# Patient Record
Sex: FEMALE | Race: WHITE | NOT HISPANIC OR LATINO | Employment: FULL TIME | URBAN - METROPOLITAN AREA
[De-identification: names, ages, dates, MRNs, and addresses within clinical notes are randomized per-mention and may not be internally consistent; named-entity substitution may affect disease eponyms.]

---

## 2017-01-06 ENCOUNTER — HOSPITAL ENCOUNTER (OUTPATIENT)
Dept: CARDIOLOGY | Facility: CLINIC | Age: 57
Discharge: HOME | End: 2017-01-06
Payer: COMMERCIAL

## 2017-01-06 ENCOUNTER — TELEPHONE (OUTPATIENT)
Dept: ELECTROPHYSIOLOGY | Facility: CLINIC | Age: 57
End: 2017-01-06

## 2017-01-06 ENCOUNTER — OFFICE VISIT (OUTPATIENT)
Dept: GASTROENTEROLOGY | Facility: CLINIC | Age: 57
End: 2017-01-06
Payer: COMMERCIAL

## 2017-01-06 ENCOUNTER — TELEPHONE (OUTPATIENT)
Dept: ENDOSCOPY | Facility: HOSPITAL | Age: 57
End: 2017-01-06

## 2017-01-06 VITALS
HEART RATE: 77 BPM | BODY MASS INDEX: 47.38 KG/M2 | DIASTOLIC BLOOD PRESSURE: 84 MMHG | SYSTOLIC BLOOD PRESSURE: 133 MMHG | HEIGHT: 65 IN | WEIGHT: 284.38 LBS

## 2017-01-06 DIAGNOSIS — I48.91 ATRIAL FIBRILLATION, UNSPECIFIED TYPE: ICD-10-CM

## 2017-01-06 DIAGNOSIS — I48.0 PAROXYSMAL ATRIAL FIBRILLATION: Primary | ICD-10-CM

## 2017-01-06 DIAGNOSIS — K22.10 ULCER OF ESOPHAGUS WITHOUT BLEEDING: Primary | ICD-10-CM

## 2017-01-06 DIAGNOSIS — I48.0 PAROXYSMAL ATRIAL FIBRILLATION: ICD-10-CM

## 2017-01-06 DIAGNOSIS — E66.01 MORBID OBESITY WITH BMI OF 45.0-49.9, ADULT: ICD-10-CM

## 2017-01-06 DIAGNOSIS — I10 ESSENTIAL HYPERTENSION: ICD-10-CM

## 2017-01-06 PROCEDURE — 93000 ELECTROCARDIOGRAM COMPLETE: CPT | Mod: S$GLB,,, | Performed by: INTERNAL MEDICINE

## 2017-01-06 PROCEDURE — 99999 PR PBB SHADOW E&M-EST. PATIENT-LVL III: CPT | Mod: PBBFAC,,, | Performed by: PHYSICIAN ASSISTANT

## 2017-01-06 PROCEDURE — 1159F MED LIST DOCD IN RCRD: CPT | Mod: S$GLB,,, | Performed by: PHYSICIAN ASSISTANT

## 2017-01-06 PROCEDURE — 3075F SYST BP GE 130 - 139MM HG: CPT | Mod: S$GLB,,, | Performed by: PHYSICIAN ASSISTANT

## 2017-01-06 PROCEDURE — 3079F DIAST BP 80-89 MM HG: CPT | Mod: S$GLB,,, | Performed by: PHYSICIAN ASSISTANT

## 2017-01-06 PROCEDURE — 99204 OFFICE O/P NEW MOD 45 MIN: CPT | Mod: S$GLB,,, | Performed by: PHYSICIAN ASSISTANT

## 2017-01-06 RX ORDER — LISINOPRIL 10 MG/1
10 TABLET ORAL
COMMUNITY
End: 2017-03-07 | Stop reason: SDUPTHER

## 2017-01-06 RX ORDER — METOPROLOL TARTRATE 25 MG/1
25 TABLET, FILM COATED ORAL
COMMUNITY
End: 2017-03-07 | Stop reason: SDUPTHER

## 2017-01-06 RX ORDER — HYDROCHLOROTHIAZIDE 25 MG/1
25 TABLET ORAL
COMMUNITY
End: 2017-01-25 | Stop reason: SDUPTHER

## 2017-01-06 RX ORDER — AZITHROMYCIN 500 MG/1
TABLET, FILM COATED ORAL
Refills: 0 | COMMUNITY
Start: 2016-11-04 | End: 2017-01-25

## 2017-01-06 NOTE — TELEPHONE ENCOUNTER
----- Message from Jocelin Yeung MA sent at 1/6/2017 10:11 AM CST -----  Contact: patient called  Batsheva please call the patient at [phone] she had a nose bleed yesterday  today it stop her blood pressure today is 150/97 heart rate is 102. Thank you.

## 2017-01-06 NOTE — TELEPHONE ENCOUNTER
Pt states that her BP has been fluctuating and HR this AM was 102. She could not give me exact values on BP. She also had a nose bleed with bright red blood just sitting at her desk yesterday. She is currently on her way to an appointment her at [facility] to see an NP for GI evaluation. EKG ordered and to be done prior to GI appointment. I informed her that once I have the results, I will discuss this with Dr. Bojorquez and call her back. She verbalized understanding and denied further questions, needs, and concerns.

## 2017-01-06 NOTE — PROGRESS NOTES
[facility] Consultation Note    Reason for Consult:  The primary encounter diagnosis was Ulcer of esophagus without bleeding. Diagnoses of Essential hypertension, Paroxysmal atrial fibrillation, and Morbid obesity with BMI of 45.0-49.9, adult were also pertinent to this visit.    PCP:   [facility]   [address] [facility] / [place] [address]    Referring MD:  Manny Bojorquez Md  [address] [place] [address]    HPI:  This is a 56 y.o. female referred by Dr. Bojorquez for evaluation of hx of an esophageal ulcer.  Diagnosed with an esophageal ulcer 10 yrs ago via endoscopy. Did not have a f/u to check for healing.  Currently being evaluated by cardiology for a fib. The plan is for her to start Eliquis, but need to rule out GI bleeding. Today she has no complaints. Denies GERD, no N/V/D, melena or rectal bleeding.     She is due for a screening colonoscopy, but does not wish to schedule it at this time.     ROS:  Constitutional: No fevers, chills, No weight loss  ENT: No allergies  CV: No chest pain  Pulm: No cough, No shortness of breath  Ophtho: No vision changes  GI: see HPI  Derm: No rash  Heme: No lymphadenopathy, No bruising  MSK: No arthritis  : No dysuria, No hematuria  Endo: No hot or cold intolerance  Neuro: No syncope, No seizure  Psych: No anxiety, No depression    Medical History:  has a past medical history of Arrhythmia; Bronchitis; High blood pressure; and Ulcer.    Surgical History:  has a past surgical history that includes Total hip arthroplasty and csection.    Family History: family history includes Colon cancer in her maternal grandmother. There is no history of Stomach cancer or Esophageal cancer..     Social History:  reports that she has never smoked. She does not have any smokeless tobacco history on file. She reports that she does not drink alcohol.    Review of patient's allergies indicates:   Allergen Reactions    Aspirin Other (See  "Comments)     "I don't take it because I've had ulcers"   ulcers    Meperidine        Current Outpatient Prescriptions on File Prior to Visit   Medication Sig Dispense Refill    hydrochlorothiazide (HYDRODIURIL) 25 MG tablet Take 25 mg by mouth once daily.  2    metoprolol tartrate (LOPRESSOR) 100 MG tablet Take 50 mg by mouth once daily.       lisinopril 10 MG tablet Take 10 mg by mouth once daily.  2     No current facility-administered medications on file prior to visit.          Objective Findings:    Vital Signs:  Visit Vitals    /84    Pulse 77    Ht 5' 5" (1.651 m)    Wt 129 kg (284 lb 6.3 oz)    BMI 47.33 kg/m2     Body mass index is 47.33 kg/(m^2).    Physical Exam:  General Appearance: Well appearing in no acute distress  Head:   Normocephalic, without obvious abnormality  Eyes:    No scleral icterus  ENT: Neck supple, Lips, mucosa, and tongue normal  Lungs: CTA bilaterally in anterior and posterior fields, no wheezes, no crackles.  Heart:  Irregular irregular, no murmurs heard  Abdomen: Soft, non tender, non distended with positive bowel sounds in all four quadrants.   Extremities: 2+ pulses, no clubbing, cyanosis or edema  Skin: No rash  Neurologic: AAO x 3      Labs:  Lab Results   Component Value Date    WBC 8.52 04/15/2006    HGB 12.7 04/15/2006    HCT 38.2 04/15/2006     04/15/2006    CHOL 224 (H) 01/28/2006    TRIG 246 (H) 01/28/2006    HDL 39.0 (L) 01/28/2006    ALT 15 01/28/2006    AST 17 01/28/2006     01/28/2006    K 4.2 01/28/2006     01/28/2006    CREATININE 0.8 01/28/2006    BUN 18 01/28/2006    CO2 26 01/28/2006    TSH 1.3 01/28/2006       Imaging:    Endoscopy:    EGD - 10yrs ago  Assessment:  1. Ulcer of esophagus without bleeding    2. Essential hypertension    3. Paroxysmal atrial fibrillation    4. Morbid obesity with BMI of 45.0-49.9, adult      Hx esophageal ulcers 10yrs ago. Did not have f/u EGD to confirm ulcer healing. Need to confirm healing prior " to starting anticoagulation for A Fib    Recommendations:  1. Schedule EGD to rule out esophageal ulcer and GI bleeding      No Follow-up on file.      Order summary:  Orders Placed This Encounter    Case request GI: ESOPHAGOGASTRODUODENOSCOPY (EGD)         Thank you so much for allowing me to participate in the care of Delmy Smith PA-C

## 2017-01-06 NOTE — LETTER
January 6, 2017      Manny Bojorquez MD  [address] [facility] [address]           [facility] Gastroenterology  [address]  [place] [address]  Phone: [phone]  Fax: [phone]          Patient: Delmy Shoemaker   MR Number: [id number]   YOB: 1960   Date of Visit: 1/6/2017       Dear Dr. Manny Bojorquez:    Thank you for referring Delmy Shoemaker to me for evaluation. Attached you will find relevant portions of my assessment and plan of care.    If you have questions, please do not hesitate to call me. I look forward to following Delmy Shoemaker along with you.    Sincerely,    Xenia Smith PA-C    Enclosure  CC:  No Recipients    If you would like to receive this communication electronically, please contact [email or web address] or [phone] to request more information on [employer] access.    For providers and/or their staff who would like to refer a patient to [facility], please contact us through our one-stop-shop provider referral line, [facility], at [phone].    If you feel you have received this communication in error or would no longer like to receive these types of communications, please e-mail [email or web address]

## 2017-01-06 NOTE — MR AVS SNAPSHOT
[facility] - Gastroenterology  [address]  [facility] [address]  Phone: [phone]  Fax: [phone]                  Delmy Shoemaker   2017 2:30 PM   Office Visit    Description:  Female : 1960   Provider:  Xenia Smith PA-C   Department:  [facility] - Gastroenterology           Reason for Visit     GI Problem           Diagnoses this Visit        Comments    Ulcer of esophagus without bleeding    -  Primary            To Do List           Future Appointments        Provider Department Dept Phone    2017 1:00 PM ECHO, [facility] - Echo/Stress Lab [phone]    2017 1:45 PM CARDIAC, PET IMAGING [facility] Cardiac [phone]    2017 3:30 PM HOLTER, EKG [facility] Arrhythmia [phone]    3/7/2017 10:00 AM EKG, APPT [facility] -[phone]    3/7/2017 10:40 AM Manny Bojorquez MD [facility] Arrhythmia [phone]      Goals (5 Years of Data)     None      [facility]     [facility] On Call Nurse Care Line -  Assistance  Registered nurses in the [facility] On Call Center provide clinical advisement, health education, appointment booking, and other advisory services.  Call for this free service at [phone].             Medications           Message regarding Medications     Verify the changes and/or additions to your medication regime listed below are the same as discussed with your clinician today.  If any of these changes or additions are incorrect, please notify your healthcare provider.             Verify that the below list of medications is an accurate representation of the medications you are currently taking.  If none reported, the list may be blank. If incorrect, please contact your healthcare provider. Carry this list with you in case of emergency.           Current Medications     hydrochlorothiazide (HYDRODIURIL) 25 MG tablet Take 25 mg by mouth once daily.    lisinopril 10 MG tablet Take 10 mg by mouth.    metoprolol  "tartrate (LOPRESSOR) 100 MG tablet Take 50 mg by mouth once daily.     azithromycin (ZITHROMAX) 500 MG tablet TAKE ONE TABLET BY MOUTH DAILY FOR 3 DAYS    hydrochlorothiazide (HYDRODIURIL) 25 MG tablet Take 25 mg by mouth.    lisinopril 10 MG tablet Take 10 mg by mouth once daily.    metoprolol tartrate (LOPRESSOR) 25 MG tablet Take 25 mg by mouth.           Clinical Reference Information           Vital Signs - Last Recorded  Most recent update: 1/6/2017  2:43 PM by Teddy Elena MA    BP Pulse Ht Wt BMI    133/84 77 5' 5" (1.651 m) 129 kg (284 lb 6.3 oz) 47.33 kg/m2      Blood Pressure          Most Recent Value    BP  133/84      Allergies as of 1/6/2017     Aspirin    Meperidine      Immunizations Administered on Date of Encounter - 1/6/2017     None      Orders Placed During Today's Visit      Normal Orders This Visit    Case request GI: ESOPHAGOGASTRODUODENOSCOPY (EGD)       [facility] Sign-Up     Activating your [facility] account is as easy as 1-2-3!     1) Visit [email or web address], select Sign Up Now, enter this activation code and your date of birth, then select Next.  [email or web address]  Expires: 2/11/2017  8:20 AM      2) Create a username and password to use when you visit [facility] in the future and select a security question in case you lose your password and select Next.    3) Enter your e-mail address and click Sign Up!    Additional Information  If you have questions, please e-mail [email or web address] or call [phone] to talk to our [facility] staff. Remember, [facility] is NOT to be used for urgent needs. For medical emergencies, dial 911.         "

## 2017-01-06 NOTE — TELEPHONE ENCOUNTER
Please see my previous telephone note. Pt brought me EKG, which shows SB.  Stated she feels fine. She brought me a copy of her recent vitals (which I will send to medical records to be scanned) which shows a fluctuating BP. HR's are between 50's-114 bmp and -184/60's-100's.   I asked that she wait in the lobby while I speak with Dr. Bojorquez.  Discussed with Dr. Bojorquez. He states that we should continue with the plan as scheduled (echo, stress test, and 48 HR holter), and then depending on the results of the 48 Hour holter, we can schedule a Zio patch, and follow up PCP in reference to BP. I discussed this with the Pt several minutes in the lobby. She verbalized understanding of all discussed and denied further questions, needs, and concerns. Will schedule Zio patch once 48 HR Holter completed and resulted. Pt ambulated out of clinic in stable condition.

## 2017-01-09 ENCOUNTER — CLINICAL SUPPORT (OUTPATIENT)
Dept: CARDIOLOGY | Facility: CLINIC | Age: 57
End: 2017-01-09
Payer: COMMERCIAL

## 2017-01-09 ENCOUNTER — HOSPITAL ENCOUNTER (OUTPATIENT)
Dept: CARDIOLOGY | Facility: CLINIC | Age: 57
Discharge: HOME | End: 2017-01-09
Payer: COMMERCIAL

## 2017-01-09 ENCOUNTER — CLINICAL SUPPORT (OUTPATIENT)
Dept: ELECTROPHYSIOLOGY | Facility: CLINIC | Age: 57
End: 2017-01-09
Payer: COMMERCIAL

## 2017-01-09 DIAGNOSIS — E66.01 MORBID OBESITY WITH BMI OF 45.0-49.9, ADULT: ICD-10-CM

## 2017-01-09 DIAGNOSIS — I10 ESSENTIAL HYPERTENSION: ICD-10-CM

## 2017-01-09 DIAGNOSIS — I48.0 PAROXYSMAL ATRIAL FIBRILLATION: Primary | ICD-10-CM

## 2017-01-09 DIAGNOSIS — I48.0 PAROXYSMAL ATRIAL FIBRILLATION: ICD-10-CM

## 2017-01-09 DIAGNOSIS — R07.9 CHEST PAIN, UNSPECIFIED TYPE: ICD-10-CM

## 2017-01-09 LAB
DIASTOLIC DYSFUNCTION: NO
DIASTOLIC DYSFUNCTION: NO
RETIRED EF AND QEF - SEE NOTES: 60 (ref 55–65)

## 2017-01-09 PROCEDURE — 99999 PR PBB SHADOW E&M-EST. PATIENT-LVL I: CPT | Mod: PBBFAC,,,

## 2017-01-09 PROCEDURE — 93224 XTRNL ECG REC UP TO 48 HRS: CPT | Mod: S$GLB,,, | Performed by: INTERNAL MEDICINE

## 2017-01-09 PROCEDURE — 78492 MYOCRD IMG PET MLT RST&STRS: CPT | Mod: S$GLB,,, | Performed by: INTERNAL MEDICINE

## 2017-01-09 PROCEDURE — 93306 TTE W/DOPPLER COMPLETE: CPT | Mod: S$GLB,,, | Performed by: INTERNAL MEDICINE

## 2017-01-09 PROCEDURE — A9555 RB82 RUBIDIUM: HCPCS | Mod: S$GLB,,, | Performed by: INTERNAL MEDICINE

## 2017-01-09 PROCEDURE — 93015 CV STRESS TEST SUPVJ I&R: CPT | Mod: S$GLB,,, | Performed by: INTERNAL MEDICINE

## 2017-01-09 PROCEDURE — 96372 THER/PROPH/DIAG INJ SC/IM: CPT | Mod: 59,S$GLB,, | Performed by: INTERNAL MEDICINE

## 2017-01-09 RX ORDER — FLECAINIDE ACETATE 100 MG/1
100 TABLET ORAL EVERY 12 HOURS
Qty: 60 TABLET | Refills: 11 | Status: SHIPPED | OUTPATIENT
Start: 2017-01-09 | End: 2017-12-07 | Stop reason: SDUPTHER

## 2017-01-11 ENCOUNTER — OFFICE VISIT (OUTPATIENT)
Dept: OTOLARYNGOLOGY | Facility: CLINIC | Age: 57
End: 2017-01-11
Payer: COMMERCIAL

## 2017-01-11 VITALS
SYSTOLIC BLOOD PRESSURE: 130 MMHG | DIASTOLIC BLOOD PRESSURE: 90 MMHG | TEMPERATURE: 97 F | HEART RATE: 54 BPM | BODY MASS INDEX: 47 KG/M2 | WEIGHT: 282.44 LBS

## 2017-01-11 DIAGNOSIS — R04.0 ANTERIOR EPISTAXIS: Primary | ICD-10-CM

## 2017-01-11 DIAGNOSIS — I48.0 PAROXYSMAL ATRIAL FIBRILLATION: ICD-10-CM

## 2017-01-11 PROCEDURE — 99203 OFFICE O/P NEW LOW 30 MIN: CPT | Mod: 25,S$GLB,, | Performed by: OTOLARYNGOLOGY

## 2017-01-11 PROCEDURE — 99999 PR PBB SHADOW E&M-EST. PATIENT-LVL III: CPT | Mod: PBBFAC,,, | Performed by: OTOLARYNGOLOGY

## 2017-01-11 PROCEDURE — 31231 NASAL ENDOSCOPY DX: CPT | Mod: S$GLB,,, | Performed by: OTOLARYNGOLOGY

## 2017-01-11 PROCEDURE — 1159F MED LIST DOCD IN RCRD: CPT | Mod: S$GLB,,, | Performed by: OTOLARYNGOLOGY

## 2017-01-11 PROCEDURE — 3080F DIAST BP >= 90 MM HG: CPT | Mod: S$GLB,,, | Performed by: OTOLARYNGOLOGY

## 2017-01-11 PROCEDURE — 3075F SYST BP GE 130 - 139MM HG: CPT | Mod: S$GLB,,, | Performed by: OTOLARYNGOLOGY

## 2017-01-11 NOTE — PROGRESS NOTES
"Chief Complaint   Patient presents with    Epistaxis         56 y.o. female presents with several week history of occasional nose bleeds starting just before [place]. They are typically left sided and are controlled with moderate pressure/Afrin. She is also concerned as this has led to a diagnosis of A fib and future anticoagulation. Denies nasal pain. No history of nasal packing.       Review of Systems     Constitutional: Negative for fatigue and unexpected weight change.   HENT: per HPI.  Eyes: Negative for visual disturbance.   Respiratory: Negative for shortness of breath, hemoptysis  Cardiovascular: Negative for chest pain and palpitations.   Genitourinary: Negative for dysuria and difficulty urinating.   Musculoskeletal: Negative for decreased ROM, back pain.   Skin: Negative for rash, sunburn, itching.   Neurological: Negative for dizziness and seizures.   Hematological: Negative for adenopathy. Does not bruise/bleed easily.   Psychiatric/Behavioral: Negative for agitation. The patient is not nervous/anxious.   Endocrine: Negative for rapid weight loss/weight gain, heat/cold intolerance.     Past Medical History   Diagnosis Date    Arrhythmia     Bronchitis     High blood pressure     Ulcer        Past Surgical History   Procedure Laterality Date    Total hip arthroplasty      Csection         family history includes Colon cancer in her maternal grandmother. There is no history of Stomach cancer or Esophageal cancer.    Pt  reports that she has never smoked. She does not have any smokeless tobacco history on file. She reports that she does not drink alcohol.    Review of patient's allergies indicates:   Allergen Reactions    Aspirin Other (See Comments)     "I don't take it because I've had ulcers"   ulcers    Meperidine         Physical Exam    Vitals:    01/11/17 1317   BP: (!) 130/90   Pulse: (!) 54   Temp: 96.5 °F (35.8 °C)     Body mass index is 47 kg/(m^2).    Physical Exam   Constitutional: " She is oriented to person, place, and time. She appears well-developed and well-nourished. No distress.   HENT:   Head: Normocephalic and atraumatic.   Right Ear: Hearing, tympanic membrane, external ear and ear canal normal. Tympanic membrane mobility is normal. No middle ear effusion. No decreased hearing is noted.   Left Ear: Hearing, tympanic membrane, external ear and ear canal normal. Tympanic membrane mobility is normal.  No middle ear effusion. No decreased hearing is noted.   Nose: Nose lacerations (left anterior septum with excoriation and prominent capillary in Keiselbach's plexus) present. No mucosal edema, rhinorrhea or nasal septal hematoma.   Mouth/Throat: Uvula is midline, oropharynx is clear and moist and mucous membranes are normal.   After informed consent obtained for nasal endoscopy. The nasal cavities were sprayed with Phenylephrine and Lidocaine. Nasal endoscopy then performed. No other masses or lesions seen posteriorly bilaterally. Silver nitrate applied to left anterior septum. Patient tolerated the procedure well.   Eyes: Conjunctivae, EOM and lids are normal. Pupils are equal, round, and reactive to light. Right eye exhibits no discharge. Left eye exhibits no discharge.   Neck: Trachea normal, normal range of motion and phonation normal. Neck supple. No tracheal tenderness present. No tracheal deviation, no edema and no erythema present. No thyroid mass and no thyromegaly present.   Cardiovascular: Normal heart sounds.    Pulmonary/Chest: Breath sounds normal. No stridor.   Abdominal: Soft.   Lymphadenopathy:     She has no cervical adenopathy.   Neurological: She is alert and oriented to person, place, and time.   Skin: Skin is warm and dry. No rash noted. She is not diaphoretic. No erythema. No pallor.   Psychiatric: She has a normal mood and affect.   Nursing note and vitals reviewed.          Assessment     1. Anterior epistaxis    2. Paroxysmal atrial fibrillation          Plan  In  summary, Ms. Shoemaker is a 56 year old female with recent episodes of epistaxis and concerned for new anticoagulation for A fib. Area of excoriation/dryness on left anterior septum. Silver nitrate applied in clinic today. Discussed prevention with humidified air, nasal saline spray, and vaseline as well as episodic treatment with pressure, Afrin prn, and ice packs. ED for severe episodes. Return to clinic prn.

## 2017-01-12 ENCOUNTER — TELEPHONE (OUTPATIENT)
Dept: ELECTROPHYSIOLOGY | Facility: CLINIC | Age: 57
End: 2017-01-12

## 2017-01-12 NOTE — TELEPHONE ENCOUNTER
Called Pt to discuss Holter Monitor results per Dr. Bojorquez, however, she did not answer. I left a detailed voicemail asking her to call me back at her earliest convenience.

## 2017-01-12 NOTE — TELEPHONE ENCOUNTER
----- Message from Manny Boojrquez MD sent at 1/12/2017  1:12 PM CST -----  We can cancel the Zio patch for now    ----- Message -----     From: Batsheva Platt, RN     Sent: 1/12/2017   1:01 PM       To: Manny Bojorquez MD    Did you still want to continue with the Zio patch? Or wait and see how she does on the medications?  ----- Message -----     From: Manny Bojorquez MD     Sent: 1/12/2017  12:48 PM       To: Batsheva Platt, RN    Please notify the patient she had multiple paroxysms of atrial fibrillation while wearing the monitor, longest episode was approximately 3.5 hours.  We will see how she does with her new medications.

## 2017-01-12 NOTE — PROGRESS NOTES
Please notify the patient she had multiple paroxysms of atrial fibrillation while wearing the monitor, longest episode was approximately 3.5 hours.  We will see how she does with her new medications.

## 2017-01-13 ENCOUNTER — SURGERY (OUTPATIENT)
Age: 57
End: 2017-01-13

## 2017-01-13 ENCOUNTER — ANESTHESIA (OUTPATIENT)
Dept: ENDOSCOPY | Facility: HOSPITAL | Age: 57
End: 2017-01-13
Payer: COMMERCIAL

## 2017-01-13 ENCOUNTER — ANESTHESIA EVENT (OUTPATIENT)
Dept: ENDOSCOPY | Facility: HOSPITAL | Age: 57
End: 2017-01-13
Payer: COMMERCIAL

## 2017-01-13 ENCOUNTER — TELEPHONE (OUTPATIENT)
Dept: ELECTROPHYSIOLOGY | Facility: CLINIC | Age: 57
End: 2017-01-13

## 2017-01-13 ENCOUNTER — HOSPITAL ENCOUNTER (OUTPATIENT)
Facility: HOSPITAL | Age: 57
Discharge: HOME | End: 2017-01-13
Attending: INTERNAL MEDICINE | Admitting: INTERNAL MEDICINE
Payer: COMMERCIAL

## 2017-01-13 VITALS
BODY MASS INDEX: 53.81 KG/M2 | HEIGHT: 61 IN | WEIGHT: 285 LBS | OXYGEN SATURATION: 98 % | HEART RATE: 57 BPM | RESPIRATION RATE: 20 BRPM | DIASTOLIC BLOOD PRESSURE: 65 MMHG | TEMPERATURE: 98 F | SYSTOLIC BLOOD PRESSURE: 131 MMHG

## 2017-01-13 DIAGNOSIS — K22.10 ULCER OF ESOPHAGUS WITHOUT BLEEDING: Primary | ICD-10-CM

## 2017-01-13 DIAGNOSIS — K22.10 ESOPHAGEAL ULCER: ICD-10-CM

## 2017-01-13 PROCEDURE — 88305 TISSUE EXAM BY PATHOLOGIST: CPT | Mod: 26,,, | Performed by: PATHOLOGY

## 2017-01-13 PROCEDURE — D9220A PRA ANESTHESIA: Mod: ANES,,, | Performed by: ANESTHESIOLOGY

## 2017-01-13 PROCEDURE — D9220A PRA ANESTHESIA: Mod: CRNA,,, | Performed by: NURSE ANESTHETIST, CERTIFIED REGISTERED

## 2017-01-13 PROCEDURE — 43239 EGD BIOPSY SINGLE/MULTIPLE: CPT | Mod: ,,, | Performed by: INTERNAL MEDICINE

## 2017-01-13 PROCEDURE — 37000008 HC ANESTHESIA 1ST 15 MINUTES: Performed by: INTERNAL MEDICINE

## 2017-01-13 PROCEDURE — 88305 TISSUE EXAM BY PATHOLOGIST: CPT | Performed by: PATHOLOGY

## 2017-01-13 PROCEDURE — 27201012 HC FORCEPS, HOT/COLD, DISP: Performed by: INTERNAL MEDICINE

## 2017-01-13 PROCEDURE — 43239 EGD BIOPSY SINGLE/MULTIPLE: CPT | Performed by: INTERNAL MEDICINE

## 2017-01-13 PROCEDURE — 63600175 PHARM REV CODE 636 W HCPCS: Performed by: NURSE ANESTHETIST, CERTIFIED REGISTERED

## 2017-01-13 PROCEDURE — 25000003 PHARM REV CODE 250: Performed by: INTERNAL MEDICINE

## 2017-01-13 PROCEDURE — 37000009 HC ANESTHESIA EA ADD 15 MINS: Performed by: INTERNAL MEDICINE

## 2017-01-13 PROCEDURE — 25000003 PHARM REV CODE 250: Performed by: NURSE ANESTHETIST, CERTIFIED REGISTERED

## 2017-01-13 PROCEDURE — 88342 IMHCHEM/IMCYTCHM 1ST ANTB: CPT | Mod: 26,,, | Performed by: PATHOLOGY

## 2017-01-13 RX ORDER — SODIUM CHLORIDE 9 MG/ML
INJECTION, SOLUTION INTRAVENOUS CONTINUOUS
Status: DISCONTINUED | OUTPATIENT
Start: 2017-01-13 | End: 2017-01-13 | Stop reason: HOSPADM

## 2017-01-13 RX ORDER — LIDOCAINE HYDROCHLORIDE 10 MG/ML
INJECTION, SOLUTION INTRAVENOUS
Status: DISCONTINUED | OUTPATIENT
Start: 2017-01-13 | End: 2017-01-13

## 2017-01-13 RX ORDER — PROPOFOL 10 MG/ML
VIAL (ML) INTRAVENOUS
Status: DISCONTINUED | OUTPATIENT
Start: 2017-01-13 | End: 2017-01-13

## 2017-01-13 RX ORDER — SODIUM CHLORIDE 9 MG/ML
INJECTION, SOLUTION INTRAVENOUS CONTINUOUS PRN
Status: DISCONTINUED | OUTPATIENT
Start: 2017-01-13 | End: 2017-01-13

## 2017-01-13 RX ADMIN — TOPICAL ANESTHETIC 2 EACH: 200 SPRAY DENTAL; PERIODONTAL at 01:01

## 2017-01-13 RX ADMIN — SODIUM CHLORIDE: 0.9 INJECTION, SOLUTION INTRAVENOUS at 12:01

## 2017-01-13 RX ADMIN — SODIUM CHLORIDE: 0.9 INJECTION, SOLUTION INTRAVENOUS at 11:01

## 2017-01-13 RX ADMIN — PROPOFOL 20 MG: 10 INJECTION, EMULSION INTRAVENOUS at 01:01

## 2017-01-13 RX ADMIN — PROPOFOL 50 MG: 10 INJECTION, EMULSION INTRAVENOUS at 01:01

## 2017-01-13 RX ADMIN — LIDOCAINE HYDROCHLORIDE 100 MG: 10 INJECTION, SOLUTION INTRAVENOUS at 01:01

## 2017-01-13 NOTE — TELEPHONE ENCOUNTER
----- Message from Batsheva Platt RN sent at 1/13/2017  9:29 AM CST -----  Contact: pt       ----- Message -----     From: Marbella Garcia     Sent: 1/13/2017   8:43 AM       To: [place] [facility] Staff    Batsheva Longs says she's returning your call in ref to her Holter results    Thanks

## 2017-01-13 NOTE — IP AVS SNAPSHOT
[facility]  [address]  [facility] [address]  Phone: [phone]           Patient Discharge Instructions     Our goal is to set you up for success. This packet includes information on your condition, medications, and your home care. It will help you to care for yourself so you don't get sicker and need to go back to the hospital.     Please ask your nurse if you have any questions.        There are many details to remember when preparing to leave the hospital. Here is what you will need to do:    1. Take your medicine. If you are prescribed medications, review your Medication List in the following pages. You may have new medications to  at the pharmacy and others that you'll need to stop taking. Review the instructions for how and when to take your medications. Talk with your doctor or nurses if you are unsure of what to do.     2. Go to your follow-up appointments. Specific follow-up information is listed in the following pages. Your may be contacted by a transition nurse or clinical provider about future appointments. Be sure we have all of the phone numbers to reach you, if needed. Please contact your provider's office if you are unable to make an appointment.     3. Watch for warning signs. Your doctor or nurse will give you detailed warning signs to watch for and when to call for assistance. These instructions may also include educational information about your condition. If you experience any of warning signs to your health, call your doctor.               [facility]  Unless otherwise directed by your provider, please contact [facility], our nurse care line that is available for 24/7 assistance.     [phone] (toll-free)    Registered nurses in the [facility] provide clinical advisement, health education, appointment booking, and other advisory services.                    ** Verify the list of medication(s) below is accurate and up  to date. Carry this with you in case of emergency. If your medications have changed, please notify your healthcare provider.             Medication List      ASK your doctor about these medications        Additional Info                      azithromycin 500 MG tablet   Commonly known as:  ZITHROMAX   Refills:  0    Instructions:  TAKE ONE TABLET BY MOUTH DAILY FOR 3 DAYS     Begin Date    AM    Noon    PM    Bedtime       flecainide 100 MG Tab   Commonly known as:  TAMBOCOR   Quantity:  60 tablet   Refills:  11   Dose:  100 mg    Instructions:  Take 1 tablet (100 mg total) by mouth every 12 (twelve) hours.     Begin Date    AM    Noon    PM    Bedtime       * hydrochlorothiazide 25 MG tablet   Commonly known as:  HYDRODIURIL   Refills:  0   Dose:  25 mg    Instructions:  Take 25 mg by mouth.     Begin Date    AM    Noon    PM    Bedtime       * hydrochlorothiazide 25 MG tablet   Commonly known as:  HYDRODIURIL   Refills:  2   Dose:  25 mg    Instructions:  Take 25 mg by mouth once daily.     Begin Date    AM    Noon    PM    Bedtime       * lisinopril 10 MG tablet   Refills:  0   Dose:  10 mg    Instructions:  Take 10 mg by mouth.     Begin Date    AM    Noon    PM    Bedtime       * lisinopril 10 MG tablet   Refills:  2   Dose:  10 mg    Instructions:  Take 10 mg by mouth once daily.     Begin Date    AM    Noon    PM    Bedtime       * metoprolol tartrate 25 MG tablet   Commonly known as:  LOPRESSOR   Refills:  0   Dose:  25 mg    Instructions:  Take 25 mg by mouth.     Begin Date    AM    Noon    PM    Bedtime       * metoprolol tartrate 100 MG tablet   Commonly known as:  LOPRESSOR   Refills:  0   Dose:  50 mg    Instructions:  Take 50 mg by mouth once daily.     Begin Date    AM    Noon    PM    Bedtime       * Notice:  This list has 6 medication(s) that are the same as other medications prescribed for you. Read the directions carefully, and ask your doctor or other care provider to review them with you.                Please bring to all follow up appointments:    1. A copy of your discharge instructions.  2. All medicines you are currently taking in their original bottles.  3. Identification and insurance card.    Please arrive 15 minutes ahead of scheduled appointment time.    Please call 24 hours in advance if you must reschedule your appointment and/or time.        Your Scheduled Appointments     Mar 07, 2017 10:00 AM CST   EKG with EKG, APPT   [facility] - EKG ([facility] )    [address]  [place] [id number] [phone]            Mar 07, 2017 10:40 AM CST   Established Patient Visit with Manny Bojorquez MD   [facility] - Arrhythmia ([facility] )    [address]  [place] [id number] [phone]              Your Future Surgeries/Procedures     Jan 13, 2017   Surgery with Demarcus Whyte MD   [facility] ([facility])    [address] [facility] [id number] [phone]                  Discharge Instructions         Upper GI Endoscopy     During endoscopy, a long, flexible tube is used to view the inside of your upper GI tract.      Upper GI endoscopy allows your healthcare provider to look directly into the beginning of your gastrointestinal (GI) tract. The esophagus, stomach, and duodenum (the first part of the small intestine) make up the upper GI tract.   Before the exam  Follow these and any other instructions you are given before your endoscopy. If you dont follow the healthcare providers instructions carefully, the test may need to be canceled or done over:  · Don't eat or drink anything after midnight the night before your exam. If your exam is in the afternoon, drink only clear liquids in the morning. Don't eat or drink anything for 8 hours before the exam. In some cases, you may be able to take medicines with sips of water until 2 hours before the procedure. Speak with your healthcare provider about this.   · Bring your X-rays  and any other test results you have.  · Because you will be sedated, arrange for an adult to drive you home after the exam.  · Tell your healthcare provider before the exam if you are taking any medicines or have any medical problems.  The procedure  Here is what to expect:  · You will lie on the endoscopy table. Usually patients lie on the left side.  · You will be monitored and given oxygen.  · Your throat may be numbed with a spray or gargle. You are given medicine through an intravenous (IV) line that will help you relax and remain comfortable. You may be awake or asleep during the procedure.  · The healthcare provider will put the endoscope in your mouth and down your esophagus. It is thinner than most pieces of food that you swallow. It will not affect your breathing. The medicine helps keep you from gagging.  · Air is put into your GI tract to expand it. It can make you burp.  · During the procedure, the healthcare provider can take biopsies (tissue samples), remove abnormalities, such as polyps, or treat abnormalities through a variety of devices placed through the endoscope. You will not feel this.   · The endoscope carries images of your upper GI tract to a video screen. If you are awake, you may be able to look at the images.  · After the procedure is done, you will rest for a time. An adult must drive you home.  When to call your healthcare provider  Contact your healthcare provider if you have:  · Black or tarry stools, or blood in your stool  · Fever  · Pain in your belly that does not go away  · Nausea and vomiting, or vomiting blood   © 2939-3310 [employer]. [address] [place] [address], [place] [address]. All rights reserved. This information is not intended as a substitute for professional medical care. Always follow your healthcare professional's instructions.            Admission Information     Date & Time Provider Department CSN    1/13/2017 11:08 AM Austin C. Thomas, MD [facility]  "[facility] [id number]      Care Providers     Provider Role Specialty Primary office phone    Demarcus Whyte MD Attending Provider Gastroenterology [phone]    Demarcus Whyte MD Surgeon  Gastroenterology [phone]      Your Vitals Were     BP Pulse Temp Resp Height Weight    133/80 62 98.1 °F (36.7 °C) 20 5' 1" (1.549 m) 129.3 kg (285 lb)    SpO2 BMI             96% 53.85 kg/m2         Recent Lab Values     No lab values to display.      Pending Labs     Order Current Status    Specimen to Pathology - Surgery Collected (01/13/17 1311)      Allergies as of 1/13/2017        Reactions    Aspirin Other (See Comments)    "I don't take it because I've had ulcers"   ulcers    Meperidine       Advance Directives     An advance directive is a document which, in the event you are no longer able to make decisions for yourself, tells your healthcare team what kind of treatment you do or do not want to receive, or who you would like to make those decisions for you.  If you do not currently have an advance directive, [facility] encourages you to create one.  For more information call:  [phone]), [phone] ([phone]),  or log on to [email or web address].        Language Assistance Services     ATTENTION: Language assistance services are available, free of charge. Please call [phone].      ATENCIÓN: Si habla español, tiene a alves disposición servicios gratuitos de asistencia lingüística. Llame al [phone].     CHÚ Ý: N?u b?n nói Ti?ng Vi?t, có các d?ch v? h? tr? ngôn ng? mi?n phí dành cho b?n. G?i s? [phone].        [facility] Sign-Up     Activating your [facility] account is as easy as 1-2-3!     1) Visit [email or web address], select Sign Up Now, enter this activation code and your date of birth, then select Next.  [email or web address]  Expires: 2/11/2017  8:20 AM      2) Create a username and password to use when you visit [facility] in the future and select a security " question in case you lose your password and select Next.    3) Enter your e-mail address and click Sign Up!    Additional Information  If you have questions, please e-mail [email or web address] or call [phone] to talk to our [facility] staff. Remember, [facility] is NOT to be used for urgent needs. For medical emergencies, dial 911.          [facility] complies with applicable Federal civil rights laws and does not discriminate on the basis of race, color, national origin, age, disability, or sex.

## 2017-01-13 NOTE — ANESTHESIA POSTPROCEDURE EVALUATION
"Anesthesia Post Evaluation    Patient: Delmy Shoemaker    Procedure(s) Performed: Procedure(s) (LRB):  ESOPHAGOGASTRODUODENOSCOPY (EGD) (N/A)    Final Anesthesia Type: general  Patient location during evaluation: PACU  Patient participation: Yes- Able to Participate  Level of consciousness: awake and alert  Post-procedure vital signs: reviewed and stable  Pain management: adequate  Airway patency: patent  PONV status at discharge: No PONV  Anesthetic complications: no      Cardiovascular status: blood pressure returned to baseline  Respiratory status: unassisted  Hydration status: euvolemic  Follow-up not needed.        Visit Vitals    /65    Pulse (!) 57    Temp 36.7 °C (98.1 °F)    Resp 20    Ht 5' 1" (1.549 m)    Wt 129.3 kg (285 lb)    SpO2 98%    Breastfeeding No    BMI 53.85 kg/m2       Pain/Yojana Score: Pain Assessment Performed: Yes (1/13/2017  1:48 PM)  Presence of Pain: denies (1/13/2017  1:48 PM)  Pain Rating Prior to Med Admin: 0 (1/13/2017 11:37 AM)  Yojana Score: 10 (1/13/2017  1:48 PM)      "

## 2017-01-13 NOTE — IP AVS SNAPSHOT
[address]  [place] [address]  Phone: [phone]           I have received a copy of my After Visit Summary and discharge instructions from [facility].    INSTRUCTIONS RECEIVED AND UNDERSTOOD BY:                     Patient/Patient Representative: ________________________________________________________________     Date/Time: ________________________________________________________________                     Instructions Given By: ________________________________________________________________     Date/Time: ________________________________________________________________

## 2017-01-13 NOTE — H&P (VIEW-ONLY)
[facility] Consultation Note    Reason for Consult:  The primary encounter diagnosis was Ulcer of esophagus without bleeding. Diagnoses of Essential hypertension, Paroxysmal atrial fibrillation, and Morbid obesity with BMI of 45.0-49.9, adult were also pertinent to this visit.    PCP:   [facility]   [address] [facility] / [place] [address]    Referring MD:  Manny Bojorquez Md  [address] [place] [address]    HPI:  This is a 56 y.o. female referred by Dr. Bojorquez for evaluation of hx of an esophageal ulcer.  Diagnosed with an esophageal ulcer 10 yrs ago via endoscopy. Did not have a f/u to check for healing.  Currently being evaluated by cardiology for a fib. The plan is for her to start Eliquis, but need to rule out GI bleeding. Today she has no complaints. Denies GERD, no N/V/D, melena or rectal bleeding.     She is due for a screening colonoscopy, but does not wish to schedule it at this time.     ROS:  Constitutional: No fevers, chills, No weight loss  ENT: No allergies  CV: No chest pain  Pulm: No cough, No shortness of breath  Ophtho: No vision changes  GI: see HPI  Derm: No rash  Heme: No lymphadenopathy, No bruising  MSK: No arthritis  : No dysuria, No hematuria  Endo: No hot or cold intolerance  Neuro: No syncope, No seizure  Psych: No anxiety, No depression    Medical History:  has a past medical history of Arrhythmia; Bronchitis; High blood pressure; and Ulcer.    Surgical History:  has a past surgical history that includes Total hip arthroplasty and csection.    Family History: family history includes Colon cancer in her maternal grandmother. There is no history of Stomach cancer or Esophageal cancer..     Social History:  reports that she has never smoked. She does not have any smokeless tobacco history on file. She reports that she does not drink alcohol.    Review of patient's allergies indicates:   Allergen Reactions    Aspirin Other (See  "Comments)     "I don't take it because I've had ulcers"   ulcers    Meperidine        Current Outpatient Prescriptions on File Prior to Visit   Medication Sig Dispense Refill    hydrochlorothiazide (HYDRODIURIL) 25 MG tablet Take 25 mg by mouth once daily.  2    metoprolol tartrate (LOPRESSOR) 100 MG tablet Take 50 mg by mouth once daily.       lisinopril 10 MG tablet Take 10 mg by mouth once daily.  2     No current facility-administered medications on file prior to visit.          Objective Findings:    Vital Signs:  Visit Vitals    /84    Pulse 77    Ht 5' 5" (1.651 m)    Wt 129 kg (284 lb 6.3 oz)    BMI 47.33 kg/m2     Body mass index is 47.33 kg/(m^2).    Physical Exam:  General Appearance: Well appearing in no acute distress  Head:   Normocephalic, without obvious abnormality  Eyes:    No scleral icterus  ENT: Neck supple, Lips, mucosa, and tongue normal  Lungs: CTA bilaterally in anterior and posterior fields, no wheezes, no crackles.  Heart:  Irregular irregular, no murmurs heard  Abdomen: Soft, non tender, non distended with positive bowel sounds in all four quadrants.   Extremities: 2+ pulses, no clubbing, cyanosis or edema  Skin: No rash  Neurologic: AAO x 3      Labs:  Lab Results   Component Value Date    WBC 8.52 04/15/2006    HGB 12.7 04/15/2006    HCT 38.2 04/15/2006     04/15/2006    CHOL 224 (H) 01/28/2006    TRIG 246 (H) 01/28/2006    HDL 39.0 (L) 01/28/2006    ALT 15 01/28/2006    AST 17 01/28/2006     01/28/2006    K 4.2 01/28/2006     01/28/2006    CREATININE 0.8 01/28/2006    BUN 18 01/28/2006    CO2 26 01/28/2006    TSH 1.3 01/28/2006       Imaging:    Endoscopy:    EGD - 10yrs ago  Assessment:  1. Ulcer of esophagus without bleeding    2. Essential hypertension    3. Paroxysmal atrial fibrillation    4. Morbid obesity with BMI of 45.0-49.9, adult      Hx esophageal ulcers 10yrs ago. Did not have f/u EGD to confirm ulcer healing. Need to confirm healing prior " to starting anticoagulation for A Fib    Recommendations:  1. Schedule EGD to rule out esophageal ulcer and GI bleeding      No Follow-up on file.      Order summary:  Orders Placed This Encounter    Case request GI: ESOPHAGOGASTRODUODENOSCOPY (EGD)         Thank you so much for allowing me to participate in the care of Delmy Smith PA-C

## 2017-01-13 NOTE — ANESTHESIA PREPROCEDURE EVALUATION
01/13/2017  Delmy Shoemaker is a 56 y.o., female.  56 year old female with pmh of afib planning on going on eliquis, stomach ulcer, htn presents for EGD.  Pt's afib is controlled with flecainide.  [facility] Anesthesia Evaluation    I have reviewed the Patient Summary Reports.        Review of Systems  Anesthesia Hx:   Denies Personal Hx of Anesthesia complications.   Cardiovascular:   Hypertension Dysrhythmias    Hepatic/GI:   PUD,        Physical Exam  General:  Well nourished    Airway/Jaw/Neck:  Airway Findings: Mouth Opening: Normal Tongue: Normal  General Airway Assessment: Adult  Mallampati: III  TM Distance: Normal, at least 6 cm      Dental:  Dental Findings: In tact   Chest/Lungs:  Chest/Lungs Clear    Heart/Vascular:  Heart Findings: Normal            Anesthesia Plan  Type of Anesthesia, risks & benefits discussed:  Anesthesia Type:  general  Patient's Preference:   Intra-op Monitoring Plan:   Intra-op Monitoring Plan Comments:   Post Op Pain Control Plan:   Post Op Pain Control Plan Comments:   Induction:   IV  Beta Blocker:  Patient is on a Beta-Blocker and has received one dose within the past 24 hours (No further documentation required).       Informed Consent: Patient understands risks and agrees with Anesthesia plan.  Questions answered. Anesthesia consent signed with patient.  ASA Score: 3     Day of Surgery Review of History & Physical:  There are no significant changes.          Ready For Surgery From Anesthesia Perspective.

## 2017-01-13 NOTE — DISCHARGE INSTRUCTIONS

## 2017-01-13 NOTE — TRANSFER OF CARE
"Anesthesia Transfer of Care Note    Patient: Delmy Shoemaker    Procedure(s) Performed: Procedure(s) (LRB):  ESOPHAGOGASTRODUODENOSCOPY (EGD) (N/A)    Patient location: PACU    Anesthesia Type: general    Transport from OR: Transported from OR on room air with adequate spontaneous ventilation    Post pain: adequate analgesia    Post assessment: no apparent anesthetic complications    Post vital signs: stable    Level of consciousness: awake and alert    Nausea/Vomiting: no nausea/vomiting    Complications: none          Last vitals:   Visit Vitals    BP (!) 118/57 (BP Location: Left arm, Patient Position: Lying, BP Method: Automatic)    Pulse (!) 51    Temp 36.8 °C (98.3 °F) (Oral)    Resp 18    Ht 5' 1" (1.549 m)    Wt 129.3 kg (285 lb)    SpO2 96%    Breastfeeding No    BMI 53.85 kg/m2     "

## 2017-01-16 ENCOUNTER — DOCUMENTATION ONLY (OUTPATIENT)
Dept: GASTROENTEROLOGY | Facility: CLINIC | Age: 57
End: 2017-01-16

## 2017-01-16 ENCOUNTER — TELEPHONE (OUTPATIENT)
Dept: ELECTROPHYSIOLOGY | Facility: CLINIC | Age: 57
End: 2017-01-16

## 2017-01-16 DIAGNOSIS — I48.0 PAROXYSMAL ATRIAL FIBRILLATION: Primary | ICD-10-CM

## 2017-01-16 RX ORDER — OMEPRAZOLE 20 MG/1
20 CAPSULE, DELAYED RELEASE ORAL DAILY
Qty: 30 CAPSULE | Refills: 11 | Status: SHIPPED | OUTPATIENT
Start: 2017-01-16 | End: 2018-01-22 | Stop reason: SDUPTHER

## 2017-01-16 NOTE — PROGRESS NOTES
Okay to be anticoagulated from GI stand point. Will need Omeprazole 20 mg daily for gastro protection.

## 2017-01-19 ENCOUNTER — TELEPHONE (OUTPATIENT)
Dept: GASTROENTEROLOGY | Facility: CLINIC | Age: 57
End: 2017-01-19

## 2017-01-19 NOTE — TELEPHONE ENCOUNTER
----- Message from Demarcus Whyte MD sent at 1/19/2017  9:45 AM CST -----  Please notify patient, the stomach biopsies did not reveal any H.pylori.

## 2017-01-20 ENCOUNTER — TELEPHONE (OUTPATIENT)
Dept: ENDOSCOPY | Facility: HOSPITAL | Age: 57
End: 2017-01-20

## 2017-01-25 ENCOUNTER — OFFICE VISIT (OUTPATIENT)
Dept: OTOLARYNGOLOGY | Facility: CLINIC | Age: 57
End: 2017-01-25
Payer: COMMERCIAL

## 2017-01-25 ENCOUNTER — TELEPHONE (OUTPATIENT)
Dept: ELECTROPHYSIOLOGY | Facility: CLINIC | Age: 57
End: 2017-01-25

## 2017-01-25 VITALS
DIASTOLIC BLOOD PRESSURE: 74 MMHG | WEIGHT: 283.75 LBS | SYSTOLIC BLOOD PRESSURE: 125 MMHG | HEART RATE: 80 BPM | BODY MASS INDEX: 53.61 KG/M2 | TEMPERATURE: 98 F

## 2017-01-25 DIAGNOSIS — R04.0 EPISTAXIS, RECURRENT: Primary | ICD-10-CM

## 2017-01-25 PROCEDURE — 3074F SYST BP LT 130 MM HG: CPT | Mod: S$GLB,,, | Performed by: OTOLARYNGOLOGY

## 2017-01-25 PROCEDURE — 3078F DIAST BP <80 MM HG: CPT | Mod: S$GLB,,, | Performed by: OTOLARYNGOLOGY

## 2017-01-25 PROCEDURE — 99214 OFFICE O/P EST MOD 30 MIN: CPT | Mod: S$GLB,,, | Performed by: OTOLARYNGOLOGY

## 2017-01-25 PROCEDURE — 1159F MED LIST DOCD IN RCRD: CPT | Mod: S$GLB,,, | Performed by: OTOLARYNGOLOGY

## 2017-01-25 PROCEDURE — 99999 PR PBB SHADOW E&M-EST. PATIENT-LVL III: CPT | Mod: PBBFAC,,, | Performed by: OTOLARYNGOLOGY

## 2017-01-25 RX ORDER — CEPHALEXIN 500 MG/1
500 CAPSULE ORAL
COMMUNITY
Start: 2017-01-25 | End: 2017-02-04

## 2017-01-25 NOTE — TELEPHONE ENCOUNTER
Mrs. Shoemaker has been dealing with recurrent epistaxis, worse sense starting eliquis.  She currently has a nasal rocket in place.  I instructed her to hold her eliquis until this issue is settled with ENT.  I sent an [employer] message to her ENT requesting to inform me when they feel she is safe for anti-coagulation.  She has had a great response to Flecainide and has only noticed one episode of afib (~45 minutes) since starting it.  She is having resting bradycardia now in the low 50s.  Instructed her to go back to 25mg of metoprolol daily.

## 2017-01-26 NOTE — PROGRESS NOTES
Chief Complaint   Patient presents with    Follow-up     ED [facility]         56 y.o. female presents with several week history of occasional nose bleeds starting just before [place]. They are typically left sided and are controlled with moderate pressure/Afrin. Silver nitrate applied to left anterior septum at last visit. Recently started on Eliquis for Afib. Was bending over yesterday and noted increased facial pressure and then subsequent epistaxis. This episode resolved somewhat but then recurred this morning with increased bleeding. She then went to to her local ED in [place] where she was packed on the left side with a rhinorocket with resolve.     Review of Systems     Constitutional: Negative for fatigue and unexpected weight change.   HENT: per HPI.  Eyes: Negative for visual disturbance.   Respiratory: Negative for shortness of breath, hemoptysis  Cardiovascular: Negative for chest pain and palpitations.   Genitourinary: Negative for dysuria and difficulty urinating.   Musculoskeletal: Negative for decreased ROM, back pain.   Skin: Negative for rash, sunburn, itching.   Neurological: Negative for dizziness and seizures.   Hematological: Negative for adenopathy. Does not bruise/bleed easily.   Psychiatric/Behavioral: Negative for agitation. The patient is not nervous/anxious.   Endocrine: Negative for rapid weight loss/weight gain, heat/cold intolerance.     Past Medical History   Diagnosis Date    Arrhythmia     Bronchitis     High blood pressure     Ulcer        Past Surgical History   Procedure Laterality Date    Total hip arthroplasty      Csection         family history includes Cancer in her maternal grandmother; Colon cancer (age of onset: 70) in her maternal grandmother. There is no history of Stomach cancer or Esophageal cancer.    Pt  reports that she has never smoked. She does not have any smokeless tobacco history on file. She reports that she does not drink  "alcohol.    Review of patient's allergies indicates:   Allergen Reactions    Aspirin Other (See Comments)     "I don't take it because I've had ulcers"   ulcers    Meperidine         Physical Exam    Vitals:    01/25/17 1410   BP: 125/74   Pulse: 80   Temp: 97.8 °F (36.6 °C)     Body mass index is 53.61 kg/(m^2).    Physical Exam   Constitutional: She is oriented to person, place, and time. She appears well-developed and well-nourished. No distress.   HENT:   Head: Normocephalic and atraumatic.   Right Ear: Hearing, tympanic membrane, external ear and ear canal normal. Tympanic membrane mobility is normal. No middle ear effusion. No decreased hearing is noted.   Left Ear: Hearing, tympanic membrane, external ear and ear canal normal. Tympanic membrane mobility is normal.  No middle ear effusion. No decreased hearing is noted.   Nose: Nose lacerations: left anterior septum with excoriation and prominent capillary in Keiselbach's plexus. Epistaxis (rhinorocket in place on L. No current bleeding. OP clear of blood.) is observed.   Mouth/Throat: Uvula is midline, oropharynx is clear and moist and mucous membranes are normal.   Left nasal cavity with rhinorocket in place. Minimal oozing anteriorly.    Eyes: Conjunctivae, EOM and lids are normal. Pupils are equal, round, and reactive to light. Right eye exhibits no discharge. Left eye exhibits no discharge.   Neck: Trachea normal, normal range of motion and phonation normal. Neck supple. No tracheal tenderness present. No tracheal deviation, no edema and no erythema present. No thyroid mass and no thyromegaly present.   Cardiovascular: Normal heart sounds.    Pulmonary/Chest: Breath sounds normal. No stridor.   Abdominal: Soft.   Lymphadenopathy:     She has no cervical adenopathy.   Neurological: She is alert and oriented to person, place, and time.   Skin: Skin is warm and dry. No rash noted. She is not diaphoretic. No erythema. No pallor.   Psychiatric: She has a " normal mood and affect.   Nursing note and vitals reviewed.          Assessment     1. Epistaxis, recurrent          Plan  In summary, Ms. Shoemaker is a 56 year old female with recent left rhinorocket packing for episode of epistaxis on anticoagulation for Afib. She is to see her cardiologist later today to discuss holding Eliquis for now. She is to follow up on Monday after lab draw for CBC, coags for re-evaluation. Discussed Afrin prn and continuing nasal saline/vaseline in the right nare. All questions were answered and she is in agreement with the plan.

## 2017-01-30 ENCOUNTER — LAB VISIT (OUTPATIENT)
Dept: LAB | Facility: HOSPITAL | Age: 57
End: 2017-01-30
Attending: OTOLARYNGOLOGY
Payer: COMMERCIAL

## 2017-01-30 ENCOUNTER — OFFICE VISIT (OUTPATIENT)
Dept: OTOLARYNGOLOGY | Facility: CLINIC | Age: 57
End: 2017-01-30
Payer: COMMERCIAL

## 2017-01-30 VITALS
BODY MASS INDEX: 53.19 KG/M2 | TEMPERATURE: 95 F | DIASTOLIC BLOOD PRESSURE: 74 MMHG | HEART RATE: 58 BPM | WEIGHT: 281.5 LBS | SYSTOLIC BLOOD PRESSURE: 125 MMHG

## 2017-01-30 DIAGNOSIS — R04.0 EPISTAXIS: Primary | ICD-10-CM

## 2017-01-30 DIAGNOSIS — R04.0 EPISTAXIS, RECURRENT: ICD-10-CM

## 2017-01-30 DIAGNOSIS — I48.0 PAROXYSMAL ATRIAL FIBRILLATION: ICD-10-CM

## 2017-01-30 LAB
APTT BLDCRRT: 22.8 SEC
BASOPHILS # BLD AUTO: 0.02 K/UL
BASOPHILS NFR BLD: 0.2 %
DIFFERENTIAL METHOD: ABNORMAL
EOSINOPHIL # BLD AUTO: 0.1 K/UL
EOSINOPHIL NFR BLD: 0.9 %
ERYTHROCYTE [DISTWIDTH] IN BLOOD BY AUTOMATED COUNT: 13 %
HCT VFR BLD AUTO: 38.2 %
HGB BLD-MCNC: 13 G/DL
INR PPP: 0.9
LYMPHOCYTES # BLD AUTO: 2.3 K/UL
LYMPHOCYTES NFR BLD: 26 %
MCH RBC QN AUTO: 32.9 PG
MCHC RBC AUTO-ENTMCNC: 34 %
MCV RBC AUTO: 97 FL
MONOCYTES # BLD AUTO: 0.5 K/UL
MONOCYTES NFR BLD: 5.9 %
NEUTROPHILS # BLD AUTO: 6 K/UL
NEUTROPHILS NFR BLD: 66.8 %
PLATELET # BLD AUTO: 311 K/UL
PMV BLD AUTO: 10.6 FL
PROTHROMBIN TIME: 9.8 SEC
RBC # BLD AUTO: 3.95 M/UL
WBC # BLD AUTO: 8.99 K/UL

## 2017-01-30 PROCEDURE — 85025 COMPLETE CBC W/AUTO DIFF WBC: CPT

## 2017-01-30 PROCEDURE — 3078F DIAST BP <80 MM HG: CPT | Mod: S$GLB,,, | Performed by: NURSE PRACTITIONER

## 2017-01-30 PROCEDURE — 1159F MED LIST DOCD IN RCRD: CPT | Mod: S$GLB,,, | Performed by: NURSE PRACTITIONER

## 2017-01-30 PROCEDURE — 99999 PR PBB SHADOW E&M-EST. PATIENT-LVL III: CPT | Mod: PBBFAC,,, | Performed by: NURSE PRACTITIONER

## 2017-01-30 PROCEDURE — 85610 PROTHROMBIN TIME: CPT

## 2017-01-30 PROCEDURE — 85730 THROMBOPLASTIN TIME PARTIAL: CPT

## 2017-01-30 PROCEDURE — 3074F SYST BP LT 130 MM HG: CPT | Mod: S$GLB,,, | Performed by: NURSE PRACTITIONER

## 2017-01-30 PROCEDURE — 36415 COLL VENOUS BLD VENIPUNCTURE: CPT

## 2017-01-30 PROCEDURE — 99213 OFFICE O/P EST LOW 20 MIN: CPT | Mod: S$GLB,,, | Performed by: NURSE PRACTITIONER

## 2017-01-30 NOTE — PROGRESS NOTES
Chief Complaint   Patient presents with    Follow-up         56 y.o. female presents for a follow up visit.  She was originally seen by Dr. Jade Holland with a several week history of occasional nose bleeds starting just before [place]. They are typically left sided and are controlled with moderate pressure/Afrin. Silver nitrate applied to left anterior septum at last visit. Recently started on Eliquis for Afib. Was bending over on 1/24 and noted increased facial pressure and then subsequent epistaxis. This episode resolved somewhat but then recurred the next morning with increased bleeding. She then went to to her local ED in [place] where she was packed on the left side with a rhinorocket with resolve. She admits she has not started the prophylactic Keflex yet. She denies fever or chills. She has not had any bleeding around the packing or noted any bleeding to the back of throat. Her cardiologist had her hold her Elaquist for the past 5 days.    Review of Systems     Constitutional: Negative for fatigue and unexpected weight change.   HENT: per HPI.  Eyes: Negative for visual disturbance.   Respiratory: Negative for shortness of breath, hemoptysis  Cardiovascular: Negative for chest pain and palpitations.   Genitourinary: Negative for dysuria and difficulty urinating.   Musculoskeletal: Negative for decreased ROM, back pain.   Skin: Negative for rash, sunburn, itching.   Neurological: Negative for dizziness and seizures.   Hematological: Negative for adenopathy. Does not bruise/bleed easily.   Psychiatric/Behavioral: Negative for agitation. The patient is not nervous/anxious.   Endocrine: Negative for rapid weight loss/weight gain, heat/cold intolerance.     Past Medical History   Diagnosis Date    Arrhythmia     Bronchitis     High blood pressure     Ulcer        Past Surgical History   Procedure Laterality Date    Total hip arthroplasty      Csection         family history includes Cancer in her  "maternal grandmother; Colon cancer (age of onset: 70) in her maternal grandmother. There is no history of Stomach cancer or Esophageal cancer.    Pt  reports that she has never smoked. She does not have any smokeless tobacco history on file. She reports that she does not drink alcohol.    Review of patient's allergies indicates:   Allergen Reactions    Aspirin Other (See Comments)     "I don't take it because I've had ulcers"   ulcers    Meperidine         Physical Exam    Vitals:    01/30/17 0830   BP: 125/74   Pulse: (!) 58   Temp: (!) 95.2 °F (35.1 °C)     Body mass index is 53.19 kg/(m^2).    Physical Exam   Constitutional: She is oriented to person, place, and time. Vital signs are normal. She appears well-developed and well-nourished. She is cooperative. She does not have a sickly appearance. She does not appear ill. No distress.   HENT:   Head: Normocephalic and atraumatic.   Right Ear: Hearing normal.   Left Ear: Hearing normal.   Nose: Rhinorrhea and nose lacerations (left anterior septum with excoriation and prominent capillary in Keiselbach's plexus) present. No epistaxis (rhinorocket in place on L. No current bleeding. OP clear of blood.). Foreign body is present.       Mouth/Throat: Uvula is midline, oropharynx is clear and moist and mucous membranes are normal.   Left nasal cavity with rhinorocket in place. Minimal oozing anteriorly.    Eyes: Conjunctivae, EOM and lids are normal. Pupils are equal, round, and reactive to light. Right eye exhibits no discharge. Left eye exhibits no discharge.   Neck: Trachea normal, normal range of motion and phonation normal. Neck supple. No tracheal tenderness present. No edema and no erythema present. No thyroid mass present.   Pulmonary/Chest: Effort normal. No stridor. No respiratory distress.   Neurological: She is alert and oriented to person, place, and time.   Skin: Skin is warm and dry. No rash noted. She is not diaphoretic. No erythema. No pallor. "   Psychiatric: She has a normal mood and affect. Her behavior is normal. Thought content normal.   Nursing note and vitals reviewed.    Labs reviewed. Within normal limits.          Ref Range & Units 8:16 AM   10yr ago      WBC 3.90 - 12.70 K/uL 8.99 8.52R    RBC 4.00 - 5.40 M/uL 3.95 (L) 4.41    Hemoglobin 12.0 - 16.0 g/dL 13.0 12.7R    Hematocrit 37.0 - 48.5 % 38.2 38.2    MCV 82 - 98 fL 97 86.6R    MCH 27.0 - 31.0 pg 32.9 (H) 28.8R    MCHC 32.0 - 36.0 % 34.0 33.2R    RDW 11.5 - 14.5 % 13.0 16.9 (H)    Platelets 150 - 350 K/uL 311 267    MPV 9.2 - 12.9 fL 10.6 10.9    Gran # 1.8 - 7.7 K/uL 6.0 5.4R    Lymph # 1.0 - 4.8 K/uL 2.3 2.5R    Mono # 0.3 - 1.0 K/uL 0.5 0.6R    Eos # 0.0 - 0.5 K/uL 0.1 0.1R    Baso # 0.00 - 0.20 K/uL 0.02 0.0R    Gran% 38.0 - 73.0 % 66.8 62.9R    Lymph% 18.0 - 48.0 % 26.0 29.2R    Mono% 4.0 - 15.0 % 5.9 7.0R    Eosinophil% 0.0 - 8.0 % 0.9 0.8    Basophil% 0.0 - 1.9 % 0.2 0.1R    Differential Method  Automated         Ref Range & Units 8:16 AM     Prothrombin Time 9.0 - 12.5 sec 9.8   INR 0.8 - 1.2 0.9   Comments: Coumadin Therapy:   2.0 - 3.0 for INR for all indicators except mechanical heart valves   and antiphospholipid syndromes which should use 2.5 - 3.5.         Ref Range & Units 8:16 AM     aPTT 21.0 - 32.0 sec 22.8   Comments: aPTT therapeutic range = 39-69 seconds     Assessment     1. Epistaxis    2. Paroxysmal atrial fibrillation        Plan  In summary, Ms. Shoemaker is a 56 year old female with recent left rhinorocket packing for episode of epistaxis on anticoagulation for Afib. Labs reviewed. Rhinorocket removed without difficulty with no bleeding noted. Reabsorbable nasopore placed without difficulty per resident, Dr. Edgar Brown. She is to follow up with her cardiologist to discuss the management of her Elaquis for her a fib. I strongly encouraged her to start taking the Keflex. Discussed Afrin prn bleeding and continuing nasal saline/vaseline. If she has a nosebleed at home  that does not resolve within 10-15 minutes, she should return to the ER for management.  All questions were answered and she is in agreement with the plan. She will RTC in 1 week, sooner if needed.

## 2017-01-30 NOTE — PATIENT INSTRUCTIONS
Nasal Packing, Dissolving  A nasal packing was placed in the front part of your nose (anterior) to control bleeding. This packing is made of a substance that helps your blood clot. It will soften and mix with the clot. It will melt, be absorbed, or fall out after a few days.  Home care  Follow these guidelines when caring for yourself at home:  · Do not pull on the packing or try to remove it yourself during the first 3 days, unless your health care provider tells you to.  · Do not drink alcohol or hot liquids while the packing is in place. These can dilate blood vessels in your nose and cause bleeding to start again.  · Keep any appointments for packing removal. If you were asked to remove the packing at home, gently blow your nose to dislodge the pack.  · If the packing stays in place blocking the nasal passage for more than 3 days you may get a sinus infection. If you cannot breathe through that nostril after 3 days, contact your health care provider.  · Take any antibiotics that were prescribed until you have finished them.  © 9730-5428 [employer] [address] [place] [address], [place] [address]. All rights reserved. This information is not intended as a substitute for professional medical care. Always follow your healthcare professional's instructions.

## 2017-01-30 NOTE — MR AVS SNAPSHOT
[facility]  [address]  [facility] [address]  Phone: [phone]  Fax: [phone]                  Fatemeh Shoemaker   2017 8:30 AM   Office Visit    Description:  Female : 1960   Provider:  Pretty Noe NP   Department:  [facility] - Head/Neck Surg On[facility]           Reason for Visit     Follow-up           Diagnoses this Visit        Comments    Epistaxis                To Do List           Future Appointments        Provider Department Dept Phone    3/7/2017 10:00 AM EKG, APPT [facility] - [phone]    3/7/2017 10:40 AM Manny Bojorquez MD [facility] - Arrhythmia [phone]      Goals (5 Years of Data)     None      [facility]     [facility] Care [facility] -  Assistance  Registered nurses in the [facility] provide clinical advisement, health education, appointment booking, and other advisory services.  Call for this free service at [phone].             Medications           Message regarding Medications     Verify the changes and/or additions to your medication regime listed below are the same as discussed with your clinician today.  If any of these changes or additions are incorrect, please notify your healthcare provider.             Verify that the below list of medications is an accurate representation of the medications you are currently taking.  If none reported, the list may be blank. If incorrect, please contact your healthcare provider. Carry this list with you in case of emergency.           Current Medications     apixaban 5 mg Tab Take 1 tablet (5 mg total) by mouth 2 (two) times daily.    cephALEXin (KEFLEX) 500 MG capsule Take 500 mg by mouth.    flecainide (TAMBOCOR) 100 MG Tab Take 1 tablet (100 mg total) by mouth every 12 (twelve) hours.    hydrochlorothiazide (HYDRODIURIL) 25 MG tablet Take 25 mg by mouth once daily.    lisinopril 10 MG tablet Take 10 mg by mouth.    metoprolol tartrate (LOPRESSOR) 100 MG  tablet Take 50 mg by mouth once daily.     metoprolol tartrate (LOPRESSOR) 25 MG tablet Take 25 mg by mouth.    omeprazole (PRILOSEC) 20 MG capsule Take 1 capsule (20 mg total) by mouth once daily.           Clinical Reference Information           Vital Signs - Last Recorded  Most recent update: 1/30/2017  8:31 AM by Jaqueline Roy MA    BP Pulse Temp Wt BMI    125/74 (!) 58 (!) 95.2 °F (35.1 °C) 127.7 kg (281 lb 8.4 oz) 53.19 kg/m2      Blood Pressure          Most Recent Value    BP  125/74      Allergies as of 1/30/2017     Aspirin    Meperidine      Immunizations Administered on Date of Encounter - 1/30/2017     None      [facility] Sign-Up     Activating your [facility] account is as easy as 1-2-3!     1) Visit [email or web address], select Sign Up Now, enter this activation code and your date of birth, then select Next.  [email or web address]  Expires: 2/11/2017  8:20 AM      2) Create a username and password to use when you visit [facility] in the future and select a security question in case you lose your password and select Next.    3) Enter your e-mail address and click Sign Up!    Additional Information  If you have questions, please e-mail [email or web address] or call [phone] to talk to our [facility] staff. Remember, [facility] is NOT to be used for urgent needs. For medical emergencies, dial 911.         Instructions      Nasal Packing, Dissolving  A nasal packing was placed in the front part of your nose (anterior) to control bleeding. This packing is made of a substance that helps your blood clot. It will soften and mix with the clot. It will melt, be absorbed, or fall out after a few days.  Home care  Follow these guidelines when caring for yourself at home:  · Do not pull on the packing or try to remove it yourself during the first 3 days, unless your health care provider tells you to.  · Do not drink alcohol or hot liquids while the packing is in place. These can dilate blood vessels in your nose and cause  bleeding to start again.  · Keep any appointments for packing removal. If you were asked to remove the packing at home, gently blow your nose to dislodge the pack.  · If the packing stays in place blocking the nasal passage for more than 3 days you may get a sinus infection. If you cannot breathe through that nostril after 3 days, contact your health care provider.  · Take any antibiotics that were prescribed until you have finished them.  © 7380-7594 [employer]. [address] [place] [address], [place] [address]. All rights reserved. This information is not intended as a substitute for professional medical care. Always follow your healthcare professional's instructions.

## 2017-02-07 ENCOUNTER — OFFICE VISIT (OUTPATIENT)
Dept: OTOLARYNGOLOGY | Facility: CLINIC | Age: 57
End: 2017-02-07
Payer: COMMERCIAL

## 2017-02-07 VITALS
BODY MASS INDEX: 53.82 KG/M2 | DIASTOLIC BLOOD PRESSURE: 74 MMHG | HEART RATE: 48 BPM | WEIGHT: 284.81 LBS | TEMPERATURE: 94 F | SYSTOLIC BLOOD PRESSURE: 138 MMHG

## 2017-02-07 DIAGNOSIS — R04.0 EPISTAXIS: Primary | ICD-10-CM

## 2017-02-07 DIAGNOSIS — I48.0 PAROXYSMAL ATRIAL FIBRILLATION: ICD-10-CM

## 2017-02-07 DIAGNOSIS — I10 ESSENTIAL HYPERTENSION: ICD-10-CM

## 2017-02-07 PROCEDURE — 99214 OFFICE O/P EST MOD 30 MIN: CPT | Mod: S$GLB,,, | Performed by: OTOLARYNGOLOGY

## 2017-02-07 PROCEDURE — 3075F SYST BP GE 130 - 139MM HG: CPT | Mod: S$GLB,,, | Performed by: OTOLARYNGOLOGY

## 2017-02-07 PROCEDURE — 3078F DIAST BP <80 MM HG: CPT | Mod: S$GLB,,, | Performed by: OTOLARYNGOLOGY

## 2017-02-07 PROCEDURE — 99999 PR PBB SHADOW E&M-EST. PATIENT-LVL III: CPT | Mod: PBBFAC,,, | Performed by: OTOLARYNGOLOGY

## 2017-02-07 NOTE — PROGRESS NOTES
Chief Complaint   Patient presents with    Follow-up         56 y.o. female presents with several week history of occasional nose bleeds starting just before [place]. They are typically left sided and are controlled with moderate pressure/Afrin. Silver nitrate applied to left anterior septum at last visit. Recently started on Eliquis for Afib. Was bending over yesterday and noted increased facial pressure and then subsequent epistaxis. This episode resolved somewhat but then recurred this morning with increased bleeding. She then went to to her local ED in [place] where she was packed on the left side with a rhinorocket with resolve.     Rhinorocket removed last week. Denies any bleeding episodes since that time.  Still off Eliquis and is nervous to restart.    Review of Systems     Constitutional: Negative for fatigue and unexpected weight change.   HENT: per HPI.  Eyes: Negative for visual disturbance.   Respiratory: Negative for shortness of breath, hemoptysis  Cardiovascular: Negative for chest pain and palpitations.   Genitourinary: Negative for dysuria and difficulty urinating.   Musculoskeletal: Negative for decreased ROM, back pain.   Skin: Negative for rash, sunburn, itching.   Neurological: Negative for dizziness and seizures.   Hematological: Negative for adenopathy. Does not bruise/bleed easily.   Psychiatric/Behavioral: Negative for agitation. The patient is not nervous/anxious.   Endocrine: Negative for rapid weight loss/weight gain, heat/cold intolerance.     Past Medical History   Diagnosis Date    Arrhythmia     Bronchitis     High blood pressure     Ulcer        Past Surgical History   Procedure Laterality Date    Total hip arthroplasty      Csection         family history includes Cancer in her maternal grandmother; Colon cancer (age of onset: 70) in her maternal grandmother. There is no history of Stomach cancer or Esophageal cancer.    Pt  reports that she has never smoked. She does not  "have any smokeless tobacco history on file. She reports that she does not drink alcohol.    Review of patient's allergies indicates:   Allergen Reactions    Aspirin Other (See Comments)     "I don't take it because I've had ulcers"   ulcers    Meperidine         Physical Exam    Vitals:    02/07/17 1608   BP: 138/74   Pulse: (!) 48   Temp: (!) 94 °F (34.4 °C)     Body mass index is 53.82 kg/(m^2).    Physical Exam   Constitutional: She is oriented to person, place, and time. She appears well-developed and well-nourished. No distress.   HENT:   Head: Normocephalic and atraumatic.   Right Ear: Hearing, tympanic membrane, external ear and ear canal normal. Tympanic membrane mobility is normal. No middle ear effusion. No decreased hearing is noted.   Left Ear: Hearing, tympanic membrane, external ear and ear canal normal. Tympanic membrane mobility is normal.  No middle ear effusion. No decreased hearing is noted.   Nose: Nose normal. No nose lacerations. No epistaxis (no nasal masses or increased areas of vasularity seen, well healed from previous rhinorocket placement.). Right sinus exhibits no maxillary sinus tenderness and no frontal sinus tenderness. Left sinus exhibits no maxillary sinus tenderness and no frontal sinus tenderness.   Mouth/Throat: Uvula is midline, oropharynx is clear and moist and mucous membranes are normal.   Eyes: Conjunctivae, EOM and lids are normal. Pupils are equal, round, and reactive to light. Right eye exhibits no discharge. Left eye exhibits no discharge.   Neck: Trachea normal, normal range of motion and phonation normal. Neck supple. No tracheal tenderness present. No tracheal deviation, no edema and no erythema present. No thyroid mass and no thyromegaly present.   Cardiovascular: Normal heart sounds.    Pulmonary/Chest: Breath sounds normal. No stridor.   Abdominal: Soft.   Lymphadenopathy:     She has no cervical adenopathy.   Neurological: She is alert and oriented to person, " place, and time.   Skin: Skin is warm and dry. No rash noted. She is not diaphoretic. No erythema. No pallor.   Psychiatric: She has a normal mood and affect.   Nursing note and vitals reviewed.          Assessment     1. Epistaxis    2. Paroxysmal atrial fibrillation    3. Essential hypertension          Plan  In summary, Ms. Shoemaker is a 56 year old female with recent left rhinorocket packing for episode of epistaxis on anticoagulation for Afib. Removed last week and no bleeding episode since that time. Will reach out to her cardiologist regarding restarting Eliquis. Again discussed prevention with nasal saline/Vasesline/humidified air. Afrin/pressure/ice packs for mild epistaxis, if severe return to ED. She is to return if episodes are not resolved.

## 2017-03-07 ENCOUNTER — OFFICE VISIT (OUTPATIENT)
Dept: ELECTROPHYSIOLOGY | Facility: CLINIC | Age: 57
End: 2017-03-07
Payer: COMMERCIAL

## 2017-03-07 ENCOUNTER — HOSPITAL ENCOUNTER (OUTPATIENT)
Dept: CARDIOLOGY | Facility: CLINIC | Age: 57
Discharge: HOME | End: 2017-03-07
Payer: COMMERCIAL

## 2017-03-07 VITALS
BODY MASS INDEX: 48.08 KG/M2 | DIASTOLIC BLOOD PRESSURE: 80 MMHG | HEART RATE: 63 BPM | WEIGHT: 288.56 LBS | SYSTOLIC BLOOD PRESSURE: 122 MMHG | HEIGHT: 65 IN

## 2017-03-07 DIAGNOSIS — E66.01 MORBID OBESITY WITH BMI OF 45.0-49.9, ADULT: ICD-10-CM

## 2017-03-07 DIAGNOSIS — I48.0 PAROXYSMAL ATRIAL FIBRILLATION: Primary | ICD-10-CM

## 2017-03-07 DIAGNOSIS — I48.91 ATRIAL FIBRILLATION, UNSPECIFIED TYPE: ICD-10-CM

## 2017-03-07 DIAGNOSIS — I10 ESSENTIAL HYPERTENSION: ICD-10-CM

## 2017-03-07 PROCEDURE — 99213 OFFICE O/P EST LOW 20 MIN: CPT | Mod: S$GLB,,, | Performed by: INTERNAL MEDICINE

## 2017-03-07 PROCEDURE — 93000 ELECTROCARDIOGRAM COMPLETE: CPT | Mod: S$GLB,,, | Performed by: INTERNAL MEDICINE

## 2017-03-07 PROCEDURE — 3074F SYST BP LT 130 MM HG: CPT | Mod: S$GLB,,, | Performed by: INTERNAL MEDICINE

## 2017-03-07 PROCEDURE — 1160F RVW MEDS BY RX/DR IN RCRD: CPT | Mod: S$GLB,,, | Performed by: INTERNAL MEDICINE

## 2017-03-07 PROCEDURE — 3079F DIAST BP 80-89 MM HG: CPT | Mod: S$GLB,,, | Performed by: INTERNAL MEDICINE

## 2017-03-07 PROCEDURE — 99999 PR PBB SHADOW E&M-EST. PATIENT-LVL III: CPT | Mod: PBBFAC,,, | Performed by: INTERNAL MEDICINE

## 2017-03-07 RX ORDER — HYDROCHLOROTHIAZIDE 25 MG/1
25 TABLET ORAL DAILY
Qty: 90 TABLET | Refills: 3 | Status: SHIPPED | OUTPATIENT
Start: 2017-03-07 | End: 2017-12-07 | Stop reason: SDUPTHER

## 2017-03-07 RX ORDER — METOPROLOL TARTRATE 25 MG/1
25 TABLET, FILM COATED ORAL 2 TIMES DAILY
Qty: 180 TABLET | Refills: 3 | Status: SHIPPED | OUTPATIENT
Start: 2017-03-07 | End: 2018-03-13 | Stop reason: SDUPTHER

## 2017-03-07 RX ORDER — LISINOPRIL 10 MG/1
10 TABLET ORAL DAILY
Qty: 90 TABLET | Refills: 3 | Status: SHIPPED | OUTPATIENT
Start: 2017-03-07 | End: 2018-02-14 | Stop reason: SDUPTHER

## 2017-03-07 NOTE — MR AVS SNAPSHOT
[facility]  [address]  [place] [address]  Phone: [phone]  Fax: [phone]                  Fatemeh Shoemaker   3/7/2017 10:40 AM   Office Visit    Description:  Female : 1960   Provider:  Manny Bojorquez MD   Department:  [facility]           Reason for Visit     Atrial Fibrillation           Diagnoses this Visit        Comments    Paroxysmal atrial fibrillation    -  Primary     Essential hypertension         Morbid obesity with BMI of 45.0-49.9, adult                To Do List           Goals (5 Years of Data)     None      Follow-Up and Disposition     Return in about 6 months (around 2017).    Follow-up and Disposition History       These Medications        Disp Refills Start End    hydrochlorothiazide (HYDRODIURIL) 25 MG tablet 90 tablet 3 3/7/2017     Take 1 tablet (25 mg total) by mouth once daily. - Oral    Pharmacy: [facility] - [place] - [id number] Ph #: [phone]       lisinopril 10 MG tablet 90 tablet 3 3/7/2017     Take 1 tablet (10 mg total) by mouth once daily. - Oral    Pharmacy: [facility] - [place] - [address] Ph #: [phone]       metoprolol tartrate (LOPRESSOR) 25 MG tablet 180 tablet 3 3/7/2017     Take 1 tablet (25 mg total) by mouth 2 (two) times daily. - Oral    Pharmacy: [facility] - [place] - [address] Ph #: [phone]         [facility] On Call     [facility] Care [facility] -  Assistance  Registered nurses in the [facility] provide clinical advisement, health education, appointment booking, and other advisory services.  Call for this free service at [phone].             Medications           Message regarding Medications     Verify the changes and/or additions to your medication regime listed below are the same as discussed with your clinician today.  If any of these changes or additions are incorrect, please notify your healthcare  "provider.        CHANGE how you are taking these medications     Start Taking Instead of    hydrochlorothiazide (HYDRODIURIL) 25 MG tablet hydrochlorothiazide (HYDRODIURIL) 25 MG tablet    Dosage:  Take 1 tablet (25 mg total) by mouth once daily. Dosage:  Take 25 mg by mouth once daily.    Reason for Change:  Reorder     lisinopril 10 MG tablet lisinopril 10 MG tablet    Dosage:  Take 1 tablet (10 mg total) by mouth once daily. Dosage:  Take 10 mg by mouth.    Reason for Change:  Reorder     metoprolol tartrate (LOPRESSOR) 25 MG tablet metoprolol tartrate (LOPRESSOR) 25 MG tablet    Dosage:  Take 1 tablet (25 mg total) by mouth 2 (two) times daily. Dosage:  Take 25 mg by mouth.    Reason for Change:  Reorder            Verify that the below list of medications is an accurate representation of the medications you are currently taking.  If none reported, the list may be blank. If incorrect, please contact your healthcare provider. Carry this list with you in case of emergency.           Current Medications     apixaban 5 mg Tab Take 1 tablet (5 mg total) by mouth 2 (two) times daily.    flecainide (TAMBOCOR) 100 MG Tab Take 1 tablet (100 mg total) by mouth every 12 (twelve) hours.    hydrochlorothiazide (HYDRODIURIL) 25 MG tablet Take 1 tablet (25 mg total) by mouth once daily.    lisinopril 10 MG tablet Take 1 tablet (10 mg total) by mouth once daily.    metoprolol tartrate (LOPRESSOR) 25 MG tablet Take 1 tablet (25 mg total) by mouth 2 (two) times daily.    omeprazole (PRILOSEC) 20 MG capsule Take 1 capsule (20 mg total) by mouth once daily.           Clinical Reference Information           Your Vitals Were     BP Pulse Height Weight BMI    122/80 63 5' 4.5" (1.638 m) 130.9 kg (288 lb 9.3 oz) 48.77 kg/m2      Blood Pressure          Most Recent Value    BP  122/80      Allergies as of 3/7/2017     Aspirin    Meperidine      Immunizations Administered on Date of Encounter - 3/7/2017     None      Language Assistance " Services     ATTENTION: Language assistance services are available, free of charge. Please call [phone].      ATENCIÓN: Si habla español, tiene a alves disposición servicios gratuitos de asistencia lingüística. Llame al [phone].     CHÚ Ý: N?u b?n nói Ti?ng Vi?t, có các d?ch v? h? tr? ngôn ng? mi?n phí dành cho b?n. G?i s? [phone].         [facility] complies with applicable Federal civil rights laws and does not discriminate on the basis of race, color, national origin, age, disability, or sex.

## 2017-03-07 NOTE — PROGRESS NOTES
"Subjective:    Patient ID:  Fatemeh Shoemaker is a 56 y.o. female who presents for follow-up of Atrial Fibrillation      HPI   Prior Hx:  I had the pleasure of seeing Mrs. Shoemaker today in our electrophysiology clinic in consultation for her atrial fibrillation. As you are aware she is a pleasant 56 year-old woman with hypertension, morbid obesity and prior bleeding esophageal ulcer. She was in her usual state of health until this past year. She notes intermittent palpitations/fluttering in her chest. Sometimes accompanied by chest discomfort. She reports she was visiting her parent's [place]cd[place] in [place], [place] and developed rapid palpitations and a nosebleed. She went to [facility]. She was noted to be in atrial fibrillation with rapid ventricular response (rate of 150bpm). She was observed and converted spontaneously to sinus rhythm. She was discharged with metoprolol increased metoprolol (100mg bid from 25 mg bid) and referred here for follow-up. She notes having these symptoms of palpitations 3-4 times a month. She notes sometimes she also can feel a "pounding" sensation in her neck. She has chronic shortness of breath with exertion that she attributes to being overweight. She has also noted recurrence of a center chest discomfort similar to her prior ulcer but denies any melena or bloody stools. She notes she has had to decrease the metoprolol to 50mg twice daily due to significant fatigue and is inquiring if she can go back to her normal dosing.     I reviewed electorcardiograms presented from her Urgent Care Clinic visit. An electrocardiogram dated 12/24/2016 at 1934 shows atrial fibrillation with ventricular rate of 150bpm. A follow-up electrocardiogram at 2125 shows normal sinus rhythm.     Interim Hx:  An echocardiogram showed preserved LVEF and PET stress showed no ischemia.  She was started on flecainide and has had no symptomatic recurrences of atrial " fibrillation while on flecainide.  She had continued issues with epistaxis and was treated by ENT.  She has been 1 month now without any epistaxis and is feeling well.  She does note having issues with achieving adequate weight loss with attempts at dieting and exercise.  She is happy overall with her treatment for her atrial fibrillation.    ECHO 1/9/2017  CONCLUSIONS     1 - Normal left ventricular systolic function (EF 60-65%).     2 - Mild left atrial enlargement.     3 - Normal left ventricular diastolic function.     4 - Normal right ventricular systolic function .     PET stress 1/9/2017  CONCLUSIONS: NORMAL MYOCARDIAL PERFUSION PET STRESS TEST  1. The perfusion scan is free of evidence for myocardial ischemia or injury.   2. Resting wall motion is physiologic. Stress wall motion is physiologic.   3. Visually estimated LV function is normal at rest and normal at stress.   4. The ventricular volumes are normal at rest and stress.   5. The extracardiac distribution of radioactivity is normal.   6. There was no previous study available to compare.    My interpretation of today's in clinic electrocardiogram is sinus rhythm at a rate of 63 bpm with normal intervals.    Review of Systems   Constitution: Negative for decreased appetite, weakness and malaise/fatigue.   HENT: Negative for congestion and nosebleeds.    Eyes: Negative.    Cardiovascular: Negative.  Negative for chest pain, dyspnea on exertion, irregular heartbeat, leg swelling, near-syncope, orthopnea, palpitations and paroxysmal nocturnal dyspnea.   Respiratory: Negative.  Negative for cough and wheezing.    Endocrine: Negative.    Hematologic/Lymphatic: Negative.  Negative for bleeding problem. Does not bruise/bleed easily.   Skin: Negative.    Musculoskeletal: Negative.    Gastrointestinal: Negative.  Negative for hematochezia and melena.   Genitourinary: Negative.    Neurological: Negative.    Psychiatric/Behavioral: Negative.         Objective:     Physical Exam   Constitutional: She appears well-developed and well-nourished. No distress.   HENT:   Head: Normocephalic and atraumatic.   Eyes: Conjunctivae are normal. Right eye exhibits no discharge. Left eye exhibits no discharge.   Neck: Neck supple. No JVD present.   Cardiovascular: Normal rate, regular rhythm and normal heart sounds.  Exam reveals no gallop and no friction rub.    No murmur heard.  Pulmonary/Chest: Effort normal and breath sounds normal. No respiratory distress. She has no wheezes. She has no rales.   Abdominal: Soft. Bowel sounds are normal. She exhibits no distension. There is no tenderness.   Musculoskeletal: She exhibits no edema or tenderness.   Neurological: She is alert.   Skin: Skin is warm and dry. No rash noted. She is not diaphoretic. No erythema.   Psychiatric: She has a normal mood and affect. Her behavior is normal. Thought content normal.   Vitals reviewed.        Assessment:       1. Paroxysmal atrial fibrillation    2. Essential hypertension    3. Morbid obesity with BMI of 45.0-49.9, adult         Plan:       In summary, Mrs. Shoemaker is a pleasant 56 year-old woman with hypertension, morbid obesity and prior bleeding esophageal ulcer who presents for evaluation of symptomatic paroxysmal atrial fibrillation. I had a long discussion with the patient and her  about the pathophysiology and risks of atrial fibrillation and its basic pathophysiology, including its health implications and treatment options with the patient. Specifically, I addressed the need for CVA (stroke) prophylaxis with aspirin versus oral anticoagulation (warfarin vs NOACs, discussed bleeding risks, irreversibility of NOACs vs Vitamin K/plasma reversal of warfarin). I also discussed the goal to reduce symptomatic arrhythmic episodes by pharmacologic and/or procedural methods and utilizing a rhythm versus a rate control strategy.  She was cleared for anticoagulation after EGD.  She is on eliquis  ([id number] 2) and tolerating well.  She is also tolerating flecainide well with good results.      Discussed she should stay current with colon cancer screening (has never had a colonoscopy).  Also she is a good candidate for bariatric surgery for weight loss as I believe this is heavily impacting her quality of life and atrial fibrillation.  She does not think her insurance would cover it.    RTC in 6 months, sooner prn.    Thank you for allowing me to participate in the care of this patient. Please do not hesitate to call me with any questions or concerns.    Manny Bojorquez MD, PhD  Cardiac Electrophysiology

## 2017-03-29 ENCOUNTER — TELEPHONE (OUTPATIENT)
Dept: ELECTROPHYSIOLOGY | Facility: CLINIC | Age: 57
End: 2017-03-29

## 2017-03-29 NOTE — TELEPHONE ENCOUNTER
I called Mrs. Shoemaker to discuss further, however, she did not answer. I left a detailed voicemail asking her to call me back at her earliest convenience.

## 2017-03-29 NOTE — TELEPHONE ENCOUNTER
----- Message from Ruth Ferguson RN sent at 3/29/2017  1:33 PM CDT -----  Contact: pt called      ----- Message -----     From: Sandra Palmer     Sent: 3/29/2017  12:45 PM       To: [facility] Rn Staff    Pt called, states she has been awaiting a call and will like to discuss her blood work. Ph [phone]. Thank you

## 2017-04-03 ENCOUNTER — TELEPHONE (OUTPATIENT)
Dept: ELECTROPHYSIOLOGY | Facility: CLINIC | Age: 57
End: 2017-04-03

## 2017-04-03 NOTE — TELEPHONE ENCOUNTER
----- Message from Ashley Rey RN sent at 4/3/2017  9:26 AM CDT -----  Contact: pt      ----- Message -----     From: Marbella Garcia     Sent: 4/3/2017   8:53 AM       To: [facility]hythm[facility] Rn Staff    Pt says she's returning Batsheva's call from last week, and she can be reached at the number listed.    Thanks

## 2017-04-03 NOTE — TELEPHONE ENCOUNTER
Pt states she sent lab work to our clinic from her NP at her job, due to an elevated Cholesterol. I advised that she follow up with her PCP and/or establish care with General Cardiology to assist with this. After several minutes of discussion, she verbalized understanding and denied further questions, needs, and concerns. She will call me back if her NP/PCP will not assist with Cholesterol levels.

## 2017-05-09 ENCOUNTER — TELEPHONE (OUTPATIENT)
Dept: NEUROLOGY | Facility: CLINIC | Age: 57
End: 2017-05-09

## 2017-05-09 NOTE — TELEPHONE ENCOUNTER
----- Message from Patricia Mcconnell sent at 5/8/2017 12:13 PM CDT -----  Contact: [phone]  Patient is requesting a call back from the nurse stated a referral was sent over, need a sooner appt, need to be checked for neuropathy, hands and feet hurt so bad, tingling.    Please call the patient upon request at phone number [phone].

## 2017-05-22 ENCOUNTER — OFFICE VISIT (OUTPATIENT)
Dept: NEUROLOGY | Facility: CLINIC | Age: 57
End: 2017-05-22
Payer: COMMERCIAL

## 2017-05-22 VITALS
HEART RATE: 53 BPM | DIASTOLIC BLOOD PRESSURE: 75 MMHG | BODY MASS INDEX: 48.34 KG/M2 | WEIGHT: 290.13 LBS | SYSTOLIC BLOOD PRESSURE: 139 MMHG | HEIGHT: 65 IN

## 2017-05-22 DIAGNOSIS — G62.9 NEUROPATHY: Primary | ICD-10-CM

## 2017-05-22 DIAGNOSIS — G56.03 BILATERAL CARPAL TUNNEL SYNDROME: ICD-10-CM

## 2017-05-22 PROCEDURE — 1160F RVW MEDS BY RX/DR IN RCRD: CPT | Mod: S$GLB,,, | Performed by: PSYCHIATRY & NEUROLOGY

## 2017-05-22 PROCEDURE — 3078F DIAST BP <80 MM HG: CPT | Mod: S$GLB,,, | Performed by: PSYCHIATRY & NEUROLOGY

## 2017-05-22 PROCEDURE — 99999 PR PBB SHADOW E&M-EST. PATIENT-LVL III: CPT | Mod: PBBFAC,,, | Performed by: PSYCHIATRY & NEUROLOGY

## 2017-05-22 PROCEDURE — 99205 OFFICE O/P NEW HI 60 MIN: CPT | Mod: S$GLB,,, | Performed by: PSYCHIATRY & NEUROLOGY

## 2017-05-22 PROCEDURE — 3075F SYST BP GE 130 - 139MM HG: CPT | Mod: S$GLB,,, | Performed by: PSYCHIATRY & NEUROLOGY

## 2017-05-22 RX ORDER — GABAPENTIN 600 MG/1
600 TABLET ORAL 3 TIMES DAILY
Qty: 90 TABLET | Refills: 11 | Status: SHIPPED | OUTPATIENT
Start: 2017-05-22 | End: 2018-05-29 | Stop reason: SDUPTHER

## 2017-05-22 RX ORDER — ROSUVASTATIN CALCIUM 10 MG/1
10 TABLET, COATED ORAL NIGHTLY
COMMUNITY
End: 2020-10-19 | Stop reason: SDUPTHER

## 2017-05-22 RX ORDER — GABAPENTIN 300 MG/1
300 CAPSULE ORAL 3 TIMES DAILY
COMMUNITY
End: 2017-05-22

## 2017-05-22 NOTE — PATIENT INSTRUCTIONS
Consider supplementation with alpha lipoic acid 600mg daily as well as capsaicin or lidocaine cream or patches for nerve pain.    EMG with nerve conduction studies to check for carpal tunnel    Increase gabapentin as direct    Return in 6 weeks

## 2017-05-23 PROBLEM — G62.9 NEUROPATHY: Status: ACTIVE | Noted: 2017-05-23

## 2017-05-23 NOTE — PROGRESS NOTES
[facility] - NEUROLOGY  [facility] [place]    Date: May 23, 2017   Patient Name: Fatemeh Shoemaker   MRN: [id number]   PCP: [facility]  Referring Provider: Self, Aaareferral    Assessment:      This is Fatemeh Shoemaker, 56 y.o. female with AF on elequis, HLD, HTN, morbid obesity presents for distal symmetric painful sensory neuropathathy with no history of diabetes although symptoms potentially related to metabolic syndrome v idiopathic.  Will need to screen for common causes of neuropathy as well as rule out any potential contribution of carpal tunnel syndrome given recent change in symptoms.     Plan:      Increase GBP to 600mg tid  Labs today  EMG  Follow up 3 months       I discussed side effects of the medications. I asked the patient to stop the medication if she notices serious adverse effects as we discussed and to seek immediate medical attention at an ER.     Tray Pate MD  [facility]   Department of Neurology    Subjective:      HPI:   Ms. Fatemeh Shoemaker is a 56 y.o. female with AF on elequis, HLD, HTN, morbid obesity presents for neuropathy    She has had burning pain in her feet for at least several years and began to have burning pain in fingers of both hands starting early 2017.  Does not have any associated weakness and does not notice any exacerbating/alleviating factors.  Takes GBP for foot pain with some relief.    PAST MEDICAL HISTORY:  Past Medical History:   Diagnosis Date    Arrhythmia     Bronchitis     High blood pressure     Ulcer        PAST SURGICAL HISTORY:  Past Surgical History:   Procedure Laterality Date    csection      TOTAL HIP ARTHROPLASTY         CURRENT MEDS:  Current Outpatient Prescriptions   Medication Sig Dispense Refill    apixaban 5 mg Tab Take 1 tablet (5 mg total) by mouth 2 (two) times daily. 60 tablet 11    flecainide (TAMBOCOR) 100 MG Tab Take 1 tablet (100 mg total) by mouth every  "12 (twelve) hours. 60 tablet 11    hydrochlorothiazide (HYDRODIURIL) 25 MG tablet Take 1 tablet (25 mg total) by mouth once daily. 90 tablet 3    lisinopril 10 MG tablet Take 1 tablet (10 mg total) by mouth once daily. 90 tablet 3    metoprolol tartrate (LOPRESSOR) 25 MG tablet Take 1 tablet (25 mg total) by mouth 2 (two) times daily. 180 tablet 3    omeprazole (PRILOSEC) 20 MG capsule Take 1 capsule (20 mg total) by mouth once daily. 30 capsule 11    rosuvastatin (CRESTOR) 10 MG tablet Take 10 mg by mouth once daily.      gabapentin (NEURONTIN) 600 MG tablet Take 1 tablet (600 mg total) by mouth 3 (three) times daily. 90 tablet 11     No current facility-administered medications for this visit.        ALLERGIES:  Review of patient's allergies indicates:   Allergen Reactions    Aspirin Other (See Comments)     "I don't take it because I've had ulcers"   ulcers    Meperidine        FAMILY HISTORY:  Family History   Problem Relation Age of Onset    Colon cancer Maternal Grandmother 70    Cancer Maternal Grandmother     Stomach cancer Neg Hx     Esophageal cancer Neg Hx        SOCIAL HISTORY:  Social History   Substance Use Topics    Smoking status: Never Smoker    Smokeless tobacco: Not on file    Alcohol use No      Comment: never       Review of Systems:  12 review of systems is negative except for the symptoms mentioned in HPI.        Objective:     Vitals:    05/22/17 0956   BP: 139/75   Pulse: (!) 53   Weight: 131.6 kg (290 lb 2 oz)   Height: 5' 5" (1.651 m)       General: NAD, well nourished   Eyes: no tearing, discharge, no erythema   ENT: moist mucous membranes of the oral cavity, nares patent    Neck: Supple, full range of motion  Cardiovascular: Warm and well perfused, pulses equal and symmetrical  Lungs: Normal work of breathing, normal chest wall excursions  Skin: No rash, lesions, or breakdown on exposed skin  Psychiatry: Mood and affect are appropriate   Abdomen: soft, non tender, non " distended  Extremeties: No cyanosis, clubbing or edema.    Neurological   MENTAL STATUS: Alert and oriented to person, place, and time. Attention and concentration within normal limits. Speech without dysarthria, able to name and repeat without difficulty. Recent and remote memory within normal limits   CRANIAL NERVES: Visual fields intact. PERRL. EOMI. Facial sensation intact. Face symmetrical. Hearing grossly intact. Full shoulder shrug bilaterally. Tongue protrudes midline   SENSORY: Sensation is decreased to light touch and vibration below the ankles.  Negative Romberg.   MOTOR: Normal bulk and tone. No pronator drift.  5/5 deltoid, biceps, triceps, interosseous, hand  bilaterally. 5/5 iliopsoas, knee extension/flexion, foot dorsi/plantarflexion bilaterally.    REFLEXES: Ankles mute, remainder symmetric and 1+ throughout. Toes down going bilaterally.   CEREBELLAR/COORDINATION/GAIT: Gait steady with normal arm swing and stride length.  Heel to shin intact. Finger to nose intact. Normal rapid alternating movements.

## 2017-06-28 ENCOUNTER — TELEPHONE (OUTPATIENT)
Dept: NEUROLOGY | Facility: CLINIC | Age: 57
End: 2017-06-28

## 2017-06-28 DIAGNOSIS — G62.9 NEUROPATHY: Primary | ICD-10-CM

## 2017-06-28 NOTE — TELEPHONE ENCOUNTER
Called patient stated that her Emg was cancel she was calling to reschedule the appointment .... Can you please place another order in for an EMG

## 2017-07-17 ENCOUNTER — TELEPHONE (OUTPATIENT)
Dept: NEUROLOGY | Facility: CLINIC | Age: 57
End: 2017-07-17

## 2017-07-17 NOTE — TELEPHONE ENCOUNTER
Returned call. Informed patient that we are currently in process of rescheduling an entire schedule of EMG orders, and appt given was in fact first available.  Also informed patient she will be on wait list for earlier appointment.  Patient expressed verbal understanding

## 2017-07-17 NOTE — TELEPHONE ENCOUNTER
----- Message from Deann Michaels sent at 7/17/2017  3:28 PM CDT -----  Contact: pt [phone]  Pt is calling to speak with nurse regarding her emg test.pls call

## 2017-08-16 ENCOUNTER — TELEPHONE (OUTPATIENT)
Dept: ELECTROPHYSIOLOGY | Facility: CLINIC | Age: 57
End: 2017-08-16

## 2017-08-16 NOTE — TELEPHONE ENCOUNTER
Notified pt, she verbalized understanding and stated she is currently feeling well and in NSR.    ----- Message from Manny Bojorquez MD sent at 8/16/2017 11:26 AM CDT -----  Contact: Pt called  Thanks    Is she feeling well now back in normal rhythm? If she has had a single breakthrough on flecainide we can continue and discuss options at our clinic visit. If she still feels unwell then we should get an ECG.    Thanks    Manny  ----- Message -----  From: Sandra Bhandari RN  Sent: 8/15/2017   2:33 PM  To: Manny Bojorquez MD    Pt states she had been feeling great since she saw you last 3/7/2017. This past Saturday 8/12 pt had a 78 min episode of AF with c/o SOB, exhaustion and feeling jittery. Her fitbit showed her  during this event. Pt scheduled to see you for f/u on 9/5/17.     Thank you,  Sandra      ----- Message -----  From: Sandra Palmer  Sent: 8/15/2017   1:44 PM  To: Sandra Bhandari RN    Pt called, states she had an episode yesterday, where she remained in AFIB for 70 minutes. She is asking to speak with you today. Ph for [phone]. Thank you

## 2017-09-05 ENCOUNTER — HOSPITAL ENCOUNTER (OUTPATIENT)
Dept: CARDIOLOGY | Facility: CLINIC | Age: 57
Discharge: HOME | End: 2017-09-05
Payer: COMMERCIAL

## 2017-09-05 ENCOUNTER — CLINICAL SUPPORT (OUTPATIENT)
Dept: ELECTROPHYSIOLOGY | Facility: CLINIC | Age: 57
End: 2017-09-05
Payer: COMMERCIAL

## 2017-09-05 ENCOUNTER — OFFICE VISIT (OUTPATIENT)
Dept: ELECTROPHYSIOLOGY | Facility: CLINIC | Age: 57
End: 2017-09-05
Payer: COMMERCIAL

## 2017-09-05 VITALS
BODY MASS INDEX: 50.02 KG/M2 | HEIGHT: 64 IN | WEIGHT: 293 LBS | HEART RATE: 54 BPM | DIASTOLIC BLOOD PRESSURE: 82 MMHG | SYSTOLIC BLOOD PRESSURE: 126 MMHG

## 2017-09-05 DIAGNOSIS — I48.0 PAROXYSMAL ATRIAL FIBRILLATION: Primary | ICD-10-CM

## 2017-09-05 DIAGNOSIS — E66.01 MORBID OBESITY WITH BMI OF 45.0-49.9, ADULT: ICD-10-CM

## 2017-09-05 DIAGNOSIS — I10 ESSENTIAL HYPERTENSION: ICD-10-CM

## 2017-09-05 DIAGNOSIS — I48.0 PAF (PAROXYSMAL ATRIAL FIBRILLATION): ICD-10-CM

## 2017-09-05 DIAGNOSIS — I48.0 PAROXYSMAL ATRIAL FIBRILLATION: ICD-10-CM

## 2017-09-05 PROCEDURE — 93000 ELECTROCARDIOGRAM COMPLETE: CPT | Mod: S$GLB,,, | Performed by: INTERNAL MEDICINE

## 2017-09-05 PROCEDURE — 99999 PR PBB SHADOW E&M-EST. PATIENT-LVL III: CPT | Mod: PBBFAC,,, | Performed by: INTERNAL MEDICINE

## 2017-09-05 PROCEDURE — 99214 OFFICE O/P EST MOD 30 MIN: CPT | Mod: S$GLB,,, | Performed by: INTERNAL MEDICINE

## 2017-09-05 PROCEDURE — 3079F DIAST BP 80-89 MM HG: CPT | Mod: S$GLB,,, | Performed by: INTERNAL MEDICINE

## 2017-09-05 PROCEDURE — 93228 REMOTE 30 DAY ECG REV/REPORT: CPT | Mod: S$GLB,,, | Performed by: INTERNAL MEDICINE

## 2017-09-05 PROCEDURE — 93229 REMOTE 30 DAY ECG TECH SUPP: CPT | Mod: S$GLB,,, | Performed by: INTERNAL MEDICINE

## 2017-09-05 PROCEDURE — 3008F BODY MASS INDEX DOCD: CPT | Mod: S$GLB,,, | Performed by: INTERNAL MEDICINE

## 2017-09-05 PROCEDURE — 3074F SYST BP LT 130 MM HG: CPT | Mod: S$GLB,,, | Performed by: INTERNAL MEDICINE

## 2017-09-05 NOTE — PROGRESS NOTES
"Subjective:    Patient ID:  Fatemeh Shoemaker is a 56 y.o. female who presents for follow-up of Paroxysmal atrial fibrillation      HPI  Prior Hx:  I had the pleasure of seeing Mrs. Shoemaker today in our electrophysiology clinic in follow-up for her atrial fibrillation. As you are aware she is a pleasant 56 year-old woman with hypertension, morbid obesity and prior bleeding esophageal ulcer. She was in her usual state of health until this past year. She noted intermittent palpitations/fluttering in her chest. Sometimes accompanied by chest discomfort. She reported [facility] [place] [place] and developed rapid palpitations and a nosebleed. She went to [facility]. She was noted to be in atrial fibrillation with rapid ventricular response (rate of 150bpm). She was observed and converted spontaneously to sinus rhythm. She was discharged with metoprolol increased metoprolol (100mg bid from 25 mg bid) and referred here for evaluation. She noted having these symptoms of palpitations 3-4 times a month. She noted sometimes she also can feel a "pounding" sensation in her neck. She has chronic shortness of breath with exertion that she attributes to being overweight. She has also noted recurrence of a center chest discomfort similar to her prior ulcer but denies any melena or bloody stools. An echocardiogram showed preserved LVEF and PET stress showed no ischemia.  She was started on flecainide.    ECHO 1/9/2017  CONCLUSIONS     1 - Normal left ventricular systolic function (EF 60-65%).     2 - Mild left atrial enlargement.     3 - Normal left ventricular diastolic function.     4 - Normal right ventricular systolic function .      PET stress 1/9/2017  CONCLUSIONS: NORMAL MYOCARDIAL PERFUSION PET STRESS TEST  1. The perfusion scan is free of evidence for myocardial ischemia or injury.   2. Resting wall motion is physiologic. Stress wall motion is " physiologic.   3. Visually estimated LV function is normal at rest and normal at stress.   4. The ventricular volumes are normal at rest and stress.   5. The extracardiac distribution of radioactivity is normal.   6. There was no previous study available to compare.    Interim Hx:  Mrs. Shoemaker presents for follow-up. She notes recently having a single breakthrough of likely AF on 8/12/2017. She noted palpitations, fatigue and her fitbit noted her heart rate was ~112 bpm. This lasted ~ 1 hour. She otherwise notes fatigue and chronic dyspnea on exertion that she attributes to being overweight. She is still contemplating bariatric surgery    My interpretation of today's in clinic electrocardiogram is sinus bradycardia at a rate of 54 bpm. Normal SC interval/QRS.    Review of Systems   Constitution: Positive for malaise/fatigue. Negative for fever and weakness.   HENT: Positive for congestion.    Eyes: Negative.    Cardiovascular: Positive for dyspnea on exertion (chronic) and palpitations (per HPI). Negative for chest pain, near-syncope, orthopnea, paroxysmal nocturnal dyspnea and syncope.   Respiratory: Positive for cough. Negative for shortness of breath and sputum production.    Hematologic/Lymphatic: Bruises/bleeds easily.   Skin: Negative.    Musculoskeletal: Negative.    Gastrointestinal: Negative for hematochezia and melena.   Neurological: Negative for dizziness, focal weakness and light-headedness.        Objective:    Physical Exam   Constitutional: She is oriented to person, place, and time. She appears well-developed and well-nourished. No distress.   HENT:   Head: Normocephalic and atraumatic.   Eyes: Conjunctivae are normal. Right eye exhibits no discharge. Left eye exhibits no discharge. No scleral icterus.   Neck: Neck supple. No JVD present.   Cardiovascular: Normal rate and regular rhythm.  Exam reveals no gallop and no friction rub.    No murmur heard.  Pulmonary/Chest: Effort normal and breath  sounds normal.   Abdominal: Soft. Bowel sounds are normal. She exhibits no distension. There is no tenderness. There is no rebound.   Musculoskeletal: She exhibits no edema.   Neurological: She is alert and oriented to person, place, and time.   Skin: Skin is warm and dry. No rash noted. She is not diaphoretic. No erythema.   Psychiatric: She has a normal mood and affect. Her behavior is normal. Judgment and thought content normal.         Assessment:       1. Paroxysmal atrial fibrillation    2. Essential hypertension    3. Morbid obesity with BMI of 45.0-49.9, adult         Plan:       In summary, Mrs. Shoemaker is a pleasant 56 year-old woman with hypertension, morbid obesity and prior bleeding esophageal ulcer who presents for evaluation of symptomatic paroxysmal atrial fibrillation. I had a long discussion with the patient and her  about the pathophysiology and risks of atrial fibrillation and its basic pathophysiology, including its health implications and treatment options with the patient. Specifically, I addressed the need for CVA (stroke) prophylaxis with aspirin versus oral anticoagulation (warfarin vs NOACs, discussed bleeding risks, irreversibility of NOACs vs Vitamin K/plasma reversal of warfarin). I also discussed the goal to reduce symptomatic arrhythmic episodes by pharmacologic and/or procedural methods and utilizing a rhythm versus a rate control strategy.  She was cleared for anticoagulation after EGD.  She is on eliquis ([id number]) and tolerating well.  She is also tolerating flecainide well. She believes she has had only 1 short breakthrough event. She would like to continue current therapy. I briefly discussed option of PVI however she wants to see if her breakthrough events begin to increase. She also is seriously contemplating having bariatric surgery even if she has to finance it and would like to see how this impacts her AF. Will rx a SEEQ monitor for a month to monitor her rhythm  control.    Continue eliquis, metoprolol and flecainide.  Will call with SEEQ results  Otherwise RTC in 6 months, sooner if needed    Thank you for allowing me to participate in the care of this patient. Please do not hesitate to call me with any questions or concerns.    Manny Bojorquez MD, PhD  Cardiac Electrophysiology

## 2017-09-06 DIAGNOSIS — I48.0 PAF (PAROXYSMAL ATRIAL FIBRILLATION): Primary | ICD-10-CM

## 2017-09-11 ENCOUNTER — PATIENT MESSAGE (OUTPATIENT)
Dept: ELECTROPHYSIOLOGY | Facility: CLINIC | Age: 57
End: 2017-09-11

## 2017-09-11 ENCOUNTER — TELEPHONE (OUTPATIENT)
Dept: ELECTROPHYSIOLOGY | Facility: CLINIC | Age: 57
End: 2017-09-11

## 2017-09-11 NOTE — TELEPHONE ENCOUNTER
Spoke with pt. She is on Eliquis. She hasn't had a menstrual cycle in 6 years. Over the weekend she started having off and on vaginal bleeding and may see blood in her urine. She feels fine, but she is just anxious. She does not want to go to ER. Advised to hold Eliquis and see GYN asap. She is calling to get appt now. She will keep me posted. I will also send a message to Dr Bojorquez.

## 2017-09-11 NOTE — TELEPHONE ENCOUNTER
----- Message from Leticia Patelnoemi sent at 9/11/2017  1:39 PM CDT -----  Contact: pt  Pls call pt at [phone].  She is on blood thinners and has been bleeding like she is on her menstrual cycle since Friday and says she is 57 yrs old and hasn't had any bleeding in years.  She says she feels ok, just wanting to know if the blood thinners are the reason.      Thank you

## 2017-09-15 ENCOUNTER — OFFICE VISIT (OUTPATIENT)
Dept: OBSTETRICS AND GYNECOLOGY | Facility: CLINIC | Age: 57
End: 2017-09-15
Payer: COMMERCIAL

## 2017-09-15 VITALS
WEIGHT: 293 LBS | DIASTOLIC BLOOD PRESSURE: 84 MMHG | BODY MASS INDEX: 51.91 KG/M2 | SYSTOLIC BLOOD PRESSURE: 122 MMHG | HEIGHT: 63 IN

## 2017-09-15 DIAGNOSIS — E66.01 MORBID OBESITY WITH BMI OF 50.0-59.9, ADULT: ICD-10-CM

## 2017-09-15 DIAGNOSIS — I48.91 ATRIAL FIBRILLATION, UNSPECIFIED TYPE: ICD-10-CM

## 2017-09-15 DIAGNOSIS — N95.0 POSTMENOPAUSAL BLEEDING: Primary | ICD-10-CM

## 2017-09-15 PROCEDURE — 3074F SYST BP LT 130 MM HG: CPT | Mod: S$GLB,,, | Performed by: OBSTETRICS & GYNECOLOGY

## 2017-09-15 PROCEDURE — 3079F DIAST BP 80-89 MM HG: CPT | Mod: S$GLB,,, | Performed by: OBSTETRICS & GYNECOLOGY

## 2017-09-15 PROCEDURE — 99204 OFFICE O/P NEW MOD 45 MIN: CPT | Mod: 25,S$GLB,, | Performed by: OBSTETRICS & GYNECOLOGY

## 2017-09-15 PROCEDURE — 3008F BODY MASS INDEX DOCD: CPT | Mod: S$GLB,,, | Performed by: OBSTETRICS & GYNECOLOGY

## 2017-09-15 PROCEDURE — 87624 HPV HI-RISK TYP POOLED RSLT: CPT

## 2017-09-15 PROCEDURE — 99999 PR PBB SHADOW E&M-EST. PATIENT-LVL III: CPT | Mod: PBBFAC,,, | Performed by: OBSTETRICS & GYNECOLOGY

## 2017-09-15 PROCEDURE — 88175 CYTOPATH C/V AUTO FLUID REDO: CPT

## 2017-09-15 NOTE — PROGRESS NOTES
Subjective:       Patient ID: Fatemeh Shoemaker is a 56 y.o. female.    Chief Complaint:  Vaginal Bleeding (spotting once a month for the past 5 months )    History of Present Illness:  HPI  57 y/o postmenopausal female presents for evaluation of vaginal bleeding for the past 5 months. Menopause occurred 5 years ago. She was started on Eliquis in 2017 after a finding of AFib. She has noted light bleeding once a month for the past 5 months. Episodes typically last 1-2 days. This month saw small clots when wiping. She was instructed by Cardiology to stop the anticoagulation until GYN workup completed. Patient is interested in gastric bypass surgery. She sees a PCP on the [facility]. Last pap was >30 years ago.    PSH: C/S x 2 (pfannenstiel) and BTL    GYN & OB History  Patient's last menstrual period was 2017.   Date of Last Pap: No result found    OB History    Para Term  AB Living   2         2   SAB TAB Ectopic Multiple Live Births                  # Outcome Date GA Lbr Giles/2nd Weight Sex Delivery Anes PTL Lv   2      F CS-Unspec      1      F CS-Unspec             Review of Systems  Review of Systems   Constitutional: Negative for chills, diaphoresis, fatigue and fever.   Respiratory: Negative for cough and shortness of breath.    Cardiovascular: Negative for chest pain and palpitations.   Gastrointestinal: Negative for abdominal pain, constipation, diarrhea, nausea and vomiting.   Genitourinary: Positive for menstrual problem, vaginal bleeding and postmenopausal bleeding. Negative for dyspareunia, pelvic pain, vaginal discharge and vaginal pain.   Neurological: Negative for headaches.   Psychiatric/Behavioral: Negative for depression.   Breast: Negative for breast mass, breast pain, nipple discharge and skin changes          Objective:    Physical Exam:   Constitutional: She is oriented to person, place, and time. She appears well-developed. No distress.   obese     HENT:   Head: Normocephalic and atraumatic.     Neck: Normal range of motion.     Pulmonary/Chest: Effort normal.        Abdominal: Soft. She exhibits abdominal incision (pfannenstiel well-healed). She exhibits no distension and no mass. There is no tenderness. There is no guarding.   Large pannus     Genitourinary:   Genitourinary Comments: Atrophic external female genitalia; vagina atrophic; cervix normal, no masses; uterus difficult to palpate 2/2 body habitus; no adnexal masses palpated.                 Neurological: She is alert and oriented to person, place, and time.     Psychiatric: She has a normal mood and affect. Her behavior is normal. Judgment and thought content normal.          Assessment:        1. Postmenopausal bleeding    2. Morbid obesity with BMI of 50.0-59.9, adult    3. Atrial fibrillation, unspecified type             Plan:      Fatemeh was seen today for vaginal bleeding.    Diagnoses and all orders for this visit:    Postmenopausal bleeding  - Patient and her daughter provided counseling regarding postmenopausal bleeding including possible causes ranging from benign to malignant. Specifically discussed increased risk for endometrial hyperplasia or malignancy with obesity. Will obtain TVUS for EMS but will likely need EMBx sampling at next visit. Discussed possible need for surgery in the future pending results.   -     Liquid-based pap smear, screening  -     HPV DNA probe, amplified  -     US Transvaginal Non OB; Future    Morbid obesity with BMI of 50.0-59.9, adult  - See above.     Atrial fibrillation, unspecified type  - Follow up with Cardiology.   - Patient currently holding Eliquis due to vaginal bleeding. Discussed with patient that if she understands the risk of staying off anticoagulation, can continue to hold. Given her bleeding has been light, if anticoagulation needed, she could resume as well. She would like to hold at this time.      Orders Placed This Encounter   Procedures     HPV DNA probe, amplified    US Transvaginal Non OB     Will need well-woman exam in near future.     Return in about 1 week (around 9/22/2017) for EMBx.        Noemi Pierre MD  OB/GYN

## 2017-09-18 ENCOUNTER — TELEPHONE (OUTPATIENT)
Dept: RADIOLOGY | Facility: HOSPITAL | Age: 57
End: 2017-09-18

## 2017-09-19 ENCOUNTER — HOSPITAL ENCOUNTER (OUTPATIENT)
Dept: RADIOLOGY | Facility: HOSPITAL | Age: 57
Discharge: HOME | End: 2017-09-19
Attending: OBSTETRICS & GYNECOLOGY
Payer: COMMERCIAL

## 2017-09-19 DIAGNOSIS — N95.0 POSTMENOPAUSAL BLEEDING: ICD-10-CM

## 2017-09-19 PROCEDURE — 76830 TRANSVAGINAL US NON-OB: CPT | Mod: 26,,, | Performed by: RADIOLOGY

## 2017-09-19 PROCEDURE — 76856 US EXAM PELVIC COMPLETE: CPT | Mod: 26,,, | Performed by: RADIOLOGY

## 2017-09-19 PROCEDURE — 76856 US EXAM PELVIC COMPLETE: CPT | Mod: TC,PO

## 2017-09-20 ENCOUNTER — TELEPHONE (OUTPATIENT)
Dept: OBSTETRICS AND GYNECOLOGY | Facility: CLINIC | Age: 57
End: 2017-09-20

## 2017-09-20 ENCOUNTER — PROCEDURE VISIT (OUTPATIENT)
Dept: OBSTETRICS AND GYNECOLOGY | Facility: CLINIC | Age: 57
End: 2017-09-20
Payer: COMMERCIAL

## 2017-09-20 ENCOUNTER — PROCEDURE VISIT (OUTPATIENT)
Dept: NEUROLOGY | Facility: CLINIC | Age: 57
End: 2017-09-20
Payer: COMMERCIAL

## 2017-09-20 VITALS
SYSTOLIC BLOOD PRESSURE: 116 MMHG | WEIGHT: 293 LBS | BODY MASS INDEX: 51.91 KG/M2 | HEIGHT: 63 IN | DIASTOLIC BLOOD PRESSURE: 70 MMHG

## 2017-09-20 DIAGNOSIS — G62.9 NEUROPATHY: ICD-10-CM

## 2017-09-20 DIAGNOSIS — N95.0 POSTMENOPAUSAL BLEEDING: Primary | ICD-10-CM

## 2017-09-20 LAB
HPV HR 12 DNA CVX QL NAA+PROBE: NEGATIVE
HPV16 DNA SPEC QL NAA+PROBE: NEGATIVE
HPV18 DNA SPEC QL NAA+PROBE: NEGATIVE

## 2017-09-20 PROCEDURE — 58100 BIOPSY OF UTERUS LINING: CPT | Mod: S$GLB,,, | Performed by: OBSTETRICS & GYNECOLOGY

## 2017-09-20 PROCEDURE — 95910 NRV CNDJ TEST 7-8 STUDIES: CPT | Mod: S$GLB,,, | Performed by: NEUROMUSCULOSKELETAL MEDICINE & OMM

## 2017-09-20 PROCEDURE — 88305 TISSUE EXAM BY PATHOLOGIST: CPT | Mod: 26,,, | Performed by: PATHOLOGY

## 2017-09-20 PROCEDURE — 95886 MUSC TEST DONE W/N TEST COMP: CPT | Mod: S$GLB,,, | Performed by: NEUROMUSCULOSKELETAL MEDICINE & OMM

## 2017-09-20 PROCEDURE — 88305 TISSUE EXAM BY PATHOLOGIST: CPT | Performed by: PATHOLOGY

## 2017-09-20 NOTE — PROCEDURES
Endometrial biopsy  Date/Time: 9/20/2017 4:13 PM  Performed by: TANK PIERRE.  Authorized by: TANK PIERRE   Preparation: Patient was prepped and draped in the usual sterile fashion.  Local anesthesia used: no    Anesthesia:  Local anesthesia used: no    Sedation:  Patient sedated: no  Patient tolerance: Patient tolerated the procedure well with no immediate complications  Comments: DATE: 9/20/17    TIME: 10:00 AM    PROCEDURE: Endometrial biopsy    INDICATION: postmenopausal bleeding    PATIENT CONSENT:      PRE ENDOMETRIAL BIOPSY COUNSELING:   The patient was informed of the risk of bleeding, infection, uterine perforation and  pain and that the test will rule-out endometrial cancer with accuracy greater than 95%. She was counseled on the alternatives to endometrial biopsy.  All of her questions were answered.  Patient gives written consent    ANESTHESIA: None    Labs: UPT performed prior to the procedure was negative.    PROCEDURE NOTE:  The cervix was visualized with a speculum and swabbed with Betadinex3.  The anterior lip of the cervix was grasped with a single toothed tenaculum, and a sterile endometrial pipelle was inserted into the uterus to a sound length of 9 cm. 4 passes were made with the pipelle and moderate amount of tissue was obtained. The specimen was placed in formalin and sent to Pathology for evaluation.     COMPLICATIONS: None    DISPOSITION: The patient tolerated the above procedure well.      POST ENDOMETRIAL BIOPSY COUNSELING:  - Manage post biopsy cramping with NSAIDs or Tylenol.  - Expect spotting or light bleeding for a few days.  - Report bleeding heavier than a period, fever > 101.0 F, worsening pain or a foul  smelling vaginal discharge.      Tank Pierre MD 09/20/2017

## 2017-09-20 NOTE — TELEPHONE ENCOUNTER
----- Message from Pamela Pittman sent at 9/20/2017 12:46 PM CDT -----  Contact: self  x_  1st Request  _  2nd Request  _  3rd Request    Who: pt    Why: pt thinks she may have left some paper work.. Please advise    What Number to Call Back: [phone]    When to Expect a call back: (Before the end of the day)   -- if call after 3:00 call back will be tomorrow.

## 2017-09-22 ENCOUNTER — TELEPHONE (OUTPATIENT)
Dept: OBSTETRICS AND GYNECOLOGY | Facility: CLINIC | Age: 57
End: 2017-09-22

## 2017-09-22 NOTE — TELEPHONE ENCOUNTER
----- Message from Zay Rogers sent at 9/22/2017  1:50 PM CDT -----  Pt returning nurse phone call. Pt can be reached at [phone]. Please mailed pt paper to her that she left.

## 2017-09-25 ENCOUNTER — TELEPHONE (OUTPATIENT)
Dept: OBSTETRICS AND GYNECOLOGY | Facility: CLINIC | Age: 57
End: 2017-09-25

## 2017-09-25 RX ORDER — PREDNISONE 20 MG/1
TABLET ORAL
COMMUNITY
Start: 2017-08-18 | End: 2017-09-26 | Stop reason: CLARIF

## 2017-09-25 RX ORDER — AZITHROMYCIN 250 MG/1
TABLET, FILM COATED ORAL
COMMUNITY
Start: 2017-08-18 | End: 2017-09-26 | Stop reason: CLARIF

## 2017-09-25 RX ORDER — FLUTICASONE PROPIONATE 50 MCG
SPRAY, SUSPENSION (ML) NASAL
COMMUNITY
Start: 2017-08-18 | End: 2017-12-20

## 2017-09-25 NOTE — TELEPHONE ENCOUNTER
Called patient. Patient was confused. Patient stated that she wasn't sure if the surgery was scheduled or not because she spoke with someone and they wasn't sure if the appointment was schedule. I read patient what I seen in the computer . I schedule patient to come in to sign papers for surgery for tomorrow.

## 2017-09-25 NOTE — PROGRESS NOTES
FINAL PATHOLOGIC DIAGNOSIS  Endometrial biopsy:  Fragments of proliferative pattern endometrium and lower uterine segment.  Negative for hyperplasia and malignancy.    Called patient and reviewed benign EMBx results. Recommended to proceed with hysteroscopy/D&C. Patient scheduled for Thursday 9/28 at 7:00. Patient given number for pre-op and will notify my clinic when she would like to come to sign consents tomorrow. All questions were answered.

## 2017-09-26 ENCOUNTER — SURGICAL CONSULT (OUTPATIENT)
Dept: OBSTETRICS AND GYNECOLOGY | Facility: CLINIC | Age: 57
End: 2017-09-26
Payer: COMMERCIAL

## 2017-09-26 ENCOUNTER — HOSPITAL ENCOUNTER (OUTPATIENT)
Dept: PREADMISSION TESTING | Facility: OTHER | Age: 57
Discharge: HOME | End: 2017-09-26
Attending: OBSTETRICS & GYNECOLOGY
Payer: COMMERCIAL

## 2017-09-26 ENCOUNTER — ANESTHESIA EVENT (OUTPATIENT)
Dept: SURGERY | Facility: OTHER | Age: 57
End: 2017-09-26
Payer: COMMERCIAL

## 2017-09-26 VITALS
OXYGEN SATURATION: 97 % | HEIGHT: 64 IN | WEIGHT: 293 LBS | TEMPERATURE: 99 F | BODY MASS INDEX: 50.02 KG/M2 | DIASTOLIC BLOOD PRESSURE: 71 MMHG | HEART RATE: 63 BPM | SYSTOLIC BLOOD PRESSURE: 127 MMHG

## 2017-09-26 VITALS
SYSTOLIC BLOOD PRESSURE: 112 MMHG | WEIGHT: 293 LBS | HEIGHT: 63 IN | DIASTOLIC BLOOD PRESSURE: 80 MMHG | BODY MASS INDEX: 51.91 KG/M2

## 2017-09-26 DIAGNOSIS — I10 ESSENTIAL HYPERTENSION: ICD-10-CM

## 2017-09-26 DIAGNOSIS — N95.0 POSTMENOPAUSAL BLEEDING: Primary | ICD-10-CM

## 2017-09-26 DIAGNOSIS — I48.0 PAROXYSMAL ATRIAL FIBRILLATION: ICD-10-CM

## 2017-09-26 DIAGNOSIS — E66.01 MORBID OBESITY WITH BMI OF 45.0-49.9, ADULT: ICD-10-CM

## 2017-09-26 LAB
ANION GAP SERPL CALC-SCNC: 11 MMOL/L
BASOPHILS # BLD AUTO: 0.02 K/UL
BASOPHILS NFR BLD: 0.2 %
BUN SERPL-MCNC: 14 MG/DL
CALCIUM SERPL-MCNC: 9.9 MG/DL
CHLORIDE SERPL-SCNC: 105 MMOL/L
CO2 SERPL-SCNC: 24 MMOL/L
CREAT SERPL-MCNC: 0.7 MG/DL
DIFFERENTIAL METHOD: ABNORMAL
EOSINOPHIL # BLD AUTO: 0.1 K/UL
EOSINOPHIL NFR BLD: 0.7 %
ERYTHROCYTE [DISTWIDTH] IN BLOOD BY AUTOMATED COUNT: 13.4 %
EST. GFR  (AFRICAN AMERICAN): >60 ML/MIN/1.73 M^2
EST. GFR  (NON AFRICAN AMERICAN): >60 ML/MIN/1.73 M^2
GLUCOSE SERPL-MCNC: 103 MG/DL
HCT VFR BLD AUTO: 40.4 %
HGB BLD-MCNC: 13.7 G/DL
LYMPHOCYTES # BLD AUTO: 3 K/UL
LYMPHOCYTES NFR BLD: 31.3 %
MCH RBC QN AUTO: 32 PG
MCHC RBC AUTO-ENTMCNC: 33.9 G/DL
MCV RBC AUTO: 94 FL
MONOCYTES # BLD AUTO: 0.5 K/UL
MONOCYTES NFR BLD: 5.1 %
NEUTROPHILS # BLD AUTO: 6.1 K/UL
NEUTROPHILS NFR BLD: 62.5 %
PLATELET # BLD AUTO: 280 K/UL
PMV BLD AUTO: 10.6 FL
POTASSIUM SERPL-SCNC: 4 MMOL/L
RBC # BLD AUTO: 4.28 M/UL
SODIUM SERPL-SCNC: 140 MMOL/L
WBC # BLD AUTO: 9.71 K/UL

## 2017-09-26 PROCEDURE — 99499 UNLISTED E&M SERVICE: CPT | Mod: S$GLB,,, | Performed by: OBSTETRICS & GYNECOLOGY

## 2017-09-26 PROCEDURE — 36415 COLL VENOUS BLD VENIPUNCTURE: CPT

## 2017-09-26 PROCEDURE — 80048 BASIC METABOLIC PNL TOTAL CA: CPT

## 2017-09-26 PROCEDURE — 85025 COMPLETE CBC W/AUTO DIFF WBC: CPT

## 2017-09-26 RX ORDER — FAMOTIDINE 20 MG/1
20 TABLET, FILM COATED ORAL
Status: CANCELLED | OUTPATIENT
Start: 2017-09-26 | End: 2017-09-26

## 2017-09-26 RX ORDER — LIDOCAINE HYDROCHLORIDE 10 MG/ML
1 INJECTION, SOLUTION EPIDURAL; INFILTRATION; INTRACAUDAL; PERINEURAL ONCE
Status: CANCELLED | OUTPATIENT
Start: 2017-09-26 | End: 2017-09-26

## 2017-09-26 RX ORDER — MIDAZOLAM HYDROCHLORIDE 5 MG/ML
2 INJECTION INTRAMUSCULAR; INTRAVENOUS
Status: CANCELLED | OUTPATIENT
Start: 2017-09-26 | End: 2017-09-26

## 2017-09-26 RX ORDER — SODIUM CHLORIDE, SODIUM LACTATE, POTASSIUM CHLORIDE, CALCIUM CHLORIDE 600; 310; 30; 20 MG/100ML; MG/100ML; MG/100ML; MG/100ML
INJECTION, SOLUTION INTRAVENOUS CONTINUOUS
Status: CANCELLED | OUTPATIENT
Start: 2017-09-26

## 2017-09-26 RX ORDER — PREGABALIN 75 MG/1
150 CAPSULE ORAL
Status: DISCONTINUED | OUTPATIENT
Start: 2017-09-28 | End: 2017-09-27 | Stop reason: HOSPADM

## 2017-09-26 NOTE — ANESTHESIA PREPROCEDURE EVALUATION
09/26/2017  Fatemeh Shoemaker is a 56 y.o., female.    Anesthesia Evaluation    I have reviewed the Patient Summary Reports.    I have reviewed the Nursing Notes.   I have reviewed the Medications.     Review of Systems  Anesthesia Hx:  No problems with previous Anesthesia PONV History of prior surgery of interest to airway management or planning: Denies Family Hx of Anesthesia complications.  Personal Hx of Anesthesia complications, Post-Operative Nausea/Vomiting, in the past, but not with recent anesthetics / prophylaxis Difficult or Failed Neuraxial Anesthesia  Social:  Non-Smoker    Hematology/Oncology:  Hematology Normal   Oncology Normal     EENT/Dental:EENT/Dental Normal   Cardiovascular:   Hypertension, well controlled Dysrhythmias atrial fibrillation ECG has been reviewed. Hx of afib,,NSR now w PAC   Pulmonary:  Pulmonary Normal    Renal/:  Renal/ Normal     Hepatic/GI:   GERD, well controlled    Musculoskeletal:   Arthritis     Neurological:  Neurology Normal    Endocrine:  Endocrine Normal    Dermatological:  Skin Normal    Psych:  Psychiatric Normal           Physical Exam  General:  Morbid Obesity    Airway/Jaw/Neck:  Airway Findings: General Airway Assessment: Possible difficult intubation Mallampati: II      Dental:  Dental Findings: molar caps         Mental Status:  Mental Status Findings:  Cooperative, Alert and Oriented         Anesthesia Plan  Type of Anesthesia, risks & benefits discussed:  Anesthesia Type:  general  Patient's Preference:   Intra-op Monitoring Plan:   Intra-op Monitoring Plan Comments:   Post Op Pain Control Plan:   Post Op Pain Control Plan Comments:   Induction:   IV  Beta Blocker:         Informed Consent: Patient understands risks and agrees with Anesthesia plan.  Questions answered. Anesthesia consent signed with patient.  ASA Score: 3     Day of Surgery Review  of History & Physical:    H&P update referred to the surgeon.     Anesthesia Plan Notes: CBC,BMP today        Ready For Surgery From Anesthesia Perspective.

## 2017-09-26 NOTE — DISCHARGE INSTRUCTIONS
PRE-ADMIT TESTING -  [phone]    [address]  [place] [facility]          Your surgery has been scheduled at [facility]. We are pleased to have the opportunity to serve you. For Further Information please call [phone].    On the day of surgery please report to the Information Desk on the 1st floor.    · CONTACT YOUR PHYSICIAN'S OFFICE THE DAY PRIOR TO YOUR SURGERY TO OBTAIN YOUR ARRIVAL TIME.     · The evening before surgery do not eat anything after 9 p.m. ( this includes hard candy, chewing gum and mints).  You may only have GATORADE, POWERADE AND WATER  from 9 p.m. until you leave your home.   DO NOT DRINK ANY LIQUIDS ON THE WAY TO THE HOSPITAL.      SPECIAL MEDICATION INSTRUCTIONS: TAKE medications checked off by the Anesthesiologist on your Medication List.    Angiogram Patients: Take medications as instructed by your physician, including aspirin.     Surgery Patients:    If you take ASPIRIN - Your PHYSICIAN/SURGEON will need to inform you IF/OR when you need to stop taking aspirin prior to your surgery.     Do Not take any medications containing IBUPROFEN.  Do Not Wear any make-up or dark nail polish   (especially eye make-up) to surgery. If you come to surgery with makeup on you will be required to remove the makeup or nail polish.    Do not shave your surgical area at least 5 days prior to your surgery. The surgical prep will be performed at the hospital according to Infection Control regulations.    Leave all valuables at home.   Do Not wear any jewelry or watches, including any metal in body piercings.  Contact Lens must be removed before surgery. Either do not wear the contact lens or bring a case and solution for storage.  Please bring a container for eyeglasses or dentures as required.  Bring any paperwork your physician has provided, such as consent forms,  history and physicals, doctor's orders, etc.   Bring comfortable clothes that are loose fitting to wear upon  discharge. Take into consideration the type of surgery being performed.  Maintain your diet as advised per your physician the day prior to surgery.      Adequate rest the night before surgery is advised.   Park in the Parking lot behind the hospital or in the [facility] across the street from the parking lot. Parking is complimentary.  If you will be discharged the same day as your procedure, please arrange for a responsible adult to drive you home or to accompany you if traveling by taxi.   YOU WILL NOT BE PERMITTED TO DRIVE OR TO LEAVE THE HOSPITAL ALONE AFTER SURGERY.   It is strongly recommended that you arrange for someone to remain with you for the first 24 hrs following your surgery.       Thank you for your cooperation.  The Staff of [facility].        Bathing Instructions                                                                 Please shower the evening before and morning of your procedure with    ANTIBACTERIAL SOAP. ( DIAL, etc )  Concentrate on the surgical area   for at least 3 minutes and rinse completely. Dry off as usual.   Do not use any deodorant, powder, body lotions, perfume, after shave or    cologne.

## 2017-09-26 NOTE — PROGRESS NOTES
"History & Physical  Gynecology      SUBJECTIVE:     Chief Complaint: Pre-op Exam       History of Present Illness:  55 y/o postmenopausal female presents for surgery workup for Hysteroscopy/D&C on 17. Patient initially presented for evaluation of vaginal bleeding for the past 5 months. Menopause occurred 5 years ago. She was started on Eliquis in 2017 after a finding of AFib. She has noted light bleeding once a month for the past 5 months. Episodes typically last 1-2 days. This month saw small clots when wiping. She was instructed by Cardiology to stop the anticoagulation until GYN workup completed. TVUS showed thickened EMS 14 mm. EMBx benign. Patient without complaints today.     PSH: C/S x 2 (pfannenstiel) and BTL    Review of patient's allergies indicates:   Allergen Reactions    Aspirin Other (See Comments)     "I don't take it because I've had ulcers"   ulcers    Meperidine        Past Medical History:   Diagnosis Date    Arrhythmia     Atrial fibrillation     history    Bronchitis     High blood pressure     PONV (postoperative nausea and vomiting)     Stroke     Ulcer      Past Surgical History:   Procedure Laterality Date    csection      HIP PINNING      JOINT REPLACEMENT      hip right    NASAL SEPTUM SURGERY      TOTAL HIP ARTHROPLASTY      TUBAL LIGATION       OB History      Para Term  AB Living    2         2    SAB TAB Ectopic Multiple Live Births                     Family History   Problem Relation Age of Onset    Colon cancer Maternal Grandmother 70    Cancer Maternal Grandmother     Ovarian cancer Maternal Grandmother     Eclampsia Paternal Grandmother     Stroke Father     Hypertension Mother     Stomach cancer Neg Hx     Esophageal cancer Neg Hx     Breast cancer Neg Hx     Miscarriages / Stillbirths Neg Hx      Social History   Substance Use Topics    Smoking status: Never Smoker    Smokeless tobacco: Never Used    Alcohol use No      " Comment: never       Current Outpatient Prescriptions   Medication Sig    apixaban 5 mg Tab Take 1 tablet (5 mg total) by mouth 2 (two) times daily. (Patient taking differently: Take 5 mg by mouth 2 (two) times daily. September 18)    flecainide (TAMBOCOR) 100 MG Tab Take 1 tablet (100 mg total) by mouth every 12 (twelve) hours.    fluticasone (FLONASE) 50 mcg/actuation nasal spray     gabapentin (NEURONTIN) 600 MG tablet Take 1 tablet (600 mg total) by mouth 3 (three) times daily.    hydrochlorothiazide (HYDRODIURIL) 25 MG tablet Take 1 tablet (25 mg total) by mouth once daily.    lisinopril 10 MG tablet Take 1 tablet (10 mg total) by mouth once daily.    metoprolol tartrate (LOPRESSOR) 25 MG tablet Take 1 tablet (25 mg total) by mouth 2 (two) times daily.    omeprazole (PRILOSEC) 20 MG capsule Take 1 capsule (20 mg total) by mouth once daily.    rosuvastatin (CRESTOR) 10 MG tablet Take 10 mg by mouth once daily.     No current facility-administered medications for this visit.      Facility-Administered Medications Ordered in Other Visits   Medication    [START ON 9/28/2017] pregabalin capsule 150 mg         Review of Systems:  Review of Systems   Constitutional: Negative for chills, diaphoresis, fatigue and fever.   Respiratory: Negative for cough and shortness of breath.    Cardiovascular: Negative for chest pain and palpitations.   Gastrointestinal: Negative for abdominal pain, constipation, diarrhea, nausea and vomiting.   Genitourinary: Positive for postmenopausal bleeding. Negative for dyspareunia, pelvic pain, vaginal bleeding, vaginal discharge and vaginal pain.   Neurological: Negative for headaches.   Psychiatric/Behavioral: Negative for depression.        OBJECTIVE:     Physical Exam:  Physical Exam   Constitutional: She is oriented to person, place, and time. She appears well-developed and well-nourished. No distress.   HENT:   Head: Normocephalic and atraumatic.   Neck: Normal range of  motion.   Cardiovascular: Normal rate and regular rhythm.  Exam reveals no gallop and no friction rub.    No murmur heard.  Pulmonary/Chest: Effort normal. She has no wheezes. She has no rales. She exhibits no tenderness.   Abdominal: Soft. She exhibits no distension and no mass. There is no tenderness. There is no rebound and no guarding. No hernia.   Genitourinary:   Genitourinary Comments: Deferred     Musculoskeletal: Normal range of motion. She exhibits no edema.   Neurological: She is alert and oriented to person, place, and time.   Psychiatric: She has a normal mood and affect. Her behavior is normal. Judgment and thought content normal.         ASSESSMENT:       ICD-10-CM ICD-9-CM    1. Postmenopausal bleeding N95.0 627.1 POCT glucose      Notify physician if BS > 180 for hysterectomy patients      Notify Physician/Vital Signs Parameters Urine output less than 0.5mL/kg/hr (with indwelling catheter) or 30 mL/hr (without indwelling catheter) or blood glucose greater than 200 mg/dL      Notify physician      Notify Physician - Potential Need of Opioid Reversal      Place in Outpatient      Diet NPO      Place sequential compression device      Place SOL hose      POCT urine pregnancy      Cardiac Monitoring - Adult      Pulse Oximetry Q4H      Vital signs   2. Morbid obesity with BMI of 45.0-49.9, adult E66.01 278.01     Z68.42 V85.42    3. Essential hypertension I10 401.9    4. Paroxysmal atrial fibrillation I48.0 427.31           Plan:      Diagnoses and all orders for this visit:    Postmenopausal bleeding  - TVUS showed thickened EMS 14 mm. EMBx benign. Recommend proceeding with diagnostic/therapeutic D&C which is scheduled for 9/28.  - Patient has been to preop today.   - I have discussed the risks, benefits, indications, and alternatives of the procedure in detail. The patient verbalizes her understanding. All questions answered. Consents signed. The patient agrees to proceed as planned.  - Orders for  surgery placed.  - Labs, EKG reviewed.     Morbid obesity with BMI of 45.0-49.9, adult    Essential hypertension  - Continue current meds.    Paroxysmal atrial fibrillation  - Patient has been holding Eliquis since before she was seen by me. Plan to resume after surgery if bleeding stable.         No orders of the defined types were placed in this encounter.      Return in about 2 weeks (around 10/10/2017) for post op.        Counseling time: 30 minutes    Noemi Pierre MD

## 2017-09-28 ENCOUNTER — ANESTHESIA (OUTPATIENT)
Dept: SURGERY | Facility: OTHER | Age: 57
End: 2017-09-28
Payer: COMMERCIAL

## 2017-09-28 ENCOUNTER — SURGERY (OUTPATIENT)
Age: 57
End: 2017-09-28

## 2017-09-28 ENCOUNTER — HOSPITAL ENCOUNTER (OUTPATIENT)
Facility: OTHER | Age: 57
Discharge: HOME | End: 2017-09-28
Attending: OBSTETRICS & GYNECOLOGY | Admitting: OBSTETRICS & GYNECOLOGY
Payer: COMMERCIAL

## 2017-09-28 VITALS
BODY MASS INDEX: 50.02 KG/M2 | HEART RATE: 55 BPM | TEMPERATURE: 98 F | OXYGEN SATURATION: 95 % | RESPIRATION RATE: 16 BRPM | SYSTOLIC BLOOD PRESSURE: 112 MMHG | DIASTOLIC BLOOD PRESSURE: 67 MMHG | HEIGHT: 64 IN | WEIGHT: 293 LBS

## 2017-09-28 DIAGNOSIS — N95.0 POSTMENOPAUSAL BLEEDING: ICD-10-CM

## 2017-09-28 DIAGNOSIS — Z98.890 STATUS POST HYSTEROSCOPIC POLYPECTOMY: Primary | ICD-10-CM

## 2017-09-28 LAB
B-HCG UR QL: NEGATIVE
CTP QC/QA: YES

## 2017-09-28 PROCEDURE — 63600175 PHARM REV CODE 636 W HCPCS: Performed by: NURSE ANESTHETIST, CERTIFIED REGISTERED

## 2017-09-28 PROCEDURE — 71000015 HC POSTOP RECOV 1ST HR: Performed by: OBSTETRICS & GYNECOLOGY

## 2017-09-28 PROCEDURE — 81025 URINE PREGNANCY TEST: CPT | Performed by: ANESTHESIOLOGY

## 2017-09-28 PROCEDURE — 36000706: Performed by: OBSTETRICS & GYNECOLOGY

## 2017-09-28 PROCEDURE — 27201423 OPTIME MED/SURG SUP & DEVICES STERILE SUPPLY: Performed by: OBSTETRICS & GYNECOLOGY

## 2017-09-28 PROCEDURE — C1782 MORCELLATOR: HCPCS | Performed by: OBSTETRICS & GYNECOLOGY

## 2017-09-28 PROCEDURE — 25000003 PHARM REV CODE 250: Performed by: ANESTHESIOLOGY

## 2017-09-28 PROCEDURE — 36000707: Performed by: OBSTETRICS & GYNECOLOGY

## 2017-09-28 PROCEDURE — 63600175 PHARM REV CODE 636 W HCPCS: Performed by: SPECIALIST

## 2017-09-28 PROCEDURE — 71000016 HC POSTOP RECOV ADDL HR: Performed by: OBSTETRICS & GYNECOLOGY

## 2017-09-28 PROCEDURE — 25000003 PHARM REV CODE 250: Performed by: SPECIALIST

## 2017-09-28 PROCEDURE — 37000008 HC ANESTHESIA 1ST 15 MINUTES: Performed by: OBSTETRICS & GYNECOLOGY

## 2017-09-28 PROCEDURE — 88305 TISSUE EXAM BY PATHOLOGIST: CPT | Performed by: PATHOLOGY

## 2017-09-28 PROCEDURE — 37000009 HC ANESTHESIA EA ADD 15 MINS: Performed by: OBSTETRICS & GYNECOLOGY

## 2017-09-28 PROCEDURE — 25000003 PHARM REV CODE 250: Performed by: NURSE ANESTHETIST, CERTIFIED REGISTERED

## 2017-09-28 PROCEDURE — 71000033 HC RECOVERY, INTIAL HOUR: Performed by: OBSTETRICS & GYNECOLOGY

## 2017-09-28 PROCEDURE — 88305 TISSUE EXAM BY PATHOLOGIST: CPT | Mod: 26,,, | Performed by: PATHOLOGY

## 2017-09-28 PROCEDURE — 58558 HYSTEROSCOPY BIOPSY: CPT | Mod: ,,, | Performed by: OBSTETRICS & GYNECOLOGY

## 2017-09-28 RX ORDER — DEXAMETHASONE SODIUM PHOSPHATE 4 MG/ML
INJECTION, SOLUTION INTRA-ARTICULAR; INTRALESIONAL; INTRAMUSCULAR; INTRAVENOUS; SOFT TISSUE
Status: DISCONTINUED | OUTPATIENT
Start: 2017-09-28 | End: 2017-09-28

## 2017-09-28 RX ORDER — FENTANYL CITRATE 50 UG/ML
25 INJECTION, SOLUTION INTRAMUSCULAR; INTRAVENOUS EVERY 5 MIN PRN
Status: COMPLETED | OUTPATIENT
Start: 2017-09-28 | End: 2017-09-28

## 2017-09-28 RX ORDER — OXYCODONE HYDROCHLORIDE 5 MG/1
5 TABLET ORAL
Status: DISCONTINUED | OUTPATIENT
Start: 2017-09-28 | End: 2017-09-28 | Stop reason: HOSPADM

## 2017-09-28 RX ORDER — LIDOCAINE HYDROCHLORIDE 10 MG/ML
1 INJECTION, SOLUTION EPIDURAL; INFILTRATION; INTRACAUDAL; PERINEURAL ONCE
Status: DISCONTINUED | OUTPATIENT
Start: 2017-09-28 | End: 2017-09-28 | Stop reason: HOSPADM

## 2017-09-28 RX ORDER — MIDAZOLAM HYDROCHLORIDE 5 MG/ML
2 INJECTION INTRAMUSCULAR; INTRAVENOUS
Status: DISCONTINUED | OUTPATIENT
Start: 2017-09-28 | End: 2017-09-28 | Stop reason: HOSPADM

## 2017-09-28 RX ORDER — GLYCOPYRROLATE 0.2 MG/ML
INJECTION INTRAMUSCULAR; INTRAVENOUS
Status: DISCONTINUED | OUTPATIENT
Start: 2017-09-28 | End: 2017-09-28

## 2017-09-28 RX ORDER — FAMOTIDINE 20 MG/1
20 TABLET, FILM COATED ORAL
Status: COMPLETED | OUTPATIENT
Start: 2017-09-28 | End: 2017-09-28

## 2017-09-28 RX ORDER — ROCURONIUM BROMIDE 10 MG/ML
INJECTION, SOLUTION INTRAVENOUS
Status: DISCONTINUED | OUTPATIENT
Start: 2017-09-28 | End: 2017-09-28

## 2017-09-28 RX ORDER — DIPHENHYDRAMINE HCL 25 MG
25 CAPSULE ORAL EVERY 4 HOURS PRN
Status: DISCONTINUED | OUTPATIENT
Start: 2017-09-28 | End: 2017-09-28

## 2017-09-28 RX ORDER — LIDOCAINE HYDROCHLORIDE 10 MG/ML
INJECTION, SOLUTION INTRAVENOUS
Status: DISCONTINUED | OUTPATIENT
Start: 2017-09-28 | End: 2017-09-28

## 2017-09-28 RX ORDER — SUCCINYLCHOLINE CHLORIDE 20 MG/ML
INJECTION INTRAMUSCULAR; INTRAVENOUS
Status: DISCONTINUED | OUTPATIENT
Start: 2017-09-28 | End: 2017-09-28

## 2017-09-28 RX ORDER — SODIUM CHLORIDE, SODIUM LACTATE, POTASSIUM CHLORIDE, CALCIUM CHLORIDE 600; 310; 30; 20 MG/100ML; MG/100ML; MG/100ML; MG/100ML
INJECTION, SOLUTION INTRAVENOUS CONTINUOUS
Status: DISCONTINUED | OUTPATIENT
Start: 2017-09-28 | End: 2017-09-28 | Stop reason: HOSPADM

## 2017-09-28 RX ORDER — NEOSTIGMINE METHYLSULFATE 1 MG/ML
INJECTION, SOLUTION INTRAVENOUS
Status: DISCONTINUED | OUTPATIENT
Start: 2017-09-28 | End: 2017-09-28

## 2017-09-28 RX ORDER — PROPOFOL 10 MG/ML
VIAL (ML) INTRAVENOUS
Status: DISCONTINUED | OUTPATIENT
Start: 2017-09-28 | End: 2017-09-28

## 2017-09-28 RX ORDER — FENTANYL CITRATE 50 UG/ML
INJECTION, SOLUTION INTRAMUSCULAR; INTRAVENOUS
Status: DISCONTINUED | OUTPATIENT
Start: 2017-09-28 | End: 2017-09-28

## 2017-09-28 RX ORDER — ONDANSETRON 2 MG/ML
INJECTION INTRAMUSCULAR; INTRAVENOUS
Status: DISCONTINUED | OUTPATIENT
Start: 2017-09-28 | End: 2017-09-28

## 2017-09-28 RX ORDER — GLYCOPYRROLATE 0.2 MG/ML
0.2 INJECTION INTRAMUSCULAR; INTRAVENOUS ONCE
Status: COMPLETED | OUTPATIENT
Start: 2017-09-28 | End: 2017-09-28

## 2017-09-28 RX ORDER — ONDANSETRON 2 MG/ML
4 INJECTION INTRAMUSCULAR; INTRAVENOUS DAILY PRN
Status: DISCONTINUED | OUTPATIENT
Start: 2017-09-28 | End: 2017-09-28 | Stop reason: HOSPADM

## 2017-09-28 RX ORDER — SODIUM CHLORIDE 0.9 % (FLUSH) 0.9 %
3 SYRINGE (ML) INJECTION
Status: DISCONTINUED | OUTPATIENT
Start: 2017-09-28 | End: 2017-09-28 | Stop reason: HOSPADM

## 2017-09-28 RX ORDER — DIPHENHYDRAMINE HYDROCHLORIDE 50 MG/ML
25 INJECTION INTRAMUSCULAR; INTRAVENOUS EVERY 6 HOURS PRN
Status: DISCONTINUED | OUTPATIENT
Start: 2017-09-28 | End: 2017-09-28

## 2017-09-28 RX ORDER — DIPHENHYDRAMINE HYDROCHLORIDE 50 MG/ML
25 INJECTION INTRAMUSCULAR; INTRAVENOUS EVERY 4 HOURS PRN
Status: DISCONTINUED | OUTPATIENT
Start: 2017-09-28 | End: 2017-09-28 | Stop reason: HOSPADM

## 2017-09-28 RX ORDER — HYDROMORPHONE HYDROCHLORIDE 2 MG/ML
0.4 INJECTION, SOLUTION INTRAMUSCULAR; INTRAVENOUS; SUBCUTANEOUS EVERY 5 MIN PRN
Status: DISCONTINUED | OUTPATIENT
Start: 2017-09-28 | End: 2017-09-28 | Stop reason: HOSPADM

## 2017-09-28 RX ORDER — OXYCODONE AND ACETAMINOPHEN 5; 325 MG/1; MG/1
1 TABLET ORAL EVERY 4 HOURS PRN
Qty: 15 TABLET | Refills: 0 | Status: SHIPPED | OUTPATIENT
Start: 2017-09-28 | End: 2018-01-25

## 2017-09-28 RX ADMIN — NEOSTIGMINE METHYLSULFATE 2 MG: 1 INJECTION INTRAVENOUS at 07:09

## 2017-09-28 RX ADMIN — CARBOXYMETHYLCELLULOSE SODIUM 2 DROP: 2.5 SOLUTION/ DROPS OPHTHALMIC at 07:09

## 2017-09-28 RX ADMIN — EPHEDRINE SULFATE 10 MG: 50 INJECTION INTRAMUSCULAR; INTRAVENOUS; SUBCUTANEOUS at 07:09

## 2017-09-28 RX ADMIN — OXYCODONE HYDROCHLORIDE 5 MG: 5 TABLET ORAL at 08:09

## 2017-09-28 RX ADMIN — FENTANYL CITRATE 25 MCG: 50 INJECTION INTRAMUSCULAR; INTRAVENOUS at 08:09

## 2017-09-28 RX ADMIN — GLYCOPYRROLATE 0.2 MG: 0.2 INJECTION, SOLUTION INTRAMUSCULAR; INTRAVENOUS at 07:09

## 2017-09-28 RX ADMIN — ONDANSETRON 4 MG: 2 INJECTION INTRAMUSCULAR; INTRAVENOUS at 07:09

## 2017-09-28 RX ADMIN — GLYCOPYRROLATE 0.2 MG: 0.2 INJECTION INTRAMUSCULAR; INTRAVENOUS at 08:09

## 2017-09-28 RX ADMIN — FENTANYL CITRATE 50 MCG: 50 INJECTION, SOLUTION INTRAMUSCULAR; INTRAVENOUS at 07:09

## 2017-09-28 RX ADMIN — ROCURONIUM BROMIDE 15 MG: 10 INJECTION, SOLUTION INTRAVENOUS at 07:09

## 2017-09-28 RX ADMIN — SODIUM CHLORIDE, SODIUM LACTATE, POTASSIUM CHLORIDE, AND CALCIUM CHLORIDE: 600; 310; 30; 20 INJECTION, SOLUTION INTRAVENOUS at 06:09

## 2017-09-28 RX ADMIN — DEXAMETHASONE SODIUM PHOSPHATE 8 MG: 4 INJECTION, SOLUTION INTRAMUSCULAR; INTRAVENOUS at 07:09

## 2017-09-28 RX ADMIN — PROPOFOL 240 MG: 10 INJECTION, EMULSION INTRAVENOUS at 07:09

## 2017-09-28 RX ADMIN — LIDOCAINE HYDROCHLORIDE 50 MG: 10 INJECTION, SOLUTION INTRAVENOUS at 07:09

## 2017-09-28 RX ADMIN — PROPOFOL 40 MG: 10 INJECTION, EMULSION INTRAVENOUS at 07:09

## 2017-09-28 RX ADMIN — FAMOTIDINE 20 MG: 20 TABLET, FILM COATED ORAL at 05:09

## 2017-09-28 RX ADMIN — SUCCINYLCHOLINE CHLORIDE 120 MG: 20 INJECTION, SOLUTION INTRAMUSCULAR; INTRAVENOUS at 07:09

## 2017-09-28 RX ADMIN — ROCURONIUM BROMIDE 5 MG: 10 INJECTION, SOLUTION INTRAVENOUS at 07:09

## 2017-09-28 NOTE — BRIEF OP NOTE
[facility]  Brief Operative Note     SUMMARY     Surgery Date: 9/28/2017     Surgeon(s) and Role:     * Noemi Pierre MD - Primary    Assisting Surgeon: Sumanth Razo MD, PGY-1    Pre-op Diagnosis:  Postmenopausal bleeding [N95.0]    Post-op Diagnosis:  Post-Op Diagnosis Codes:     * Postmenopausal bleeding [N95.0]    Procedure(s) (LRB):  [id number] (N/A)    Anesthesia: General    Description of the findings of the procedure:   1. Bimanual Exam - Limited due to large panus. Uterine fundus not palpable. No plapable masses in adnexa.   2. Uterus sounds to 9.5cm.  3. Hysteroscopy - Atrophic endometrium with 3-4cm endometrial polyp extending from fundus L of the midline into the uterine cavity.    Estimated Blood Loss: 20cc  IVF: 800cc LR  UOP: 50cc    Truclear:  Small blade  Fluid in: 4840cc  Fluid out: 4620cc  Deficit: 220cc             Specimens:   Specimen (12h ago through future)    Start     Ordered    09/28/17 0818  Specimen to Pathology - Surgery  Once     Comments:  1. Endometrial polyp      09/28/17 0819          Discharge Note    SUMMARY     Admit Date: 9/28/2017    Discharge Date and Time:  09/28/2017 8:13 AM    Hospital Course (synopsis of major diagnoses, care, treatment, and services provided during the course of the hospital stay):   Patient was admitted for the above mentioned procedure.  Procedure was performed without difficulty.  Please see operative report for further details.  She was transferred to recovery in stable condition and discharged home the same day.      Final Diagnosis: Post-Op Diagnosis Codes:     * Postmenopausal bleeding [N95.0]    Disposition: Home or Self Care    Follow Up/Patient Instructions: See Below    Medications:    Reconciled Home Medications:   Current Discharge Medication List      START taking these medications    Details   oxycodone-acetaminophen (PERCOCET) 5-325 mg per tablet Take 1 tablet by mouth every 4 (four) hours as  needed for Pain.  Qty: 15 tablet, Refills: 0         CONTINUE these medications which have NOT CHANGED    Details   apixaban 5 mg Tab Take 1 tablet (5 mg total) by mouth 2 (two) times daily.  Qty: 60 tablet, Refills: 11    Associated Diagnoses: Paroxysmal atrial fibrillation      flecainide (TAMBOCOR) 100 MG Tab Take 1 tablet (100 mg total) by mouth every 12 (twelve) hours.  Qty: 60 tablet, Refills: 11    Associated Diagnoses: Paroxysmal atrial fibrillation      gabapentin (NEURONTIN) 600 MG tablet Take 1 tablet (600 mg total) by mouth 3 (three) times daily.  Qty: 90 tablet, Refills: 11    Associated Diagnoses: Neuropathy      hydrochlorothiazide (HYDRODIURIL) 25 MG tablet Take 1 tablet (25 mg total) by mouth once daily.  Qty: 90 tablet, Refills: 3    Associated Diagnoses: Paroxysmal atrial fibrillation      lisinopril 10 MG tablet Take 1 tablet (10 mg total) by mouth once daily.  Qty: 90 tablet, Refills: 3    Associated Diagnoses: Paroxysmal atrial fibrillation      metoprolol tartrate (LOPRESSOR) 25 MG tablet Take 1 tablet (25 mg total) by mouth 2 (two) times daily.  Qty: 180 tablet, Refills: 3    Associated Diagnoses: Paroxysmal atrial fibrillation      omeprazole (PRILOSEC) 20 MG capsule Take 1 capsule (20 mg total) by mouth once daily.  Qty: 30 capsule, Refills: 11    Associated Diagnoses: Paroxysmal atrial fibrillation      rosuvastatin (CRESTOR) 10 MG tablet Take 10 mg by mouth once daily.      fluticasone (FLONASE) 50 mcg/actuation nasal spray               Discharge Procedure Orders  Diet general     Activity as tolerated     Other restrictions (specify):   Order Comments: Nothing in the vagina for 2 weeks - No douching, tampons, or sex.     Call MD for:  temperature >100.4     Call MD for:  persistent nausea and vomiting     Call MD for:  severe uncontrolled pain     Call MD for:  difficulty breathing, headache or visual disturbances     Call MD for:  redness, tenderness, or signs of infection (pain,  swelling, redness, odor or green/yellow discharge around incision site)     Call MD for:  persistent dizziness or light-headedness     Call MD for:   Order Comments: Heavy vaginal bleeding, soaking though more than 1 heavy pad per hour.     No dressing needed       Follow-up Information     Noemi Pierre MD In 2 weeks.    Specialty:  Obstetrics and Gynecology  Why:  Post-op follow up  Contact information:  [address] [place] [id number] [phone]                 Sumanth Razo M.D.  Obstetrics & Gynecology  PGY-1

## 2017-09-28 NOTE — PLAN OF CARE
Fatemeh Crystal Shoemaker has met all discharge criteria from Phase II. Vital Signs are stable, ambulating  without difficulty. Discharge instructions given, patient verbalized understanding. Discharged from facility via wheelchair in stable condition.

## 2017-09-28 NOTE — INTERVAL H&P NOTE
The patient has been examined and the H&P has been reviewed:    Patient stopped taking apixiban 20 days ago.  I concur with the findings and no changes have occurred since H&P was written.    Anesthesia/Surgery risks, benefits and alternative options discussed and understood by patient/family.    Active Hospital Problems    Diagnosis  POA    Postmenopausal bleeding [N95.0]  Yes      Resolved Hospital Problems    Diagnosis Date Resolved POA   No resolved problems to display.

## 2017-09-28 NOTE — OP NOTE
DATE:  09/28/2017    PROCEDURE: [id number] (N/A)    SURGEON: Noemi Pierre MD - Primary    RESIDENT: Sumanth Razo M.D., PGY-1    PRE OPERATIVE DIAGNOSIS: Postmenopausal bleeding [N95.0]    POST OPERATIVE DIAGNOSIS: Same     EBL: 20mL    UOP: 50mL    IVF: 800mL    Truclear:  Small blade  Fluid in: 4840cc  Fluid out: 4620cc  Deficit: 220cc     ANESTHESIA: General     LOCAL ANESTHESIA: None    SPECIMENS:  Specimen (12h ago through future)     Start     Ordered     09/28/17 0818   Specimen to Pathology - Surgery  Once     Comments:  1. Endometrial polyp       09/28/17 0819     INDICATION: Postmenopausal bleeding [N95.0]    FINDINGS:   1. Bimanual Exam - Limited due to large panus. Uterine fundus not palpable. No plapable masses in adnexa.   2. Uterus sounds to 9.5cm. Uterus does not descend toward the introitus when pulled with single tooth tenaculum.  3. Hysteroscopy - Atrophic endometrium with 3-4cm endometrial polyp extending from fundus L of the midline into the uterine cavity.    PROCEDURE IN DETAIL:   After proper consents were explained and obtained, the   patient was taken to the Operating Room where anesthesia was obtained without difficulty. She was then placed in dorsal lithotomy position in Jayesh-type stirrups. The patient was then prepped and draped in normal sterile fashion. A weighted speculum was placed into the vagina. Right angle used to visualize the cervix, which was grasped at the anterior lip with the single tooth tenaculum. The cervix was serially dilated to #8 Hegar dilator. The uterus sounded to 9.5 cm. The hysteroscope was previously white balanced and the line cleared of air. It was then inserted into the cervical os and the uterine cavity was directly visualized. Large 3-4 cm endometrial polyp extending from the fundus to the L of the midline into the uterine cavity was noted. Bilateral ostia were noted at that time. The Truclear, size small, was inserted through the  "hysteroscope and was used to remove the polyp. The hysteroscope and Truclear were then removed. Specimens labeled "Endometrial Polyp" was sent to pathology for evaluation. The single tooth tenaculum was then removed and the cervix was visualized and seen to be hemostatic.  The patient tolerated the procedure well. All counts were correct x2. The patient was taken to Recovery area in stable condition.         Sumanth Razo M.D., PGY-1  Obstetrics & Gynecology    "

## 2017-09-28 NOTE — ANESTHESIA POSTPROCEDURE EVALUATION
"Anesthesia Post Evaluation    Patient: Fatemeh Shoemaker    Procedure(s) Performed: Procedure(s) (LRB):  [id number] (N/A)    Final Anesthesia Type: general  Patient location during evaluation: PACU  Patient participation: Yes- Able to Participate  Level of consciousness: awake and alert and oriented  Post-procedure vital signs: reviewed and stable  Pain management: adequate  PONV status at discharge: No PONV  Anesthetic complications: no      Cardiovascular status: blood pressure returned to baseline and hemodynamically stable  Respiratory status: unassisted, spontaneous ventilation and room air  Hydration status: euvolemic  Follow-up not needed.        Visit Vitals  /64 (BP Location: Right arm, Patient Position: Sitting)   Pulse (!) 55   Temp 36.7 °C (98 °F) (Oral)   Resp 16   Ht 5' 4" (1.626 m)   Wt 132.9 kg (293 lb)   LMP 09/08/2017   SpO2 98%   Breastfeeding? No   BMI 50.29 kg/m²       Pain/Yojana Score: Pain Assessment Performed: Yes (9/28/2017  8:55 AM)  Presence of Pain: denies (9/28/2017  9:55 AM)  Pain Rating Prior to Med Admin: 4 (9/28/2017  8:25 AM)  Pain Rating Post Med Admin: 0 (9/28/2017  8:25 AM)  Yojana Score: 10 (9/28/2017  9:55 AM)      "

## 2017-09-28 NOTE — DISCHARGE INSTRUCTIONS
Discharge Instructions for Dilation and Curettage (D and C)  Home care  · Take it easy. Rest for 2 days as needed.  · Return to your normal activities after 24 to 48 hours. You may also return to work at that time.  · Eat a normal diet.  · Take an over-the-counter pain reliever for pain, if needed.  · Remember, its OK to have bleeding for about a week after the procedure. The amount of bleeding should be similar to what you have during a normal period.  · Dont drive for 24 hours after the procedure unless specifically told by your provider that it is OK to do so.  · Dont have sexual intercourse or use tampons or douches until your doctor says its safe to do so.     When to call your doctor  Call your doctor right away if you have any of the following:  · Bleeding that soaks more than one sanitary pad in one hour  · Severe abdominal pain  · Severe cramps  · Fever above 100.4°F (38.0°C)  · A foul smelling vaginal discharge     Date Last Reviewed: 5/19/2015  © 9781-5679 [employer]. [address] [place] [address]. All rights reserved. This information is not intended as a substitute for professional medical care. Always follow your healthcare professional's instructions.      Discharge Instructions: After Your Surgery  Youve just had surgery. During surgery, you were given medicine called anesthesia to keep you relaxed and free of pain. After surgery, you may have some pain or nausea. This is common. Here are some tips for feeling better and getting well after surgery.     Stay on schedule with your medicine.     Going home  Your healthcare provider will show you how to take care of yourself when you go home. He or she will also answer your questions. Have an adult family member or friend drive you home. For the first 24 hours after your surgery:  · Do not drive or use heavy equipment.  · Do not make important decisions or sign legal papers.  · Do not drink alcohol.  · Have someone stay  with you, if needed. He or she can watch for problems and help keep you safe.  Be sure to go to all follow-up visits with your healthcare provider. And rest after your surgery for as long as your healthcare provider tells you to.    Coping with pain  If you have pain after surgery, pain medicine will help you feel better. Take it as told, before pain becomes severe. Also, ask your healthcare provider or pharmacist about other ways to control pain. This might be with heat, ice, or relaxation. And follow any other instructions your surgeon or nurse gives you.    Tips for taking pain medicine  To get the best relief possible, remember these points:  · Pain medicines can upset your stomach. Taking them with a little food may help.  · Most pain relievers taken by mouth need at least 20 to 30 minutes to start to work.  · Taking medicine on a schedule can help you remember to take it. Try to time your medicine so that you can take it before starting an activity. This might be before you get dressed, go for a walk, or sit down for dinner.  · Constipation is a common side effect of pain medicines. Call your healthcare provider before taking any medicines such as laxatives or stool softeners to help ease constipation. Also ask if you should skip any foods. Drinking lots of fluids and eating foods such as fruits and vegetables that are high in fiber can also help. Remember, do not take laxatives unless your surgeon has prescribed them.  · Drinking alcohol and taking pain medicine can cause dizziness and slow your breathing. It can even be deadly. Do not drink alcohol while taking pain medicine.  · Pain medicine can make you react more slowly to things. Do not drive or run machinery while taking pain medicine.  Your healthcare provider may tell you to take acetaminophen to help ease your pain. Ask him or her how much you are supposed to take each day. Acetaminophen or other pain relievers may interact with your prescription  medicines or other over-the-counter (OTC) medicines. Some prescription medicines have acetaminophen and other ingredients. Using both prescription and OTC acetaminophen for pain can cause you to overdose. Read the labels on your OTC medicines with care. This will help you to clearly know the list of ingredients, how much to take, and any warnings. It may also help you not take too much acetaminophen. If you have questions or do not understand the information, ask your pharmacist or healthcare provider to explain it to you before you take the OTC medicine.    Managing nausea  Some people have an upset stomach after surgery. This is often because of anesthesia, pain, or pain medicine, or the stress of surgery. These tips will help you handle nausea and eat healthy foods as you get better. If you were on a special food plan before surgery, ask your healthcare provider if you should follow it while you get better. These tips may help:  · Do not push yourself to eat. Your body will tell you when to eat and how much.  · Start off with clear liquids and soup. They are easier to digest.  · Next try semi-solid foods, such as mashed potatoes, applesauce, and gelatin, as you feel ready.  · Slowly move to solid foods. Dont eat fatty, rich, or spicy foods at first.  · Do not force yourself to have 3 large meals a day. Instead eat smaller amounts more often.  · Take pain medicines with a small amount of solid food, such as crackers or toast, to avoid nausea.     Call your surgeon if  · You still have pain an hour after taking medicine. The medicine may not be strong enough.  · You feel too sleepy, dizzy, or groggy. The medicine may be too strong.  · You have side effects like nausea, vomiting, or skin changes, such as rash, itching, or hives.       If you have obstructive sleep apnea  You were given anesthesia medicine during surgery to keep you comfortable and free of pain. After surgery, you may have more apnea spells because  of this medicine and other medicines you were given. The spells may last longer than usual.   At home:  · Keep using the continuous positive airway pressure (CPAP) device when you sleep. Unless your healthcare provider tells you not to, use it when you sleep, day or night. CPAP is a common device used to treat obstructive sleep apnea.  · Talk with your provider before taking any pain medicine, muscle relaxants, or sedatives. Your provider will tell you about the possible dangers of taking these medicines.    Date Last Reviewed: 12/1/2016  © 7334-7121 [employer]. [address] [place] [address]. All rights reserved. This information is not intended as a substitute for professional medical care. Always follow your healthcare professional's instructions.

## 2017-09-28 NOTE — TRANSFER OF CARE
"Anesthesia Transfer of Care Note    Patient: Fatemeh Shoemaker    Procedure(s) Performed: Procedure(s) (LRB):  [id number] (N/A)    Patient location: PACU    Anesthesia Type: general    Transport from OR: Transported from OR on 2-3 L/min O2 by NC with adequate spontaneous ventilation    Post pain: adequate analgesia    Post assessment: no apparent anesthetic complications    Post vital signs: stable    Level of consciousness: awake    Nausea/Vomiting: no nausea/vomiting    Complications: none    Transfer of care protocol was followed      Last vitals:   Visit Vitals  /74 (BP Location: Left arm, Patient Position: Sitting)   Pulse (!) 52   Temp 37 °C (98.6 °F) (Oral)   Resp 16   Ht 5' 4" (1.626 m)   Wt 132.9 kg (293 lb)   LMP 09/08/2017   SpO2 96%   Breastfeeding? No   BMI 50.29 kg/m²     "

## 2017-10-04 ENCOUNTER — PATIENT MESSAGE (OUTPATIENT)
Dept: OBSTETRICS AND GYNECOLOGY | Facility: CLINIC | Age: 57
End: 2017-10-04

## 2017-10-13 ENCOUNTER — PATIENT MESSAGE (OUTPATIENT)
Dept: OBSTETRICS AND GYNECOLOGY | Facility: CLINIC | Age: 57
End: 2017-10-13

## 2017-10-13 ENCOUNTER — OFFICE VISIT (OUTPATIENT)
Dept: OBSTETRICS AND GYNECOLOGY | Facility: CLINIC | Age: 57
End: 2017-10-13
Payer: COMMERCIAL

## 2017-10-13 VITALS
SYSTOLIC BLOOD PRESSURE: 118 MMHG | DIASTOLIC BLOOD PRESSURE: 76 MMHG | BODY MASS INDEX: 51.91 KG/M2 | WEIGHT: 293 LBS | HEIGHT: 63 IN

## 2017-10-13 DIAGNOSIS — Z12.39 SCREENING FOR MALIGNANT NEOPLASM OF BREAST: ICD-10-CM

## 2017-10-13 DIAGNOSIS — Z98.890 STATUS POST HYSTEROSCOPIC POLYPECTOMY: Primary | ICD-10-CM

## 2017-10-13 PROCEDURE — 99999 PR PBB SHADOW E&M-EST. PATIENT-LVL III: CPT | Mod: PBBFAC,,, | Performed by: OBSTETRICS & GYNECOLOGY

## 2017-10-13 PROCEDURE — 99024 POSTOP FOLLOW-UP VISIT: CPT | Mod: S$GLB,,, | Performed by: OBSTETRICS & GYNECOLOGY

## 2017-10-13 NOTE — PROGRESS NOTES
Subjective:       Patient ID: Fatemeh Shoemaker is a 56 y.o. female.    Chief Complaint:  Post-op Evaluation      History of Present Illness:  HPI  Postoperative Follow-up  Patient presents to the clinic 2 weeks status post hysteroscopy/D&C/polypectomy for postmenopausal bleeding. Eating a regular diet without difficulty. Bowel movements are normal. The patient is not having any pain. Had no bleeding after procedure.     GYN & OB HistoryPatient's last menstrual period was 2017.   Date of Last Pap: 2017    OB History    Para Term  AB Living   2         2   SAB TAB Ectopic Multiple Live Births                  # Outcome Date GA Lbr Giles/2nd Weight Sex Delivery Anes PTL Lv   2      F CS-Unspec      1      F CS-Unspec             Review of Systems  Review of Systems   Constitutional: Negative for chills, diaphoresis, fatigue and fever.   Respiratory: Negative for cough and shortness of breath.    Cardiovascular: Negative for chest pain and palpitations.   Gastrointestinal: Negative for abdominal pain, constipation, diarrhea, nausea and vomiting.   Genitourinary: Negative for dyspareunia, pelvic pain, vaginal bleeding, vaginal discharge, vaginal pain, postcoital bleeding and postmenopausal bleeding.   Neurological: Negative for headaches.   Psychiatric/Behavioral: Negative for depression. The patient is not nervous/anxious.            Objective:    Physical Exam:   Constitutional: She appears well-developed and well-nourished. No distress.    HENT:   Head: Normocephalic and atraumatic.     Neck: Normal range of motion.     Pulmonary/Chest: Effort normal.          Genitourinary:   Genitourinary Comments: Atrophic external female genitalia; vagina atrophic normal; cervix normal, no masses.           Musculoskeletal: Normal range of motion and moves all extremeties.       Neurological: She is alert.     Psychiatric: She has a normal mood and affect. Her behavior is normal. Judgment  and thought content normal.          Assessment:        1. Status post hysteroscopic polypectomy    2. Screening for malignant neoplasm of breast               Plan:      Fatemeh was seen today for post-op evaluation.    Diagnoses and all orders for this visit:    Patient doing well post-op.   Reviewed again her benign path.   Counseled on postmenopausal bleeding.   May resume normal activities    Screening for malignant neoplasm of breast  -     Mammo Digital Screening Bilat with Tomosynthesis CAD; Future        Orders Placed This Encounter   Procedures    Mammo Digital Screening Bilat with Tomosynthesis CAD       Return in about 1 year (around 10/13/2018) for annual.        Noemi Pierre MD  OB/GYN

## 2017-10-13 NOTE — LETTER
October 13, 2017      [facility] [address]  [address]  [place] [address]  Phone: [phone]  Fax: [phone]       Patient: Fatemeh Shoemaker   YOB: 1960  Date of Visit: 10/13/2017    To Whom It May Concern:    Rishi Shoemaker  was at [facility] on 10/13/2017. She may return to work with no restrictions. If you have any questions or concerns, or if I can be of further assistance, please do not hesitate to contact me.    Sincerely,    Noemi Pierre MD

## 2017-10-26 ENCOUNTER — PATIENT MESSAGE (OUTPATIENT)
Dept: OBSTETRICS AND GYNECOLOGY | Facility: CLINIC | Age: 57
End: 2017-10-26

## 2017-10-26 ENCOUNTER — PATIENT MESSAGE (OUTPATIENT)
Dept: NEUROLOGY | Facility: CLINIC | Age: 57
End: 2017-10-26

## 2017-10-27 ENCOUNTER — HOSPITAL ENCOUNTER (OUTPATIENT)
Dept: RADIOLOGY | Facility: HOSPITAL | Age: 57
Discharge: HOME | End: 2017-10-27
Attending: OBSTETRICS & GYNECOLOGY
Payer: COMMERCIAL

## 2017-10-27 DIAGNOSIS — Z12.39 SCREENING FOR MALIGNANT NEOPLASM OF BREAST: ICD-10-CM

## 2017-10-27 PROCEDURE — 77063 BREAST TOMOSYNTHESIS BI: CPT | Mod: 26,,, | Performed by: RADIOLOGY

## 2017-10-27 PROCEDURE — 77067 SCR MAMMO BI INCL CAD: CPT | Mod: TC

## 2017-10-27 PROCEDURE — 77067 SCR MAMMO BI INCL CAD: CPT | Mod: 26,,, | Performed by: RADIOLOGY

## 2017-10-30 ENCOUNTER — TELEPHONE (OUTPATIENT)
Dept: RADIOLOGY | Facility: HOSPITAL | Age: 57
End: 2017-10-30

## 2017-11-01 ENCOUNTER — HOSPITAL ENCOUNTER (OUTPATIENT)
Dept: RADIOLOGY | Facility: HOSPITAL | Age: 57
Discharge: HOME | End: 2017-11-01
Attending: OBSTETRICS & GYNECOLOGY
Payer: COMMERCIAL

## 2017-11-01 DIAGNOSIS — R92.8 ABNORMAL MAMMOGRAM OF RIGHT BREAST: ICD-10-CM

## 2017-11-01 PROCEDURE — 77061 BREAST TOMOSYNTHESIS UNI: CPT | Mod: 26,,, | Performed by: RADIOLOGY

## 2017-11-01 PROCEDURE — 77061 BREAST TOMOSYNTHESIS UNI: CPT | Mod: TC

## 2017-11-01 PROCEDURE — 76642 ULTRASOUND BREAST LIMITED: CPT | Mod: 26,RT,, | Performed by: RADIOLOGY

## 2017-11-01 PROCEDURE — 77065 DX MAMMO INCL CAD UNI: CPT | Mod: 26,,, | Performed by: RADIOLOGY

## 2017-11-01 PROCEDURE — 76642 ULTRASOUND BREAST LIMITED: CPT | Mod: TC,PO,RT

## 2017-11-01 PROCEDURE — 77065 DX MAMMO INCL CAD UNI: CPT | Mod: TC

## 2017-11-02 ENCOUNTER — TELEPHONE (OUTPATIENT)
Dept: RADIOLOGY | Facility: HOSPITAL | Age: 57
End: 2017-11-02

## 2017-11-02 NOTE — TELEPHONE ENCOUNTER
..Patient notified of diagnostic mammogram results.  Right breast biopsies are scheduled on 11/16/2017.   She is on Eliquis. She will check with Dr Bojorquez regarding holding medication for 5 days, prior to procedure.

## 2017-11-02 NOTE — TELEPHONE ENCOUNTER
Per Dr Bojorquez, it is ok to hold Eliquis for 3 days prior to procedure and resume as soon as cleared to by surgeon. Spoke with patient and she verbalizes understanding.

## 2017-11-08 ENCOUNTER — OFFICE VISIT (OUTPATIENT)
Dept: NEUROLOGY | Facility: CLINIC | Age: 57
End: 2017-11-08
Payer: COMMERCIAL

## 2017-11-08 VITALS
BODY MASS INDEX: 50.02 KG/M2 | DIASTOLIC BLOOD PRESSURE: 75 MMHG | HEART RATE: 60 BPM | SYSTOLIC BLOOD PRESSURE: 120 MMHG | HEIGHT: 64 IN | WEIGHT: 293 LBS

## 2017-11-08 DIAGNOSIS — R20.2 PARESTHESIAS: Primary | ICD-10-CM

## 2017-11-08 DIAGNOSIS — G62.9 NEUROPATHY: ICD-10-CM

## 2017-11-08 PROCEDURE — 99214 OFFICE O/P EST MOD 30 MIN: CPT | Mod: S$GLB,,, | Performed by: PSYCHIATRY & NEUROLOGY

## 2017-11-08 PROCEDURE — 99999 PR PBB SHADOW E&M-EST. PATIENT-LVL III: CPT | Mod: PBBFAC,,, | Performed by: PSYCHIATRY & NEUROLOGY

## 2017-11-08 RX ORDER — AMITRIPTYLINE HYDROCHLORIDE 50 MG/1
50 TABLET, FILM COATED ORAL NIGHTLY
Qty: 30 TABLET | Refills: 11 | Status: SHIPPED | OUTPATIENT
Start: 2017-11-08 | End: 2017-11-09 | Stop reason: CLARIF

## 2017-11-08 NOTE — PROGRESS NOTES
[facility] - NEUROLOGY  [facility] [place]    Date: November 8, 2017   Patient Name: Fatemeh Shoemaker   MRN: [id number]   PCP: Liseth Owen  Referring Provider: Self, Aaareferral    Assessment:      This is Fatemeh Shoemaker, 56 y.o. female with AF on elequis and flecainide, HLD, HTN, morbid obesity presents for distal symmetric painful sensory neuropathathy with no history of diabetes.  Recent EMG BUE does show mild right CTS although this would not explain her symptoms.  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  Will consider celiac panel with repeat TSH and lipid panel, possible punch bx for small fiber density on follow up but most likely explanation at this time is metabolic syndrome.      Plan:      Cymbalta with increase to 60mg qd, wean GBP  Labs today  MRI brain and C-spine    Follow up 6 weeks       I discussed side effects of the medications. I asked the patient to stop the medication if she notices serious adverse effects as we discussed and to seek immediate medical attention at an ER.     Tray Pate MD  [facility]   Department of Neurology    Subjective:   No improvement on GBP 600mg tid, symptoms interfere with sleep    HPI:   Ms. Fatemeh Shoemaker is a 56 y.o. female with AF on elequis, HLD, HTN, morbid obesity presents for neuropathy  She has had burning pain in her feet for at least several years and began to have burning pain in fingers of both hands starting early 2017.  Does not have any associated weakness and does not notice any exacerbating/alleviating factors.  Takes GBP for foot pain with some relief.    PAST MEDICAL HISTORY:  Past Medical History:   Diagnosis Date    Arrhythmia     Atrial fibrillation     history    Bronchitis     High blood pressure     PONV (postoperative nausea and  "vomiting)     Stroke     Ulcer        PAST SURGICAL HISTORY:  Past Surgical History:   Procedure Laterality Date    csection      HIP PINNING      JOINT REPLACEMENT      hip right    NASAL SEPTUM SURGERY      TOTAL HIP ARTHROPLASTY      TUBAL LIGATION  1997       CURRENT MEDS:  Current Outpatient Prescriptions   Medication Sig Dispense Refill    apixaban 5 mg Tab Take 1 tablet (5 mg total) by mouth 2 (two) times daily. (Patient taking differently: Take 5 mg by mouth 2 (two) times daily. September 18) 60 tablet 11    flecainide (TAMBOCOR) 100 MG Tab Take 1 tablet (100 mg total) by mouth every 12 (twelve) hours. 60 tablet 11    fluticasone (FLONASE) 50 mcg/actuation nasal spray       gabapentin (NEURONTIN) 600 MG tablet Take 1 tablet (600 mg total) by mouth 3 (three) times daily. 90 tablet 11    hydrochlorothiazide (HYDRODIURIL) 25 MG tablet Take 1 tablet (25 mg total) by mouth once daily. 90 tablet 3    lisinopril 10 MG tablet Take 1 tablet (10 mg total) by mouth once daily. 90 tablet 3    metoprolol tartrate (LOPRESSOR) 25 MG tablet Take 1 tablet (25 mg total) by mouth 2 (two) times daily. 180 tablet 3    omeprazole (PRILOSEC) 20 MG capsule Take 1 capsule (20 mg total) by mouth once daily. 30 capsule 11    oxycodone-acetaminophen (PERCOCET) 5-325 mg per tablet Take 1 tablet by mouth every 4 (four) hours as needed for Pain. 15 tablet 0    rosuvastatin (CRESTOR) 10 MG tablet Take 10 mg by mouth once daily.       No current facility-administered medications for this visit.        ALLERGIES:  Review of patient's allergies indicates:   Allergen Reactions    Aspirin Other (See Comments)     "I don't take it because I've had ulcers"   ulcers    Meperidine        FAMILY HISTORY:  Family History   Problem Relation Age of Onset    Colon cancer Maternal Grandmother 70    Cancer Maternal Grandmother     Ovarian cancer Maternal Grandmother     Eclampsia Paternal Grandmother     Stroke Father     " "Hypertension Mother     Stomach cancer Neg Hx     Esophageal cancer Neg Hx     Breast cancer Neg Hx     Miscarriages / Stillbirths Neg Hx        SOCIAL HISTORY:  Social History   Substance Use Topics    Smoking status: Never Smoker    Smokeless tobacco: Never Used    Alcohol use No      Comment: never       Review of Systems:  12 review of systems is negative except for the symptoms mentioned in HPI.        Objective:     Vitals:    11/08/17 1031   BP: 120/75   Pulse: 60   Weight: (!) 136.3 kg (300 lb 7.8 oz)   Height: 5' 4" (1.626 m)       General: NAD, well nourished   Eyes: no tearing, discharge, no erythema   ENT: moist mucous membranes of the oral cavity, nares patent    Neck: Supple, full range of motion  Cardiovascular: Warm and well perfused, pulses equal and symmetrical  Lungs: Normal work of breathing, normal chest wall excursions  Skin: No rash, lesions, or breakdown on exposed skin  Psychiatry: Mood and affect are appropriate   Abdomen: soft, non tender, non distended  Extremeties: No cyanosis, clubbing or edema.    Neurological   MENTAL STATUS: Alert and oriented to person, place, and time. Attention and concentration within normal limits. Speech without dysarthria, able to name and repeat without difficulty. Recent and remote memory within normal limits   CRANIAL NERVES: Visual fields intact. PERRL. EOMI. Facial sensation intact. Face symmetrical. Hearing grossly intact. Full shoulder shrug bilaterally. Tongue protrudes midline   SENSORY: Sensation is decreased to light touch and vibration below the ankles.  Negative Romberg.   MOTOR: Normal bulk and tone. No pronator drift.  5/5 deltoid, biceps, triceps, interosseous, hand  bilaterally. 5/5 iliopsoas, knee extension/flexion, foot dorsi/plantarflexion bilaterally.    REFLEXES: Patellar 2+, remainder mute. Toes down going bilaterally.   CEREBELLAR/COORDINATION/GAIT: Gait steady with normal arm swing and stride length.  Heel to shin intact. " Finger to nose intact. Normal rapid alternating movements.

## 2017-11-09 RX ORDER — DULOXETIN HYDROCHLORIDE 60 MG/1
60 CAPSULE, DELAYED RELEASE ORAL DAILY
Qty: 30 CAPSULE | Refills: 11 | Status: SHIPPED | OUTPATIENT
Start: 2017-11-09 | End: 2018-11-02 | Stop reason: SDUPTHER

## 2017-11-09 RX ORDER — DULOXETIN HYDROCHLORIDE 30 MG/1
30 CAPSULE, DELAYED RELEASE ORAL DAILY
Qty: 7 CAPSULE | Refills: 0 | Status: SHIPPED | OUTPATIENT
Start: 2017-11-09 | End: 2018-01-08

## 2017-11-13 DIAGNOSIS — G62.9 NEUROPATHY: ICD-10-CM

## 2017-11-13 DIAGNOSIS — R20.2 PARESTHESIAS: ICD-10-CM

## 2017-11-13 RX ORDER — DULOXETIN HYDROCHLORIDE 30 MG/1
30 CAPSULE, DELAYED RELEASE ORAL DAILY
Qty: 7 CAPSULE | Refills: 0 | OUTPATIENT
Start: 2017-11-13 | End: 2018-11-13

## 2017-11-14 NOTE — PROGRESS NOTES
Please notify the patient that her rhythm monitor showed possibly 2 episodes of atrial fibrillation however there was significant noise during these events making it difficult to definitively diagnose the rhythm. Otherwise she was in sinus rhythm. If she feels well from a palpitation/heart rhythm standpoint then we will continue current therapy.

## 2017-11-15 ENCOUNTER — TELEPHONE (OUTPATIENT)
Dept: ELECTROPHYSIOLOGY | Facility: CLINIC | Age: 57
End: 2017-11-15

## 2017-11-15 NOTE — TELEPHONE ENCOUNTER
----- Message from Manny Bojorquez MD sent at 11/14/2017  4:45 PM CST -----  Please notify the patient that her rhythm monitor showed possibly 2 episodes of atrial fibrillation however there was significant noise during these events making it difficult to definitively diagnose the rhythm. Otherwise she was in sinus rhythm. If she feels well from a palpitation/heart rhythm standpoint then we will continue current therapy.

## 2017-11-15 NOTE — TELEPHONE ENCOUNTER
Spoke with patient to relay results of 30 day monitor, per Dr Bojorquez' note. She verbalizes understanding and states she is feeling fine.

## 2017-11-16 ENCOUNTER — HOSPITAL ENCOUNTER (OUTPATIENT)
Dept: RADIOLOGY | Facility: HOSPITAL | Age: 57
Discharge: HOME | End: 2017-11-16
Attending: OBSTETRICS & GYNECOLOGY
Payer: COMMERCIAL

## 2017-11-16 DIAGNOSIS — R92.8 ABNORMAL MAMMOGRAM OF RIGHT BREAST: ICD-10-CM

## 2017-11-16 PROCEDURE — 88360 TUMOR IMMUNOHISTOCHEM/MANUAL: CPT | Performed by: PATHOLOGY

## 2017-11-16 PROCEDURE — 19083 BX BREAST 1ST LESION US IMAG: CPT | Mod: RT,,, | Performed by: RADIOLOGY

## 2017-11-16 PROCEDURE — A4648 IMPLANTABLE TISSUE MARKER: HCPCS | Mod: PO

## 2017-11-16 PROCEDURE — 88305 TISSUE EXAM BY PATHOLOGIST: CPT | Performed by: PATHOLOGY

## 2017-11-16 PROCEDURE — 19084 BX BREAST ADD LESION US IMAG: CPT | Mod: RT,,, | Performed by: RADIOLOGY

## 2017-11-16 PROCEDURE — 88342 IMHCHEM/IMCYTCHM 1ST ANTB: CPT | Mod: 26,59,, | Performed by: PATHOLOGY

## 2017-11-16 PROCEDURE — 88341 IMHCHEM/IMCYTCHM EA ADD ANTB: CPT | Mod: 26,59,, | Performed by: PATHOLOGY

## 2017-11-16 PROCEDURE — 88360 TUMOR IMMUNOHISTOCHEM/MANUAL: CPT | Mod: 26,,, | Performed by: PATHOLOGY

## 2017-11-16 PROCEDURE — 88305 TISSUE EXAM BY PATHOLOGIST: CPT | Mod: 26,,, | Performed by: PATHOLOGY

## 2017-11-22 ENCOUNTER — TELEPHONE (OUTPATIENT)
Dept: RADIOLOGY | Facility: HOSPITAL | Age: 57
End: 2017-11-22

## 2017-11-22 NOTE — TELEPHONE ENCOUNTER
..Patient notified of breast biopsy results     FINAL PATHOLOGIC DIAGNOSIS  1. 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 CORE BIOPSY, RIGHT BREAST 9 CM FROM THE NIPPLE SHOWS A BENIGN SPINDLE CELL  PROLIFERATION. I WILL SEND THIS CASE FOR OUTSIDE CONSULTATION WITH FINAL REPORT TO  FOLLOW    She was instructed second pathology might take 10-14 days    I will call her when results are completed

## 2017-11-30 ENCOUNTER — HOSPITAL ENCOUNTER (OUTPATIENT)
Dept: RADIOLOGY | Facility: HOSPITAL | Age: 57
Discharge: HOME | End: 2017-11-30
Attending: PSYCHIATRY & NEUROLOGY
Payer: COMMERCIAL

## 2017-11-30 DIAGNOSIS — R20.2 PARESTHESIAS: ICD-10-CM

## 2017-11-30 PROCEDURE — 25500020 PHARM REV CODE 255: Mod: PO | Performed by: PSYCHIATRY & NEUROLOGY

## 2017-11-30 PROCEDURE — 70553 MRI BRAIN STEM W/O & W/DYE: CPT | Mod: 26,,, | Performed by: RADIOLOGY

## 2017-11-30 PROCEDURE — 70553 MRI BRAIN STEM W/O & W/DYE: CPT | Mod: TC,PO

## 2017-11-30 PROCEDURE — A9585 GADOBUTROL INJECTION: HCPCS | Mod: PO | Performed by: PSYCHIATRY & NEUROLOGY

## 2017-11-30 PROCEDURE — 72156 MRI NECK SPINE W/O & W/DYE: CPT | Mod: 26,,, | Performed by: RADIOLOGY

## 2017-11-30 PROCEDURE — 72156 MRI NECK SPINE W/O & W/DYE: CPT | Mod: TC,PO

## 2017-11-30 RX ORDER — GADOBUTROL 604.72 MG/ML
10 INJECTION INTRAVENOUS
Status: COMPLETED | OUTPATIENT
Start: 2017-11-30 | End: 2017-11-30

## 2017-11-30 RX ADMIN — GADOBUTROL 10 ML: 604.72 INJECTION INTRAVENOUS at 11:11

## 2017-11-30 NOTE — TELEPHONE ENCOUNTER
..Patient notified of breast biopsy results.     Supplemental Diagnosis  [place] DIAGNOSIS:  BREAST, RIGHT, NEEDLE CORE BIOPSY ([id number], 2; 11/16/2017):  -Myofibroblastoma.    Patient will call back to schedule appointment with Dr Fraire to discuss excisional biopsy

## 2017-12-07 DIAGNOSIS — I48.0 PAROXYSMAL ATRIAL FIBRILLATION: ICD-10-CM

## 2017-12-07 RX ORDER — FLECAINIDE ACETATE 100 MG/1
TABLET ORAL
Qty: 60 TABLET | OUTPATIENT
Start: 2017-12-07

## 2017-12-07 RX ORDER — FLECAINIDE ACETATE 100 MG/1
100 TABLET ORAL EVERY 12 HOURS
Qty: 60 TABLET | Refills: 11 | Status: SHIPPED | OUTPATIENT
Start: 2017-12-07 | End: 2018-01-22 | Stop reason: SDUPTHER

## 2017-12-07 RX ORDER — HYDROCHLOROTHIAZIDE 25 MG/1
TABLET ORAL
Qty: 90 TABLET | Refills: 3 | Status: SHIPPED | OUTPATIENT
Start: 2017-12-07 | End: 2018-01-22 | Stop reason: SDUPTHER

## 2017-12-11 ENCOUNTER — OFFICE VISIT (OUTPATIENT)
Dept: SURGERY | Facility: CLINIC | Age: 57
End: 2017-12-11
Payer: COMMERCIAL

## 2017-12-11 ENCOUNTER — LAB VISIT (OUTPATIENT)
Dept: LAB | Facility: HOSPITAL | Age: 57
End: 2017-12-11
Attending: SURGERY
Payer: COMMERCIAL

## 2017-12-11 VITALS
DIASTOLIC BLOOD PRESSURE: 68 MMHG | BODY MASS INDEX: 49.38 KG/M2 | SYSTOLIC BLOOD PRESSURE: 125 MMHG | HEART RATE: 65 BPM | WEIGHT: 287.69 LBS | TEMPERATURE: 98 F

## 2017-12-11 DIAGNOSIS — Z01.818 PREOP TESTING: ICD-10-CM

## 2017-12-11 DIAGNOSIS — D24.1 MYOFIBROBLASTOMA OF BREAST, RIGHT: Primary | ICD-10-CM

## 2017-12-11 LAB
ALBUMIN SERPL BCP-MCNC: 3.6 G/DL
ALP SERPL-CCNC: 46 U/L
ALT SERPL W/O P-5'-P-CCNC: 17 U/L
ANION GAP SERPL CALC-SCNC: 9 MMOL/L
AST SERPL-CCNC: 19 U/L
BASOPHILS # BLD AUTO: 0.03 K/UL
BASOPHILS NFR BLD: 0.3 %
BILIRUB SERPL-MCNC: 0.6 MG/DL
BUN SERPL-MCNC: 15 MG/DL
CALCIUM SERPL-MCNC: 9.9 MG/DL
CHLORIDE SERPL-SCNC: 100 MMOL/L
CO2 SERPL-SCNC: 31 MMOL/L
CREAT SERPL-MCNC: 0.8 MG/DL
DIFFERENTIAL METHOD: ABNORMAL
EOSINOPHIL # BLD AUTO: 0.1 K/UL
EOSINOPHIL NFR BLD: 0.8 %
ERYTHROCYTE [DISTWIDTH] IN BLOOD BY AUTOMATED COUNT: 12.6 %
EST. GFR  (AFRICAN AMERICAN): >60 ML/MIN/1.73 M^2
EST. GFR  (NON AFRICAN AMERICAN): >60 ML/MIN/1.73 M^2
GLUCOSE SERPL-MCNC: 139 MG/DL
HCT VFR BLD AUTO: 41.7 %
HGB BLD-MCNC: 13.9 G/DL
IMM GRANULOCYTES # BLD AUTO: 0.02 K/UL
IMM GRANULOCYTES NFR BLD AUTO: 0.2 %
LYMPHOCYTES # BLD AUTO: 2.7 K/UL
LYMPHOCYTES NFR BLD: 29.4 %
MCH RBC QN AUTO: 31.4 PG
MCHC RBC AUTO-ENTMCNC: 33.3 G/DL
MCV RBC AUTO: 94 FL
MONOCYTES # BLD AUTO: 0.6 K/UL
MONOCYTES NFR BLD: 6 %
NEUTROPHILS # BLD AUTO: 5.8 K/UL
NEUTROPHILS NFR BLD: 63.3 %
NRBC BLD-RTO: 0 /100 WBC
PLATELET # BLD AUTO: 318 K/UL
PMV BLD AUTO: 11.2 FL
POTASSIUM SERPL-SCNC: 4 MMOL/L
PROT SERPL-MCNC: 7.5 G/DL
RBC # BLD AUTO: 4.43 M/UL
SODIUM SERPL-SCNC: 140 MMOL/L
WBC # BLD AUTO: 9.15 K/UL

## 2017-12-11 PROCEDURE — 36415 COLL VENOUS BLD VENIPUNCTURE: CPT | Mod: PO

## 2017-12-11 PROCEDURE — 99204 OFFICE O/P NEW MOD 45 MIN: CPT | Mod: S$GLB,,, | Performed by: SURGERY

## 2017-12-11 PROCEDURE — 85025 COMPLETE CBC W/AUTO DIFF WBC: CPT

## 2017-12-11 PROCEDURE — 80053 COMPREHEN METABOLIC PANEL: CPT

## 2017-12-11 PROCEDURE — 99999 PR PBB SHADOW E&M-EST. PATIENT-LVL IV: CPT | Mod: PBBFAC,,, | Performed by: SURGERY

## 2017-12-11 RX ORDER — SODIUM CHLORIDE 9 MG/ML
INJECTION, SOLUTION INTRAVENOUS CONTINUOUS
Status: CANCELLED | OUTPATIENT
Start: 2017-12-11

## 2017-12-11 NOTE — LETTER
December 14, 2017      Noemi Pierre MD  [address] [address]           [facility]  [address] [facility] [address] [place] [address]  Phone: [phone]          Patient: Fatemeh Shoemaker   MR Number: [id number]   YOB: 1960   Date of Visit: 12/11/2017       Dear Dr. Noemi Pierre:    Thank you for referring Fatemeh Shoemaker to me for evaluation. Attached you will find relevant portions of my assessment and plan of care.    If you have questions, please do not hesitate to call me. I look forward to following Fatemeh Shoemaker along with you.    Sincerely,    Sebastián Lawrence  CC:  No Recipients    If you would like to receive this communication electronically, please contact [email or web address] or [phone] to request more information on [employer] access.    For providers and/or their staff who would like to refer a patient to [facility], please contact us through our one-stop-shop provider referral line, Cristina Kate, at [phone].    If you feel you have received this communication in error or would no longer like to receive these types of communications, please e-mail [email or web address]

## 2017-12-12 ENCOUNTER — PATIENT MESSAGE (OUTPATIENT)
Dept: ELECTROPHYSIOLOGY | Facility: CLINIC | Age: 57
End: 2017-12-12

## 2017-12-18 DIAGNOSIS — C50.911 MALIGNANT NEOPLASM OF RIGHT FEMALE BREAST, UNSPECIFIED ESTROGEN RECEPTOR STATUS, UNSPECIFIED SITE OF BREAST: Primary | ICD-10-CM

## 2017-12-19 NOTE — PROGRESS NOTES
Subjective:       Patient ID: Fatemeh Shoemaker is a 57 y.o. female.    Chief Complaint: Consult (myofibroblastoma)      HPI 57-year-old female with recent right breast biopsy which revealed mild fibroblastoma.  She was referred for definitive excision.  The patient denies any symptoms.  She cannot feel the mass.  This was discovered on a screening exam.  Family history is negative for breast or ovarian cancer.  Age of menarche was 12.  Menopause was she has 2 children the first of which was born when she was 26.  She has done well since the biopsy.      Past Medical History:   Diagnosis Date    Arrhythmia     Atrial fibrillation     history    Bronchitis     High blood pressure     PONV (postoperative nausea and vomiting)     Stroke     Ulcer      Past Surgical History:   Procedure Laterality Date    csection      DILATION AND CURETTAGE OF UTERUS      HIP PINNING      JOINT REPLACEMENT      hip right    NASAL SEPTUM SURGERY      TOTAL HIP ARTHROPLASTY      TUBAL LIGATION  1997         Current Outpatient Prescriptions:     apixaban 5 mg Tab, Take 1 tablet (5 mg total) by mouth 2 (two) times daily. (Patient taking differently: Take 5 mg by mouth 2 (two) times daily. September 18), Disp: 60 tablet, Rfl: 11    DULoxetine (CYMBALTA) 30 MG capsule, Take 1 capsule (30 mg total) by mouth once daily., Disp: 7 capsule, Rfl: 0    DULoxetine (CYMBALTA) 60 MG capsule, Take 1 capsule (60 mg total) by mouth once daily., Disp: 30 capsule, Rfl: 11    flecainide (TAMBOCOR) 100 MG Tab, Take 1 tablet (100 mg total) by mouth every 12 (twelve) hours., Disp: 60 tablet, Rfl: 11    gabapentin (NEURONTIN) 600 MG tablet, Take 1 tablet (600 mg total) by mouth 3 (three) times daily., Disp: 90 tablet, Rfl: 11    hydroCHLOROthiazide (HYDRODIURIL) 25 MG tablet, TAKE ONE TABLET BY MOUTH ONCE DAILY, Disp: 90 tablet, Rfl: 3    lisinopril 10 MG tablet, Take 1 tablet (10 mg total) by mouth once daily., Disp: 90 tablet, Rfl: 3    " metoprolol tartrate (LOPRESSOR) 25 MG tablet, Take 1 tablet (25 mg total) by mouth 2 (two) times daily., Disp: 180 tablet, Rfl: 3    omeprazole (PRILOSEC) 20 MG capsule, Take 1 capsule (20 mg total) by mouth once daily., Disp: 30 capsule, Rfl: 11    rosuvastatin (CRESTOR) 10 MG tablet, Take 10 mg by mouth once daily., Disp: , Rfl:     fluticasone (FLONASE) 50 mcg/actuation nasal spray, , Disp: , Rfl:     oxycodone-acetaminophen (PERCOCET) 5-325 mg per tablet, Take 1 tablet by mouth every 4 (four) hours as needed for Pain., Disp: 15 tablet, Rfl: 0    Review of patient's allergies indicates:   Allergen Reactions    Aspirin Other (See Comments)     "I don't take it because I've had ulcers"   ulcers    Meperidine Other (See Comments)     disoriented       Family History   Problem Relation Age of Onset    Colon cancer Maternal Grandmother 70    Cancer Maternal Grandmother     Ovarian cancer Maternal Grandmother     Eclampsia Paternal Grandmother     Stroke Father     Hypertension Mother     Stomach cancer Neg Hx     Esophageal cancer Neg Hx     Breast cancer Neg Hx     Miscarriages / Stillbirths Neg Hx      Social History     Social History    Marital status:      Spouse name: N/A    Number of children: N/A    Years of education: N/A     Occupational History    Not on file.     Social History Main Topics    Smoking status: Never Smoker    Smokeless tobacco: Never Used    Alcohol use No      Comment: never    Drug use: No    Sexual activity: Yes     Partners: Male     Other Topics Concern    Not on file     Social History Narrative    No narrative on file       Review of Systems   Constitutional: Negative for activity change, chills, fever and unexpected weight change.   HENT: Negative for congestion, sore throat, trouble swallowing and voice change.    Eyes: Negative for redness and visual disturbance.   Respiratory: Negative for cough, shortness of breath and wheezing.  "   Cardiovascular: Negative for chest pain and palpitations.   Gastrointestinal: Negative for abdominal pain, blood in stool, nausea and vomiting.   Endocrine: Negative.    Genitourinary: Negative for dysuria, frequency and hematuria.   Musculoskeletal: Negative for arthralgias, back pain and neck pain.   Skin: Negative for rash and wound.   Allergic/Immunologic: Negative.    Neurological: Negative for dizziness, weakness and headaches.   Hematological: Negative for adenopathy.   Psychiatric/Behavioral: Negative for agitation and dysphoric mood. The patient is not nervous/anxious.      Objective:     Physical Exam   Constitutional: She is oriented to person, place, and time. She appears well-developed and well-nourished. No distress.   HENT:   Head: Normocephalic and atraumatic.   Mouth/Throat: Oropharynx is clear and moist. No oropharyngeal exudate.   Eyes: Conjunctivae and EOM are normal. Pupils are equal, round, and reactive to light. No scleral icterus.   Neck: Normal range of motion. No thyromegaly present.   Cardiovascular: Normal rate and regular rhythm.    No murmur heard.  Pulmonary/Chest: Effort normal and breath sounds normal. She has no wheezes. She has no rales.   Right Breast Exam:  There are no palpable dominant masses.  There are no overlying skin changes.  There is no contour change of the breast.  There is no nipple discharge.  There is no significant breast tenderness.  There is no palpable axillary or supraclavicular adenopathy.    There is no abnormality detected on physical exam of the contralateral breast.   Abdominal: Soft. Bowel sounds are normal. She exhibits no distension and no mass. There is no tenderness. No hernia.   Musculoskeletal: Normal range of motion. She exhibits no edema.   Lymphadenopathy:     She has no cervical adenopathy.   Neurological: She is alert and oriented to person, place, and time. No cranial nerve deficit.   Skin: Skin is warm and dry. No rash noted. No erythema.    Psychiatric: She has a normal mood and affect. Her behavior is normal.     1) Right breast, 10:00, 10 cm from nipple (Mammotome x 7).  2) Right breast, 10:00, 9 cm from nipple (Mammotome x 9).  Supplemental Diagnosis  [place] DIAGNOSIS:  BREAST, RIGHT, NEEDLE CORE BIOPSY ([id number], 2; 11/16/2017):  -Myofibroblastoma.    Assessment:     Encounter Diagnoses   Name Primary?    Preop testing     Myofibroblastoma of breast, right Yes       Plan:      1. Plan wire localized excision of this mass.  2. Risks and benefits of the planned procedure were discussed at length with the patient.  Risks and benefits of not proceeding with the procedure were discussed as well. All questions were answered. The patient expressed clear understanding and would like to proceed with the procedure as discussed.

## 2017-12-20 ENCOUNTER — ANESTHESIA EVENT (OUTPATIENT)
Dept: SURGERY | Facility: HOSPITAL | Age: 57
End: 2017-12-20
Payer: COMMERCIAL

## 2017-12-21 ENCOUNTER — HOSPITAL ENCOUNTER (OUTPATIENT)
Dept: RADIOLOGY | Facility: HOSPITAL | Age: 57
Discharge: HOME | End: 2017-12-21
Attending: SURGERY
Payer: COMMERCIAL

## 2017-12-21 ENCOUNTER — ANESTHESIA (OUTPATIENT)
Dept: SURGERY | Facility: HOSPITAL | Age: 57
End: 2017-12-21
Payer: COMMERCIAL

## 2017-12-21 ENCOUNTER — SURGERY (OUTPATIENT)
Age: 57
End: 2017-12-21

## 2017-12-21 ENCOUNTER — HOSPITAL ENCOUNTER (OUTPATIENT)
Facility: HOSPITAL | Age: 57
Discharge: HOME | End: 2017-12-21
Attending: SURGERY | Admitting: SURGERY
Payer: COMMERCIAL

## 2017-12-21 ENCOUNTER — HOSPITAL ENCOUNTER (OUTPATIENT)
Dept: RADIOLOGY | Facility: HOSPITAL | Age: 57
Discharge: HOME | End: 2017-12-21
Attending: SURGERY | Admitting: SURGERY
Payer: COMMERCIAL

## 2017-12-21 DIAGNOSIS — D24.1 MYOFIBROBLASTOMA OF BREAST, RIGHT: Primary | ICD-10-CM

## 2017-12-21 DIAGNOSIS — D24.1 MYOFIBROBLASTOMA OF BREAST, RIGHT: ICD-10-CM

## 2017-12-21 DIAGNOSIS — C50.911 MALIGNANT NEOPLASM OF RIGHT FEMALE BREAST, UNSPECIFIED ESTROGEN RECEPTOR STATUS, UNSPECIFIED SITE OF BREAST: ICD-10-CM

## 2017-12-21 DIAGNOSIS — Z01.818 PREOP TESTING: ICD-10-CM

## 2017-12-21 PROCEDURE — 25000003 PHARM REV CODE 250: Performed by: NURSE ANESTHETIST, CERTIFIED REGISTERED

## 2017-12-21 PROCEDURE — 63600175 PHARM REV CODE 636 W HCPCS: Performed by: SURGERY

## 2017-12-21 PROCEDURE — 76098 X-RAY EXAM SURGICAL SPECIMEN: CPT | Mod: TC

## 2017-12-21 PROCEDURE — 19125 EXCISION BREAST LESION: CPT | Mod: RT,,, | Performed by: SURGERY

## 2017-12-21 PROCEDURE — 37000009 HC ANESTHESIA EA ADD 15 MINS: Performed by: SURGERY

## 2017-12-21 PROCEDURE — 36000706: Performed by: SURGERY

## 2017-12-21 PROCEDURE — 99900103 DSU ONLY-NO CHARGE-INITIAL HR (STAT): Performed by: SURGERY

## 2017-12-21 PROCEDURE — 25000003 PHARM REV CODE 250: Performed by: ANESTHESIOLOGY

## 2017-12-21 PROCEDURE — D9220A PRA ANESTHESIA: Mod: CRNA,,, | Performed by: NURSE ANESTHETIST, CERTIFIED REGISTERED

## 2017-12-21 PROCEDURE — 99900104 DSU ONLY-NO CHARGE-EA ADD'L HR (STAT): Performed by: SURGERY

## 2017-12-21 PROCEDURE — C1769 GUIDE WIRE: HCPCS

## 2017-12-21 PROCEDURE — 71000033 HC RECOVERY, INTIAL HOUR: Performed by: SURGERY

## 2017-12-21 PROCEDURE — 37000008 HC ANESTHESIA 1ST 15 MINUTES: Performed by: SURGERY

## 2017-12-21 PROCEDURE — 25000003 PHARM REV CODE 250: Performed by: SURGERY

## 2017-12-21 PROCEDURE — 19281 PERQ DEVICE BREAST 1ST IMAG: CPT | Mod: RT,,, | Performed by: RADIOLOGY

## 2017-12-21 PROCEDURE — 88307 TISSUE EXAM BY PATHOLOGIST: CPT | Performed by: PATHOLOGY

## 2017-12-21 PROCEDURE — 19281 PERQ DEVICE BREAST 1ST IMAG: CPT | Mod: TC

## 2017-12-21 PROCEDURE — 71000015 HC POSTOP RECOV 1ST HR: Performed by: SURGERY

## 2017-12-21 PROCEDURE — 63600175 PHARM REV CODE 636 W HCPCS: Performed by: NURSE ANESTHETIST, CERTIFIED REGISTERED

## 2017-12-21 PROCEDURE — D9220A PRA ANESTHESIA: Mod: ANES,,, | Performed by: ANESTHESIOLOGY

## 2017-12-21 PROCEDURE — 76098 X-RAY EXAM SURGICAL SPECIMEN: CPT | Mod: 26,,, | Performed by: RADIOLOGY

## 2017-12-21 PROCEDURE — C1729 CATH, DRAINAGE: HCPCS | Performed by: SURGERY

## 2017-12-21 PROCEDURE — 71000039 HC RECOVERY, EACH ADD'L HOUR: Performed by: SURGERY

## 2017-12-21 PROCEDURE — 36000707: Performed by: SURGERY

## 2017-12-21 RX ORDER — DEXAMETHASONE SODIUM PHOSPHATE 4 MG/ML
INJECTION, SOLUTION INTRA-ARTICULAR; INTRALESIONAL; INTRAMUSCULAR; INTRAVENOUS; SOFT TISSUE
Status: DISCONTINUED | OUTPATIENT
Start: 2017-12-21 | End: 2017-12-21

## 2017-12-21 RX ORDER — METOCLOPRAMIDE HYDROCHLORIDE 5 MG/ML
10 INJECTION INTRAMUSCULAR; INTRAVENOUS EVERY 10 MIN PRN
Status: DISCONTINUED | OUTPATIENT
Start: 2017-12-21 | End: 2017-12-21 | Stop reason: HOSPADM

## 2017-12-21 RX ORDER — SODIUM CHLORIDE 9 MG/ML
INJECTION, SOLUTION INTRAVENOUS CONTINUOUS
Status: DISCONTINUED | OUTPATIENT
Start: 2017-12-21 | End: 2017-12-21 | Stop reason: HOSPADM

## 2017-12-21 RX ORDER — PROPOFOL 10 MG/ML
VIAL (ML) INTRAVENOUS
Status: DISCONTINUED | OUTPATIENT
Start: 2017-12-21 | End: 2017-12-21

## 2017-12-21 RX ORDER — ACETAMINOPHEN 10 MG/ML
INJECTION, SOLUTION INTRAVENOUS
Status: DISCONTINUED | OUTPATIENT
Start: 2017-12-21 | End: 2017-12-21

## 2017-12-21 RX ORDER — ONDANSETRON 2 MG/ML
INJECTION INTRAMUSCULAR; INTRAVENOUS
Status: DISCONTINUED | OUTPATIENT
Start: 2017-12-21 | End: 2017-12-21

## 2017-12-21 RX ORDER — FENTANYL CITRATE 50 UG/ML
25 INJECTION, SOLUTION INTRAMUSCULAR; INTRAVENOUS EVERY 5 MIN PRN
Status: DISCONTINUED | OUTPATIENT
Start: 2017-12-21 | End: 2017-12-21 | Stop reason: HOSPADM

## 2017-12-21 RX ORDER — GLYCOPYRROLATE 0.2 MG/ML
INJECTION INTRAMUSCULAR; INTRAVENOUS
Status: DISCONTINUED | OUTPATIENT
Start: 2017-12-21 | End: 2017-12-21

## 2017-12-21 RX ORDER — OXYCODONE HYDROCHLORIDE 5 MG/1
5 TABLET ORAL
Status: DISCONTINUED | OUTPATIENT
Start: 2017-12-21 | End: 2017-12-21 | Stop reason: HOSPADM

## 2017-12-21 RX ORDER — ACETAMINOPHEN 500 MG
1000 TABLET ORAL EVERY 6 HOURS PRN
COMMUNITY
End: 2019-01-30

## 2017-12-21 RX ORDER — SODIUM CHLORIDE 0.9 % (FLUSH) 0.9 %
3 SYRINGE (ML) INJECTION
Status: DISCONTINUED | OUTPATIENT
Start: 2017-12-21 | End: 2017-12-21 | Stop reason: HOSPADM

## 2017-12-21 RX ORDER — LIDOCAINE HYDROCHLORIDE 10 MG/ML
1 INJECTION, SOLUTION EPIDURAL; INFILTRATION; INTRACAUDAL; PERINEURAL ONCE
Status: COMPLETED | OUTPATIENT
Start: 2017-12-21 | End: 2017-12-21

## 2017-12-21 RX ORDER — HYDROCODONE BITARTRATE AND ACETAMINOPHEN 7.5; 325 MG/1; MG/1
1 TABLET ORAL EVERY 4 HOURS PRN
Qty: 30 TABLET | Refills: 0 | Status: SHIPPED | OUTPATIENT
Start: 2017-12-21 | End: 2017-12-31

## 2017-12-21 RX ORDER — KETAMINE HYDROCHLORIDE 100 MG/ML
INJECTION, SOLUTION INTRAMUSCULAR; INTRAVENOUS
Status: DISCONTINUED | OUTPATIENT
Start: 2017-12-21 | End: 2017-12-21

## 2017-12-21 RX ORDER — BUPIVACAINE HYDROCHLORIDE AND EPINEPHRINE 2.5; 5 MG/ML; UG/ML
INJECTION, SOLUTION EPIDURAL; INFILTRATION; INTRACAUDAL; PERINEURAL
Status: DISCONTINUED | OUTPATIENT
Start: 2017-12-21 | End: 2017-12-21 | Stop reason: HOSPADM

## 2017-12-21 RX ORDER — SUCCINYLCHOLINE CHLORIDE 20 MG/ML
INJECTION INTRAMUSCULAR; INTRAVENOUS
Status: DISCONTINUED | OUTPATIENT
Start: 2017-12-21 | End: 2017-12-21

## 2017-12-21 RX ORDER — MIDAZOLAM HYDROCHLORIDE 1 MG/ML
INJECTION, SOLUTION INTRAMUSCULAR; INTRAVENOUS
Status: DISCONTINUED | OUTPATIENT
Start: 2017-12-21 | End: 2017-12-21

## 2017-12-21 RX ORDER — FENTANYL CITRATE 50 UG/ML
INJECTION, SOLUTION INTRAMUSCULAR; INTRAVENOUS
Status: DISCONTINUED | OUTPATIENT
Start: 2017-12-21 | End: 2017-12-21

## 2017-12-21 RX ORDER — LIDOCAINE HCL/PF 100 MG/5ML
SYRINGE (ML) INTRAVENOUS
Status: DISCONTINUED | OUTPATIENT
Start: 2017-12-21 | End: 2017-12-21

## 2017-12-21 RX ORDER — HYDROCODONE BITARTRATE AND ACETAMINOPHEN 5; 325 MG/1; MG/1
1 TABLET ORAL EVERY 4 HOURS PRN
Status: DISCONTINUED | OUTPATIENT
Start: 2017-12-21 | End: 2017-12-21 | Stop reason: HOSPADM

## 2017-12-21 RX ORDER — CEFAZOLIN SODIUM 2 G/50ML
2 SOLUTION INTRAVENOUS
Status: COMPLETED | OUTPATIENT
Start: 2017-12-21 | End: 2017-12-21

## 2017-12-21 RX ADMIN — SUCCINYLCHOLINE CHLORIDE 200 MG: 20 INJECTION, SOLUTION INTRAMUSCULAR; INTRAVENOUS at 10:12

## 2017-12-21 RX ADMIN — EPHEDRINE SULFATE 10 MG: 50 INJECTION, SOLUTION INTRAMUSCULAR; INTRAVENOUS; SUBCUTANEOUS at 10:12

## 2017-12-21 RX ADMIN — KETAMINE HYDROCHLORIDE 25 MG: 100 INJECTION, SOLUTION, CONCENTRATE INTRAMUSCULAR; INTRAVENOUS at 10:12

## 2017-12-21 RX ADMIN — SODIUM CHLORIDE, SODIUM GLUCONATE, SODIUM ACETATE, POTASSIUM CHLORIDE, MAGNESIUM CHLORIDE, SODIUM PHOSPHATE, DIBASIC, AND POTASSIUM PHOSPHATE: .53; .5; .37; .037; .03; .012; .00082 INJECTION, SOLUTION INTRAVENOUS at 09:12

## 2017-12-21 RX ADMIN — ONDANSETRON 4 MG: 2 INJECTION, SOLUTION INTRAMUSCULAR; INTRAVENOUS at 10:12

## 2017-12-21 RX ADMIN — LIDOCAINE HYDROCHLORIDE 10 MG: 10 INJECTION, SOLUTION EPIDURAL; INFILTRATION; INTRACAUDAL; PERINEURAL at 09:12

## 2017-12-21 RX ADMIN — PROPOFOL 170 MG: 10 INJECTION, EMULSION INTRAVENOUS at 10:12

## 2017-12-21 RX ADMIN — CEFAZOLIN SODIUM 2 G: 2 SOLUTION INTRAVENOUS at 10:12

## 2017-12-21 RX ADMIN — OXYCODONE HYDROCHLORIDE 5 MG: 5 TABLET ORAL at 11:12

## 2017-12-21 RX ADMIN — BUPIVACAINE HYDROCHLORIDE AND EPINEPHRINE BITARTRATE 30 ML: 2.5; .0091 INJECTION, SOLUTION EPIDURAL; INFILTRATION; INTRACAUDAL; PERINEURAL at 10:12

## 2017-12-21 RX ADMIN — LIDOCAINE HYDROCHLORIDE 100 MG: 20 INJECTION, SOLUTION INTRAVENOUS at 10:12

## 2017-12-21 RX ADMIN — MIDAZOLAM 2 MG: 1 INJECTION INTRAMUSCULAR; INTRAVENOUS at 10:12

## 2017-12-21 RX ADMIN — FENTANYL CITRATE 50 MCG: 50 INJECTION, SOLUTION INTRAMUSCULAR; INTRAVENOUS at 10:12

## 2017-12-21 RX ADMIN — GLYCOPYRROLATE 0.2 MG: 0.2 INJECTION, SOLUTION INTRAMUSCULAR; INTRAVENOUS at 10:12

## 2017-12-21 RX ADMIN — DEXAMETHASONE SODIUM PHOSPHATE 8 MG: 4 INJECTION, SOLUTION INTRAMUSCULAR; INTRAVENOUS at 10:12

## 2017-12-21 RX ADMIN — ACETAMINOPHEN 1000 MG: 10 INJECTION, SOLUTION INTRAVENOUS at 10:12

## 2017-12-21 NOTE — PLAN OF CARE
"Pt is awake and calm denies pain but states "uncomfortable where xray had to pt the needle"    VSS  Surgical consent signed  "

## 2017-12-21 NOTE — ANESTHESIA PREPROCEDURE EVALUATION
12/21/2017  Fatemeh Shoemaker is a 57 y.o., female.    Anesthesia Evaluation    I have reviewed the Patient Summary Reports.    I have reviewed the Nursing Notes.   I have reviewed the Medications.     Review of Systems  Anesthesia Hx:  Denies Family Hx of Anesthesia complications.  Personal Hx of Anesthesia complications, Post-Operative Nausea/Vomiting, in the past, but not with recent anesthetics / prophylaxis   Cardiovascular:   Hypertension    Pulmonary:  Pulmonary Normal    Renal/:  Renal/ Normal     Hepatic/GI:   PUD,    Neurological:  Neurology Normal    Endocrine:  Endocrine Normal        Physical Exam  General:  Morbid Obesity, Malnutrition    Airway/Jaw/Neck:  Airway Findings: Mouth Opening: Normal Tongue: Normal  General Airway Assessment: Adult  Mallampati: III  TM Distance: Normal, at least 6 cm  Jaw/Neck Findings:  Neck ROM: Normal ROM  Neck Findings:  Girth Increased      Dental:  Dental Findings: In tact   Chest/Lungs:  Chest/Lungs Clear    Heart/Vascular:  Heart Findings: Normal            Anesthesia Plan  Type of Anesthesia, risks & benefits discussed:  Anesthesia Type:  general  Patient's Preference:   Intra-op Monitoring Plan: standard ASA monitors  Intra-op Monitoring Plan Comments:   Post Op Pain Control Plan:   Post Op Pain Control Plan Comments:   Induction:   IV  Beta Blocker:  Patient is on a Beta-Blocker and has received one dose within the past 24 hours (No further documentation required).       Informed Consent: Patient understands risks and agrees with Anesthesia plan.  Questions answered. Anesthesia consent signed with patient.  ASA Score: 3     Day of Surgery Review of History & Physical:    H&P update referred to the surgeon.         Ready For Surgery From Anesthesia Perspective.

## 2017-12-21 NOTE — PLAN OF CARE
All personal belongings in pt belonging bag and tagged with pt sticker/ verified    Daughters at the bedside

## 2017-12-21 NOTE — DISCHARGE SUMMARY
Discharge Summary  General Surgery      Admit Date: 12/21/2017    Discharge Date :12/21/2017    Attending Physician: Kin Fraire     Discharge Physician: Kin Fraire    Discharged Condition: good    Discharge Diagnosis: Myofibroblastoma of breast, right [D24.1]    Treatments/Procedures: Procedure(s) (LRB):  LUMPECTOMY-BREAST (Right)  LOCALIZATION-NEEDLE (Right)    Hospital Course: Uneventful; Discharged home from [facility]    Significant Diagnostic Studies: none    Disposition: Home or Self Care    Diet: Regular    Follow up: Office 10-14 days    Activity: No heavy lifting till seen in office.    Patient Instructions:   Current Discharge Medication List      START taking these medications    Details   hydrocodone-acetaminophen 7.5-325mg (NORCO) 7.5-325 mg per tablet Take 1 tablet by mouth every 4 (four) hours as needed for Pain.  Qty: 30 tablet, Refills: 0         CONTINUE these medications which have NOT CHANGED    Details   acetaminophen (TYLENOL) 500 MG tablet Take 1,000 mg by mouth every 6 (six) hours as needed for Pain (pain).      apixaban 5 mg Tab Take 1 tablet (5 mg total) by mouth 2 (two) times daily.  Qty: 60 tablet, Refills: 11    Associated Diagnoses: Paroxysmal atrial fibrillation      !! DULoxetine (CYMBALTA) 60 MG capsule Take 1 capsule (60 mg total) by mouth once daily.  Qty: 30 capsule, Refills: 11    Associated Diagnoses: Paresthesias; Neuropathy      flecainide (TAMBOCOR) 100 MG Tab Take 1 tablet (100 mg total) by mouth every 12 (twelve) hours.  Qty: 60 tablet, Refills: 11    Associated Diagnoses: Paroxysmal atrial fibrillation      gabapentin (NEURONTIN) 600 MG tablet Take 1 tablet (600 mg total) by mouth 3 (three) times daily.  Qty: 90 tablet, Refills: 11    Associated Diagnoses: Neuropathy      hydroCHLOROthiazide (HYDRODIURIL) 25 MG tablet TAKE ONE TABLET BY MOUTH ONCE DAILY  Qty: 90 tablet, Refills: 3    Comments: This prescription was filled on 12/7/2017. Any refills authorized will  be placed on file.  Associated Diagnoses: Paroxysmal atrial fibrillation      lisinopril 10 MG tablet Take 1 tablet (10 mg total) by mouth once daily.  Qty: 90 tablet, Refills: 3    Associated Diagnoses: Paroxysmal atrial fibrillation      metoprolol tartrate (LOPRESSOR) 25 MG tablet Take 1 tablet (25 mg total) by mouth 2 (two) times daily.  Qty: 180 tablet, Refills: 3    Associated Diagnoses: Paroxysmal atrial fibrillation      omeprazole (PRILOSEC) 20 MG capsule Take 1 capsule (20 mg total) by mouth once daily.  Qty: 30 capsule, Refills: 11    Associated Diagnoses: Paroxysmal atrial fibrillation      rosuvastatin (CRESTOR) 10 MG tablet Take 10 mg by mouth once daily.      !! DULoxetine (CYMBALTA) 30 MG capsule Take 1 capsule (30 mg total) by mouth once daily.  Qty: 7 capsule, Refills: 0    Associated Diagnoses: Paresthesias; Neuropathy      oxycodone-acetaminophen (PERCOCET) 5-325 mg per tablet Take 1 tablet by mouth every 4 (four) hours as needed for Pain.  Qty: 15 tablet, Refills: 0       !! - Potential duplicate medications found. Please discuss with provider.            Discharge Procedure Orders  Diet general     Remove dressing in 48 hours

## 2017-12-21 NOTE — H&P (VIEW-ONLY)
Subjective:       Patient ID: Fatemeh Shoemaker is a 57 y.o. female.    Chief Complaint: Consult (myofibroblastoma)      HPI 57-year-old female with recent right breast biopsy which revealed mild fibroblastoma.  She was referred for definitive excision.  The patient denies any symptoms.  She cannot feel the mass.  This was discovered on a screening exam.  Family history is negative for breast or ovarian cancer.  Age of menarche was 12.  Menopause was she has 2 children the first of which was born when she was 26.  She has done well since the biopsy.      Past Medical History:   Diagnosis Date    Arrhythmia     Atrial fibrillation     history    Bronchitis     High blood pressure     PONV (postoperative nausea and vomiting)     Stroke     Ulcer      Past Surgical History:   Procedure Laterality Date    csection      DILATION AND CURETTAGE OF UTERUS      HIP PINNING      JOINT REPLACEMENT      hip right    NASAL SEPTUM SURGERY      TOTAL HIP ARTHROPLASTY      TUBAL LIGATION  1997         Current Outpatient Prescriptions:     apixaban 5 mg Tab, Take 1 tablet (5 mg total) by mouth 2 (two) times daily. (Patient taking differently: Take 5 mg by mouth 2 (two) times daily. September 18), Disp: 60 tablet, Rfl: 11    DULoxetine (CYMBALTA) 30 MG capsule, Take 1 capsule (30 mg total) by mouth once daily., Disp: 7 capsule, Rfl: 0    DULoxetine (CYMBALTA) 60 MG capsule, Take 1 capsule (60 mg total) by mouth once daily., Disp: 30 capsule, Rfl: 11    flecainide (TAMBOCOR) 100 MG Tab, Take 1 tablet (100 mg total) by mouth every 12 (twelve) hours., Disp: 60 tablet, Rfl: 11    gabapentin (NEURONTIN) 600 MG tablet, Take 1 tablet (600 mg total) by mouth 3 (three) times daily., Disp: 90 tablet, Rfl: 11    hydroCHLOROthiazide (HYDRODIURIL) 25 MG tablet, TAKE ONE TABLET BY MOUTH ONCE DAILY, Disp: 90 tablet, Rfl: 3    lisinopril 10 MG tablet, Take 1 tablet (10 mg total) by mouth once daily., Disp: 90 tablet, Rfl: 3    " metoprolol tartrate (LOPRESSOR) 25 MG tablet, Take 1 tablet (25 mg total) by mouth 2 (two) times daily., Disp: 180 tablet, Rfl: 3    omeprazole (PRILOSEC) 20 MG capsule, Take 1 capsule (20 mg total) by mouth once daily., Disp: 30 capsule, Rfl: 11    rosuvastatin (CRESTOR) 10 MG tablet, Take 10 mg by mouth once daily., Disp: , Rfl:     fluticasone (FLONASE) 50 mcg/actuation nasal spray, , Disp: , Rfl:     oxycodone-acetaminophen (PERCOCET) 5-325 mg per tablet, Take 1 tablet by mouth every 4 (four) hours as needed for Pain., Disp: 15 tablet, Rfl: 0    Review of patient's allergies indicates:   Allergen Reactions    Aspirin Other (See Comments)     "I don't take it because I've had ulcers"   ulcers    Meperidine Other (See Comments)     disoriented       Family History   Problem Relation Age of Onset    Colon cancer Maternal Grandmother 70    Cancer Maternal Grandmother     Ovarian cancer Maternal Grandmother     Eclampsia Paternal Grandmother     Stroke Father     Hypertension Mother     Stomach cancer Neg Hx     Esophageal cancer Neg Hx     Breast cancer Neg Hx     Miscarriages / Stillbirths Neg Hx      Social History     Social History    Marital status:      Spouse name: N/A    Number of children: N/A    Years of education: N/A     Occupational History    Not on file.     Social History Main Topics    Smoking status: Never Smoker    Smokeless tobacco: Never Used    Alcohol use No      Comment: never    Drug use: No    Sexual activity: Yes     Partners: Male     Other Topics Concern    Not on file     Social History Narrative    No narrative on file       Review of Systems   Constitutional: Negative for activity change, chills, fever and unexpected weight change.   HENT: Negative for congestion, sore throat, trouble swallowing and voice change.    Eyes: Negative for redness and visual disturbance.   Respiratory: Negative for cough, shortness of breath and wheezing.  "   Cardiovascular: Negative for chest pain and palpitations.   Gastrointestinal: Negative for abdominal pain, blood in stool, nausea and vomiting.   Endocrine: Negative.    Genitourinary: Negative for dysuria, frequency and hematuria.   Musculoskeletal: Negative for arthralgias, back pain and neck pain.   Skin: Negative for rash and wound.   Allergic/Immunologic: Negative.    Neurological: Negative for dizziness, weakness and headaches.   Hematological: Negative for adenopathy.   Psychiatric/Behavioral: Negative for agitation and dysphoric mood. The patient is not nervous/anxious.      Objective:     Physical Exam   Constitutional: She is oriented to person, place, and time. She appears well-developed and well-nourished. No distress.   HENT:   Head: Normocephalic and atraumatic.   Mouth/Throat: Oropharynx is clear and moist. No oropharyngeal exudate.   Eyes: Conjunctivae and EOM are normal. Pupils are equal, round, and reactive to light. No scleral icterus.   Neck: Normal range of motion. No thyromegaly present.   Cardiovascular: Normal rate and regular rhythm.    No murmur heard.  Pulmonary/Chest: Effort normal and breath sounds normal. She has no wheezes. She has no rales.   Right Breast Exam:  There are no palpable dominant masses.  There are no overlying skin changes.  There is no contour change of the breast.  There is no nipple discharge.  There is no significant breast tenderness.  There is no palpable axillary or supraclavicular adenopathy.    There is no abnormality detected on physical exam of the contralateral breast.   Abdominal: Soft. Bowel sounds are normal. She exhibits no distension and no mass. There is no tenderness. No hernia.   Musculoskeletal: Normal range of motion. She exhibits no edema.   Lymphadenopathy:     She has no cervical adenopathy.   Neurological: She is alert and oriented to person, place, and time. No cranial nerve deficit.   Skin: Skin is warm and dry. No rash noted. No erythema.    Psychiatric: She has a normal mood and affect. Her behavior is normal.     1) Right breast, 10:00, 10 cm from nipple (Mammotome x 7).  2) Right breast, 10:00, 9 cm from nipple (Mammotome x 9).  Supplemental Diagnosis  [facility] DIAGNOSIS:  BREAST, RIGHT, NEEDLE CORE BIOPSY ([id number], 2; 11/16/2017):  -Myofibroblastoma.    Assessment:     Encounter Diagnoses   Name Primary?    Preop testing     Myofibroblastoma of breast, right Yes       Plan:      1. Plan wire localized excision of this mass.  2. Risks and benefits of the planned procedure were discussed at length with the patient.  Risks and benefits of not proceeding with the procedure were discussed as well. All questions were answered. The patient expressed clear understanding and would like to proceed with the procedure as discussed.

## 2017-12-21 NOTE — ANESTHESIA POSTPROCEDURE EVALUATION
"Anesthesia Post Evaluation    Patient: Fatemeh Shoemaker    Procedure(s) Performed: Procedure(s) (LRB):  LUMPECTOMY-BREAST (Right)  LOCALIZATION-NEEDLE (Right)    Final Anesthesia Type: general  Patient location during evaluation: PACU  Patient participation: Yes- Able to Participate  Level of consciousness: awake and alert  Post-procedure vital signs: reviewed and stable  Pain management: adequate  Airway patency: patent  PONV status at discharge: No PONV  Anesthetic complications: no      Cardiovascular status: blood pressure returned to baseline  Respiratory status: unassisted  Hydration status: euvolemic  Follow-up not needed.        Visit Vitals  BP (!) 154/65 (BP Location: Right leg, Patient Position: Lying)   Pulse 61   Temp 36.6 °C (97.8 °F) (Temporal)   Resp 18   Ht 5' 3" (1.6 m)   Wt 129.7 kg (286 lb)   LMP 09/08/2017   SpO2 95%   Breastfeeding? No   BMI 50.66 kg/m²       Pain/Yojana Score: Pain Assessment Performed: Yes (12/21/2017 12:31 PM)  Presence of Pain: complains of pain/discomfort (12/21/2017 12:31 PM)  Pain Rating Prior to Med Admin: 3 (12/21/2017 11:55 AM)  Pain Rating Post Med Admin: 4 (12/21/2017 12:15 PM)  Yojana Score: 10 (12/21/2017 12:15 PM)      "

## 2017-12-21 NOTE — BRIEF OP NOTE
Patient: Fatemeh Shoemaker     Date of Procedure: 12/21/2017    Procedure: Right breast lumpectomy with wire localization    Surgeon: Kin Steen MD    Assistant: Ana Philip    Pre-op Diagnosis: Myofibroblastoma of breast, right [D24.1]     Post-op Diagnosis: Myofibroblastoma of breast, right [D24.1]    Findings: Breast mass    Specimen: Right breast lumpectomy    EBL: 15 cc    Complications: None

## 2017-12-21 NOTE — TRANSFER OF CARE
"Anesthesia Transfer of Care Note    Patient: Fatemeh Shoemaker    Procedure(s) Performed: Procedure(s) (LRB):  LUMPECTOMY-BREAST (Right)  LOCALIZATION-NEEDLE (Right)    Patient location: PACU    Anesthesia Type: general    Transport from OR: Transported from OR on 2-3 L/min O2 by NC with adequate spontaneous ventilation    Post pain: adequate analgesia    Post assessment: no apparent anesthetic complications and tolerated procedure well    Post vital signs: stable    Level of consciousness: sedated    Nausea/Vomiting: no nausea/vomiting    Complications: none    Transfer of care protocol was followed      Last vitals:   Visit Vitals  BP (!) 98/54 (BP Location: Right leg)   Pulse 66   Temp 36.4 °C (97.5 °F) (Temporal)   Resp 20   Ht 5' 3" (1.6 m)   Wt 129.7 kg (286 lb)   LMP 09/08/2017   SpO2 95%   Breastfeeding? No   BMI 50.66 kg/m²     "

## 2017-12-21 NOTE — DISCHARGE INSTRUCTIONS
Post op instructions for prevention of DVT  What is deep vein thrombosis?  Deep vein thrombosis (DVT) is the medical term for blood clots in the deep veins of the leg.  These blood clots can be dangerous.  A DVT can block a blood vessel and keep blood from getting where it needs to go.  Another problem is that the clot can travel to other parts of the body such as the lungs.  A clot that travels to the lungs is called a pulmonary embolus (PE) and can cause serious problems with breathing which can lead to death.  Am I at risk for DVT/PE?  If you are not very active, you are at risk of DVT.  Anyone confined to bed, sitting for long periods of time, recovering from surgery, etc. increases the risk of DVT.  Other risk factors are cancer diagnosis, certain medications, estrogen replacement in any form,older age, obesity, pregnancy, smoking, history of clotting disorders, and dehydration.  How will I know if I have a DVT?   Swelling in the lower leg   Pain   Warmth, redness, hardness or bulging of the vein  If you have any of these symptoms, call your doctors office right away.  Some people will not have any symptoms until the clot moves to the lungs.  What are the symptoms of a PE?   Panting, shortness of breath, or trouble breathing   Sharp, knife-like chest pain when you breathe   Coughing or coughing up blood   Rapid heartbeat  If you have any of these symptoms or get worse quickly, call 911 for emergency treatment.  How can I prevent a DVT?   Avoid long periods of inactivity and dont cross your legs--get up and walk around every hour or so.   Stay active--walking after surgery is highly encouraged.  This means you should get out of the house and walk in the neighborhood.  Going up and down stairs will not impair healing (unless advised against such activity by your doctor).     Drink plenty of noncaffeinated, nonalcoholic fluids each day to prevent dehydration.   Wear special support stockings, if they  "have been advised by your doctor.   If you travel, stop at least once an hour and walk around.   Avoid smoking (assistance with stopping is available through your healthcare provider)  Always notify your doctor if you are not able to follow the post operative instructions that are given to you at the time of discharge.  It may be necessary to prescribe one of the medications available to prevent DVT.We hope your stay was comfortable as you heal now, mend and rest.    For we have enjoyed taking care of you by giving your our best.    And as you get better, by regaining your health and strength;   We count it as a privilege to have served you and hope your time at [facility] was well spent.      Thank  You!!!Discharge Instructions: After Your Surgery/Procedure  Youve just had surgery. During surgery you were given medicine called anesthesia to keep you relaxed and free of pain. After surgery you may have some pain or nausea. This is common. Here are some tips for feeling better and getting well after surgery.     Stay on schedule with your medication.   Going home  Your doctor or nurse will show you how to take care of yourself when you go home. He or she will also answer your questions. Have an adult family member or friend drive you home.      For your safety we recommend these precaution for the first 24 hours after your procedure:  · Do not drive or use heavy equipment.  · Do not make important decisions or sign legal papers.  · Do not drink alcohol.  · Have someone stay with you, if needed. He or she can watch for problems and help keep you safe.  · Your concentration, balance, coordination, and judgement may be impaired for many hours after anesthesia.  Use caution when ambulating or standing up.     · You may feel weak and "washed out" after anesthesia and surgery.      Subtle residual effects of general anesthesia or sedation with regional / local anesthesia can last more than 24 hours.  Rest for the remainder " of the day or longer if your Doctor/Surgeon has advised you to do so.  Although you may feel normal within the first 24 hours, your reflexes and mental ability may be impaired without you realizing it.  You may feel dizzy, lightheaded or sleepy for 24 hours or longer.      Be sure to go to all follow-up visits with your doctor. And rest after your surgery for as long as your doctor tells you to.  Coping with pain  If you have pain after surgery, pain medicine will help you feel better. Take it as told, before pain becomes severe. Also, ask your doctor or pharmacist about other ways to control pain. This might be with heat, ice, or relaxation. And follow any other instructions your surgeon or nurse gives you.  Tips for taking pain medicine  To get the best relief possible, remember these points:  · Pain medicines can upset your stomach. Taking them with a little food may help.  · Most pain relievers taken by mouth need at least 20 to 30 minutes to start to work.  · Taking medicine on a schedule can help you remember to take it. Try to time your medicine so that you can take it before starting an activity. This might be before you get dressed, go for a walk, or sit down for dinner.  · Constipation is a common side effect of pain medicines. Call your doctor before taking any medicines such as laxatives or stool softeners to help ease constipation. Also ask if you should skip any foods. Drinking lots of fluids and eating foods such as fruits and vegetables that are high in fiber can also help. Remember, do not take laxatives unless your surgeon has prescribed them.  · Drinking alcohol and taking pain medicine can cause dizziness and slow your breathing. It can even be deadly. Do not drink alcohol while taking pain medicine.  · Pain medicine can make you react more slowly to things. Do not drive or run machinery while taking pain medicine.  Your health care provider may tell you to take acetaminophen to help ease your  pain. Ask him or her how much you are supposed to take each day. Acetaminophen or other pain relievers may interact with your prescription medicines or other over-the-counter (OTC) drugs. Some prescription medicines have acetaminophen and other ingredients. Using both prescription and OTC acetaminophen for pain can cause you to overdose. Read the labels on your OTC medicines with care. This will help you to clearly know the list of ingredients, how much to take, and any warnings. It may also help you not take too much acetaminophen. If you have questions or do not understand the information, ask your pharmacist or health care provider to explain it to you before you take the OTC medicine.  Managing nausea  Some people have an upset stomach after surgery. This is often because of anesthesia, pain, or pain medicine, or the stress of surgery. These tips will help you handle nausea and eat healthy foods as you get better. If you were on a special food plan before surgery, ask your doctor if you should follow it while you get better. These tips may help:  · Do not push yourself to eat. Your body will tell you when to eat and how much.  · Start off with clear liquids and soup. They are easier to digest.  · Next try semi-solid foods, such as mashed potatoes, applesauce, and gelatin, as you feel ready.  · Slowly move to solid foods. Dont eat fatty, rich, or spicy foods at first.  · Do not force yourself to have 3 large meals a day. Instead eat smaller amounts more often.  · Take pain medicines with a small amount of solid food, such as crackers or toast, to avoid nausea.     Call your surgeon if  · You still have pain an hour after taking medicine. The medicine may not be strong enough.  · You feel too sleepy, dizzy, or groggy. The medicine may be too strong.  · You have side effects like nausea, vomiting, or skin changes, such as rash, itching, or hives.       If you have obstructive sleep apnea  You were given  anesthesia medicine during surgery to keep you comfortable and free of pain. After surgery, you may have more apnea spells because of this medicine and other medicines you were given. The spells may last longer than usual.   At home:  · Keep using the continuous positive airway pressure (CPAP) device when you sleep. Unless your health care provider tells you not to, use it when you sleep, day or night. CPAP is a common device used to treat obstructive sleep apnea.  · Talk with your provider before taking any pain medicine, muscle relaxants, or sedatives. Your provider will tell you about the possible dangers of taking these medicines.  © 5633-0486 [employer]. [address] [place] [address], [place] [address]. All rights reserved. This information is not intended as a substitute for professional medical care. Always follow your healthcare professional's instructions.  Using Opioids for Pain Management     Your doctor has given instructions for you to take an opioid.  This is a drug for bad pain.  It helps control pain without causing bleeding and kidney problems.  Common opioid names are morphine, hydromorphone, oxycodone, and methadone. These drugs are called narcotics.    There are several safety concerns you need to know.     · It is against the law to give or sell this drug to another person.  You must keep this medicine safely locked.    · You may have side effects from taking this medication.  These include nausea, itching, sweating, sleepiness, a change in your ability to breathe, and depression.  · Do not take alcohol or sleeping pills opioids.    · Long-term opoid use may no longer giver you relief from pain.  It can cause you stomach pain, mental anxiety, and headaches.  Long-term opoid use can potentially lead to unlawful street drug abuse and reduce your ability to stay employed.    · Your body may become opioid tolerant if you need to take more to get relief.    · You must stop taking opioids if  you begin having more pain as a result of the medicine.    · Opioid withdrawal occurs when you have to stop taking the drug.  It can cause you to have nausea, vomiting, diarrhea, stomach pain, anxiety, and dilated pupils in your eyes. This condition means you are opioid dependent.    · Addiction is a drug induced brain disease. It means there are changes in how your brain is working.  Children, teens, and young adults under 25 years old are more likely to get addicted to opioids.      · Addiction can happen with repeated opioid use.  It does not happen with short-term use of two weeks or less.       For more information, please speak with your doctor or pharmacist.

## 2017-12-21 NOTE — OP NOTE
DATE OF PROCEDURE:  12/21/2017.    PROCEDURE:  Right breast partial lumpectomy with wire localization.    PREOPERATIVE DIAGNOSIS:  Myofibroblastoma of the right breast.    POSTOPERATIVE DIAGNOSIS:  Myofibroblastoma of the right breast.    SURGEON:  Kin Fraire Jr., M.D.    ASSISTANT:  Shayy Philip.    PROCEDURE IN DETAIL:  After appropriate consent was obtained, consent forms   signed and questions answered, the patient was taken to the operating room and   placed on the operating room table where general anesthesia was induced via   laryngeal mask.  Preoperative antibiotics were administered.  SCDs were in   place.  Time-out procedure was performed.  The wire localization had been   performed in Radiology prior to the procedure.  The films were reviewed.  The   breast was prepped and draped.  A skin incision was made and dissection was   performed into the breast tissue.  Dissection was then performed around the   wire, removing the lumpectomy specimen encasing the wire.  That was then   oriented for the pathologist and sent to Radiology for a specimen radiograph,   which confirmed the clips placed at the biopsy to be within the specimen.  0.25%   Marcaine was injected for local anesthetic and adequate hemostasis was ensured.    The wound was irrigated.  The deep tissues were reapproximated with a running   3-0 Vicryl suture and the skin was closed with 4-0 Monocryl subcuticular stitch.    Steri-Strips and dressings were applied.  The patient was awakened, extubated   and transferred to the Recovery Room in stable condition.  She tolerated the   procedure without complication.  Blood loss was approximately 15 mL.  Counts   were correct at the end of the procedure.      RTL/HN  dd: 12/21/2017 11:24:18 (CST)  td: 12/21/2017 12:57:04 (CST)  Doc ID   #[id number]  Job ID #[id number]    CC:

## 2017-12-22 VITALS
RESPIRATION RATE: 18 BRPM | HEIGHT: 63 IN | WEIGHT: 286 LBS | BODY MASS INDEX: 50.68 KG/M2 | TEMPERATURE: 98 F | OXYGEN SATURATION: 95 % | HEART RATE: 66 BPM | DIASTOLIC BLOOD PRESSURE: 61 MMHG | SYSTOLIC BLOOD PRESSURE: 151 MMHG

## 2017-12-27 PROBLEM — K81.0 ACUTE CHOLECYSTITIS: Status: ACTIVE | Noted: 2017-12-27

## 2018-01-08 ENCOUNTER — OFFICE VISIT (OUTPATIENT)
Dept: SURGERY | Facility: CLINIC | Age: 58
End: 2018-01-08
Payer: COMMERCIAL

## 2018-01-08 VITALS — WEIGHT: 288.13 LBS | TEMPERATURE: 98 F | BODY MASS INDEX: 49.45 KG/M2

## 2018-01-08 DIAGNOSIS — N63.10 BREAST MASS, RIGHT: Primary | ICD-10-CM

## 2018-01-08 PROCEDURE — 99999 PR PBB SHADOW E&M-EST. PATIENT-LVL IV: CPT | Mod: PBBFAC,,, | Performed by: SURGERY

## 2018-01-08 PROCEDURE — 99024 POSTOP FOLLOW-UP VISIT: CPT | Mod: S$GLB,,, | Performed by: SURGERY

## 2018-01-09 ENCOUNTER — PATIENT MESSAGE (OUTPATIENT)
Dept: NEUROLOGY | Facility: CLINIC | Age: 58
End: 2018-01-09

## 2018-01-09 RX ORDER — SODIUM CHLORIDE 9 MG/ML
INJECTION, SOLUTION INTRAVENOUS CONTINUOUS
Status: CANCELLED | OUTPATIENT
Start: 2018-01-09

## 2018-01-11 NOTE — PROGRESS NOTES
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  She was referred for definitive excision.  The patient denies any symptoms.  She cannot feel the mass.  This was discovered on a screening exam.  Family history is negative for breast or ovarian cancer.  Age of menarche was 12.  Menopause was she has 2 children the first of which was born when she was 26.  She has done well since the biopsy.   Margins are positive. She will need re excision. Doing well since surgery.          Past Medical History:   Diagnosis Date    Arrhythmia      Atrial fibrillation       history    Bronchitis      High blood pressure      PONV (postoperative nausea and vomiting)      Stroke      Ulcer              Past Surgical History:   Procedure Laterality Date    csection        DILATION AND CURETTAGE OF UTERUS        HIP PINNING        JOINT REPLACEMENT         hip right    NASAL SEPTUM SURGERY        TOTAL HIP ARTHROPLASTY        TUBAL LIGATION   1997            Current Outpatient Prescriptions:     apixaban 5 mg Tab, Take 1 tablet (5 mg total) by mouth 2 (two) times daily. (Patient taking differently: Take 5 mg by mouth 2 (two) times daily. September 18), Disp: 60 tablet, Rfl: 11    DULoxetine (CYMBALTA) 30 MG capsule, Take 1 capsule (30 mg total) by mouth once daily., Disp: 7 capsule, Rfl: 0    DULoxetine (CYMBALTA) 60 MG capsule, Take 1 capsule (60 mg total) by mouth once daily., Disp: 30 capsule, Rfl: 11    flecainide (TAMBOCOR) 100 MG Tab, Take 1 tablet (100 mg total) by mouth every 12 (twelve) hours., Disp: 60 tablet, Rfl: 11    gabapentin (NEURONTIN) 600 MG tablet, Take 1 tablet (600 mg total) by mouth 3 (three) times daily., Disp: 90 tablet, Rfl: 11    hydroCHLOROthiazide (HYDRODIURIL) 25 MG tablet, TAKE ONE TABLET BY MOUTH ONCE DAILY, Disp: 90 tablet, Rfl: 3    lisinopril 10 MG tablet, Take 1 tablet (10 mg total) by  "mouth once daily., Disp: 90 tablet, Rfl: 3    metoprolol tartrate (LOPRESSOR) 25 MG tablet, Take 1 tablet (25 mg total) by mouth 2 (two) times daily., Disp: 180 tablet, Rfl: 3    omeprazole (PRILOSEC) 20 MG capsule, Take 1 capsule (20 mg total) by mouth once daily., Disp: 30 capsule, Rfl: 11    rosuvastatin (CRESTOR) 10 MG tablet, Take 10 mg by mouth once daily., Disp: , Rfl:     fluticasone (FLONASE) 50 mcg/actuation nasal spray, , Disp: , Rfl:     oxycodone-acetaminophen (PERCOCET) 5-325 mg per tablet, Take 1 tablet by mouth every 4 (four) hours as needed for Pain., Disp: 15 tablet, Rfl: 0           Review of patient's allergies indicates:   Allergen Reactions    Aspirin Other (See Comments)       "I don't take it because I've had ulcers"   ulcers    Meperidine Other (See Comments)       disoriented               Family History   Problem Relation Age of Onset    Colon cancer Maternal Grandmother 70    Cancer Maternal Grandmother      Ovarian cancer Maternal Grandmother      Eclampsia Paternal Grandmother      Stroke Father      Hypertension Mother      Stomach cancer Neg Hx      Esophageal cancer Neg Hx      Breast cancer Neg Hx      Miscarriages / Stillbirths Neg Hx        Social History   Social History            Social History    Marital status:        Spouse name: N/A    Number of children: N/A    Years of education: N/A      Occupational History    Not on file.             Social History Main Topics    Smoking status: Never Smoker    Smokeless tobacco: Never Used    Alcohol use No         Comment: never    Drug use: No    Sexual activity: Yes       Partners: Male           Other Topics Concern    Not on file          Social History Narrative    No narrative on file            Review of Systems   Constitutional: Negative for activity change, chills, fever and unexpected weight change.   HENT: Negative for congestion, sore throat, trouble swallowing and voice change.    Eyes: " Negative for redness and visual disturbance.   Respiratory: Negative for cough, shortness of breath and wheezing.    Cardiovascular: Negative for chest pain and palpitations.   Gastrointestinal: Negative for abdominal pain, blood in stool, nausea and vomiting.   Endocrine: Negative.    Genitourinary: Negative for dysuria, frequency and hematuria.   Musculoskeletal: Negative for arthralgias, back pain and neck pain.   Skin: Negative for rash and wound.   Allergic/Immunologic: Negative.    Neurological: Negative for dizziness, weakness and headaches.   Hematological: Negative for adenopathy.   Psychiatric/Behavioral: Negative for agitation and dysphoric mood. The patient is not nervous/anxious.       Objective:      Physical Exam   Constitutional: She is oriented to person, place, and time. She appears well-developed and well-nourished. No distress.   HENT:   Head: Normocephalic and atraumatic.   Mouth/Throat: Oropharynx is clear and moist. No oropharyngeal exudate.   Eyes: Conjunctivae and EOM are normal. Pupils are equal, round, and reactive to light. No scleral icterus.   Neck: Normal range of motion. No thyromegaly present.   Cardiovascular: Normal rate and regular rhythm.    No murmur heard.  Pulmonary/Chest: Effort normal and breath sounds normal. She has no wheezes. She has no rales.   Right Breast Exam:  Incision is healing well. No infection.    There is no abnormality detected on physical exam of the contralateral breast.   Abdominal: Soft. Bowel sounds are normal. She exhibits no distension and no mass. There is no tenderness. No hernia.   Musculoskeletal: Normal range of motion. She exhibits no edema.   Lymphadenopathy:     She has no cervical adenopathy.   Neurological: She is alert and oriented to person, place, and time. No cranial nerve deficit.   Skin: Skin is warm and dry. No rash noted. No erythema.   Psychiatric: She has a normal mood and affect. Her behavior is normal.      1) Right breast, 10:00,  10 cm from nipple (Mammotome x 7).  2) Right breast, 10:00, 9 cm from nipple (Mammotome x 9).  Supplemental Diagnosis  [place] DIAGNOSIS:  BREAST, RIGHT, NEEDLE CORE BIOPSY ([id number], 2; 11/16/2017):  -Myofibroblastoma.     Assessment:           Encounter Diagnoses   Name Primary?    Preop testing      Myofibroblastoma of breast, right Yes         Plan:      1. Plan  Re excision of margins.  2. Risks and benefits of the planned procedure were discussed at length with the patient.  Risks and benefits of not proceeding with the procedure were discussed as well. All questions were answered. The patient expressed clear understanding and would like to proceed with the procedure as discussed.

## 2018-01-22 DIAGNOSIS — I48.0 PAROXYSMAL ATRIAL FIBRILLATION: ICD-10-CM

## 2018-01-22 RX ORDER — HYDROCHLOROTHIAZIDE 25 MG/1
25 TABLET ORAL DAILY
Qty: 90 TABLET | Refills: 3 | Status: SHIPPED | OUTPATIENT
Start: 2018-01-22 | End: 2019-02-14 | Stop reason: SDUPTHER

## 2018-01-22 RX ORDER — OMEPRAZOLE 20 MG/1
20 CAPSULE, DELAYED RELEASE ORAL DAILY
Qty: 30 CAPSULE | Refills: 11 | Status: SHIPPED | OUTPATIENT
Start: 2018-01-22 | End: 2019-02-14 | Stop reason: SDUPTHER

## 2018-01-22 RX ORDER — FLECAINIDE ACETATE 100 MG/1
100 TABLET ORAL EVERY 12 HOURS
Qty: 60 TABLET | Refills: 11 | Status: SHIPPED | OUTPATIENT
Start: 2018-01-22 | End: 2018-11-16

## 2018-01-23 ENCOUNTER — ANESTHESIA EVENT (OUTPATIENT)
Dept: SURGERY | Facility: HOSPITAL | Age: 58
End: 2018-01-23
Payer: COMMERCIAL

## 2018-01-24 ENCOUNTER — TELEPHONE (OUTPATIENT)
Dept: SURGERY | Facility: CLINIC | Age: 58
End: 2018-01-24

## 2018-01-24 NOTE — TELEPHONE ENCOUNTER
----- Message from Suzette Rosenberg sent at 1/24/2018  2:43 PM CST -----  Contact: self  Patient is calling regarding upcoming procedure and needs to know if she has to have blood work.  Please call patient at [phone]. Thanks!

## 2018-01-25 RX ORDER — DOCUSATE SODIUM 100 MG/1
100 CAPSULE, LIQUID FILLED ORAL 2 TIMES DAILY
COMMUNITY
End: 2019-04-22 | Stop reason: CLARIF

## 2018-01-29 ENCOUNTER — HOSPITAL ENCOUNTER (OUTPATIENT)
Facility: HOSPITAL | Age: 58
Discharge: HOME | End: 2018-01-29
Attending: SURGERY | Admitting: SURGERY
Payer: COMMERCIAL

## 2018-01-29 ENCOUNTER — ANESTHESIA (OUTPATIENT)
Dept: SURGERY | Facility: HOSPITAL | Age: 58
End: 2018-01-29
Payer: COMMERCIAL

## 2018-01-29 DIAGNOSIS — N63.10 BREAST MASS, RIGHT: Primary | ICD-10-CM

## 2018-01-29 PROCEDURE — 71000033 HC RECOVERY, INTIAL HOUR: Mod: PO | Performed by: SURGERY

## 2018-01-29 PROCEDURE — 25000003 PHARM REV CODE 250: Mod: PO | Performed by: NURSE ANESTHETIST, CERTIFIED REGISTERED

## 2018-01-29 PROCEDURE — 71000039 HC RECOVERY, EACH ADD'L HOUR: Mod: PO | Performed by: SURGERY

## 2018-01-29 PROCEDURE — 36000707: Mod: PO | Performed by: SURGERY

## 2018-01-29 PROCEDURE — 25000003 PHARM REV CODE 250: Mod: PO | Performed by: SURGERY

## 2018-01-29 PROCEDURE — 63600175 PHARM REV CODE 636 W HCPCS: Mod: PO | Performed by: ANESTHESIOLOGY

## 2018-01-29 PROCEDURE — D9220A PRA ANESTHESIA: Mod: CRNA,,, | Performed by: NURSE ANESTHETIST, CERTIFIED REGISTERED

## 2018-01-29 PROCEDURE — 25000003 PHARM REV CODE 250: Mod: PO | Performed by: ANESTHESIOLOGY

## 2018-01-29 PROCEDURE — 19301 PARTIAL MASTECTOMY: CPT | Mod: 58,RT,, | Performed by: SURGERY

## 2018-01-29 PROCEDURE — 63600175 PHARM REV CODE 636 W HCPCS: Mod: PO | Performed by: NURSE ANESTHETIST, CERTIFIED REGISTERED

## 2018-01-29 PROCEDURE — D9220A PRA ANESTHESIA: Mod: ANES,,, | Performed by: ANESTHESIOLOGY

## 2018-01-29 PROCEDURE — 36000706: Mod: PO | Performed by: SURGERY

## 2018-01-29 PROCEDURE — 37000009 HC ANESTHESIA EA ADD 15 MINS: Mod: PO | Performed by: SURGERY

## 2018-01-29 PROCEDURE — 37000008 HC ANESTHESIA 1ST 15 MINUTES: Mod: PO | Performed by: SURGERY

## 2018-01-29 RX ORDER — BUPIVACAINE HYDROCHLORIDE AND EPINEPHRINE 2.5; 5 MG/ML; UG/ML
INJECTION, SOLUTION EPIDURAL; INFILTRATION; INTRACAUDAL; PERINEURAL
Status: DISCONTINUED | OUTPATIENT
Start: 2018-01-29 | End: 2018-01-29 | Stop reason: HOSPADM

## 2018-01-29 RX ORDER — OXYCODONE HYDROCHLORIDE 5 MG/1
10 TABLET ORAL ONCE AS NEEDED
Status: COMPLETED | OUTPATIENT
Start: 2018-01-29 | End: 2018-01-29

## 2018-01-29 RX ORDER — HYDROCODONE BITARTRATE AND ACETAMINOPHEN 7.5; 325 MG/1; MG/1
1 TABLET ORAL EVERY 4 HOURS PRN
Qty: 20 TABLET | Refills: 0 | Status: SHIPPED | OUTPATIENT
Start: 2018-01-29 | End: 2018-02-08

## 2018-01-29 RX ORDER — PROPOFOL 10 MG/ML
VIAL (ML) INTRAVENOUS
Status: DISCONTINUED | OUTPATIENT
Start: 2018-01-29 | End: 2018-01-29

## 2018-01-29 RX ORDER — HYDROCODONE BITARTRATE AND ACETAMINOPHEN 5; 325 MG/1; MG/1
1 TABLET ORAL EVERY 4 HOURS PRN
Status: DISCONTINUED | OUTPATIENT
Start: 2018-01-29 | End: 2018-01-29 | Stop reason: HOSPADM

## 2018-01-29 RX ORDER — SODIUM CHLORIDE 9 MG/ML
INJECTION, SOLUTION INTRAVENOUS CONTINUOUS
Status: DISCONTINUED | OUTPATIENT
Start: 2018-01-29 | End: 2018-01-29

## 2018-01-29 RX ORDER — SODIUM CHLORIDE 0.9 % (FLUSH) 0.9 %
3 SYRINGE (ML) INJECTION
Status: DISCONTINUED | OUTPATIENT
Start: 2018-01-29 | End: 2018-01-29 | Stop reason: HOSPADM

## 2018-01-29 RX ORDER — ACETAMINOPHEN 10 MG/ML
INJECTION, SOLUTION INTRAVENOUS
Status: DISCONTINUED | OUTPATIENT
Start: 2018-01-29 | End: 2018-01-29

## 2018-01-29 RX ORDER — SUCCINYLCHOLINE CHLORIDE 20 MG/ML
INJECTION INTRAMUSCULAR; INTRAVENOUS
Status: DISCONTINUED | OUTPATIENT
Start: 2018-01-29 | End: 2018-01-29

## 2018-01-29 RX ORDER — SODIUM CHLORIDE, SODIUM LACTATE, POTASSIUM CHLORIDE, CALCIUM CHLORIDE 600; 310; 30; 20 MG/100ML; MG/100ML; MG/100ML; MG/100ML
INJECTION, SOLUTION INTRAVENOUS CONTINUOUS
Status: DISCONTINUED | OUTPATIENT
Start: 2018-01-29 | End: 2018-01-29 | Stop reason: HOSPADM

## 2018-01-29 RX ORDER — LIDOCAINE HCL/PF 100 MG/5ML
SYRINGE (ML) INTRAVENOUS
Status: DISCONTINUED | OUTPATIENT
Start: 2018-01-29 | End: 2018-01-29

## 2018-01-29 RX ORDER — DEXAMETHASONE SODIUM PHOSPHATE 4 MG/ML
8 INJECTION, SOLUTION INTRA-ARTICULAR; INTRALESIONAL; INTRAMUSCULAR; INTRAVENOUS; SOFT TISSUE
Status: COMPLETED | OUTPATIENT
Start: 2018-01-29 | End: 2018-01-29

## 2018-01-29 RX ORDER — LIDOCAINE HYDROCHLORIDE 10 MG/ML
0.5 INJECTION, SOLUTION EPIDURAL; INFILTRATION; INTRACAUDAL; PERINEURAL ONCE AS NEEDED
Status: DISCONTINUED | OUTPATIENT
Start: 2018-01-29 | End: 2018-01-29 | Stop reason: HOSPADM

## 2018-01-29 RX ORDER — CEFAZOLIN SODIUM 1 G/3ML
INJECTION, POWDER, FOR SOLUTION INTRAMUSCULAR; INTRAVENOUS
Status: DISCONTINUED | OUTPATIENT
Start: 2018-01-29 | End: 2018-01-29

## 2018-01-29 RX ORDER — SODIUM CHLORIDE 9 MG/ML
INJECTION, SOLUTION INTRAVENOUS CONTINUOUS
Status: DISCONTINUED | OUTPATIENT
Start: 2018-01-29 | End: 2018-01-29 | Stop reason: HOSPADM

## 2018-01-29 RX ORDER — FENTANYL CITRATE 50 UG/ML
25 INJECTION, SOLUTION INTRAMUSCULAR; INTRAVENOUS EVERY 5 MIN PRN
Status: DISCONTINUED | OUTPATIENT
Start: 2018-01-29 | End: 2018-01-29 | Stop reason: HOSPADM

## 2018-01-29 RX ORDER — MIDAZOLAM HYDROCHLORIDE 1 MG/ML
INJECTION, SOLUTION INTRAMUSCULAR; INTRAVENOUS
Status: DISCONTINUED | OUTPATIENT
Start: 2018-01-29 | End: 2018-01-29

## 2018-01-29 RX ORDER — ROCURONIUM BROMIDE 10 MG/ML
INJECTION, SOLUTION INTRAVENOUS
Status: DISCONTINUED | OUTPATIENT
Start: 2018-01-29 | End: 2018-01-29

## 2018-01-29 RX ORDER — KETOROLAC TROMETHAMINE 30 MG/ML
30 INJECTION, SOLUTION INTRAMUSCULAR; INTRAVENOUS ONCE AS NEEDED
Status: COMPLETED | OUTPATIENT
Start: 2018-01-29 | End: 2018-01-29

## 2018-01-29 RX ORDER — ONDANSETRON 2 MG/ML
INJECTION INTRAMUSCULAR; INTRAVENOUS
Status: DISCONTINUED | OUTPATIENT
Start: 2018-01-29 | End: 2018-01-29

## 2018-01-29 RX ORDER — FENTANYL CITRATE 50 UG/ML
INJECTION, SOLUTION INTRAMUSCULAR; INTRAVENOUS
Status: DISCONTINUED | OUTPATIENT
Start: 2018-01-29 | End: 2018-01-29

## 2018-01-29 RX ADMIN — FENTANYL CITRATE 25 MCG: 50 INJECTION, SOLUTION INTRAMUSCULAR; INTRAVENOUS at 12:01

## 2018-01-29 RX ADMIN — FENTANYL CITRATE 50 MCG: 50 INJECTION, SOLUTION INTRAMUSCULAR; INTRAVENOUS at 11:01

## 2018-01-29 RX ADMIN — KETOROLAC TROMETHAMINE 30 MG: 30 INJECTION, SOLUTION INTRAMUSCULAR at 01:01

## 2018-01-29 RX ADMIN — FENTANYL CITRATE 25 MCG: 50 INJECTION, SOLUTION INTRAMUSCULAR; INTRAVENOUS at 01:01

## 2018-01-29 RX ADMIN — MIDAZOLAM HYDROCHLORIDE 2 MG: 1 INJECTION, SOLUTION INTRAMUSCULAR; INTRAVENOUS at 11:01

## 2018-01-29 RX ADMIN — SODIUM CHLORIDE, SODIUM LACTATE, POTASSIUM CHLORIDE, CALCIUM CHLORIDE: 600; 310; 30; 20 INJECTION, SOLUTION INTRAVENOUS at 11:01

## 2018-01-29 RX ADMIN — PROPOFOL 200 MG: 10 INJECTION, EMULSION INTRAVENOUS at 11:01

## 2018-01-29 RX ADMIN — ONDANSETRON 4 MG: 2 INJECTION, SOLUTION INTRAMUSCULAR; INTRAVENOUS at 12:01

## 2018-01-29 RX ADMIN — LIDOCAINE HYDROCHLORIDE 75 MG: 20 INJECTION PARENTERAL at 11:01

## 2018-01-29 RX ADMIN — ACETAMINOPHEN 1000 MG: 10 INJECTION, SOLUTION INTRAVENOUS at 12:01

## 2018-01-29 RX ADMIN — CEFAZOLIN 2 G: 1 INJECTION, POWDER, FOR SOLUTION INTRAVENOUS at 11:01

## 2018-01-29 RX ADMIN — DEXAMETHASONE SODIUM PHOSPHATE 8 MG: 4 INJECTION, SOLUTION INTRAMUSCULAR; INTRAVENOUS at 11:01

## 2018-01-29 RX ADMIN — OXYCODONE HYDROCHLORIDE 10 MG: 5 TABLET ORAL at 01:01

## 2018-01-29 RX ADMIN — SUCCINYLCHOLINE CHLORIDE 170 MG: 20 INJECTION, SOLUTION INTRAMUSCULAR; INTRAVENOUS at 11:01

## 2018-01-29 RX ADMIN — ROCURONIUM BROMIDE 5 MG: 10 INJECTION, SOLUTION INTRAVENOUS at 11:01

## 2018-01-29 NOTE — H&P (VIEW-ONLY)
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  She was referred for definitive excision.  The patient denies any symptoms.  She cannot feel the mass.  This was discovered on a screening exam.  Family history is negative for breast or ovarian cancer.  Age of menarche was 12.  Menopause was she has 2 children the first of which was born when she was 26.  She has done well since the biopsy.   Margins are positive. She will need re excision. Doing well since surgery.          Past Medical History:   Diagnosis Date    Arrhythmia      Atrial fibrillation       history    Bronchitis      High blood pressure      PONV (postoperative nausea and vomiting)      Stroke      Ulcer              Past Surgical History:   Procedure Laterality Date    csection        DILATION AND CURETTAGE OF UTERUS        HIP PINNING        JOINT REPLACEMENT         hip right    NASAL SEPTUM SURGERY        TOTAL HIP ARTHROPLASTY        TUBAL LIGATION   1997            Current Outpatient Prescriptions:     apixaban 5 mg Tab, Take 1 tablet (5 mg total) by mouth 2 (two) times daily. (Patient taking differently: Take 5 mg by mouth 2 (two) times daily. September 18), Disp: 60 tablet, Rfl: 11    DULoxetine (CYMBALTA) 30 MG capsule, Take 1 capsule (30 mg total) by mouth once daily., Disp: 7 capsule, Rfl: 0    DULoxetine (CYMBALTA) 60 MG capsule, Take 1 capsule (60 mg total) by mouth once daily., Disp: 30 capsule, Rfl: 11    flecainide (TAMBOCOR) 100 MG Tab, Take 1 tablet (100 mg total) by mouth every 12 (twelve) hours., Disp: 60 tablet, Rfl: 11    gabapentin (NEURONTIN) 600 MG tablet, Take 1 tablet (600 mg total) by mouth 3 (three) times daily., Disp: 90 tablet, Rfl: 11    hydroCHLOROthiazide (HYDRODIURIL) 25 MG tablet, TAKE ONE TABLET BY MOUTH ONCE DAILY, Disp: 90 tablet, Rfl: 3    lisinopril 10 MG tablet, Take 1 tablet (10 mg total) by  "mouth once daily., Disp: 90 tablet, Rfl: 3    metoprolol tartrate (LOPRESSOR) 25 MG tablet, Take 1 tablet (25 mg total) by mouth 2 (two) times daily., Disp: 180 tablet, Rfl: 3    omeprazole (PRILOSEC) 20 MG capsule, Take 1 capsule (20 mg total) by mouth once daily., Disp: 30 capsule, Rfl: 11    rosuvastatin (CRESTOR) 10 MG tablet, Take 10 mg by mouth once daily., Disp: , Rfl:     fluticasone (FLONASE) 50 mcg/actuation nasal spray, , Disp: , Rfl:     oxycodone-acetaminophen (PERCOCET) 5-325 mg per tablet, Take 1 tablet by mouth every 4 (four) hours as needed for Pain., Disp: 15 tablet, Rfl: 0           Review of patient's allergies indicates:   Allergen Reactions    Aspirin Other (See Comments)       "I don't take it because I've had ulcers"   ulcers    Meperidine Other (See Comments)       disoriented               Family History   Problem Relation Age of Onset    Colon cancer Maternal Grandmother 70    Cancer Maternal Grandmother      Ovarian cancer Maternal Grandmother      Eclampsia Paternal Grandmother      Stroke Father      Hypertension Mother      Stomach cancer Neg Hx      Esophageal cancer Neg Hx      Breast cancer Neg Hx      Miscarriages / Stillbirths Neg Hx        Social History   Social History            Social History    Marital status:        Spouse name: N/A    Number of children: N/A    Years of education: N/A      Occupational History    Not on file.             Social History Main Topics    Smoking status: Never Smoker    Smokeless tobacco: Never Used    Alcohol use No         Comment: never    Drug use: No    Sexual activity: Yes       Partners: Male           Other Topics Concern    Not on file          Social History Narrative    No narrative on file            Review of Systems   Constitutional: Negative for activity change, chills, fever and unexpected weight change.   HENT: Negative for congestion, sore throat, trouble swallowing and voice change.    Eyes: " Negative for redness and visual disturbance.   Respiratory: Negative for cough, shortness of breath and wheezing.    Cardiovascular: Negative for chest pain and palpitations.   Gastrointestinal: Negative for abdominal pain, blood in stool, nausea and vomiting.   Endocrine: Negative.    Genitourinary: Negative for dysuria, frequency and hematuria.   Musculoskeletal: Negative for arthralgias, back pain and neck pain.   Skin: Negative for rash and wound.   Allergic/Immunologic: Negative.    Neurological: Negative for dizziness, weakness and headaches.   Hematological: Negative for adenopathy.   Psychiatric/Behavioral: Negative for agitation and dysphoric mood. The patient is not nervous/anxious.       Objective:      Physical Exam   Constitutional: She is oriented to person, place, and time. She appears well-developed and well-nourished. No distress.   HENT:   Head: Normocephalic and atraumatic.   Mouth/Throat: Oropharynx is clear and moist. No oropharyngeal exudate.   Eyes: Conjunctivae and EOM are normal. Pupils are equal, round, and reactive to light. No scleral icterus.   Neck: Normal range of motion. No thyromegaly present.   Cardiovascular: Normal rate and regular rhythm.    No murmur heard.  Pulmonary/Chest: Effort normal and breath sounds normal. She has no wheezes. She has no rales.   Right Breast Exam:  Incision is healing well. No infection.    There is no abnormality detected on physical exam of the contralateral breast.   Abdominal: Soft. Bowel sounds are normal. She exhibits no distension and no mass. There is no tenderness. No hernia.   Musculoskeletal: Normal range of motion. She exhibits no edema.   Lymphadenopathy:     She has no cervical adenopathy.   Neurological: She is alert and oriented to person, place, and time. No cranial nerve deficit.   Skin: Skin is warm and dry. No rash noted. No erythema.   Psychiatric: She has a normal mood and affect. Her behavior is normal.      1) Right breast, 10:00,  10 cm from nipple (Mammotome x 7).  2) Right breast, 10:00, 9 cm from nipple (Mammotome x 9).  Supplemental Diagnosis  [place] DIAGNOSIS:  BREAST, RIGHT, NEEDLE CORE BIOPSY ([id number], 2; 11/16/2017):  -Myofibroblastoma.     Assessment:           Encounter Diagnoses   Name Primary?    Preop testing      Myofibroblastoma of breast, right Yes         Plan:      1. Plan  Re excision of margins.  2. Risks and benefits of the planned procedure were discussed at length with the patient.  Risks and benefits of not proceeding with the procedure were discussed as well. All questions were answered. The patient expressed clear understanding and would like to proceed with the procedure as discussed.

## 2018-01-29 NOTE — OP NOTE
DATE OF PROCEDURE:  01/29/2018.    PROCEDURE:  Reexcision of right breast lumpectomy margins.    PREOPERATIVE DIAGNOSIS:  Mild fibroblastoma of the right breast with positive   margins of lateral inferior resection.    POSTOPERATIVE DIAGNOSIS:  Mild fibroblastoma of the right breast.    SURGEON:  Kin Fraire Jr., M.D.    ASSISTANT:  None.    SPECIMENS:  Include:  1.  Reexcision including inferior and lateral margins, which were marked.  2.  New inferior margin.  3.  New lateral margin.    PROCEDURE IN DETAIL:  After appropriate consent was obtained, consent forms   signed, and questions answered, the patient was taken to the Operating Room and   placed on the operating room table where general anesthesia was induced via   laryngeal mask.  Preoperative antibiotics were administered.  SCDs were in   place.  Time-out procedure was performed.  The area was prepped and draped in   the usual sterile fashion.  A skin incision was made at the site of the previous   lumpectomy dissection into the cavity.  The seroma was evacuated.  Reexcision   of the cavity was then performed to include the inferior and lateral margins and   these were then marked and oriented for the Pathologist.  Additional inferior   margin was then taken and marked as a new inferior margin and additional lateral   margin was taken and marked as a new lateral margin.  Then, 0.25% Marcaine was   injected for local anesthetic and adequate hemostasis was ensured.  Deep tissues   were then reapproximated with a running 3-0 Vicryl suture and the skin was   closed with a 4-0 Monocryl subcuticular stitch.  Steri-Strips were applied.  The   patient was awakened, extubated, and transferred to the Recovery Room in stable   condition.  She tolerated the procedure without complication.  Estimated blood   loss was approximately 25 mL.  Counts were correct at the end of the procedure.      RTL/HN  dd: 01/29/2018 13:01:03 (CST)  td: 01/29/2018 16:05:10 (CST)  Doc  ID   #[id number]  Job ID #[id number]    CC:

## 2018-01-29 NOTE — ANESTHESIA POSTPROCEDURE EVALUATION
"Anesthesia Post Evaluation    Patient: Fatemeh Shoemaker    Procedure(s) Performed: Procedure(s) (LRB):  [id number] (Right)    Final Anesthesia Type: general  Patient location during evaluation: PACU  Patient participation: Yes- Able to Participate  Level of consciousness: awake and alert and oriented  Post-procedure vital signs: reviewed and stable  Pain management: adequate  Airway patency: patent  PONV status at discharge: No PONV  Anesthetic complications: no      Cardiovascular status: hemodynamically stable and blood pressure returned to baseline  Respiratory status: unassisted, spontaneous ventilation and room air  Hydration status: euvolemic  Follow-up not needed.        Visit Vitals  BP (!) 109/52 (BP Location: Left arm)   Pulse (!) 58   Temp 36.4 °C (97.5 °F) (Skin)   Resp 18   Ht 5' 4" (1.626 m)   Wt 129.7 kg (286 lb)   LMP 09/08/2017   SpO2 98%   Breastfeeding? No   BMI 49.09 kg/m²       Pain/Yojana Score: Pain Assessment Performed: Yes (1/29/2018  1:30 PM)  Presence of Pain: complains of pain/discomfort (1/29/2018  2:19 PM)  Pain Rating Prior to Med Admin: 5 (1/29/2018  1:58 PM)  Pain Rating Post Med Admin: 2 (1/29/2018  2:19 PM)  Yojana Score: 10 (1/29/2018  2:19 PM)      "

## 2018-01-29 NOTE — BRIEF OP NOTE
Patient: Fatemeh Shoemaker     Date of Procedure: 1/29/2018    Procedure: Re excision of right lumpectomy margin    Surgeon: Kin Steen MD    Assistant: None    Pre-op Diagnosis: Breast mass, right [N63.10]     Post-op Diagnosis: Breast mass, right [N63.10]    Findings: breast tissue    Specimen: 1. Re excision including inferior and lateral margin.  2. New inferior margin.  3. New lateral margin.    EBL: 25 cc    Complications: None

## 2018-01-29 NOTE — ANESTHESIA PREPROCEDURE EVALUATION
01/29/2018  Fatemeh Shoemaker is a 57 y.o., female.    Anesthesia Evaluation      I have reviewed the Medications.     Review of Systems  Anesthesia Hx:  No problems with previous Anesthesia   Social:  Non-Smoker, No Alcohol Use    Cardiovascular:   Hypertension Dysrhythmias atrial fibrillation hyperlipidemia    Pulmonary:  Pulmonary Normal    Renal/:  Renal/ Normal     Hepatic/GI:   PUD,    Neurological:   Peripheral Neuropathy    Endocrine:  Endocrine Normal        Physical Exam  General:  Morbid Obesity    Airway/Jaw/Neck:  Airway Findings: Mouth Opening: Normal General Airway Assessment: Adult  Mallampati: III  Jaw/Neck Findings:  Neck ROM: Extension Decreased, Mild       Chest/Lungs:  Chest/Lungs Findings: Clear to auscultation, Normal Respiratory Rate     Heart/Vascular:  Heart Findings: Rate: Normal  Rhythm: Regular Rhythm  Sounds: Normal  Heart murmur: negative Vascular Findings: Normal (No carotid bruits.)       Mental Status:  Mental Status Findings:  Cooperative, Alert and Oriented         Anesthesia Plan  Type of Anesthesia, risks & benefits discussed:  Anesthesia Type:  general  Patient's Preference:   Intra-op Monitoring Plan:   Intra-op Monitoring Plan Comments:   Post Op Pain Control Plan:   Post Op Pain Control Plan Comments:   Induction:   IV  Beta Blocker:  Patient is on a Beta-Blocker and has received one dose within the past 24 hours (No further documentation required).       Informed Consent: Patient understands risks and agrees with Anesthesia plan.  Questions answered. Anesthesia consent signed with patient.  ASA Score: 3     Day of Surgery Review of History & Physical:            Ready For Surgery From Anesthesia Perspective.

## 2018-01-29 NOTE — DISCHARGE SUMMARY
Discharge Summary  General Surgery      Admit Date: 1/29/2018    Discharge Date :1/29/2018    Attending Physician: Kin Fraire     Discharge Physician: Kin Fraire    Discharged Condition: good    Discharge Diagnosis: Breast mass, right [N63.10]    Treatments/Procedures: Procedure(s) (LRB):  [id number] (Right)    Hospital Course: Uneventful; Discharged home from Recovery    Significant Diagnostic Studies: none    Disposition: Home or Self Care    Diet: Regular    Follow up: Office 10-14 days    Activity: No heavy lifting till seen in office.    Patient Instructions:   Current Discharge Medication List      START taking these medications    Details   hydrocodone-acetaminophen 7.5-325mg (NORCO) 7.5-325 mg per tablet Take 1 tablet by mouth every 4 (four) hours as needed for Pain.  Qty: 20 tablet, Refills: 0         CONTINUE these medications which have NOT CHANGED    Details   acetaminophen (TYLENOL) 500 MG tablet Take 1,000 mg by mouth every 6 (six) hours as needed for Pain (pain).      apixaban 5 mg Tab Take 1 tablet (5 mg total) by mouth 2 (two) times daily.  Qty: 60 tablet, Refills: 11    Associated Diagnoses: Paroxysmal atrial fibrillation      docusate sodium (COLACE) 100 MG capsule Take 100 mg by mouth 2 (two) times daily.      DULoxetine (CYMBALTA) 60 MG capsule Take 1 capsule (60 mg total) by mouth once daily.  Qty: 30 capsule, Refills: 11    Associated Diagnoses: Paresthesias; Neuropathy      flecainide (TAMBOCOR) 100 MG Tab Take 1 tablet (100 mg total) by mouth every 12 (twelve) hours.  Qty: 60 tablet, Refills: 11    Associated Diagnoses: Paroxysmal atrial fibrillation      gabapentin (NEURONTIN) 600 MG tablet Take 1 tablet (600 mg total) by mouth 3 (three) times daily.  Qty: 90 tablet, Refills: 11    Associated Diagnoses: Neuropathy      hydroCHLOROthiazide (HYDRODIURIL) 25 MG tablet Take 1 tablet (25 mg total) by mouth once daily.  Qty: 90 tablet, Refills: 3    Comments: This prescription  was filled on 12/7/2017. Any refills authorized will be placed on file.  Associated Diagnoses: Paroxysmal atrial fibrillation      lisinopril 10 MG tablet Take 1 tablet (10 mg total) by mouth once daily.  Qty: 90 tablet, Refills: 3    Associated Diagnoses: Paroxysmal atrial fibrillation      metoprolol tartrate (LOPRESSOR) 25 MG tablet Take 1 tablet (25 mg total) by mouth 2 (two) times daily.  Qty: 180 tablet, Refills: 3    Associated Diagnoses: Paroxysmal atrial fibrillation      omeprazole (PRILOSEC) 20 MG capsule Take 1 capsule (20 mg total) by mouth once daily.  Qty: 30 capsule, Refills: 11    Associated Diagnoses: Paroxysmal atrial fibrillation      rosuvastatin (CRESTOR) 10 MG tablet Take 10 mg by mouth every evening.                Discharge Procedure Orders  Diet general     Activity as tolerated

## 2018-01-29 NOTE — DISCHARGE INSTRUCTIONS
Discharge Instructions: After Your Surgery  Youve just had surgery. During surgery, you were given medicine called anesthesia to keep you relaxed and free of pain. After surgery, you may have some pain or nausea. This is common. Here are some tips for feeling better and getting well after surgery.     Stay on schedule with your medicine.   Going home  Your healthcare provider will show you how to take care of yourself when you go home. He or she will also answer your questions. Have an adult family member or friend drive you home. For the first 24 hours after your surgery:  · Do not drive or use heavy equipment.  · Do not make important decisions or sign legal papers.  · Do not drink alcohol.  · Have someone stay with you, if needed. He or she can watch for problems and help keep you safe.  Be sure to go to all follow-up visits with your healthcare provider. And rest after your surgery for as long as your healthcare provider tells you to.  Coping with pain  If you have pain after surgery, pain medicine will help you feel better. Take it as told, before pain becomes severe. Also, ask your healthcare provider or pharmacist about other ways to control pain. This might be with heat, ice, or relaxation. And follow any other instructions your surgeon or nurse gives you.  Tips for taking pain medicine  To get the best relief possible, remember these points:  · Pain medicines can upset your stomach. Taking them with a little food may help.  · Most pain relievers taken by mouth need at least 20 to 30 minutes to start to work.  · Taking medicine on a schedule can help you remember to take it. Try to time your medicine so that you can take it before starting an activity. This might be before you get dressed, go for a walk, or sit down for dinner.  · Constipation is a common side effect of pain medicines. Call your healthcare provider before taking any medicines such as laxatives or stool softeners to help ease constipation.  Also ask if you should skip any foods. Drinking lots of fluids and eating foods such as fruits and vegetables that are high in fiber can also help. Remember, do not take laxatives unless your surgeon has prescribed them.  · Drinking alcohol and taking pain medicine can cause dizziness and slow your breathing. It can even be deadly. Do not drink alcohol while taking pain medicine.  · Pain medicine can make you react more slowly to things. Do not drive or run machinery while taking pain medicine.  Your healthcare provider may tell you to take acetaminophen to help ease your pain. Ask him or her how much you are supposed to take each day. Acetaminophen or other pain relievers may interact with your prescription medicines or other over-the-counter (OTC) medicines. Some prescription medicines have acetaminophen and other ingredients. Using both prescription and OTC acetaminophen for pain can cause you to overdose. Read the labels on your OTC medicines with care. This will help you to clearly know the list of ingredients, how much to take, and any warnings. It may also help you not take too much acetaminophen. If you have questions or do not understand the information, ask your pharmacist or healthcare provider to explain it to you before you take the OTC medicine.  Managing nausea  Some people have an upset stomach after surgery. This is often because of anesthesia, pain, or pain medicine, or the stress of surgery. These tips will help you handle nausea and eat healthy foods as you get better. If you were on a special food plan before surgery, ask your healthcare provider if you should follow it while you get better. These tips may help:  · Do not push yourself to eat. Your body will tell you when to eat and how much.  · Start off with clear liquids and soup. They are easier to digest.  · Next try semi-solid foods, such as mashed potatoes, applesauce, and gelatin, as you feel ready.  · Slowly move to solid foods. Dont  eat fatty, rich, or spicy foods at first.  · Do not force yourself to have 3 large meals a day. Instead eat smaller amounts more often.  · Take pain medicines with a small amount of solid food, such as crackers or toast, to avoid nausea.     Call your surgeon if  · You still have pain an hour after taking medicine. The medicine may not be strong enough.  · You feel too sleepy, dizzy, or groggy. The medicine may be too strong.  · You have side effects like nausea, vomiting, or skin changes, such as rash, itching, or hives.       If you have obstructive sleep apnea  You were given anesthesia medicine during surgery to keep you comfortable and free of pain. After surgery, you may have more apnea spells because of this medicine and other medicines you were given. The spells may last longer than usual.   At home:  · Keep using the continuous positive airway pressure (CPAP) device when you sleep. Unless your healthcare provider tells you not to, use it when you sleep, day or night. CPAP is a common device used to treat obstructive sleep apnea.  · Talk with your provider before taking any pain medicine, muscle relaxants, or sedatives. Your provider will tell you about the possible dangers of taking these medicines.  Date Last Reviewed: 12/1/2016 © 2000-2017 [employer]. [address] [place] [address], [place] [address]. All rights reserved. This information is not intended as a substitute for professional medical care. Always follow your healthcare professional's instructions.        Department of General Surgery  [facility]    BREAST Excision    DOS:   Wear bra day and night for one week.   May shower in 24 hours   May remove outer dressing in 48 hours. If steri-strips present leave in place. Pat dry if they get  wet. Do not worry if they fall off.   Minimal activity for 48 hours.   Advance diet as tolerated   Apply ice pack for 24 hours for comfort.   Resume home medications as  prescribed    DONT:   Do not soak in the tub   No driving for 24 hours or while taking narcotic pain medication   DO NOT TAKE ADDITIONAL TYLENOL/ACETAMINOPHEN WHILE TAKING NARCOTIC PAIN MEDICATION THAT CONTAINS TYLENOL/ACETAMINOPHEN.    CALL PHYSICIAN FOR:   Redness or bleeding.   Fever greater then 101.   Drainage from the incision site that appears infected.   Persistent pain not relieved by pain medication.    RETURN APPOINTMENT: Call to schedule appointment in 2 weeks    FOR EMERGENCIES: Call your physician at [phone]

## 2018-01-29 NOTE — INTERVAL H&P NOTE
The patient has been examined and the H&P has been reviewed:    I concur with the findings and no changes have occurred since H&P was written.    Anesthesia/Surgery risks, benefits and alternative options discussed and understood by patient/family.          Active Hospital Problems    Diagnosis  POA    Breast mass, right [N63.10]  Yes      Resolved Hospital Problems    Diagnosis Date Resolved POA   No resolved problems to display.

## 2018-01-29 NOTE — TRANSFER OF CARE
"Anesthesia Transfer of Care Note    Patient: Fatemeh Shoemaker    Procedure(s) Performed: Procedure(s) (LRB):  [id number] (Right)    Patient location: PACU    Anesthesia Type: general    Transport from OR: Transported from OR on room air with adequate spontaneous ventilation    Post pain: adequate analgesia    Post assessment: no apparent anesthetic complications and tolerated procedure well    Post vital signs: stable    Level of consciousness: awake and alert    Nausea/Vomiting: no nausea/vomiting    Complications: none    Transfer of care protocol was followed      Last vitals:   Visit Vitals  BP (!) 118/58 (BP Location: Left arm, Patient Position: Lying)   Pulse (!) 48   Temp 36.5 °C (97.7 °F) (Skin)   Resp 18   Ht 5' 4" (1.626 m)   Wt 129.7 kg (286 lb)   LMP 09/08/2017   SpO2 96%   Breastfeeding? No   BMI 49.09 kg/m²     "

## 2018-01-30 VITALS
BODY MASS INDEX: 48.83 KG/M2 | WEIGHT: 286 LBS | OXYGEN SATURATION: 98 % | DIASTOLIC BLOOD PRESSURE: 52 MMHG | RESPIRATION RATE: 18 BRPM | SYSTOLIC BLOOD PRESSURE: 109 MMHG | TEMPERATURE: 98 F | HEART RATE: 58 BPM | HEIGHT: 64 IN

## 2018-01-30 PROCEDURE — 88341 IMHCHEM/IMCYTCHM EA ADD ANTB: CPT | Mod: 26,,, | Performed by: PATHOLOGY

## 2018-01-30 PROCEDURE — 88342 IMHCHEM/IMCYTCHM 1ST ANTB: CPT | Performed by: PATHOLOGY

## 2018-01-30 PROCEDURE — 88307 TISSUE EXAM BY PATHOLOGIST: CPT | Mod: 26,,, | Performed by: PATHOLOGY

## 2018-02-01 ENCOUNTER — PATIENT MESSAGE (OUTPATIENT)
Dept: SURGERY | Facility: CLINIC | Age: 58
End: 2018-02-01

## 2018-02-02 ENCOUNTER — PATIENT MESSAGE (OUTPATIENT)
Dept: SURGERY | Facility: CLINIC | Age: 58
End: 2018-02-02

## 2018-02-02 ENCOUNTER — TELEPHONE (OUTPATIENT)
Dept: SURGERY | Facility: CLINIC | Age: 58
End: 2018-02-02

## 2018-02-02 NOTE — TELEPHONE ENCOUNTER
----- Message from Beulah Solis sent at 2/1/2018 10:29 AM CST -----  Contact: Patient  Fatemeh. Patient [phone], calling to speak with Kenisha, to get a relealse back to work. Would like to return to work on 2/5/18.  Please advise. Thanks

## 2018-02-14 DIAGNOSIS — I48.0 PAROXYSMAL ATRIAL FIBRILLATION: ICD-10-CM

## 2018-02-14 RX ORDER — LISINOPRIL 10 MG/1
TABLET ORAL
Qty: 90 TABLET | Refills: 3 | Status: SHIPPED | OUTPATIENT
Start: 2018-02-14 | End: 2019-02-14 | Stop reason: SDUPTHER

## 2018-02-19 ENCOUNTER — OFFICE VISIT (OUTPATIENT)
Dept: SURGERY | Facility: CLINIC | Age: 58
End: 2018-02-19
Payer: COMMERCIAL

## 2018-02-19 VITALS — WEIGHT: 288.81 LBS | HEIGHT: 64 IN | BODY MASS INDEX: 49.31 KG/M2 | TEMPERATURE: 98 F | RESPIRATION RATE: 16 BRPM

## 2018-02-19 DIAGNOSIS — Z98.890 POST-OPERATIVE STATE: Primary | ICD-10-CM

## 2018-02-19 PROCEDURE — 99024 POSTOP FOLLOW-UP VISIT: CPT | Mod: S$GLB,,, | Performed by: SURGERY

## 2018-02-19 PROCEDURE — 99999 PR PBB SHADOW E&M-EST. PATIENT-LVL III: CPT | Mod: PBBFAC,,, | Performed by: SURGERY

## 2018-02-19 NOTE — PROGRESS NOTES
POST-OP NOTE    PROCEDURE: S/P re excision of right breast margins.    COMPLAINTS: None.    EXAM: Incision well approximated. No drainage. No infection.      IMPRESSION: Doing well.  Margins clear.    PLAN: FU as needed.  Continue screening as ordered.

## 2018-03-13 ENCOUNTER — OFFICE VISIT (OUTPATIENT)
Dept: OBSTETRICS AND GYNECOLOGY | Facility: CLINIC | Age: 58
End: 2018-03-13
Payer: COMMERCIAL

## 2018-03-13 ENCOUNTER — OFFICE VISIT (OUTPATIENT)
Dept: ELECTROPHYSIOLOGY | Facility: CLINIC | Age: 58
End: 2018-03-13
Payer: COMMERCIAL

## 2018-03-13 ENCOUNTER — HOSPITAL ENCOUNTER (OUTPATIENT)
Dept: CARDIOLOGY | Facility: CLINIC | Age: 58
Discharge: HOME | End: 2018-03-13
Payer: COMMERCIAL

## 2018-03-13 VITALS
WEIGHT: 288.81 LBS | SYSTOLIC BLOOD PRESSURE: 130 MMHG | DIASTOLIC BLOOD PRESSURE: 74 MMHG | HEIGHT: 65 IN | BODY MASS INDEX: 48.12 KG/M2

## 2018-03-13 VITALS
WEIGHT: 290.38 LBS | HEIGHT: 65 IN | SYSTOLIC BLOOD PRESSURE: 126 MMHG | DIASTOLIC BLOOD PRESSURE: 82 MMHG | BODY MASS INDEX: 48.38 KG/M2 | HEART RATE: 67 BPM

## 2018-03-13 DIAGNOSIS — N95.1 HOT FLUSHES, PERIMENOPAUSAL: Primary | ICD-10-CM

## 2018-03-13 DIAGNOSIS — I48.0 PAROXYSMAL ATRIAL FIBRILLATION: Primary | ICD-10-CM

## 2018-03-13 DIAGNOSIS — E66.01 MORBID OBESITY WITH BMI OF 45.0-49.9, ADULT: ICD-10-CM

## 2018-03-13 DIAGNOSIS — I10 ESSENTIAL HYPERTENSION: ICD-10-CM

## 2018-03-13 DIAGNOSIS — I48.0 PAF (PAROXYSMAL ATRIAL FIBRILLATION): ICD-10-CM

## 2018-03-13 PROCEDURE — 3078F DIAST BP <80 MM HG: CPT | Mod: CPTII,S$GLB,, | Performed by: OBSTETRICS & GYNECOLOGY

## 2018-03-13 PROCEDURE — 99999 PR PBB SHADOW E&M-EST. PATIENT-LVL III: CPT | Mod: PBBFAC,,, | Performed by: OBSTETRICS & GYNECOLOGY

## 2018-03-13 PROCEDURE — 99999 PR PBB SHADOW E&M-EST. PATIENT-LVL III: CPT | Mod: PBBFAC,,, | Performed by: INTERNAL MEDICINE

## 2018-03-13 PROCEDURE — 99213 OFFICE O/P EST LOW 20 MIN: CPT | Mod: S$GLB,,, | Performed by: OBSTETRICS & GYNECOLOGY

## 2018-03-13 PROCEDURE — 3075F SYST BP GE 130 - 139MM HG: CPT | Mod: CPTII,S$GLB,, | Performed by: OBSTETRICS & GYNECOLOGY

## 2018-03-13 PROCEDURE — 93000 ELECTROCARDIOGRAM COMPLETE: CPT | Mod: S$GLB,,, | Performed by: INTERNAL MEDICINE

## 2018-03-13 PROCEDURE — 99213 OFFICE O/P EST LOW 20 MIN: CPT | Mod: S$GLB,,, | Performed by: INTERNAL MEDICINE

## 2018-03-13 PROCEDURE — 3079F DIAST BP 80-89 MM HG: CPT | Mod: CPTII,S$GLB,, | Performed by: INTERNAL MEDICINE

## 2018-03-13 PROCEDURE — 3074F SYST BP LT 130 MM HG: CPT | Mod: CPTII,S$GLB,, | Performed by: INTERNAL MEDICINE

## 2018-03-13 RX ORDER — PAROXETINE 10 MG/1
10 TABLET, FILM COATED ORAL DAILY
Qty: 30 TABLET | Refills: 11 | Status: SHIPPED | OUTPATIENT
Start: 2018-03-13 | End: 2018-06-20

## 2018-03-13 RX ORDER — METOPROLOL TARTRATE 25 MG/1
25 TABLET, FILM COATED ORAL 2 TIMES DAILY
Qty: 180 TABLET | Refills: 3 | Status: SHIPPED | OUTPATIENT
Start: 2018-03-13 | End: 2018-03-13 | Stop reason: SDUPTHER

## 2018-03-13 RX ORDER — METOPROLOL TARTRATE 25 MG/1
25 TABLET, FILM COATED ORAL 2 TIMES DAILY
Qty: 180 TABLET | Refills: 3 | Status: SHIPPED | OUTPATIENT
Start: 2018-03-13 | End: 2019-04-03 | Stop reason: SDUPTHER

## 2018-03-13 RX ORDER — PAROXETINE 10 MG/1
10 TABLET, FILM COATED ORAL DAILY
Qty: 30 TABLET | Refills: 11 | Status: SHIPPED | OUTPATIENT
Start: 2018-03-13 | End: 2018-03-13 | Stop reason: SDUPTHER

## 2018-03-13 NOTE — PROGRESS NOTES
"Subjective:    Patient ID:  Fatemeh Shoemaker is a 57 y.o. female who presents for follow-up of No chief complaint on file.      HPI  Prior Hx:  I had the pleasure of seeing Mrs. Shoemaker today in our electrophysiology clinic in follow-up for her atrial fibrillation. As you are aware she is a pleasant 56 year-old woman with hypertension, morbid obesity and prior bleeding esophageal ulcer. She was in her usual state of health until this past year. She noted intermittent palpitations/fluttering in her chest. Sometimes accompanied by chest discomfort. She reported she was visiting her parent's [facility] 2016 in [place], [place] and developed rapid palpitations and a nosebleed. She went to [facility]. She was noted to be in atrial fibrillation with rapid ventricular response (rate of 150bpm). She was observed and converted spontaneously to sinus rhythm. She was discharged with metoprolol increased metoprolol (100mg bid from 25 mg bid) and referred here for evaluation. She noted having these symptoms of palpitations 3-4 times a month. She noted sometimes she also can feel a "pounding" sensation in her neck. She has chronic shortness of breath with exertion that she attributes to being overweight. She has also noted recurrence of a center chest discomfort similar to her prior ulcer but denies any melena or bloody stools. An echocardiogram showed preserved LVEF and PET stress showed no ischemia.  She was started on flecainide.     Since starting flecainide she has had only rare, short episodes of palpitations.     ECHO 1/9/2017  CONCLUSIONS     1 - Normal left ventricular systolic function (EF 60-65%).     2 - Mild left atrial enlargement.     3 - Normal left ventricular diastolic function.     4 - Normal right ventricular systolic function .      PET stress 1/9/2017  CONCLUSIONS: NORMAL MYOCARDIAL PERFUSION PET STRESS TEST  1. The perfusion scan is free of evidence for myocardial " ischemia or injury.   2. Resting wall motion is physiologic. Stress wall motion is physiologic.   3. Visually estimated LV function is normal at rest and normal at stress.   4. The ventricular volumes are normal at rest and stress.   5. The extracardiac distribution of radioactivity is normal.   6. There was no previous study available to compare.       Interim Hx:   Mrs. Shoemaker presents today for follow-up. She is doing well. She denies any palpitations or bleeding.    My interpretation of today's in clinic electrocardiogram is normal sinus rhythm at a rate of 67 bpm.    Review of Systems   Constitution: Negative for fever and weakness.   HENT: Negative.  Negative for congestion and sore throat.    Eyes: Negative.  Negative for blurred vision and visual disturbance.   Cardiovascular: Negative.  Negative for chest pain, dyspnea on exertion, irregular heartbeat, near-syncope, orthopnea, palpitations, paroxysmal nocturnal dyspnea and syncope.   Respiratory: Negative for cough and shortness of breath.    Hematologic/Lymphatic: Negative for bleeding problem. Does not bruise/bleed easily.   Skin: Negative.    Musculoskeletal: Positive for arthritis.   Gastrointestinal: Negative for bloating, abdominal pain, hematochezia and melena.   Neurological: Negative for dizziness and focal weakness.        Objective:    Physical Exam   Constitutional: She is oriented to person, place, and time. She appears well-developed and well-nourished. No distress.   HENT:   Head: Normocephalic and atraumatic.   Eyes: Conjunctivae are normal. Right eye exhibits no discharge. Left eye exhibits no discharge.   Neck: Neck supple. No JVD present.   Cardiovascular: Normal rate, regular rhythm and normal heart sounds.  Exam reveals no gallop and no friction rub.    No murmur heard.  Pulmonary/Chest: Effort normal and breath sounds normal. No respiratory distress. She has no wheezes. She has no rales.   Abdominal: Soft. Bowel sounds are normal. She  exhibits no distension. There is no tenderness. There is no rebound.   Musculoskeletal: She exhibits no edema.   Neurological: She is oriented to person, place, and time.   Skin: Skin is warm and dry. She is not diaphoretic.   Psychiatric: She has a normal mood and affect. Her behavior is normal. Judgment and thought content normal.   Vitals reviewed.        Assessment:       1. Paroxysmal atrial fibrillation    2. Essential hypertension    3. Morbid obesity with BMI of 45.0-49.9, adult         Plan:       In summary, Mrs. Shoemaker is a pleasant 57 year-old woman with hypertension, morbid obesity and prior bleeding esophageal ulcer who presents for evaluation of symptomatic paroxysmal atrial fibrillation. She has had symptomatic benefit with flecainide/metoprolol. She is on eliquis for stroke risk reduction. She understands bleeding and reversibility issues. Continue current plan. RTC in 6 months, sooner if needed. Will check CBC at that time.    Thank you for allowing me to participate in the care of this patient. Please do not hesitate to call me with any questions or concerns.    Manny Bojorquez MD, PhD  Cardiac Electrophysiology

## 2018-03-13 NOTE — PROGRESS NOTES
Subjective:       Patient ID: Fatemeh Shoemaker is a 57 y.o. female.    Chief Complaint:  Hot Flashes      History of Present Illness  HPI  58 y/o female presents for evaluation of hot flushes and night sweats. Had previously had these around the time of menopause but now they are unbearable. Reports that they are affecting her daily life. Otherwise doing well.     GYN & OB History  Patient's last menstrual period was 2017.   Date of Last Pap: 2017    OB History    Para Term  AB Living   4 2 2     2   SAB TAB Ectopic Multiple Live Births                  # Outcome Date GA Lbr Giles/2nd Weight Sex Delivery Anes PTL Lv   4 Term            3 Term            2      F CS-Unspec      1      F CS-Unspec             Review of Systems  Review of Systems   Constitutional: Negative for chills, diaphoresis, fatigue and fever.        Night sweats   Respiratory: Negative for cough and shortness of breath.    Cardiovascular: Negative for chest pain and palpitations.   Gastrointestinal: Negative for abdominal pain, constipation, diarrhea, nausea and vomiting.   Endocrine: Positive for hot flashes.   Genitourinary: Negative for dyspareunia, pelvic pain, vaginal bleeding, vaginal discharge and vaginal pain.   Neurological: Negative for headaches.   Psychiatric/Behavioral: Positive for sleep disturbance. Negative for depression.           Objective:    Physical Exam:   Constitutional: She appears well-developed and well-nourished. No distress.    HENT:   Head: Normocephalic and atraumatic.     Neck: Normal range of motion.     Pulmonary/Chest: Effort normal.          Genitourinary:   Genitourinary Comments: Talk only               Neurological: She is alert.     Psychiatric: She has a normal mood and affect. Her behavior is normal. Judgment and thought content normal.          Assessment:        1. Hot flushes, perimenopausal            Plan:      Fatemeh was seen today for hot  flashes.    Diagnoses and all orders for this visit:    Hot flushes, perimenopausal  - A full discussion of the benefit-risk ratio of hormonal replacement therapy was carried out. Improvement in vasomotor and other climacteric symptoms was discussed, including possible improvements in sleep and mood. Reduction of risk for osteoporosis was explained. We discussed the study data showing increased risk of thrombo-embolic events such as myocardial infarction, stroke and also possibly breast cancer with estrogen replacement, and how this might affect her. The range of side effects such as breast tenderness, weight gain and including possible increases in lifetime risk of breast cancer and possible thrombotic complications was discussed. We also discussed ACOG's recommendation to use hormone replacement therapy for the relief of hot flashes alone and to be on the lowest dose possible for the shortest amount of time.  Alternative such as herbal and soy-based products were reviewed. All of her questions about this therapy were answered.  - Paxil 10 mg daily Rx. RTC 3 months for follow up. Precautions given.     -     paroxetine (PAXIL) 10 MG tablet; Take 1 tablet (10 mg total) by mouth once daily.    No orders of the defined types were placed in this encounter.      Follow-up in about 3 months (around 6/13/2018) for f/u HRT.        Noemi Pierre MD  OB/GYN

## 2018-03-20 ENCOUNTER — OFFICE VISIT (OUTPATIENT)
Dept: NEUROLOGY | Facility: CLINIC | Age: 58
End: 2018-03-20
Payer: COMMERCIAL

## 2018-03-20 ENCOUNTER — PATIENT MESSAGE (OUTPATIENT)
Dept: ELECTROPHYSIOLOGY | Facility: CLINIC | Age: 58
End: 2018-03-20

## 2018-03-20 VITALS
WEIGHT: 287.25 LBS | SYSTOLIC BLOOD PRESSURE: 130 MMHG | DIASTOLIC BLOOD PRESSURE: 70 MMHG | HEIGHT: 65 IN | HEART RATE: 55 BPM | BODY MASS INDEX: 47.86 KG/M2

## 2018-03-20 DIAGNOSIS — M54.2 CERVICALGIA: ICD-10-CM

## 2018-03-20 DIAGNOSIS — G62.9 NEUROPATHY: Primary | ICD-10-CM

## 2018-03-20 PROCEDURE — 3075F SYST BP GE 130 - 139MM HG: CPT | Mod: CPTII,S$GLB,, | Performed by: PSYCHIATRY & NEUROLOGY

## 2018-03-20 PROCEDURE — 3078F DIAST BP <80 MM HG: CPT | Mod: CPTII,S$GLB,, | Performed by: PSYCHIATRY & NEUROLOGY

## 2018-03-20 PROCEDURE — 99213 OFFICE O/P EST LOW 20 MIN: CPT | Mod: S$GLB,,, | Performed by: PSYCHIATRY & NEUROLOGY

## 2018-03-20 PROCEDURE — 99999 PR PBB SHADOW E&M-EST. PATIENT-LVL III: CPT | Mod: PBBFAC,,, | Performed by: PSYCHIATRY & NEUROLOGY

## 2018-03-20 NOTE — PROGRESS NOTES
[facility] - NEUROLOGY  [facility] [place]    Date: March 20, 2018   Patient Name: Fatemeh Shoemaker   MRN: [id number]   PCP: Liseth Owen  Referring Provider: No ref. provider found    Assessment:      This is Fatemeh Shoemaker, 57 y.o. female with AF on elequis and flecainide, HLD, HTN, morbid obesity presents for distal symmetric painful sensory neuropathathy with no history of diabetes.  MRI brain and C-spine without significant pathology (images reviewed) and EMG BUE does show mild right CTS although this would not explain her symptoms.  Most likely explanation at this time is metabolic syndrome, we discussed possible bariatric surgery.      Plan:       Continue Cymbalta 60mg qd, /1200  Referral to PT    Follow up 6 months       I discussed side effects of the medications. I asked the patient to stop the medication if she notices serious adverse effects as we discussed and to seek immediate medical attention at an ER.     Tray Pate MD  [facility]   Department of Neurology    Subjective:   Symptoms manageable on cymbalta 60mg/d and /1200, intermittent swelling in feet but no rash or joint swelling    11/2017  No improvement on GBP 600mg tid, symptoms interfere with sleep    HPI 5/2017:   Ms. Fatemeh Shoemaker is a 57 y.o. female with AF on elequis, HLD, HTN, morbid obesity presents for neuropathy  She has had burning pain in her feet for at least several years and began to have burning pain in fingers of both hands starting early 2017.  Does not have any associated weakness and does not notice any exacerbating/alleviating factors.  Takes GBP for foot pain with some relief.    PAST MEDICAL HISTORY:  Past Medical History:   Diagnosis Date    Arrhythmia     Atrial fibrillation     history    Bronchitis     Encounter for blood transfusion     High blood pressure     Ulcer        PAST SURGICAL HISTORY:  Past Surgical History:   Procedure  "Laterality Date    BREAST LUMPECTOMY Right 12/21/2017    CHOLECYSTECTOMY  12/28/2017    Dr. TOLU Bowers, [facility]     csection      DILATION AND CURETTAGE OF UTERUS      HIP PINNING      JOINT REPLACEMENT      hip right    NASAL SEPTUM SURGERY      TOTAL HIP ARTHROPLASTY      TUBAL LIGATION  1997       CURRENT MEDS:  Current Outpatient Prescriptions   Medication Sig Dispense Refill    acetaminophen (TYLENOL) 500 MG tablet Take 1,000 mg by mouth every 6 (six) hours as needed for Pain (pain).      apixaban 5 mg Tab Take 1 tablet (5 mg total) by mouth 2 (two) times daily. 60 tablet 11    docusate sodium (COLACE) 100 MG capsule Take 100 mg by mouth 2 (two) times daily.      DULoxetine (CYMBALTA) 60 MG capsule Take 1 capsule (60 mg total) by mouth once daily. (Patient taking differently: Take 60 mg by mouth every evening. ) 30 capsule 11    flecainide (TAMBOCOR) 100 MG Tab Take 1 tablet (100 mg total) by mouth every 12 (twelve) hours. 60 tablet 11    gabapentin (NEURONTIN) 600 MG tablet Take 1 tablet (600 mg total) by mouth 3 (three) times daily. (Patient taking differently: Take 600 mg by mouth once daily. ) 90 tablet 11    hydroCHLOROthiazide (HYDRODIURIL) 25 MG tablet Take 1 tablet (25 mg total) by mouth once daily. 90 tablet 3    lisinopril 10 MG tablet TAKE ONE TABLET BY MOUTH ONCE DAILY 90 tablet 3    metoprolol tartrate (LOPRESSOR) 25 MG tablet Take 1 tablet (25 mg total) by mouth 2 (two) times daily. 180 tablet 3    omeprazole (PRILOSEC) 20 MG capsule Take 1 capsule (20 mg total) by mouth once daily. 30 capsule 11    paroxetine (PAXIL) 10 MG tablet Take 1 tablet (10 mg total) by mouth once daily. 30 tablet 11    rosuvastatin (CRESTOR) 10 MG tablet Take 10 mg by mouth every evening.        No current facility-administered medications for this visit.        ALLERGIES:  Review of patient's allergies indicates:   Allergen Reactions    Aspirin Other (See Comments)     "I don't take it because I've had " "ulcers"   ulcers    Meperidine        FAMILY HISTORY:  Family History   Problem Relation Age of Onset    Colon cancer Maternal Grandmother 70    Cancer Maternal Grandmother     Ovarian cancer Maternal Grandmother     Eclampsia Paternal Grandmother     Stroke Father     Hypertension Mother     Stomach cancer Neg Hx     Esophageal cancer Neg Hx     Breast cancer Neg Hx     Miscarriages / Stillbirths Neg Hx        SOCIAL HISTORY:  Social History   Substance Use Topics    Smoking status: Never Smoker    Smokeless tobacco: Never Used    Alcohol use No      Comment: never       Review of Systems:  12 review of systems is negative except for the symptoms mentioned in HPI.        Objective:     Vitals:    03/20/18 1355   BP: 130/70   Pulse: (!) 55   Weight: 130.3 kg (287 lb 4.2 oz)   Height: 5' 5" (1.651 m)       General: NAD, well nourished   Eyes: no tearing, discharge, no erythema   ENT: moist mucous membranes of the oral cavity, nares patent    Neck: Supple, full range of motion  Cardiovascular: Warm and well perfused, pulses equal and symmetrical  Lungs: Normal work of breathing, normal chest wall excursions  Skin: No rash, lesions, or breakdown on exposed skin  Psychiatry: Mood and affect are appropriate   Abdomen: soft, non tender, non distended  Extremeties: No cyanosis, clubbing or edema.    Neurological   MENTAL STATUS: Alert and oriented to person, place, and time. Attention and concentration within normal limits. Speech without dysarthria, able to name and repeat without difficulty. Recent and remote memory within normal limits   CRANIAL NERVES: Visual fields intact. PERRL. EOMI. Facial sensation intact. Face symmetrical. Hearing grossly intact. Full shoulder shrug bilaterally. Tongue protrudes midline   SENSORY: Sensation is decreased to light touch and vibration below the ankles.  Negative Romberg.   MOTOR: Normal bulk and tone. No pronator drift.  5/5 deltoid, biceps, triceps, interosseous, hand "  bilaterally. 5/5 iliopsoas, knee extension/flexion, foot dorsi/plantarflexion bilaterally.    REFLEXES: Patellar 2+, remainder mute. Toes down going bilaterally.   CEREBELLAR/COORDINATION/GAIT: Gait steady with normal arm swing and stride length.  Heel to shin intact. Finger to nose intact. Normal rapid alternating movements.

## 2018-03-22 ENCOUNTER — PATIENT MESSAGE (OUTPATIENT)
Dept: OBSTETRICS AND GYNECOLOGY | Facility: CLINIC | Age: 58
End: 2018-03-22

## 2018-03-22 DIAGNOSIS — N95.1 HOT FLUSHES, PERIMENOPAUSAL: Primary | ICD-10-CM

## 2018-03-23 RX ORDER — VENLAFAXINE HYDROCHLORIDE 37.5 MG/1
37.5 CAPSULE, EXTENDED RELEASE ORAL DAILY
Qty: 30 CAPSULE | Refills: 11 | Status: SHIPPED | OUTPATIENT
Start: 2018-03-23 | End: 2018-06-20

## 2018-03-23 RX ORDER — VENLAFAXINE HYDROCHLORIDE 37.5 MG/1
37.5 CAPSULE, EXTENDED RELEASE ORAL DAILY
Qty: 30 CAPSULE | Refills: 11 | Status: SHIPPED | OUTPATIENT
Start: 2018-03-23 | End: 2018-03-23 | Stop reason: SDUPTHER

## 2018-04-02 ENCOUNTER — CLINICAL SUPPORT (OUTPATIENT)
Dept: REHABILITATION | Facility: HOSPITAL | Age: 58
End: 2018-04-02
Attending: PSYCHIATRY & NEUROLOGY
Payer: COMMERCIAL

## 2018-04-02 DIAGNOSIS — G62.9 NEUROPATHY: Primary | ICD-10-CM

## 2018-04-02 PROCEDURE — 97161 PT EVAL LOW COMPLEX 20 MIN: CPT | Mod: PO

## 2018-04-02 PROCEDURE — 97110 THERAPEUTIC EXERCISES: CPT | Mod: PO

## 2018-04-02 NOTE — PLAN OF CARE
.  Physical Therapy Evaluation    Name: Fatemeh Rojas [place]  Clinic Number: [id number]    Diagnosis:   Encounter Diagnosis   Name Primary?    Neuropathy Yes     Physician: Tray Pate MD  Treatment Orders: PT Eval and Treat    Past Medical History:   Diagnosis Date    Arrhythmia     Atrial fibrillation     history    Bronchitis     Encounter for blood transfusion     High blood pressure     Ulcer      Current Outpatient Prescriptions   Medication Sig    acetaminophen (TYLENOL) 500 MG tablet Take 1,000 mg by mouth every 6 (six) hours as needed for Pain (pain).    apixaban 5 mg Tab Take 1 tablet (5 mg total) by mouth 2 (two) times daily.    docusate sodium (COLACE) 100 MG capsule Take 100 mg by mouth 2 (two) times daily.    DULoxetine (CYMBALTA) 60 MG capsule Take 1 capsule (60 mg total) by mouth once daily. (Patient taking differently: Take 60 mg by mouth every evening. )    flecainide (TAMBOCOR) 100 MG Tab Take 1 tablet (100 mg total) by mouth every 12 (twelve) hours.    gabapentin (NEURONTIN) 600 MG tablet Take 1 tablet (600 mg total) by mouth 3 (three) times daily. (Patient taking differently: Take 600 mg by mouth once daily. )    hydroCHLOROthiazide (HYDRODIURIL) 25 MG tablet Take 1 tablet (25 mg total) by mouth once daily.    lisinopril 10 MG tablet TAKE ONE TABLET BY MOUTH ONCE DAILY    metoprolol tartrate (LOPRESSOR) 25 MG tablet Take 1 tablet (25 mg total) by mouth 2 (two) times daily.    omeprazole (PRILOSEC) 20 MG capsule Take 1 capsule (20 mg total) by mouth once daily.    paroxetine (PAXIL) 10 MG tablet Take 1 tablet (10 mg total) by mouth once daily.    rosuvastatin (CRESTOR) 10 MG tablet Take 10 mg by mouth every evening.     venlafaxine (EFFEXOR XR) 37.5 MG 24 hr capsule Take 1 capsule (37.5 mg total) by mouth once daily.     No current facility-administered medications for this visit.      Review of patient's allergies indicates:   Allergen Reactions    Aspirin Other (See  "Comments)     "I don't take it because I've had ulcers"   ulcers    Meperidine Other (See Comments)     disoriented     Precautions: afib    Evaluation Date: 04/02/2018  Visit # authorized: 1/20  Authorization period: 12/31/2018  Plan of Care Expiration: 5/14/2018    Subjective     Onset/GABO: gradual    Primary concern/ Chief complaints:    Fatemeh is a 57 y.o. female with PMH of fibromyalgia,  R hip replacement (recalls being unable to tolerate bedsheets on her bare feet in 2008) that presents to [facility] secondary to bilateral fingertip numbness and tingling.  Injury/surgery occurred on a year and a half-two years ago. Reports that her pain initially felt more like arthritis. Pt reports that she is unable to use her lawnmower due to the vibrations in her hands and in general heavier work causes increased pain. Pt reports that she feels that she is progressively getting worse.     Pt also reports a lack of sensation in her feet and that she has to wear socks to keep her pain level low at night, for her hand tingling wears a carpal tunnel brace on her right wrist which helps but she is unsure why. X-ray and MRI was taken and revealed no significant abnormalities aside from reversal of cervical lordosis. Previous treatment included cymbalta, gabapentin (thinks it is helping, but not as much as it did in the beginning. Pt reports that sleeping with no socks on, not wearing her carpal tunnel brace, reading on her elio, writing at eye-level, working on across-stitch dwight increases hand pain and reports hand pain at worst is a 10 on the VAS. Pt has a decreased ability to perform ADLs such as reading, driving, participating in recreational activity. Pt reports constant numbness and tingling in both hands at the DIP of all fingers and both feet. No cultural, environmental, or spiritual barriers identified to treatment or learning.    Pain Scale: Fatemeh rates pain on a scale of 0-10 to be 10 at worst; 6 " currently; 2 at best .    Patient Goals: Pt would like to decrease pain and increase function so she can return to pain-free completion of all normal daily activities.      Objective     Observation: rounded shoulder posture    Cervical Range of Motion:    Degrees Pain   Flexion WNL -     Extension WNL -     Right Rotation 75% Stiffness pain     Left Rotation 100% -     Right Side Bending 75% Stiffness pain contralaterally   Left Side Bending 75% Stiffness pain contralaterally      Shoulder Range of Motion:   Shoulder Left Right   Flexion WNL WNL   Abduction WNL WNL   ER WNL WNL   IR WNL WNL     Strength:  Cervical MMT   Flexion 4/5   Extension 4/5   Right Side Bend 4/5   Left Side Bend 4/5     Upper Extremity Strength  (R) UE  (L) UE    Shoulder flexion: 4+/5 Shoulder flexion: 4+/5   Shoulder Abduction: 4+/5 Shoulder abduction: 4+/5   Shoulder ER 4+/5 Shoulder ER 4+/5   Shoulder IR 4+/5 Shoulder IR 4+/5   Elbow flexion: 4+/5 Elbow flexion: 4+/5   Elbow extension: 4+/5 Elbow extension: 4+/5   Wrist flexion: 4+/5 Wrist flexion: 4+/5   Wrist extension: 4+/5 Wrist extension: 4+/5    4+/5 : 4+/5   Lower Trap 4-/5 Lower Trap 4-/5   Middle Trap 4-/5 Middle Trap 4-/5   Rhomboids 4/5 Rhomboids 4/5     Joint Mobility: ttp but WNL to grade 1-2 PA's along cervical spine    Palpation: ttp to bilateral upper trapezius      Sensation: decreased to light touch bilateral DIP of fingers 2-5    Flexibility: decreased in bilateral pectorals, upper trapezius, levator scapulae    PT Evaluation Completed? Yes  Discussed Plan of Care with patient: Yes    TREATMENT:  Fatemeh received therapeutic exercises to develop strength and endurance, flexibility for 15 minutes including: Scapular rows with GTB, levator scapula stretch, upper trap stretch     Instructed pt. Regarding: Proper technique with all exercises. Pt demo good understanding of the education provided. Fatemeh demonstrated good return demonstration of  activities.      Assessment     This is a 57 y.o. female referred to outpatient physical therapy and presents with a medical diagnosis of cervicalgia and demonstrates limitations as described in the problem list. Pt will benefit from physical therapy services in order to maximize pain free functional independence. The following goals were discussed with the patient and patient is in agreement with them as to be addressed in the treatment plan. Pt was given a HEP consisting of scapular rows with GTB, levator scapula stretch, upper trap stretch. Pt verbally understood the instructions as they were given and demonstrated proper form and technique during therapy. Pt was advised to perform these exercises free of pain, and to stop performing them if pain occurs.     Pt presents with decreased range of motion into side bending and rotation, and would benefit from stretching of upper trapezius and pectorals in order to decrease muscle guarding and pain as well as to allow for increases of range of motion into rotation and side bending. To decrease stresses on the cervical spine with completion of ADLs, pt would benefit from strengthening of periscapular musculature and stretching of pectorals as tolerated for postural re-education.     Medical necessity is demonstrated by the following IMPAIRMENTS/PROBLEM LIST:   1)Increase in pain level limiting function   2)Decreased range of motion limiting function   3)Decreased strength limiting function   4)Impaired posture   5)Lack of HEP    GOALS: Short Term Goals: 3 weeks  1. Report decreased hand pain  <   / =  9  /10  to increase tolerance for ADLs  2. Increased AROM into side bending to WNL degrees to demonstrate improvements in mobility.  3. Increased MMT  for middle trapezius to 4/5 to increase tolerance for ADL and work activities.  4. Pt to report a decreased incidence of tingling when reading her elio to demonstrate improvements in ADL tolerance.  5. Pt to tolerate HEP  to improve ROM and independence with ADL's    Long Term Goals: 6 weeks  1. Report decreased hand pain  <   / =  7  /10  to increase tolerance for ADLs  2. Increased AROM into rotation to WNL degrees bilaterally to demonstrate improvements in mobility.  3. Increased MMT  for lower trapezius to 4/5  to increase tolerance for ADL and work activities.  4. Increased MMT  for middle trapezius to 4+/5 to increase tolerance for ADL and work activities.  5. Pt to be Independent with HEP to improve ROM and independence with ADL's      Plan     Pt will be treated by physical therapy 1-3 times a week for 6 weeks for Pt Education, HEP, therapeutic exercises, neuromuscular re-education, joint mobilizations, modalities prn to achieve established goals. Fatemeh may at times be seen by a PTA as part of the Rehab Team.     Cont PT for  6  weeks.     Maggie Mccall, DPT  04/02/2018    I certify the need for these services furnished under this plan of treatment and while under my care.    ______________________________ Physician/Referring Practitioner  Date of Signature

## 2018-04-12 ENCOUNTER — CLINICAL SUPPORT (OUTPATIENT)
Dept: REHABILITATION | Facility: HOSPITAL | Age: 58
End: 2018-04-12
Attending: PSYCHIATRY & NEUROLOGY
Payer: COMMERCIAL

## 2018-04-12 ENCOUNTER — PATIENT MESSAGE (OUTPATIENT)
Dept: NEUROLOGY | Facility: CLINIC | Age: 58
End: 2018-04-12

## 2018-04-12 DIAGNOSIS — G62.9 NEUROPATHY: Primary | ICD-10-CM

## 2018-04-12 PROCEDURE — 97110 THERAPEUTIC EXERCISES: CPT | Mod: PO

## 2018-04-12 NOTE — PROGRESS NOTES
Physical Therapy Daily Treatment Note   Name: Fatemeh Shoemaker  Clinic Number: [id number]    Visit Date: 4/12/2018  Time in: 3:00 PM CDT  Time out: 4:00 PM CDT  Visit number: 2/20    Diagnosis:   Encounter Diagnosis   Name Primary?    Neuropathy Yes     Physician: Tray Pate MD    Subjective   Pt reports: Getting a burning pain in her right index finger. No problems with her HEP. Has been having more pain down her legs lately, alternating legs depending on the day.    Pain Scale:  3/10      Objective     Therapeutic exercise x45 min (45 min 1-on-1 with PT)  UBE x6 min (3 min fwd, 3 min reverse)  Brugger's exercise 2x10 YTB  Sidelying HA 1x15 AROM, 1x10 1# BUE  Physioball HA 2x10  Physioball flexion with pec stretch 2x10  Supine pec stretch 3x30 sec  Serratus press-ups 2x20 1# dowel    Written Home Exercises Provided: Patient educated to continue with previously issued HEP. Includes scapular rows with GTB, levator scapula stretch, upper trap stretch.  Exercises were reviewed and Fatemeh was able to demonstrate them prior to the end of the session. Pt received a written copy of exercises to perform at home. Fatemeh demonstrated good  understanding of the education provided.     Assessment   Pt tolerated treatment well and was able to complete all exercises without increases in pain in her upper extremities. Demonstrated difficulty with standing therex due to reports of sciatica-type pain in her right leg with sit-stand transfers. Would benefit from progressions as tolerated in postural re-education to decrease tingling and numbness in both hands. Would benefit from a future lumbar screen and sciatica assessment. To continue with modifications as needed.    Fatemeh is progressing well towards her goals and no updates to goals at this time. Will benefit from con't skilled care.    Anticipated barriers to physical therapy: none  Pt's spiritual, cultural and  educational needs considered and pt agreeable to plan of care and goals.    GOALS   GOALS: Short Term Goals: 3 weeks  1. Report decreased hand pain  <   / =  9  /10  to increase tolerance for ADLs  2. Increased AROM into side bending to WNL degrees to demonstrate improvements in mobility.  3. Increased MMT  for middle trapezius to 4/5 to increase tolerance for ADL and work activities.  4. Pt to report a decreased incidence of tingling when reading her elio to demonstrate improvements in ADL tolerance.  5. Pt to tolerate HEP to improve ROM and independence with ADL's     Long Term Goals: 6 weeks  1. Report decreased hand pain  <   / =  7  /10  to increase tolerance for ADLs  2. Increased AROM into rotation to WNL degrees bilaterally to demonstrate improvements in mobility.  3. Increased MMT  for lower trapezius to 4/5  to increase tolerance for ADL and work activities.  4. Increased MMT  for middle trapezius to 4+/5 to increase tolerance for ADL and work activities.  5. Pt to be Independent with HEP to improve ROM and independence with ADL's    Plan   Continue with established Plan of Care towards Physical Therapy goals.       Maggie Mccall, PT

## 2018-04-16 ENCOUNTER — CLINICAL SUPPORT (OUTPATIENT)
Dept: REHABILITATION | Facility: HOSPITAL | Age: 58
End: 2018-04-16
Attending: PSYCHIATRY & NEUROLOGY
Payer: COMMERCIAL

## 2018-04-16 DIAGNOSIS — G62.9 NEUROPATHY: Primary | ICD-10-CM

## 2018-04-16 PROCEDURE — 97110 THERAPEUTIC EXERCISES: CPT | Mod: PO

## 2018-04-16 NOTE — PROGRESS NOTES
Physical Therapy Daily Treatment Note   Name: Fatemeh Shoemaker  Clinic Number: [id number]    Visit Date: 4/16/2018  Time in: 3:00 PM CDT  Time out: 4:00 PM CDT  Visit number: 2/20    Diagnosis:   Encounter Diagnosis   Name Primary?    Neuropathy Yes     Physician: Tray Pate MD    Subjective   Pt reports: Had an increase in bilateral arm pain since last visit, with no other change in activity.    Pain Scale:  3/10      Objective     Therapeutic exercise x35 min (35 min 1-on-1 with PT)  UBE x6 min (3 min fwd, 3 min reverse) (not performed)  Brugger's exercise 2x8 YTB  Sidelying HA 2x10 AROM  Physioball HA 2x10  Physioball flexion with pec stretch 2x10 (not performed)  Supine pec stretch 3x30 sec  Serratus press-ups 2x15 3# dowel  Dowel flexion 2x10 3# dowel with 3 sec holds    Written Home Exercises Provided: Patient educated to continue with previously issued HEP. Includes scapular rows with GTB, levator scapula stretch, upper trap stretch.  Exercises were reviewed and Fatemeh was able to demonstrate them prior to the end of the session. Pt received a written copy of exercises to perform at home. Fatemeh demonstrated good  understanding of the education provided.     Assessment   Pt tolerated treatment well and was able to complete all exercises without increases in pain in her upper extremities. Decreased intensity of exercises in order to decrease post-PT soreness. Decreased standing therex to decrease sciatica symptoms. Would benefit from progressions as tolerated in postural re-education to decrease tingling and numbness in both hands. To continue with modifications as needed.    Fatemeh is progressing well towards her goals and no updates to goals at this time. Will benefit from con't skilled care.    Anticipated barriers to physical therapy: none  Pt's spiritual, cultural and educational needs considered and pt agreeable to plan of care and goals.    GOALS    GOALS: Short Term Goals: 3 weeks  1. Report decreased hand pain  <   / =  9  /10  to increase tolerance for ADLs  2. Increased AROM into side bending to WNL degrees to demonstrate improvements in mobility.  3. Increased MMT  for middle trapezius to 4/5 to increase tolerance for ADL and work activities.  4. Pt to report a decreased incidence of tingling when reading her elio to demonstrate improvements in ADL tolerance.  5. Pt to tolerate HEP to improve ROM and independence with ADL's     Long Term Goals: 6 weeks  1. Report decreased hand pain  <   / =  7  /10  to increase tolerance for ADLs  2. Increased AROM into rotation to WNL degrees bilaterally to demonstrate improvements in mobility.  3. Increased MMT  for lower trapezius to 4/5  to increase tolerance for ADL and work activities.  4. Increased MMT  for middle trapezius to 4+/5 to increase tolerance for ADL and work activities.  5. Pt to be Independent with HEP to improve ROM and independence with ADL's    Plan   Continue with established Plan of Care towards Physical Therapy goals.       Maggie Mccall, PT

## 2018-04-18 DIAGNOSIS — M54.2 CERVICALGIA: ICD-10-CM

## 2018-04-18 DIAGNOSIS — M51.36 LUMBAR DEGENERATIVE DISC DISEASE: Primary | ICD-10-CM

## 2018-04-18 PROBLEM — M51.369 LUMBAR DEGENERATIVE DISC DISEASE: Status: ACTIVE | Noted: 2018-04-18

## 2018-04-19 ENCOUNTER — PATIENT MESSAGE (OUTPATIENT)
Dept: ELECTROPHYSIOLOGY | Facility: CLINIC | Age: 58
End: 2018-04-19

## 2018-04-23 ENCOUNTER — CLINICAL SUPPORT (OUTPATIENT)
Dept: REHABILITATION | Facility: HOSPITAL | Age: 58
End: 2018-04-23
Attending: PSYCHIATRY & NEUROLOGY
Payer: COMMERCIAL

## 2018-04-23 DIAGNOSIS — G62.9 NEUROPATHY: Primary | ICD-10-CM

## 2018-04-23 PROCEDURE — 97110 THERAPEUTIC EXERCISES: CPT | Mod: PO | Performed by: PHYSICAL THERAPIST

## 2018-04-23 NOTE — PROGRESS NOTES
"                                                    Physical Therapy Daily Treatment Note   Name: Fatemeh Shoemaker  Clinic Number: [id number]    Visit Date: 4/23/2018  Time in: 4:10 PM CDT  Time out: 5:00 PM CDT  Billable time: 50 min  Visit number: 4/20    Diagnosis:   Encounter Diagnosis   Name Primary?    Neuropathy Yes     Physician: Tray Pate MD    Subjective   Pt reports: Had no increase in pain after last visit. She doesn't notice any improvement either however. Finger tingling is more bothersome than shoulder pain at this moment. Pt asks if her referral for cervical/lumbar PT has been received.     Pain Scale:  3/10 bilat shoulders and fingers    Objective     Therapeutic exercise x40 min (40 min 1-on-1 with PT)  UBE x6 min (2 min fwd, 2 min reverse)  Brugger's exercise 2x8 YTB  Sidelying HA 2x10 AROM  Physioball HA + wrist ext 2x10  Supine pec stretch 3x30 sec  Serratus press-ups 2x15 3# dowel  Dowel flexion 2x10 3# dowel with 3 sec holds  Supine chin tuck 2x10x5 sec    Did not perform:  Physioball flexion with pec stretch 2x10 (not performed)      Written Home Exercises Provided: Patient educated to continue with previously issued HEP.   Exercises were reviewed and Fatemeh was able to demonstrate them prior to the end of the session. Pt received a written copy of exercises to perform at home. Fatemeh demonstrated good  understanding of the education provided.     Discussed PT referral for cervical/lumbar pain. It has been received but is "pending". Encouraged pt to contact [employer] to discuss insurance coverage. Pt to f/u with primary PT to discuss adding vs changing PT to address the deficits from the new referral.     Assessment   Pt tolerated treatment well and was able to complete all exercises without increases in pain in her upper extremities; she did have increased "sciatic pain" in bilat LEs per pt. Will assess pt's tolerance for UBE at next visit since she did not have an " exacerbation of symptoms while present today with this addition. Would benefit from progressions as tolerated in postural re-education to decrease tingling and numbness in both hands. To continue with modifications as needed, per primary PT.     Fatemeh is progressing well towards her goals and no updates to goals at this time. Will benefit from con't skilled care.    Anticipated barriers to physical therapy: none  Pt's spiritual, cultural and educational needs considered and pt agreeable to plan of care and goals.    GOALS     Short Term Goals: 3 weeks  1. Report decreased hand pain  <   / =  9  /10  to increase tolerance for ADLs  2. Increased AROM into side bending to WNL degrees to demonstrate improvements in mobility.  3. Increased MMT  for middle trapezius to 4/5 to increase tolerance for ADL and work activities.  4. Pt to report a decreased incidence of tingling when reading her elio to demonstrate improvements in ADL tolerance.  5. Pt to tolerate HEP to improve ROM and independence with ADL's     Long Term Goals: 6 weeks  1. Report decreased hand pain  <   / =  7  /10  to increase tolerance for ADLs  2. Increased AROM into rotation to WNL degrees bilaterally to demonstrate improvements in mobility.  3. Increased MMT  for lower trapezius to 4/5  to increase tolerance for ADL and work activities.  4. Increased MMT  for middle trapezius to 4+/5 to increase tolerance for ADL and work activities.  5. Pt to be Independent with HEP to improve ROM and independence with ADL's    Plan   Continue with established Plan of Care towards Physical Therapy goals, per primary PT.      Libby Law, PT

## 2018-04-24 ENCOUNTER — PATIENT MESSAGE (OUTPATIENT)
Dept: NEUROLOGY | Facility: CLINIC | Age: 58
End: 2018-04-24

## 2018-04-26 DIAGNOSIS — M54.2 CERVICALGIA: Primary | ICD-10-CM

## 2018-04-30 ENCOUNTER — CLINICAL SUPPORT (OUTPATIENT)
Dept: REHABILITATION | Facility: HOSPITAL | Age: 58
End: 2018-04-30
Attending: PSYCHIATRY & NEUROLOGY
Payer: COMMERCIAL

## 2018-04-30 DIAGNOSIS — G62.9 NEUROPATHY: Primary | ICD-10-CM

## 2018-04-30 PROCEDURE — 97110 THERAPEUTIC EXERCISES: CPT | Mod: PO | Performed by: PHYSICAL THERAPIST

## 2018-04-30 NOTE — PROGRESS NOTES
Physical Therapy Daily Treatment Note   Name: Fatemeh Shoemaker  Clinic Number: [id number]    Visit Date: 4/30/2018  Time in: 4:00 PM CDT  Time out: 4:40 PM CDT  Billable time: 40 min  Visit number: 5/20    Diagnosis:   Encounter Diagnosis   Name Primary?    Neuropathy Yes     Physician: Tray Pate MD    Subjective   Pt reports: Having less tingling in her fingers. I had a fall over the weekend and has severe left shoulder pain afterwards. It is better today.     Pain Scale:  3/10 bilat shoulders and fingers    Objective     Therapeutic exercise x30 min (30 min 1-on-1 with PT)  UBE x6 min (2 min fwd, 2 min reverse)  Brugger's exercise 2x8 YTB  Sidelying HA 2x10 AROM  Physioball HA + wrist ext 2x10  Supine pec stretch 3x30 sec  Serratus press-ups 2x15 3# dowel  Dowel flexion 2x10 3# dowel with 3 sec holds  Supine chin tuck 2x10x5 sec    Did not perform:  Physioball flexion with pec stretch 2x10 (not performed)      Written Home Exercises Provided: Patient educated to continue with previously issued HEP.   Exercises were reviewed and Fatemeh was able to demonstrate them prior to the end of the session. Pt received a written copy of exercises to perform at home. Fatemeh demonstrated good  understanding of the education provided.       Assessment   Pt with a mild reduction in intensity of UE parasthesias since onset of treatment. She will benefit from continuance and  Initiating treatment of her lumbar region at her reassessment     Fatemeh is progressing well towards her goals and no updates to goals at this time. Will benefit from con't skilled care.    Anticipated barriers to physical therapy: none  Pt's spiritual, cultural and educational needs considered and pt agreeable to plan of care and goals.    GOALS     Short Term Goals: 3 weeks  1. Report decreased hand pain  <   / =  9  /10  to increase tolerance for ADLs  2. Increased AROM into side bending to WNL  degrees to demonstrate improvements in mobility.  3. Increased MMT  for middle trapezius to 4/5 to increase tolerance for ADL and work activities.  4. Pt to report a decreased incidence of tingling when reading her elio to demonstrate improvements in ADL tolerance.  5. Pt to tolerate HEP to improve ROM and independence with ADL's     Long Term Goals: 6 weeks  1. Report decreased hand pain  <   / =  7  /10  to increase tolerance for ADLs  2. Increased AROM into rotation to WNL degrees bilaterally to demonstrate improvements in mobility.  3. Increased MMT  for lower trapezius to 4/5  to increase tolerance for ADL and work activities.  4. Increased MMT  for middle trapezius to 4+/5 to increase tolerance for ADL and work activities.  5. Pt to be Independent with HEP to improve ROM and independence with ADL's    Plan   RE assess next session with plan to include lumbar in POC with primary PT.      Cruz Aviles, PT

## 2018-05-07 ENCOUNTER — CLINICAL SUPPORT (OUTPATIENT)
Dept: REHABILITATION | Facility: HOSPITAL | Age: 58
End: 2018-05-07
Attending: PSYCHIATRY & NEUROLOGY
Payer: COMMERCIAL

## 2018-05-07 DIAGNOSIS — G62.9 NEUROPATHY: Primary | ICD-10-CM

## 2018-05-07 PROCEDURE — 97530 THERAPEUTIC ACTIVITIES: CPT | Mod: PO

## 2018-05-07 PROCEDURE — 97110 THERAPEUTIC EXERCISES: CPT | Mod: PO

## 2018-05-07 NOTE — PROGRESS NOTES
Physical Therapy Daily Treatment Note   Name: Fatemeh Shoemaker  Clinic Number: [id number]    Visit Date: 2018  Time in: 4:00 PM CDT  Time out: 5:00 PM CDT  Billable time: 60 min  Visit number:     Diagnosis:   Encounter Diagnosis   Name Primary?    Neuropathy Yes     Physician: Tray Pate MD    Subjective   Pt reports: Still having significant sensation of burning/scraping/falling asleep in both hands. Feels like her progress has been knocked back somewhat by that fall she had last week. Reports that a lot of her back problems is most likely a weight problem but was told she needs surgical intervention and she hasn't gotten to the headspace where she can be on board with that yet. Reports that lying on an incline, standing for a long time, doing household chores and walking long distances increases her pain, and lying supine decreases her pain.    Pain Scale:  3/10 bilat shoulders and fingers, 4/10 in the low back and legs    Objective     Lumbar AROM screen:  Flexion: 80  Extension: 20  Left side-bendin  Right side-bendin with stiffness  Left rotation: 80%  Right rotation: 80%    Lower extremity strength screen:  4+ overall bilaterally in hip and knee musculature with the exception of knee extension and hip extension which were both 4/5.    Cervical Range of Motion:     Degrees Pain   Flexion WNL -      Extension WNL -      Right Rotation 75% Stiffness pain      Left Rotation 100% -      Right Side Bending 75% Stiffness pain contralaterally   Left Side Bending 75% Stiffness pain contralaterally      Shoulder Range of Motion:   Shoulder Left Right   Flexion WNL WNL   Abduction WNL WNL   ER WNL WNL   IR WNL WNL      Strength:  Cervical MMT   Flexion 4/5   Extension 4/5   Right Side Bend 4/5   Left Side Bend 4/5      Upper Extremity Strength  (R) UE   (L) UE     Shoulder flexion: 4+/5 Shoulder flexion: 4+/5   Shoulder Abduction: 4+/5  Shoulder abduction: 4+/5   Shoulder ER 4+/5 Shoulder ER 4+/5   Shoulder IR 4+/5 Shoulder IR 4+/5   Elbow flexion: 4+/5 Elbow flexion: 4+/5   Elbow extension: 4+/5 Elbow extension: 4+/5   Wrist flexion: 4+/5 Wrist flexion: 4+/5   Wrist extension: 4+/5 Wrist extension: 4+/5    4+/5 : 4+/5   Lower Trap 4-/5 Lower Trap 4-/5   Middle Trap 4/5 Middle Trap 4/5   Rhomboids 4/5 Rhomboids 4/5      Joint Mobility: ttp but WNL to grade 1-2 PA's along cervical spine     Palpation: ttp to bilateral upper trapezius       Sensation: decreased to light touch bilateral DIP of fingers 2-5     Flexibility: decreased in bilateral pectorals, upper trapezius, levator scapulae    Therapeutic exercise x50 min (50 min 1-on-1 with PT)   DKTC with physioball 2x15  LTR with physioball 2x10  Sciatic nerve glides on physioball 2x10 BLE  Supine abdominal marches 2x10  Supine bridging 2x10  Sidelying HA 2x10 AROM  Supine pec stretch 3x30 sec  Serratus press-ups 2x15 3# dowel  Dowel flexion 2x10 3# dowel with 3 sec holds  Supine chin tuck 2x10x5 sec  Multifidus press-out 1x8 each way with OTB    Not performed due to time:  UBE x6 min (2 min fwd, 2 min reverse)   Brugger's exercise 2x8 YTB  Physioball HA + wrist ext 2x10    Written Home Exercises Provided: Patient educated to continue with previously issued HEP. Increased to include abdominal marches and bridging (HEP 2 go code = [id number])  Exercises were reviewed and Fatemeh was able to demonstrate them prior to the end of the session. Pt received a written copy of exercises to perform at home. Fatemeh demonstrated good  understanding of the education provided.       Assessment   Pt with a mild reduction in intensity of UE parasthesias since onset of treatment. Pt is making progress with neck pain and tingling in hands and fingers, but her progress is slowed at this time due to exacerbating events outside of clinic (2 falls from chairs). Pt presents with stiffness and pain in her low back and  mild sciatic nerve impingement on her left side. In order to address this, pt would benefit from initiation of stretching, lumbar AROM exercises and core strengthening to decrease stresses on the lumbar spine. Due to tingling and pain in the left sciatic nerve distribution, pt would also benefit from sciatic nerve mobility exercise to decrease sciatic nerve impingement. Pt would benefit from continued PT to address pain in both her low back and her neck.    Fatemeh is progressing fairly towards her goals at this time. Will benefit from con't skilled care.    Anticipated barriers to physical therapy: none  Pt's spiritual, cultural and educational needs considered and pt agreeable to plan of care and goals.    GOALS     Short Term Goals: 3 weeks  1. Report decreased hand pain  <   / =  9  /10  to increase tolerance for ADLs  MET: 5/7/2018  2. Increased AROM into side bending to WNL degrees to demonstrate improvements in mobility.  3. Increased MMT  for middle trapezius to 4/5 to increase tolerance for ADL and work activities. MET: 5/7/2018  4. Pt to report a decreased incidence of tingling when reading her elio to demonstrate improvements in ADL tolerance.  5. Pt to tolerate HEP to improve ROM and independence with ADL's  MET: 5/7/2018     Long Term Goals: 6 weeks  1. Report decreased hand pain  <   / =  7  /10  to increase tolerance for ADLs  2. Increased AROM into rotation to WNL degrees bilaterally to demonstrate improvements in mobility.  3. Increased MMT  for lower trapezius to 4/5  to increase tolerance for ADL and work activities.  4. Increased MMT  for middle trapezius to 4+/5 to increase tolerance for ADL and work activities.  5. Pt to tolerate 10 min of standing with decreased pain in order to demonstrate improved ADL tolerance.  6. Pt to be Independent with HEP to improve ROM and independence with ADL's    Plan   Pt to continue 2x/week for 4 weeks as tolerated to address treatment of neuropathy symptoms and  low back pain.       Maggie Mccall, PT

## 2018-05-08 NOTE — PLAN OF CARE
Physical Therapy Daily Treatment Note   Name: Fatemeh Shoemaker  Clinic Number: [id number]    Visit Date: 2018  Time in: 4:00 PM CDT  Time out: 5:00 PM CDT  Billable time: 60 min  Visit number:     Diagnosis:   Encounter Diagnosis   Name Primary?    Neuropathy Yes     Physician: Tray Pate MD    Subjective   Pt reports: Still having significant sensation of burning/scraping/falling asleep in both hands. Feels like her progress has been knocked back somewhat by that fall she had last week. Reports that a lot of her back problems is most likely a weight problem but was told she needs surgical intervention and she hasn't gotten to the headspace where she can be on board with that yet. Reports that lying on an incline, standing for a long time, doing household chores and walking long distances increases her pain, and lying supine decreases her pain.    Pain Scale:  3/10 bilat shoulders and fingers, 4/10 in the low back and legs    Objective     Lumbar AROM screen:  Flexion: 80  Extension: 20  Left side-bendin  Right side-bendin with stiffness  Left rotation: 80%  Right rotation: 80%    Lower extremity strength screen:  4+ overall bilaterally in hip and knee musculature with the exception of knee extension and hip extension which were both 4/5.    Cervical Range of Motion:     Degrees Pain   Flexion WNL -      Extension WNL -      Right Rotation 75% Stiffness pain      Left Rotation 100% -      Right Side Bending 75% Stiffness pain contralaterally   Left Side Bending 75% Stiffness pain contralaterally      Shoulder Range of Motion:   Shoulder Left Right   Flexion WNL WNL   Abduction WNL WNL   ER WNL WNL   IR WNL WNL      Strength:  Cervical MMT   Flexion 4/5   Extension 4/5   Right Side Bend 4/5   Left Side Bend 4/5      Upper Extremity Strength  (R) UE   (L) UE     Shoulder flexion: 4+/5 Shoulder flexion: 4+/5   Shoulder Abduction: 4+/5  Shoulder abduction: 4+/5   Shoulder ER 4+/5 Shoulder ER 4+/5   Shoulder IR 4+/5 Shoulder IR 4+/5   Elbow flexion: 4+/5 Elbow flexion: 4+/5   Elbow extension: 4+/5 Elbow extension: 4+/5   Wrist flexion: 4+/5 Wrist flexion: 4+/5   Wrist extension: 4+/5 Wrist extension: 4+/5    4+/5 : 4+/5   Lower Trap 4-/5 Lower Trap 4-/5   Middle Trap 4/5 Middle Trap 4/5   Rhomboids 4/5 Rhomboids 4/5      Joint Mobility: ttp but WNL to grade 1-2 PA's along cervical spine     Palpation: ttp to bilateral upper trapezius       Sensation: decreased to light touch bilateral DIP of fingers 2-5     Flexibility: decreased in bilateral pectorals, upper trapezius, levator scapulae    Therapeutic exercise x50 min (50 min 1-on-1 with PT)   DKTC with physioball 2x15  LTR with physioball 2x10  Sciatic nerve glides on physioball 2x10 BLE  Supine abdominal marches 2x10  Supine bridging 2x10  Sidelying HA 2x10 AROM  Supine pec stretch 3x30 sec  Serratus press-ups 2x15 3# dowel  Dowel flexion 2x10 3# dowel with 3 sec holds  Supine chin tuck 2x10x5 sec  Multifidus press-out 1x8 each way with OTB    Not performed due to time:  UBE x6 min (2 min fwd, 2 min reverse)   Brugger's exercise 2x8 YTB  Physioball HA + wrist ext 2x10    Written Home Exercises Provided: Patient educated to continue with previously issued HEP. Increased to include abdominal marches and bridging (HEP 2 go code = [id number])  Exercises were reviewed and Fatemeh was able to demonstrate them prior to the end of the session. Pt received a written copy of exercises to perform at home. Fatemeh demonstrated good  understanding of the education provided.       Assessment   Pt with a mild reduction in intensity of UE parasthesias since onset of treatment. Pt is making progress with neck pain and tingling in hands and fingers, but her progress is slowed at this time due to exacerbating events outside of clinic (2 falls from chairs). Pt presents with stiffness and pain in her low back and  mild sciatic nerve impingement on her left side. In order to address this, pt would benefit from initiation of stretching, lumbar AROM exercises and core strengthening to decrease stresses on the lumbar spine. Due to tingling and pain in the left sciatic nerve distribution, pt would also benefit from sciatic nerve mobility exercise to decrease sciatic nerve impingement. Pt would benefit from continued PT to address pain in both her low back and her neck.    Fatemeh is progressing fairly towards her goals at this time. Will benefit from con't skilled care.    Anticipated barriers to physical therapy: none  Pt's spiritual, cultural and educational needs considered and pt agreeable to plan of care and goals.    GOALS     Short Term Goals: 3 weeks  1. Report decreased hand pain  <   / =  9  /10  to increase tolerance for ADLs  MET: 5/7/2018  2. Increased AROM into side bending to WNL degrees to demonstrate improvements in mobility.  3. Increased MMT  for middle trapezius to 4/5 to increase tolerance for ADL and work activities. MET: 5/7/2018  4. Pt to report a decreased incidence of tingling when reading her elio to demonstrate improvements in ADL tolerance.  5. Pt to tolerate HEP to improve ROM and independence with ADL's  MET: 5/7/2018     Long Term Goals: 6 weeks  1. Report decreased hand pain  <   / =  7  /10  to increase tolerance for ADLs  2. Increased AROM into rotation to WNL degrees bilaterally to demonstrate improvements in mobility.  3. Increased MMT  for lower trapezius to 4/5  to increase tolerance for ADL and work activities.  4. Increased MMT  for middle trapezius to 4+/5 to increase tolerance for ADL and work activities.  5. Pt to tolerate 10 min of standing with decreased pain in order to demonstrate improved ADL tolerance.  6. Pt to be Independent with HEP to improve ROM and independence with ADL's    Plan   Pt to continue 2x/week for 4 weeks as tolerated to address treatment of neuropathy symptoms and  low back pain.       Maggie Mccall, PT

## 2018-05-14 ENCOUNTER — CLINICAL SUPPORT (OUTPATIENT)
Dept: REHABILITATION | Facility: HOSPITAL | Age: 58
End: 2018-05-14
Attending: PSYCHIATRY & NEUROLOGY
Payer: COMMERCIAL

## 2018-05-14 DIAGNOSIS — G62.9 NEUROPATHY: Primary | ICD-10-CM

## 2018-05-14 PROCEDURE — 97110 THERAPEUTIC EXERCISES: CPT | Mod: PO

## 2018-05-14 NOTE — PROGRESS NOTES
Physical Therapy Daily Treatment Note   Name: Fatemeh Shoemaker  Clinic Number: [id number]    Visit Date: 5/14/2018  Time in: 4:00 PM CDT  Time out: 5:00 PM CDT  Billable time: 60 min  Visit number: 6/20    Diagnosis:   Encounter Diagnosis   Name Primary?    Neuropathy Yes     Physician: Tray Pate MD    Subjective   Pt reports: Feeling about the same as last time, still with tingling in both hands with reading.    Pain Scale:  3/10 bilat shoulders and fingers, 4/10 in the low back and legs    Objective     Therapeutic exercise x50 min (50 min 1-on-1 with PT)   DKTC with physioball 2x15  LTR with physioball 2x10  Sciatic nerve glides on physioball 2x10 BLE  Supine abdominal marches 2x10  Supine bridging 2x10  Sidelying HA 2x10 AROM  Supine pec stretch 3x30 sec  Serratus press-ups 2x15 5# dowel  Dowel flexion 2x10 3# dowel with 3 sec holds  Supine chin tuck 2x10x5 sec  Multifidus press-out 1x8 each way with OTB (not performed today)  Brugger's exercise 2x8 YTB    Not performed due to time:  UBE x6 min (2 min fwd, 2 min reverse)   Physioball HA + wrist ext 2x10    Written Home Exercises Provided: Patient educated to continue with previously issued HEP. Increased to include abdominal marches and bridging (HEP 2 go code = [id number])  Exercises were reviewed and Fatemeh was able to demonstrate them prior to the end of the session. Pt received a written copy of exercises to perform at home. Fatemeh demonstrated good  understanding of the education provided.       Assessment   Pt with a mild reduction in intensity of UE parasthesias since onset of treatment. Pt is making progress with neck pain and tingling in hands and fingers, but her progress is slowed at this time due to exacerbating events outside of clinic (2 falls from chairs). Pt presents with stiffness and pain in her low back and mild sciatic nerve impingement on her left side. In order to address this, pt  would benefit from initiation of stretching, lumbar AROM exercises and core strengthening to decrease stresses on the lumbar spine. Due to tingling and pain in the left sciatic nerve distribution, pt would also benefit from sciatic nerve mobility exercise to decrease sciatic nerve impingement. Pt would benefit from continued PT to address pain in both her low back and her neck.    Fatemeh is progressing fairly towards her goals at this time. Will benefit from con't skilled care.    Anticipated barriers to physical therapy: none  Pt's spiritual, cultural and educational needs considered and pt agreeable to plan of care and goals.    GOALS     Short Term Goals: 3 weeks  1. Report decreased hand pain  <   / =  9  /10  to increase tolerance for ADLs  MET: 5/7/2018  2. Increased AROM into side bending to WNL degrees to demonstrate improvements in mobility.  3. Increased MMT  for middle trapezius to 4/5 to increase tolerance for ADL and work activities. MET: 5/7/2018  4. Pt to report a decreased incidence of tingling when reading her elio to demonstrate improvements in ADL tolerance.  5. Pt to tolerate HEP to improve ROM and independence with ADL's  MET: 5/7/2018     Long Term Goals: 6 weeks  1. Report decreased hand pain  <   / =  7  /10  to increase tolerance for ADLs  2. Increased AROM into rotation to WNL degrees bilaterally to demonstrate improvements in mobility.  3. Increased MMT  for lower trapezius to 4/5  to increase tolerance for ADL and work activities.  4. Increased MMT  for middle trapezius to 4+/5 to increase tolerance for ADL and work activities.  5. Pt to tolerate 10 min of standing with decreased pain in order to demonstrate improved ADL tolerance.  6. Pt to be Independent with HEP to improve ROM and independence with ADL's    Plan   Pt to continue 2x/week for 4 weeks as tolerated to address treatment of neuropathy symptoms and low back pain.       Maggie Mccall, PT

## 2018-05-16 ENCOUNTER — CLINICAL SUPPORT (OUTPATIENT)
Dept: REHABILITATION | Facility: HOSPITAL | Age: 58
End: 2018-05-16
Attending: PSYCHIATRY & NEUROLOGY
Payer: COMMERCIAL

## 2018-05-16 DIAGNOSIS — G62.9 NEUROPATHY: Primary | ICD-10-CM

## 2018-05-16 PROCEDURE — 97110 THERAPEUTIC EXERCISES: CPT | Mod: PO

## 2018-05-16 NOTE — PROGRESS NOTES
Physical Therapy Daily Treatment Note   Name: Fatemeh Shoemaker  Clinic Number: [id number]    Visit Date: 5/16/2018  Time in: 4:00 PM CDT  Time out: 5:00 PM CDT  Billable time: 60 min  Visit number: 6/20  Plan of care expiration: 6/11/2018    Diagnosis:   Encounter Diagnosis   Name Primary?    Neuropathy Yes     Physician: Tray Pate MD    Subjective   Pt reports: Feeling about the same as last time, still with tingling in both hands with reading.    Pain Scale:  3/10 bilat shoulders and fingers, 4/10 in the low back and legs    Objective     Therapeutic exercise x50 min (50 min 1-on-1 with PT)   UBE x6 min reverse  DKTC with physioball 2x15  LTR with physioball 2x10  Sciatic nerve glides on physioball 2x10 BLE  Supine abdominal marches 2x10  Supine bridging 2x10      Sidelying HA 2x10 AROM (add weight next time)  Supine pec stretch 3x30 sec  Serratus press-ups 2x15 5# dowel  Dowel flexion 2x10 5# dowel with 3 sec holds  Supine chin tuck 2x10x5 sec (3 sets next time)  Brugger's exercise 2x8 OTB  Physioball HA + wrist ext 2x10     Written Home Exercises Provided: Patient educated to continue with previously issued HEP. Increased to include abdominal marches and bridging (HEP 2 go code = [email or web address])  Exercises were reviewed and Fatemeh was able to demonstrate them prior to the end of the session. Pt received a written copy of exercises to perform at home. Fatemeh demonstrated good  understanding of the education provided.       Assessment   Pt with a mild reduction in intensity of UE parasthesias since onset of treatment. Pt presents with stiffness and pain in her low back and mild sciatic nerve impingement on her left side. In order to address this, pt would benefit from initiation of stretching, lumbar AROM exercises and core strengthening to decrease stresses on the lumbar spine. Pt would benefit from continued PT to address pain in both her low back and her  neck.    Fatemeh is progressing fairly towards her goals at this time. Will benefit from con't skilled care.    Anticipated barriers to physical therapy: none  Pt's spiritual, cultural and educational needs considered and pt agreeable to plan of care and goals.    GOALS     Short Term Goals: 3 weeks  1. Report decreased hand pain  <   / =  9  /10  to increase tolerance for ADLs  MET: 5/7/2018  2. Increased AROM into side bending to WNL degrees to demonstrate improvements in mobility.  3. Increased MMT  for middle trapezius to 4/5 to increase tolerance for ADL and work activities. MET: 5/7/2018  4. Pt to report a decreased incidence of tingling when reading her elio to demonstrate improvements in ADL tolerance.  5. Pt to tolerate HEP to improve ROM and independence with ADL's  MET: 5/7/2018     Long Term Goals: 6 weeks  1. Report decreased hand pain  <   / =  7  /10  to increase tolerance for ADLs   2. Increased AROM into rotation to WNL degrees bilaterally to demonstrate improvements in mobility.  3. Increased MMT  for lower trapezius to 4/5  to increase tolerance for ADL and work activities.  4. Increased MMT  for middle trapezius to 4+/5 to increase tolerance for ADL and work activities. MET:5/14/2018  5. Pt to tolerate 10 min of standing with decreased pain in order to demonstrate improved ADL tolerance. MET:5/14/2018  6. Pt to be Independent with HEP to improve ROM and independence with ADL's MET:5/14/2018    Plan   Pt to continue 2x/week for 4 weeks as tolerated to address treatment of neuropathy symptoms and low back pain.       Maggie Mccall, PT

## 2018-05-21 ENCOUNTER — CLINICAL SUPPORT (OUTPATIENT)
Dept: REHABILITATION | Facility: HOSPITAL | Age: 58
End: 2018-05-21
Payer: COMMERCIAL

## 2018-05-21 DIAGNOSIS — G62.9 NEUROPATHY: Primary | ICD-10-CM

## 2018-05-21 PROCEDURE — 97110 THERAPEUTIC EXERCISES: CPT | Mod: PO

## 2018-05-21 NOTE — PROGRESS NOTES
"                                                    Physical Therapy Daily Treatment Note   Name: Fatemeh Shoemaker  Clinic Number: [id number]    Visit Date: 5/21/2018  Time in: 5:00 PM CDT  Time out: 6:00 PM CDT  Billable time: 60 min  Visit number: 7/20  Plan of care expiration: 6/11/2018    Diagnosis:   Encounter Diagnosis   Name Primary?    Neuropathy Yes     Physician: Tray Pate MD    Subjective   Pt reports: Feeling about the same as last time, still with tingling in both hands with typing. Pt reports, "that thing we did last time with the ball on the wall really hurt my shld".    Pain Scale:  3/10 bilat shoulders and fingers, 4/10 in the low back and legs    Objective     Therapeutic exercise x50 min (50 min 1-on-1 with PT)   UBE x6 min reverse  DKTC with physioball 2x15  LTR with physioball 2x10  Sciatic nerve glides on physioball 2x10 BLE  Supine abdominal marches 2x10  Supine bridging 2x10      Sidelying HA 2x10 AROM 1#  Supine pec stretch 3x30 sec  Serratus press-ups 2x15 5# dowel  Dowel flexion 2x10 5# dowel with 3 sec holds  Supine chin tuck 3 x10x5 sec  Supine cervical retraction 2x10  Brugger's exercise 2x8 OTB  Physioball HA + wrist ext 2x10 NP    Written Home Exercises Provided: Patient educated to continue with previously issued HEP.  (HEP 2 go code = [email or web address])  Exercises were reviewed and Fatemeh was able to demonstrate them prior to the end of the session. Pt has received a written copy of exercises to perform at home. Fatemeh demonstrated good  understanding of the education provided.       Assessment   Pt with mild c/o shld pn with pec, relieved with adducting arms. Pt presents with stiffness and pain in her low back and mild sciatic nerve impingement on her left side. In order to address this, pt would benefit from initiation of stretching, lumbar AROM exercises and core strengthening to decrease stresses on the lumbar spine. Pt would benefit from continued PT to address pain in both her " low back and her neck.    Fatemeh is progressing fairly towards her goals at this time. Will benefit from con't skilled care.    Anticipated barriers to physical therapy: none  Pt's spiritual, cultural and educational needs considered and pt agreeable to plan of care and goals.    GOALS     Short Term Goals: 3 weeks  1. Report decreased hand pain  <   / =  9  /10  to increase tolerance for ADLs  MET: 5/7/2018  2. Increased AROM into side bending to WNL degrees to demonstrate improvements in mobility.  3. Increased MMT  for middle trapezius to 4/5 to increase tolerance for ADL and work activities. MET: 5/7/2018  4. Pt to report a decreased incidence of tingling when reading her elio to demonstrate improvements in ADL tolerance.  5. Pt to tolerate HEP to improve ROM and independence with ADL's  MET: 5/7/2018     Long Term Goals: 6 weeks  1. Report decreased hand pain  <   / =  7  /10  to increase tolerance for ADLs   2. Increased AROM into rotation to WNL degrees bilaterally to demonstrate improvements in mobility.  3. Increased MMT  for lower trapezius to 4/5  to increase tolerance for ADL and work activities.  4. Increased MMT  for middle trapezius to 4+/5 to increase tolerance for ADL and work activities. MET:5/14/2018  5. Pt to tolerate 10 min of standing with decreased pain in order to demonstrate improved ADL tolerance. MET:5/14/2018  6. Pt to be Independent with HEP to improve ROM and independence with ADL's MET:5/14/2018    Plan   Pt to continue 2x/week for 4 weeks as tolerated to address treatment of neuropathy symptoms and low back pain.       Eleuterio Wasserman, PTA

## 2018-05-23 ENCOUNTER — CLINICAL SUPPORT (OUTPATIENT)
Dept: REHABILITATION | Facility: HOSPITAL | Age: 58
End: 2018-05-23
Attending: PSYCHIATRY & NEUROLOGY
Payer: COMMERCIAL

## 2018-05-23 DIAGNOSIS — G62.9 NEUROPATHY: Primary | ICD-10-CM

## 2018-05-23 PROCEDURE — 97110 THERAPEUTIC EXERCISES: CPT | Mod: PO

## 2018-05-23 NOTE — PROGRESS NOTES
Physical Therapy Daily Treatment Note   Name: Fatemeh Shoemaker  Clinic Number: [id number]    Visit Date: 5/23/2018  Time in: 4:00 PM CDT  Time out: 5:00 PM CDT  Billable time: 60 min  Visit number: 8/20  Plan of care expiration: 6/11/2018    Diagnosis:   Encounter Diagnosis   Name Primary?    Neuropathy Yes     Physician: Tray Pate MD    Subjective   Pt reports: Feeling a little better still with tingling in both hands with typing.    Pain Scale:  0/10    Objective     Therapeutic exercise x50 min (50 min 1-on-1 with PT)   UBE x6 min reverse  DKTC with physioball 2x15  LTR with physioball 3x10  Sciatic nerve glides on physioball 3x10 BLE  Supine abdominal marches 3x10  Supine bridging 3x10      Sidelying HA 2x10 AROM 1#  Supine pec stretch 3x30 sec  Serratus press-ups 2x15 5# dowel  Dowel flexion 3x10 5# dowel with 3 sec holds  Supine chin tuck 3 x10x5 sec  Supine cervical retraction 3x10  Brugger's exercise 3x10 OTB  Physioball HA + wrist ext 2x10 NP    Written Home Exercises Provided: Patient educated to continue with previously issued HEP.  (HEP 2 go code = YDSREZ3)  Exercises were reviewed and Fatemeh was able to demonstrate them prior to the end of the session. Pt has received a written copy of exercises to perform at home. Fatemeh demonstrated good  understanding of the education provided.       Assessment   Pt belkis tx with progression of ther ex well, no provocation of sx. Pt presents with stiffness and pain in her low back and mild sciatic nerve impingement on her left side. In order to address this, pt would benefit from initiation of stretching, lumbar AROM exercises and core strengthening to decrease stresses on the lumbar spine. Pt would benefit from continued PT to address pain in both her low back and her neck.    Fatemeh is progressing fairly towards her goals at this time. Will benefit from con't skilled care.    Anticipated barriers to physical  therapy: none  Pt's spiritual, cultural and educational needs considered and pt agreeable to plan of care and goals.    GOALS     Short Term Goals: 3 weeks  1. Report decreased hand pain  <   / =  9  /10  to increase tolerance for ADLs  MET: 5/7/2018  2. Increased AROM into side bending to WNL degrees to demonstrate improvements in mobility.  3. Increased MMT  for middle trapezius to 4/5 to increase tolerance for ADL and work activities. MET: 5/7/2018  4. Pt to report a decreased incidence of tingling when reading her elio to demonstrate improvements in ADL tolerance.  5. Pt to tolerate HEP to improve ROM and independence with ADL's  MET: 5/7/2018     Long Term Goals: 6 weeks  1. Report decreased hand pain  <   / =  7  /10  to increase tolerance for ADLs   2. Increased AROM into rotation to WNL degrees bilaterally to demonstrate improvements in mobility.  3. Increased MMT  for lower trapezius to 4/5  to increase tolerance for ADL and work activities.  4. Increased MMT  for middle trapezius to 4+/5 to increase tolerance for ADL and work activities. MET:5/14/2018  5. Pt to tolerate 10 min of standing with decreased pain in order to demonstrate improved ADL tolerance. MET:5/14/2018  6. Pt to be Independent with HEP to improve ROM and independence with ADL's MET:5/14/2018    Plan   Pt to continue 2x/week for 4 weeks as tolerated to address treatment of neuropathy symptoms and low back pain.       Eleuterio Wasserman, PTA

## 2018-05-29 DIAGNOSIS — G62.9 NEUROPATHY: ICD-10-CM

## 2018-05-30 ENCOUNTER — CLINICAL SUPPORT (OUTPATIENT)
Dept: REHABILITATION | Facility: HOSPITAL | Age: 58
End: 2018-05-30
Attending: PSYCHIATRY & NEUROLOGY
Payer: COMMERCIAL

## 2018-05-30 DIAGNOSIS — G62.9 NEUROPATHY: Primary | ICD-10-CM

## 2018-05-30 PROCEDURE — 97110 THERAPEUTIC EXERCISES: CPT | Mod: PO

## 2018-05-30 RX ORDER — GABAPENTIN 600 MG/1
600 TABLET ORAL 3 TIMES DAILY
Qty: 90 TABLET | Refills: 11 | Status: SHIPPED | OUTPATIENT
Start: 2018-05-30 | End: 2019-06-10 | Stop reason: SDUPTHER

## 2018-05-30 NOTE — PROGRESS NOTES
Physical Therapy Daily Treatment Note   Name: Fatemeh Shoemaker  Clinic Number: [id number]    Visit Date: 5/30/2018  Time in: 4:00 PM CDT  Time out: 5:00 PM CDT  Billable time: 60 min  Visit number: 9/20  Plan of care expiration: 6/11/2018    Diagnosis:   Encounter Diagnosis   Name Primary?    Neuropathy Yes     Physician: Tray Pate MD    Subjective   Pt reports: Feeling a little better overall, as though she is 50% better, but is still getting frequent pain in both her hands and feet. Continues to get a sensation of swelling in both feet and is unsure of the source of this.    Pain Scale:  5/10    Objective     Therapeutic exercise x50 min (30 min 1-on-1 with PT)   UBE x6 min reverse  DKTC with physioball 2x15  LTR with physioball 3x10  Sciatic nerve glides on physioball 3x10 BLE  Supine abdominal marches 3x10  Supine bridging 3x10  Sidelying HA 2x10 AROM 2#  Supine pec stretch 3x30 sec  Serratus press-ups 2x15 2# weights BUE  Supine shoulder flexion 3x10 2# weights BUE  Supine chin tuck 3 x10x5 sec  Supine cervical retraction 3x10  Brugger's exercise 3x10 OTB    Written Home Exercises Provided: Patient educated to continue with previously issued HEP.  (HEP 2 go code = YDSREZ3)  Exercises were reviewed and Fatemeh was able to demonstrate them prior to the end of the session. Pt has received a written copy of exercises to perform at home. Fatemeh demonstrated good  understanding of the education provided.       Assessment   Pt belkis tx with progression of ther ex well, no provocation of sx. Exercises were modified to prepare for transition to HEP. Dowel exercises were modified to be completed with 2# free weights to make them easier at home. At this time, she has demonstrated improvements in pain, but continues to be limited in her functional mobility, especially with reading and typing. Pt may benefit from increased evaluation of her lumbar spine in order to  assess for possible neurological involvement. Due to pt's independence with all above exercises completed today, her HEP was expanded to include all exercises completed today and she is to complete these independently for 2 weeks. Pt is to contact MD regarding other possible interventions for reducing her pain and is to return to PT if deemed necessary by herself or her MD.     Fatemeh is progressing fairly towards her goals at this time. Will benefit from con't skilled care.    Anticipated barriers to physical therapy: none  Pt's spiritual, cultural and educational needs considered and pt agreeable to plan of care and goals.    GOALS     Short Term Goals: 3 weeks  1. Report decreased hand pain  <   / =  9  /10  to increase tolerance for ADLs  MET: 5/7/2018  2. Increased AROM into side bending to WNL degrees to demonstrate improvements in mobility.  3. Increased MMT  for middle trapezius to 4/5 to increase tolerance for ADL and work activities. MET: 5/7/2018  4. Pt to report a decreased incidence of tingling when reading her elio to demonstrate improvements in ADL tolerance.  5. Pt to tolerate HEP to improve ROM and independence with ADL's  MET: 5/7/2018     Long Term Goals: 6 weeks  1. Report decreased hand pain  <   / =  7  /10  to increase tolerance for ADLs MET:5/30/2018  2. Increased AROM into rotation to WNL degrees bilaterally to demonstrate improvements in mobility.  3. Increased MMT  for lower trapezius to 4/5  to increase tolerance for ADL and work activities.  4. Increased MMT  for middle trapezius to 4+/5 to increase tolerance for ADL and work activities. MET:5/14/2018  5. Pt to tolerate 10 min of standing with decreased pain in order to demonstrate improved ADL tolerance. MET:5/14/2018  6. Pt to be Independent with HEP to improve ROM and independence with ADL's MET:5/14/2018    Plan   Pt to continue independently with her HEP for 2 weeks before possible continuation of PT.      Maggie Mccall, PT

## 2018-06-01 ENCOUNTER — PATIENT MESSAGE (OUTPATIENT)
Dept: ELECTROPHYSIOLOGY | Facility: CLINIC | Age: 58
End: 2018-06-01

## 2018-06-01 ENCOUNTER — PATIENT MESSAGE (OUTPATIENT)
Dept: OBSTETRICS AND GYNECOLOGY | Facility: CLINIC | Age: 58
End: 2018-06-01

## 2018-06-01 ENCOUNTER — PATIENT MESSAGE (OUTPATIENT)
Dept: NEUROLOGY | Facility: CLINIC | Age: 58
End: 2018-06-01

## 2018-06-01 DIAGNOSIS — M54.41 CHRONIC BILATERAL LOW BACK PAIN WITH BILATERAL SCIATICA: Primary | ICD-10-CM

## 2018-06-01 DIAGNOSIS — M54.42 CHRONIC BILATERAL LOW BACK PAIN WITH BILATERAL SCIATICA: Primary | ICD-10-CM

## 2018-06-01 DIAGNOSIS — G89.29 CHRONIC BILATERAL LOW BACK PAIN WITH BILATERAL SCIATICA: Primary | ICD-10-CM

## 2018-06-01 RX ORDER — TIZANIDINE 4 MG/1
4 TABLET ORAL EVERY 6 HOURS PRN
Qty: 30 TABLET | Refills: 0 | Status: SHIPPED | OUTPATIENT
Start: 2018-06-01 | End: 2018-06-11

## 2018-06-11 ENCOUNTER — HOSPITAL ENCOUNTER (OUTPATIENT)
Dept: RADIOLOGY | Facility: HOSPITAL | Age: 58
Discharge: HOME | End: 2018-06-11
Attending: PSYCHIATRY & NEUROLOGY
Payer: COMMERCIAL

## 2018-06-11 DIAGNOSIS — G89.29 CHRONIC BILATERAL LOW BACK PAIN WITH BILATERAL SCIATICA: ICD-10-CM

## 2018-06-11 DIAGNOSIS — M54.42 CHRONIC BILATERAL LOW BACK PAIN WITH BILATERAL SCIATICA: ICD-10-CM

## 2018-06-11 DIAGNOSIS — M54.41 CHRONIC BILATERAL LOW BACK PAIN WITH BILATERAL SCIATICA: ICD-10-CM

## 2018-06-11 PROCEDURE — 72148 MRI LUMBAR SPINE W/O DYE: CPT | Mod: TC,PO

## 2018-06-11 PROCEDURE — 72148 MRI LUMBAR SPINE W/O DYE: CPT | Mod: 26,,, | Performed by: RADIOLOGY

## 2018-06-20 ENCOUNTER — OFFICE VISIT (OUTPATIENT)
Dept: CARDIOLOGY | Facility: CLINIC | Age: 58
End: 2018-06-20
Payer: COMMERCIAL

## 2018-06-20 VITALS
SYSTOLIC BLOOD PRESSURE: 134 MMHG | WEIGHT: 289.69 LBS | HEART RATE: 49 BPM | HEIGHT: 65 IN | BODY MASS INDEX: 48.26 KG/M2 | DIASTOLIC BLOOD PRESSURE: 78 MMHG

## 2018-06-20 DIAGNOSIS — I10 ESSENTIAL HYPERTENSION: Primary | ICD-10-CM

## 2018-06-20 DIAGNOSIS — E78.5 DYSLIPIDEMIA: ICD-10-CM

## 2018-06-20 DIAGNOSIS — I48.0 PAROXYSMAL ATRIAL FIBRILLATION: ICD-10-CM

## 2018-06-20 PROCEDURE — 3075F SYST BP GE 130 - 139MM HG: CPT | Mod: CPTII,S$GLB,, | Performed by: INTERNAL MEDICINE

## 2018-06-20 PROCEDURE — 3078F DIAST BP <80 MM HG: CPT | Mod: CPTII,S$GLB,, | Performed by: INTERNAL MEDICINE

## 2018-06-20 PROCEDURE — 99214 OFFICE O/P EST MOD 30 MIN: CPT | Mod: S$GLB,,, | Performed by: INTERNAL MEDICINE

## 2018-06-20 PROCEDURE — 3008F BODY MASS INDEX DOCD: CPT | Mod: CPTII,S$GLB,, | Performed by: INTERNAL MEDICINE

## 2018-06-20 PROCEDURE — 99999 PR PBB SHADOW E&M-EST. PATIENT-LVL III: CPT | Mod: PBBFAC,,, | Performed by: INTERNAL MEDICINE

## 2018-06-20 NOTE — PROGRESS NOTES
Subjective:    Patient ID:  Fatemeh Shoemaker is a 57 y.o. female who presents for follow-up of PAF    HPI  She comes with no complaints, no chest pain, no shortness of breath  Main issue is cholesterol, tryglycerides    Review of Systems   Constitution: Negative for decreased appetite, weakness, malaise/fatigue, weight gain and weight loss.   Cardiovascular: Negative for chest pain, dyspnea on exertion, leg swelling, palpitations and syncope.   Respiratory: Negative for cough and shortness of breath.    Gastrointestinal: Negative.    All other systems reviewed and are negative.       Objective:    Physical Exam   Constitutional: She is oriented to person, place, and time. She appears well-developed and well-nourished.   HENT:   Head: Normocephalic.   Eyes: Pupils are equal, round, and reactive to light.   Neck: Normal range of motion. Neck supple. No JVD present. Carotid bruit is not present. No thyromegaly present.   Cardiovascular: Normal rate, regular rhythm, normal heart sounds, intact distal pulses and normal pulses.  PMI is not displaced.  Exam reveals no gallop.    No murmur heard.  Pulmonary/Chest: Effort normal and breath sounds normal.   Abdominal: Soft. Normal appearance. She exhibits no mass. There is no hepatosplenomegaly. There is no tenderness.   Musculoskeletal: Normal range of motion. She exhibits no edema.   Neurological: She is alert and oriented to person, place, and time. She has normal strength and normal reflexes. No sensory deficit.   Skin: Skin is warm and intact.   Psychiatric: She has a normal mood and affect.   Nursing note and vitals reviewed.        Assessment:       1. Essential hypertension    2. Paroxysmal atrial fibrillation    3. Dyslipidemia         Plan:   Fasting lipids, labs; call with results  Continue all cardiac medications  Regular exercise program  Weight loss  Follow up with EP

## 2018-06-21 ENCOUNTER — PATIENT MESSAGE (OUTPATIENT)
Dept: CARDIOLOGY | Facility: CLINIC | Age: 58
End: 2018-06-21

## 2018-06-22 ENCOUNTER — LAB VISIT (OUTPATIENT)
Dept: LAB | Facility: HOSPITAL | Age: 58
End: 2018-06-22
Attending: INTERNAL MEDICINE
Payer: COMMERCIAL

## 2018-06-22 ENCOUNTER — TELEPHONE (OUTPATIENT)
Dept: SURGERY | Facility: CLINIC | Age: 58
End: 2018-06-22

## 2018-06-22 DIAGNOSIS — I10 ESSENTIAL HYPERTENSION: ICD-10-CM

## 2018-06-22 DIAGNOSIS — I48.0 PAROXYSMAL ATRIAL FIBRILLATION: ICD-10-CM

## 2018-06-22 DIAGNOSIS — E78.5 DYSLIPIDEMIA: ICD-10-CM

## 2018-06-22 LAB
ALBUMIN SERPL BCP-MCNC: 3.6 G/DL
ALP SERPL-CCNC: 46 U/L
ALT SERPL W/O P-5'-P-CCNC: 11 U/L
ANION GAP SERPL CALC-SCNC: 9 MMOL/L
AST SERPL-CCNC: 15 U/L
BILIRUB DIRECT SERPL-MCNC: 0.3 MG/DL
BILIRUB SERPL-MCNC: 0.9 MG/DL
BNP SERPL-MCNC: 143 PG/ML
BUN SERPL-MCNC: 12 MG/DL
CALCIUM SERPL-MCNC: 9.7 MG/DL
CHLORIDE SERPL-SCNC: 101 MMOL/L
CHOLEST SERPL-MCNC: 170 MG/DL
CHOLEST/HDLC SERPL: 4 {RATIO}
CO2 SERPL-SCNC: 28 MMOL/L
CREAT SERPL-MCNC: 0.7 MG/DL
EST. GFR  (AFRICAN AMERICAN): >60 ML/MIN/1.73 M^2
EST. GFR  (NON AFRICAN AMERICAN): >60 ML/MIN/1.73 M^2
GLUCOSE SERPL-MCNC: 95 MG/DL
HDLC SERPL-MCNC: 42 MG/DL
HDLC SERPL: 24.7 %
LDLC SERPL CALC-MCNC: 75.6 MG/DL
NONHDLC SERPL-MCNC: 128 MG/DL
POTASSIUM SERPL-SCNC: 4.1 MMOL/L
PROT SERPL-MCNC: 7.2 G/DL
SODIUM SERPL-SCNC: 138 MMOL/L
TRIGL SERPL-MCNC: 262 MG/DL
TSH SERPL DL<=0.005 MIU/L-ACNC: 1.2 UIU/ML

## 2018-06-22 PROCEDURE — 80076 HEPATIC FUNCTION PANEL: CPT

## 2018-06-22 PROCEDURE — 36415 COLL VENOUS BLD VENIPUNCTURE: CPT | Mod: PO

## 2018-06-22 PROCEDURE — 83880 ASSAY OF NATRIURETIC PEPTIDE: CPT

## 2018-06-22 PROCEDURE — 80048 BASIC METABOLIC PNL TOTAL CA: CPT

## 2018-06-22 PROCEDURE — 84443 ASSAY THYROID STIM HORMONE: CPT

## 2018-06-22 PROCEDURE — 80061 LIPID PANEL: CPT

## 2018-06-22 NOTE — TELEPHONE ENCOUNTER
----- Message from Kenneth Aguirre sent at 6/22/2018  1:52 PM CDT -----  Contact: Patient  Type: Needs Medical Advice    Who Called:  patient  Symptoms (please be specific):    How long has patient had these symptoms:    Pharmacy name and phone #:    Best Call Back Number: [phone]  Additional Information: requesting a call back concerning a visit for Monday 06/25/18

## 2018-06-22 NOTE — TELEPHONE ENCOUNTER
----- Message from Luis Alberto Driver sent at 6/22/2018  3:58 PM CDT -----  Contact: self   Placed call to pod, patient miss call from your office please call back at [phone] (home) [phone] (work)

## 2018-06-25 ENCOUNTER — OFFICE VISIT (OUTPATIENT)
Dept: SURGERY | Facility: CLINIC | Age: 58
End: 2018-06-25
Payer: COMMERCIAL

## 2018-06-25 VITALS — BODY MASS INDEX: 47.36 KG/M2 | WEIGHT: 284.63 LBS

## 2018-06-25 DIAGNOSIS — N61.0 MASTITIS: Primary | ICD-10-CM

## 2018-06-25 PROCEDURE — 3008F BODY MASS INDEX DOCD: CPT | Mod: CPTII,S$GLB,, | Performed by: SURGERY

## 2018-06-25 PROCEDURE — 99999 PR PBB SHADOW E&M-EST. PATIENT-LVL II: CPT | Mod: PBBFAC,,, | Performed by: SURGERY

## 2018-06-25 PROCEDURE — 99213 OFFICE O/P EST LOW 20 MIN: CPT | Mod: S$GLB,,, | Performed by: SURGERY

## 2018-06-25 RX ORDER — TIZANIDINE 4 MG/1
4 TABLET ORAL EVERY 6 HOURS PRN
COMMUNITY
End: 2018-08-01 | Stop reason: SDUPTHER

## 2018-06-25 RX ORDER — CEPHALEXIN 500 MG/1
CAPSULE ORAL
COMMUNITY
End: 2018-11-16

## 2018-06-25 NOTE — PROGRESS NOTES
Subjective:       Patient ID: Fatemeh Shoemaker is a 57 y.o. female.    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  That was in December of 2017.  She had an episode of erythema the breast consistent with mastitis for which she was treated with oral antibiotics by her primary care physician.  This appears to be resolving.  She has not had any nipple discharge.      Past Medical History:   Diagnosis Date    Arrhythmia     Atrial fibrillation     history    Bronchitis     Encounter for blood transfusion     High blood pressure     Ulcer      Past Surgical History:   Procedure Laterality Date    BREAST LUMPECTOMY Right 12/21/2017    CHOLECYSTECTOMY  12/28/2017    Dr. T[facility]U [facility]     csection      DILATION AND CURETTAGE OF UTERUS      HIP PINNING      JOINT REPLACEMENT      hip right    NASAL SEPTUM SURGERY      TOTAL HIP ARTHROPLASTY      TUBAL LIGATION  1997         Current Outpatient Prescriptions:     acetaminophen (TYLENOL) 500 MG tablet, Take 1,000 mg by mouth every 6 (six) hours as needed for Pain (pain)., Disp: , Rfl:     apixaban 5 mg Tab, Take 1 tablet (5 mg total) by mouth 2 (two) times daily., Disp: 60 tablet, Rfl: 11    cephALEXin (KEFLEX) 500 MG capsule, cephalexin 500 mg capsule  Take 1 capsule every 12 hours by oral route for 10 days., Disp: , Rfl:     docusate sodium (COLACE) 100 MG capsule, Take 100 mg by mouth 2 (two) times daily., Disp: , Rfl:     DULoxetine (CYMBALTA) 60 MG capsule, Take 1 capsule (60 mg total) by mouth once daily. (Patient taking differently: Take 60 mg by mouth every evening. ), Disp: 30 capsule, Rfl: 11    flecainide (TAMBOCOR) 100 MG Tab, Take 1 tablet (100 mg total) by mouth every 12 (twelve) hours., Disp: 60 tablet, Rfl: 11    gabapentin (NEURONTIN) 600 MG tablet, Take 1 tablet (600 mg total) by mouth 3 (three) times daily., Disp: 90 tablet,  "Rfl: 11    hydroCHLOROthiazide (HYDRODIURIL) 25 MG tablet, Take 1 tablet (25 mg total) by mouth once daily., Disp: 90 tablet, Rfl: 3    lisinopril 10 MG tablet, TAKE ONE TABLET BY MOUTH ONCE DAILY, Disp: 90 tablet, Rfl: 3    metoprolol tartrate (LOPRESSOR) 25 MG tablet, Take 1 tablet (25 mg total) by mouth 2 (two) times daily., Disp: 180 tablet, Rfl: 3    omeprazole (PRILOSEC) 20 MG capsule, Take 1 capsule (20 mg total) by mouth once daily., Disp: 30 capsule, Rfl: 11    rosuvastatin (CRESTOR) 10 MG tablet, Take 10 mg by mouth every evening. , Disp: , Rfl:     tiZANidine (ZANAFLEX) 4 MG tablet, Take 4 mg by mouth every 6 (six) hours as needed., Disp: , Rfl:     Review of patient's allergies indicates:   Allergen Reactions    Aspirin Other (See Comments)     "I don't take it because I've had ulcers"   ulcers    Meperidine Other (See Comments)     disoriented       Family History   Problem Relation Age of Onset    Colon cancer Maternal Grandmother 70    Cancer Maternal Grandmother     Ovarian cancer Maternal Grandmother     Eclampsia Paternal Grandmother     Stroke Father     Hypertension Mother     Stomach cancer Neg Hx     Esophageal cancer Neg Hx     Breast cancer Neg Hx     Miscarriages / Stillbirths Neg Hx      Social History     Social History    Marital status:      Spouse name: N/A    Number of children: N/A    Years of education: N/A     Occupational History    Not on file.     Social History Main Topics    Smoking status: Never Smoker    Smokeless tobacco: Never Used    Alcohol use No      Comment: never    Drug use: No    Sexual activity: Yes     Partners: Male     Other Topics Concern    Not on file     Social History Narrative    No narrative on file       Review of Systems   Constitutional: Negative for chills, fever and unexpected weight change.   HENT: Negative for congestion, hearing loss, mouth sores and sore throat.    Eyes: Negative for visual disturbance. "   Respiratory: Negative for cough, shortness of breath and wheezing.    Cardiovascular: Negative for chest pain and palpitations.   Gastrointestinal: Negative for abdominal distention, abdominal pain, blood in stool, diarrhea, nausea and vomiting.   Genitourinary: Negative for dysuria, frequency and hematuria.   Musculoskeletal: Negative for arthralgias, joint swelling and neck pain.   Skin: Negative for rash and wound.        See HPI   Neurological: Negative for dizziness, syncope, weakness and headaches.   Hematological: Does not bruise/bleed easily.   Psychiatric/Behavioral: Negative for agitation, behavioral problems and dysphoric mood.     Objective:     Physical Exam   Constitutional: She is oriented to person, place, and time. She appears well-developed and well-nourished. No distress.   HENT:   Head: Normocephalic and atraumatic.   Mouth/Throat: Oropharynx is clear and moist. No oropharyngeal exudate.   Eyes: Conjunctivae and EOM are normal. Pupils are equal, round, and reactive to light. No scleral icterus.   Neck: Normal range of motion. No thyromegaly present.   Cardiovascular: Normal rate and regular rhythm.    No murmur heard.  Pulmonary/Chest: Effort normal and breath sounds normal. She has no wheezes. She has no rales.   Breast exam:  There is some erythema of the right breast.  This appears to be resolving.  There is no nipple discharge.  There is no new palpable mass.  There is no axillary adenopathy.   Abdominal: Soft. Bowel sounds are normal. She exhibits no distension and no mass. There is no tenderness. No hernia.   Musculoskeletal: Normal range of motion. She exhibits no edema.   Lymphadenopathy:     She has no cervical adenopathy.   Neurological: She is alert and oriented to person, place, and time. No cranial nerve deficit.   Skin: Skin is warm and dry. No rash noted. No erythema.   Psychiatric: She has a normal mood and affect. Her behavior is normal.                Assessment:     Encounter  Diagnosis   Name Primary?    Mastitis Yes       Plan:      1.  Complete course of antibiotics.  2.  Follow-up with me if this does not resolve.  We will consider imaging at that time.

## 2018-07-24 ENCOUNTER — TELEPHONE (OUTPATIENT)
Dept: NEUROLOGY | Facility: CLINIC | Age: 58
End: 2018-07-24

## 2018-07-31 ENCOUNTER — PATIENT MESSAGE (OUTPATIENT)
Dept: NEUROLOGY | Facility: CLINIC | Age: 58
End: 2018-07-31

## 2018-08-01 DIAGNOSIS — M54.2 CERVICALGIA: Primary | ICD-10-CM

## 2018-08-01 RX ORDER — TIZANIDINE 4 MG/1
4 TABLET ORAL EVERY 6 HOURS PRN
Qty: 30 TABLET | Refills: 2 | Status: SHIPPED | OUTPATIENT
Start: 2018-08-01 | End: 2019-11-08 | Stop reason: SDUPTHER

## 2018-11-02 DIAGNOSIS — G62.9 NEUROPATHY: ICD-10-CM

## 2018-11-02 DIAGNOSIS — R20.2 PARESTHESIAS: ICD-10-CM

## 2018-11-02 RX ORDER — DULOXETIN HYDROCHLORIDE 60 MG/1
60 CAPSULE, DELAYED RELEASE ORAL DAILY
Qty: 90 CAPSULE | Refills: 1 | Status: SHIPPED | OUTPATIENT
Start: 2018-11-02 | End: 2018-12-06 | Stop reason: SDUPTHER

## 2018-11-15 NOTE — PROGRESS NOTES
"Ms. Shoemaker is a patient of Dr. Bojorquez and was last seen in clinic 3/13/2018.      Subjective:   Patient ID:  Fatemeh Shoemaker is a 57 y.o. female who presents for follow-up of Atrial Fibrillation  .     HPI:    Ms. Shoemaker is a 57 y.o. female with pAF, HTN, bleeding ulcer hx, and obesity here for follow up.    Background:    Ms. Shoemaker has a history of hypertension, morbid obesity and prior bleeding esophageal ulcer. She was in her usual state of health until this past year. She noted intermittent palpitations/fluttering in her chest. Sometimes accompanied by chest discomfort. She reported she was visiting her parent's [address] [id number] in [place], [place] and developed rapid palpitations and a nosebleed. She went to [facility]. She was noted to be in atrial fibrillation with rapid ventricular response (rate of 150bpm). She was observed and converted spontaneously to sinus rhythm. She was discharged with metoprolol increased metoprolol (100mg bid from 25 mg bid) and referred here for evaluation. She noted having these symptoms of palpitations 3-4 times a month. She noted sometimes she also can feel a "pounding" sensation in her neck. She has chronic shortness of breath with exertion that she attributes to being overweight. She has also noted recurrence of a center chest discomfort similar to her prior ulcer but denies any melena or bloody stools. An echocardiogram showed preserved LVEF and PET stress showed no ischemia.  She was started on flecainide.     Since starting flecainide she has had only rare, short episodes of palpitations. At her last clinic appt 3/2018 she was doing well with no palpitations.     Update (11/16/2018):    Today she says she is experiencing some episodes from time to time of "feeling different" that feels nothing like her previous AF episodes. She also has ASHBY, but this is chronic. She acknowledges that she is very nervous about exercising her heart " and has been reluctant to be active normally.  She had a negative PET stress 1/2017.  Ms. Shoemaker denies chest pain with exertion or at rest, palpitations, dizziness, or syncope. She is also moving from one house to another and gets very tired after a day of moving.     She is currently taking apixaban 5mg BID for stroke prophylaxis and denies significant bleeding episodes. She is currently being treated with flecainide 100mg BID for rhythm control and metoprolol tartrate 25mg BID for HR control.  Kidney function is stable, with a creatinine of 0.7 on 6/22/2017.    I have personally reviewed the patient's EKG today, which shows sinus rhythm . WV interval is 180. QTc is 430.    Recent Cardiac Tests:    ECHO 1/9/2017  CONCLUSIONS     1 - Normal left ventricular systolic function (EF 60-65%).     2 - Mild left atrial enlargement.     3 - Normal left ventricular diastolic function.     4 - Normal right ventricular systolic function .      PET stress 1/9/2017  CONCLUSIONS: NORMAL MYOCARDIAL PERFUSION PET STRESS TEST  1. The perfusion scan is free of evidence for myocardial ischemia or injury.   2. Resting wall motion is physiologic. Stress wall motion is physiologic.   3. Visually estimated LV function is normal at rest and normal at stress.   4. The ventricular volumes are normal at rest and stress.   5. The extracardiac distribution of radioactivity is normal.   6. There was no previous study available to compare.    Current Outpatient Medications   Medication Sig    acetaminophen (TYLENOL) 500 MG tablet Take 1,000 mg by mouth every 6 (six) hours as needed for Pain (pain).    apixaban 5 mg Tab Take 1 tablet (5 mg total) by mouth 2 (two) times daily.    DULoxetine (CYMBALTA) 60 MG capsule Take 1 capsule (60 mg total) by mouth once daily.    flecainide (TAMBOCOR) 100 MG Tab Take 1 tablet (100 mg total) by mouth every 12 (twelve) hours.    gabapentin (NEURONTIN) 600 MG tablet Take 1 tablet (600 mg total) by mouth 3  "(three) times daily.    hydroCHLOROthiazide (HYDRODIURIL) 25 MG tablet Take 1 tablet (25 mg total) by mouth once daily.    lisinopril 10 MG tablet TAKE ONE TABLET BY MOUTH ONCE DAILY    metoprolol tartrate (LOPRESSOR) 25 MG tablet Take 1 tablet (25 mg total) by mouth 2 (two) times daily.    omeprazole (PRILOSEC) 20 MG capsule Take 1 capsule (20 mg total) by mouth once daily.    rosuvastatin (CRESTOR) 10 MG tablet Take 10 mg by mouth every evening.     tiZANidine (ZANAFLEX) 4 MG tablet Take 1 tablet (4 mg total) by mouth every 6 (six) hours as needed.    docusate sodium (COLACE) 100 MG capsule Take 100 mg by mouth 2 (two) times daily.     No current facility-administered medications for this visit.      Review of Systems   Constitution: Negative for malaise/fatigue.   Cardiovascular: Positive for dyspnea on exertion. Negative for chest pain, irregular heartbeat, leg swelling and palpitations.   Respiratory: Negative for shortness of breath.    Hematologic/Lymphatic: Negative for bleeding problem.   Skin: Negative for rash.   Musculoskeletal: Negative for myalgias.   Gastrointestinal: Negative for hematemesis, hematochezia and nausea.   Genitourinary: Negative for hematuria.   Neurological: Negative for light-headedness.   Psychiatric/Behavioral: Negative for altered mental status.   Allergic/Immunologic: Negative for persistent infections.     Objective:        /80   Pulse 74   Ht 5' 4" (1.626 m)   Wt 130.1 kg (286 lb 13.1 oz)   LMP 09/08/2017   BMI 49.23 kg/m²     Physical Exam   Constitutional: She is oriented to person, place, and time. She appears well-developed and well-nourished.   HENT:   Head: Normocephalic.   Nose: Nose normal.   Eyes: Pupils are equal, round, and reactive to light.   Cardiovascular: Normal rate, regular rhythm, S1 normal and S2 normal.   No murmur heard.  Pulses:       Radial pulses are 2+ on the right side, and 2+ on the left side.   Pulmonary/Chest: Breath sounds normal. " No respiratory distress.   Abdominal: Normal appearance.   Musculoskeletal: Normal range of motion. She exhibits no edema.   Neurological: She is alert and oriented to person, place, and time.   Skin: Skin is warm and dry. No erythema.   Psychiatric: She has a normal mood and affect. Her speech is normal and behavior is normal.   Nursing note and vitals reviewed.    Lab Results   Component Value Date     06/22/2018    K 4.1 06/22/2018    BUN 12 06/22/2018    CREATININE 0.7 06/22/2018    ALT 11 06/22/2018    AST 15 06/22/2018    HGB 13.5 12/27/2017    HCT 40.5 12/27/2017    TSH 1.204 06/22/2018    LDLCALC 75.6 06/22/2018       Recent Labs   Lab 01/30/17  0816   INR 0.9       Assessment:     1. Paroxysmal atrial fibrillation    2. Essential hypertension    3. Morbid obesity with BMI of 45.0-49.9, adult      Plan:     In summary, Ms. Shoemaker is a 57 y.o. female with pAF, HTN, bleeding ulcer hx, and obesity here for follow up.  She is doing well from a rhythm perspective with no symptomatic AF. She is rhythm controlled on flecainide and rate controlled on metoprolol.   She remains on eliquis for CVA prophylaxis. She has ASHBY but has not been regularly active due to concerns about going back into AF. Negative PET 1/2017.  I reassured her that her heart will not be harmed from activity and that exercise is good for her.    Continue current medications.  Increase regular daily activity.  RTC in 6 months, sooner if needed.    *A copy of this note has been sent to Dr. Bojorquez*    Follow-up in about 6 months (around 5/16/2019).    ------------------------------------------------------------------    MELLISA Marroquin, NP-C  [facility]

## 2018-11-16 ENCOUNTER — HOSPITAL ENCOUNTER (OUTPATIENT)
Dept: CARDIOLOGY | Facility: CLINIC | Age: 58
Discharge: HOME | End: 2018-11-16
Payer: COMMERCIAL

## 2018-11-16 ENCOUNTER — OFFICE VISIT (OUTPATIENT)
Dept: ELECTROPHYSIOLOGY | Facility: CLINIC | Age: 58
End: 2018-11-16
Payer: COMMERCIAL

## 2018-11-16 VITALS
HEART RATE: 74 BPM | BODY MASS INDEX: 48.96 KG/M2 | HEIGHT: 64 IN | SYSTOLIC BLOOD PRESSURE: 110 MMHG | DIASTOLIC BLOOD PRESSURE: 80 MMHG | WEIGHT: 286.81 LBS

## 2018-11-16 DIAGNOSIS — I10 ESSENTIAL HYPERTENSION: ICD-10-CM

## 2018-11-16 DIAGNOSIS — I49.9 CARDIAC ARRHYTHMIA, UNSPECIFIED CARDIAC ARRHYTHMIA TYPE: ICD-10-CM

## 2018-11-16 DIAGNOSIS — I48.0 PAROXYSMAL ATRIAL FIBRILLATION: Primary | ICD-10-CM

## 2018-11-16 DIAGNOSIS — E66.01 MORBID OBESITY WITH BMI OF 45.0-49.9, ADULT: ICD-10-CM

## 2018-11-16 PROCEDURE — 93000 ELECTROCARDIOGRAM COMPLETE: CPT | Mod: S$GLB,,, | Performed by: INTERNAL MEDICINE

## 2018-11-16 PROCEDURE — 3008F BODY MASS INDEX DOCD: CPT | Mod: CPTII,S$GLB,, | Performed by: NURSE PRACTITIONER

## 2018-11-16 PROCEDURE — 99999 PR PBB SHADOW E&M-EST. PATIENT-LVL III: CPT | Mod: PBBFAC,,, | Performed by: NURSE PRACTITIONER

## 2018-11-16 PROCEDURE — 3079F DIAST BP 80-89 MM HG: CPT | Mod: CPTII,S$GLB,, | Performed by: NURSE PRACTITIONER

## 2018-11-16 PROCEDURE — 99214 OFFICE O/P EST MOD 30 MIN: CPT | Mod: S$GLB,,, | Performed by: NURSE PRACTITIONER

## 2018-11-16 PROCEDURE — 3074F SYST BP LT 130 MM HG: CPT | Mod: CPTII,S$GLB,, | Performed by: NURSE PRACTITIONER

## 2018-11-16 RX ORDER — FLECAINIDE ACETATE 100 MG/1
100 TABLET ORAL EVERY 12 HOURS
Qty: 180 TABLET | Refills: 3 | Status: SHIPPED | OUTPATIENT
Start: 2018-11-16 | End: 2019-08-20 | Stop reason: SDUPTHER

## 2018-11-21 DIAGNOSIS — I49.9 CARDIAC ARRHYTHMIA, UNSPECIFIED CARDIAC ARRHYTHMIA TYPE: Primary | ICD-10-CM

## 2018-12-05 ENCOUNTER — PATIENT MESSAGE (OUTPATIENT)
Dept: NEUROLOGY | Facility: CLINIC | Age: 58
End: 2018-12-05

## 2018-12-06 ENCOUNTER — OFFICE VISIT (OUTPATIENT)
Dept: NEUROLOGY | Facility: CLINIC | Age: 58
End: 2018-12-06
Payer: COMMERCIAL

## 2018-12-06 VITALS
BODY MASS INDEX: 49.19 KG/M2 | HEIGHT: 64 IN | HEART RATE: 44 BPM | SYSTOLIC BLOOD PRESSURE: 99 MMHG | WEIGHT: 288.13 LBS | DIASTOLIC BLOOD PRESSURE: 55 MMHG

## 2018-12-06 DIAGNOSIS — G62.9 NEUROPATHY: ICD-10-CM

## 2018-12-06 DIAGNOSIS — R20.2 PARESTHESIAS: ICD-10-CM

## 2018-12-06 DIAGNOSIS — G56.01 CARPAL TUNNEL SYNDROME OF RIGHT WRIST: Primary | ICD-10-CM

## 2018-12-06 PROCEDURE — 3078F DIAST BP <80 MM HG: CPT | Mod: CPTII,S$GLB,, | Performed by: PSYCHIATRY & NEUROLOGY

## 2018-12-06 PROCEDURE — 99999 PR PBB SHADOW E&M-EST. PATIENT-LVL IV: CPT | Mod: PBBFAC,,, | Performed by: PSYCHIATRY & NEUROLOGY

## 2018-12-06 PROCEDURE — 3008F BODY MASS INDEX DOCD: CPT | Mod: CPTII,S$GLB,, | Performed by: PSYCHIATRY & NEUROLOGY

## 2018-12-06 PROCEDURE — 99213 OFFICE O/P EST LOW 20 MIN: CPT | Mod: S$GLB,,, | Performed by: PSYCHIATRY & NEUROLOGY

## 2018-12-06 PROCEDURE — 3074F SYST BP LT 130 MM HG: CPT | Mod: CPTII,S$GLB,, | Performed by: PSYCHIATRY & NEUROLOGY

## 2018-12-06 RX ORDER — DULOXETIN HYDROCHLORIDE 60 MG/1
60 CAPSULE, DELAYED RELEASE ORAL DAILY
Qty: 90 CAPSULE | Refills: 1 | Status: SHIPPED | OUTPATIENT
Start: 2018-12-06 | End: 2018-12-06 | Stop reason: SDUPTHER

## 2018-12-06 RX ORDER — DULOXETIN HYDROCHLORIDE 60 MG/1
60 CAPSULE, DELAYED RELEASE ORAL DAILY
Qty: 180 CAPSULE | Refills: 1 | Status: SHIPPED | OUTPATIENT
Start: 2018-12-06 | End: 2019-01-26 | Stop reason: SDUPTHER

## 2018-12-06 RX ORDER — DULOXETIN HYDROCHLORIDE 60 MG/1
60 CAPSULE, DELAYED RELEASE ORAL DAILY
Qty: 180 CAPSULE | Refills: 1 | Status: SHIPPED | OUTPATIENT
Start: 2018-12-06 | End: 2018-12-06 | Stop reason: SDUPTHER

## 2018-12-06 NOTE — PROGRESS NOTES
[facility] NEUROLOGY  [facility] [place]    Date: December 6, 2018   Patient Name: Fatemeh Shoemaker   MRN: [id number]   PCP: Liseth Owen  Referring Provider: No ref. provider found    Assessment:      This is Fatemeh Shoemaker, 58 y.o. female with AF on elequis and flecainide, HLD, HTN, morbid obesity presents for distal symmetric painful sensory neuropathathy with no history of diabetes.  MRI brain and C-spine without significant pathology (images reviewed) and EMG BUE does show mild right CTS although this would only partially explain her symptoms.  Neuropathy can occur with flecainide but she reports symptoms preceded initiation of this medication.  Most likely explanation at this time is metabolic syndrome, we discussed possible bariatric surgery.      Plan:       Increase Cymbalta 60->120mg qd, continue /1200  Doxepin cream prn  Referral to hand surgery    Follow up 6-12 months       I discussed side effects of the medications. I asked the patient to stop the medication if she notices serious adverse effects as we discussed and to seek immediate medical attention at an ER.     Tray Pate MD  [facility]   Department of Neurology    Subjective:   Worsening pain in fingers>feet, balance difficulty with recent fall, notes effect of cymbalta on anxiety as well as pain, presents with daughter who corroborates history    3/2018  Symptoms manageable on cymbalta 60mg/d and /1200, intermittent swelling in feet but no rash or joint swelling  11/2017  No improvement on GBP 600mg tid, symptoms interfere with sleep    HPI 5/2017:   Ms. Fatemeh Shoemaker is a 58 y.o. female with AF on elequis, HLD, HTN, morbid obesity presents for neuropathy  She has had burning pain in her feet for at least several years and began to have burning pain in fingers of both hands starting early 2017.  Does not have any associated weakness and does not notice any  exacerbating/alleviating factors.  Takes GBP for foot pain with some relief.    PAST MEDICAL HISTORY:  Past Medical History:   Diagnosis Date    Arrhythmia     Atrial fibrillation     history    Bronchitis     Encounter for blood transfusion     High blood pressure     Ulcer        PAST SURGICAL HISTORY:  Past Surgical History:   Procedure Laterality Date    BREAST LUMPECTOMY Right 12/21/2017    CHOLECYSTECTOMY  12/28/2017    Dr. TOLU Bowers, [facility]     CHOLECYSTECTOMY-LAPAROSCOPIC W/ CHOLANGIOGRAM N/A 12/28/2017    [facility]formed by [facility] at [facility] OR    csection      DILATION AND CURETTAGE OF UTERUS      ESOPHAGOGASTRODUODENOSCOPY (EGD) N/A 1/13/2017    [facility] ENDO (4TH FLR)    HIP PINNING      [id number] N/A 9/28/2017    [facility] OR    JOINT REPLACEMENT      hip right    LOCALIZATION-NEEDLE Right 12/21/2017    [facility]    LUMPECTOMY-BREAST Right 12/21/2017    [facility] at [facility] OR    NASAL SEPTUM SURGERY      [id number] Right 1/29/2018    [facility]    TOTAL HIP ARTHROPLASTY      TUBAL LIGATION  1997       CURRENT MEDS:  Current Outpatient Medications   Medication Sig Dispense Refill    acetaminophen (TYLENOL) 500 MG tablet Take 1,000 mg by mouth every 6 (six) hours as needed for Pain (pain).      apixaban 5 mg Tab Take 1 tablet (5 mg total) by mouth 2 (two) times daily. 60 tablet 11    docusate sodium (COLACE) 100 MG capsule Take 100 mg by mouth 2 (two) times daily.      DULoxetine (CYMBALTA) 60 MG capsule Take 1 capsule (60 mg total) by mouth once daily. 180 capsule 1    flecainide (TAMBOCOR) 100 MG Tab Take 1 tablet (100 mg total) by mouth every 12 (twelve) hours. 180 tablet 3    gabapentin (NEURONTIN) 600 MG tablet Take 1 tablet (600 mg total) by mouth 3 (three) times daily. 90 tablet 11  "   hydroCHLOROthiazide (HYDRODIURIL) 25 MG tablet Take 1 tablet (25 mg total) by mouth once daily. 90 tablet 3    lisinopril 10 MG tablet TAKE ONE TABLET BY MOUTH ONCE DAILY 90 tablet 3    metoprolol tartrate (LOPRESSOR) 25 MG tablet Take 1 tablet (25 mg total) by mouth 2 (two) times daily. 180 tablet 3    omeprazole (PRILOSEC) 20 MG capsule Take 1 capsule (20 mg total) by mouth once daily. 30 capsule 11    rosuvastatin (CRESTOR) 10 MG tablet Take 10 mg by mouth every evening.       tiZANidine (ZANAFLEX) 4 MG tablet Take 1 tablet (4 mg total) by mouth every 6 (six) hours as needed. 30 tablet 2     No current facility-administered medications for this visit.        ALLERGIES:  Review of patient's allergies indicates:   Allergen Reactions    Aspirin Other (See Comments)     "I don't take it because I've had ulcers"   ulcers    Meperidine        FAMILY HISTORY:  Family History   Problem Relation Age of Onset    Colon cancer Maternal Grandmother 70    Cancer Maternal Grandmother     Ovarian cancer Maternal Grandmother     Eclampsia Paternal Grandmother     Stroke Father     Hypertension Mother     Stomach cancer Neg Hx     Esophageal cancer Neg Hx     Breast cancer Neg Hx     Miscarriages / Stillbirths Neg Hx        SOCIAL HISTORY:  Social History     Tobacco Use    Smoking status: Never Smoker    Smokeless tobacco: Never Used   Substance Use Topics    Alcohol use: No     Comment: never    Drug use: No       Review of Systems:  12 review of systems is negative except for the symptoms mentioned in HPI.        Objective:     Vitals:    12/06/18 0837   BP: (!) 99/55   Pulse: (!) 44   Weight: 130.7 kg (288 lb 2.3 oz)   Height: 5' 4" (1.626 m)       General: NAD, well nourished   Eyes: no tearing, discharge, no erythema   ENT: moist mucous membranes of the oral cavity, nares patent    Neck: Supple, full range of motion  Cardiovascular: Warm and well perfused, pulses equal and symmetrical  Lungs: Normal " work of breathing, normal chest wall excursions  Skin: No rash, lesions, or breakdown on exposed skin  Psychiatry: Mood and affect are appropriate   Abdomen: soft, non tender, non distended  Extremeties: No cyanosis, clubbing or edema.    Neurological   MENTAL STATUS: Alert and oriented to person, place, and time. Attention and concentration within normal limits. Speech without dysarthria, able to name and repeat without difficulty. Recent and remote memory within normal limits   CRANIAL NERVES: Visual fields intact. PERRL. EOMI. Facial sensation intact. Face symmetrical. Hearing grossly intact. Full shoulder shrug bilaterally. Tongue protrudes midline   SENSORY: Sensation is decreased to light touch and vibration past the ankles.  + Romberg.  + tinel R>L  MOTOR: Normal bulk and tone. No pronator drift.  5/5 deltoid, biceps, triceps, interosseous, hand  bilaterally. 5/5 iliopsoas, knee extension/flexion, foot dorsi/plantarflexion bilaterally.    CEREBELLAR/COORDINATION/GAIT: Gait steady with normal arm swing and stride length.  Heel to shin intact. Finger to nose intact. Normal rapid alternating movements.

## 2018-12-06 NOTE — PATIENT INSTRUCTIONS
INCREASE CYMBALTA TO 120MG DAILY    YOU WILL BE CONTACTED BY [facility] ABOUT DOXEPIN CREAM TO USE AS NEEDED    CALL IF SYMPTOMS DO NOT IMPROVE AFTER 1 MONTH TO DISCUSS OTHER OPTIONS    CONTINUE TO DISCUSS OPTIONS FOR BARIATRIC SURGERY WITH YOUR DOCTORS

## 2018-12-17 ENCOUNTER — PATIENT MESSAGE (OUTPATIENT)
Dept: SURGERY | Facility: CLINIC | Age: 58
End: 2018-12-17

## 2018-12-17 ENCOUNTER — PATIENT MESSAGE (OUTPATIENT)
Dept: OBSTETRICS AND GYNECOLOGY | Facility: CLINIC | Age: 58
End: 2018-12-17

## 2018-12-17 DIAGNOSIS — Z12.39 SCREENING BREAST EXAMINATION: Primary | ICD-10-CM

## 2018-12-20 ENCOUNTER — OFFICE VISIT (OUTPATIENT)
Dept: OBSTETRICS AND GYNECOLOGY | Facility: CLINIC | Age: 58
End: 2018-12-20
Payer: COMMERCIAL

## 2018-12-20 VITALS — BODY MASS INDEX: 48.83 KG/M2 | HEIGHT: 64 IN | WEIGHT: 286 LBS

## 2018-12-20 DIAGNOSIS — Z01.419 WELL WOMAN EXAM WITH ROUTINE GYNECOLOGICAL EXAM: Primary | ICD-10-CM

## 2018-12-20 DIAGNOSIS — R23.2 VASOMOTOR FLUSHING: ICD-10-CM

## 2018-12-20 DIAGNOSIS — Z00.00 ANNUAL PHYSICAL EXAM: ICD-10-CM

## 2018-12-20 PROCEDURE — 99396 PREV VISIT EST AGE 40-64: CPT | Mod: S$GLB,,, | Performed by: OBSTETRICS & GYNECOLOGY

## 2018-12-20 PROCEDURE — 99999 PR PBB SHADOW E&M-EST. PATIENT-LVL III: CPT | Mod: PBBFAC,,, | Performed by: OBSTETRICS & GYNECOLOGY

## 2018-12-20 PROCEDURE — 87660 TRICHOMONAS VAGIN DIR PROBE: CPT

## 2018-12-20 NOTE — PROGRESS NOTES
Subjective:       Patient ID: Fatemeh Shoemaker is a 58 y.o. female.    Chief Complaint:  Well Woman      History of Present Illness:  HPI  SUBJECTIVE:   58 y.o. female  here for annual. Reports a lump in left breast under her arm, tender to palpation. She is taking black cohosh and estroven over the counter for hot flushes. This was helping but she recently ran out and symptoms worsened. She cannot take paxil due to a fib and effexor made her have more sweating.    Denies vaginal bleeding.  denies vaginal itching or irritation.  denies vaginal discharge.   Reports vaginal irritation after changing soaps a few days ago. Reports this is improving.    She is sexually active. Denies vaginal dryness.     History of abnormal pap: No  Last Pap: 2017 - NILM/HPV neg  Last MM2017  Last Colonoscopy: To be scheduled    FH: Denies family history of breast, GYN cancer. MGM colon cancer. Father colon cancer.    GYN & OB History  Patient's last menstrual period was 2017.   Date of Last Pap: 2017    OB History    Para Term  AB Living   4 2 2     2   SAB TAB Ectopic Multiple Live Births                  # Outcome Date GA Lbr Giles/2nd Weight Sex Delivery Anes PTL Lv   4 Term            3 Term            2      F CS-Unspec      1      F CS-Unspec             Review of Systems  Review of Systems   Constitutional: Negative for chills, diaphoresis, fatigue and fever.   Respiratory: Negative for cough and shortness of breath.    Cardiovascular: Negative for chest pain and palpitations.   Gastrointestinal: Negative for abdominal pain, constipation, diarrhea, nausea and vomiting.   Endocrine: Positive for hot flashes. Negative for hyperthyroidism and hypothyroidism.   Genitourinary: Positive for vaginal pain. Negative for decreased libido, dyspareunia, menstrual problem, pelvic pain, vaginal bleeding, vaginal discharge and urinary incontinence.   Musculoskeletal: Negative for back  pain and myalgias.   Neurological: Negative for headaches.   Psychiatric/Behavioral: Negative for depression. The patient is not nervous/anxious.    Breast: Negative for mass, mastodynia, nipple discharge and skin changes          Objective:    Physical Exam:   Constitutional: She is oriented to person, place, and time. She appears well-developed and well-nourished. No distress.    HENT:   Head: Normocephalic and atraumatic.    Eyes: EOM are normal.    Neck: Normal range of motion.     Pulmonary/Chest: Effort normal. She exhibits no mass and no tenderness. Right breast exhibits no inverted nipple, no mass, no nipple discharge, no skin change, no tenderness and no swelling. Left breast exhibits no inverted nipple, no mass, no nipple discharge, no skin change, no tenderness and no swelling. Breasts are symmetrical.        Abdominal: Soft. She exhibits no distension. There is no tenderness.     Genitourinary:   Genitourinary Comments: Normal external female genitalia; vagina rugated, normal; cervix normal, no masses; uterus small mobile nontender; no adnexal masses palpated.           Musculoskeletal: Normal range of motion and moves all extremeties.       Neurological: She is alert and oriented to person, place, and time.    Skin: Skin is warm.    Psychiatric: She has a normal mood and affect. Her behavior is normal. Judgment and thought content normal.          Assessment:        1. Well woman exam with routine gynecological exam    2. Annual physical exam    3. Vasomotor flushing               Plan:      Fatemeh was seen today for well woman.    Diagnoses and all orders for this visit:    Well woman exam with routine gynecological exam  - Pap guidelines discussed. Pap up to date.   - MMG ordered.   - Cscope - counseled on importance of colon cancer screening.  - Contraception - N/A  - STD screening - declined.    Annual physical exam  -     Vaginosis Screen by DNA Probe    Vasomotor flushing  - Patient unable to take  paxil and failed effexor.   - No absolute contraindications to HRT. HTN well controlled and no history of VTE.     Other orders  -     estradiol 0.05 mg/24 hr td ptsw (VIVELLE-DOT) 0.05 mg/24 hr; Place 1 patch onto the skin twice a week.  -     progesterone (PROMETRIUM) 100 MG capsule; Take 1 capsule (100 mg total) by mouth nightly.        Orders Placed This Encounter   Procedures    Vaginosis Screen by DNA Probe       Follow-up in about 1 year (around 12/20/2019) for annual.

## 2018-12-20 NOTE — LETTER
December 20, 2018      [facility] [address]  [address]  [place] [address]  Phone: [phone]  Fax: [phone]       Patient: Fatemeh Shoemaker   YOB: 1960  Date of Visit: 12/20/2018    To Whom It May Concern:    Rishi Shoemaker  was at [facility] on 12/20/2018. She may return to work/school on 12/21/18. If you have any questions or concerns, or if I can be of further assistance, please do not hesitate to contact me.    Sincerely,    QUINTON Pierre MD

## 2018-12-21 LAB
CANDIDA RRNA VAG QL PROBE: NEGATIVE
G VAGINALIS RRNA GENITAL QL PROBE: NEGATIVE
T VAGINALIS RRNA GENITAL QL PROBE: NEGATIVE

## 2018-12-21 RX ORDER — ESTRADIOL 0.05 MG/D
1 FILM, EXTENDED RELEASE TRANSDERMAL
Qty: 8 PATCH | Refills: 11 | Status: SHIPPED | OUTPATIENT
Start: 2018-12-24 | End: 2019-10-14

## 2018-12-21 RX ORDER — PROGESTERONE 100 MG/1
100 CAPSULE ORAL NIGHTLY
Qty: 30 CAPSULE | Refills: 11 | Status: SHIPPED | OUTPATIENT
Start: 2018-12-21 | End: 2019-03-25 | Stop reason: SDUPTHER

## 2019-01-02 DIAGNOSIS — I48.0 PAROXYSMAL ATRIAL FIBRILLATION: ICD-10-CM

## 2019-01-16 ENCOUNTER — HOSPITAL ENCOUNTER (OUTPATIENT)
Dept: RADIOLOGY | Facility: HOSPITAL | Age: 59
Discharge: HOME | End: 2019-01-16
Attending: OBSTETRICS & GYNECOLOGY
Payer: COMMERCIAL

## 2019-01-16 DIAGNOSIS — N63.32 UNSPECIFIED LUMP IN AXILLARY TAIL OF THE LEFT BREAST: ICD-10-CM

## 2019-01-16 DIAGNOSIS — Z12.39 SCREENING BREAST EXAMINATION: ICD-10-CM

## 2019-01-16 PROCEDURE — 77066 DX MAMMO INCL CAD BI: CPT | Mod: 26,,, | Performed by: RADIOLOGY

## 2019-01-16 PROCEDURE — 77066 MAMMO DIGITAL DIAGNOSTIC BILAT WITH TOMOSYNTHESIS_CAD: ICD-10-PCS | Mod: 26,,, | Performed by: RADIOLOGY

## 2019-01-16 PROCEDURE — 76642 ULTRASOUND BREAST LIMITED: CPT | Mod: 26,50,, | Performed by: RADIOLOGY

## 2019-01-16 PROCEDURE — 77066 DX MAMMO INCL CAD BI: CPT | Mod: TC,PO

## 2019-01-16 PROCEDURE — 77062 BREAST TOMOSYNTHESIS BI: CPT | Mod: 26,,, | Performed by: RADIOLOGY

## 2019-01-16 PROCEDURE — 77062 MAMMO DIGITAL DIAGNOSTIC BILAT WITH TOMOSYNTHESIS_CAD: ICD-10-PCS | Mod: 26,,, | Performed by: RADIOLOGY

## 2019-01-16 PROCEDURE — 76642 US BREAST BILATERAL LIMITED: ICD-10-PCS | Mod: 26,50,, | Performed by: RADIOLOGY

## 2019-01-16 PROCEDURE — 76642 ULTRASOUND BREAST LIMITED: CPT | Mod: TC,50,PO

## 2019-01-21 ENCOUNTER — PATIENT MESSAGE (OUTPATIENT)
Dept: NEUROLOGY | Facility: CLINIC | Age: 59
End: 2019-01-21

## 2019-01-25 ENCOUNTER — TELEPHONE (OUTPATIENT)
Dept: NEUROLOGY | Facility: CLINIC | Age: 59
End: 2019-01-25

## 2019-01-26 DIAGNOSIS — G62.9 NEUROPATHY: ICD-10-CM

## 2019-01-26 DIAGNOSIS — R20.2 PARESTHESIAS: ICD-10-CM

## 2019-01-26 RX ORDER — DULOXETIN HYDROCHLORIDE 60 MG/1
120 CAPSULE, DELAYED RELEASE ORAL DAILY
Qty: 180 CAPSULE | Refills: 1 | Status: SHIPPED | OUTPATIENT
Start: 2019-01-26 | End: 2019-12-19 | Stop reason: SDUPTHER

## 2019-01-26 NOTE — TELEPHONE ENCOUNTER
----- Message from Trav Riddle sent at 1/25/2019  3:09 PM CST -----  Contact: Patient @ [phone]  Patient calling to get an update on the medication discussed at last office visit, pls call to advise

## 2019-01-28 ENCOUNTER — PATIENT MESSAGE (OUTPATIENT)
Dept: OBSTETRICS AND GYNECOLOGY | Facility: CLINIC | Age: 59
End: 2019-01-28

## 2019-01-28 DIAGNOSIS — N95.0 POSTMENOPAUSAL BLEEDING: Primary | ICD-10-CM

## 2019-01-29 ENCOUNTER — PATIENT MESSAGE (OUTPATIENT)
Dept: OBSTETRICS AND GYNECOLOGY | Facility: CLINIC | Age: 59
End: 2019-01-29

## 2019-01-29 ENCOUNTER — TELEPHONE (OUTPATIENT)
Dept: OBSTETRICS AND GYNECOLOGY | Facility: CLINIC | Age: 59
End: 2019-01-29

## 2019-01-29 ENCOUNTER — HOSPITAL ENCOUNTER (OUTPATIENT)
Dept: RADIOLOGY | Facility: HOSPITAL | Age: 59
Discharge: HOME | End: 2019-01-29
Attending: OBSTETRICS & GYNECOLOGY
Payer: COMMERCIAL

## 2019-01-29 DIAGNOSIS — N95.0 POSTMENOPAUSAL BLEEDING: ICD-10-CM

## 2019-01-29 PROCEDURE — 76830 US PELVIS COMP WITH TRANSVAG NON-OB (XPD): ICD-10-PCS | Mod: 26,,, | Performed by: RADIOLOGY

## 2019-01-29 PROCEDURE — 76830 TRANSVAGINAL US NON-OB: CPT | Mod: TC,PO

## 2019-01-29 PROCEDURE — 76830 TRANSVAGINAL US NON-OB: CPT | Mod: 26,,, | Performed by: RADIOLOGY

## 2019-01-29 PROCEDURE — 76856 US PELVIS COMP WITH TRANSVAG NON-OB (XPD): ICD-10-PCS | Mod: 26,,, | Performed by: RADIOLOGY

## 2019-01-29 PROCEDURE — 76856 US EXAM PELVIC COMPLETE: CPT | Mod: 26,,, | Performed by: RADIOLOGY

## 2019-01-29 NOTE — TELEPHONE ENCOUNTER
Called patient with U/S results. EMS slightly thickened, 5.9 mm. Recommended EMBx, will schedule tomorrow at 1000 am.

## 2019-01-30 ENCOUNTER — PROCEDURE VISIT (OUTPATIENT)
Dept: OBSTETRICS AND GYNECOLOGY | Facility: CLINIC | Age: 59
End: 2019-01-30
Payer: COMMERCIAL

## 2019-01-30 VITALS
BODY MASS INDEX: 48.18 KG/M2 | SYSTOLIC BLOOD PRESSURE: 113 MMHG | HEIGHT: 64 IN | DIASTOLIC BLOOD PRESSURE: 53 MMHG | WEIGHT: 282.19 LBS

## 2019-01-30 DIAGNOSIS — N95.0 PMB (POSTMENOPAUSAL BLEEDING): Primary | ICD-10-CM

## 2019-01-30 LAB
B-HCG UR QL: NEGATIVE
CTP QC/QA: YES

## 2019-01-30 PROCEDURE — 88305 TISSUE EXAM BY PATHOLOGIST: CPT | Mod: 26,,, | Performed by: PATHOLOGY

## 2019-01-30 PROCEDURE — 88305 TISSUE SPECIMEN TO PATHOLOGY, OBSTETRICS/GYNECOLOGY: ICD-10-PCS | Mod: 26,,, | Performed by: PATHOLOGY

## 2019-01-30 PROCEDURE — 88305 TISSUE EXAM BY PATHOLOGIST: CPT | Performed by: PATHOLOGY

## 2019-01-30 PROCEDURE — 58100 BIOPSY OF UTERUS LINING: CPT | Mod: S$GLB,,, | Performed by: OBSTETRICS & GYNECOLOGY

## 2019-01-30 PROCEDURE — 81025 URINE PREGNANCY TEST: CPT | Mod: S$GLB,,, | Performed by: OBSTETRICS & GYNECOLOGY

## 2019-01-30 PROCEDURE — 81025 POCT URINE PREGNANCY: ICD-10-PCS | Mod: S$GLB,,, | Performed by: OBSTETRICS & GYNECOLOGY

## 2019-01-30 PROCEDURE — 58100 BIOPSY (GYNECOLOGICAL): ICD-10-PCS | Mod: S$GLB,,, | Performed by: OBSTETRICS & GYNECOLOGY

## 2019-01-30 RX ORDER — HYDROCHLOROTHIAZIDE 25 MG/1
25 TABLET ORAL
COMMUNITY
End: 2019-04-22 | Stop reason: CLARIF

## 2019-01-30 RX ORDER — METOPROLOL TARTRATE 25 MG/1
25 TABLET, FILM COATED ORAL
COMMUNITY
End: 2019-04-22 | Stop reason: CLARIF

## 2019-01-30 RX ORDER — FLUTICASONE PROPIONATE 50 MCG
SPRAY, SUSPENSION (ML) NASAL
Refills: 0 | COMMUNITY
Start: 2018-12-11 | End: 2019-04-22 | Stop reason: CLARIF

## 2019-01-30 RX ORDER — LISINOPRIL 10 MG/1
10 TABLET ORAL
COMMUNITY
End: 2019-04-22

## 2019-01-30 RX ORDER — FLECAINIDE ACETATE 50 MG/1
TABLET ORAL
COMMUNITY
End: 2019-04-22 | Stop reason: CLARIF

## 2019-01-30 NOTE — PROCEDURES
Biopsy (Gynecological)  Date/Time: 1/30/2019 10:49 AM  Performed by: Noemi Pierre MD  Authorized by: Noemi Pierre MD     Consent Done?:  Yes (Written)   Patient was prepped and draped in the normal sterile fashion.  Local anesthesia used?: No      Biopsy Location:  Uterus (Tenaculum placed on anterior lip of cervix. Sounded to 8 cm. 4 passes with moderate tissue. Hemostatis noted.)  Estimated blood loss (cc):  2   Patient tolerated the procedure well with no immediate complications.    Noemi Pierre MD  OB/GYN

## 2019-02-01 DIAGNOSIS — I48.0 PAROXYSMAL ATRIAL FIBRILLATION: ICD-10-CM

## 2019-02-01 RX ORDER — APIXABAN 5 MG/1
TABLET, FILM COATED ORAL
Qty: 60 TABLET | Refills: 11 | Status: SHIPPED | OUTPATIENT
Start: 2019-02-01 | End: 2019-04-22 | Stop reason: CLARIF

## 2019-02-14 ENCOUNTER — TELEPHONE (OUTPATIENT)
Dept: OBSTETRICS AND GYNECOLOGY | Facility: CLINIC | Age: 59
End: 2019-02-14

## 2019-02-14 DIAGNOSIS — I48.0 PAROXYSMAL ATRIAL FIBRILLATION: ICD-10-CM

## 2019-02-14 NOTE — TELEPHONE ENCOUNTER
Called patient to review U/S and EMBx. EMBx neg but due to thickened EMS, recommended hysteroscopy/D&C to rule out endometrial hyperplasia/malignancy. Patient expressed understanding. Will look at dates and call back.

## 2019-02-15 RX ORDER — OMEPRAZOLE 20 MG/1
20 CAPSULE, DELAYED RELEASE ORAL DAILY
Qty: 30 CAPSULE | Refills: 11 | Status: SHIPPED | OUTPATIENT
Start: 2019-02-15 | End: 2020-01-14 | Stop reason: SDUPTHER

## 2019-02-15 RX ORDER — HYDROCHLOROTHIAZIDE 25 MG/1
25 TABLET ORAL DAILY
Qty: 90 TABLET | Refills: 3 | Status: SHIPPED | OUTPATIENT
Start: 2019-02-15 | End: 2020-02-18 | Stop reason: SDUPTHER

## 2019-02-15 RX ORDER — LISINOPRIL 10 MG/1
10 TABLET ORAL DAILY
Qty: 90 TABLET | Refills: 3 | Status: SHIPPED | OUTPATIENT
Start: 2019-02-15 | End: 2020-02-18 | Stop reason: SDUPTHER

## 2019-02-18 ENCOUNTER — TELEPHONE (OUTPATIENT)
Dept: OBSTETRICS AND GYNECOLOGY | Facility: CLINIC | Age: 59
End: 2019-02-18

## 2019-02-19 ENCOUNTER — TELEPHONE (OUTPATIENT)
Dept: OBSTETRICS AND GYNECOLOGY | Facility: CLINIC | Age: 59
End: 2019-02-19

## 2019-02-19 NOTE — TELEPHONE ENCOUNTER
----- Message from Ruth Maria sent at 2/19/2019  9:58 AM CST -----  Contact: self  Pt is returning your call. Please call back at [phone]

## 2019-02-20 ENCOUNTER — TELEPHONE (OUTPATIENT)
Dept: OBSTETRICS AND GYNECOLOGY | Facility: CLINIC | Age: 59
End: 2019-02-20

## 2019-02-20 NOTE — TELEPHONE ENCOUNTER
----- Message from Noemi Pierre MD sent at 2/19/2019 12:47 PM CST -----  Regarding: surgery  Please let patient know her D&C is scheduled on 2/27 at 2 PM, but will confirm with her the day before on timing. She would need to arrive 2 hours prior to surgery. Please schedule preop appt and consents appt with me prior to surgery.     Thanks!

## 2019-02-20 NOTE — TELEPHONE ENCOUNTER
Spoke with pt informed pt her D&C is scheduled on 2/27 at 2 PM, but will confirm with her the day before on timing. She would need to arrive 2 hours prior to surgery. Patient scheduled for preop appt and consents appt with Dr Pierre prior to surgery. Pt verbalized understanding

## 2019-02-21 ENCOUNTER — PATIENT MESSAGE (OUTPATIENT)
Dept: OBSTETRICS AND GYNECOLOGY | Facility: CLINIC | Age: 59
End: 2019-02-21

## 2019-03-20 ENCOUNTER — PATIENT MESSAGE (OUTPATIENT)
Dept: OBSTETRICS AND GYNECOLOGY | Facility: CLINIC | Age: 59
End: 2019-03-20

## 2019-03-24 ENCOUNTER — PATIENT MESSAGE (OUTPATIENT)
Dept: OBSTETRICS AND GYNECOLOGY | Facility: CLINIC | Age: 59
End: 2019-03-24

## 2019-03-25 RX ORDER — PROGESTERONE 100 MG/1
200 CAPSULE ORAL NIGHTLY
Qty: 60 CAPSULE | Refills: 11 | Status: SHIPPED | OUTPATIENT
Start: 2019-03-25 | End: 2019-08-26 | Stop reason: SDUPTHER

## 2019-04-03 DIAGNOSIS — I48.0 PAROXYSMAL ATRIAL FIBRILLATION: ICD-10-CM

## 2019-04-03 RX ORDER — METOPROLOL TARTRATE 25 MG/1
25 TABLET, FILM COATED ORAL 2 TIMES DAILY
Qty: 60 TABLET | Refills: 2 | Status: SHIPPED | OUTPATIENT
Start: 2019-04-03 | End: 2019-07-23 | Stop reason: SDUPTHER

## 2019-04-11 ENCOUNTER — PATIENT MESSAGE (OUTPATIENT)
Dept: ELECTROPHYSIOLOGY | Facility: CLINIC | Age: 59
End: 2019-04-11

## 2019-04-11 ENCOUNTER — PATIENT MESSAGE (OUTPATIENT)
Dept: OBSTETRICS AND GYNECOLOGY | Facility: CLINIC | Age: 59
End: 2019-04-11

## 2019-04-11 DIAGNOSIS — N95.0 POSTMENOPAUSAL BLEEDING: Primary | ICD-10-CM

## 2019-04-15 ENCOUNTER — PATIENT MESSAGE (OUTPATIENT)
Dept: OBSTETRICS AND GYNECOLOGY | Facility: CLINIC | Age: 59
End: 2019-04-15

## 2019-04-17 ENCOUNTER — TELEPHONE (OUTPATIENT)
Dept: OBSTETRICS AND GYNECOLOGY | Facility: CLINIC | Age: 59
End: 2019-04-17

## 2019-04-17 NOTE — TELEPHONE ENCOUNTER
----- Message from Noemi Pierre MD sent at 4/15/2019 11:01 AM CDT -----  Regarding: preop  D&C scheduled for 4/25. She needs preop appt and appt with me for consents prior to surgery. Thanks!

## 2019-04-22 ENCOUNTER — PATIENT MESSAGE (OUTPATIENT)
Dept: ELECTROPHYSIOLOGY | Facility: CLINIC | Age: 59
End: 2019-04-22

## 2019-04-22 ENCOUNTER — HOSPITAL ENCOUNTER (OUTPATIENT)
Dept: PREADMISSION TESTING | Facility: OTHER | Age: 59
Discharge: HOME | End: 2019-04-22
Attending: OBSTETRICS & GYNECOLOGY
Payer: COMMERCIAL

## 2019-04-22 ENCOUNTER — OFFICE VISIT (OUTPATIENT)
Dept: OBSTETRICS AND GYNECOLOGY | Facility: CLINIC | Age: 59
End: 2019-04-22
Payer: COMMERCIAL

## 2019-04-22 VITALS
HEART RATE: 49 BPM | HEIGHT: 65 IN | BODY MASS INDEX: 47.48 KG/M2 | TEMPERATURE: 97 F | WEIGHT: 285 LBS | SYSTOLIC BLOOD PRESSURE: 127 MMHG | OXYGEN SATURATION: 99 % | DIASTOLIC BLOOD PRESSURE: 69 MMHG

## 2019-04-22 VITALS
BODY MASS INDEX: 46.67 KG/M2 | WEIGHT: 280.44 LBS | SYSTOLIC BLOOD PRESSURE: 130 MMHG | DIASTOLIC BLOOD PRESSURE: 56 MMHG

## 2019-04-22 DIAGNOSIS — E66.01 MORBID OBESITY WITH BMI OF 45.0-49.9, ADULT: ICD-10-CM

## 2019-04-22 DIAGNOSIS — N95.0 POSTMENOPAUSAL BLEEDING: Primary | ICD-10-CM

## 2019-04-22 LAB
ANION GAP SERPL CALC-SCNC: 10 MMOL/L (ref 8–16)
BASOPHILS # BLD AUTO: 0.02 K/UL (ref 0–0.2)
BASOPHILS NFR BLD: 0.2 % (ref 0–1.9)
BUN SERPL-MCNC: 13 MG/DL (ref 6–20)
CALCIUM SERPL-MCNC: 9.5 MG/DL (ref 8.7–10.5)
CHLORIDE SERPL-SCNC: 101 MMOL/L (ref 95–110)
CO2 SERPL-SCNC: 29 MMOL/L (ref 23–29)
CREAT SERPL-MCNC: 0.7 MG/DL (ref 0.5–1.4)
DIFFERENTIAL METHOD: ABNORMAL
EOSINOPHIL # BLD AUTO: 0.2 K/UL (ref 0–0.5)
EOSINOPHIL NFR BLD: 1.8 % (ref 0–8)
ERYTHROCYTE [DISTWIDTH] IN BLOOD BY AUTOMATED COUNT: 14.9 % (ref 11.5–14.5)
EST. GFR  (AFRICAN AMERICAN): >60 ML/MIN/1.73 M^2
EST. GFR  (NON AFRICAN AMERICAN): >60 ML/MIN/1.73 M^2
GLUCOSE SERPL-MCNC: 109 MG/DL (ref 70–110)
HCT VFR BLD AUTO: 33.6 % (ref 37–48.5)
HGB BLD-MCNC: 10.7 G/DL (ref 12–16)
LYMPHOCYTES # BLD AUTO: 3 K/UL (ref 1–4.8)
LYMPHOCYTES NFR BLD: 35.1 % (ref 18–48)
MCH RBC QN AUTO: 28.2 PG (ref 27–31)
MCHC RBC AUTO-ENTMCNC: 31.8 G/DL (ref 32–36)
MCV RBC AUTO: 89 FL (ref 82–98)
MONOCYTES # BLD AUTO: 0.5 K/UL (ref 0.3–1)
MONOCYTES NFR BLD: 5.6 % (ref 4–15)
NEUTROPHILS # BLD AUTO: 4.9 K/UL (ref 1.8–7.7)
NEUTROPHILS NFR BLD: 57.1 % (ref 38–73)
PLATELET # BLD AUTO: 338 K/UL (ref 150–350)
PMV BLD AUTO: 10.7 FL (ref 9.2–12.9)
POTASSIUM SERPL-SCNC: 3.7 MMOL/L (ref 3.5–5.1)
RBC # BLD AUTO: 3.79 M/UL (ref 4–5.4)
SODIUM SERPL-SCNC: 140 MMOL/L (ref 136–145)
WBC # BLD AUTO: 8.5 K/UL (ref 3.9–12.7)

## 2019-04-22 PROCEDURE — 80048 BASIC METABOLIC PNL TOTAL CA: CPT

## 2019-04-22 PROCEDURE — 99499 NO LOS: ICD-10-PCS | Mod: S$GLB,,, | Performed by: OBSTETRICS & GYNECOLOGY

## 2019-04-22 PROCEDURE — 85025 COMPLETE CBC W/AUTO DIFF WBC: CPT

## 2019-04-22 PROCEDURE — 99499 UNLISTED E&M SERVICE: CPT | Mod: S$GLB,,, | Performed by: OBSTETRICS & GYNECOLOGY

## 2019-04-22 PROCEDURE — 36415 COLL VENOUS BLD VENIPUNCTURE: CPT

## 2019-04-22 RX ORDER — SODIUM CHLORIDE, SODIUM LACTATE, POTASSIUM CHLORIDE, CALCIUM CHLORIDE 600; 310; 30; 20 MG/100ML; MG/100ML; MG/100ML; MG/100ML
INJECTION, SOLUTION INTRAVENOUS CONTINUOUS
Status: CANCELLED | OUTPATIENT
Start: 2019-04-22

## 2019-04-22 NOTE — PROGRESS NOTES
"History & Physical  Gynecology      SUBJECTIVE:     Chief Complaint: consents     History of Present Illness:  59 y/o P2 presents for surgery workup for scheduled hysteroscopy/D&C for postmenopausal bleeding. She was seen by me 1.5 years ago for PMB and after hysteroscopy/D&C was found to have a benign endometrial polyp. Following this procedure she had no further vaginal bleeding. She was started on HRT for menopausal symptoms about a year later in 12/2018 after symptoms worsened with effexor (cannot take paxil with flecainide). She subsequently began having some PMB. U/S 5.9 mm. EMBx benign. She continued to have spotting and so decision was made to proceed with hysteroscopy/D&C. She is interested in a hysterectomy but unable to take off work at this time. Prometrium has been increased from 100 mg to 200 mg daily.    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 TOLU Bowers, [facility]     CHOLECYSTECTOMY-LAPAROSCOPIC W/ CHOLANGIOGRAM N/A 12/28/2017    [facility] by [facility]ell [facility] at [facility] OR    csection      DILATION AND CURETTAGE OF UTERUS      ESOPHAGOGASTRODUODENOSCOPY (EGD) N/A 1/13/2017    [facility] at [facility] ENDO (4TH FLR)    HIP PINNING      [id number] N/A 9/28/2017    [facility] at [facility] OR    JOINT REPLACEMENT      hip right    LOCALIZATION-NEEDLE Right " 2017    [facility] at [facility] OR    LUMPECTOMY-BREAST Right 2017    [facility] at [facility] OR    NASAL SEPTUM SURGERY      IH-[id number]-OJIGYR Right 2018    [facility]    TOTAL HIP ARTHROPLASTY      TUBAL LIGATION       OB History        4    Para   2    Term   2            AB        Living   2       SAB        TAB        Ectopic        Multiple        Live Births                   Family History   Problem Relation Age of Onset    Colon cancer Maternal Grandmother 70        mets to ovary    Eclampsia Paternal Grandmother     Stroke Father     Colon cancer Father     Hypertension Mother     Stomach cancer Neg Hx     Esophageal cancer Neg Hx     Breast cancer Neg Hx     Miscarriages / Stillbirths Neg Hx      Social History     Tobacco Use    Smoking status: Never Smoker    Smokeless tobacco: Never Used   Substance Use Topics    Alcohol use: No     Comment: never    Drug use: No       Current Outpatient Medications   Medication Sig    apixaban (ELIQUIS) 5 mg Tab Take 1 tablet (5 mg total) by mouth 2 (two) times daily. (Patient taking differently: Take 5 mg by mouth 2 (two) times daily. Off )    DULoxetine (CYMBALTA) 60 MG capsule Take 2 capsules (120 mg total) by mouth once daily.    estradiol 0.05 mg/24 hr td ptsw (VIVELLE-DOT) 0.05 mg/24 hr Place 1 patch onto the skin twice a week.    flecainide (TAMBOCOR) 100 MG Tab Take 1 tablet (100 mg total) by mouth every 12 (twelve) hours.    gabapentin (NEURONTIN) 600 MG tablet Take 1 tablet (600 mg total) by mouth 3 (three) times daily.    hydroCHLOROthiazide (HYDRODIURIL) 25 MG tablet Take 1 tablet (25 mg total) by mouth once daily.    lisinopril 10 MG tablet Take 1 tablet (10 mg total) by mouth once daily.    metoprolol tartrate (LOPRESSOR) 25 MG tablet Take 1 tablet (25 mg total) by mouth 2 (two) times daily.    omeprazole  (PRILOSEC) 20 MG capsule Take 1 capsule (20 mg total) by mouth once daily.    progesterone (PROMETRIUM) 100 MG capsule Take 2 capsules (200 mg total) by mouth nightly.    rosuvastatin (CRESTOR) 10 MG tablet Take 10 mg by mouth every evening.     tiZANidine (ZANAFLEX) 4 MG tablet Take 1 tablet (4 mg total) by mouth every 6 (six) hours as needed.     No current facility-administered medications for this visit.          Review of Systems:  Review of Systems   Constitutional: Negative for chills, diaphoresis, fatigue and fever.   Respiratory: Negative for cough and shortness of breath.    Cardiovascular: Negative for chest pain and palpitations.   Gastrointestinal: Negative for abdominal pain, constipation, diarrhea, nausea and vomiting.   Genitourinary: Positive for menstrual problem and postmenopausal bleeding. Negative for dyspareunia, dysuria, frequency, genital sores, hematuria, pelvic pain, vaginal bleeding, vaginal discharge, vaginal pain and postcoital bleeding.   Musculoskeletal: Negative for back pain and myalgias.   Integumentary:  Negative for rash, acne, breast mass, nipple discharge and breast skin changes.   Neurological: Negative for headaches.   Psychiatric/Behavioral: Negative for depression. The patient is not nervous/anxious.    Breast: Negative for mass, mastodynia, nipple discharge and skin changes       OBJECTIVE:     Physical Exam:  Physical Exam   Constitutional: She is oriented to person, place, and time. She appears well-developed and well-nourished. No distress.   HENT:   Head: Normocephalic and atraumatic.   Neck: Normal range of motion.   Cardiovascular: Normal rate, regular rhythm and normal heart sounds. Exam reveals no gallop and no friction rub.   No murmur heard.  Pulmonary/Chest: Effort normal. No stridor. No respiratory distress. She has no wheezes. She has no rales.   Abdominal: Soft. She exhibits no distension and no mass. There is no tenderness. There is no rebound and no  guarding. No hernia.   Genitourinary: No vaginal discharge found.   Genitourinary Comments: See prior notes.     Musculoskeletal: Normal range of motion. She exhibits no edema.   Neurological: She is alert and oriented to person, place, and time.   Skin: Skin is warm. No rash noted.   Psychiatric: She has a normal mood and affect. Her behavior is normal. Judgment and thought content normal.         ASSESSMENT:       ICD-10-CM ICD-9-CM    1. Postmenopausal bleeding N95.0 627.1    2. Morbid obesity with BMI of 45.0-49.9, adult E66.01 278.01     Z68.42 V85.42           Plan:      Fatemeh was seen today for consents.    Diagnoses and all orders for this visit:    Postmenopausal bleeding  Hysteroscopy/D&C scheduled.  Consents signed. Risks, benefits, alternatives discussed.  All questions answered.   Orders placed.     Morbid obesity with BMI of 45.0-49.9, adult        No orders of the defined types were placed in this encounter.      Follow up in about 6 weeks (around 6/3/2019) for post op.    Counseling time: 15 minutes    Noemi Pierre

## 2019-04-22 NOTE — DISCHARGE INSTRUCTIONS
PRE-ADMIT TESTING -  [phone]    [address]  [place] [facility]          Your surgery has been scheduled at [facility]. We are pleased to have the opportunity to serve you. For Further Information please call [phone].    On the day of surgery please report to the Information Desk on the 1st floor.    · CONTACT YOUR PHYSICIAN'S OFFICE THE DAY PRIOR TO YOUR SURGERY TO OBTAIN YOUR ARRIVAL TIME.     · The evening before surgery do not eat anything after 9 p.m. ( this includes hard candy, chewing gum and mints).  You may only have GATORADE, POWERADE AND WATER  from 9 p.m. until you leave your home.   DO NOT DRINK ANY LIQUIDS ON THE WAY TO THE HOSPITAL.      SPECIAL MEDICATION INSTRUCTIONS: TAKE medications checked off by the Anesthesiologist on your Medication List.    Angiogram Patients: Take medications as instructed by your physician, including aspirin.     Surgery Patients:    If you take ASPIRIN - Your PHYSICIAN/SURGEON will need to inform you IF/OR when you need to stop taking aspirin prior to your surgery.     Do Not take any medications containing IBUPROFEN.  Do Not Wear any make-up or dark nail polish   (especially eye make-up) to surgery. If you come to surgery with makeup on you will be required to remove the makeup or nail polish.    Do not shave your surgical area at least 5 days prior to your surgery. The surgical prep will be performed at the hospital according to Infection Control regulations.    Leave all valuables at home.   Do Not wear any jewelry or watches, including any metal in body piercings. Jewelry must be removed prior to coming to the hospital.  There is a possibility that rings that are unable to be removed may be cut off if they are on the surgical extremity.    Contact Lens must be removed before surgery. Either do not wear the contact lens or bring a case and solution for storage.  Please bring a container for eyeglasses or dentures as required.  Bring  any paperwork your physician has provided, such as consent forms,  history and physicals, doctor's orders, etc.   Bring comfortable clothes that are loose fitting to wear upon discharge. Take into consideration the type of surgery being performed.  Maintain your diet as advised per your physician the day prior to surgery.      Adequate rest the night before surgery is advised.   Park in the Parking lot behind the hospital or in the [place] [facility] across the street from the parking lot. Parking is complimentary.  If you will be discharged the same day as your procedure, please arrange for a responsible adult to drive you home or to accompany you if traveling by taxi.   YOU WILL NOT BE PERMITTED TO DRIVE OR TO LEAVE THE HOSPITAL ALONE AFTER SURGERY.   It is strongly recommended that you arrange for someone to remain with you for the first 24 hrs following your surgery.       Thank you for your cooperation.  The Staff of [facility].                Bathing Instructions with Hibiclens     Shower the evening before and morning of your procedure with Hibiclens:   Wash your face with water and your regular face wash/soap   Apply Hibiclens directly on your skin or on a wet washcloth and wash gently. When showering: Move away from the shower stream when applying Hibiclens to avoid rinsing off too soon.   Rinse thoroughly with warm water   Do not dilute Hibiclens         Dry off as usual, do not use any deodorant, powder, body lotions, perfume, after shave or cologne.

## 2019-04-23 ENCOUNTER — PATIENT MESSAGE (OUTPATIENT)
Dept: OBSTETRICS AND GYNECOLOGY | Facility: CLINIC | Age: 59
End: 2019-04-23

## 2019-05-06 ENCOUNTER — PATIENT MESSAGE (OUTPATIENT)
Dept: NEUROLOGY | Facility: CLINIC | Age: 59
End: 2019-05-06

## 2019-05-08 RX ORDER — NORTRIPTYLINE HYDROCHLORIDE 25 MG/1
25 CAPSULE ORAL NIGHTLY
Qty: 90 CAPSULE | Refills: 1 | Status: SHIPPED | OUTPATIENT
Start: 2019-05-08 | End: 2019-11-08 | Stop reason: SDUPTHER

## 2019-06-10 DIAGNOSIS — G62.9 NEUROPATHY: ICD-10-CM

## 2019-06-10 RX ORDER — GABAPENTIN 600 MG/1
600 TABLET ORAL 3 TIMES DAILY
Qty: 270 TABLET | Refills: 3 | Status: SHIPPED | OUTPATIENT
Start: 2019-06-10 | End: 2020-06-17 | Stop reason: SDUPTHER

## 2019-07-23 ENCOUNTER — PATIENT MESSAGE (OUTPATIENT)
Dept: OBSTETRICS AND GYNECOLOGY | Facility: CLINIC | Age: 59
End: 2019-07-23

## 2019-07-23 ENCOUNTER — PATIENT MESSAGE (OUTPATIENT)
Dept: ELECTROPHYSIOLOGY | Facility: CLINIC | Age: 59
End: 2019-07-23

## 2019-07-23 DIAGNOSIS — I48.0 PAROXYSMAL ATRIAL FIBRILLATION: ICD-10-CM

## 2019-07-23 RX ORDER — METOPROLOL TARTRATE 25 MG/1
TABLET, FILM COATED ORAL
Qty: 60 TABLET | Refills: 2 | Status: SHIPPED | OUTPATIENT
Start: 2019-07-23 | End: 2019-10-16 | Stop reason: SDUPTHER

## 2019-07-24 DIAGNOSIS — N95.0 POSTMENOPAUSAL BLEEDING: Primary | ICD-10-CM

## 2019-08-01 ENCOUNTER — ANESTHESIA EVENT (OUTPATIENT)
Dept: SURGERY | Facility: OTHER | Age: 59
End: 2019-08-01
Payer: COMMERCIAL

## 2019-08-01 ENCOUNTER — OFFICE VISIT (OUTPATIENT)
Dept: OBSTETRICS AND GYNECOLOGY | Facility: CLINIC | Age: 59
End: 2019-08-01
Payer: COMMERCIAL

## 2019-08-01 ENCOUNTER — HOSPITAL ENCOUNTER (OUTPATIENT)
Dept: PREADMISSION TESTING | Facility: OTHER | Age: 59
Discharge: HOME | End: 2019-08-01
Attending: OBSTETRICS & GYNECOLOGY
Payer: COMMERCIAL

## 2019-08-01 VITALS
BODY MASS INDEX: 47.8 KG/M2 | HEART RATE: 57 BPM | TEMPERATURE: 98 F | OXYGEN SATURATION: 100 % | SYSTOLIC BLOOD PRESSURE: 118 MMHG | HEIGHT: 64 IN | DIASTOLIC BLOOD PRESSURE: 58 MMHG | WEIGHT: 280 LBS

## 2019-08-01 VITALS
BODY MASS INDEX: 47.93 KG/M2 | DIASTOLIC BLOOD PRESSURE: 68 MMHG | SYSTOLIC BLOOD PRESSURE: 110 MMHG | HEIGHT: 65 IN | WEIGHT: 287.69 LBS

## 2019-08-01 DIAGNOSIS — N95.0 POSTMENOPAUSAL BLEEDING: Primary | ICD-10-CM

## 2019-08-01 DIAGNOSIS — E66.01 CLASS 3 SEVERE OBESITY WITH BODY MASS INDEX (BMI) OF 45.0 TO 49.9 IN ADULT, UNSPECIFIED OBESITY TYPE, UNSPECIFIED WHETHER SERIOUS COMORBIDITY PRESENT: ICD-10-CM

## 2019-08-01 DIAGNOSIS — Z01.818 PREOP TESTING: Primary | ICD-10-CM

## 2019-08-01 LAB
ANION GAP SERPL CALC-SCNC: 11 MMOL/L (ref 8–16)
BASOPHILS # BLD AUTO: 0.04 K/UL (ref 0–0.2)
BASOPHILS NFR BLD: 0.5 % (ref 0–1.9)
BUN SERPL-MCNC: 11 MG/DL (ref 6–20)
CALCIUM SERPL-MCNC: 9.6 MG/DL (ref 8.7–10.5)
CHLORIDE SERPL-SCNC: 103 MMOL/L (ref 95–110)
CO2 SERPL-SCNC: 28 MMOL/L (ref 23–29)
CREAT SERPL-MCNC: 0.7 MG/DL (ref 0.5–1.4)
DIFFERENTIAL METHOD: ABNORMAL
EOSINOPHIL # BLD AUTO: 0.1 K/UL (ref 0–0.5)
EOSINOPHIL NFR BLD: 0.8 % (ref 0–8)
ERYTHROCYTE [DISTWIDTH] IN BLOOD BY AUTOMATED COUNT: 15.4 % (ref 11.5–14.5)
EST. GFR  (AFRICAN AMERICAN): >60 ML/MIN/1.73 M^2
EST. GFR  (NON AFRICAN AMERICAN): >60 ML/MIN/1.73 M^2
GLUCOSE SERPL-MCNC: 100 MG/DL (ref 70–110)
HCT VFR BLD AUTO: 33.6 % (ref 37–48.5)
HGB BLD-MCNC: 10.7 G/DL (ref 12–16)
IMM GRANULOCYTES # BLD AUTO: 0.04 K/UL (ref 0–0.04)
IMM GRANULOCYTES NFR BLD AUTO: 0.5 % (ref 0–0.5)
LYMPHOCYTES # BLD AUTO: 2.2 K/UL (ref 1–4.8)
LYMPHOCYTES NFR BLD: 25.7 % (ref 18–48)
MCH RBC QN AUTO: 27.4 PG (ref 27–31)
MCHC RBC AUTO-ENTMCNC: 31.8 G/DL (ref 32–36)
MCV RBC AUTO: 86 FL (ref 82–98)
MONOCYTES # BLD AUTO: 0.5 K/UL (ref 0.3–1)
MONOCYTES NFR BLD: 5.8 % (ref 4–15)
NEUTROPHILS # BLD AUTO: 5.7 K/UL (ref 1.8–7.7)
NEUTROPHILS NFR BLD: 66.7 % (ref 38–73)
NRBC BLD-RTO: 0 /100 WBC
PLATELET # BLD AUTO: 323 K/UL (ref 150–350)
PMV BLD AUTO: 10.5 FL (ref 9.2–12.9)
POTASSIUM SERPL-SCNC: 3.8 MMOL/L (ref 3.5–5.1)
RBC # BLD AUTO: 3.91 M/UL (ref 4–5.4)
SODIUM SERPL-SCNC: 142 MMOL/L (ref 136–145)
WBC # BLD AUTO: 8.52 K/UL (ref 3.9–12.7)

## 2019-08-01 PROCEDURE — 3008F BODY MASS INDEX DOCD: CPT | Mod: CPTII,S$GLB,, | Performed by: OBSTETRICS & GYNECOLOGY

## 2019-08-01 PROCEDURE — 3008F PR BODY MASS INDEX (BMI) DOCUMENTED: ICD-10-PCS | Mod: CPTII,S$GLB,, | Performed by: OBSTETRICS & GYNECOLOGY

## 2019-08-01 PROCEDURE — 36415 COLL VENOUS BLD VENIPUNCTURE: CPT

## 2019-08-01 PROCEDURE — 80048 BASIC METABOLIC PNL TOTAL CA: CPT

## 2019-08-01 PROCEDURE — 99999 PR PBB SHADOW E&M-EST. PATIENT-LVL III: ICD-10-PCS | Mod: PBBFAC,,, | Performed by: OBSTETRICS & GYNECOLOGY

## 2019-08-01 PROCEDURE — 3074F PR MOST RECENT SYSTOLIC BLOOD PRESSURE < 130 MM HG: ICD-10-PCS | Mod: CPTII,S$GLB,, | Performed by: OBSTETRICS & GYNECOLOGY

## 2019-08-01 PROCEDURE — 99214 OFFICE O/P EST MOD 30 MIN: CPT | Mod: S$GLB,,, | Performed by: OBSTETRICS & GYNECOLOGY

## 2019-08-01 PROCEDURE — 3078F DIAST BP <80 MM HG: CPT | Mod: CPTII,S$GLB,, | Performed by: OBSTETRICS & GYNECOLOGY

## 2019-08-01 PROCEDURE — 99214 PR OFFICE/OUTPT VISIT, EST, LEVL IV, 30-39 MIN: ICD-10-PCS | Mod: S$GLB,,, | Performed by: OBSTETRICS & GYNECOLOGY

## 2019-08-01 PROCEDURE — 3074F SYST BP LT 130 MM HG: CPT | Mod: CPTII,S$GLB,, | Performed by: OBSTETRICS & GYNECOLOGY

## 2019-08-01 PROCEDURE — 99999 PR PBB SHADOW E&M-EST. PATIENT-LVL III: CPT | Mod: PBBFAC,,, | Performed by: OBSTETRICS & GYNECOLOGY

## 2019-08-01 PROCEDURE — 85025 COMPLETE CBC W/AUTO DIFF WBC: CPT

## 2019-08-01 PROCEDURE — 3078F PR MOST RECENT DIASTOLIC BLOOD PRESSURE < 80 MM HG: ICD-10-PCS | Mod: CPTII,S$GLB,, | Performed by: OBSTETRICS & GYNECOLOGY

## 2019-08-01 RX ORDER — VALACYCLOVIR HYDROCHLORIDE 1 G/1
TABLET, FILM COATED ORAL
COMMUNITY
End: 2019-10-14 | Stop reason: SDUPTHER

## 2019-08-01 NOTE — DISCHARGE INSTRUCTIONS
PRE-ADMIT TESTING -  [phone]    [address]  [place] [facility]          Your surgery has been scheduled at [facility]. We are pleased to have the opportunity to serve you. For Further Information please call [phone].    On the day of surgery please report to the Information Desk on the 1st floor.    · CONTACT YOUR PHYSICIAN'S OFFICE THE DAY PRIOR TO YOUR SURGERY TO OBTAIN YOUR ARRIVAL TIME.     · The evening before surgery do not eat anything after 9 p.m. ( this includes hard candy, chewing gum and mints).  You may only have GATORADE, POWERADE AND WATER  from 9 p.m. until you leave your home.   DO NOT DRINK ANY LIQUIDS ON THE WAY TO THE HOSPITAL.      SPECIAL MEDICATION INSTRUCTIONS: TAKE medications checked off by the Anesthesiologist on your Medication List.    Angiogram Patients: Take medications as instructed by your physician, including aspirin.     Surgery Patients:    If you take ASPIRIN - Your PHYSICIAN/SURGEON will need to inform you IF/OR when you need to stop taking aspirin prior to your surgery.     Do Not take any medications containing IBUPROFEN.  Do Not Wear any make-up or dark nail polish   (especially eye make-up) to surgery. If you come to surgery with makeup on you will be required to remove the makeup or nail polish.    Do not shave your surgical area at least 5 days prior to your surgery. The surgical prep will be performed at the hospital according to Infection Control regulations.    Leave all valuables at home.   Do Not wear any jewelry or watches, including any metal in body piercings. Jewelry must be removed prior to coming to the hospital.  There is a possibility that rings that are unable to be removed may be cut off if they are on the surgical extremity.    Contact Lens must be removed before surgery. Either do not wear the contact lens or bring a case and solution for storage.  Please bring a container for eyeglasses or dentures as required.  Bring  any paperwork your physician has provided, such as consent forms,  history and physicals, doctor's orders, etc.   Bring comfortable clothes that are loose fitting to wear upon discharge. Take into consideration the type of surgery being performed.  Maintain your diet as advised per your physician the day prior to surgery.      Adequate rest the night before surgery is advised.   Park in the Parking lot behind the hospital or in the [place] [facility] across the street from the parking lot. Parking is complimentary.  If you will be discharged the same day as your procedure, please arrange for a responsible adult to drive you home or to accompany you if traveling by taxi.   YOU WILL NOT BE PERMITTED TO DRIVE OR TO LEAVE THE HOSPITAL ALONE AFTER SURGERY.   It is strongly recommended that you arrange for someone to remain with you for the first 24 hrs following your surgery.    The Surgeon will speak to your family/visitor after your surgery regarding the outcome of your surgery and post op care.  The Surgeon may speak to you after your surgery, but there is a possibility you may not remember the details.  Please check with your family members regarding the conversation with the Surgeon.      Thank you for your cooperation.  The Staff of [facility].                Bathing Instructions with Hibiclens     Shower the evening before and morning of your procedure with Hibiclens:   Wash your face with water and your regular face wash/soap   Apply Hibiclens directly on your skin or on a wet washcloth and wash gently. When showering: Move away from the shower stream when applying Hibiclens to avoid rinsing off too soon.   Rinse thoroughly with warm water   Do not dilute Hibiclens         Dry off as usual, do not use any deodorant, powder, body lotions, perfume, after shave or cologne.

## 2019-08-01 NOTE — PROGRESS NOTES
Subjective:       Patient ID: Fatemeh Shoemaker is a 58 y.o. female.    Chief Complaint:  Vaginal Bleeding    History of Present Illness:  Vaginal Bleeding   The patient's primary symptoms include pelvic pain. The patient's pertinent negatives include no vaginal discharge. This is a new problem. The current episode started 1 to 4 weeks ago. The problem occurs daily. The problem has been waxing and waning. The pain is mild. The problem affects both sides. She is not pregnant. Associated symptoms include anorexia. Pertinent negatives include no abdominal pain, back pain, chills, constipation, diarrhea, discolored urine, dysuria, fever, flank pain, frequency, headaches, hematuria, nausea, painful intercourse, rash, urgency or vomiting. The vaginal bleeding is lighter than menses. She has been passing clots. She has been passing tissue. Nothing aggravates the symptoms. She has tried nothing and acetaminophen for the symptoms. The treatment provided no relief. She is sexually active. No, her partner does not have an STD. She uses tubal ligation for contraception. She is postmenopausal. Her past medical history is significant for an abdominal surgery, a  section and a gynecological surgery. There is no history of an ectopic pregnancy, endometriosis, herpes simplex, menorrhagia, metrorrhagia, miscarriage, ovarian cysts, perineal abscess, PID, a terminated pregnancy or vaginosis.          59 y/o P2 presents for evaluation of postmenopausal bleeding. Reports that since her last visit with me, the bleeding stopped. She has now restarted bleeding for the past 2 weeks. She is having more cramping as well. She is on HRT for menopausal symptoms.     Her father recently passed away from colon cancer. Coping ok, trying to stay busy at work.    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 TOLU Bowers, [facility]     CHOLECYSTECTOMY-LAPAROSCOPIC W/ CHOLANGIOGRAM N/A 12/28/2017    Performed by [facility]ell Bowers [facility] at [facility] OR    csection      CS x 2    DILATION AND CURETTAGE OF UTERUS      ESOPHAGOGASTRODUODENOSCOPY (EGD) N/A 1/13/2017    Performed [facility] at [facility] ENDO (4TH FLR)    HIP PINNING      [id number]-AWYITHTH-QVSAYFBNK N/A 9/28/2017    [facility] OR    JOINT REPLACEMENT      hip right    LOCALIZATION-NEEDLE Right 12/21/2017    [facility]    LUMPECTOMY-BREAST Right 12/21/2017    [facility] at [facility] OR    NASAL SEPTUM SURGERY      UD-RQCMYFGL-WNENAG Right 1/29/2018    [facility]    TOTAL HIP ARTHROPLASTY      TUBAL LIGATION  1997     Family History   Problem Relation Age of Onset    Colon cancer Maternal Grandmother 70        mets to ovary    Eclampsia Paternal Grandmother     Stroke Father     Colon cancer Father     Hypertension Mother     Stomach cancer Neg Hx     Esophageal cancer Neg Hx     Breast cancer Neg Hx     Miscarriages / Stillbirths Neg Hx      Social History     Socioeconomic History    Marital status: Significant Other     Spouse name: Not on file    Number of children: Not on file    Years of education: Not on file    Highest education level: Not on file   Occupational History    Not on file   Social Needs    Financial resource strain: Not on file    Food insecurity:     Worry: Not on file     Inability: Not on file    Transportation needs:     Medical: Not on file     Non-medical: Not on file   Tobacco Use    Smoking status: Never Smoker    Smokeless tobacco: Never Used    Substance and Sexual Activity    Alcohol use: No     Comment: never    Drug use: No    Sexual activity: Yes     Partners: Male   Lifestyle    Physical activity:     Days per week: Not on file     Minutes per session: Not on file    Stress: Not on file   Relationships    Social connections:     Talks on phone: Not on file     Gets together: Not on file     Attends Denominational service: Not on file     Active member of club or organization: Not on file     Attends meetings of clubs or organizations: Not on file     Relationship status: Not on file   Other Topics Concern    Not on file   Social History Narrative    Not on file       Current Outpatient Medications:     apixaban (ELIQUIS) 5 mg Tab, Take 1 tablet (5 mg total) by mouth 2 (two) times daily. (Patient taking differently: Take 5 mg by mouth 2 (two) times daily. Off 4/22), Disp: 60 tablet, Rfl: 11    DULoxetine (CYMBALTA) 60 MG capsule, Take 2 capsules (120 mg total) by mouth once daily., Disp: 180 capsule, Rfl: 1    estradiol 0.05 mg/24 hr td ptsw (VIVELLE-DOT) 0.05 mg/24 hr, Place 1 patch onto the skin twice a week., Disp: 8 patch, Rfl: 11    flecainide (TAMBOCOR) 100 MG Tab, Take 1 tablet (100 mg total) by mouth every 12 (twelve) hours., Disp: 180 tablet, Rfl: 3    gabapentin (NEURONTIN) 600 MG tablet, Take 1 tablet (600 mg total) by mouth 3 (three) times daily., Disp: 270 tablet, Rfl: 3    hydroCHLOROthiazide (HYDRODIURIL) 25 MG tablet, Take 1 tablet (25 mg total) by mouth once daily., Disp: 90 tablet, Rfl: 3    lisinopril 10 MG tablet, Take 1 tablet (10 mg total) by mouth once daily., Disp: 90 tablet, Rfl: 3    loratadine (CLARITIN ORAL), Take by mouth as needed., Disp: , Rfl:     metoprolol tartrate (LOPRESSOR) 25 MG tablet, TAKE ONE TABLET BY MOUTH TWICE DAILY, Disp: 60 tablet, Rfl: 2    nortriptyline (PAMELOR) 25 MG capsule, Take 1 capsule (25 mg total) by mouth every evening., Disp: 90 capsule, Rfl: 1    omeprazole (PRILOSEC) 20 MG  "capsule, Take 1 capsule (20 mg total) by mouth once daily., Disp: 30 capsule, Rfl: 11    progesterone (PROMETRIUM) 100 MG capsule, Take 2 capsules (200 mg total) by mouth nightly., Disp: 60 capsule, Rfl: 11    rosuvastatin (CRESTOR) 10 MG tablet, Take 10 mg by mouth every evening. , Disp: , Rfl:     tiZANidine (ZANAFLEX) 4 MG tablet, Take 1 tablet (4 mg total) by mouth every 6 (six) hours as needed., Disp: 30 tablet, Rfl: 2    valACYclovir (VALTREX) 1000 MG tablet, valacyclovir 1 gram tablet  Take 2 tablets every 12 hours by oral route for 1 day., Disp: , Rfl:     Review of patient's allergies indicates:   Allergen Reactions    Aspirin Other (See Comments)     "I don't take it because I've had ulcers"   ulcers  "I don't take it because I've had ulcers"     Meperidine Other (See Comments)     disoriented     GYN & OB History  Patient's last menstrual period was 2017.   Date of Last Pap: 2017    OB History    Para Term  AB Living   4 2 2     2   SAB TAB Ectopic Multiple Live Births                  # Outcome Date GA Lbr Giles/2nd Weight Sex Delivery Anes PTL Lv   4 Term            3 Term            2      F CS-Unspec      1      F CS-Unspec          Review of Systems  Review of Systems   Constitutional: Negative for chills, diaphoresis, fatigue and fever.   Respiratory: Negative for cough and shortness of breath.    Cardiovascular: Negative for chest pain and palpitations.   Gastrointestinal: Positive for anorexia. Negative for abdominal pain, constipation, diarrhea, nausea and vomiting.   Genitourinary: Positive for pelvic pain, vaginal bleeding and postmenopausal bleeding. Negative for dysmenorrhea, dyspareunia, dysuria, flank pain, frequency, hematuria, menorrhagia, menstrual problem, urgency, vaginal discharge, vaginal pain and postcoital bleeding.   Musculoskeletal: Negative for back pain and myalgias.   Integumentary:  Negative for rash, acne, breast mass, nipple " discharge and breast skin changes.   Neurological: Negative for headaches.   Psychiatric/Behavioral: Negative for depression. The patient is not nervous/anxious.    Breast: Negative for mass, mastodynia, nipple discharge and skin changes          Objective:    Physical Exam:   Constitutional: She is oriented to person, place, and time. She appears well-developed and well-nourished. No distress.    HENT:   Head: Normocephalic and atraumatic.    Eyes: EOM are normal.    Neck: Normal range of motion.     Pulmonary/Chest: Effort normal.        Abdominal: Soft. She exhibits no abdominal incision.     Genitourinary:   Genitourinary Comments: Atrophic external female genitalia; vagina atrophic, normal; cervix normal, no masses; uterus difficult to palpate 2/2 body habitus; no adnexal masses palpated; rectal deferred             Musculoskeletal: Normal range of motion and moves all extremeties.       Neurological: She is alert and oriented to person, place, and time.    Skin: Skin is warm.    Psychiatric: She has a normal mood and affect. Her behavior is normal. Judgment and thought content normal.          Assessment:        1. Postmenopausal bleeding             Plan:      Fatemeh was seen today for vaginal bleeding.    Diagnoses and all orders for this visit:    Postmenopausal bleeding  - Given patient's recurrent PMB, counsled on the need for tissue evaluation again. Discussed TVUS, EMBx, hysteroscopy D&C and ultimately possible hysterectomy. Patient would like to proceed with EMBx in the office and if benign, proceed with hysterectomy. Counseled that if EMBx negative, there is still possibility for hyperplasia or malignancy and that she may need to follow up with Gyn Onc postoperatively if this is found. She expressed understanding. Follow up next week for EMBx.   - To preop.   - Case request placed for 9/3/19.   - Will sign consents next week.   - Patient to discuss with her Cardiologist regarding eliquis  perioperatively.    No orders of the defined types were placed in this encounter.    Follow up in about 1 week (around 8/8/2019) for EMBx.

## 2019-08-01 NOTE — ANESTHESIA PREPROCEDURE EVALUATION
08/01/2019  Fatemeh Shoemaker is a 58 y.o., female.    Anesthesia Evaluation    I have reviewed the Patient Summary Reports.    I have reviewed the Nursing Notes.   I have reviewed the Medications.     Review of Systems  Anesthesia Hx:  No problems with previous Anesthesia  Denies Family Hx of Anesthesia complications.   Denies Personal Hx of Anesthesia complications.   Social:  Non-Smoker    Hematology/Oncology:  Hematology Normal   Oncology Normal     EENT/Dental:EENT/Dental Normal   Cardiovascular:   Exercise tolerance: good Hypertension, well controlled Dysrhythmias atrial fibrillation Well controlled a-fib. Stopping Eloquis 3 days prior   Pulmonary:  Pulmonary Normal    Renal/:  Renal/ Normal     Hepatic/GI:   PUD,    Musculoskeletal:  Spine Disorders: lumbar and cervical Degenerative disease    Neurological:   Fingers neuropathy along with with feet  Peripheral Neuropathy    Endocrine:  Endocrine Normal    Dermatological:  Skin Normal    Psych:  Psychiatric Normal           Physical Exam  General:  Morbid Obesity    Airway/Jaw/Neck:  Airway Findings: Mouth Opening: Normal Tongue: Normal  General Airway Assessment: Adult  Mallampati: I  TM Distance: Normal, at least 6 cm  Jaw/Neck Findings:  Neck ROM: Normal ROM      Dental:  Dental Findings: In tact             Anesthesia Plan  Type of Anesthesia, risks & benefits discussed:  Anesthesia Type:  general  Patient's Preference:   Intra-op Monitoring Plan: standard ASA monitors  Intra-op Monitoring Plan Comments:   Post Op Pain Control Plan: multimodal analgesia  Post Op Pain Control Plan Comments:   Induction:   IV  Beta Blocker:         Informed Consent: Patient understands risks and agrees with Anesthesia plan.  Questions answered. Anesthesia consent signed with patient.  ASA Score: 3     Day of Surgery Review of History & Physical:    H&P update  referred to the surgeon.     Anesthesia Plan Notes: CBC BMP ordered.  T and S day of surgery        Ready For Surgery From Anesthesia Perspective.

## 2019-08-05 ENCOUNTER — PROCEDURE VISIT (OUTPATIENT)
Dept: OBSTETRICS AND GYNECOLOGY | Facility: CLINIC | Age: 59
End: 2019-08-05
Payer: COMMERCIAL

## 2019-08-05 VITALS
SYSTOLIC BLOOD PRESSURE: 118 MMHG | DIASTOLIC BLOOD PRESSURE: 68 MMHG | BODY MASS INDEX: 46.27 KG/M2 | HEIGHT: 65 IN | WEIGHT: 277.69 LBS

## 2019-08-05 DIAGNOSIS — N95.0 POSTMENOPAUSAL BLEEDING: Primary | ICD-10-CM

## 2019-08-05 PROCEDURE — 58100 BIOPSY (GYNECOLOGICAL): ICD-10-PCS | Mod: S$GLB,,, | Performed by: OBSTETRICS & GYNECOLOGY

## 2019-08-05 PROCEDURE — 88305 TISSUE EXAM BY PATHOLOGIST: CPT | Performed by: PATHOLOGY

## 2019-08-05 PROCEDURE — 88305 TISSUE EXAM BY PATHOLOGIST: CPT | Mod: 26,,, | Performed by: PATHOLOGY

## 2019-08-05 PROCEDURE — 58100 BIOPSY OF UTERUS LINING: CPT | Mod: S$GLB,,, | Performed by: OBSTETRICS & GYNECOLOGY

## 2019-08-05 PROCEDURE — 88305 TISSUE SPECIMEN TO PATHOLOGY, OBSTETRICS/GYNECOLOGY: ICD-10-PCS | Mod: 26,,, | Performed by: PATHOLOGY

## 2019-08-05 NOTE — PROCEDURES
Biopsy (Gynecological)  Date/Time: 8/5/2019 6:29 PM  Performed by: Noemi Pierre MD  Authorized by: Noemi Pierre MD     Consent Done?:  Yes (Written)   Patient was prepped and draped in the normal sterile fashion.  Local anesthesia used?: No      Biopsy Location:  Uterus  Estimated blood loss (cc):  0   Patient tolerated the procedure well with no immediate complications.     Uterus sounded to 9 cm. Minimal tissue obtained. Patient tolerated procedure well.

## 2019-08-09 ENCOUNTER — PATIENT MESSAGE (OUTPATIENT)
Dept: SURGERY | Facility: OTHER | Age: 59
End: 2019-08-09

## 2019-08-20 DIAGNOSIS — I48.0 PAROXYSMAL ATRIAL FIBRILLATION: ICD-10-CM

## 2019-08-20 RX ORDER — FLECAINIDE ACETATE 100 MG/1
TABLET ORAL
Qty: 180 TABLET | Refills: 0 | Status: SHIPPED | OUTPATIENT
Start: 2019-08-20 | End: 2019-10-24 | Stop reason: SDUPTHER

## 2019-08-21 ENCOUNTER — PATIENT MESSAGE (OUTPATIENT)
Dept: ELECTROPHYSIOLOGY | Facility: CLINIC | Age: 59
End: 2019-08-21

## 2019-08-21 ENCOUNTER — PATIENT MESSAGE (OUTPATIENT)
Dept: SURGERY | Facility: OTHER | Age: 59
End: 2019-08-21

## 2019-08-21 DIAGNOSIS — N95.0 POSTMENOPAUSAL BLEEDING: Primary | ICD-10-CM

## 2019-08-22 ENCOUNTER — PATIENT MESSAGE (OUTPATIENT)
Dept: ELECTROPHYSIOLOGY | Facility: CLINIC | Age: 59
End: 2019-08-22

## 2019-08-26 RX ORDER — PROGESTERONE 100 MG/1
200 CAPSULE ORAL 3 TIMES DAILY
Qty: 180 CAPSULE | Refills: 0 | Status: ON HOLD | OUTPATIENT
Start: 2019-08-26 | End: 2019-09-03

## 2019-08-26 RX ORDER — PROGESTERONE 100 MG/1
200 CAPSULE ORAL NIGHTLY
Qty: 60 CAPSULE | Refills: 11 | Status: CANCELLED | OUTPATIENT
Start: 2019-08-26 | End: 2020-08-25

## 2019-09-02 ENCOUNTER — PATIENT MESSAGE (OUTPATIENT)
Dept: OBSTETRICS AND GYNECOLOGY | Facility: CLINIC | Age: 59
End: 2019-09-02

## 2019-09-03 ENCOUNTER — ANESTHESIA (OUTPATIENT)
Dept: SURGERY | Facility: OTHER | Age: 59
End: 2019-09-03
Payer: COMMERCIAL

## 2019-09-03 ENCOUNTER — HOSPITAL ENCOUNTER (OUTPATIENT)
Facility: OTHER | Age: 59
Discharge: HOME | End: 2019-09-04
Attending: OBSTETRICS & GYNECOLOGY | Admitting: OBSTETRICS & GYNECOLOGY
Payer: COMMERCIAL

## 2019-09-03 DIAGNOSIS — N95.0 POSTMENOPAUSAL BLEEDING: Primary | ICD-10-CM

## 2019-09-03 DIAGNOSIS — Z01.818 PREOP TESTING: ICD-10-CM

## 2019-09-03 DIAGNOSIS — Z90.710 S/P LAPAROSCOPIC HYSTERECTOMY: ICD-10-CM

## 2019-09-03 LAB
ABO + RH BLD: NORMAL
BLD GP AB SCN CELLS X3 SERPL QL: NORMAL
POCT GLUCOSE: 107 MG/DL (ref 70–110)
RH BLD: NORMAL

## 2019-09-03 PROCEDURE — 88307 TISSUE EXAM BY PATHOLOGIST: CPT | Performed by: PATHOLOGY

## 2019-09-03 PROCEDURE — 63600175 PHARM REV CODE 636 W HCPCS: Performed by: ANESTHESIOLOGY

## 2019-09-03 PROCEDURE — 58571 PR LAPAROSCOPY W TOT HYSTERECTUTERUS <=250 GRAM  W TUBE/OVARY: ICD-10-PCS | Mod: 82,,, | Performed by: OBSTETRICS & GYNECOLOGY

## 2019-09-03 PROCEDURE — 88307 TISSUE SPECIMEN TO PATHOLOGY - SURGERY: ICD-10-PCS | Mod: 26,,, | Performed by: PATHOLOGY

## 2019-09-03 PROCEDURE — 58571 TLH W/T/O 250 G OR LESS: CPT | Mod: ,,, | Performed by: OBSTETRICS & GYNECOLOGY

## 2019-09-03 PROCEDURE — 58571 TLH W/T/O 250 G OR LESS: CPT | Mod: 82,,, | Performed by: OBSTETRICS & GYNECOLOGY

## 2019-09-03 PROCEDURE — 36000711: Performed by: OBSTETRICS & GYNECOLOGY

## 2019-09-03 PROCEDURE — 63600175 PHARM REV CODE 636 W HCPCS: Performed by: OBSTETRICS & GYNECOLOGY

## 2019-09-03 PROCEDURE — 88307 TISSUE EXAM BY PATHOLOGIST: CPT | Mod: 26,,, | Performed by: PATHOLOGY

## 2019-09-03 PROCEDURE — 25000003 PHARM REV CODE 250: Performed by: NURSE ANESTHETIST, CERTIFIED REGISTERED

## 2019-09-03 PROCEDURE — 27201423 OPTIME MED/SURG SUP & DEVICES STERILE SUPPLY: Performed by: OBSTETRICS & GYNECOLOGY

## 2019-09-03 PROCEDURE — 86901 BLOOD TYPING SEROLOGIC RH(D): CPT | Mod: 91

## 2019-09-03 PROCEDURE — 36000710: Performed by: OBSTETRICS & GYNECOLOGY

## 2019-09-03 PROCEDURE — 86901 BLOOD TYPING SEROLOGIC RH(D): CPT

## 2019-09-03 PROCEDURE — 58571 PR LAPAROSCOPY W TOT HYSTERECTUTERUS <=250 GRAM  W TUBE/OVARY: ICD-10-PCS | Mod: ,,, | Performed by: OBSTETRICS & GYNECOLOGY

## 2019-09-03 PROCEDURE — 25000003 PHARM REV CODE 250: Performed by: OBSTETRICS & GYNECOLOGY

## 2019-09-03 PROCEDURE — 37000008 HC ANESTHESIA 1ST 15 MINUTES: Performed by: OBSTETRICS & GYNECOLOGY

## 2019-09-03 PROCEDURE — P9045 ALBUMIN (HUMAN), 5%, 250 ML: HCPCS | Mod: JG | Performed by: NURSE ANESTHETIST, CERTIFIED REGISTERED

## 2019-09-03 PROCEDURE — 71000033 HC RECOVERY, INTIAL HOUR: Performed by: OBSTETRICS & GYNECOLOGY

## 2019-09-03 PROCEDURE — 36415 COLL VENOUS BLD VENIPUNCTURE: CPT

## 2019-09-03 PROCEDURE — 63600175 PHARM REV CODE 636 W HCPCS: Performed by: NURSE ANESTHETIST, CERTIFIED REGISTERED

## 2019-09-03 PROCEDURE — 37000009 HC ANESTHESIA EA ADD 15 MINS: Performed by: OBSTETRICS & GYNECOLOGY

## 2019-09-03 PROCEDURE — 71000039 HC RECOVERY, EACH ADD'L HOUR: Performed by: OBSTETRICS & GYNECOLOGY

## 2019-09-03 RX ORDER — DIPHENHYDRAMINE HCL 25 MG
25 CAPSULE ORAL EVERY 4 HOURS PRN
Status: DISCONTINUED | OUTPATIENT
Start: 2019-09-03 | End: 2019-09-04 | Stop reason: HOSPADM

## 2019-09-03 RX ORDER — DULOXETIN HYDROCHLORIDE 30 MG/1
120 CAPSULE, DELAYED RELEASE ORAL DAILY
Status: DISCONTINUED | OUTPATIENT
Start: 2019-09-04 | End: 2019-09-04 | Stop reason: HOSPADM

## 2019-09-03 RX ORDER — PROPOFOL 10 MG/ML
VIAL (ML) INTRAVENOUS
Status: DISCONTINUED | OUTPATIENT
Start: 2019-09-03 | End: 2019-09-03

## 2019-09-03 RX ORDER — IBUPROFEN 600 MG/1
600 TABLET ORAL EVERY 6 HOURS
Status: DISCONTINUED | OUTPATIENT
Start: 2019-09-04 | End: 2019-09-04 | Stop reason: HOSPADM

## 2019-09-03 RX ORDER — ROSUVASTATIN CALCIUM 10 MG/1
10 TABLET, COATED ORAL NIGHTLY
Status: DISCONTINUED | OUTPATIENT
Start: 2019-09-03 | End: 2019-09-04 | Stop reason: HOSPADM

## 2019-09-03 RX ORDER — DIPHENHYDRAMINE HYDROCHLORIDE 50 MG/ML
25 INJECTION INTRAMUSCULAR; INTRAVENOUS EVERY 4 HOURS PRN
Status: DISCONTINUED | OUTPATIENT
Start: 2019-09-03 | End: 2019-09-04 | Stop reason: HOSPADM

## 2019-09-03 RX ORDER — GLYCOPYRROLATE 0.2 MG/ML
INJECTION INTRAMUSCULAR; INTRAVENOUS
Status: DISCONTINUED | OUTPATIENT
Start: 2019-09-03 | End: 2019-09-03

## 2019-09-03 RX ORDER — OXYCODONE AND ACETAMINOPHEN 10; 325 MG/1; MG/1
1 TABLET ORAL EVERY 4 HOURS PRN
Status: DISCONTINUED | OUTPATIENT
Start: 2019-09-03 | End: 2019-09-04 | Stop reason: HOSPADM

## 2019-09-03 RX ORDER — OXYCODONE AND ACETAMINOPHEN 5; 325 MG/1; MG/1
1 TABLET ORAL EVERY 4 HOURS PRN
Status: DISCONTINUED | OUTPATIENT
Start: 2019-09-03 | End: 2019-09-04 | Stop reason: HOSPADM

## 2019-09-03 RX ORDER — MIDAZOLAM HYDROCHLORIDE 1 MG/ML
INJECTION INTRAMUSCULAR; INTRAVENOUS
Status: DISCONTINUED | OUTPATIENT
Start: 2019-09-03 | End: 2019-09-03

## 2019-09-03 RX ORDER — EPHEDRINE SULFATE 50 MG/ML
INJECTION, SOLUTION INTRAVENOUS
Status: DISCONTINUED | OUTPATIENT
Start: 2019-09-03 | End: 2019-09-03

## 2019-09-03 RX ORDER — ALBUMIN HUMAN 50 G/1000ML
SOLUTION INTRAVENOUS CONTINUOUS PRN
Status: DISCONTINUED | OUTPATIENT
Start: 2019-09-03 | End: 2019-09-03

## 2019-09-03 RX ORDER — ONDANSETRON 2 MG/ML
4 INJECTION INTRAMUSCULAR; INTRAVENOUS DAILY PRN
Status: DISCONTINUED | OUTPATIENT
Start: 2019-09-03 | End: 2019-09-03 | Stop reason: HOSPADM

## 2019-09-03 RX ORDER — LIDOCAINE HCL/PF 100 MG/5ML
SYRINGE (ML) INTRAVENOUS
Status: DISCONTINUED | OUTPATIENT
Start: 2019-09-03 | End: 2019-09-03

## 2019-09-03 RX ORDER — ONDANSETRON 8 MG/1
8 TABLET, ORALLY DISINTEGRATING ORAL EVERY 8 HOURS PRN
Status: DISCONTINUED | OUTPATIENT
Start: 2019-09-03 | End: 2019-09-04 | Stop reason: HOSPADM

## 2019-09-03 RX ORDER — KETOROLAC TROMETHAMINE 30 MG/ML
30 INJECTION, SOLUTION INTRAMUSCULAR; INTRAVENOUS EVERY 6 HOURS
Status: COMPLETED | OUTPATIENT
Start: 2019-09-03 | End: 2019-09-04

## 2019-09-03 RX ORDER — ONDANSETRON 2 MG/ML
INJECTION INTRAMUSCULAR; INTRAVENOUS
Status: DISCONTINUED | OUTPATIENT
Start: 2019-09-03 | End: 2019-09-03

## 2019-09-03 RX ORDER — SUCCINYLCHOLINE CHLORIDE 20 MG/ML
INJECTION INTRAMUSCULAR; INTRAVENOUS
Status: DISCONTINUED | OUTPATIENT
Start: 2019-09-03 | End: 2019-09-03

## 2019-09-03 RX ORDER — ROCURONIUM BROMIDE 10 MG/ML
INJECTION, SOLUTION INTRAVENOUS
Status: DISCONTINUED | OUTPATIENT
Start: 2019-09-03 | End: 2019-09-03

## 2019-09-03 RX ORDER — HYDROMORPHONE HYDROCHLORIDE 2 MG/ML
0.4 INJECTION, SOLUTION INTRAMUSCULAR; INTRAVENOUS; SUBCUTANEOUS EVERY 5 MIN PRN
Status: DISCONTINUED | OUTPATIENT
Start: 2019-09-03 | End: 2019-09-03 | Stop reason: HOSPADM

## 2019-09-03 RX ORDER — SODIUM CHLORIDE 0.9 % (FLUSH) 0.9 %
3 SYRINGE (ML) INJECTION
Status: DISCONTINUED | OUTPATIENT
Start: 2019-09-03 | End: 2019-09-03

## 2019-09-03 RX ORDER — NEOSTIGMINE METHYLSULFATE 1 MG/ML
INJECTION, SOLUTION INTRAVENOUS
Status: DISCONTINUED | OUTPATIENT
Start: 2019-09-03 | End: 2019-09-03

## 2019-09-03 RX ORDER — FLECAINIDE ACETATE 100 MG/1
100 TABLET ORAL EVERY 12 HOURS
Status: DISCONTINUED | OUTPATIENT
Start: 2019-09-03 | End: 2019-09-04 | Stop reason: HOSPADM

## 2019-09-03 RX ORDER — GABAPENTIN 300 MG/1
600 CAPSULE ORAL 3 TIMES DAILY
Status: DISCONTINUED | OUTPATIENT
Start: 2019-09-03 | End: 2019-09-04 | Stop reason: HOSPADM

## 2019-09-03 RX ORDER — MEPERIDINE HYDROCHLORIDE 25 MG/ML
12.5 INJECTION INTRAMUSCULAR; INTRAVENOUS; SUBCUTANEOUS ONCE AS NEEDED
Status: DISCONTINUED | OUTPATIENT
Start: 2019-09-03 | End: 2019-09-03

## 2019-09-03 RX ORDER — BISACODYL 10 MG
10 SUPPOSITORY, RECTAL RECTAL DAILY PRN
Status: DISCONTINUED | OUTPATIENT
Start: 2019-09-03 | End: 2019-09-04 | Stop reason: HOSPADM

## 2019-09-03 RX ORDER — SODIUM CHLORIDE, SODIUM LACTATE, POTASSIUM CHLORIDE, CALCIUM CHLORIDE 600; 310; 30; 20 MG/100ML; MG/100ML; MG/100ML; MG/100ML
INJECTION, SOLUTION INTRAVENOUS CONTINUOUS
Status: DISCONTINUED | OUTPATIENT
Start: 2019-09-03 | End: 2019-09-03

## 2019-09-03 RX ORDER — MUPIROCIN 20 MG/G
1 OINTMENT TOPICAL 2 TIMES DAILY
Status: DISCONTINUED | OUTPATIENT
Start: 2019-09-03 | End: 2019-09-04 | Stop reason: HOSPADM

## 2019-09-03 RX ORDER — OXYCODONE HYDROCHLORIDE 5 MG/1
5 TABLET ORAL
Status: DISCONTINUED | OUTPATIENT
Start: 2019-09-03 | End: 2019-09-03 | Stop reason: HOSPADM

## 2019-09-03 RX ORDER — FENTANYL CITRATE 50 UG/ML
INJECTION, SOLUTION INTRAMUSCULAR; INTRAVENOUS
Status: DISCONTINUED | OUTPATIENT
Start: 2019-09-03 | End: 2019-09-03

## 2019-09-03 RX ORDER — METOPROLOL TARTRATE 25 MG/1
25 TABLET, FILM COATED ORAL 2 TIMES DAILY
Status: DISCONTINUED | OUTPATIENT
Start: 2019-09-03 | End: 2019-09-04 | Stop reason: HOSPADM

## 2019-09-03 RX ADMIN — METOPROLOL TARTRATE 25 MG: 25 TABLET ORAL at 09:09

## 2019-09-03 RX ADMIN — FLECAINIDE ACETATE 100 MG: 100 TABLET ORAL at 09:09

## 2019-09-03 RX ADMIN — ROCURONIUM BROMIDE 10 MG: 10 INJECTION, SOLUTION INTRAVENOUS at 01:09

## 2019-09-03 RX ADMIN — NEOSTIGMINE METHYLSULFATE 5 MG: 1 INJECTION INTRAVENOUS at 02:09

## 2019-09-03 RX ADMIN — SODIUM CHLORIDE, SODIUM LACTATE, POTASSIUM CHLORIDE, AND CALCIUM CHLORIDE: 600; 310; 30; 20 INJECTION, SOLUTION INTRAVENOUS at 10:09

## 2019-09-03 RX ADMIN — EPHEDRINE SULFATE 15 MG: 50 INJECTION INTRAMUSCULAR; INTRAVENOUS; SUBCUTANEOUS at 12:09

## 2019-09-03 RX ADMIN — MUPIROCIN 1 G: 20 OINTMENT TOPICAL at 09:09

## 2019-09-03 RX ADMIN — OXYCODONE HYDROCHLORIDE AND ACETAMINOPHEN 1 TABLET: 5; 325 TABLET ORAL at 09:09

## 2019-09-03 RX ADMIN — GLYCOPYRROLATE 0.4 MG: 0.2 INJECTION, SOLUTION INTRAMUSCULAR; INTRAVENOUS at 12:09

## 2019-09-03 RX ADMIN — ROCURONIUM BROMIDE 10 MG: 10 INJECTION, SOLUTION INTRAVENOUS at 12:09

## 2019-09-03 RX ADMIN — ALBUMIN (HUMAN): 12.5 SOLUTION INTRAVENOUS at 12:09

## 2019-09-03 RX ADMIN — GLYCOPYRROLATE 0.8 MG: 0.2 INJECTION, SOLUTION INTRAMUSCULAR; INTRAVENOUS at 02:09

## 2019-09-03 RX ADMIN — HYDROMORPHONE HYDROCHLORIDE 0.4 MG: 2 INJECTION, SOLUTION INTRAMUSCULAR; INTRAVENOUS; SUBCUTANEOUS at 03:09

## 2019-09-03 RX ADMIN — SUCCINYLCHOLINE CHLORIDE 160 MG: 20 INJECTION, SOLUTION INTRAMUSCULAR; INTRAVENOUS at 12:09

## 2019-09-03 RX ADMIN — FENTANYL CITRATE 100 MCG: 50 INJECTION, SOLUTION INTRAMUSCULAR; INTRAVENOUS at 02:09

## 2019-09-03 RX ADMIN — EPHEDRINE SULFATE 10 MG: 50 INJECTION INTRAMUSCULAR; INTRAVENOUS; SUBCUTANEOUS at 02:09

## 2019-09-03 RX ADMIN — SUGAMMADEX 508 MG: 100 INJECTION, SOLUTION INTRAVENOUS at 02:09

## 2019-09-03 RX ADMIN — FENTANYL CITRATE 50 MCG: 50 INJECTION, SOLUTION INTRAMUSCULAR; INTRAVENOUS at 02:09

## 2019-09-03 RX ADMIN — GABAPENTIN 600 MG: 300 CAPSULE ORAL at 09:09

## 2019-09-03 RX ADMIN — ROCURONIUM BROMIDE 30 MG: 10 INJECTION, SOLUTION INTRAVENOUS at 12:09

## 2019-09-03 RX ADMIN — SODIUM CHLORIDE, SODIUM LACTATE, POTASSIUM CHLORIDE, AND CALCIUM CHLORIDE: 600; 310; 30; 20 INJECTION, SOLUTION INTRAVENOUS at 12:09

## 2019-09-03 RX ADMIN — CARBOXYMETHYLCELLULOSE SODIUM 2 DROP: 2.5 SOLUTION/ DROPS OPHTHALMIC at 12:09

## 2019-09-03 RX ADMIN — ROSUVASTATIN CALCIUM 10 MG: 10 TABLET, COATED ORAL at 09:09

## 2019-09-03 RX ADMIN — EPHEDRINE SULFATE 10 MG: 50 INJECTION INTRAMUSCULAR; INTRAVENOUS; SUBCUTANEOUS at 12:09

## 2019-09-03 RX ADMIN — LIDOCAINE HYDROCHLORIDE 50 MG: 20 INJECTION, SOLUTION INTRAVENOUS at 12:09

## 2019-09-03 RX ADMIN — ONDANSETRON 4 MG: 2 INJECTION INTRAMUSCULAR; INTRAVENOUS at 02:09

## 2019-09-03 RX ADMIN — ALBUMIN (HUMAN): 12.5 SOLUTION INTRAVENOUS at 01:09

## 2019-09-03 RX ADMIN — KETOROLAC TROMETHAMINE 30 MG: 30 INJECTION, SOLUTION INTRAMUSCULAR at 06:09

## 2019-09-03 RX ADMIN — MIDAZOLAM HYDROCHLORIDE 2 MG: 1 INJECTION, SOLUTION INTRAMUSCULAR; INTRAVENOUS at 11:09

## 2019-09-03 RX ADMIN — FENTANYL CITRATE 100 MCG: 50 INJECTION, SOLUTION INTRAMUSCULAR; INTRAVENOUS at 12:09

## 2019-09-03 RX ADMIN — PROPOFOL 200 MG: 10 INJECTION, EMULSION INTRAVENOUS at 12:09

## 2019-09-03 RX ADMIN — EPHEDRINE SULFATE 5 MG: 50 INJECTION INTRAMUSCULAR; INTRAVENOUS; SUBCUTANEOUS at 02:09

## 2019-09-03 NOTE — TRANSFER OF CARE
"Anesthesia Transfer of Care Note    Patient: Fatemeh Shoemaker    Procedure(s) Performed: Procedure(s) (LRB):  HYSTERECTOMY, TOTAL, LAPAROSCOPIC (N/A)  SALPINGO-OOPHORECTOMY, LAPAROSCOPIC (Bilateral)  CYSTOSCOPY (N/A)    Patient location: PACU    Anesthesia Type: general    Transport from OR: Transported from OR on 2-3 L/min O2 by NC with adequate spontaneous ventilation    Post pain: adequate analgesia    Post assessment: no apparent anesthetic complications and tolerated procedure well    Post vital signs: stable    Level of consciousness: awake    Nausea/Vomiting: no nausea/vomiting    Complications: none    Transfer of care protocol was followed      Last vitals:   Visit Vitals  BP (!) 88/48 (BP Location: Left arm, Patient Position: Lying)   Pulse 70   Temp 37.4 °C (99.3 °F) (Tympanic)   Resp 18   Ht 5' 4" (1.626 m)   Wt 127 kg (280 lb)   LMP 09/08/2017   SpO2 100%   Breastfeeding? No   BMI 48.06 kg/m²     "

## 2019-09-03 NOTE — NURSING
Received pt to room from pacu aaox4, able to move from stretcher to bed with min assist, ftg with clear yellow urine, bandaid and steri strip to 3 lapsites. Family in room, call bell within reach bed in low locked position

## 2019-09-03 NOTE — ANESTHESIA POSTPROCEDURE EVALUATION
Anesthesia Post Evaluation    Patient: Fatemeh Shoemaker    Procedure(s) Performed: Procedure(s) (LRB):  HYSTERECTOMY, TOTAL, LAPAROSCOPIC (N/A)  SALPINGO-OOPHORECTOMY, LAPAROSCOPIC (Bilateral)  CYSTOSCOPY (N/A)    Final Anesthesia Type: general  Patient location during evaluation: PACU  Patient participation: Yes- Able to Participate  Level of consciousness: awake and alert  Post-procedure vital signs: reviewed and stable  Pain management: adequate  Airway patency: patent  PONV status at discharge: No PONV  Anesthetic complications: no      Cardiovascular status: blood pressure returned to baseline  Respiratory status: unassisted, spontaneous ventilation and room air  Hydration status: euvolemic  Follow-up not needed.          Vitals Value Taken Time   /60 9/3/2019  4:04 PM   Temp 36.6 °C (97.8 °F) 9/3/2019  3:15 PM   Pulse 73 9/3/2019  4:09 PM   Resp 18 9/3/2019  3:30 PM   SpO2 96 % 9/3/2019  4:09 PM   Vitals shown include unvalidated device data.      No case tracking events are documented in the log.      Pain/Yojana Score: Pain Rating Prior to Med Admin: 7 (9/3/2019  3:45 PM)  Pain Rating Post Med Admin: 7 (9/3/2019  3:41 PM)  Yojana Score: 9 (9/3/2019  3:47 PM)

## 2019-09-03 NOTE — BRIEF OP NOTE
[facility]  Brief Operative Note     SUMMARY     Surgery Date: 9/3/2019     Surgeon(s) and Role:     * Noemi Pierre MD - Primary     * Anthony Bentley Jr., MD - Assisting (No qualified resident available)    Pre-op Diagnosis:  Postmenopausal bleeding [N95.0]    Post-op Diagnosis:  Post-Op Diagnosis Codes:     * Postmenopausal bleeding [N95.0]    Procedure(s) (LRB):  HYSTERECTOMY, TOTAL, LAPAROSCOPIC (N/A)  SALPINGO-OOPHORECTOMY, LAPAROSCOPIC (Bilateral)  CYSTOSCOPY (N/A)    Anesthesia: General    Description of the findings of the procedure: Omental adhesions to anterior abdominal wall. Normal uterus. Evidence of prior tubal ligation. Bilateral cystic ovaries.     Findings/Key Components:  Omental adhesions to anterior abdominal wall. Normal uterus. Uterus sounded to 10 cm. Evidence of prior tubal ligation. Bilateral cystic ovaries.  Bilateral ureteral efflux on cystoscopy at end of procedure.     Estimated Blood Loss: 75 mL         Specimens:   Specimen (12h ago, onward)    Start     Ordered    09/03/19 1409  Specimen to Pathology - Surgery  Once     Comments:  1. Uterus, cervix, bilateral fallopian tubes and ovaries.     Start Status     09/03/19 1409 Collected (09/03/19 1416) Order ID: [id number]       09/03/19 1416        Noemi Pierre MD  OB/GYN

## 2019-09-03 NOTE — OR NURSING
The patient was transferred to room 371 via stretcher by flo Burch and myself. The patient tolerated the transfer without any distress noted. The stretcher and bed was placed in a lock position. The patient was able to transfer from the stretcher to the bed with assistance from jada and myself. The bed was placed in the lowest position. The patient family in the room upon arrival. Bedside handoff given to SAVANA Chairez.

## 2019-09-03 NOTE — OPERATIVE NOTE ADDENDUM
Certification of Assistant at Surgery       Surgery Date: 9/3/2019     Participating Surgeons:  Surgeon(s) and Role:     * Noemi Pierre MD - Primary     * Anthony Bentley Jr., MD - Assisting    Procedures:  Procedure(s) (LRB):  HYSTERECTOMY, TOTAL, LAPAROSCOPIC (N/A)  SALPINGO-OOPHORECTOMY, LAPAROSCOPIC (Bilateral)  CYSTOSCOPY (N/A)    Assistant Surgeon's Certification of Necessity:  I understand that section 1842 (b) (6) (d) of the Social Security Act generally prohibits Medicare Part B reasonable charge payment for the services of assistants at surgery in teaching hospitals when qualified residents are available to furnish such services. I certify that the services for which payment is claimed were medically necessary, and that no qualified resident was available to perform the services. I further understand that these services are subject to post-payment review by the Medicare carrier.      Anthony Bentley Jr, MD    09/03/2019  2:34 PM    Anthony Bentley MD

## 2019-09-03 NOTE — H&P
Progress Notes           Subjective:       Patient ID: Fatemeh Shoemaker is a 58 y.o. female.     Chief Complaint:  Vaginal Bleeding     History of Present Illness:  Vaginal Bleeding   The patient's primary symptoms include pelvic pain. The patient's pertinent negatives include no vaginal discharge. This is a new problem. The current episode started 1 to 4 weeks ago. The problem occurs daily. The problem has been waxing and waning. The pain is mild. The problem affects both sides. She is not pregnant. Associated symptoms include anorexia. Pertinent negatives include no abdominal pain, back pain, chills, constipation, diarrhea, discolored urine, dysuria, fever, flank pain, frequency, headaches, hematuria, nausea, painful intercourse, rash, urgency or vomiting. The vaginal bleeding is lighter than menses. She has been passing clots. She has been passing tissue. Nothing aggravates the symptoms. She has tried nothing and acetaminophen for the symptoms. The treatment provided no relief. She is sexually active. No, her partner does not have an STD. She uses tubal ligation for contraception. She is postmenopausal. Her past medical history is significant for an abdominal surgery, a  section and a gynecological surgery. There is no history of an ectopic pregnancy, endometriosis, herpes simplex, menorrhagia, metrorrhagia, miscarriage, ovarian cysts, perineal abscess, PID, a terminated pregnancy or vaginosis.            59 y/o P2 presents for evaluation of postmenopausal bleeding. Reports that since her last visit with me, the bleeding stopped. She has now restarted bleeding for the past 2 weeks. She is having more cramping as well. She is on HRT for menopausal symptoms.      Her father recently passed away from colon cancer. Coping ok, trying to stay busy at work.          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 TOLU Bowers, [facility]     CHOLECYSTECTOMY-LAPAROSCOPIC W/ CHOLANGIOGRAM N/A 12/28/2017     Performed by [facility]ell [facility]ers [facility] at [facility] OR    csection         CS x 2    DILATION AND CURETTAGE OF UTERUS        ESOPHAGOGASTRODUODENOSCOPY (EGD) N/A 1/13/2017     Per[facility] at [facility] (4TH FLR)    HIP PINNING        [id number] N/A 9/28/2017     [facility] at [facility] OR    JOINT REPLACEMENT         hip right    LOCALIZATION-NEEDLE Right 12/21/2017     [facility] at [facility] OR    LUMPECTOMY-BREAST Right 12/21/2017     Performed by [facility] at [facility] OR    NASAL SEPTUM SURGERY        [id number] Right 1/29/2018     Performed by [facility]ire [facility] at [facility] OR    TOTAL HIP ARTHROPLASTY        TUBAL LIGATION   1997            Family History   Problem Relation Age of Onset    Colon cancer Maternal Grandmother 70         mets to ovary    Eclampsia Paternal Grandmother      Stroke Father      Colon cancer Father      Hypertension Mother      Stomach cancer Neg Hx      Esophageal cancer Neg Hx      Breast cancer Neg Hx      Miscarriages / Stillbirths Neg Hx        Social History            Socioeconomic History    Marital status: Significant Other       Spouse name: Not on file    Number of children: Not on file    Years of education: Not on file    Highest education level: Not on file   Occupational History    Not on file   Social Needs    Financial resource strain: Not on file    Food insecurity:       Worry: Not on file       Inability: Not on file    Transportation needs:       Medical: Not on file        Non-medical: Not on file   Tobacco Use    Smoking status: Never Smoker    Smokeless tobacco: Never Used   Substance and Sexual Activity    Alcohol use: No       Comment: never    Drug use: No    Sexual activity: Yes       Partners: Male   Lifestyle    Physical activity:       Days per week: Not on file       Minutes per session: Not on file    Stress: Not on file   Relationships    Social connections:       Talks on phone: Not on file       Gets together: Not on file       Attends Latter-day service: Not on file       Active member of club or organization: Not on file       Attends meetings of clubs or organizations: Not on file       Relationship status: Not on file   Other Topics Concern    Not on file   Social History Narrative    Not on file         Current Outpatient Medications:     apixaban (ELIQUIS) 5 mg Tab, Take 1 tablet (5 mg total) by mouth 2 (two) times daily. (Patient taking differently: Take 5 mg by mouth 2 (two) times daily. Off 4/22), Disp: 60 tablet, Rfl: 11    DULoxetine (CYMBALTA) 60 MG capsule, Take 2 capsules (120 mg total) by mouth once daily., Disp: 180 capsule, Rfl: 1    estradiol 0.05 mg/24 hr td ptsw (VIVELLE-DOT) 0.05 mg/24 hr, Place 1 patch onto the skin twice a week., Disp: 8 patch, Rfl: 11    flecainide (TAMBOCOR) 100 MG Tab, Take 1 tablet (100 mg total) by mouth every 12 (twelve) hours., Disp: 180 tablet, Rfl: 3    gabapentin (NEURONTIN) 600 MG tablet, Take 1 tablet (600 mg total) by mouth 3 (three) times daily., Disp: 270 tablet, Rfl: 3    hydroCHLOROthiazide (HYDRODIURIL) 25 MG tablet, Take 1 tablet (25 mg total) by mouth once daily., Disp: 90 tablet, Rfl: 3    lisinopril 10 MG tablet, Take 1 tablet (10 mg total) by mouth once daily., Disp: 90 tablet, Rfl: 3    loratadine (CLARITIN ORAL), Take by mouth as needed., Disp: , Rfl:     metoprolol tartrate (LOPRESSOR) 25 MG tablet, TAKE ONE TABLET BY MOUTH TWICE DAILY, Disp: 60 tablet, Rfl: 2    nortriptyline  "(PAMELOR) 25 MG capsule, Take 1 capsule (25 mg total) by mouth every evening., Disp: 90 capsule, Rfl: 1    omeprazole (PRILOSEC) 20 MG capsule, Take 1 capsule (20 mg total) by mouth once daily., Disp: 30 capsule, Rfl: 11    progesterone (PROMETRIUM) 100 MG capsule, Take 2 capsules (200 mg total) by mouth nightly., Disp: 60 capsule, Rfl: 11    rosuvastatin (CRESTOR) 10 MG tablet, Take 10 mg by mouth every evening. , Disp: , Rfl:     tiZANidine (ZANAFLEX) 4 MG tablet, Take 1 tablet (4 mg total) by mouth every 6 (six) hours as needed., Disp: 30 tablet, Rfl: 2    valACYclovir (VALTREX) 1000 MG tablet, valacyclovir 1 gram tablet  Take 2 tablets every 12 hours by oral route for 1 day., Disp: , Rfl:            Review of patient's allergies indicates:   Allergen Reactions    Aspirin Other (See Comments)       "I don't take it because I've had ulcers"   ulcers  "I don't take it because I've had ulcers"     Meperidine Other (See Comments)       disoriented      GYN & OB History  Patient's last menstrual period was 2017.   Date of Last Pap: 2017                      OB History    Para Term  AB Living   4 2 2     2   SAB TAB Ectopic Multiple Live Births                         # Outcome Date GA Lbr Giles/2nd Weight Sex Delivery Anes PTL Lv   4 Term                     3 Term                     2          F CS-Unspec         1          F CS-Unspec               Review of Systems  Review of Systems   Constitutional: Negative for chills, diaphoresis, fatigue and fever.   Respiratory: Negative for cough and shortness of breath.    Cardiovascular: Negative for chest pain and palpitations.   Gastrointestinal: Positive for anorexia. Negative for abdominal pain, constipation, diarrhea, nausea and vomiting.   Genitourinary: Positive for pelvic pain, vaginal bleeding and postmenopausal bleeding. Negative for dysmenorrhea, dyspareunia, dysuria, flank pain, frequency, hematuria, menorrhagia, " menstrual problem, urgency, vaginal discharge, vaginal pain and postcoital bleeding.   Musculoskeletal: Negative for back pain and myalgias.   Integumentary:  Negative for rash, acne, breast mass, nipple discharge and breast skin changes.   Neurological: Negative for headaches.   Psychiatric/Behavioral: Negative for depression. The patient is not nervous/anxious.    Breast: Negative for mass, mastodynia, nipple discharge and skin changes        Objective:    Physical Exam:   Constitutional: She is oriented to person, place, and time. She appears well-developed and well-nourished. No distress.    HENT:   Head: Normocephalic and atraumatic.    Eyes: EOM are normal.    Neck: Normal range of motion.     Pulmonary/Chest: Effort normal.         Abdominal: Soft. She exhibits no abdominal incision.     Genitourinary:   Genitourinary Comments: Atrophic external female genitalia; vagina atrophic, normal; cervix normal, no masses; uterus difficult to palpate 2/2 body habitus; no adnexal masses palpated; rectal deferred             Musculoskeletal: Normal range of motion and moves all extremeties.       Neurological: She is alert and oriented to person, place, and time.    Skin: Skin is warm.    Psychiatric: She has a normal mood and affect. Her behavior is normal. Judgment and thought content normal.          Assessment:      1. Postmenopausal bleeding          Plan:   Fatemeh was seen today for vaginal bleeding.     Diagnoses and all orders for this visit:     Postmenopausal bleeding  - Given patient's recurrent PMB, counsled on the need for tissue evaluation again. Discussed TVUS, EMBx, hysteroscopy D&C and ultimately possible hysterectomy. Patient would like to proceed with EMBx in the office and if benign, proceed with hysterectomy. Counseled that if EMBx negative, there is still possibility for hyperplasia or malignancy and that she may need to follow up with Gyn Onc postoperatively if this is found. She expressed  understanding. Follow up next week for EMBx.   - To preop.   - Case request placed for 9/3/19.   - Will sign consents next week.   - Patient to discuss with her Cardiologist regarding eliquis perioperatively.           To OR for scheduled procedure.     Noemi Pierre MD  OB/GYN

## 2019-09-04 VITALS
DIASTOLIC BLOOD PRESSURE: 59 MMHG | RESPIRATION RATE: 18 BRPM | SYSTOLIC BLOOD PRESSURE: 105 MMHG | TEMPERATURE: 99 F | HEIGHT: 64 IN | OXYGEN SATURATION: 97 % | BODY MASS INDEX: 47.8 KG/M2 | HEART RATE: 68 BPM | WEIGHT: 280 LBS

## 2019-09-04 LAB
BASOPHILS # BLD AUTO: 0.01 K/UL (ref 0–0.2)
BASOPHILS NFR BLD: 0.1 % (ref 0–1.9)
DIFFERENTIAL METHOD: ABNORMAL
EOSINOPHIL # BLD AUTO: 0 K/UL (ref 0–0.5)
EOSINOPHIL NFR BLD: 0 % (ref 0–8)
ERYTHROCYTE [DISTWIDTH] IN BLOOD BY AUTOMATED COUNT: 14.9 % (ref 11.5–14.5)
HCT VFR BLD AUTO: 28.9 % (ref 37–48.5)
HGB BLD-MCNC: 9.4 G/DL (ref 12–16)
IMM GRANULOCYTES # BLD AUTO: 0.07 K/UL (ref 0–0.04)
IMM GRANULOCYTES NFR BLD AUTO: 0.4 % (ref 0–0.5)
LYMPHOCYTES # BLD AUTO: 1.2 K/UL (ref 1–4.8)
LYMPHOCYTES NFR BLD: 7.3 % (ref 18–48)
MCH RBC QN AUTO: 27.6 PG (ref 27–31)
MCHC RBC AUTO-ENTMCNC: 32.5 G/DL (ref 32–36)
MCV RBC AUTO: 85 FL (ref 82–98)
MONOCYTES # BLD AUTO: 0.7 K/UL (ref 0.3–1)
MONOCYTES NFR BLD: 4 % (ref 4–15)
NEUTROPHILS # BLD AUTO: 14.2 K/UL (ref 1.8–7.7)
NEUTROPHILS NFR BLD: 88.2 % (ref 38–73)
NRBC BLD-RTO: 0 /100 WBC
PLATELET # BLD AUTO: 327 K/UL (ref 150–350)
PMV BLD AUTO: 10.6 FL (ref 9.2–12.9)
RBC # BLD AUTO: 3.41 M/UL (ref 4–5.4)
WBC # BLD AUTO: 16.11 K/UL (ref 3.9–12.7)

## 2019-09-04 PROCEDURE — 36415 COLL VENOUS BLD VENIPUNCTURE: CPT

## 2019-09-04 PROCEDURE — 63600175 PHARM REV CODE 636 W HCPCS: Performed by: OBSTETRICS & GYNECOLOGY

## 2019-09-04 PROCEDURE — 85025 COMPLETE CBC W/AUTO DIFF WBC: CPT

## 2019-09-04 PROCEDURE — 25000003 PHARM REV CODE 250: Performed by: OBSTETRICS & GYNECOLOGY

## 2019-09-04 RX ORDER — OXYCODONE AND ACETAMINOPHEN 5; 325 MG/1; MG/1
1 TABLET ORAL EVERY 4 HOURS PRN
Qty: 20 TABLET | Refills: 0 | Status: SHIPPED | OUTPATIENT
Start: 2019-09-04 | End: 2019-10-17

## 2019-09-04 RX ORDER — IBUPROFEN 600 MG/1
600 TABLET ORAL EVERY 6 HOURS PRN
Qty: 30 TABLET | Refills: 1 | Status: SHIPPED | OUTPATIENT
Start: 2019-09-04 | End: 2020-03-10

## 2019-09-04 RX ORDER — ONDANSETRON 4 MG/1
4 TABLET, FILM COATED ORAL EVERY 8 HOURS PRN
Qty: 60 TABLET | Refills: 3 | Status: SHIPPED | OUTPATIENT
Start: 2019-09-04 | End: 2019-10-17

## 2019-09-04 RX ADMIN — DULOXETINE 120 MG: 30 CAPSULE, DELAYED RELEASE ORAL at 09:09

## 2019-09-04 RX ADMIN — KETOROLAC TROMETHAMINE 30 MG: 30 INJECTION, SOLUTION INTRAMUSCULAR at 06:09

## 2019-09-04 RX ADMIN — METOPROLOL TARTRATE 25 MG: 25 TABLET ORAL at 09:09

## 2019-09-04 RX ADMIN — MUPIROCIN 1 G: 20 OINTMENT TOPICAL at 09:09

## 2019-09-04 RX ADMIN — GABAPENTIN 600 MG: 300 CAPSULE ORAL at 09:09

## 2019-09-04 RX ADMIN — KETOROLAC TROMETHAMINE 30 MG: 30 INJECTION, SOLUTION INTRAMUSCULAR at 01:09

## 2019-09-04 RX ADMIN — KETOROLAC TROMETHAMINE 30 MG: 30 INJECTION, SOLUTION INTRAMUSCULAR at 12:09

## 2019-09-04 RX ADMIN — ONDANSETRON 8 MG: 8 TABLET, ORALLY DISINTEGRATING ORAL at 11:09

## 2019-09-04 RX ADMIN — FLECAINIDE ACETATE 100 MG: 100 TABLET ORAL at 09:09

## 2019-09-04 RX ADMIN — OXYCODONE HYDROCHLORIDE AND ACETAMINOPHEN 1 TABLET: 10; 325 TABLET ORAL at 06:09

## 2019-09-04 NOTE — PLAN OF CARE
Problem: Adult Inpatient Plan of Care  Goal: Plan of Care Review  Outcome: Outcome(s) achieved Date Met: 09/04/19  Free from falls, injury, or skin breakdown this hospital admission. Pt eager & in agreement w/ DC. VU of DC instructions and the need to attend follow-up appointment--paperwork passed & explained. Prescriptions filled and delivered to bedside. IV removed w/ cath tip intact, WNL. Voiding, ambulating, & tolerating PO well. Lap sites WNL. To be DCd home w/ family--will be escorted downstairs via  transport team once dressed, ready & ride arrives. Pt discharged in no distress with family.

## 2019-09-04 NOTE — PLAN OF CARE
Pt discharging home today. Family to provide transportation.    Pt confirms no CM needs or barriers for discharge.     09/04/19 1322   Final Note   Assessment Type Final Discharge Note   Anticipated Discharge Disposition Home   What phone number can be called within the next 1-3 days to see how you are doing after discharge? [phone]   Hospital Follow Up  Appt(s) scheduled? Yes   Discharge plans and expectations educations in teach back method with documentation complete? Yes   Right Care Referral Info   Post Acute Recommendation No Care

## 2019-09-04 NOTE — PLAN OF CARE
Problem: Adult Inpatient Plan of Care  Goal: Plan of Care Review  Outcome: Ongoing (interventions implemented as appropriate)     09/04/19 4723   Plan of Care Review   Plan of Care Reviewed With patient   Progress improving       Comments: Plan of care reviewed with patient. Pt remains free from fall, injury, and skin breakdown. Positions self independently. Pt pain has been controlled by PO and IV medication. Purposeful hourly rounding done. Safety maintained. Patient lying in bed in no distress. Will continue to monitor.

## 2019-09-04 NOTE — DISCHARGE SUMMARY
[facility]  Obstetrics & Gynecology  Discharge Summary    Patient Name: Fatemeh Shoemaker  MRN: [id number]  Admission Date: 9/3/2019  Hospital Length of Stay: 0 days  Discharge Date and Time:  09/04/2019 12:52 PM  Attending Physician: Noemi Pierre MD   Discharging Provider: Hafsa Pantoja MD  Primary Care Provider: BRENNA Chaves      Hospital Course: Patient presented for scheduled procedure. Patient was passed back to OR for TLH/BSO. Please see OP note for further details. Tolerated procedure well and patient was taken to recovery in a stable condition. Prior to discharge patient was able to void, ambulate, tolerate PO and pain was well controlled with PO meds. Patient was given routine post-op instructions for which patient voiced understanding. Patient was subsequently discharged home.      Procedure(s) (LRB):  HYSTERECTOMY, TOTAL, LAPAROSCOPIC (N/A)  SALPINGO-OOPHORECTOMY, LAPAROSCOPIC (Bilateral)  CYSTOSCOPY (N/A)         Pending Diagnostic Studies:     None        Final Active Diagnoses:    Diagnosis Date Noted POA    PMB (postmenopausal bleeding) [N95.0] 09/03/2019 Yes    S/P TLH/BSO, 9/3/19 [Z90.710] 09/03/2019 No    Postmenopausal bleeding [N95.0] 09/20/2017 Yes      Problems Resolved During this Admission:        Discharged Condition: good    Disposition:     Follow Up:  Follow-up Information     Noemi Pierre MD In 6 weeks.    Specialty:  Obstetrics and Gynecology  Why:  Post Operative Appointment  Contact information:  [address] [place] [id number] [phone]                 Patient Instructions:      Other restrictions (specify):   Order Comments: Pelvic rest for 6 weeks or until cleared in clinic (no tampons, no douching, no intercourse, etc.).     Lifting restrictions   Order Comments: Do not lift anything heavier than 10 pounds     Notify your health care provider if you experience any of the following:  increased confusion or weakness      Notify your health care provider if you experience any of the following:  persistent dizziness, light-headedness, or visual disturbances     Notify your health care provider if you experience any of the following:  worsening rash     Notify your health care provider if you experience any of the following:  severe persistent headache     Notify your health care provider if you experience any of the following:  difficulty breathing or increased cough     Notify your health care provider if you experience any of the following:  redness, tenderness, or signs of infection (pain, swelling, redness, odor or green/yellow discharge around incision site)     Notify your health care provider if you experience any of the following:  severe uncontrolled pain     Notify your health care provider if you experience any of the following:  persistent nausea and vomiting or diarrhea     Notify your health care provider if you experience any of the following:  temperature >100.4     Activity as tolerated     Medications:  Reconciled Home Medications:      Medication List      START taking these medications    ibuprofen 600 MG tablet  Commonly known as:  ADVIL,MOTRIN  Take 1 tablet (600 mg total) by mouth every 6 (six) hours as needed for Pain.     ondansetron 4 MG tablet  Commonly known as:  ZOFRAN  Take 1 tablet (4 mg total) by mouth every 8 (eight) hours as needed for Nausea.     oxyCODONE-acetaminophen 5-325 mg per tablet  Commonly known as:  PERCOCET  Take 1 tablet by mouth every 4 (four) hours as needed.        CHANGE how you take these medications    apixaban 5 mg Tab  Commonly known as:  ELIQUIS  Take 1 tablet (5 mg total) by mouth 2 (two) times daily.  What changed:  additional instructions        CONTINUE taking these medications    CLARITIN ORAL  Take by mouth as needed.     DULoxetine 60 MG capsule  Commonly known as:  CYMBALTA  Take 2 capsules (120 mg total) by mouth once daily.     estradiol 0.05 mg/24 hr td ptsw 0.05  mg/24 hr  Commonly known as:  VIVELLE-DOT  Place 1 patch onto the skin twice a week.     flecainide 100 MG Tab  Commonly known as:  TAMBOCOR  TAKE ONE TABLET BY MOUTH EVERY TWELVE HOURS     gabapentin 600 MG tablet  Commonly known as:  NEURONTIN  Take 1 tablet (600 mg total) by mouth 3 (three) times daily.     hydroCHLOROthiazide 25 MG tablet  Commonly known as:  HYDRODIURIL  Take 1 tablet (25 mg total) by mouth once daily.     lisinopril 10 MG tablet  Take 1 tablet (10 mg total) by mouth once daily.     metoprolol tartrate 25 MG tablet  Commonly known as:  LOPRESSOR  TAKE ONE TABLET BY MOUTH TWICE DAILY     nortriptyline 25 MG capsule  Commonly known as:  PAMELOR  Take 1 capsule (25 mg total) by mouth every evening.     omeprazole 20 MG capsule  Commonly known as:  PRILOSEC  Take 1 capsule (20 mg total) by mouth once daily.     rosuvastatin 10 MG tablet  Commonly known as:  CRESTOR  Take 10 mg by mouth every evening.     tiZANidine 4 MG tablet  Commonly known as:  ZANAFLEX  Take 1 tablet (4 mg total) by mouth every 6 (six) hours as needed.     valACYclovir 1000 MG tablet  Commonly known as:  VALTREX  valacyclovir 1 gram tablet   Take 2 tablets every 12 hours by oral route for 1 day.        STOP taking these medications    progesterone 100 MG capsule  Commonly known as:  PROMETRIUM            Hafsa Pantoja MD  Obstetrics & Gynecology  [facility]

## 2019-09-04 NOTE — PROGRESS NOTES
"[facility]  Obstetrics & Gynecology  Progress Note    Patient Name: Fatemeh Shoemaker  MRN: [id number]  Admission Date: 9/3/2019  Primary Care Provider: BRENNA Chaves  Principal Problem: <principal problem not specified>    Subjective:     Interval History: POD#1 s/p TLH/BSO/Cysto. Patient is doing well this morning. Tolerating normal diet. She is passing gas. She is ambulating without difficulty. She has a mild cough, but will begin using her incentive spirometer. Abdominal pain is adequately controlled with oral pain medications. Mccall is still in place, but patient has urge to void.     Scheduled Meds:   DULoxetine  120 mg Oral Daily    flecainide  100 mg Oral Q12H    gabapentin  600 mg Oral TID    ibuprofen  600 mg Oral Q6H    ketorolac  30 mg Intravenous Q6H    metoprolol tartrate  25 mg Oral BID    mupirocin  1 g Nasal BID    rosuvastatin  10 mg Oral QHS     Continuous Infusions:  PRN Meds:bisacodyl, diphenhydrAMINE, diphenhydrAMINE, ondansetron, oxyCODONE-acetaminophen, oxyCODONE-acetaminophen, promethazine (PHENERGAN) IVPB    Review of patient's allergies indicates:   Allergen Reactions    Aspirin Other (See Comments)     "I don't take it because I've had ulcers"   ulcers  "I don't take it because I've had ulcers"     Meperidine Other (See Comments)     Felt like she was about to pass put after taking       Objective:     Vital Signs (Most Recent):  Temp: 96.6 °F (35.9 °C) (09/04/19 0300)  Pulse: 79 (09/04/19 0300)  Resp: 20 (09/04/19 0300)  BP: (!) 102/52 (09/04/19 0300)  SpO2: (!) 93 % (09/04/19 0300) Vital Signs (24h Range):  Temp:  [96.6 °F (35.9 °C)-99.3 °F (37.4 °C)] 96.6 °F (35.9 °C)  Pulse:  [53-87] 79  Resp:  [16-20] 20  SpO2:  [92 %-100 %] 93 %  BP: ()/(48-68) 102/52     Weight: 127 kg (280 lb)  Body mass index is 48.06 kg/m².  Patient's last menstrual period was 09/08/2017.    I&O (Last 24H):    Intake/Output Summary (Last 24 hours) at 9/4/2019 0615  Last " data filed at 9/4/2019 0000  Gross per 24 hour   Intake 1700 ml   Output 1200 ml   Net 500 ml       Physical Exam:   Constitutional: She is oriented to person, place, and time. She appears well-developed and well-nourished.    HENT:   Head: Normocephalic and atraumatic.    Eyes: Pupils are equal, round, and reactive to light. EOM are normal.    Neck: Normal range of motion. Neck supple.    Cardiovascular: Normal rate and regular rhythm.     Pulmonary/Chest: Effort normal.        Abdominal: Soft. Bowel sounds are normal. There is tenderness (appropriate post-op).   Multiple laparoscopic incisions visualized. Clean, dry, and intact.      Genitourinary: Vagina normal and uterus normal.           Musculoskeletal: Normal range of motion and moves all extremeties.       Neurological: She is alert and oriented to person, place, and time.    Skin: Skin is warm and dry.        Laboratory:  Recent Labs   Lab 09/04/19  0437   WBC 16.11*   HGB 9.4*   HCT 28.9*   MCV 85               Assessment/Plan:     Active Diagnoses:    Diagnosis Date Noted POA    PMB (postmenopausal bleeding) [N95.0] 09/03/2019 Yes    S/P TLH/BSO, 9/3/19 [Z90.710] 09/03/2019 No    Postmenopausal bleeding [N95.0] 09/20/2017 Yes      Problems Resolved During this Admission:       1. POD#1 TLH/BSO  - Patient meeting post op milestones  - Abdominal pain controlled with oral pain medications  - Plan is to pull lopez this AM >> discharge after voiding  - Overnight UOP adequate     Jade Reece MD  Obstetrics & Gynecology  [facility]

## 2019-09-05 NOTE — OP NOTE
OPERATIVE REPORT    DATE: 09/042019    PREOPERATIVE DIAGNOSIS  1. Postmenopausal bleeding  2. Morbid obesity    POSTOPERATIVE DIAGNOSIS  1. Postmenopausal bleeding  2. Morbid obesity    PROCEDURE:  1. Total Laparoscopic Hysterectomy  2. Bilateral salpingo-oophorectomy  3. Cystoscopy    SURGEON: Noemi Pierre MD    ASSISTANT: Anthony Bentley MD (no qualified resident available)    ANESTHESIA: General    COMPLICATIONS: None    EBL: 75 cc    IV FLUIDS: 1700 cc    URINE OUTPUT: 300 cc    FINDINGS: Cervix did not pull well. Uterus sounded to 10 cm. Bilateral cystic ovaries. Evidence of prior bilateral tubal ligation. Bilateral ureteral efflux bilaterally at the end of the procedure. Vaginal cuff was closed with Endostitch.    PROCEDURE: Patient was taken to the operating room where general anesthesia was administered and found to be adequate. She was prepped and draped in the dorsal lithotomy position. A lopez catheter was placed into the bladder. A weighted sterile speculum was placed in the vagina and a right angle retractor was used to visualize the cervix. The anterior lip of the cervix was grasped with a single tooth tenaculum. The uterus was sounded to approximately 10 cm. A stay suture of 0-Vicryl was placed at 12 o'clock and 6 o'clock on the cervix. A RENETTA manipulator was placed inside the endometrial cavity. Gloves were changed. Attention was turned to the anterior abdominal wall. A Veress needle was inserted into the umbilicus under tenting of the anterior abdominal wall. Placement into the peritoneal cavity was confirmed via saline drop test. The abdomen was insufflated to 15mm Hg using Carbon dioxide. A 10 mm infraumbilical skin incision was made with the scalpel. A 10 mm Optiview trocar was advanced through this incision. Excellent hemostasis was noted. The patient was placed in deep Trendelenburg. A 5 mm left lateral skin incision was made with a scalpel and a 5 mm trocar was advanced through this incision  under visualization of the camera. A 5 mm right lateral skin incision was made with the scalpel. A 5 mm air seal trocar was advanced through this incision under visualization of the camera. Excellent hemostasis was noted. Attention was turned to the left round ligament. This was cauterized and transected and continued anteriorly to create the bladder flap to the midline, with careful dissection due to scarring from prior  sections. Attention was turned to the right round ligament. It was cauterized and transected and continued anteriorly to finish creating the bladder flap. Attention was turned to the left infundibulopelvic ligament. The ureter was visualized well below this. The left IP was cauterized and transected and carried down to the level of the uterine vessels. The vessels were cauterized but not transected. Attention was turned to the right infundibulopelvic ligament. This was cauterized and transected and carried down to the level of the uterine vessels. The vessels were cauterized but not transected. Attention was turned to the anterior portion of the cervix. The bladder was dissected off the cervix. The posterior colpotomy was created and carried around to the level of the uterine vessels bilaterally. The left uterine vessels were again cauterized and transected. The anterior colpotomy was created. This was continued to the level of the uterine vessels bilaterally.  Attention was turned to the posterior portion of the uterus. The anterior and posterior colpotomies were connected. The right uterine vessels were cauterized and transected. The anterior and posterior colpotomies were connected. The uterus was removed vaginally. A moist laparotomy sponge inside of glove was placed in the vagina to maintain intraperitoneal pressure. Attention was turned to the vaginal cuff. The sponge and glove were removed. The angles of the vaginal cuff were secured with matress sutures of 0 vicryl using the  Endostitch. The pelvis was copiously irrigated and suctioned. Excellent hemostasis was noted. The lopez catheter was removed. A cystoscope was advanced through the urethra. The bladder was inspected and found to be intact. Both ureters were seen effluxing urine. The cystoscope was removed and the lopez catheter was again inserted through the urethra. Attention was turned to the anterior abdominal wall. The abdomen was deflated and all trocars were removed. Hemostasis was achieved at the trocar sites with the Bovie. The skin was closed with 4-0 Monocryl in a subcuticular fashion. Sponge, lap, and needle counts were correct x 2. The patient was taken to the recovery room in stable condition with the lopez draining urine.     Noemi Pierre MD  OB/GYN

## 2019-09-09 ENCOUNTER — PATIENT MESSAGE (OUTPATIENT)
Dept: OBSTETRICS AND GYNECOLOGY | Facility: CLINIC | Age: 59
End: 2019-09-09

## 2019-09-16 ENCOUNTER — PATIENT MESSAGE (OUTPATIENT)
Dept: OBSTETRICS AND GYNECOLOGY | Facility: CLINIC | Age: 59
End: 2019-09-16

## 2019-09-27 ENCOUNTER — OFFICE VISIT (OUTPATIENT)
Dept: ORTHOPEDICS | Facility: CLINIC | Age: 59
End: 2019-09-27
Payer: COMMERCIAL

## 2019-09-27 VITALS
SYSTOLIC BLOOD PRESSURE: 140 MMHG | HEIGHT: 64 IN | BODY MASS INDEX: 47.8 KG/M2 | DIASTOLIC BLOOD PRESSURE: 85 MMHG | HEART RATE: 86 BPM | WEIGHT: 280 LBS

## 2019-09-27 DIAGNOSIS — E66.01 MORBID OBESITY WITH BMI OF 45.0-49.9, ADULT: Primary | ICD-10-CM

## 2019-09-27 DIAGNOSIS — G89.29 CHRONIC PAIN OF LEFT KNEE: ICD-10-CM

## 2019-09-27 DIAGNOSIS — M25.562 CHRONIC PAIN OF LEFT KNEE: ICD-10-CM

## 2019-09-27 DIAGNOSIS — M17.12 PRIMARY OSTEOARTHRITIS OF LEFT KNEE: ICD-10-CM

## 2019-09-27 PROCEDURE — 20610 LARGE JOINT ASPIRATION/INJECTION: L KNEE: ICD-10-PCS | Mod: LT,S$GLB,, | Performed by: NURSE PRACTITIONER

## 2019-09-27 PROCEDURE — 99999 PR PBB SHADOW E&M-EST. PATIENT-LVL III: ICD-10-PCS | Mod: PBBFAC,,, | Performed by: NURSE PRACTITIONER

## 2019-09-27 PROCEDURE — 99203 OFFICE O/P NEW LOW 30 MIN: CPT | Mod: 25,S$GLB,, | Performed by: NURSE PRACTITIONER

## 2019-09-27 PROCEDURE — 99203 PR OFFICE/OUTPT VISIT, NEW, LEVL III, 30-44 MIN: ICD-10-PCS | Mod: 25,S$GLB,, | Performed by: NURSE PRACTITIONER

## 2019-09-27 PROCEDURE — 99999 PR PBB SHADOW E&M-EST. PATIENT-LVL III: CPT | Mod: PBBFAC,,, | Performed by: NURSE PRACTITIONER

## 2019-09-27 PROCEDURE — 20610 DRAIN/INJ JOINT/BURSA W/O US: CPT | Mod: LT,S$GLB,, | Performed by: NURSE PRACTITIONER

## 2019-09-27 RX ORDER — TRIAMCINOLONE ACETONIDE 40 MG/ML
40 INJECTION, SUSPENSION INTRA-ARTICULAR; INTRAMUSCULAR
Status: DISCONTINUED | OUTPATIENT
Start: 2019-09-27 | End: 2019-09-27 | Stop reason: HOSPADM

## 2019-09-27 RX ADMIN — TRIAMCINOLONE ACETONIDE 40 MG: 40 INJECTION, SUSPENSION INTRA-ARTICULAR; INTRAMUSCULAR at 11:09
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
## 2019-09-27 NOTE — PATIENT INSTRUCTIONS
Understanding Osteoarthritis of the Knee    A joint is a place where two bones meet. The knee is called a hinge joint. This joint is formed where the thighbone (femur) meets the shinbone (tibia). A healthy knee joint bends freely. Knee osteoarthritis is a condition where parts of the knee joint wear out. This can lead to pain, stiffness, and limited movement.   What is osteoarthritis?  Every joint contains a smooth tissue called cartilage. Cartilage cushions the ends of bones and helps bones in a joint glide smoothly against each other. Knee osteoarthritis occurs when cartilage in the knee joint begins to break down and wear away. Bones may become exposed and rub together. The cartilage may become irritated and rough. This prevents smooth movement of the joint and can lead to pain.  Causes of osteoarthritis of the knee  Causes can include:  · Wear and tear from normal use over time  · Overuse of the knee during sports or work activities  · Being overweight. This increases stress on the knee joint.  · Misalignment of the knee joint  · Injury to the knee  Symptoms of osteoarthritis of the knee  Common symptoms include:  · Pain and swelling around the joint. The pain and swelling get worse with activity and better with rest.  · Grinding sound when moving the knee  · Reduced knee movement  · Knee stiffness. This is often worse first thing in the morning.  Treating osteoarthritis of the knee  Osteoarthritis is a long-term condition. Treatment usually focuses on managing symptoms. Treatment may include:  · Over-the-counter or prescription medicines taken by mouth to help relieve pain and swelling  · Injections of medicine into the joint to help relieve symptoms for a time  · A weight-loss plan for people who are overweight  · A plan of physical therapy and exercises to improve the strength and flexibility of the muscles around the knee  · Heat or cold therapy to help relieve pain and stiffness  · Assistive devices that  help with movement, such as a cane or a walker  · Assistive devices that make activities of daily life easier, such as raised toilet seats or shower bars  If other treatments dont do enough to relieve symptoms, you may need surgery to replace the joint. During this surgery, the damaged joint is removed. An artificial knee joint is then put into place. This can help relieve pain and stiffness and restore movement of the knee.     When to call your healthcare provider  Call your healthcare provider right away if you have any of these:  · Fever of 100.4°F (38°C) or higher, or as directed  · Symptoms that dont get better with prescribed medicines or get worse  · New symptoms   Date Last Reviewed: 3/10/2016  © 5775-3421 [employer]. [address] [place] [address], [place] [address]. All rights reserved. This information is not intended as a substitute for professional medical care. Always follow your healthcare professional's instructions.

## 2019-09-27 NOTE — PROCEDURES
Large Joint Aspiration/Injection: L knee  Date/Time: 9/27/2019 11:15 AM  Performed by: MELLISA Hamilton  Authorized by: MELLISA Hamilton     Consent Done?:  Yes (Verbal)  Indications:  Pain  Timeout: Prior to procedure the correct patient, procedure, and site was verified      Location:  Knee  Site:  L knee  Prep: Patient was prepped and draped in usual sterile fashion    Needle size:  22 G  Ultrasonic Guidance for needle placement: No  Approach:  Anterolateral  Medications:  40 mg triamcinolone acetonide 40 mg/mL  Patient tolerance:  Patient tolerated the procedure well with no immediate complications

## 2019-10-03 ENCOUNTER — PATIENT MESSAGE (OUTPATIENT)
Dept: OBSTETRICS AND GYNECOLOGY | Facility: CLINIC | Age: 59
End: 2019-10-03

## 2019-10-10 ENCOUNTER — PATIENT MESSAGE (OUTPATIENT)
Dept: OBSTETRICS AND GYNECOLOGY | Facility: CLINIC | Age: 59
End: 2019-10-10

## 2019-10-10 ENCOUNTER — TELEPHONE (OUTPATIENT)
Dept: OBSTETRICS AND GYNECOLOGY | Facility: CLINIC | Age: 59
End: 2019-10-10

## 2019-10-10 NOTE — TELEPHONE ENCOUNTER
----- Message from Cindy Pierre sent at 10/10/2019  2:04 PM CDT -----  Contact: TAD MANRIQUE [[id number]]  Name of Who is Calling:TAD MANRIQUE [[id number]]       What is the request in detail: Pt is requesting to speak with someone in staff regarding a fax that she received .      Can the clinic reply by [facility]: Y    What Number to Call Back if not in [facility]: [phone]

## 2019-10-14 ENCOUNTER — TELEPHONE (OUTPATIENT)
Dept: OBSTETRICS AND GYNECOLOGY | Facility: CLINIC | Age: 59
End: 2019-10-14

## 2019-10-14 ENCOUNTER — OFFICE VISIT (OUTPATIENT)
Dept: OBSTETRICS AND GYNECOLOGY | Facility: CLINIC | Age: 59
End: 2019-10-14
Payer: COMMERCIAL

## 2019-10-14 VITALS — SYSTOLIC BLOOD PRESSURE: 118 MMHG | WEIGHT: 281.94 LBS | BODY MASS INDEX: 48.4 KG/M2 | DIASTOLIC BLOOD PRESSURE: 72 MMHG

## 2019-10-14 DIAGNOSIS — K06.9 DISEASE OF GINGIVA DUE TO RECURRENT ORAL HERPES SIMPLEX VIRUS (HSV) INFECTION: ICD-10-CM

## 2019-10-14 DIAGNOSIS — Z90.710 S/P LAPAROSCOPIC HYSTERECTOMY: Primary | ICD-10-CM

## 2019-10-14 DIAGNOSIS — N95.0 PMB (POSTMENOPAUSAL BLEEDING): ICD-10-CM

## 2019-10-14 DIAGNOSIS — B00.9 DISEASE OF GINGIVA DUE TO RECURRENT ORAL HERPES SIMPLEX VIRUS (HSV) INFECTION: ICD-10-CM

## 2019-10-14 PROCEDURE — 99024 POSTOP FOLLOW-UP VISIT: CPT | Mod: S$GLB,,, | Performed by: OBSTETRICS & GYNECOLOGY

## 2019-10-14 PROCEDURE — 99024 PR POST-OP FOLLOW-UP VISIT: ICD-10-PCS | Mod: S$GLB,,, | Performed by: OBSTETRICS & GYNECOLOGY

## 2019-10-14 RX ORDER — VALACYCLOVIR HYDROCHLORIDE 500 MG/1
500 TABLET, FILM COATED ORAL DAILY
Qty: 30 TABLET | Refills: 11 | Status: ON HOLD | OUTPATIENT
Start: 2019-10-14 | End: 2020-09-22 | Stop reason: SDUPTHER

## 2019-10-14 NOTE — TELEPHONE ENCOUNTER
----- Message from Noemi Pierre MD sent at 10/14/2019  3:00 PM CDT -----  Patient would like a hormone consult. Thanks!

## 2019-10-14 NOTE — PROGRESS NOTES
Subjective:       Patient ID: Fatemeh Shoemaker is a 58 y.o. female.    Chief Complaint:  Post-op Evaluation (hysterectomy, c/o hot flashes have worsened. Sts, feels like she is burning from the inside to outside)    History of Present Illness:  HPI  Postoperative Follow-up  Patient presents to the clinic 6 weeks status post TLH/BSO for PMB. Eating a regular diet without difficulty. Bowel movements are normal. The patient is not having any pain. She stopped HRT with surgery and has not noticed much change to hot flushes, was still having hot flushes on HRT.     FINAL PATHOLOGIC DIAGNOSIS  119 G HYSTERECTOMY SPECIMEN:  INNOCUOUS ENDOMETRIUM WITH NO EVIDENCE OF CARCINOMA OR HYPERPLASIA  NO DYSPLASIA IDENTIFIED IN THE CERVIX  FALLOPIAN TUBES AND OVARIES:  NO SIGNIFICANT MORPHOLOGIC ALTERATION    GYN & OB History  Patient's last menstrual period was 2017.   Date of Last Pap: 2017    OB History    Para Term  AB Living   4 2 2     2   SAB TAB Ectopic Multiple Live Births                  # Outcome Date GA Lbr Giles/2nd Weight Sex Delivery Anes PTL Lv   4 Term            3 Term            2      F CS-Unspec      1      F CS-Unspec          Review of Systems  Review of Systems   Constitutional: Negative for chills, diaphoresis, fatigue and fever.   Respiratory: Negative for cough and shortness of breath.    Cardiovascular: Negative for chest pain and palpitations.   Gastrointestinal: Negative for abdominal pain, constipation, diarrhea, nausea and vomiting.   Genitourinary: Negative for dyspareunia, menstrual problem, pelvic pain, vaginal bleeding, vaginal discharge, vaginal pain and postmenopausal bleeding.   Neurological: Negative for headaches.   Psychiatric/Behavioral: Negative for depression. The patient is not nervous/anxious.            Objective:    Physical Exam:   Constitutional: She is oriented to person, place, and time. She appears well-developed and well-nourished. No  distress.    HENT:   Head: Normocephalic and atraumatic.    Eyes: EOM are normal.    Neck: Normal range of motion.     Pulmonary/Chest: Effort normal.          Genitourinary:   Genitourinary Comments: Normal external female genitalia. Vagina atrophic, vaginal cuff intact. Uterus/cervix surgically absent. No adnexal masses palpated.             Musculoskeletal: Normal range of motion and moves all extremeties.       Neurological: She is alert and oriented to person, place, and time.    Skin: Skin is warm. No rash noted.    Psychiatric: She has a normal mood and affect. Her behavior is normal. Judgment and thought content normal.          Assessment:        1. S/P TLH/BSO, 9/3/19    2. PMB (postmenopausal bleeding)    3. 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    Diagnoses and all orders for this visit:    S/P TLH/BSO, 9/3/19  - Doing well postop.   - Reviewed benign path.   - May resume normal activities.  - Referral placed for hormone consult with Dr. Al.    PMB (postmenopausal bleeding)    No orders of the defined types were placed in this encounter.      Follow up in about 1 year (around 10/14/2020) for annual.

## 2019-10-15 ENCOUNTER — TELEPHONE (OUTPATIENT)
Dept: OBSTETRICS AND GYNECOLOGY | Facility: CLINIC | Age: 59
End: 2019-10-15

## 2019-10-15 NOTE — TELEPHONE ENCOUNTER
----- Message from Libby Dubose sent at 10/15/2019 10:50 AM CDT -----  Contact: TAD MANRIQUE   Type:  Patient Returning Call    Who Called: TAD MANRIQUE     Who Left Message for Patient: Gracia    Does the patient know what this is regarding?: unknown    Can the clinic reply in [facility]: no    Best Call Back Number: [phone]    Additional Information:

## 2019-10-16 DIAGNOSIS — I48.0 PAROXYSMAL ATRIAL FIBRILLATION: ICD-10-CM

## 2019-10-16 RX ORDER — METOPROLOL TARTRATE 25 MG/1
25 TABLET, FILM COATED ORAL 2 TIMES DAILY
Qty: 60 TABLET | Refills: 2 | Status: SHIPPED | OUTPATIENT
Start: 2019-10-16 | End: 2020-01-28 | Stop reason: SDUPTHER

## 2019-10-17 ENCOUNTER — LAB VISIT (OUTPATIENT)
Dept: LAB | Facility: OTHER | Age: 59
End: 2019-10-17
Attending: OBSTETRICS & GYNECOLOGY
Payer: COMMERCIAL

## 2019-10-17 ENCOUNTER — OFFICE VISIT (OUTPATIENT)
Dept: OBSTETRICS AND GYNECOLOGY | Facility: CLINIC | Age: 59
End: 2019-10-17
Attending: OBSTETRICS & GYNECOLOGY
Payer: COMMERCIAL

## 2019-10-17 ENCOUNTER — TELEPHONE (OUTPATIENT)
Dept: OBSTETRICS AND GYNECOLOGY | Facility: CLINIC | Age: 59
End: 2019-10-17

## 2019-10-17 VITALS
WEIGHT: 281.31 LBS | SYSTOLIC BLOOD PRESSURE: 102 MMHG | BODY MASS INDEX: 48.03 KG/M2 | HEIGHT: 64 IN | DIASTOLIC BLOOD PRESSURE: 66 MMHG

## 2019-10-17 DIAGNOSIS — Z90.710 S/P LAPAROSCOPIC HYSTERECTOMY: ICD-10-CM

## 2019-10-17 DIAGNOSIS — N95.1 MENOPAUSAL SYMPTOMS: Primary | ICD-10-CM

## 2019-10-17 DIAGNOSIS — N95.1 MENOPAUSAL SYMPTOMS: ICD-10-CM

## 2019-10-17 DIAGNOSIS — R92.8 ABNORMAL MAMMOGRAM: ICD-10-CM

## 2019-10-17 DIAGNOSIS — R92.8 ABNORMAL MAMMOGRAM: Primary | ICD-10-CM

## 2019-10-17 PROBLEM — K81.0 ACUTE CHOLECYSTITIS: Status: RESOLVED | Noted: 2017-12-27 | Resolved: 2019-10-17

## 2019-10-17 PROBLEM — Z98.890 STATUS POST HYSTEROSCOPIC POLYPECTOMY: Status: RESOLVED | Noted: 2017-09-28 | Resolved: 2019-10-17

## 2019-10-17 PROBLEM — N95.0 POSTMENOPAUSAL BLEEDING: Status: RESOLVED | Noted: 2017-09-20 | Resolved: 2019-10-17

## 2019-10-17 PROBLEM — N95.0 PMB (POSTMENOPAUSAL BLEEDING): Status: RESOLVED | Noted: 2019-09-03 | Resolved: 2019-10-17

## 2019-10-17 PROBLEM — Z01.818 PREOP TESTING: Status: RESOLVED | Noted: 2017-12-21 | Resolved: 2019-10-17

## 2019-10-17 LAB
ESTRADIOL SERPL-MCNC: 32 PG/ML
FSH SERPL-ACNC: 28.8 MIU/ML

## 2019-10-17 PROCEDURE — 3074F SYST BP LT 130 MM HG: CPT | Mod: CPTII,S$GLB,, | Performed by: OBSTETRICS & GYNECOLOGY

## 2019-10-17 PROCEDURE — 3078F PR MOST RECENT DIASTOLIC BLOOD PRESSURE < 80 MM HG: ICD-10-PCS | Mod: CPTII,S$GLB,, | Performed by: OBSTETRICS & GYNECOLOGY

## 2019-10-17 PROCEDURE — 99214 PR OFFICE/OUTPT VISIT, EST, LEVL IV, 30-39 MIN: ICD-10-PCS | Mod: 24,S$GLB,, | Performed by: OBSTETRICS & GYNECOLOGY

## 2019-10-17 PROCEDURE — 3078F DIAST BP <80 MM HG: CPT | Mod: CPTII,S$GLB,, | Performed by: OBSTETRICS & GYNECOLOGY

## 2019-10-17 PROCEDURE — 3008F BODY MASS INDEX DOCD: CPT | Mod: CPTII,S$GLB,, | Performed by: OBSTETRICS & GYNECOLOGY

## 2019-10-17 PROCEDURE — 82670 ASSAY OF TOTAL ESTRADIOL: CPT

## 2019-10-17 PROCEDURE — 84402 ASSAY OF FREE TESTOSTERONE: CPT

## 2019-10-17 PROCEDURE — 99999 PR PBB SHADOW E&M-EST. PATIENT-LVL III: ICD-10-PCS | Mod: PBBFAC,,, | Performed by: OBSTETRICS & GYNECOLOGY

## 2019-10-17 PROCEDURE — 3008F PR BODY MASS INDEX (BMI) DOCUMENTED: ICD-10-PCS | Mod: CPTII,S$GLB,, | Performed by: OBSTETRICS & GYNECOLOGY

## 2019-10-17 PROCEDURE — 99999 PR PBB SHADOW E&M-EST. PATIENT-LVL III: CPT | Mod: PBBFAC,,, | Performed by: OBSTETRICS & GYNECOLOGY

## 2019-10-17 PROCEDURE — 83001 ASSAY OF GONADOTROPIN (FSH): CPT

## 2019-10-17 PROCEDURE — 99214 OFFICE O/P EST MOD 30 MIN: CPT | Mod: 24,S$GLB,, | Performed by: OBSTETRICS & GYNECOLOGY

## 2019-10-17 PROCEDURE — 3074F PR MOST RECENT SYSTOLIC BLOOD PRESSURE < 130 MM HG: ICD-10-PCS | Mod: CPTII,S$GLB,, | Performed by: OBSTETRICS & GYNECOLOGY

## 2019-10-17 NOTE — PROGRESS NOTES
Subjective:       Patient ID: Fatemeh Shoemaker is a 58 y.o. female.    Chief Complaint:  Menopause (hormone consult)      History of Present Illness  HPI  Hormone Replacement Counseling  Patient presents to discuss hormone replacement therapy. Patient is requesting hormone replacement therapy due to hot flashes, insomnia and night sweats and decreased libido. The patient is not currently taking hormone replacement therapy. Patient denies post-menopausal vaginal bleeding. She is currently 6 weeks S/P TLHBSO for PMB on HRT which was initiated in 2018 for severe vasomotor symptoms.  The patient is not currently sexually active. GYN screening history: last pap: approximate date  and was normal and last mammogram: approximate date 2019 and was abnormal: additional studies/short term followup in 6 months recommended. . Patient is hormone deficient due to menopause, which occurred at age 53(recently had TLHBSO). The patient currently has symptoms of hot flashes, insomnia, no energy, vaginal dryness, night sweats.     Current hormone therapy:   none    Previous hormone therapy:   Estrogen and Progesterone  Reason for starting HRT: hot flashes  Bleeding in past on HRT: menometrorrhagia                        GYN & OB History  Patient's last menstrual period was 2013 (approximate).   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 TOLU Bowers, [facility]     csection      CS x 2    CYSTOSCOPY N/A 9/3/2019    Procedure: CYSTOSCOPY;  Surgeon: Noemi Pierre MD;  Location: [facility];  Service: OB/GYN;  Laterality: N/A;    DILATION AND CURETTAGE OF UTERUS      HIP PINNING      HYSTERECTOMY      LAPAROSCOPIC SALPINGO-OOPHORECTOMY Bilateral 9/3/2019    Procedure: SALPINGO-OOPHORECTOMY, LAPAROSCOPIC;  Surgeon: Noemi Pierre MD;  Location: [facility];  Service: OB/GYN;  Laterality: Bilateral;  Dr. Bentley to assist. No resident needed.     LAPAROSCOPIC TOTAL HYSTERECTOMY N/A 9/3/2019    Procedure: HYSTERECTOMY, TOTAL, LAPAROSCOPIC;  Surgeon: Noemi Pierre MD;  Location: [facility];  Service: OB/GYN;  Laterality: N/A;    NASAL SEPTUM SURGERY      OOPHORECTOMY      TOTAL HIP ARTHROPLASTY Right     TUBAL LIGATION  1997       Review of Systems  Review of Systems   Endocrine: Positive for hot flashes.   Genitourinary: Positive for decreased libido, frequency, hot flashes and vaginal dryness. Negative for bladder incontinence and dysuria.   Psychiatric/Behavioral: Positive for sleep disturbance.           Objective:      /66   Ht 5' 4" (1.626 m)   Wt 127.6 kg (281 lb 4.9 oz)   LMP 09/08/2013 (Approximate)   BMI 48.29 kg/m²   Physical Exam         Assessment:        1. Menopausal symptoms    2. S/P TLH/BSO, 9/3/19    3. Abnormal mammogram                Plan:        1. Menopausal symptoms  Fatemeh Shoemaker was seen today for counseling and discussion on menopausal symptoms and hormone management.     Patient's multiple complaints include decreased libido, hot flashes, insomnia, no energy, night sweats.    The medical discussion explained that hormone deficiency is NOT an abnormal medical condition but rather is usually a normal course of events in women's lives. I discussed with the patient the following changes to women's systems related to hormone deficiency: (see " above)    Additionally, the issues related to hormone deficiency caused by surgery were explained in detail. Conflicting information from popular publications was discussed in detail as well as information from medical papers, reports, and journals. All questions were answered in detail.    This patient does not have a family history of thrombo/embolic issues.     PE: Deferred    The different routes of hormone management (oral, transdermal, injectable, subcutaneous pellets) were explained. The benefits, risks, and side effects of each method were noted and discussed. Medical consequences of not receiving hormones were also explained, particularly concerning degenerative conditions (i.e. Osteoporosis)  and relating to preventive medicine. It was explained that individualized hormone management is the primary goal of this practice.    DIAGNOSES:  1. Menopausal symptoms    2. S/P TLH/BSO, 9/3/19    3. Abnormal mammogram          PLAN:   Orders Placed This Encounter    US Breast Right Limited    Estradiol    Follicle stimulating hormone    Testosterone, free     Discussed with patient need for clearance from cardiology prior to resuming HRT, but that estradiol therapy alone, transdermally does not significantly increase thrombotic risk. (patient currently on anticoagulant therapy).  Further, will need to further evaluate breast issues which studies were recommended for short term follow up.    Total time for this visit was 40 minute, more than half spent counseling the patient on the above hormone related issues.       - Estradiol; Future  - Follicle stimulating hormone; Future  - Testosterone, free; Future    Consider transdermal estradiol, and may consider low dose Testosterone .      2. S/P TLH/BSO, 9/3/19      3. Abnormal mammogram    Diagnostic mammogram.  - US Breast Right Limited; Future       Follow up if symptoms worsen or fail to improve.

## 2019-10-17 NOTE — Clinical Note
Patient is requesting resumption of use of ERT for management of her severe vasomotor symptoms. As she is currently on anticoagulants, and transdermal estradiol has not been shown to significantly increase thrombotic risk, please comment on possible use of transdermal estradiol for this patient. Thank you,Karen Al MD

## 2019-10-21 LAB — TESTOST FREE SERPL-MCNC: 0.9 PG/ML

## 2019-10-24 DIAGNOSIS — I48.0 PAROXYSMAL ATRIAL FIBRILLATION: ICD-10-CM

## 2019-10-24 RX ORDER — FLECAINIDE ACETATE 100 MG/1
100 TABLET ORAL EVERY 12 HOURS
Qty: 180 TABLET | Refills: 1 | Status: SHIPPED | OUTPATIENT
Start: 2019-10-24 | End: 2020-02-27 | Stop reason: SDUPTHER

## 2019-10-31 ENCOUNTER — TELEPHONE (OUTPATIENT)
Dept: OBSTETRICS AND GYNECOLOGY | Facility: CLINIC | Age: 59
End: 2019-10-31

## 2019-10-31 NOTE — TELEPHONE ENCOUNTER
Discussed use of transdermal estrogen in a low dose with the patient. Awaiting clearance of breast mass prior to beginning medication. Patient to contact us re: results.

## 2019-10-31 NOTE — TELEPHONE ENCOUNTER
----- Message from Manny Bojorquez MD sent at 10/18/2019  7:11 AM CDT -----  Hi Karen Adam question. I honestly would defer to you on this matter. If the literature notes no significant thrombotic risk then I have no objection to the use of transdermal estradiol.     Thanks for reaching out to me!    Regards    Manny    ----- Message -----  From: Karen Al MD  Sent: 10/17/2019   6:03 PM CDT  To: Manny Bojorquez MD    Patient is requesting resumption of use of ERT for management of her severe vasomotor symptoms. As she is currently on anticoagulants, and transdermal estradiol has not been shown to significantly increase thrombotic risk, please comment on possible use of transdermal estradiol for this patient.   Thank you,  Karen Al MD

## 2019-11-08 DIAGNOSIS — M54.2 CERVICALGIA: ICD-10-CM

## 2019-11-08 RX ORDER — TIZANIDINE 4 MG/1
4 TABLET ORAL EVERY 6 HOURS PRN
Qty: 30 TABLET | Refills: 5 | Status: ON HOLD | OUTPATIENT
Start: 2019-11-08 | End: 2020-09-22 | Stop reason: SDUPTHER

## 2019-11-08 RX ORDER — NORTRIPTYLINE HYDROCHLORIDE 25 MG/1
25 CAPSULE ORAL NIGHTLY
Qty: 90 CAPSULE | Refills: 3 | Status: SHIPPED | OUTPATIENT
Start: 2019-11-08 | End: 2021-03-03 | Stop reason: SDUPTHER

## 2019-11-15 ENCOUNTER — HOSPITAL ENCOUNTER (OUTPATIENT)
Dept: RADIOLOGY | Facility: HOSPITAL | Age: 59
Discharge: HOME | End: 2019-11-15
Attending: OBSTETRICS & GYNECOLOGY
Payer: COMMERCIAL

## 2019-11-15 DIAGNOSIS — R92.8 ABNORMAL MAMMOGRAM: ICD-10-CM

## 2019-11-15 PROCEDURE — 77066 MAMMO DIGITAL DIAGNOSTIC BILAT WITH TOMOSYNTHESIS_CAD: ICD-10-PCS | Mod: 26,,, | Performed by: RADIOLOGY

## 2019-11-15 PROCEDURE — 76642 ULTRASOUND BREAST LIMITED: CPT | Mod: TC,PO,RT

## 2019-11-15 PROCEDURE — 77066 DX MAMMO INCL CAD BI: CPT | Mod: 26,,, | Performed by: RADIOLOGY

## 2019-11-15 PROCEDURE — 77062 MAMMO DIGITAL DIAGNOSTIC BILAT WITH TOMOSYNTHESIS_CAD: ICD-10-PCS | Mod: 26,,, | Performed by: RADIOLOGY

## 2019-11-15 PROCEDURE — 77062 BREAST TOMOSYNTHESIS BI: CPT | Mod: 26,,, | Performed by: RADIOLOGY

## 2019-11-15 PROCEDURE — 76642 US BREAST RIGHT LIMITED: ICD-10-PCS | Mod: 26,RT,, | Performed by: RADIOLOGY

## 2019-11-15 PROCEDURE — 76642 ULTRASOUND BREAST LIMITED: CPT | Mod: 26,RT,, | Performed by: RADIOLOGY

## 2019-11-15 PROCEDURE — 77062 BREAST TOMOSYNTHESIS BI: CPT | Mod: TC,PO

## 2019-11-27 ENCOUNTER — PATIENT MESSAGE (OUTPATIENT)
Dept: OBSTETRICS AND GYNECOLOGY | Facility: CLINIC | Age: 59
End: 2019-11-27

## 2019-11-27 ENCOUNTER — TELEPHONE (OUTPATIENT)
Dept: GASTROENTEROLOGY | Facility: CLINIC | Age: 59
End: 2019-11-27

## 2019-11-27 DIAGNOSIS — N95.1 MENOPAUSAL SYMPTOMS: Primary | ICD-10-CM

## 2019-11-27 NOTE — TELEPHONE ENCOUNTER
Pt is going to see if she can find a  so she can have scope done in [place].   Pt will call back so schedule.

## 2019-11-27 NOTE — TELEPHONE ENCOUNTER
----- Message from Sofia Gonzalez sent at 11/26/2019  2:23 PM CST -----  Regarding: External Patient Referral  Good Afternoon,    Liseth Owen NP would like to refer the following patient to [facility] for a colonoscopy. I have a scanned the following patient's referral and records into . If there are any further questions in regards to the patient, please contact me at my extension.    Thank you,   [facility]

## 2019-11-27 NOTE — TELEPHONE ENCOUNTER
Spoke with pt. Scheduled c-scope. Pt verbalized understanding.   Prep instructions & reminder letter placed in mail.       Can pt safely hold eliquis for 2 days prior to colonoscopy on 2/3?

## 2019-11-27 NOTE — TELEPHONE ENCOUNTER
"Called patient to discuss her desire for "Bio identical creams".    Left message on voice mail. (per her physician, ok to use low dose transdermal if low risk )    Consider patch, cream, or vaginal ring.   "

## 2019-11-29 RX ORDER — ESTRADIOL 0.5 MG/.5G
1 GEL TOPICAL DAILY
Qty: 30 PACKET | Refills: 12 | Status: SHIPPED | OUTPATIENT
Start: 2019-11-29 | End: 2020-02-13

## 2019-11-29 NOTE — TELEPHONE ENCOUNTER
Discussed options of transdermal estradiol.  Did not get relief with patches prior to hysterectomy.    Discussed daily use of gel vs vaginal ring.     Plan on use of Divigel.low dose.   Advised to update me with results in about 6 weeks.

## 2019-12-19 DIAGNOSIS — G62.9 NEUROPATHY: ICD-10-CM

## 2019-12-19 DIAGNOSIS — R20.2 PARESTHESIAS: ICD-10-CM

## 2019-12-19 RX ORDER — DULOXETIN HYDROCHLORIDE 60 MG/1
120 CAPSULE, DELAYED RELEASE ORAL DAILY
Qty: 180 CAPSULE | Refills: 1 | Status: SHIPPED | OUTPATIENT
Start: 2019-12-19 | End: 2020-01-20 | Stop reason: SDUPTHER

## 2020-01-03 ENCOUNTER — PATIENT MESSAGE (OUTPATIENT)
Dept: OBSTETRICS AND GYNECOLOGY | Facility: CLINIC | Age: 60
End: 2020-01-03

## 2020-01-14 DIAGNOSIS — I48.0 PAROXYSMAL ATRIAL FIBRILLATION: ICD-10-CM

## 2020-01-14 DIAGNOSIS — I49.8 OTHER SPECIFIED CARDIAC ARRHYTHMIAS: Primary | ICD-10-CM

## 2020-01-14 RX ORDER — OMEPRAZOLE 20 MG/1
20 CAPSULE, DELAYED RELEASE ORAL DAILY
Qty: 30 CAPSULE | Refills: 1 | Status: SHIPPED | OUTPATIENT
Start: 2020-01-14 | End: 2020-04-27 | Stop reason: SDUPTHER

## 2020-01-16 NOTE — PROGRESS NOTES
"Ms. Shoemaker is a patient of Dr. Bojorquez and was last seen in clinic 11/16/2018.      Subjective:   Patient ID:  Fatemeh Shoemaker is a 59 y.o. female who presents for follow-up of Atrial Fibrillation  .     HPI:    Ms. Shoemaker is a 59 y.o. female with pAF, HTN, bleeding ulcer hx, and obesity here for follow up.    Background:    Ms. Shoemaker has a history of hypertension, morbid obesity and prior bleeding esophageal ulcer. She was in her usual state of health until this past year. She noted intermittent palpitations/fluttering in her chest. Sometimes accompanied by chest discomfort. She reported she was visiting her parent's [place] [address] in [place], [place] and developed rapid palpitations and a nosebleed. She went to [facility]. She was noted to be in atrial fibrillation with rapid ventricular response (rate of 150bpm). She was observed and converted spontaneously to sinus rhythm. She was discharged with metoprolol increased metoprolol (100mg bid from 25 mg bid) and referred here for evaluation. She noted having these symptoms of palpitations 3-4 times a month. She noted sometimes she also can feel a "pounding" sensation in her neck. She has chronic shortness of breath with exertion that she attributes to being overweight. She has also noted recurrence of a center chest discomfort similar to her prior ulcer but denies any melena or bloody stools. An echocardiogram showed preserved LVEF and PET stress showed no ischemia.  She was started on flecainide.     Since starting flecainide she has had only rare, short episodes of palpitations. At her last clinic appt 3/2018 she was doing well with no palpitations.     She had a negative PET stress 1/2017.      Was stable at clinic visit 11/2018. Did have some chronic ASHBY likely cardiac deconditioning.    Update (01/20/2020):    Today she says she thinks she may have very brief AF episodes but these resolve quickly with an extra " metoprolol. She also reports worsening fatigue, mild chronic ASHBY. Denies CP, LH, syncope.    She is currently taking eliquis 5mg BID for stroke prophylaxis and denies significant bleeding episodes. She is currently being treated with flecainide 100mg BID for rhythm control and metoprolol succinate 25mg BID for HR control.  Kidney function is stable, with a creatinine of 0.7 on 8/1/2019.    I have personally reviewed the patient's EKG today, which shows sinus rhythm at 59bpm. WA interval is 204. QRS is 110. QT is 450.    Recent Cardiac Tests:    ECHO 1/9/2017  CONCLUSIONS     1 - Normal left ventricular systolic function (EF 60-65%).     2 - Mild left atrial enlargement.     3 - Normal left ventricular diastolic function.     4 - Normal right ventricular systolic function .     Current Outpatient Medications   Medication Sig    apixaban (ELIQUIS) 5 mg Tab Take 1 tablet (5 mg total) by mouth 2 (two) times daily.    DULoxetine (CYMBALTA) 60 MG capsule Take 2 capsules (120 mg total) by mouth once daily. NO FURTHER REFILLS WITHOUT APPOINTMENT    estradiol (DIVIGEL) 0.5 mg/0.5 gram (0.1 %) GlPk Place 1 application onto the skin once daily.    flecainide (TAMBOCOR) 100 MG Tab Take 1 tablet (100 mg total) by mouth every 12 (twelve) hours.    gabapentin (NEURONTIN) 600 MG tablet Take 1 tablet (600 mg total) by mouth 3 (three) times daily.    hydroCHLOROthiazide (HYDRODIURIL) 25 MG tablet Take 1 tablet (25 mg total) by mouth once daily.    ibuprofen (ADVIL,MOTRIN) 600 MG tablet Take 1 tablet (600 mg total) by mouth every 6 (six) hours as needed for Pain.    lisinopril 10 MG tablet Take 1 tablet (10 mg total) by mouth once daily.    loratadine (CLARITIN ORAL) Take by mouth as needed.    metoprolol tartrate (LOPRESSOR) 25 MG tablet Take 1 tablet (25 mg total) by mouth 2 (two) times daily.    nortriptyline (PAMELOR) 25 MG capsule Take 1 capsule (25 mg total) by mouth every evening.    omeprazole (PRILOSEC) 20 MG  "capsule Take 1 capsule (20 mg total) by mouth once daily.    rosuvastatin (CRESTOR) 10 MG tablet Take 10 mg by mouth every evening.     tiZANidine (ZANAFLEX) 4 MG tablet Take 1 tablet (4 mg total) by mouth every 6 (six) hours as needed.    valACYclovir (VALTREX) 500 MG tablet Take 1 tablet (500 mg total) by mouth once daily.     No current facility-administered medications for this visit.        Review of Systems   Constitution: Positive for malaise/fatigue.   Cardiovascular: Positive for dyspnea on exertion (chronic) and palpitations (brief). Negative for chest pain, irregular heartbeat and leg swelling.   Respiratory: Negative for shortness of breath.    Hematologic/Lymphatic: Negative for bleeding problem.   Skin: Negative for rash.   Musculoskeletal: Negative for myalgias.   Gastrointestinal: Negative for hematemesis, hematochezia and nausea.   Genitourinary: Negative for hematuria.   Neurological: Negative for light-headedness.   Psychiatric/Behavioral: Negative for altered mental status.   Allergic/Immunologic: Negative for persistent infections.     Objective:        BP (!) 109/58   Pulse (!) 49   Ht 5' 4" (1.626 m)   Wt 131.9 kg (290 lb 12.6 oz)   LMP 09/08/2017   BMI 49.91 kg/m²     Physical Exam   Constitutional: She is oriented to person, place, and time. She appears well-developed and well-nourished.   HENT:   Head: Normocephalic.   Nose: Nose normal.   Eyes: Pupils are equal, round, and reactive to light.   Cardiovascular: Regular rhythm, S1 normal and S2 normal. Bradycardia present.   No murmur heard.  Pulses:       Radial pulses are 2+ on the right side, and 2+ on the left side.   Pulmonary/Chest: Breath sounds normal. No respiratory distress.   Abdominal: Normal appearance.   Musculoskeletal: Normal range of motion. She exhibits no edema.   Neurological: She is alert and oriented to person, place, and time.   Skin: Skin is warm and dry. No erythema.   Psychiatric: She has a normal mood and " affect. Her speech is normal and behavior is normal.   Nursing note and vitals reviewed.    Lab Results   Component Value Date     08/01/2019    K 3.8 08/01/2019    BUN 11 08/01/2019    CREATININE 0.7 08/01/2019    ALT 11 06/22/2018    AST 15 06/22/2018    HGB 9.4 (L) 09/04/2019    HCT 28.9 (L) 09/04/2019    TSH 1.204 06/22/2018    LDLCALC 75.6 06/22/2018       Recent Labs   Lab 01/30/17  0816   INR 0.9       Assessment:     1. Paroxysmal atrial fibrillation    2. Essential hypertension    3. Morbid obesity with BMI of 45.0-49.9, adult      Plan:     In summary, Ms. Shoemaker is a 57 y.o. female with pAF, HTN, bleeding ulcer hx, and obesity here for routine follow up.  She is doing well from a rhythm perspective, with her AF controlled on flecainide. Minimal brief palpitations which resolve with extra metoprolol. She is happy with her current regimen.  She is reporting increased fatigue. Will update echo. She is being treated for anemia and vitamin D deficiency by outside PCP, which could be contributing to her fatigue. She remains on eliquis for CVA prophylaxis.    Echo.  Continue current medications.  RTC 6 mo, sooner if needed.    *A copy of this note has been sent to Dr. Bojorquez*    Follow up in about 6 months (around 7/20/2020).    ------------------------------------------------------------------    MELLISA Marroquin, NP-C  Cardiac Electrophysiology

## 2020-01-20 ENCOUNTER — HOSPITAL ENCOUNTER (OUTPATIENT)
Dept: CARDIOLOGY | Facility: CLINIC | Age: 60
Discharge: HOME | End: 2020-01-20
Payer: COMMERCIAL

## 2020-01-20 ENCOUNTER — TELEPHONE (OUTPATIENT)
Dept: NEUROLOGY | Facility: CLINIC | Age: 60
End: 2020-01-20

## 2020-01-20 ENCOUNTER — OFFICE VISIT (OUTPATIENT)
Dept: ELECTROPHYSIOLOGY | Facility: CLINIC | Age: 60
End: 2020-01-20
Payer: COMMERCIAL

## 2020-01-20 ENCOUNTER — OFFICE VISIT (OUTPATIENT)
Dept: NEUROLOGY | Facility: CLINIC | Age: 60
End: 2020-01-20
Payer: COMMERCIAL

## 2020-01-20 VITALS
BODY MASS INDEX: 49.51 KG/M2 | SYSTOLIC BLOOD PRESSURE: 122 MMHG | HEART RATE: 53 BPM | WEIGHT: 290 LBS | DIASTOLIC BLOOD PRESSURE: 70 MMHG | HEIGHT: 64 IN

## 2020-01-20 VITALS
DIASTOLIC BLOOD PRESSURE: 58 MMHG | HEART RATE: 49 BPM | WEIGHT: 290.81 LBS | BODY MASS INDEX: 49.65 KG/M2 | SYSTOLIC BLOOD PRESSURE: 109 MMHG | HEIGHT: 64 IN

## 2020-01-20 DIAGNOSIS — M51.36 LUMBAR DEGENERATIVE DISC DISEASE: Primary | ICD-10-CM

## 2020-01-20 DIAGNOSIS — I48.0 PAROXYSMAL ATRIAL FIBRILLATION: Primary | ICD-10-CM

## 2020-01-20 DIAGNOSIS — R20.2 PARESTHESIAS: ICD-10-CM

## 2020-01-20 DIAGNOSIS — I10 ESSENTIAL HYPERTENSION: ICD-10-CM

## 2020-01-20 DIAGNOSIS — G62.9 NEUROPATHY: ICD-10-CM

## 2020-01-20 DIAGNOSIS — I49.8 OTHER SPECIFIED CARDIAC ARRHYTHMIAS: ICD-10-CM

## 2020-01-20 DIAGNOSIS — E66.01 MORBID OBESITY WITH BMI OF 45.0-49.9, ADULT: ICD-10-CM

## 2020-01-20 PROCEDURE — 3008F BODY MASS INDEX DOCD: CPT | Mod: CPTII,S$GLB,, | Performed by: NURSE PRACTITIONER

## 2020-01-20 PROCEDURE — 99999 PR PBB SHADOW E&M-EST. PATIENT-LVL III: ICD-10-PCS | Mod: PBBFAC,,, | Performed by: NURSE PRACTITIONER

## 2020-01-20 PROCEDURE — 93005 ELECTROCARDIOGRAM TRACING: CPT | Mod: S$GLB,,, | Performed by: NURSE PRACTITIONER

## 2020-01-20 PROCEDURE — 99213 OFFICE O/P EST LOW 20 MIN: CPT | Mod: S$GLB,,, | Performed by: PSYCHIATRY & NEUROLOGY

## 2020-01-20 PROCEDURE — 3074F SYST BP LT 130 MM HG: CPT | Mod: CPTII,S$GLB,, | Performed by: NURSE PRACTITIONER

## 2020-01-20 PROCEDURE — 3008F PR BODY MASS INDEX (BMI) DOCUMENTED: ICD-10-PCS | Mod: CPTII,S$GLB,, | Performed by: PSYCHIATRY & NEUROLOGY

## 2020-01-20 PROCEDURE — 99999 PR PBB SHADOW E&M-EST. PATIENT-LVL III: CPT | Mod: PBBFAC,,, | Performed by: PSYCHIATRY & NEUROLOGY

## 2020-01-20 PROCEDURE — 3074F SYST BP LT 130 MM HG: CPT | Mod: CPTII,S$GLB,, | Performed by: PSYCHIATRY & NEUROLOGY

## 2020-01-20 PROCEDURE — 93005 RHYTHM STRIP: ICD-10-PCS | Mod: S$GLB,,, | Performed by: NURSE PRACTITIONER

## 2020-01-20 PROCEDURE — 99999 PR PBB SHADOW E&M-EST. PATIENT-LVL III: CPT | Mod: PBBFAC,,, | Performed by: NURSE PRACTITIONER

## 2020-01-20 PROCEDURE — 3008F PR BODY MASS INDEX (BMI) DOCUMENTED: ICD-10-PCS | Mod: CPTII,S$GLB,, | Performed by: NURSE PRACTITIONER

## 2020-01-20 PROCEDURE — 3078F PR MOST RECENT DIASTOLIC BLOOD PRESSURE < 80 MM HG: ICD-10-PCS | Mod: CPTII,S$GLB,, | Performed by: NURSE PRACTITIONER

## 2020-01-20 PROCEDURE — 93010 RHYTHM STRIP: ICD-10-PCS | Mod: S$GLB,,, | Performed by: INTERNAL MEDICINE

## 2020-01-20 PROCEDURE — 3078F PR MOST RECENT DIASTOLIC BLOOD PRESSURE < 80 MM HG: ICD-10-PCS | Mod: CPTII,S$GLB,, | Performed by: PSYCHIATRY & NEUROLOGY

## 2020-01-20 PROCEDURE — 99214 PR OFFICE/OUTPT VISIT, EST, LEVL IV, 30-39 MIN: ICD-10-PCS | Mod: S$GLB,,, | Performed by: NURSE PRACTITIONER

## 2020-01-20 PROCEDURE — 99214 OFFICE O/P EST MOD 30 MIN: CPT | Mod: S$GLB,,, | Performed by: NURSE PRACTITIONER

## 2020-01-20 PROCEDURE — 3078F DIAST BP <80 MM HG: CPT | Mod: CPTII,S$GLB,, | Performed by: PSYCHIATRY & NEUROLOGY

## 2020-01-20 PROCEDURE — 3074F PR MOST RECENT SYSTOLIC BLOOD PRESSURE < 130 MM HG: ICD-10-PCS | Mod: CPTII,S$GLB,, | Performed by: NURSE PRACTITIONER

## 2020-01-20 PROCEDURE — 3074F PR MOST RECENT SYSTOLIC BLOOD PRESSURE < 130 MM HG: ICD-10-PCS | Mod: CPTII,S$GLB,, | Performed by: PSYCHIATRY & NEUROLOGY

## 2020-01-20 PROCEDURE — 93010 ELECTROCARDIOGRAM REPORT: CPT | Mod: S$GLB,,, | Performed by: INTERNAL MEDICINE

## 2020-01-20 PROCEDURE — 3078F DIAST BP <80 MM HG: CPT | Mod: CPTII,S$GLB,, | Performed by: NURSE PRACTITIONER

## 2020-01-20 PROCEDURE — 3008F BODY MASS INDEX DOCD: CPT | Mod: CPTII,S$GLB,, | Performed by: PSYCHIATRY & NEUROLOGY

## 2020-01-20 PROCEDURE — 99999 PR PBB SHADOW E&M-EST. PATIENT-LVL III: ICD-10-PCS | Mod: PBBFAC,,, | Performed by: PSYCHIATRY & NEUROLOGY

## 2020-01-20 PROCEDURE — 99213 PR OFFICE/OUTPT VISIT, EST, LEVL III, 20-29 MIN: ICD-10-PCS | Mod: S$GLB,,, | Performed by: PSYCHIATRY & NEUROLOGY

## 2020-01-20 RX ORDER — DULOXETIN HYDROCHLORIDE 60 MG/1
120 CAPSULE, DELAYED RELEASE ORAL DAILY
Qty: 180 CAPSULE | Refills: 3 | Status: SHIPPED | OUTPATIENT
Start: 2020-01-20 | End: 2021-03-03 | Stop reason: SDUPTHER

## 2020-01-20 NOTE — PROGRESS NOTES
[facility] - NEUROLOGY  [facility] [place]    Date: January 20, 2020   Patient Name: Fatemeh Shoemaker   MRN: [id number]   PCP: Liseth Owen  Referring Provider: Self, Aaareferral    Assessment:      This is Fatemeh Shoemaker, 59 y.o. female with AF on elequis and flecainide, HLD, HTN, morbid obesity presents for distal symmetric painful sensory neuropathathy with no history of diabetes.  MRI brain and C-spine without significant pathology (images reviewed) and EMG BUE does show mild right CTS although this would only partially explain her symptoms.  Neuropathy can occur with flecainide but she reports symptoms preceded initiation of this medication.  Most likely explanation at this time is metabolic syndrome.  Also difficulty with lumbar radicular pain.     Plan:       Continue Cymbalta 120mg qd,   Continue /1200  Continue Pamelor 25mg qhs  Referral to spine clinic    Follow up 6-12 months       I discussed side effects of the medications. I asked the patient to stop the medication if she notices serious adverse effects as we discussed and to seek immediate medical attention at an ER.     Tray Pate MD  [facility]   Department of Neurology     Subjective:   -  Neuropathy manageable on GBP 1800mg/d, Cymbalta 120mg/d, Pamelor 25mg qhs;  Occasionally uses icy hot with some benefit, no effect of doxepin cream  -  Difficulty with right leg radicular pain after standing only a few minutes, some gait changes    12/2018  Worsening pain in fingers>feet, balance difficulty with recent fall, notes effect of cymbalta on anxiety as well as pain, presents with daughter who corroborates history  3/2018  Symptoms manageable on cymbalta 60mg/d and /1200, intermittent swelling in feet but no rash or joint swelling  11/2017  No improvement on GBP 600mg tid, symptoms interfere with sleep    HPI 5/2017:   Ms. Fatemeh Shoemaker is a 59 y.o. female with AF on elequis,  HLD, HTN, morbid obesity presents for neuropathy  She has had burning pain in her feet for at least several years and began to have burning pain in fingers of both hands starting early 2017.  Does not have any associated weakness and does not notice any exacerbating/alleviating factors.  Takes GBP for foot pain with some relief.    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 T[facility]U [facility], [facility]     csection      CS x 2    CYSTOSCOPY N/A 9/3/2019    Procedure: CYSTOSCOPY;  Surgeon: Noemi Pierre MD;  Location: [facility];  Service: OB/GYN;  Laterality: N/A;    DILATION AND CURETTAGE OF UTERUS      HIP PINNING      HYSTERECTOMY      LAPAROSCOPIC SALPINGO-OOPHORECTOMY Bilateral 9/3/2019    Procedure: SALPINGO-OOPHORECTOMY, LAPAROSCOPIC;  Surgeon: Noemi Pierre MD;  Location: [facility];  Service: OB/GYN;  Laterality: Bilateral;  Dr. Bentley to assist. No resident needed.     LAPAROSCOPIC TOTAL HYSTERECTOMY N/A 9/3/2019    Procedure: HYSTERECTOMY, TOTAL, LAPAROSCOPIC;  Surgeon: Noemi Pierre MD;  Location: [facility];  Service: OB/GYN;  Laterality: N/A;    NASAL SEPTUM SURGERY      OOPHORECTOMY      TOTAL HIP ARTHROPLASTY Right     TUBAL LIGATION  1997       CURRENT MEDS:  Current Outpatient Medications   Medication Sig Dispense Refill    apixaban (ELIQUIS) 5 mg Tab Take 1 tablet (5 mg total) by mouth 2 (two) times daily. 60 tablet 1    DULoxetine (CYMBALTA) 60 MG capsule Take 2 capsules (120 mg total) by mouth once daily. NO FURTHER REFILLS WITHOUT APPOINTMENT 180 capsule 1    estradiol  "(DIVIGEL) 0.5 mg/0.5 gram (0.1 %) GlPk Place 1 application onto the skin once daily. 30 packet 12    flecainide (TAMBOCOR) 100 MG Tab Take 1 tablet (100 mg total) by mouth every 12 (twelve) hours. 180 tablet 1    gabapentin (NEURONTIN) 600 MG tablet Take 1 tablet (600 mg total) by mouth 3 (three) times daily. 270 tablet 3    hydroCHLOROthiazide (HYDRODIURIL) 25 MG tablet Take 1 tablet (25 mg total) by mouth once daily. 90 tablet 3    ibuprofen (ADVIL,MOTRIN) 600 MG tablet Take 1 tablet (600 mg total) by mouth every 6 (six) hours as needed for Pain. 30 tablet 1    lisinopril 10 MG tablet Take 1 tablet (10 mg total) by mouth once daily. 90 tablet 3    loratadine (CLARITIN ORAL) Take by mouth as needed.      metoprolol tartrate (LOPRESSOR) 25 MG tablet Take 1 tablet (25 mg total) by mouth 2 (two) times daily. 60 tablet 2    nortriptyline (PAMELOR) 25 MG capsule Take 1 capsule (25 mg total) by mouth every evening. 90 capsule 3    omeprazole (PRILOSEC) 20 MG capsule Take 1 capsule (20 mg total) by mouth once daily. 30 capsule 1    rosuvastatin (CRESTOR) 10 MG tablet Take 10 mg by mouth every evening.       tiZANidine (ZANAFLEX) 4 MG tablet Take 1 tablet (4 mg total) by mouth every 6 (six) hours as needed. 30 tablet 5    valACYclovir (VALTREX) 500 MG tablet Take 1 tablet (500 mg total) by mouth once daily. 30 tablet 11     No current facility-administered medications for this visit.        ALLERGIES:  Review of patient's allergies indicates:   Allergen Reactions    Aspirin Other (See Comments)     "I don't take it because I've had ulcers"   ulcers    Meperidine        FAMILY HISTORY:  Family History   Problem Relation Age of Onset    Colon cancer Maternal Grandmother 70        mets to ovary    Eclampsia Paternal Grandmother     Stroke Father 57    Colon cancer Father     Hypertension Father     Hypertension Mother     Stomach cancer Neg Hx     Esophageal cancer Neg Hx     Breast cancer Neg Hx     " "Miscarriages / Stillbirths Neg Hx     Ovarian cancer Neg Hx        SOCIAL HISTORY:  Social History     Tobacco Use    Smoking status: Never Smoker    Smokeless tobacco: Never Used   Substance Use Topics    Alcohol use: No     Comment: never    Drug use: No       Review of Systems:  12 review of systems is negative except for the symptoms mentioned in HPI.        Objective:     Vitals:    01/20/20 1135   BP: 122/70   Pulse: (!) 53   Weight: 131.5 kg (290 lb)   Height: 5' 4" (1.626 m)       General: NAD, well nourished   Eyes: no tearing, discharge, no erythema   ENT: moist mucous membranes of the oral cavity, nares patent    Neck: Supple, full range of motion  Cardiovascular: Warm and well perfused, pulses equal and symmetrical  Lungs: Normal work of breathing, normal chest wall excursions  Skin: No rash, lesions, or breakdown on exposed skin  Psychiatry: Mood and affect are appropriate   Abdomen: soft, non tender, non distended  Extremeties: No cyanosis, clubbing or edema.    Neurological   MENTAL STATUS: Alert and oriented to person, place, and time. Attention and concentration within normal limits. Speech without dysarthria, able to name and repeat without difficulty. Recent and remote memory within normal limits   CRANIAL NERVES: Visual fields intact. PERRL. EOMI. Facial sensation intact. Face symmetrical. Hearing grossly intact. Full shoulder shrug bilaterally. Tongue protrudes midline   MOTOR: Normal bulk and tone. No pronator drift.  Right ankle dorsiflexion 4/5, remainder 5/5    CEREBELLAR/COORDINATION/GAIT: Gait mildly unsteady with some steppage of the right.    "

## 2020-01-21 ENCOUNTER — TELEPHONE (OUTPATIENT)
Dept: OBSTETRICS AND GYNECOLOGY | Facility: CLINIC | Age: 60
End: 2020-01-21

## 2020-01-21 NOTE — TELEPHONE ENCOUNTER
Called pt to let her know that authorization was done and med would cause $30 co pay. Pt said okay thanks.

## 2020-01-28 ENCOUNTER — TELEPHONE (OUTPATIENT)
Dept: GASTROENTEROLOGY | Facility: CLINIC | Age: 60
End: 2020-01-28

## 2020-01-28 DIAGNOSIS — I48.0 PAROXYSMAL ATRIAL FIBRILLATION: ICD-10-CM

## 2020-01-28 RX ORDER — METOPROLOL TARTRATE 25 MG/1
25 TABLET, FILM COATED ORAL 2 TIMES DAILY
Qty: 60 TABLET | Refills: 11 | Status: SHIPPED | OUTPATIENT
Start: 2020-01-28 | End: 2020-01-29 | Stop reason: SDUPTHER

## 2020-01-28 NOTE — TELEPHONE ENCOUNTER
----- Message from Alissa Mcwilliams sent at 1/28/2020  3:45 PM CST -----  Type: Needs Medical Advice    Who Called:  patient  Best Call Back Number: [phone]  Additional Information: procedure scheduled for 02/04/2020. Please give call back to r/s

## 2020-01-29 DIAGNOSIS — I48.0 PAROXYSMAL ATRIAL FIBRILLATION: ICD-10-CM

## 2020-01-29 RX ORDER — METOPROLOL TARTRATE 25 MG/1
25 TABLET, FILM COATED ORAL 2 TIMES DAILY
Qty: 60 TABLET | Refills: 11 | Status: SHIPPED | OUTPATIENT
Start: 2020-01-29 | End: 2020-10-19

## 2020-01-29 NOTE — TELEPHONE ENCOUNTER
Spoke with pt. Rescheduled c-scope per pt request. Pt verbalized understanding to new date.   Prep instructions sent to pt's mychart.

## 2020-02-13 ENCOUNTER — TELEPHONE (OUTPATIENT)
Dept: NEUROLOGY | Facility: CLINIC | Age: 60
End: 2020-02-13

## 2020-02-13 ENCOUNTER — OFFICE VISIT (OUTPATIENT)
Dept: SPINE | Facility: CLINIC | Age: 60
End: 2020-02-13
Payer: COMMERCIAL

## 2020-02-13 VITALS
HEART RATE: 55 BPM | DIASTOLIC BLOOD PRESSURE: 73 MMHG | RESPIRATION RATE: 13 BRPM | BODY MASS INDEX: 48.48 KG/M2 | SYSTOLIC BLOOD PRESSURE: 133 MMHG | HEIGHT: 65 IN | WEIGHT: 291 LBS

## 2020-02-13 DIAGNOSIS — M48.062 SPINAL STENOSIS OF LUMBAR REGION WITH NEUROGENIC CLAUDICATION: ICD-10-CM

## 2020-02-13 DIAGNOSIS — G89.29 CHRONIC BILATERAL LOW BACK PAIN WITH BILATERAL SCIATICA: Primary | ICD-10-CM

## 2020-02-13 DIAGNOSIS — M54.41 CHRONIC BILATERAL LOW BACK PAIN WITH BILATERAL SCIATICA: Primary | ICD-10-CM

## 2020-02-13 DIAGNOSIS — M54.42 CHRONIC BILATERAL LOW BACK PAIN WITH BILATERAL SCIATICA: Primary | ICD-10-CM

## 2020-02-13 DIAGNOSIS — M47.816 LUMBAR SPONDYLOSIS: ICD-10-CM

## 2020-02-13 PROCEDURE — 99999 PR PBB SHADOW E&M-EST. PATIENT-LVL V: ICD-10-PCS | Mod: PBBFAC,,, | Performed by: PHYSICIAN ASSISTANT

## 2020-02-13 PROCEDURE — 99244 PR OFFICE CONSULTATION,LEVEL IV: ICD-10-PCS | Mod: S$GLB,,, | Performed by: PHYSICIAN ASSISTANT

## 2020-02-13 PROCEDURE — 99244 OFF/OP CNSLTJ NEW/EST MOD 40: CPT | Mod: S$GLB,,, | Performed by: PHYSICIAN ASSISTANT

## 2020-02-13 PROCEDURE — 99999 PR PBB SHADOW E&M-EST. PATIENT-LVL V: CPT | Mod: PBBFAC,,, | Performed by: PHYSICIAN ASSISTANT

## 2020-02-13 NOTE — LETTER
February 13, 2020      Tray Pate MD  [address]  [facility] [address]           [facility]  [address]  [facility] [address]  Phone: [phone]  Fax: [phone]          Patient: Fatemeh Shoemaker   MR Number: [id number]   YOB: 1960   Date of Visit: 2/13/2020       Dear Dr. Tray Pate:    Thank you for referring Fatemeh Shoemaker to me for evaluation. Attached you will find relevant portions of my assessment and plan of care.    If you have questions, please do not hesitate to call me. I look forward to following Fatemeh Shoemaker along with you.    Sincerely,    NIRMAL Doosure  CC:  No Recipients    If you would like to receive this communication electronically, please contact [email or web address] or [phone] to request more information on [employer] Link access.    For providers and/or their staff who would like to refer a patient to [facility], please contact us through our one-stop-shop provider referral line, [facility], at [phone].    If you feel you have received this communication in error or would no longer like to receive these types of communications, please e-mail [email or web address]

## 2020-02-13 NOTE — PROGRESS NOTES
Back and Spine Consult    Patient ID: Fatemeh Shoemaker is a 59 y.o. female.    Chief Complaint   Patient presents with    Back Pain     lower back pain radiates down right leg        Review of Systems   Constitutional: Negative for activity change, appetite change, chills, fever and unexpected weight change.   HENT: Negative for tinnitus, trouble swallowing and voice change.    Respiratory: Negative for apnea, cough, chest tightness and shortness of breath.    Cardiovascular: Negative for chest pain and palpitations.   Gastrointestinal: Negative for constipation, diarrhea, nausea and vomiting.   Genitourinary: Negative for difficulty urinating, dysuria, frequency and urgency.   Musculoskeletal: Positive for arthralgias, back pain and myalgias. Negative for gait problem, neck pain and neck stiffness.   Skin: Negative for wound.   Neurological: Positive for numbness. Negative for dizziness, tremors, seizures, facial asymmetry, speech difficulty, weakness, light-headedness and headaches.   Psychiatric/Behavioral: Negative for confusion and decreased concentration.       Past Medical History:   Diagnosis Date    Arrhythmia     Atrial fibrillation     history    Bronchitis     Encounter for blood transfusion     High blood pressure     Hyperlipidemia     Lump or mass in breast     benign     Ulcer      Social History     Socioeconomic History    Marital status: Significant Other     Spouse name: Not on file    Number of children: Not on file    Years of education: Not on file    Highest education level: Not on file   Occupational History    Not on file   Social Needs    Financial resource strain: Not on file    Food insecurity:     Worry: Not on file     Inability: Not on file    Transportation needs:     Medical: Not on file     Non-medical: Not on file   Tobacco Use    Smoking status: Never Smoker    Smokeless tobacco: Never Used   Substance and Sexual Activity    Alcohol use: No     Comment:  "never    Drug use: No    Sexual activity: Yes     Partners: Male     Birth control/protection: See Surgical Hx     Comment: current partner over 30 years   Lifestyle    Physical activity:     Days per week: Not on file     Minutes per session: Not on file    Stress: Not on file   Relationships    Social connections:     Talks on phone: Not on file     Gets together: Not on file     Attends [employer] service: Not on file     Active member of club or organization: Not on file     Attends meetings of clubs or organizations: Not on file     Relationship status: Not on file   Other Topics Concern    Not on file   Social History Narrative    Not on file     Family History   Problem Relation Age of Onset    Colon cancer Maternal Grandmother 70        mets to ovary    Eclampsia Paternal Grandmother     Stroke Father 57    Colon cancer Father     Hypertension Father     Hypertension Mother     Stomach cancer Neg Hx     Esophageal cancer Neg Hx     Breast cancer Neg Hx     Miscarriages / Stillbirths Neg Hx     Ovarian cancer Neg Hx      Review of patient's allergies indicates:   Allergen Reactions    Aspirin Other (See Comments)     "I don't take it because I've had ulcers"   ulcers  "I don't take it because I've had ulcers"     Meperidine Other (See Comments)     Felt like she was about to pass put after taking       Current Outpatient Medications:     apixaban (ELIQUIS) 5 mg Tab, Take 1 tablet (5 mg total) by mouth 2 (two) times daily., Disp: 60 tablet, Rfl: 1    DULoxetine (CYMBALTA) 60 MG capsule, Take 2 capsules (120 mg total) by mouth once daily., Disp: 180 capsule, Rfl: 3    flecainide (TAMBOCOR) 100 MG Tab, Take 1 tablet (100 mg total) by mouth every 12 (twelve) hours., Disp: 180 tablet, Rfl: 1    gabapentin (NEURONTIN) 600 MG tablet, Take 1 tablet (600 mg total) by mouth 3 (three) times daily., Disp: 270 tablet, Rfl: 3    hydroCHLOROthiazide (HYDRODIURIL) 25 MG tablet, Take 1 tablet (25 mg " "total) by mouth once daily., Disp: 90 tablet, Rfl: 3    lisinopril 10 MG tablet, Take 1 tablet (10 mg total) by mouth once daily., Disp: 90 tablet, Rfl: 3    metoprolol tartrate (LOPRESSOR) 25 MG tablet, Take 1 tablet (25 mg total) by mouth 2 (two) times daily., Disp: 60 tablet, Rfl: 11    nortriptyline (PAMELOR) 25 MG capsule, Take 1 capsule (25 mg total) by mouth every evening., Disp: 90 capsule, Rfl: 3    omeprazole (PRILOSEC) 20 MG capsule, Take 1 capsule (20 mg total) by mouth once daily., Disp: 30 capsule, Rfl: 1    rosuvastatin (CRESTOR) 10 MG tablet, Take 10 mg by mouth every evening. , Disp: , Rfl:     tiZANidine (ZANAFLEX) 4 MG tablet, Take 1 tablet (4 mg total) by mouth every 6 (six) hours as needed., Disp: 30 tablet, Rfl: 5    valACYclovir (VALTREX) 500 MG tablet, Take 1 tablet (500 mg total) by mouth once daily., Disp: 30 tablet, Rfl: 11    ibuprofen (ADVIL,MOTRIN) 600 MG tablet, Take 1 tablet (600 mg total) by mouth every 6 (six) hours as needed for Pain. (Patient not taking: Reported on 2/13/2020), Disp: 30 tablet, Rfl: 1    loratadine (CLARITIN ORAL), Take by mouth as needed., Disp: , Rfl:     Vitals:    02/13/20 1537   BP: 133/73   Pulse: (!) 55   Resp: 13   Weight: 132 kg (291 lb 0.1 oz)   Height: 5' 5" (1.651 m)       Physical Exam   Constitutional: She is oriented to person, place, and time. She appears well-developed and well-nourished.   HENT:   Head: Normocephalic and atraumatic.   Eyes: Pupils are equal, round, and reactive to light.   Neck: Normal range of motion. Neck supple.   Cardiovascular: Normal rate.   Pulmonary/Chest: Effort normal.   Musculoskeletal: Normal range of motion. She exhibits no edema.   Neurological: She is alert and oriented to person, place, and time. She has a normal Finger-Nose-Finger Test, a normal Heel to Shin Test, a normal Romberg Test and a normal Tandem Gait Test. Gait normal.   Reflex Scores:       Tricep reflexes are 0 on the right side and 0 on the " left side.       Bicep reflexes are 0 on the right side and 0 on the left side.       Brachioradialis reflexes are 0 on the right side and 0 on the left side.       Patellar reflexes are 0 on the right side and 0 on the left side.       Achilles reflexes are 0 on the right side and 0 on the left side.  Skin: Skin is warm, dry and intact.   Psychiatric: She has a normal mood and affect. Her speech is normal and behavior is normal. Judgment and thought content normal.   Nursing note and vitals reviewed.      Neurologic Exam     Mental Status   Oriented to person, place, and time.   Oriented to person.   Oriented to place.   Oriented to time.   Follows 3 step commands.   Attention: normal. Concentration: normal.   Speech: speech is normal   Level of consciousness: alert  Knowledge: consistent with education.   Able to name object. Able to read. Able to repeat. Able to write. Normal comprehension.     Cranial Nerves     CN II   Visual acuity: normal  Right visual field deficit: none  Left visual field deficit: none     CN III, IV, VI   Pupils are equal, round, and reactive to light.  Right pupil: Size: 3 mm. Shape: regular. Reactivity: brisk. Consensual response: intact.   Left pupil: Size: 3 mm. Shape: regular. Reactivity: brisk. Consensual response: intact.   CN III: no CN III palsy  CN VI: no CN VI palsy  Nystagmus: none   Diplopia: none  Ophthalmoparesis: none  Conjugate gaze: present    CN V   Right facial sensation deficit: none  Left facial sensation deficit: none    CN VII   Right facial weakness: none  Left facial weakness: none    CN VIII   Hearing: intact    CN IX, X   CN IX normal.   CN X normal.     CN XI   Right sternocleidomastoid strength: normal  Left sternocleidomastoid strength: normal  Right trapezius strength: normal  Left trapezius strength: normal    CN XII   Fasciculations: absent  Tongue deviation: none    Motor Exam   Muscle bulk: normal  Overall muscle tone: normal  Right arm pronator drift:  absent  Left arm pronator drift: absent    Strength   Right neck flexion: 5/5  Left neck flexion: 5/5  Right neck extension: 5/5  Left neck extension: 5/5  Right deltoid: 5/5  Left deltoid: 5/5  Right biceps: 5/5  Left biceps: /  Right triceps: 5/  Left triceps: 5/  Right wrist flexion: /  Left wrist flexion: /  Right wrist extension: /  Left wrist extension:   Right interossei:   Left interossei:   Right abdominals: 5/  Left abdominals: 5/  Right iliopsoas:   Left iliopsoas:   Right quadriceps:   Left quadriceps:   Right hamstrin/5  Left hamstrin/5  Right glutei:   Left glutei:   Right anterior tibial:   Left anterior tibial:   Right posterior tibial:   Left posterior tibial:   Right peroneal:   Left peroneal:   Right gastroc:   Left gastroc:     Sensory Exam   Right arm light touch: normal  Left arm light touch: normal  Right leg light touch: normal  Left leg light touch: normal  Right arm vibration: normal  Left arm vibration: normal  Right leg vibration: decreased from ankle  Left leg vibration: decreased from ankle  Right arm pinprick: normal  Left arm pinprick: normal    Gait, Coordination, and Reflexes     Gait  Gait: normal    Coordination   Romberg: negative  Finger to nose coordination: normal  Heel to shin coordination: normal  Tandem walking coordination: normal    Tremor   Resting tremor: absent  Intention tremor: absent  Action tremor: absent    Reflexes   Right brachioradialis: 0  Left brachioradialis: 0  Right biceps: 0  Left biceps: 0  Right triceps: 0  Left triceps: 0  Right patellar: 0  Left patellar: 0  Right achilles: 0  Left achilles: 0  Right C[place]: absent  Left [place]: absent  Right ankle clonus: absent  Left ankle clonus: absent      Provider dictation:  59-year-old female with atrial fibrillation (maintained on Eliquis and flecainide), hypertension, obesity, neuropathy is referred by Dr. Pate for evaluation of  back and leg pain.  She has chronic pain affecting the lower back with radiation into the right posterior leg more severely the left posterior leg.  This is associated with numbness and tingling.  She also has separate neuropathy pain in the bilateral feet.  Lower back and radicular pain increased with walking and standing during which her legs feel weak.  Pain improves with sitting, but is still present.  She is taking gabapentin, Zanaflex, ibuprofen.  She has never had physical therapy or epidural steroid injections for lower back radicular leg pain.  Oswestry score: 28%.  PHQ:  7.    She has a morbidly obese female with muted muscle stretch reflexes throughout the upper and lower extremities.  There is decreased sensation to the bilateral feet from angles of the toes.  5/5 strength throughout the upper and lower extremities.    MRI lumbar spine from 06/11/2018 reviewed.  It is a motion degraded MRI.  At L3-4 there is degenerative disc desiccation with significant endplate changes as well as a broad-based disc and facet hypertrophy contributing to right lateral recess and foraminal narrowing greater than left foraminal narrowing.  Severe central canal stenosis is seen at L3-4.  L4-5 broad left disc hernia with left foraminal stenosis greater than right.  Mild to moderate central canal stenosis is noted at this area.  L5-S1 broad disc hernia with left foraminal narrowing.    Ms. Weldon has chronic bilateral lower back pain with right greater than left leg radiculopathy associated with degenerative changes in lumbar stenosis/spondylosis at L3-4 more so than L4-5 and L5-S1.  Pain can also be significantly affected by her weight contributing to muscular back pain and joint pain in both the hips and the knees.  I have encouraged regular exercise and weight loss to the best of her ability help improve her overall level of pain.  I am referring her to physical therapy to improve muscular radicular pain.  I am referring  her to pain management for consideration of epidural steroid injections to help improve radicular leg pain.  Follow-up with me in approximately 12 weeks to monitor her progress.    Visit Diagnosis:  Chronic bilateral low back pain with bilateral sciatica  -     Ambulatory referral/consult to Pain Clinic; Future; Expected date: 02/20/2020  -     Ambulatory referral/consult to Physical/Occupational Therapy; Future; Expected date: 02/13/2020    Lumbar spondylosis  -     Ambulatory referral/consult to Pain Clinic; Future; Expected date: 02/20/2020  -     Ambulatory referral/consult to Physical/Occupational Therapy; Future; Expected date: 02/13/2020    Spinal stenosis of lumbar region with neurogenic claudication  -     Ambulatory referral/consult to Pain Clinic; Future; Expected date: 02/20/2020  -     Ambulatory referral/consult to Physical/Occupational Therapy; Future; Expected date: 02/13/2020        Total time spent counseling greater than fifty percent of total visit time.  Counseling included discussion regarding imaging findings, diagnosis possibilities, treatment options, risks and benefits.   The patient had many questions regarding the options and long-term effects.

## 2020-02-17 ENCOUNTER — TELEPHONE (OUTPATIENT)
Dept: PAIN MEDICINE | Facility: CLINIC | Age: 60
End: 2020-02-17

## 2020-02-17 ENCOUNTER — OFFICE VISIT (OUTPATIENT)
Dept: PAIN MEDICINE | Facility: CLINIC | Age: 60
End: 2020-02-17
Payer: COMMERCIAL

## 2020-02-17 VITALS
RESPIRATION RATE: 20 BRPM | DIASTOLIC BLOOD PRESSURE: 57 MMHG | HEIGHT: 65 IN | WEIGHT: 291.56 LBS | HEART RATE: 74 BPM | BODY MASS INDEX: 48.58 KG/M2 | TEMPERATURE: 98 F | OXYGEN SATURATION: 97 % | SYSTOLIC BLOOD PRESSURE: 120 MMHG

## 2020-02-17 DIAGNOSIS — G89.29 CHRONIC BILATERAL LOW BACK PAIN WITH BILATERAL SCIATICA: ICD-10-CM

## 2020-02-17 DIAGNOSIS — M54.16 LUMBAR RADICULOPATHY: Primary | ICD-10-CM

## 2020-02-17 DIAGNOSIS — M54.42 CHRONIC BILATERAL LOW BACK PAIN WITH BILATERAL SCIATICA: ICD-10-CM

## 2020-02-17 DIAGNOSIS — M54.41 CHRONIC BILATERAL LOW BACK PAIN WITH BILATERAL SCIATICA: ICD-10-CM

## 2020-02-17 DIAGNOSIS — M47.816 LUMBAR SPONDYLOSIS: ICD-10-CM

## 2020-02-17 PROCEDURE — 99999 PR PBB SHADOW E&M-EST. PATIENT-LVL III: ICD-10-PCS | Mod: PBBFAC,,, | Performed by: ANESTHESIOLOGY

## 2020-02-17 PROCEDURE — 99204 PR OFFICE/OUTPT VISIT, NEW, LEVL IV, 45-59 MIN: ICD-10-PCS | Mod: S$GLB,,, | Performed by: ANESTHESIOLOGY

## 2020-02-17 PROCEDURE — 99204 OFFICE O/P NEW MOD 45 MIN: CPT | Mod: S$GLB,,, | Performed by: ANESTHESIOLOGY

## 2020-02-17 PROCEDURE — 99999 PR PBB SHADOW E&M-EST. PATIENT-LVL III: CPT | Mod: PBBFAC,,, | Performed by: ANESTHESIOLOGY

## 2020-02-17 PROCEDURE — 3074F SYST BP LT 130 MM HG: CPT | Mod: CPTII,S$GLB,, | Performed by: ANESTHESIOLOGY

## 2020-02-17 PROCEDURE — 3078F PR MOST RECENT DIASTOLIC BLOOD PRESSURE < 80 MM HG: ICD-10-PCS | Mod: CPTII,S$GLB,, | Performed by: ANESTHESIOLOGY

## 2020-02-17 PROCEDURE — 3074F PR MOST RECENT SYSTOLIC BLOOD PRESSURE < 130 MM HG: ICD-10-PCS | Mod: CPTII,S$GLB,, | Performed by: ANESTHESIOLOGY

## 2020-02-17 PROCEDURE — 3008F PR BODY MASS INDEX (BMI) DOCUMENTED: ICD-10-PCS | Mod: CPTII,S$GLB,, | Performed by: ANESTHESIOLOGY

## 2020-02-17 PROCEDURE — 3078F DIAST BP <80 MM HG: CPT | Mod: CPTII,S$GLB,, | Performed by: ANESTHESIOLOGY

## 2020-02-17 PROCEDURE — 3008F BODY MASS INDEX DOCD: CPT | Mod: CPTII,S$GLB,, | Performed by: ANESTHESIOLOGY

## 2020-02-17 RX ORDER — ALPRAZOLAM 0.5 MG/1
0.5 TABLET, ORALLY DISINTEGRATING ORAL ONCE AS NEEDED
Status: CANCELLED | OUTPATIENT
Start: 2020-03-03 | End: 2031-07-30

## 2020-02-17 NOTE — TELEPHONE ENCOUNTER
Patient it scheduled for a lumbar steroid injection with Dr. Tavarez on 3/3 and will need to stop Eliquis 3 days before. Please advise if patient can hold. Thanks.

## 2020-02-17 NOTE — LETTER
February 17, 2020      Caroline Nielson PA-C  [address] [facility] [address]  [address]  [facility] [address]           [facility]  [address] [place] [address]  Phone: [phone]  Fax: [phone]          Patient: Fatemeh Shoemaker   MR Number: [id number]   YOB: 1960   Date of Visit: 2/17/2020       Dear Caroline Nielson:    Thank you for referring Fatmeeh Shoemaker to me for evaluation. Attached you will find relevant portions of my assessment and plan of care.    If you have questions, please do not hesitate to call me. I look forward to following Fatemeh Shoemaker along with you.    Sincerely,    Gurjit Tavarez MD    Enclosure  CC:  No Recipients    If you would like to receive this communication electronically, please contact [email or web address] or [phone] to request more information on [employer] access.    For providers and/or their staff who would like to refer a patient to [facility], please contact us through our one-stop-shop provider referral line, [facility], at [phone].    If you feel you have received this communication in error or would no longer like to receive these types of communications, please e-mail [email or web address]          Enio Pavon), Cardiovascular Disease; Internal Medicine  [address] [place] [address]  Phone: [phone]  Fax: [phone]    Larisa Galvez), Internal Medicine; Nephrology  [address]  [address]nd [place] [address]  Phone: [phone]  Fax: [phone] Enio Pavon), Cardiovascular Disease; Internal Medicine  [address] [place] [address]  Phone: [phone]  Fax: [phone]    Larisa Galvez), Internal Medicine; Nephrology  [address]  [address]nd [place] [address]  Phone: [phone]  Fax: [phone]    Sakina Paredes), Surgery; Vascular Surgery  [address] [facility]  [address]  [place] [address]  Phone: [phone]  Fax: [phone] Enio Pavon), Cardiovascular Disease; Internal Medicine  [address] [place] [address]  Phone: [phone]  Fax: [phone]    Sakina Paredes), Surgery; Vascular Surgery  [address] [facility]  [address]  [place] [address]  Phone: [phone]  Fax: [phone]    Giovanny Pavon), Nephrology  [address]  [address]nd [place] [address]  Phone: [phone]  Fax: [phone]

## 2020-02-17 NOTE — PROGRESS NOTES
[facility] New Patient Evaluation    Referred by: Caroline Nielson PA-C  Reason for referral: back pain    CC:   Chief Complaint   Patient presents with    Establish Care    Leg Pain    Low-back Pain      Last 3 PDI Scores 2/17/2020   Pain Disability Index (PDI) 10       HPI:   Fatemeh Shoemaker is a 59 y.o. female who complains of back pain    Onset: more than a few years  Progression: since onset, pain is gradually worsening  Current Pain Score: 6/10  Timing: constant  Quality: aching  Radiation: yes, down the back of both legs  Associated numbness or weakness: no numbness, weakness  Exacerbated by: standing, walking  Allievated by: rest, leaning forward  Is Pain Level Acceptable?: No    Previous Therapies:  PT/OT:   HEP:   Interventions:   Surgery:  Medications:   - NSAIDS:   - MSK Relaxants: tizanidine  - TCAs: nortriptyline  - SNRIs: cymbalta  - Topicals:   - Anticonvulsants: gabapentin  - Opioids:     History:    Current Outpatient Medications:     apixaban (ELIQUIS) 5 mg Tab, Take 1 tablet (5 mg total) by mouth 2 (two) times daily., Disp: 60 tablet, Rfl: 1    DULoxetine (CYMBALTA) 60 MG capsule, Take 2 capsules (120 mg total) by mouth once daily., Disp: 180 capsule, Rfl: 3    flecainide (TAMBOCOR) 100 MG Tab, Take 1 tablet (100 mg total) by mouth every 12 (twelve) hours., Disp: 180 tablet, Rfl: 1    gabapentin (NEURONTIN) 600 MG tablet, Take 1 tablet (600 mg total) by mouth 3 (three) times daily., Disp: 270 tablet, Rfl: 3    hydroCHLOROthiazide (HYDRODIURIL) 25 MG tablet, Take 1 tablet (25 mg total) by mouth once daily., Disp: 90 tablet, Rfl: 3    ibuprofen (ADVIL,MOTRIN) 600 MG tablet, Take 1 tablet (600 mg total) by mouth every 6 (six) hours as needed for Pain. (Patient not taking: Reported on 2/13/2020), Disp: 30 tablet, Rfl: 1    lisinopril 10 MG tablet, Take 1 tablet (10 mg total) by mouth once daily., Disp: 90 tablet, Rfl: 3    loratadine (CLARITIN ORAL), Take by mouth as needed.,  Disp: , Rfl:     metoprolol tartrate (LOPRESSOR) 25 MG tablet, Take 1 tablet (25 mg total) by mouth 2 (two) times daily., Disp: 60 tablet, Rfl: 11    nortriptyline (PAMELOR) 25 MG capsule, Take 1 capsule (25 mg total) by mouth every evening., Disp: 90 capsule, Rfl: 3    omeprazole (PRILOSEC) 20 MG capsule, Take 1 capsule (20 mg total) by mouth once daily., Disp: 30 capsule, Rfl: 1    rosuvastatin (CRESTOR) 10 MG tablet, Take 10 mg by mouth every evening. , Disp: , Rfl:     tiZANidine (ZANAFLEX) 4 MG tablet, Take 1 tablet (4 mg total) by mouth every 6 (six) hours as needed., Disp: 30 tablet, Rfl: 5    valACYclovir (VALTREX) 500 MG tablet, Take 1 tablet (500 mg total) by mouth once daily., Disp: 30 tablet, Rfl: 11    Past Medical History:   Diagnosis Date    Arrhythmia     Atrial fibrillation     history    Bronchitis     Encounter for blood transfusion     High blood pressure     Hyperlipidemia     Lump or mass in breast     benign     Ulcer        Past Surgical History:   Procedure Laterality Date    BREAST BIOPSY Right 2017    myofibroblastoma     BREAST LUMPECTOMY Right 12/21/2017    CHOLECYSTECTOMY  12/28/2017    Dr. TOLU Bowers, [facility]     csection      CS x 2    CYSTOSCOPY N/A 9/3/2019    Procedure: CYSTOSCOPY;  Surgeon: Noemi Pierre MD;  Location: [facility];  Service: OB/GYN;  Laterality: N/A;    DILATION AND CURETTAGE OF UTERUS      HIP PINNING      HYSTERECTOMY      LAPAROSCOPIC SALPINGO-OOPHORECTOMY Bilateral 9/3/2019    Procedure: SALPINGO-OOPHORECTOMY, LAPAROSCOPIC;  Surgeon: Noemi Pierre MD;  Location: [facility];  Service: OB/GYN;  Laterality: Bilateral;  Dr. Bentley to assist. No resident needed.     LAPAROSCOPIC TOTAL HYSTERECTOMY N/A 9/3/2019    Procedure: HYSTERECTOMY, TOTAL, LAPAROSCOPIC;  Surgeon: Noemi Pierre MD;  Location: [facility];  Service: OB/GYN;  Laterality: N/A;    NASAL SEPTUM SURGERY      OOPHORECTOMY      TOTAL HIP ARTHROPLASTY Right     TUBAL LIGATION   "1997       Family History   Problem Relation Age of Onset    Colon cancer Maternal Grandmother 70        mets to ovary    Eclampsia Paternal Grandmother     Stroke Father 57    Colon cancer Father     Hypertension Father     Hypertension Mother     Stomach cancer Neg Hx     Esophageal cancer Neg Hx     Breast cancer Neg Hx     Miscarriages / Stillbirths Neg Hx     Ovarian cancer Neg Hx        Social History     Socioeconomic History    Marital status: Significant Other     Spouse name: Not on file    Number of children: Not on file    Years of education: Not on file    Highest education level: Not on file   Occupational History    Not on file   Social Needs    Financial resource strain: Not on file    Food insecurity:     Worry: Not on file     Inability: Not on file    Transportation needs:     Medical: Not on file     Non-medical: Not on file   Tobacco Use    Smoking status: Never Smoker    Smokeless tobacco: Never Used   Substance and Sexual Activity    Alcohol use: No     Comment: never    Drug use: No    Sexual activity: Yes     Partners: Male     Birth control/protection: See Surgical Hx     Comment: current partner over 30 years   Lifestyle    Physical activity:     Days per week: Not on file     Minutes per session: Not on file    Stress: Not on file   Relationships    Social connections:     Talks on phone: Not on file     Gets together: Not on file     Attends Jainism service: Not on file     Active member of club or organization: Not on file     Attends meetings of clubs or organizations: Not on file     Relationship status: Not on file   Other Topics Concern    Not on file   Social History Narrative    Not on file       Review of patient's allergies indicates:   Allergen Reactions    Aspirin Other (See Comments)     "I don't take it because I've had ulcers"   ulcers  "I don't take it because I've had ulcers"     Meperidine Other (See Comments)     Felt like she was about " "to pass put after taking       Review of Systems:  General ROS: negative for - fever  Psychological ROS: negative for - hostility  Hematological and Lymphatic ROS: positive for - bruising  Endocrine ROS: negative for - unexpected weight changes  Respiratory ROS: no cough, shortness of breath, or wheezing  Cardiovascular ROS: no chest pain or dyspnea on exertion  Gastrointestinal ROS: no abdominal pain, change in bowel habits, or black or bloody stools  Musculoskeletal ROS: negative for - muscular weakness  Neurological ROS: negative for - bowel and bladder control changes  Dermatological ROS: negative for rash    Physical Exam:  Vitals:    02/17/20 0809   BP: (!) 120/57   Pulse: 74   Resp: 20   Temp: 97.7 °F (36.5 °C)   TempSrc: Oral   SpO2: 97%   Weight: 132.2 kg (291 lb 8.9 oz)   Height: 5' 5" (1.651 m)   PainSc:   4   PainLoc: Back     Body mass index is 48.52 kg/m².     Gen: NAD  Gait: gait intact  Psych:  Mood appropriate for given condition  HEENT: eyes anicteric   GI: Abd soft  CV: RRR  Lungs: breathing unlabored   ROM: limited AROM of the L spine in all planes, full ROM at ankles, knees and hips  Lumbar flexion 90 degrees, extension 50 degrees, side bending 30 degrees.    Sensation: intact to light touch in all dermatomes tested from L2-S1 bilaterally, except decreased over the left L5  Reflexes: 0/0 b/l patella and 2+ b/l achilles  Palpation: Diffusely tender over lumbar paraspinals  -TTP over the b/l greater trochanters and bilateral SI joint  Tone: normal in the b/l knees and hips   Skin: intact  Extremities: No edema in b/l ankles or hands  Provacative tests: mildly + b/l axial facet loading       Right Left   L2/3 Iliacus Hip flexion  5  5   L3/4 Qudratus Femoris Knee Extension  5  5   L4/5 Tib Anterior Ankle Dorsiflexion   5  5   L5/S1 Extensor Hallicus Longus Great toe extension  5  5   L4/5 Tib Anterior/Posterior Inversion  5  5   L5/S1 Extensor Digitorum Longus, Peronues Eversion  5  5   S1/S2 " Gastroc/Soleus Plantar Flexion  5  5       Imaging:  MRI lumbar spine 6/11/18  FINDINGS:  Vertebral column: The study is motion degraded.  There is multilevel degenerative change.  There is marked disc space narrowing towards the right at the L3-4 level where there is associated degenerative endplate signal change.  There is no other significant disc space narrowing.  There is no fracture.  Baseline marrow signal intensity is normal.  Anterior endplate osteophyte formation is also present at the L2-3 level.  There is subtle trace anterolisthesis of L4 on L5.    Spinal canal, conus, epidural space: Spinal canal is developmentally normal.  The conus is normal in location, contour and signal intensity, terminating at the level of T12-L1.  There is no abnormal epidural collection or mass.    Findings by level:    On the sagittal images, there is minimal bulging of the annulus and mild facet joint arthropathy at the T10-11 level but there is no spinal canal or significant foraminal stenosis.  At the T11-T12 level, there is a shallow broad central disc protrusion which narrows the ventral subarachnoid space but again there is no significant spinal canal or foraminal stenosis and there is no cord compression.    T12-L1: There is minimal bulging of the annulus and there is mild facet joint arthropathy.  There is no spinal canal or significant foraminal stenosis.  L1-2: There is moderate facet joint arthropathy.  There is minimal bulging of the annulus with a tiny 2 mm central disc protrusion.  There is no spinal canal or significant foraminal stenosis.  L2-3: There is a moderate diffuse disc bulge with marked facet joint arthropathy.  There is mildly prominent dorsal epidural fat.  There is flattening of the ventral dural sac.  There is moderate central spinal stenosis.  AP measurement of the dural sac is 7.5 mm.  There is no significant foraminal stenosis.  L3-4: There is marked disc space narrowing towards the right.   There is a diffuse disc bulge with osteophytic ridging and superimposed broad right paracentral and foraminal disc protrusion.  There is moderate-to-marked facet joint arthropathy with ligamentum flavum thickening, right greater than left.  Also, there is mildly prominent dorsal epidural fat.  There is moderate central spinal stenosis.  There is severe right lateral recess and foraminal stenosis with mild-to-moderate left foraminal stenosis.  L4-5: There is marked facet joint arthropathy.  There is a diffuse disc bulge with superimposed broad central disc protrusion.  There is mild spinal stenosis.  There is mild-to-moderate bilateral foraminal stenosis.  L5-S1: There is a diffuse disc bulge with superimposed broad central disc protrusion with annular fissure.  There is marked left and moderate-to-marked right facet joint arthropathy with ligamentum flavum thickening.  There is no significant spinal stenosis.  The right foramina is patent.  There is severe left foraminal stenosis.  The broad central disc protrusion approaches both S1 roots.  The left S1 root is crowded between the facet joint changes and the disc protrusion.  Both S1 roots could be affected, left greater than right.    Soft tissues, other: The prevertebral soft tissues are normal.  The aorta is mildly ectatic.    Labs:  BMP  Lab Results   Component Value Date     08/01/2019    K 3.8 08/01/2019     08/01/2019    CO2 28 08/01/2019    BUN 11 08/01/2019    CREATININE 0.7 08/01/2019    CALCIUM 9.6 08/01/2019    ANIONGAP 11 08/01/2019    ESTGFRAFRICA >60 08/01/2019    EGFRNONAA >60 08/01/2019     Lab Results   Component Value Date    ALT 11 06/22/2018    AST 15 06/22/2018    ALKPHOS 46 (L) 06/22/2018    BILITOT 0.9 06/22/2018       Assessment:   Problem List Items Addressed This Visit        Neuro    Lumbar radiculopathy - Primary    Lumbar spondylosis       Orthopedic    Chronic bilateral low back pain with bilateral sciatica          59  y.o. year old female with PMH HTN, a-fib on eliquis presents to the office with back pain.  She's had this pain for over 5 years and it has been gradually worsening.  No history of prior back surgery.  Today her pain is constant, aching, 6/10, radiating down the back of both of her legs.  She denies any weakness but has had chronic numbness in the top of her feet for years.  Her pain is worse with standing and walking and relieved with rest and leaning forward.  On exam she has 5/5 strength, decreased sensation to light touch over the left L5.  She takes gabapentin, cymbalta, nortriptyline, and tizanidine for over 6 weeks.  She is about to start formal PT.  MRI lumbar spine c/w multilevel b/l facet arthropathy, L3-4 moderate central spinal stenosis and L5-S1 severe left foraminal stenosis, broad central disc protrusion approaches both S1 roots, the left S1 root is crowded between the facet joint changes and the disc protrusion, both S1 roots could be affected, left greater than right.  Her pain is a combination of facet mediated pain and radicular pain with radiculopathy.  Her pain is limiting her mobility and interfering with her ADL's.  We will schedule for L5/S1 ILESI.  Follow up 2 weeks post injection.  We will get clearance for her to hold her eliquis prior to procedure.     Treatment Plan:   Procedures: L5/S1 ILESI. Procedure explained using an anatomical model.  Risks, benefits, alternatives explained to patient who verbalized understanding, including increased risk of infection, bleeding, need for additional procedures or surgery, and nerve damage.  Questions regarding the procedure, risks, expected outcome, and possible side effects were solicited and answered to the patient's satisfaction.  Fatemeh wishes to proceed with the injection.  Verbal and written consent were obtained in clinic today.  PT/OT/HEP: schedule to start formal PT, I've given her a list of some exercises to start at home  Medications:  continue medications as prescribed  Labs: Reviewed and medications are appropriately dosed for current hepatorenal function.  Imaging: No additional recommended at this time.    : Not applicable    Gurjit Tavarez M.D.  Interventional Pain Medicine / Anesthesiology

## 2020-02-18 DIAGNOSIS — I48.0 PAROXYSMAL ATRIAL FIBRILLATION: ICD-10-CM

## 2020-02-18 RX ORDER — LISINOPRIL 10 MG/1
10 TABLET ORAL DAILY
Qty: 90 TABLET | Refills: 3 | Status: SHIPPED | OUTPATIENT
Start: 2020-02-18 | End: 2020-09-22

## 2020-02-18 RX ORDER — HYDROCHLOROTHIAZIDE 25 MG/1
25 TABLET ORAL DAILY
Qty: 90 TABLET | Refills: 3 | Status: SHIPPED | OUTPATIENT
Start: 2020-02-18 | End: 2020-05-07 | Stop reason: DRUGHIGH

## 2020-02-26 ENCOUNTER — PATIENT MESSAGE (OUTPATIENT)
Dept: OBSTETRICS AND GYNECOLOGY | Facility: CLINIC | Age: 60
End: 2020-02-26

## 2020-02-26 DIAGNOSIS — N95.1 MENOPAUSAL SYMPTOMS: Primary | ICD-10-CM

## 2020-02-26 NOTE — TELEPHONE ENCOUNTER
Discussed with patient who states that she has overall had much fewer hot flashes and was sleeping well again since using the Estradiol cream(Divigel) until her sciatica worsened recently. Now has pain that interferes with sleep. She is scheduled for Epidural injection on March 3.   Advised that changing medication/increasing dose will not change the symptoms of moodiness which is more likely related to worsening chronic pain. Will check estradiol level.   May need to consider Effexor .

## 2020-02-27 DIAGNOSIS — I48.0 PAROXYSMAL ATRIAL FIBRILLATION: ICD-10-CM

## 2020-02-27 RX ORDER — FLECAINIDE ACETATE 100 MG/1
100 TABLET ORAL EVERY 12 HOURS
Qty: 180 TABLET | Refills: 3 | Status: SHIPPED | OUTPATIENT
Start: 2020-02-27 | End: 2020-11-04 | Stop reason: SDUPTHER

## 2020-02-28 ENCOUNTER — TELEPHONE (OUTPATIENT)
Dept: GASTROENTEROLOGY | Facility: CLINIC | Age: 60
End: 2020-02-28

## 2020-02-28 NOTE — TELEPHONE ENCOUNTER
Pt having colonoscopy on 3/16 with Dr. Harris.   Can pt safely hold eliqius for 3 days prior?  Please advise

## 2020-03-02 ENCOUNTER — TELEPHONE (OUTPATIENT)
Dept: SURGERY | Facility: HOSPITAL | Age: 60
End: 2020-03-02

## 2020-03-02 NOTE — TELEPHONE ENCOUNTER
Patient states she is on antibiotics after seeing doctor today for upper respiratory symptoms. Please call patient to reschedule procedure for tomorrow with Dr. Tavarez, thank you!

## 2020-03-03 ENCOUNTER — HOSPITAL ENCOUNTER (OUTPATIENT)
Dept: RADIOLOGY | Facility: HOSPITAL | Age: 60
Discharge: HOME | End: 2020-03-03
Attending: ANESTHESIOLOGY | Admitting: ANESTHESIOLOGY
Payer: COMMERCIAL

## 2020-03-03 DIAGNOSIS — M54.16 LUMBAR RADICULOPATHY: ICD-10-CM

## 2020-03-03 DIAGNOSIS — M51.36 DDD (DEGENERATIVE DISC DISEASE), LUMBAR: ICD-10-CM

## 2020-03-10 ENCOUNTER — PATIENT MESSAGE (OUTPATIENT)
Dept: ADMINISTRATIVE | Facility: OTHER | Age: 60
End: 2020-03-10

## 2020-03-10 ENCOUNTER — CLINICAL SUPPORT (OUTPATIENT)
Dept: OTHER | Facility: OTHER | Age: 60
End: 2020-03-10

## 2020-03-10 VITALS — DIASTOLIC BLOOD PRESSURE: 75 MMHG | SYSTOLIC BLOOD PRESSURE: 112 MMHG

## 2020-03-10 DIAGNOSIS — I10 ESSENTIAL HYPERTENSION: Primary | ICD-10-CM

## 2020-03-10 NOTE — PROGRESS NOTES
Digital Medicine: Video Consult    03/10/2020  60 yo female presents for enrollment in [facility].  States that she hopes to be able to get OFF of her HTN medication as she is usually very well controlled.  Does not exercise because of musculoskeletal limitations.  Father was drinker and smoker before he had a stroke at 56 yo then  a few years ago.      Hypertension Consult   This is a chronic problem. The current episode started more than 1 year ago. The problem is unchanged. The problem is controlled. Pertinent negatives include no chest pain or shortness of breath. There are no associated agents to hypertension. Risk factors for coronary artery disease include dyslipidemia, family history, obesity and sedentary lifestyle. Past treatments include beta blockers, ACE inhibitors and diuretics. The current treatment provides significant improvement. There are no compliance problems.  There is no history of CAD/MI.       Patient Active Problem List   Diagnosis    Essential hypertension    Paroxysmal atrial fibrillation    Morbid obesity with BMI of 45.0-49.9, adult    Esophageal ulcer    Epistaxis    Neuropathy    Breast mass, right    Cervicalgia    Lumbar degenerative disc disease    S/P TLH/BSO, 9/3/19    Lumbar radiculopathy    Chronic bilateral low back pain with bilateral sciatica    Lumbar spondylosis       Past Medical History:   Diagnosis Date    Anemia     Arrhythmia     Arthritis     Atrial fibrillation     history    Bronchitis     Encounter for blood transfusion     General anesthetics causing adverse effect in therapeutic use     High blood pressure     Hyperlipidemia     Lump or mass in breast     benign     Neuropathy     Obesity     Ulcer        Family History   Problem Relation Age of Onset    Colon cancer Maternal Grandmother 70        mets to ovary    Eclampsia Paternal Grandmother     Stroke Father 57    Colon cancer Father     Hypertension Father     Hypertension  "Mother     Hypertension Brother     No Known Problems Daughter     Stomach cancer Neg Hx     Esophageal cancer Neg Hx     Breast cancer Neg Hx     Miscarriages / Stillbirths Neg Hx     Ovarian cancer Neg Hx        Social History     Socioeconomic History    Marital status: Significant Other     Spouse name: Not on file    Number of children: Not on file    Years of education: Not on file    Highest education level: Not on file   Occupational History    Not on file   Social Needs    Financial resource strain: Somewhat hard    Food insecurity:     Worry: Not on file     Inability: Not on file    Transportation needs:     Medical: Not on file     Non-medical: Not on file   Tobacco Use    Smoking status: Never Smoker    Smokeless tobacco: Never Used   Substance and Sexual Activity    Alcohol use: No     Comment: never    Drug use: No    Sexual activity: Yes     Partners: Male     Birth control/protection: See Surgical Hx     Comment: current partner over 30 years   Lifestyle    Physical activity:     Days per week: 0 days     Minutes per session: Not on file    Stress: Not on file   Relationships    Social connections:     Talks on phone: Not on file     Gets together: Not on file     Attends Druze service: Not on file     Active member of club or organization: Not on file     Attends meetings of clubs or organizations: Not on file     Relationship status: Not on file   Other Topics Concern    Not on file   Social History Narrative    Not on file       Review of patient's allergies indicates:   Allergen Reactions    Aspirin Other (See Comments)     "I don't take it because I've had ulcers"   ulcers  "I don't take it because I've had ulcers"     Meperidine Other (See Comments)     Felt like she was about to pass put after taking         Current Outpatient Medications:     apixaban (ELIQUIS) 5 mg Tab, Take 1 tablet (5 mg total) by mouth 2 (two) times daily., Disp: 60 tablet, Rfl: 1    " DULoxetine (CYMBALTA) 60 MG capsule, Take 2 capsules (120 mg total) by mouth once daily., Disp: 180 capsule, Rfl: 3    flecainide (TAMBOCOR) 100 MG Tab, Take 1 tablet (100 mg total) by mouth every 12 (twelve) hours., Disp: 180 tablet, Rfl: 3    gabapentin (NEURONTIN) 600 MG tablet, Take 1 tablet (600 mg total) by mouth 3 (three) times daily., Disp: 270 tablet, Rfl: 3    hydroCHLOROthiazide (HYDRODIURIL) 25 MG tablet, Take 1 tablet (25 mg total) by mouth once daily., Disp: 90 tablet, Rfl: 3    lisinopril 10 MG tablet, Take 1 tablet (10 mg total) by mouth once daily., Disp: 90 tablet, Rfl: 3    loratadine (CLARITIN ORAL), Take by mouth as needed., Disp: , Rfl:     metoprolol tartrate (LOPRESSOR) 25 MG tablet, Take 1 tablet (25 mg total) by mouth 2 (two) times daily., Disp: 60 tablet, Rfl: 11    nortriptyline (PAMELOR) 25 MG capsule, Take 1 capsule (25 mg total) by mouth every evening., Disp: 90 capsule, Rfl: 3    omeprazole (PRILOSEC) 20 MG capsule, Take 1 capsule (20 mg total) by mouth once daily., Disp: 30 capsule, Rfl: 1    rosuvastatin (CRESTOR) 10 MG tablet, Take 10 mg by mouth every evening. , Disp: , Rfl:     tiZANidine (ZANAFLEX) 4 MG tablet, Take 1 tablet (4 mg total) by mouth every 6 (six) hours as needed., Disp: 30 tablet, Rfl: 5    valACYclovir (VALTREX) 500 MG tablet, Take 1 tablet (500 mg total) by mouth once daily., Disp: 30 tablet, Rfl: 11        Patient is eligible for enrollment.  Enrollment completed.            Assessment & Plan      Essential Hypertension I10  Patient to be enrolled in the Hypertension Digital Medicine Program.   - An [employer] digital blood pressure cuff will be provided for at-home use.  Patient will be trained to use the device and start recording the blood pressure readings.   - With each blood pressure measurement, the patient's data automatically integrates with [facility], [facility]'s electronic medical record system.    - The patient's Care Team monitors the  results and intervenes with medication or lifestyle modifications as needed.   - The patient and his/her physician (if applicable) receive customized monthly reports to track progress.

## 2020-03-11 ENCOUNTER — PATIENT MESSAGE (OUTPATIENT)
Dept: PAIN MEDICINE | Facility: CLINIC | Age: 60
End: 2020-03-11

## 2020-03-12 ENCOUNTER — PATIENT MESSAGE (OUTPATIENT)
Dept: OBSTETRICS AND GYNECOLOGY | Facility: CLINIC | Age: 60
End: 2020-03-12

## 2020-03-12 DIAGNOSIS — N95.1 MENOPAUSAL SYMPTOMS: Primary | ICD-10-CM

## 2020-03-12 RX ORDER — ESTRADIOL 1 MG/G
1 GEL TOPICAL DAILY
Qty: 30 G | Refills: 11 | Status: SHIPPED | OUTPATIENT
Start: 2020-03-12 | End: 2020-09-19 | Stop reason: CLARIF

## 2020-03-12 NOTE — TELEPHONE ENCOUNTER
Called patient who has had recurrence of symptoms and estradiol level dropped. Will increase dose to 1mg from 0.5 of the divigel. RX sent to pharmacy in [place].

## 2020-03-13 ENCOUNTER — TELEPHONE (OUTPATIENT)
Dept: GASTROENTEROLOGY | Facility: CLINIC | Age: 60
End: 2020-03-13

## 2020-03-13 NOTE — TELEPHONE ENCOUNTER
----- Message from Alissa Mcwilliams sent at 3/13/2020  1:01 PM CDT -----  Type: Needs Medical Advice    Who Called:  patient  Best Call Back Number: [phone]  Additional Information: please give patient a call back to r/s procedure on 0316/2020

## 2020-03-13 NOTE — TELEPHONE ENCOUNTER
Spoke with pt. Rescheduled c-scope per pt request. Pt verbalized understanding. Also informed per Dr. Bojorquez to hold eliquis for 2 days prior. Pt verbalized understanding.

## 2020-03-18 ENCOUNTER — PATIENT OUTREACH (OUTPATIENT)
Dept: OTHER | Facility: OTHER | Age: 60
End: 2020-03-18

## 2020-03-18 NOTE — LETTER
March 18, 2020     Fatemeh Shoemaker  [address]  [place] [address]       Dear Fatemeh,    Welcome to [facility]! Our goal is to make care effective, proactive and convenient by using data you send us from home to better treat your chronic conditions.              My name is Cesar MartinezOskarKamar, and I am your dedicated Digital Medicine clinician. As an expert in medication management, I will help ensure that the medications you are taking continue to provide the intended benefits and help you reach your goals. You can reach me directly at [phone] or by sending me a message directly through your [facility] account.      I am Tiki Kumar and I will be your health . My job is to help you identify lifestyle changes to improve your disease control. We will talk about nutrition, exercise, and other ways you may be able to adjust your current habits to better your health. Additionally, we will help ensure you are completing the tests and screenings that are necessary to help manage your conditions. You can reach me directly at [phone] or by sending me a message directly through your [facility] account.    Most importantly, YOU are at the center of this team. Together, we will work to improve your overall health and encourage you to meet your goals for a healthier lifestyle.     What we expect from YOU:  · Please take frequent home blood pressure measurements. We ask that you take at least 1 blood pressure reading per week, but more information will better help us get you know you. Be sure you rest for a few minutes before taking the reading in a quiet, comfortable place.     Be available to receive phone calls or [employer] messages, when appropriate, from your care team. Please let us know if there are any specific days or times that work best for us to reach you via phone.     Complete routine tests and screenings. Dont worry, we will help keep you on track!           What you  should expect from your Digital Medicine Care Team:   We will work with you to create a personalized plan of care and provide you with encouragement and education, including regarding lifestyle changes, that could help you manage your disease states.     We will adjust your current medications, if needed, and continue to monitor your long-term progress.     We will provide you and your physician with monthly progress reports after you have been in the program for more than 30 days.     We will send you reminders through [employer] and text messages to help ensure you do not miss any testing deadlines to help manage your disease states.    You will be able to reach us by phone or through your [employer] account by clicking our names under Care Team on the right side of the home screen.    I look forward to working with you to achieve your blood pressure goals!    We look forward to working with you to help manage your health,    Sincerely,    Your Digital Medicine Team    Please visit our websites to learn more:   · Hypertension: [email or web address]      Remember, we are not available for emergencies. If you have an emergency, please contact your doctors office directly or call [facility] ([phone] or [phone]) or [phone].

## 2020-03-18 NOTE — PROGRESS NOTES
Digital Medicine: Health  Introduction    Introduced Fatemeh Shoemaker to Digital Medicine. Discussed health  role and recommended lifestyle modifications.    Enrolled pt into [facility] Digital Medicine program. Introduced myself as pt's health  for the program. Pt states her blood pressure has been running low lately. Discussed symptoms of a low blood pressure. Pt does state she feels weak often. Discussed emergency protocol. Encouraged pt to eat a salty snack (gave examples), drink water, and move around slowly if receiving a blood pressure reading of <90/60 with symptoms. Pt verbalized understanding. Also discussed dehydration's impact on blood pressure. Encouraged pt to be sure she is consuming enough fluids during the day. Discussed goals of the program, including maintaining a low sodium diet. Encouraged limiting processed foods. Encouraged use of salt free seasonings. Also encouraged 150 minutes of physical activity weekly (~20-25 minutes daily). Gave encouragement and support. Encouraged pt to reach out to me with any questions or concerns.     The history is provided by the patient.     HYPERTENSION  Our goal is to get BP to consistently below 130/80mmHg and make the process convenient so patient can avoid extra trips to the office. Getting your blood pressure below 130/80mmHg (definition of control) will reduce your risk for heart attack, kidney failure, stroke and death (as well as kidney failure, eye disease, & dementia)      Reviewed that the Digital Medicine care team - consisting of a clinician and a health  - will follow the most current evidence-based national guidelines for treating your condition.  The health  will focus on lifestyle modifications and motivation while the clinician will focus on medication therapy.  The care team will review all data on a regular basis and reach out as needed.      Explained that one of the key parts of the program is communication with the care  team.  Asked patient to respond to outreach attempts and complete questionnaires.  Stressed importance of medication adherence.    Reviewed non-pharmacologic therapies and impact on BP.      Explained that we expect patient to obtain several blood pressures per week at random times of day.  Instructed patient not to allow anyone else to use phone and monitoring device.  Confirmed appropriate BP monitoring technique.      Explained to patient that the digital medicine team is not available for emergencies.  Patient will call [facility] on-call ([phone] or [phone]) or [phone] if needed.      Patient's BP goal is 130/80.Patient's BP average is 100/66 mmHg, which is above goal, per 2017 ACC/AHA Hypertension Guidelines.          Last 5 Patient Entered Readings                                      Current 30 Day Average: 100/66     Recent Readings 3/18/2020 3/18/2020 3/18/2020 3/18/2020 3/18/2020    SBP (mmHg) 104 97 91 144 144    DBP (mmHg) 61 62 58 78 78    Pulse 72 75 52 78 78            INTERVENTION(S)  recommended diet modifications, recommend physical activity, reviewed monitoring technique, encouragement/support and goal setting    PLAN  patient verbalizes understanding, patient amenable to changes, Clinician follow-up and continue monitoring      There are no preventive care reminders to display for this patient.    Reviewed the importance of self-monitoring, medication adherence, and that the health  can be used as a resource for lifestyle modifications to help reduce or maintain a healthy lifestyle.    Sent link to [facility] webpages and my contact information via [employer] for future questions. Follow up scheduled.             Diet Screening   She has the following dietary restrictions: low sodium diet    Pt admits to not following a low sodium diet.     Intervention(s): portion control, low sodium diet education, reducing sodium intake, sodium reduction while dining out and reducing  javier    Physical Activity Screening   When asked if exercising, patient responded: no    Intervention(s): goal setting and goal tracking       SDOH

## 2020-03-26 ENCOUNTER — PATIENT OUTREACH (OUTPATIENT)
Dept: OTHER | Facility: OTHER | Age: 60
End: 2020-03-26

## 2020-03-28 ENCOUNTER — TELEPHONE (OUTPATIENT)
Dept: CARDIOLOGY | Facility: HOSPITAL | Age: 60
End: 2020-03-28

## 2020-03-28 NOTE — TELEPHONE ENCOUNTER
Called by Ms Shoemaker while on call 3/28/20 regarding low blood pressure. Prior to taking antihypertensives, her BP was in 130s/70s. Took antihypertensive medication and a muscle relaxer (for back pain). Stood up and felt unwell. Checked BP and was 70s/40s. She has been helping her daughter move for the last couple days as well. She has been sweating with this activity.     Since her BP was low, she has been sitting/laying down. Instructed her to adequately hydrate and recheck BP. If it is still low or if she begins to feel worse with any new or worsening symptoms, I instructed her to seek urgent/emergent medical care (at the nearest emergency room).     Carlos Palomo MD PGY7

## 2020-04-01 ENCOUNTER — PATIENT MESSAGE (OUTPATIENT)
Dept: OTHER | Facility: OTHER | Age: 60
End: 2020-04-01

## 2020-04-07 ENCOUNTER — TELEPHONE (OUTPATIENT)
Dept: OPTOMETRY | Facility: CLINIC | Age: 60
End: 2020-04-07

## 2020-04-08 DIAGNOSIS — I48.0 PAROXYSMAL ATRIAL FIBRILLATION: ICD-10-CM

## 2020-04-14 ENCOUNTER — PATIENT OUTREACH (OUTPATIENT)
Dept: OTHER | Facility: OTHER | Age: 60
End: 2020-04-14

## 2020-04-14 NOTE — PROGRESS NOTES
Digital Medicine: Health  Follow-Up    The history is provided by the patient.     Follow Up  Follow-up reason(s): reading review and routine education      Routine Education Topics: eating patterns and physical activity  Pt called- she states she is having trouble logging on to [employer], and requested tech support. Discussed recent BP readings. Pt states she is under stress and lost a family member on . She is trying to control stress by staying busy/active throughout the day. Pt states today was her first day back to work. Gave encouragement for pt to make time for herself for stress relief. Encouraged pt to continue being active, and discussed impact on blood pressure. Pt admits to drinking 2-3 cokes daily, and Powerade. She asked if this would impact blood pressure. Discussed caffeine and sodium content in beverages. Discussed it's impact on blood pressure. Encouraged her to relate to higher BP readings to lifestyle factors. Pt agreed. Gave encouragement and support. Encouraged pt to reach out to me with questions or concerns.       INTERVENTION(S)  recommended diet modifications, recommend physical activity and encouragement/support    PLAN  patient verbalizes understanding, patient amenable to changes and continue monitoring      There are no preventive care reminders to display for this patient.    Last 5 Patient Entered Readings                                      Current 30 Day Average: 114/67     Recent Readings 4/10/2020 2020 2020 2020 2020    SBP (mmHg) 104 147 151 116 109    DBP (mmHg) 57 90 86 64 68    Pulse 55 51 54 63 57                      Diet Screening   Patient reports eating or drinking the followin-3 cokes daily, poweradeShe has the following dietary restrictions: low sodium diet    Intervention(s): reducing sodium intake    Physical Activity Screening       Pt states she has been staying active lately with home activities.     Intervention(s): goal tracking        SDOH

## 2020-04-17 ENCOUNTER — TELEPHONE (OUTPATIENT)
Dept: GASTROENTEROLOGY | Facility: CLINIC | Age: 60
End: 2020-04-17

## 2020-04-17 NOTE — TELEPHONE ENCOUNTER
Vmail left for pt informing her that cscope for 4/27/20 has to be rescheduled and we will call her to reschedule once scheduling resumes.

## 2020-04-24 NOTE — PROGRESS NOTES
"Digital Medicine: Clinician Introduction    Fatemeh Shoemaker is a 59 y.o. female who is newly enrolled in the Digital Medicine Clinic.    The following information was reviewed and updated:  Preferred pharmacy   [facility] - [place] - [address]  [address]  [place] [address]  Phone: [phone] Fax: [phone]      Patient prefers a 90 days supply.     Review of patient's allergies indicates:   Allergen Reactions    Aspirin Other (See Comments)     "I don't take it because I've had ulcers"   ulcers  "I don't take it because I've had ulcers"     Meperidine Other (See Comments)     Felt like she was about to pass put after taking       Called patient to follow up. Patient endorses adherence to medication regimen. Patient denies hypertensive s/sx (SOB, CP, severe headaches, changes in vision). Instructed patient to seek medical care if BP > 180/110 and is accompanied by hypertensive s/sx associated, patient confirms understanding.     Patient does admit to some orthostatic hypotension, she has had several low readings but she is unsure if one day she accidentally took too much of her BP meds. She also stated she often can not walk straight and will lean or run into people. I encouraged appropriate compliance, patient verbally agreed.       Patient denies having questions or concerns. Patient has my contact information and knows to call with any concerns or clinical changes.        The history is provided by the patient. No  was used.     HYPERTENSION  Our goal is to get BP to consistently below 130/80mmHg and make the process convenient so patient can avoid extra trips to the office. Getting your blood pressure below 130/80mmHg (definition of control) will reduce your risk for heart attack, kidney failure, stroke and death (as well as kidney failure, eye disease, & dementia)      Reviewed non-pharmacologic therapies and impact on BP      Explained that we expect patient " to obtain several blood pressures per week at random times of day.  Instructed patient not to allow anyone else to use phone and monitoring device.  Confirmed appropriate BP monitoring technique.      Explained to patient that the digital medicine team is not available for emergencies.  Patient will call [facility] on-call ([phone] or [phone]) or [phone] if needed.    Patient's BP goal is 130/80. Patients BP average is 118/69 mmHg, which is at or below goal, per 2017 ACC/AHA Hypertension Guidelines.      Medication Change: dose decrease    Assessment:  Reviewed recent readings. Per 2017 ACC/ AHA HTN guidelines (goal of BP < 130/80), current 30-day average is well controlled. Patient is complaining of hypotensive sx, trial decrease in meds may be appropriate.          Med Review complete.    Allergies reviewed.      Last 5 Patient Entered Readings                                      Current 30 Day Average: 118/69     Recent Readings 4/23/2020 4/22/2020 4/22/2020 4/20/2020 4/18/2020    SBP (mmHg) 119 124 135 122 128    DBP (mmHg) 72 77 79 72 69    Pulse 55 63 71 60 71            INTERVENTION(S)  reviewed appropriate dose schedule, reviewed monitoring technique, recommended med change and encouragement/support    PLAN  patient verbalizes understanding, patient amenable to changes and continue monitoring    Decrease HCTZ to 1/2 tab daily to see if sx resolve. I will continue to monitor regularly and will follow-up in 2 weeks, sooner if blood pressure begins to trend upward or downward. If BP rises >130/80, resume full dose.      There are no preventive care reminders to display for this patient.    Current Medication Regimen:  Hypertension Medications             hydroCHLOROthiazide (HYDRODIURIL) 25 MG tablet Take 1 tablet (25 mg total) by mouth once daily.    lisinopril 10 MG tablet Take 1 tablet (10 mg total) by mouth once daily.    metoprolol tartrate (LOPRESSOR) 25 MG tablet Take 1 tablet (25 mg total) by  mouth 2 (two) times daily.            Reviewed the importance of self-monitoring, medication adherence, and that the health  can be used as a resource for lifestyle modifications to help reduce or maintain a healthy lifestyle.    Sent link to [facility] webpages and my contact information via [employer] for future questions. Follow up scheduled.                     Medication Adherence Screening   She did not miss a dose this month.

## 2020-04-27 DIAGNOSIS — I48.0 PAROXYSMAL ATRIAL FIBRILLATION: ICD-10-CM

## 2020-04-27 RX ORDER — OMEPRAZOLE 20 MG/1
20 CAPSULE, DELAYED RELEASE ORAL DAILY
Qty: 30 CAPSULE | Refills: 1 | Status: SHIPPED | OUTPATIENT
Start: 2020-04-27 | End: 2020-06-12 | Stop reason: SDUPTHER

## 2020-05-07 ENCOUNTER — PATIENT OUTREACH (OUTPATIENT)
Dept: OTHER | Facility: OTHER | Age: 60
End: 2020-05-07

## 2020-05-07 DIAGNOSIS — I48.0 PAROXYSMAL ATRIAL FIBRILLATION: ICD-10-CM

## 2020-05-07 RX ORDER — HYDROCHLOROTHIAZIDE 25 MG/1
12.5 TABLET ORAL DAILY
Qty: 90 TABLET | Refills: 3
Start: 2020-05-07 | End: 2020-06-11 | Stop reason: DRUGHIGH

## 2020-05-07 NOTE — PROGRESS NOTES
Digital Medicine: Clinician Follow-Up    Called patient to follow up. Patient denies hypertensive s/sx (SOB, CP, severe headaches, changes in vision).     Patient is doing well with decreased dose of HCTZ. She is still feeling dizzy and her gait is unbalanced, but this will likely require consult from neurologist. BP is still meeting goal with lower dose, elevated reading yesterday was d/t being out of metoprolol for 2 days. Patient will be picking this up today, otherwise all BP's within the past 2 weeks have been at or below AHA guideline goals.     I emphasized the importance of proper technique, patient will get lower BP's with a second reading. Patient verbalized understanding. Patient ok to continue with lowered dose at this time.     Patient denies having questions or concerns. Patient has my contact information and knows to call with any concerns or clinical changes.        The history is provided by the patient. No  was used.     Follow Up  Follow-up reason(s): reading review and medication change follow-up      Readings are trending up   Patient started new medication.    Is patient tolerating med change?:  Yes      Assessment:  Reviewed recent readings. Per 2017 ACC/ AHA HTN guidelines (goal of BP < 130/80), current 30-day average is well controlled.       INTERVENTION(S)  reviewed appropriate dose schedule, reviewed monitoring technique, encouragement/support and goal setting    PLAN  patient verbalizes understanding, patient amenable to changes and continue monitoring    Continue current medication regimen, med list updated to reflect HCTZ 12.5 mg (pt takign 1/2 tab at this time). I will continue to monitor regularly and will follow-up in 4 weeks, sooner if blood pressure begins to trend upward or downward. If BP is still trending up, can return to 25 mg QD.       There are no preventive care reminders to display for this patient.    Last 5 Patient Entered Readings                                       Current 30 Day Average: 124/75     Recent Readings 5/7/2020 5/6/2020 5/6/2020 5/2/2020 5/2/2020    SBP (mmHg) 120 145 126 131 137    DBP (mmHg) 78 79 83 81 86    Pulse 71 87 73 50 55             Hypertension Medications             hydroCHLOROthiazide (HYDRODIURIL) 25 MG tablet Take 0.5 tablets (12.5 mg total) by mouth once daily.    lisinopril 10 MG tablet Take 1 tablet (10 mg total) by mouth once daily.    metoprolol tartrate (LOPRESSOR) 25 MG tablet Take 1 tablet (25 mg total) by mouth 2 (two) times daily.                 Screenings

## 2020-05-11 ENCOUNTER — TELEPHONE (OUTPATIENT)
Dept: GASTROENTEROLOGY | Facility: CLINIC | Age: 60
End: 2020-05-11

## 2020-05-12 ENCOUNTER — TELEPHONE (OUTPATIENT)
Dept: PAIN MEDICINE | Facility: CLINIC | Age: 60
End: 2020-05-12

## 2020-05-12 DIAGNOSIS — Z11.9 SCREENING EXAMINATION FOR INFECTIOUS DISEASE: Primary | ICD-10-CM

## 2020-05-12 NOTE — TELEPHONE ENCOUNTER
Spoke with patient regarding procedure and explained all instructions and answered all questions. Patient scheduled for covid testing and follow up appointment.

## 2020-05-19 ENCOUNTER — LAB VISIT (OUTPATIENT)
Dept: FAMILY MEDICINE | Facility: CLINIC | Age: 60
End: 2020-05-19
Payer: COMMERCIAL

## 2020-05-19 DIAGNOSIS — Z11.9 SCREENING EXAMINATION FOR INFECTIOUS DISEASE: ICD-10-CM

## 2020-05-19 PROCEDURE — U0003 INFECTIOUS AGENT DETECTION BY NUCLEIC ACID (DNA OR RNA); SEVERE ACUTE RESPIRATORY SYNDROME CORONAVIRUS 2 (SARS-COV-2) (CORONAVIRUS DISEASE [COVID-19]), AMPLIFIED PROBE TECHNIQUE, MAKING USE OF HIGH THROUGHPUT TECHNOLOGIES AS DESCRIBED BY CMS-2020-01-R: HCPCS

## 2020-05-20 LAB — SARS-COV-2 RNA RESP QL NAA+PROBE: NOT DETECTED

## 2020-05-20 RX ORDER — ERGOCALCIFEROL 1.25 MG/1
50000 CAPSULE ORAL
COMMUNITY
End: 2022-04-20

## 2020-05-21 ENCOUNTER — HOSPITAL ENCOUNTER (OUTPATIENT)
Dept: RADIOLOGY | Facility: HOSPITAL | Age: 60
Discharge: HOME | End: 2020-05-21
Attending: ANESTHESIOLOGY
Payer: COMMERCIAL

## 2020-05-21 ENCOUNTER — HOSPITAL ENCOUNTER (OUTPATIENT)
Facility: HOSPITAL | Age: 60
Discharge: HOME | End: 2020-05-21
Attending: ANESTHESIOLOGY | Admitting: ANESTHESIOLOGY
Payer: COMMERCIAL

## 2020-05-21 VITALS
DIASTOLIC BLOOD PRESSURE: 70 MMHG | TEMPERATURE: 98 F | RESPIRATION RATE: 15 BRPM | HEART RATE: 54 BPM | OXYGEN SATURATION: 97 % | SYSTOLIC BLOOD PRESSURE: 130 MMHG

## 2020-05-21 DIAGNOSIS — M54.16 LUMBAR RADICULOPATHY: Primary | ICD-10-CM

## 2020-05-21 DIAGNOSIS — M54.16 LUMBAR RADICULOPATHY: ICD-10-CM

## 2020-05-21 PROCEDURE — A4216 STERILE WATER/SALINE, 10 ML: HCPCS | Mod: PO | Performed by: ANESTHESIOLOGY

## 2020-05-21 PROCEDURE — 63600175 PHARM REV CODE 636 W HCPCS: Mod: PO | Performed by: ANESTHESIOLOGY

## 2020-05-21 PROCEDURE — 62323 NJX INTERLAMINAR LMBR/SAC: CPT | Mod: PO | Performed by: ANESTHESIOLOGY

## 2020-05-21 PROCEDURE — 25500020 PHARM REV CODE 255: Mod: PO | Performed by: ANESTHESIOLOGY

## 2020-05-21 PROCEDURE — 62323 NJX INTERLAMINAR LMBR/SAC: CPT | Mod: ,,, | Performed by: ANESTHESIOLOGY

## 2020-05-21 PROCEDURE — 76000 FLUOROSCOPY <1 HR PHYS/QHP: CPT | Mod: TC,PO

## 2020-05-21 PROCEDURE — 25000003 PHARM REV CODE 250: Mod: PO | Performed by: ANESTHESIOLOGY

## 2020-05-21 PROCEDURE — 62323 PR INJ LUMBAR/SACRAL, W/IMAGING GUIDANCE: ICD-10-PCS | Mod: ,,, | Performed by: ANESTHESIOLOGY

## 2020-05-21 RX ORDER — METHYLPREDNISOLONE ACETATE 40 MG/ML
INJECTION, SUSPENSION INTRA-ARTICULAR; INTRALESIONAL; INTRAMUSCULAR; SOFT TISSUE
Status: DISCONTINUED | OUTPATIENT
Start: 2020-05-21 | End: 2020-05-21 | Stop reason: HOSPADM

## 2020-05-21 RX ORDER — SODIUM CHLORIDE 9 MG/ML
INJECTION, SOLUTION INTRAMUSCULAR; INTRAVENOUS; SUBCUTANEOUS
Status: DISCONTINUED | OUTPATIENT
Start: 2020-05-21 | End: 2020-05-21 | Stop reason: HOSPADM

## 2020-05-21 RX ORDER — LIDOCAINE HYDROCHLORIDE 10 MG/ML
INJECTION, SOLUTION EPIDURAL; INFILTRATION; INTRACAUDAL; PERINEURAL
Status: DISCONTINUED | OUTPATIENT
Start: 2020-05-21 | End: 2020-05-21 | Stop reason: HOSPADM

## 2020-05-21 RX ORDER — ALPRAZOLAM 0.5 MG/1
0.5 TABLET, ORALLY DISINTEGRATING ORAL ONCE AS NEEDED
Status: COMPLETED | OUTPATIENT
Start: 2020-05-21 | End: 2020-05-21

## 2020-05-21 RX ORDER — SODIUM BICARBONATE 42 MG/ML
INJECTION, SOLUTION INTRAVENOUS
Status: DISCONTINUED | OUTPATIENT
Start: 2020-05-21 | End: 2020-05-21 | Stop reason: HOSPADM

## 2020-05-21 RX ADMIN — ALPRAZOLAM 0.5 MG: 0.5 TABLET, ORALLY DISINTEGRATING ORAL at 09:05

## 2020-05-21 NOTE — OP NOTE
"Procedure Note    Procedure Date: 5/21/2020    Procedure Performed:  L5/S1 lumbar interlaminar epidural steroid injection under fluoroscopy.    Indications: Patient has failed conservative therapy.      Pre-op diagnosis: Lumbar Radiculopathy    Post-op diagnosis: same    Physician: Gurjit Tavarez MD    IV Sedation medications: none    Medications injected: depomedrol 80mg, 1% Lidocaine 1ml, 2 mL sterile, preservative-free normal saline.    Local anesthetic used: 1% Lidocaine, 1 ml, 8.4% sodium bicarbonate 0.25ml    Estimated Blood Loss: none    Complications:  none    Technique:  The patient was interviewed in the holding area and Risks/Benefits were discussed, including, but not limited to, the possibility of new or different pain, bleeding or infection.   All questions were answered.  The patient understood and accepted risks.  Consent was verfied.  A time-out was taken to identify patient and procedure prior to starting the procedure. The patient was placed in the prone position on the fluoroscopy table. The area of the lumbar spine was prepped with Chloraprep and draped in a sterile manner. The L5/S1 interspace was identified and marked under AP fluoroscopy. The skin and subcutaneous tissues overlying the targeted interspace were anesthetized with 3-5 mL of 1% lidocaine using a 25G, 1.5" needle.  A 17G, 3.5" Tuohy epidural needle was directed toward the interspace under fluoroscopic guidance until the ligamentum flavum was engaged. From this point, a loss of resistance technique with a glass syringe and saline was used to identify entrance of the needle into the epidural space. Once loss of resistance was observed 1 mL of contrast solution was injected. An appropriate epidurogram was noted.  A 4 mL mixture consisting of saline, 1 mL 1% Lidocaine and 80 mg of depomedrol was injected slowly and without resistance.  The needle was flushed with normal saline and removed. The contrast was seen to be displaced after " injection. Patient was awake/responsive during all injections.  The patient tolerated the procedure well and was transferred to the [facility] in stable condition.  The patient was monitored after the procedure and was given post-procedure and discharge instructions to follow at home. The patient was discharged in a stable condition.

## 2020-05-21 NOTE — H&P
"[facility]  History & Physical - Short Stay  Pain Management       SUBJECTIVE:     Procedure: Procedure(s) (LRB):  Injection-steroid-epidural-lumbar L5/S1 (N/A)    Chief Complaint/Reason for Admission:  Lumbar radiculopathy [M54.16]    PTA Medications   Medication Sig    DULoxetine (CYMBALTA) 60 MG capsule Take 2 capsules (120 mg total) by mouth once daily.    ergocalciferol (VITAMIN D2) 50,000 unit Cap Take 50,000 Units by mouth every 7 days.    estradioL (DIVIGEL) 1 mg/gram (0.1 %) topical gel Place 1 g onto the skin once daily.    flecainide (TAMBOCOR) 100 MG Tab Take 1 tablet (100 mg total) by mouth every 12 (twelve) hours.    gabapentin (NEURONTIN) 600 MG tablet Take 1 tablet (600 mg total) by mouth 3 (three) times daily.    hydroCHLOROthiazide (HYDRODIURIL) 25 MG tablet Take 0.5 tablets (12.5 mg total) by mouth once daily.    lisinopril 10 MG tablet Take 1 tablet (10 mg total) by mouth once daily.    metoprolol tartrate (LOPRESSOR) 25 MG tablet Take 1 tablet (25 mg total) by mouth 2 (two) times daily.    nortriptyline (PAMELOR) 25 MG capsule Take 1 capsule (25 mg total) by mouth every evening.    omeprazole (PRILOSEC) 20 MG capsule Take 1 capsule (20 mg total) by mouth once daily.    rosuvastatin (CRESTOR) 10 MG tablet Take 10 mg by mouth every evening.     tiZANidine (ZANAFLEX) 4 MG tablet Take 1 tablet (4 mg total) by mouth every 6 (six) hours as needed.    apixaban (ELIQUIS) 5 mg Tab Take 1 tablet (5 mg total) by mouth 2 (two) times daily.    loratadine (CLARITIN ORAL) Take by mouth as needed.    valACYclovir (VALTREX) 500 MG tablet Take 1 tablet (500 mg total) by mouth once daily.       Review of patient's allergies indicates:   Allergen Reactions    Aspirin Other (See Comments)     "I don't take it because I've had ulcers"   ulcers  "I don't take it because I've had ulcers"     Meperidine Other (See Comments)     Felt like she was about to pass put after taking "       Past Medical History:   Diagnosis Date    Anemia     Arrhythmia     Arthritis     Atrial fibrillation     history    Bronchitis     Encounter for blood transfusion     General anesthetics causing adverse effect in therapeutic use     High blood pressure     Hyperlipidemia     Lump or mass in breast     benign     Neuropathy     Obesity     Ulcer      Past Surgical History:   Procedure Laterality Date    BREAST BIOPSY Right 2017    myofibroblastoma     BREAST LUMPECTOMY Right 12/21/2017    CHOLECYSTECTOMY  12/28/2017    Dr. TOLU Bowers, ST     csection      CS x 2    CYSTOSCOPY N/A 9/3/2019    Procedure: CYSTOSCOPY;  Surgeon: Noemi Pierre MD;  Location: [facility];  Service: OB/GYN;  Laterality: N/A;    DILATION AND CURETTAGE OF UTERUS      HIP PINNING      HYSTERECTOMY      LAPAROSCOPIC SALPINGO-OOPHORECTOMY Bilateral 9/3/2019    Procedure: SALPINGO-OOPHORECTOMY, LAPAROSCOPIC;  Surgeon: Noemi Pierre MD;  Location: [facility];  Service: OB/GYN;  Laterality: Bilateral;  Dr. Bentley to assist. No resident needed.     LAPAROSCOPIC TOTAL HYSTERECTOMY N/A 9/3/2019    Procedure: HYSTERECTOMY, TOTAL, LAPAROSCOPIC;  Surgeon: Noemi Pierre MD;  Location: [facility];  Service: OB/GYN;  Laterality: N/A;    NASAL SEPTUM SURGERY      OOPHORECTOMY      TOTAL HIP ARTHROPLASTY Right     TUBAL LIGATION  1997     Family History   Problem Relation Age of Onset    Colon cancer Maternal Grandmother 70        mets to ovary    Eclampsia Paternal Grandmother     Stroke Father 57    Colon cancer Father     Hypertension Father     Hypertension Mother     Hypertension Brother     No Known Problems Daughter     Stomach cancer Neg Hx     Esophageal cancer Neg Hx     Breast cancer Neg Hx     Miscarriages / Stillbirths Neg Hx     Ovarian cancer Neg Hx      Social History     Tobacco Use    Smoking status: Never Smoker    Smokeless tobacco: Never Used   Substance Use Topics    Alcohol use: No      Frequency: Never     Comment: never    Drug use: No      No current facility-administered medications for this encounter.     Review of Systems:  General ROS: negative for - fever  Musculoskeletal ROS: negative for - muscular weakness  Neurological ROS: negative for - numbness/tingling  Dermatological ROS: negative for rash    OBJECTIVE:     Vital Signs (Most Recent):     There is no height or weight on file to calculate BMI.    Physical Exam:  General appearance - alert, well appearing, and in no distress  Mental status - AOx3  Eyes - pupils equal and reactive, extraocular eye movements intact  Heart - normal rate, regular rhythm, normal S1, S2, no murmurs, rubs, clicks or gallops  Chest - clear to auscultation, no wheezes, rales or rhonchi, symmetric air entry  Abdomen - soft, nontender, nondistended, no masses or organomegaly  Neurological - alert, oriented, normal speech, no focal findings or movement disorder noted  Extremities - peripheral pulses normal, no pedal edema, no clubbing or cyanosis       Right Left   L2/3 Iliacus Hip flexion  5  5   L3/4 Qudratus Femoris Knee Extension  5  5   L4/5 Tib Anterior Ankle Dorsiflexion   5  5   L5/S1 Extensor Hallicus Longus Great toe extension  5  5   L4/5 Tib Anterior/Posterior Inversion  5  5   L5/S1 Extensor Digitorum Longus, Peronues Eversion  5  5   S1/S2 Gastroc/Soleus Plantar Flexion  5  5         ASSESSMENT/PLAN:     Active Hospital Problems    Diagnosis  POA    Lumbar radiculopathy [M54.16]  Yes      Resolved Hospital Problems   No resolved problems to display.      59 y.o. year old female with PMH HTN, a-fib on eliquis presents to the office with back pain.  She's had this pain for over 5 years and it has been gradually worsening.  No history of prior back surgery.  Today her pain is constant, aching, 6/10, radiating down the back of both of her legs.  She denies any weakness but has had chronic numbness in the top of her feet for years.  Her pain is worse  with standing and walking and relieved with rest and leaning forward.  On exam she has 5/5 strength, decreased sensation to light touch over the left L5.  She takes gabapentin, cymbalta, nortriptyline, and tizanidine for over 6 weeks.  MRI lumbar spine c/w multilevel b/l facet arthropathy, L3-4 moderate central spinal stenosis and L5-S1 severe left foraminal stenosis, broad central disc protrusion approaches both S1 roots, the left S1 root is crowded between the facet joint changes and the disc protrusion, both S1 roots could be affected, left greater than right.  Her pain is a combination of facet mediated pain and radicular pain with radiculopathy.  Her pain is limiting her mobility and interfering with her ADL's.  We proceed with L5/S1 ILESI.  Follow up 2 weeks post injection.  She has held eliquis for 3 days.     The risks and benefits of this intervention, and alternative therapies were discussed with the patient.  The discussion of risks included infection, bleeding, need for additional procedures or surgery, nerve damage, paralysis, adverse medication reaction(s), stroke, and/or death.  Questions regarding the procedure, risks, expected outcome, and possible side effects were solicited and answered to the patient's satisfaction.  Fatemeh wishes to proceed with the injection.  Verbal and written consent were verified.      Proceed with intervention as scheduled.    Gurjit Tavarez M.D.  Interventional Pain Medicine / Anesthesiology

## 2020-05-21 NOTE — DISCHARGE SUMMARY
[facility]  Discharge Note  Short Stay    Admit Date: 5/21/2020    Discharge Date: 5/21/2020    Attending Physician: Gurjit Tavarez     Discharge Provider: Gurjit Tavarez    Diagnoses:  Active Hospital Problems    Diagnosis  POA    Lumbar radiculopathy [M54.16]  Yes      Resolved Hospital Problems   No resolved problems to display.       Discharged Condition: Good    Final Diagnoses: Lumbar radiculopathy [M54.16]    Disposition: Home or Self Care    Hospital Course: No complications, uneventful    Outcome of Hospitalization, Treatment, Procedure, or Surgery:  Patient was admitted for outpatient interventional pain management procedure. The patient tolerated the procedure well with no complications.    Follow up/Patient Instructions:  Follow up as scheduled in Pain Management office in 3-4 weeks.  Patient has received instructions and follow up date and time.    Medications:  Continue previous medications    Discharge Procedure Orders   Notify your health care provider if you experience any of the following:  temperature >100.4     Notify your health care provider if you experience any of the following:  persistent nausea and vomiting or diarrhea     Notify your health care provider if you experience any of the following:  severe uncontrolled pain     Notify your health care provider if you experience any of the following:  redness, tenderness, or signs of infection (pain, swelling, redness, odor or green/yellow discharge around incision site)     Notify your health care provider if you experience any of the following:  difficulty breathing or increased cough     Notify your health care provider if you experience any of the following:  severe persistent headache     Notify your health care provider if you experience any of the following:  worsening rash     Notify your health care provider if you experience any of the following:  persistent dizziness, light-headedness, or visual disturbances     Notify your  health care provider if you experience any of the following:  increased confusion or weakness     Activity as tolerated         Discharge Procedure Orders (must include Diet, Follow-up, Activity):   Discharge Procedure Orders (must include Diet, Follow-up, Activity)   Notify your health care provider if you experience any of the following:  temperature >100.4     Notify your health care provider if you experience any of the following:  persistent nausea and vomiting or diarrhea     Notify your health care provider if you experience any of the following:  severe uncontrolled pain     Notify your health care provider if you experience any of the following:  redness, tenderness, or signs of infection (pain, swelling, redness, odor or green/yellow discharge around incision site)     Notify your health care provider if you experience any of the following:  difficulty breathing or increased cough     Notify your health care provider if you experience any of the following:  severe persistent headache     Notify your health care provider if you experience any of the following:  worsening rash     Notify your health care provider if you experience any of the following:  persistent dizziness, light-headedness, or visual disturbances     Notify your health care provider if you experience any of the following:  increased confusion or weakness     Activity as tolerated

## 2020-06-05 ENCOUNTER — TELEPHONE (OUTPATIENT)
Dept: PAIN MEDICINE | Facility: CLINIC | Age: 60
End: 2020-06-05

## 2020-06-05 ENCOUNTER — OFFICE VISIT (OUTPATIENT)
Dept: PAIN MEDICINE | Facility: CLINIC | Age: 60
End: 2020-06-05
Payer: COMMERCIAL

## 2020-06-05 VITALS
RESPIRATION RATE: 20 BRPM | BODY MASS INDEX: 47.67 KG/M2 | WEIGHT: 286.5 LBS | TEMPERATURE: 99 F | HEART RATE: 60 BPM | DIASTOLIC BLOOD PRESSURE: 64 MMHG | OXYGEN SATURATION: 98 % | SYSTOLIC BLOOD PRESSURE: 136 MMHG

## 2020-06-05 DIAGNOSIS — M54.16 LUMBAR RADICULOPATHY: ICD-10-CM

## 2020-06-05 DIAGNOSIS — Z11.9 SCREENING EXAMINATION FOR INFECTIOUS DISEASE: ICD-10-CM

## 2020-06-05 DIAGNOSIS — M51.36 LUMBAR DEGENERATIVE DISC DISEASE: Primary | ICD-10-CM

## 2020-06-05 PROCEDURE — 99999 PR PBB SHADOW E&M-EST. PATIENT-LVL IV: ICD-10-PCS | Mod: PBBFAC,,, | Performed by: NURSE PRACTITIONER

## 2020-06-05 PROCEDURE — 99999 PR PBB SHADOW E&M-EST. PATIENT-LVL IV: CPT | Mod: PBBFAC,,, | Performed by: NURSE PRACTITIONER

## 2020-06-05 PROCEDURE — 99213 PR OFFICE/OUTPT VISIT, EST, LEVL III, 20-29 MIN: ICD-10-PCS | Mod: S$GLB,,, | Performed by: NURSE PRACTITIONER

## 2020-06-05 PROCEDURE — 99213 OFFICE O/P EST LOW 20 MIN: CPT | Mod: S$GLB,,, | Performed by: NURSE PRACTITIONER

## 2020-06-05 RX ORDER — ALPRAZOLAM 0.5 MG/1
1 TABLET, ORALLY DISINTEGRATING ORAL ONCE AS NEEDED
Status: CANCELLED | OUTPATIENT
Start: 2020-06-22 | End: 2031-11-18

## 2020-06-05 NOTE — H&P (VIEW-ONLY)
[facility] New Patient Evaluation    Referred by: Caroline Nielson PA-C  Reason for referral: back pain    CC:   Chief Complaint   Patient presents with    Follow-up    Low-back Pain      Last 3 PDI Scores 2/17/2020   Pain Disability Index (PDI) 10     Interval HPI 6/5/20:  Mrs Shoemaker returns to clinic today for follow up.  She is s/p L5/S1 ILESI on 5/21/20 with 55% relief in symptoms, she is able to walk farther and stand longer since the injection.  Her sciatica is still present, bilaterally, worse turning in bed.  The pain starts in the buttock area and radiates to bottom of foot.  She has been to formal PT and continues HEP.        Pain Intervention History:  L5/S1 ILESI on 5/21/20 with 55% relief.    HPI:   Fatemeh Shoemaker is a 59 y.o. female who complains of back pain    Onset: more than a few years  Progression: since onset, pain is gradually worsening  Current Pain Score: 6/10  Timing: constant  Quality: aching  Radiation: yes, down the back of both legs  Associated numbness or weakness: no numbness, weakness  Exacerbated by: standing, walking  Allievated by: rest, leaning forward  Is Pain Level Acceptable?: No    Previous Therapies:  PT/OT:   HEP:   Interventions:   Surgery:  Medications:   - NSAIDS:   - MSK Relaxants: tizanidine  - TCAs: nortriptyline  - SNRIs: cymbalta  - Topicals:   - Anticonvulsants: gabapentin  - Opioids:     History:    Current Outpatient Medications:     apixaban (ELIQUIS) 5 mg Tab, Take 1 tablet (5 mg total) by mouth 2 (two) times daily., Disp: 60 tablet, Rfl: 11    DULoxetine (CYMBALTA) 60 MG capsule, Take 2 capsules (120 mg total) by mouth once daily., Disp: 180 capsule, Rfl: 3    ergocalciferol (VITAMIN D2) 50,000 unit Cap, Take 50,000 Units by mouth every 7 days., Disp: , Rfl:     estradioL (DIVIGEL) 1 mg/gram (0.1 %) topical gel, Place 1 g onto the skin once daily., Disp: 30 g, Rfl: 11    flecainide (TAMBOCOR) 100 MG Tab, Take 1 tablet (100 mg total) by  mouth every 12 (twelve) hours., Disp: 180 tablet, Rfl: 3    gabapentin (NEURONTIN) 600 MG tablet, Take 1 tablet (600 mg total) by mouth 3 (three) times daily., Disp: 270 tablet, Rfl: 3    hydroCHLOROthiazide (HYDRODIURIL) 25 MG tablet, Take 0.5 tablets (12.5 mg total) by mouth once daily., Disp: 90 tablet, Rfl: 3    lisinopril 10 MG tablet, Take 1 tablet (10 mg total) by mouth once daily., Disp: 90 tablet, Rfl: 3    loratadine (CLARITIN ORAL), Take by mouth as needed., Disp: , Rfl:     metoprolol tartrate (LOPRESSOR) 25 MG tablet, Take 1 tablet (25 mg total) by mouth 2 (two) times daily., Disp: 60 tablet, Rfl: 11    nortriptyline (PAMELOR) 25 MG capsule, Take 1 capsule (25 mg total) by mouth every evening., Disp: 90 capsule, Rfl: 3    omeprazole (PRILOSEC) 20 MG capsule, Take 1 capsule (20 mg total) by mouth once daily., Disp: 30 capsule, Rfl: 1    rosuvastatin (CRESTOR) 10 MG tablet, Take 10 mg by mouth every evening. , Disp: , Rfl:     tiZANidine (ZANAFLEX) 4 MG tablet, Take 1 tablet (4 mg total) by mouth every 6 (six) hours as needed., Disp: 30 tablet, Rfl: 5    valACYclovir (VALTREX) 500 MG tablet, Take 1 tablet (500 mg total) by mouth once daily., Disp: 30 tablet, Rfl: 11    Past Medical History:   Diagnosis Date    Anemia     Arrhythmia     Arthritis     Atrial fibrillation     history    Bronchitis     Encounter for blood transfusion     General anesthetics causing adverse effect in therapeutic use     High blood pressure     Hyperlipidemia     Lump or mass in breast     benign     Neuropathy     Obesity     Ulcer        Past Surgical History:   Procedure Laterality Date    BREAST BIOPSY Right 2017    myofibroblastoma     BREAST LUMPECTOMY Right 12/21/2017    CHOLECYSTECTOMY  12/28/2017    Dr. TOLU Bowers, [facility]     csection      CS x 2    CYSTOSCOPY N/A 9/3/2019    Procedure: CYSTOSCOPY;  Surgeon: Noemi Pierre MD;  Location: [facility];  Service: OB/GYN;  Laterality: N/A;     DILATION AND CURETTAGE OF UTERUS      EPIDURAL STEROID INJECTION INTO LUMBAR SPINE N/A 5/21/2020    Procedure: Injection-steroid-epidural-lumbar L5/S1;  Surgeon: Gurjit Tavarez MD;  Location: [facility];  Service: Pain Management;  Laterality: N/A;    HIP PINNING      HYSTERECTOMY      LAPAROSCOPIC SALPINGO-OOPHORECTOMY Bilateral 9/3/2019    Procedure: SALPINGO-OOPHORECTOMY, LAPAROSCOPIC;  Surgeon: Noemi Pierre MD;  Location: [facility];  Service: OB/GYN;  Laterality: Bilateral;  Dr. Bentley to assist. No resident needed.     LAPAROSCOPIC TOTAL HYSTERECTOMY N/A 9/3/2019    Procedure: HYSTERECTOMY, TOTAL, LAPAROSCOPIC;  Surgeon: Noemi Pierre MD;  Location: [facility];  Service: OB/GYN;  Laterality: N/A;    NASAL SEPTUM SURGERY      OOPHORECTOMY      TOTAL HIP ARTHROPLASTY Right     TUBAL LIGATION  1997       Family History   Problem Relation Age of Onset    Colon cancer Maternal Grandmother 70        mets to ovary    Eclampsia Paternal Grandmother     Stroke Father 57    Colon cancer Father     Hypertension Father     Hypertension Mother     Hypertension Brother     No Known Problems Daughter     Stomach cancer Neg Hx     Esophageal cancer Neg Hx     Breast cancer Neg Hx     Miscarriages / Stillbirths Neg Hx     Ovarian cancer Neg Hx        Social History     Socioeconomic History    Marital status: Significant Other     Spouse name: Not on file    Number of children: Not on file    Years of education: Not on file    Highest education level: Not on file   Occupational History    Not on file   Social Needs    Financial resource strain: Somewhat hard    Food insecurity:     Worry: Never true     Inability: Never true    Transportation needs:     Medical: No     Non-medical: No   Tobacco Use    Smoking status: Never Smoker    Smokeless tobacco: Never Used   Substance and Sexual Activity    Alcohol use: No     Frequency: Never     Comment: never    Drug use: No    Sexual activity: Yes  "    Partners: Male     Birth control/protection: See Surgical Hx     Comment: current partner over 30 years   Lifestyle    Physical activity:     Days per week: 0 days     Minutes per session: Not on file    Stress: Not on file   Relationships    Social connections:     Talks on phone: More than three times a week     Gets together: Twice a week     Attends [employer] service: More than 4 times per year     Active member of club or organization: No     Attends meetings of clubs or organizations: Never     Relationship status:    Other Topics Concern    Not on file   Social History Narrative    Not on file       Review of patient's allergies indicates:   Allergen Reactions    Aspirin Other (See Comments)     "I don't take it because I've had ulcers"   ulcers  "I don't take it because I've had ulcers"     Meperidine Other (See Comments)     Felt like she was about to pass put after taking       Review of Systems:  General ROS: negative for - fever  Psychological ROS: negative for - hostility  Hematological and Lymphatic ROS: positive for - bruising  Endocrine ROS: negative for - unexpected weight changes  Respiratory ROS: no cough, shortness of breath, or wheezing  Cardiovascular ROS: no chest pain or dyspnea on exertion  Gastrointestinal ROS: no abdominal pain, change in bowel habits, or black or bloody stools  Musculoskeletal ROS: negative for - muscular weakness  Neurological ROS: negative for - bowel and bladder control changes  Dermatological ROS: negative for rash    Physical Exam:  Vitals:    06/05/20 0843   BP: 136/64   Pulse: 60   Resp: 20   Temp: 98.8 °F (37.1 °C)   TempSrc: Temporal   SpO2: 98%   Weight: 129.9 kg (286 lb 7.8 oz)   PainSc:   2   PainLoc: Back     Body mass index is 47.67 kg/m².     Gen: NAD  Gait: gait intact  Psych:  Mood appropriate for given condition  HEENT: eyes anicteric   GI: Abd soft  CV: RRR  Lungs: breathing unlabored   ROM: limited AROM of the L spine in all planes, " full ROM at ankles, knees and hips  Lumbar flexion 90 degrees, extension 50 degrees, side bending 30 degrees.    Sensation: intact to light touch in all dermatomes tested from L2-S1 bilaterally, except decreased over the left L5  Reflexes: 1/1 b/l patella and 1+ b/l achilles  Palpation: Diffusely tender over lumbar paraspinals  -TTP over the b/l greater trochanters,  +TTP bilateral SI joint L>R  Tone: normal in the b/l knees and hips   Skin: intact  Extremities: No edema in b/l ankles or hands  Provacative tests: mildly + b/l axial facet loading       Right Left   L2/3 Iliacus Hip flexion  5  5   L3/4 Qudratus Femoris Knee Extension  5  5   L4/5 Tib Anterior Ankle Dorsiflexion   5  5   L5/S1 Extensor Hallicus Longus Great toe extension  5  5   L4/5 Tib Anterior/Posterior Inversion  5  5   L5/S1 Extensor Digitorum Longus, Peronues Eversion  5  5   S1/S2 Gastroc/Soleus Plantar Flexion  5  5       Imaging:  MRI lumbar spine 6/11/18  FINDINGS:  Vertebral column: The study is motion degraded.  There is multilevel degenerative change.  There is marked disc space narrowing towards the right at the L3-4 level where there is associated degenerative endplate signal change.  There is no other significant disc space narrowing.  There is no fracture.  Baseline marrow signal intensity is normal.  Anterior endplate osteophyte formation is also present at the L2-3 level.  There is subtle trace anterolisthesis of L4 on L5.    Spinal canal, conus, epidural space: Spinal canal is developmentally normal.  The conus is normal in location, contour and signal intensity, terminating at the level of T12-L1.  There is no abnormal epidural collection or mass.    Findings by level:    On the sagittal images, there is minimal bulging of the annulus and mild facet joint arthropathy at the T10-11 level but there is no spinal canal or significant foraminal stenosis.  At the T11-T12 level, there is a shallow broad central disc protrusion which  narrows the ventral subarachnoid space but again there is no significant spinal canal or foraminal stenosis and there is no cord compression.    T12-L1: There is minimal bulging of the annulus and there is mild facet joint arthropathy.  There is no spinal canal or significant foraminal stenosis.  L1-2: There is moderate facet joint arthropathy.  There is minimal bulging of the annulus with a tiny 2 mm central disc protrusion.  There is no spinal canal or significant foraminal stenosis.  L2-3: There is a moderate diffuse disc bulge with marked facet joint arthropathy.  There is mildly prominent dorsal epidural fat.  There is flattening of the ventral dural sac.  There is moderate central spinal stenosis.  AP measurement of the dural sac is 7.5 mm.  There is no significant foraminal stenosis.  L3-4: There is marked disc space narrowing towards the right.  There is a diffuse disc bulge with osteophytic ridging and superimposed broad right paracentral and foraminal disc protrusion.  There is moderate-to-marked facet joint arthropathy with ligamentum flavum thickening, right greater than left.  Also, there is mildly prominent dorsal epidural fat.  There is moderate central spinal stenosis.  There is severe right lateral recess and foraminal stenosis with mild-to-moderate left foraminal stenosis.  L4-5: There is marked facet joint arthropathy.  There is a diffuse disc bulge with superimposed broad central disc protrusion.  There is mild spinal stenosis.  There is mild-to-moderate bilateral foraminal stenosis.  L5-S1: There is a diffuse disc bulge with superimposed broad central disc protrusion with annular fissure.  There is marked left and moderate-to-marked right facet joint arthropathy with ligamentum flavum thickening.  There is no significant spinal stenosis.  The right foramina is patent.  There is severe left foraminal stenosis.  The broad central disc protrusion approaches both S1 roots.  The left S1 root is  crowded between the facet joint changes and the disc protrusion.  Both S1 roots could be affected, left greater than right.    Soft tissues, other: The prevertebral soft tissues are normal.  The aorta is mildly ectatic.    Labs:  BMP  Lab Results   Component Value Date     08/01/2019    K 3.8 08/01/2019     08/01/2019    CO2 28 08/01/2019    BUN 11 08/01/2019    CREATININE 0.7 08/01/2019    CALCIUM 9.6 08/01/2019    ANIONGAP 11 08/01/2019    ESTGFRAFRICA >60 08/01/2019    EGFRNONAA >60 08/01/2019     Lab Results   Component Value Date    ALT 11 06/22/2018    AST 15 06/22/2018    ALKPHOS 46 (L) 06/22/2018    BILITOT 0.9 06/22/2018     Lab Results   Component Value Date    WBC 16.11 (H) 09/04/2019    HGB 9.4 (L) 09/04/2019    HCT 28.9 (L) 09/04/2019    MCV 85 09/04/2019     09/04/2019           Assessment:   Problem List Items Addressed This Visit        Neuro    Lumbar degenerative disc disease - Primary    Lumbar radiculopathy          59 y.o. year old female with PMH HTN, a-fib on eliquis presents to the office with back pain.  She's had this pain for over 5 years and it has been gradually worsening.  No history of prior back surgery.  Today her pain is constant, aching, 6/10, radiating down the back of both of her legs.  She denies any weakness but has had chronic numbness in the top of her feet for years.  Her pain is worse with standing and walking and relieved with rest and leaning forward.  On exam she has 5/5 strength, decreased sensation to light touch over the left L5.  She takes gabapentin, cymbalta, nortriptyline, and tizanidine for over 6 weeks.  She is about to start formal PT.  MRI lumbar spine c/w multilevel b/l facet arthropathy, L3-4 moderate central spinal stenosis and L5-S1 severe left foraminal stenosis, broad central disc protrusion approaches both S1 roots, the left S1 root is crowded between the facet joint changes and the disc protrusion, both S1 roots could be affected,  left greater than right.  Her pain is a combination of facet mediated pain and radicular pain with radiculopathy.  Her pain is limiting her mobility and interfering with her ADL's.  We will schedule for L5/S1 ILESI.  Follow up 2 weeks post injection.  We will get clearance for her to hold her eliquis prior to procedure.     6/5/20 - Mrs Shoemaker returns to clinic today for follow up.  She is s/p L5/S1 ILESI on 5/21/20 with 55% relief in symptoms, she is able to walk farther and stand longer since the injection.  Her sciatica is still present, bilaterally, worse turning in bed.  The pain starts in the buttock area and radiates to bottom of foot.  She has been to formal PT and continues HEP.  On exam she has 5/5 strength, decreased sensation to light touch over the left L5.  She continues gabapentin, cymbalta, nortriptyline, and tizanidine.   MRI lumbar spine c/w multilevel b/l facet arthropathy, L3-4 moderate central spinal stenosis and L5-S1 severe left foraminal stenosis, broad central disc protrusion approaches both S1 roots, the left S1 root is crowded between the facet joint changes and the disc protrusion, both S1 roots could be affected, left greater than right.  Her pain is a combination of facet mediated pain and radicular pain with radiculopathy.  Her pain is limiting her mobility and interfering with her ADL's.  We will schedule for repeat L5/S1 ILESI.  Follow up 2 weeks post injection.  We will get clearance for her to hold her eliquis prior to procedure.       Treatment Plan:   Procedures: repeat L5/S1 ILESI. Procedure explained using an anatomical model.  Risks, benefits, alternatives explained to patient who verbalized understanding, including increased risk of infection, bleeding, need for additional procedures or surgery, and nerve damage.  Questions regarding the procedure, risks, expected outcome, and possible side effects were solicited and answered to the patient's satisfaction.  Fatemeh wishes to  proceed with the injection.  Verbal and written consent were obtained in clinic today.  PT/OT/HEP: continue HEP  Medications: continue medications as prescribed  Labs: Reviewed and medications are appropriately dosed for current hepatorenal function.  Imaging: No additional recommended at this time.    : Not applicable    Attending physician:  Gurjit Tavarez M.D.  Interventional Pain Medicine / Anesthesiology

## 2020-06-05 NOTE — PROGRESS NOTES
[facility] New Patient Evaluation    Referred by: Caroline Nielson PA-C  Reason for referral: back pain    CC:   Chief Complaint   Patient presents with    Follow-up    Low-back Pain      Last 3 PDI Scores 2/17/2020   Pain Disability Index (PDI) 10     Interval HPI 6/5/20:  Mrs Shoemaker returns to clinic today for follow up.  She is s/p L5/S1 ILESI on 5/21/20 with 55% relief in symptoms, she is able to walk farther and stand longer since the injection.  Her sciatica is still present, bilaterally, worse turning in bed.  The pain starts in the buttock area and radiates to bottom of foot.  She has been to formal PT and continues HEP.        Pain Intervention History:  L5/S1 ILESI on 5/21/20 with 55% relief.    HPI:   Fatemeh Shoemaker is a 59 y.o. female who complains of back pain    Onset: more than a few years  Progression: since onset, pain is gradually worsening  Current Pain Score: 6/10  Timing: constant  Quality: aching  Radiation: yes, down the back of both legs  Associated numbness or weakness: no numbness, weakness  Exacerbated by: standing, walking  Allievated by: rest, leaning forward  Is Pain Level Acceptable?: No    Previous Therapies:  PT/OT:   HEP:   Interventions:   Surgery:  Medications:   - NSAIDS:   - MSK Relaxants: tizanidine  - TCAs: nortriptyline  - SNRIs: cymbalta  - Topicals:   - Anticonvulsants: gabapentin  - Opioids:     History:    Current Outpatient Medications:     apixaban (ELIQUIS) 5 mg Tab, Take 1 tablet (5 mg total) by mouth 2 (two) times daily., Disp: 60 tablet, Rfl: 11    DULoxetine (CYMBALTA) 60 MG capsule, Take 2 capsules (120 mg total) by mouth once daily., Disp: 180 capsule, Rfl: 3    ergocalciferol (VITAMIN D2) 50,000 unit Cap, Take 50,000 Units by mouth every 7 days., Disp: , Rfl:     estradioL (DIVIGEL) 1 mg/gram (0.1 %) topical gel, Place 1 g onto the skin once daily., Disp: 30 g, Rfl: 11    flecainide (TAMBOCOR) 100 MG Tab, Take 1 tablet (100 mg total) by  mouth every 12 (twelve) hours., Disp: 180 tablet, Rfl: 3    gabapentin (NEURONTIN) 600 MG tablet, Take 1 tablet (600 mg total) by mouth 3 (three) times daily., Disp: 270 tablet, Rfl: 3    hydroCHLOROthiazide (HYDRODIURIL) 25 MG tablet, Take 0.5 tablets (12.5 mg total) by mouth once daily., Disp: 90 tablet, Rfl: 3    lisinopril 10 MG tablet, Take 1 tablet (10 mg total) by mouth once daily., Disp: 90 tablet, Rfl: 3    loratadine (CLARITIN ORAL), Take by mouth as needed., Disp: , Rfl:     metoprolol tartrate (LOPRESSOR) 25 MG tablet, Take 1 tablet (25 mg total) by mouth 2 (two) times daily., Disp: 60 tablet, Rfl: 11    nortriptyline (PAMELOR) 25 MG capsule, Take 1 capsule (25 mg total) by mouth every evening., Disp: 90 capsule, Rfl: 3    omeprazole (PRILOSEC) 20 MG capsule, Take 1 capsule (20 mg total) by mouth once daily., Disp: 30 capsule, Rfl: 1    rosuvastatin (CRESTOR) 10 MG tablet, Take 10 mg by mouth every evening. , Disp: , Rfl:     tiZANidine (ZANAFLEX) 4 MG tablet, Take 1 tablet (4 mg total) by mouth every 6 (six) hours as needed., Disp: 30 tablet, Rfl: 5    valACYclovir (VALTREX) 500 MG tablet, Take 1 tablet (500 mg total) by mouth once daily., Disp: 30 tablet, Rfl: 11    Past Medical History:   Diagnosis Date    Anemia     Arrhythmia     Arthritis     Atrial fibrillation     history    Bronchitis     Encounter for blood transfusion     General anesthetics causing adverse effect in therapeutic use     High blood pressure     Hyperlipidemia     Lump or mass in breast     benign     Neuropathy     Obesity     Ulcer        Past Surgical History:   Procedure Laterality Date    BREAST BIOPSY Right 2017    myofibroblastoma     BREAST LUMPECTOMY Right 12/21/2017    CHOLECYSTECTOMY  12/28/2017    Dr. TOLU Bowers, [facility]     csection      CS x 2    CYSTOSCOPY N/A 9/3/2019    Procedure: CYSTOSCOPY;  Surgeon: Noemi Pierre MD;  Location: [facility];  Service: OB/GYN;  Laterality: N/A;     DILATION AND CURETTAGE OF UTERUS      EPIDURAL STEROID INJECTION INTO LUMBAR SPINE N/A 5/21/2020    Procedure: Injection-steroid-epidural-lumbar L5/S1;  Surgeon: Gurjit Tavarez MD;  Location[facility];  Service: Pain Management;  Laterality: N/A;    HIP PINNING      HYSTERECTOMY      LAPAROSCOPIC SALPINGO-OOPHORECTOMY Bilateral 9/3/2019    Procedure: SALPINGO-OOPHORECTOMY, LAPAROSCOPIC;  Surgeon: Noemi Pierre MD;  Location: [facility];  Service: OB/GYN;  Laterality: Bilateral;  Dr. Bentley to assist. No resident needed.     LAPAROSCOPIC TOTAL HYSTERECTOMY N/A 9/3/2019    Procedure: HYSTERECTOMY, TOTAL, LAPAROSCOPIC;  Surgeon: Noemi Pierre MD;  Location: [facility];  Service: OB/GYN;  Laterality: N/A;    NASAL SEPTUM SURGERY      OOPHORECTOMY      TOTAL HIP ARTHROPLASTY Right     TUBAL LIGATION  1997       Family History   Problem Relation Age of Onset    Colon cancer Maternal Grandmother 70        mets to ovary    Eclampsia Paternal Grandmother     Stroke Father 57    Colon cancer Father     Hypertension Father     Hypertension Mother     Hypertension Brother     No Known Problems Daughter     Stomach cancer Neg Hx     Esophageal cancer Neg Hx     Breast cancer Neg Hx     Miscarriages / Stillbirths Neg Hx     Ovarian cancer Neg Hx        Social History     Socioeconomic History    Marital status: Significant Other     Spouse name: Not on file    Number of children: Not on file    Years of education: Not on file    Highest education level: Not on file   Occupational History    Not on file   Social Needs    Financial resource strain: Somewhat hard    Food insecurity:     Worry: Never true     Inability: Never true    Transportation needs:     Medical: No     Non-medical: No   Tobacco Use    Smoking status: Never Smoker    Smokeless tobacco: Never Used   Substance and Sexual Activity    Alcohol use: No     Frequency: Never     Comment: never    Drug use: No    Sexual activity: Yes  "    Partners: Male     Birth control/protection: See Surgical Hx     Comment: current partner over 30 years   Lifestyle    Physical activity:     Days per week: 0 days     Minutes per session: Not on file    Stress: Not on file   Relationships    Social connections:     Talks on phone: More than three times a week     Gets together: Twice a week     Attends [employer] service: More than 4 times per year     Active member of club or organization: No     Attends meetings of clubs or organizations: Never     Relationship status:    Other Topics Concern    Not on file   Social History Narrative    Not on file       Review of patient's allergies indicates:   Allergen Reactions    Aspirin Other (See Comments)     "I don't take it because I've had ulcers"   ulcers  "I don't take it because I've had ulcers"     Meperidine Other (See Comments)     Felt like she was about to pass put after taking       Review of Systems:  General ROS: negative for - fever  Psychological ROS: negative for - hostility  Hematological and Lymphatic ROS: positive for - bruising  Endocrine ROS: negative for - unexpected weight changes  Respiratory ROS: no cough, shortness of breath, or wheezing  Cardiovascular ROS: no chest pain or dyspnea on exertion  Gastrointestinal ROS: no abdominal pain, change in bowel habits, or black or bloody stools  Musculoskeletal ROS: negative for - muscular weakness  Neurological ROS: negative for - bowel and bladder control changes  Dermatological ROS: negative for rash    Physical Exam:  Vitals:    06/05/20 0843   BP: 136/64   Pulse: 60   Resp: 20   Temp: 98.8 °F (37.1 °C)   TempSrc: Temporal   SpO2: 98%   Weight: 129.9 kg (286 lb 7.8 oz)   PainSc:   2   PainLoc: Back     Body mass index is 47.67 kg/m².     Gen: NAD  Gait: gait intact  Psych:  Mood appropriate for given condition  HEENT: eyes anicteric   GI: Abd soft  CV: RRR  Lungs: breathing unlabored   ROM: limited AROM of the L spine in all planes, " full ROM at ankles, knees and hips  Lumbar flexion 90 degrees, extension 50 degrees, side bending 30 degrees.    Sensation: intact to light touch in all dermatomes tested from L2-S1 bilaterally, except decreased over the left L5  Reflexes: 1/1 b/l patella and 1+ b/l achilles  Palpation: Diffusely tender over lumbar paraspinals  -TTP over the b/l greater trochanters,  +TTP bilateral SI joint L>R  Tone: normal in the b/l knees and hips   Skin: intact  Extremities: No edema in b/l ankles or hands  Provacative tests: mildly + b/l axial facet loading       Right Left   L2/3 Iliacus Hip flexion  5  5   L3/4 Qudratus Femoris Knee Extension  5  5   L4/5 Tib Anterior Ankle Dorsiflexion   5  5   L5/S1 Extensor Hallicus Longus Great toe extension  5  5   L4/5 Tib Anterior/Posterior Inversion  5  5   L5/S1 Extensor Digitorum Longus, Peronues Eversion  5  5   S1/S2 Gastroc/Soleus Plantar Flexion  5  5       Imaging:  MRI lumbar spine 6/11/18  FINDINGS:  Vertebral column: The study is motion degraded.  There is multilevel degenerative change.  There is marked disc space narrowing towards the right at the L3-4 level where there is associated degenerative endplate signal change.  There is no other significant disc space narrowing.  There is no fracture.  Baseline marrow signal intensity is normal.  Anterior endplate osteophyte formation is also present at the L2-3 level.  There is subtle trace anterolisthesis of L4 on L5.    Spinal canal, conus, epidural space: Spinal canal is developmentally normal.  The conus is normal in location, contour and signal intensity, terminating at the level of T12-L1.  There is no abnormal epidural collection or mass.    Findings by level:    On the sagittal images, there is minimal bulging of the annulus and mild facet joint arthropathy at the T10-11 level but there is no spinal canal or significant foraminal stenosis.  At the T11-T12 level, there is a shallow broad central disc protrusion which  narrows the ventral subarachnoid space but again there is no significant spinal canal or foraminal stenosis and there is no cord compression.    T12-L1: There is minimal bulging of the annulus and there is mild facet joint arthropathy.  There is no spinal canal or significant foraminal stenosis.  L1-2: There is moderate facet joint arthropathy.  There is minimal bulging of the annulus with a tiny 2 mm central disc protrusion.  There is no spinal canal or significant foraminal stenosis.  L2-3: There is a moderate diffuse disc bulge with marked facet joint arthropathy.  There is mildly prominent dorsal epidural fat.  There is flattening of the ventral dural sac.  There is moderate central spinal stenosis.  AP measurement of the dural sac is 7.5 mm.  There is no significant foraminal stenosis.  L3-4: There is marked disc space narrowing towards the right.  There is a diffuse disc bulge with osteophytic ridging and superimposed broad right paracentral and foraminal disc protrusion.  There is moderate-to-marked facet joint arthropathy with ligamentum flavum thickening, right greater than left.  Also, there is mildly prominent dorsal epidural fat.  There is moderate central spinal stenosis.  There is severe right lateral recess and foraminal stenosis with mild-to-moderate left foraminal stenosis.  L4-5: There is marked facet joint arthropathy.  There is a diffuse disc bulge with superimposed broad central disc protrusion.  There is mild spinal stenosis.  There is mild-to-moderate bilateral foraminal stenosis.  L5-S1: There is a diffuse disc bulge with superimposed broad central disc protrusion with annular fissure.  There is marked left and moderate-to-marked right facet joint arthropathy with ligamentum flavum thickening.  There is no significant spinal stenosis.  The right foramina is patent.  There is severe left foraminal stenosis.  The broad central disc protrusion approaches both S1 roots.  The left S1 root is  crowded between the facet joint changes and the disc protrusion.  Both S1 roots could be affected, left greater than right.    Soft tissues, other: The prevertebral soft tissues are normal.  The aorta is mildly ectatic.    Labs:  BMP  Lab Results   Component Value Date     08/01/2019    K 3.8 08/01/2019     08/01/2019    CO2 28 08/01/2019    BUN 11 08/01/2019    CREATININE 0.7 08/01/2019    CALCIUM 9.6 08/01/2019    ANIONGAP 11 08/01/2019    ESTGFRAFRICA >60 08/01/2019    EGFRNONAA >60 08/01/2019     Lab Results   Component Value Date    ALT 11 06/22/2018    AST 15 06/22/2018    ALKPHOS 46 (L) 06/22/2018    BILITOT 0.9 06/22/2018     Lab Results   Component Value Date    WBC 16.11 (H) 09/04/2019    HGB 9.4 (L) 09/04/2019    HCT 28.9 (L) 09/04/2019    MCV 85 09/04/2019     09/04/2019           Assessment:   Problem List Items Addressed This Visit        Neuro    Lumbar degenerative disc disease - Primary    Lumbar radiculopathy          59 y.o. year old female with PMH HTN, a-fib on eliquis presents to the office with back pain.  She's had this pain for over 5 years and it has been gradually worsening.  No history of prior back surgery.  Today her pain is constant, aching, 6/10, radiating down the back of both of her legs.  She denies any weakness but has had chronic numbness in the top of her feet for years.  Her pain is worse with standing and walking and relieved with rest and leaning forward.  On exam she has 5/5 strength, decreased sensation to light touch over the left L5.  She takes gabapentin, cymbalta, nortriptyline, and tizanidine for over 6 weeks.  She is about to start formal PT.  MRI lumbar spine c/w multilevel b/l facet arthropathy, L3-4 moderate central spinal stenosis and L5-S1 severe left foraminal stenosis, broad central disc protrusion approaches both S1 roots, the left S1 root is crowded between the facet joint changes and the disc protrusion, both S1 roots could be affected,  left greater than right.  Her pain is a combination of facet mediated pain and radicular pain with radiculopathy.  Her pain is limiting her mobility and interfering with her ADL's.  We will schedule for L5/S1 ILESI.  Follow up 2 weeks post injection.  We will get clearance for her to hold her eliquis prior to procedure.     6/5/20 - Mrs Shoemaker returns to clinic today for follow up.  She is s/p L5/S1 ILESI on 5/21/20 with 55% relief in symptoms, she is able to walk farther and stand longer since the injection.  Her sciatica is still present, bilaterally, worse turning in bed.  The pain starts in the buttock area and radiates to bottom of foot.  She has been to formal PT and continues HEP.  On exam she has 5/5 strength, decreased sensation to light touch over the left L5.  She continues gabapentin, cymbalta, nortriptyline, and tizanidine.   MRI lumbar spine c/w multilevel b/l facet arthropathy, L3-4 moderate central spinal stenosis and L5-S1 severe left foraminal stenosis, broad central disc protrusion approaches both S1 roots, the left S1 root is crowded between the facet joint changes and the disc protrusion, both S1 roots could be affected, left greater than right.  Her pain is a combination of facet mediated pain and radicular pain with radiculopathy.  Her pain is limiting her mobility and interfering with her ADL's.  We will schedule for repeat L5/S1 ILESI.  Follow up 2 weeks post injection.  We will get clearance for her to hold her eliquis prior to procedure.       Treatment Plan:   Procedures: repeat L5/S1 ILESI. Procedure explained using an anatomical model.  Risks, benefits, alternatives explained to patient who verbalized understanding, including increased risk of infection, bleeding, need for additional procedures or surgery, and nerve damage.  Questions regarding the procedure, risks, expected outcome, and possible side effects were solicited and answered to the patient's satisfaction.  Fatemeh wishes to  proceed with the injection.  Verbal and written consent were obtained in clinic today.  PT/OT/HEP: continue HEP  Medications: continue medications as prescribed  Labs: Reviewed and medications are appropriately dosed for current hepatorenal function.  Imaging: No additional recommended at this time.    : Not applicable    Attending physician:  Gurjit Tavarez M.D.  Interventional Pain Medicine / Anesthesiology

## 2020-06-05 NOTE — TELEPHONE ENCOUNTER
Dr. James,    Ms. Sahara is a patient of Dr. Bojorquez who is scheduled for a lumbar epidural on 6/22.  Hx of afib no prior history of stoke.  She is 60 y/o, weighs 129.9 kg, and last creatinine is 0.7 (from 8/1/19).  Ok to hold Eliquis 3 days prior to procedure?    Thanks,    Valeri

## 2020-06-05 NOTE — TELEPHONE ENCOUNTER
This patient is scheduled to have a lumbar epidural on 6/22 and she will need to hold her eliquis for 3 days prior. Please advise

## 2020-06-09 ENCOUNTER — PATIENT OUTREACH (OUTPATIENT)
Dept: OTHER | Facility: OTHER | Age: 60
End: 2020-06-09

## 2020-06-09 NOTE — PROGRESS NOTES
Digital Medicine: Health  Follow-Up    The history is provided by the patient.   Follow Up  Follow-up reason(s): reading review and routine education    Called pt for follow up. Average /81.     She says she was taking 1/2 her BP pill (HCTZ), and her BP increased. Pt states she went back to taking the whole pill now. No symptoms of hypotension lately. States she is feeling good.     Stated she had injections in her back recently, and this helped with the pain. Going back to receive more injections on the 19th.    Denies questions or concerns today.       INTERVENTION(S)  encouragement/support and denied questions    PLAN  patient verbalizes understanding, patient amenable to changes, Clinician follow-up and continue monitoring      There are no preventive care reminders to display for this patient.    Last 5 Patient Entered Readings                                      Current 30 Day Average: 126/81     Recent Readings 6/6/2020 6/3/2020 6/1/2020 5/31/2020 5/31/2020    SBP (mmHg) 125 127 104 118 130    DBP (mmHg) 79 78 65 76 88    Pulse 50 58 53 60 83                      Diet Screening   No change to diet.  She has the following dietary restrictions: low sodium diet    Physical Activity Screening   No change to exercise routine.          SDOH

## 2020-06-11 ENCOUNTER — PATIENT OUTREACH (OUTPATIENT)
Dept: OTHER | Facility: OTHER | Age: 60
End: 2020-06-11

## 2020-06-11 DIAGNOSIS — I48.0 PAROXYSMAL ATRIAL FIBRILLATION: ICD-10-CM

## 2020-06-11 RX ORDER — HYDROCHLOROTHIAZIDE 25 MG/1
25 TABLET ORAL DAILY
Qty: 90 TABLET | Refills: 1
Start: 2020-06-11 | End: 2020-09-22

## 2020-06-11 NOTE — PROGRESS NOTES
Digital Medicine: Clinician Follow-Up    Called patient to follow up. Patient endorses adherence to medication regimen. Patient denies hypotensive s/sx (lightheadedness, dizziness, nausea, fatigue); patient denies hypertensive s/sx (SOB, CP, severe headaches, changes in vision).    Pt noticed that her BP was trending up, so she self-titrated back up to HCTZ 25 mg QD. This is something we previously had discussed during original trial decrease. Pt is pleased with her numbers, will notify me if she needs another refill.     Pt also reports 1 episode of Afib, this has since resolved. I encouraged compliance of all meds - sharmila beta blocker- pt verbalized understanding.     Patient denies having questions or concerns. Patient has my contact information and knows to call with any concerns or clinical changes.        The history is provided by the patient. No  was used.   Follow Up  Follow-up reason(s): reading review      Readings are trending down due to medication adherence.        Assessment:  Reviewed recent readings. Per 2017 ACC/ AHA HTN guidelines (goal of BP < 130/80), current 30-day average is well controlled. Appropriate to continue on full dose of thiazide along with other HTN meds.       INTERVENTION(S)  reviewed appropriate dose schedule, encouragement/support and goal setting    PLAN  patient verbalizes understanding, demonstrates understanding via teach back and continue monitoring    Continue current medication regimen, I updated med list to reflect HCTZ 25 mg. I will continue to monitor regularly and will follow-up in 2 to 3 months, sooner if blood pressure begins to trend upward or downward.         There are no preventive care reminders to display for this patient.    Last 5 Patient Entered Readings                                      Current 30 Day Average: 124/81     Recent Readings 6/6/2020 6/3/2020 6/1/2020 5/31/2020 5/31/2020    SBP (mmHg) 125 127 104 118 130    DBP (mmHg) 79 78  65 76 88    Pulse 50 58 53 60 83             Hypertension Medications             hydroCHLOROthiazide (HYDRODIURIL) 25 MG tablet Take 1 tablet (25 mg total) by mouth once daily.    lisinopril 10 MG tablet Take 1 tablet (10 mg total) by mouth once daily.    metoprolol tartrate (LOPRESSOR) 25 MG tablet Take 1 tablet (25 mg total) by mouth 2 (two) times daily.                 Screenings

## 2020-06-12 DIAGNOSIS — I48.0 PAROXYSMAL ATRIAL FIBRILLATION: ICD-10-CM

## 2020-06-15 RX ORDER — OMEPRAZOLE 20 MG/1
20 CAPSULE, DELAYED RELEASE ORAL DAILY
Qty: 30 CAPSULE | Refills: 0 | Status: SHIPPED | OUTPATIENT
Start: 2020-06-15 | End: 2020-07-24 | Stop reason: SDUPTHER

## 2020-06-17 ENCOUNTER — LAB VISIT (OUTPATIENT)
Dept: FAMILY MEDICINE | Facility: CLINIC | Age: 60
End: 2020-06-17
Payer: COMMERCIAL

## 2020-06-17 DIAGNOSIS — Z11.9 SCREENING EXAMINATION FOR INFECTIOUS DISEASE: ICD-10-CM

## 2020-06-17 DIAGNOSIS — G62.9 NEUROPATHY: ICD-10-CM

## 2020-06-17 PROCEDURE — U0003 INFECTIOUS AGENT DETECTION BY NUCLEIC ACID (DNA OR RNA); SEVERE ACUTE RESPIRATORY SYNDROME CORONAVIRUS 2 (SARS-COV-2) (CORONAVIRUS DISEASE [COVID-19]), AMPLIFIED PROBE TECHNIQUE, MAKING USE OF HIGH THROUGHPUT TECHNOLOGIES AS DESCRIBED BY CMS-2020-01-R: HCPCS

## 2020-06-17 RX ORDER — GABAPENTIN 600 MG/1
600 TABLET ORAL 3 TIMES DAILY
Qty: 270 TABLET | Refills: 3 | Status: ON HOLD | OUTPATIENT
Start: 2020-06-17 | End: 2020-09-22 | Stop reason: SDUPTHER

## 2020-06-18 LAB — SARS-COV-2 RNA RESP QL NAA+PROBE: NOT DETECTED

## 2020-06-19 ENCOUNTER — HOSPITAL ENCOUNTER (OUTPATIENT)
Dept: RADIOLOGY | Facility: HOSPITAL | Age: 60
Discharge: HOME | End: 2020-06-19
Attending: ANESTHESIOLOGY | Admitting: ANESTHESIOLOGY
Payer: COMMERCIAL

## 2020-06-19 ENCOUNTER — OFFICE VISIT (OUTPATIENT)
Dept: ORTHOPEDICS | Facility: CLINIC | Age: 60
End: 2020-06-19
Payer: COMMERCIAL

## 2020-06-19 ENCOUNTER — HOSPITAL ENCOUNTER (OUTPATIENT)
Dept: RADIOLOGY | Facility: HOSPITAL | Age: 60
Discharge: HOME | End: 2020-06-19
Attending: ORTHOPAEDIC SURGERY | Admitting: ANESTHESIOLOGY
Payer: COMMERCIAL

## 2020-06-19 ENCOUNTER — HOSPITAL ENCOUNTER (OUTPATIENT)
Facility: HOSPITAL | Age: 60
Discharge: HOME | End: 2020-06-19
Attending: ANESTHESIOLOGY | Admitting: ANESTHESIOLOGY
Payer: COMMERCIAL

## 2020-06-19 ENCOUNTER — TELEPHONE (OUTPATIENT)
Dept: ORTHOPEDICS | Facility: CLINIC | Age: 60
End: 2020-06-19

## 2020-06-19 VITALS — WEIGHT: 286.38 LBS | BODY MASS INDEX: 47.71 KG/M2 | HEIGHT: 65 IN

## 2020-06-19 DIAGNOSIS — M79.671 RIGHT FOOT PAIN: ICD-10-CM

## 2020-06-19 DIAGNOSIS — M54.16 LUMBAR RADICULOPATHY: Primary | ICD-10-CM

## 2020-06-19 DIAGNOSIS — M54.16 LUMBAR RADICULOPATHY: ICD-10-CM

## 2020-06-19 DIAGNOSIS — M79.671 RIGHT FOOT PAIN: Primary | ICD-10-CM

## 2020-06-19 DIAGNOSIS — E66.01 MORBID OBESITY WITH BMI OF 45.0-49.9, ADULT: ICD-10-CM

## 2020-06-19 DIAGNOSIS — S93.401A SPRAIN OF RIGHT ANKLE, UNSPECIFIED LIGAMENT, INITIAL ENCOUNTER: ICD-10-CM

## 2020-06-19 PROCEDURE — 73630 X-RAY EXAM OF FOOT: CPT | Mod: TC,PO,RT

## 2020-06-19 PROCEDURE — 3074F PR MOST RECENT SYSTOLIC BLOOD PRESSURE < 130 MM HG: ICD-10-PCS | Mod: CPTII,S$GLB,, | Performed by: ORTHOPAEDIC SURGERY

## 2020-06-19 PROCEDURE — 73630 XR FOOT COMPLETE 3 VIEW RIGHT: ICD-10-PCS | Mod: 26,RT,, | Performed by: RADIOLOGY

## 2020-06-19 PROCEDURE — 63600175 PHARM REV CODE 636 W HCPCS: Mod: PO | Performed by: ANESTHESIOLOGY

## 2020-06-19 PROCEDURE — 99213 OFFICE O/P EST LOW 20 MIN: CPT | Mod: 25,S$GLB,, | Performed by: ORTHOPAEDIC SURGERY

## 2020-06-19 PROCEDURE — 3074F SYST BP LT 130 MM HG: CPT | Mod: CPTII,S$GLB,, | Performed by: ORTHOPAEDIC SURGERY

## 2020-06-19 PROCEDURE — 97760 PR ORTHOTIC MGMT&TRAINJ INITIAL ENC EA 15 MINS: ICD-10-PCS | Mod: GP,S$GLB,, | Performed by: ORTHOPAEDIC SURGERY

## 2020-06-19 PROCEDURE — 73630 X-RAY EXAM OF FOOT: CPT | Mod: 26,RT,, | Performed by: RADIOLOGY

## 2020-06-19 PROCEDURE — 73610 XR ANKLE COMPLETE 3 VIEW RIGHT: ICD-10-PCS | Mod: 26,RT,, | Performed by: RADIOLOGY

## 2020-06-19 PROCEDURE — 62323 NJX INTERLAMINAR LMBR/SAC: CPT | Mod: PO | Performed by: ANESTHESIOLOGY

## 2020-06-19 PROCEDURE — 3078F PR MOST RECENT DIASTOLIC BLOOD PRESSURE < 80 MM HG: ICD-10-PCS | Mod: CPTII,S$GLB,, | Performed by: ORTHOPAEDIC SURGERY

## 2020-06-19 PROCEDURE — 3008F BODY MASS INDEX DOCD: CPT | Mod: CPTII,S$GLB,, | Performed by: ORTHOPAEDIC SURGERY

## 2020-06-19 PROCEDURE — 3008F PR BODY MASS INDEX (BMI) DOCUMENTED: ICD-10-PCS | Mod: CPTII,S$GLB,, | Performed by: ORTHOPAEDIC SURGERY

## 2020-06-19 PROCEDURE — 93971 EXTREMITY STUDY: CPT | Mod: 26,RT,, | Performed by: RADIOLOGY

## 2020-06-19 PROCEDURE — 97760 ORTHOTIC MGMT&TRAING 1ST ENC: CPT | Mod: GP,S$GLB,, | Performed by: ORTHOPAEDIC SURGERY

## 2020-06-19 PROCEDURE — 25000003 PHARM REV CODE 250: Mod: PO | Performed by: ANESTHESIOLOGY

## 2020-06-19 PROCEDURE — 3078F DIAST BP <80 MM HG: CPT | Mod: CPTII,S$GLB,, | Performed by: ORTHOPAEDIC SURGERY

## 2020-06-19 PROCEDURE — 73610 X-RAY EXAM OF ANKLE: CPT | Mod: 26,RT,, | Performed by: RADIOLOGY

## 2020-06-19 PROCEDURE — 99999 PR PBB SHADOW E&M-EST. PATIENT-LVL III: ICD-10-PCS | Mod: PBBFAC,,, | Performed by: ORTHOPAEDIC SURGERY

## 2020-06-19 PROCEDURE — 62323 NJX INTERLAMINAR LMBR/SAC: CPT | Mod: ,,, | Performed by: ANESTHESIOLOGY

## 2020-06-19 PROCEDURE — 99999 PR PBB SHADOW E&M-EST. PATIENT-LVL III: CPT | Mod: PBBFAC,,, | Performed by: ORTHOPAEDIC SURGERY

## 2020-06-19 PROCEDURE — 93971 US LOWER EXTREMITY VEINS RIGHT: ICD-10-PCS | Mod: 26,RT,, | Performed by: RADIOLOGY

## 2020-06-19 PROCEDURE — 25500020 PHARM REV CODE 255: Mod: PO | Performed by: ANESTHESIOLOGY

## 2020-06-19 PROCEDURE — 93971 EXTREMITY STUDY: CPT | Mod: TC,PO,RT

## 2020-06-19 PROCEDURE — 73610 X-RAY EXAM OF ANKLE: CPT | Mod: TC,PO,RT

## 2020-06-19 PROCEDURE — 76000 FLUOROSCOPY <1 HR PHYS/QHP: CPT | Mod: TC,PO

## 2020-06-19 PROCEDURE — 99213 PR OFFICE/OUTPT VISIT, EST, LEVL III, 20-29 MIN: ICD-10-PCS | Mod: 25,S$GLB,, | Performed by: ORTHOPAEDIC SURGERY

## 2020-06-19 PROCEDURE — 62323 PR INJ LUMBAR/SACRAL, W/IMAGING GUIDANCE: ICD-10-PCS | Mod: ,,, | Performed by: ANESTHESIOLOGY

## 2020-06-19 RX ORDER — TRIAMCINOLONE ACETONIDE 40 MG/ML
INJECTION, SUSPENSION INTRA-ARTICULAR; INTRAMUSCULAR
Status: DISCONTINUED | OUTPATIENT
Start: 2020-06-19 | End: 2020-06-19 | Stop reason: HOSPADM

## 2020-06-19 RX ORDER — LIDOCAINE HYDROCHLORIDE 10 MG/ML
INJECTION, SOLUTION EPIDURAL; INFILTRATION; INTRACAUDAL; PERINEURAL
Status: DISCONTINUED | OUTPATIENT
Start: 2020-06-19 | End: 2020-06-19 | Stop reason: HOSPADM

## 2020-06-19 RX ORDER — ALPRAZOLAM 0.5 MG/1
1 TABLET, ORALLY DISINTEGRATING ORAL ONCE AS NEEDED
Status: COMPLETED | OUTPATIENT
Start: 2020-06-22 | End: 2020-06-19

## 2020-06-19 RX ADMIN — ALPRAZOLAM 1 MG: 0.5 TABLET, ORALLY DISINTEGRATING ORAL at 11:06

## 2020-06-19 NOTE — PLAN OF CARE
Spoke with Trinidad in Ultrasound department to confirm patient ultrasound order and scheduling.  Dr. Tavarez's number provided incase of positive result.  Pt transported to entrance number one with wheelchair and left in the care of her daughter at Radiology check in desk.

## 2020-06-19 NOTE — INTERVAL H&P NOTE
The patient has been examined and the H&P has been reviewed:    I concur with the findings and changes have been noted since the H&P was written: She developed some acute right ankle pain this morning.  Some swelling and tenderness, no erythema.  I will get an U/S of her RLE to r/o DVT as she's been off of eliquis for this procedure.  We will proceed with L5/S1 YUE.  Risks, benefits, alternatives explained to patient who verbalized understanding, including increased risk of infection, bleeding, need for additional procedures or surgery, and nerve damage.  Questions regarding the procedure, risks, expected outcome, and possible side effects were solicited and answered to the patient's satisfaction.  Fatemeh wishes to proceed with the injection.  Verbal and written consent were obtained.    Anesthesia/Surgery risks, benefits and alternative options discussed and understood by patient/family.      Active Hospital Problems    Diagnosis  POA    Lumbar radiculopathy [M54.16]  Yes      Resolved Hospital Problems   No resolved problems to display.

## 2020-06-19 NOTE — OP NOTE
"Procedure Note    Procedure Date: 6/19/2020    Procedure Performed:  L5/S1 lumbar interlaminar epidural steroid injection under fluoroscopy.    Indications: Patient has failed conservative therapy.      Pre-op diagnosis: Lumbar Radiculopathy    Post-op diagnosis: same    Physician: Gurjit Tavarez MD    IV Sedation medications: none    Medications injected: depomedrol 80mg, 1% Lidocaine 1ml, 2 mL sterile, preservative-free normal saline.    Local anesthetic used: 1% Lidocaine, 1 ml, 8.4% sodium bicarbonate 0.25ml    Estimated Blood Loss: none    Complications:  none    Technique:  The patient was interviewed in the holding area and Risks/Benefits were discussed, including, but not limited to, the possibility of new or different pain, bleeding or infection.   All questions were answered.  The patient understood and accepted risks.  Consent was verfied.  A time-out was taken to identify patient and procedure prior to starting the procedure. The patient was placed in the prone position on the fluoroscopy table. The area of the lumbar spine was prepped with Chloraprep and draped in a sterile manner. The L5/S1 interspace was identified and marked under AP fluoroscopy. The skin and subcutaneous tissues overlying the targeted interspace were anesthetized with 3-5 mL of 1% lidocaine using a 25G, 1.5" needle.  An 18G, 3.5" Tuohy epidural needle was directed toward the interspace under fluoroscopic guidance until the ligamentum flavum was engaged. From this point, a loss of resistance technique with a glass syringe and saline was used to identify entrance of the needle into the epidural space. Once loss of resistance was observed 1 mL of contrast solution was injected. An appropriate epidurogram was noted.  A 4 mL mixture consisting of saline, 1 mL 1% Lidocaine and 80 mg of depomedrol was injected slowly and without resistance.  The needle was flushed with normal saline and removed. The contrast was seen to be displaced after " injection. Patient was awake/responsive during all injections.  The patient tolerated the procedure well and was transferred to the [facility] in stable condition.  The patient was monitored after the procedure and was given post-procedure and discharge instructions to follow at home. The patient was discharged in a stable condition.

## 2020-06-19 NOTE — TELEPHONE ENCOUNTER
Patient called and scheduled same day appointment  ----- Message from Kin Huggins sent at 6/19/2020  6:53 AM CDT -----  Regarding: Injured her right foot and ankle this morning, needs to see the Dr today.  Type:  Same Day Appointment Request    Caller is requesting a same day appointment.  Caller declined first available appointment listed below.      Name of Caller:  Ptnt friend Libby [phone]    When is the first available appointment?  06-22-20    Symptoms:  Injured her right foot and ankle this morning, needs to see the Dr today.    Best Call Back Number:  Ptnt friend Libby [phone]    Additional Information:   Advised needs to see the Dr today for her injury. She will be at the [facility] today for another appmnt. Please call to schedule today.

## 2020-06-19 NOTE — DISCHARGE SUMMARY
[facility]  Discharge Note  Short Stay    Admit Date: 6/19/2020    Discharge Date: 6/19/2020    Attending Physician: Gurjit Tavarez     Discharge Provider: Gurjit Tavarez    Diagnoses:  Active Hospital Problems    Diagnosis  POA    Lumbar radiculopathy [M54.16]  Yes      Resolved Hospital Problems   No resolved problems to display.       Discharged Condition: Good    Final Diagnoses: Lumbar radiculopathy [M54.16]    Disposition: Home or Self Care    Hospital Course: No complications, uneventful    Outcome of Hospitalization, Treatment, Procedure, or Surgery:  Patient was admitted for outpatient interventional pain management procedure. The patient tolerated the procedure well with no complications.    Follow up/Patient Instructions:  Follow up as scheduled in Pain Management office in 3-4 weeks.  Patient has received instructions and follow up date and time.    Medications:  Continue previous medications    Discharge Procedure Orders   US Lower Extremity Veins Right   Standing Status: Future Standing Exp. Date: 06/19/21     Notify your health care provider if you experience any of the following:  temperature >100.4     Notify your health care provider if you experience any of the following:  persistent nausea and vomiting or diarrhea     Notify your health care provider if you experience any of the following:  severe uncontrolled pain     Notify your health care provider if you experience any of the following:  redness, tenderness, or signs of infection (pain, swelling, redness, odor or green/yellow discharge around incision site)     Notify your health care provider if you experience any of the following:  difficulty breathing or increased cough     Notify your health care provider if you experience any of the following:  severe persistent headache     Notify your health care provider if you experience any of the following:  worsening rash     Notify your health care provider if you experience any of the  following:  persistent dizziness, light-headedness, or visual disturbances     Notify your health care provider if you experience any of the following:  increased confusion or weakness     Activity as tolerated         Discharge Procedure Orders (must include Diet, Follow-up, Activity):   Discharge Procedure Orders (must include Diet, Follow-up, Activity)   US Lower Extremity Veins Right   Standing Status: Future Standing Exp. Date: 06/19/21     Notify your health care provider if you experience any of the following:  temperature >100.4     Notify your health care provider if you experience any of the following:  persistent nausea and vomiting or diarrhea     Notify your health care provider if you experience any of the following:  severe uncontrolled pain     Notify your health care provider if you experience any of the following:  redness, tenderness, or signs of infection (pain, swelling, redness, odor or green/yellow discharge around incision site)     Notify your health care provider if you experience any of the following:  difficulty breathing or increased cough     Notify your health care provider if you experience any of the following:  severe persistent headache     Notify your health care provider if you experience any of the following:  worsening rash     Notify your health care provider if you experience any of the following:  persistent dizziness, light-headedness, or visual disturbances     Notify your health care provider if you experience any of the following:  increased confusion or weakness     Activity as tolerated

## 2020-06-19 NOTE — PROGRESS NOTES
59 years old complaining of pain in her right ankle which has had now for 1 days time.  States that she twisted her ankle today.  Pain is been 10 out 10 the pain scale.  She cannot recall any previous injury to this ankle    Exam shows he is tender throughout the ankle in the region of the ATFL as well as the calcaneal fibular ligament, also tender around the Achilles tendon which does appear to be intact    X-rays are negative    Assessment:  Ankle sprain right    Plan:  Boot, activity modifications, follow-up in a few weeks time    We performed a custom orthotic/brace fitting, adjusting and training with the patient. The patient demonstrated understanding and proper care. This was performed for 15 minutes.    Further History  Aching pain  Worse with activity  Relieved with rest  No other associated symptoms  No other radiation    Further Exam  Alert and oriented  Pleasant  Contralateral limb has appropriate range of motion for age and condition  Contralateral limb has appropriate strength for age and condition  Contralateral limb has appropriate stability  for age and condition  No adenopathy  Pulses are appropriate for current condition  Skin is intact        Chief Complaint    Chief Complaint   Patient presents with    Right Foot - Pain       HPI  Fatemeh Shoemaker is a 59 y.o.  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 TOLU Bowers, [facility]      csection      CS x 2    CYSTOSCOPY N/A 9/3/2019    Procedure: CYSTOSCOPY;  Surgeon: Noemi Pierre MD;  Location: [facility] OR;  Service: OB/GYN;  Laterality: N/A;    DILATION AND CURETTAGE OF UTERUS      EPIDURAL STEROID INJECTION INTO LUMBAR SPINE N/A 5/21/2020    Procedure: Injection-steroid-epidural-lumbar L5/S1;  Surgeon: Gurjit Tavarez MD;  Location: [facility] OR;  Service: Pain Management;  Laterality: N/A;    HIP PINNING      HYSTERECTOMY      LAPAROSCOPIC SALPINGO-OOPHORECTOMY Bilateral 9/3/2019    Procedure: SALPINGO-OOPHORECTOMY, LAPAROSCOPIC;  Surgeon: Noemi Pierre MD;  Location: [facility] OR;  Service: OB/GYN;  Laterality: Bilateral;  Dr. Bentley to assist. No resident needed.     LAPAROSCOPIC TOTAL HYSTERECTOMY N/A 9/3/2019    Procedure: HYSTERECTOMY, TOTAL, LAPAROSCOPIC;  Surgeon: Noemi Pierre MD;  Location: [facility];  Service: OB/GYN;  Laterality: N/A;    NASAL SEPTUM SURGERY      OOPHORECTOMY      TOTAL HIP ARTHROPLASTY Right     TUBAL LIGATION  1997       Medications  Current Outpatient Medications   Medication Sig    apixaban (ELIQUIS) 5 mg Tab Take 1 tablet (5 mg total) by mouth 2 (two) times daily.    DULoxetine (CYMBALTA) 60 MG capsule Take 2 capsules (120 mg total) by mouth once daily.    ergocalciferol (VITAMIN D2) 50,000 unit Cap Take 50,000 Units by mouth every 7 days.    flecainide (TAMBOCOR) 100 MG Tab Take 1 tablet (100 mg total) by mouth every 12 (twelve) hours.    gabapentin (NEURONTIN) 600 MG tablet Take 1 tablet (600 mg total) by mouth 3 (three) times daily.    hydroCHLOROthiazide (HYDRODIURIL) 25 MG tablet Take 1 tablet (25 mg total) by mouth once daily.    lisinopril 10 MG tablet Take 1 tablet (10 mg total) by mouth once daily.    metoprolol tartrate (LOPRESSOR) 25 MG tablet Take 1 tablet (25 mg total) by mouth 2 (two) times daily.    nortriptyline (PAMELOR) 25 MG capsule Take 1 capsule (25 mg total) by mouth every evening.    omeprazole (PRILOSEC) 20 MG  "capsule Take 1 capsule (20 mg total) by mouth once daily.    rosuvastatin (CRESTOR) 10 MG tablet Take 10 mg by mouth every evening.     tiZANidine (ZANAFLEX) 4 MG tablet Take 1 tablet (4 mg total) by mouth every 6 (six) hours as needed.    valACYclovir (VALTREX) 500 MG tablet Take 1 tablet (500 mg total) by mouth once daily.    estradioL (DIVIGEL) 1 mg/gram (0.1 %) topical gel Place 1 g onto the skin once daily.    loratadine (CLARITIN ORAL) Take by mouth as needed.     No current facility-administered medications for this visit.        Allergies  Review of patient's allergies indicates:   Allergen Reactions    Aspirin Other (See Comments)     "I don't take it because I've had ulcers"   ulcers  "I don't take it because I've had ulcers"     Meperidine Other (See Comments)     Felt like she was about to pass put after taking       Family History  Family History   Problem Relation Age of Onset    Colon cancer Maternal Grandmother 70        mets to ovary    Eclampsia Paternal Grandmother     Stroke Father 57    Colon cancer Father     Hypertension Father     Hypertension Mother     Hypertension Brother     No Known Problems Daughter     Stomach cancer Neg Hx     Esophageal cancer Neg Hx     Breast cancer Neg Hx     Miscarriages / Stillbirths Neg Hx     Ovarian cancer Neg Hx        Social History  Social History     Socioeconomic History    Marital status: Significant Other     Spouse name: Not on file    Number of children: Not on file    Years of education: Not on file    Highest education level: Not on file   Occupational History    Not on file   Social Needs    Financial resource strain: Somewhat hard    Food insecurity     Worry: Never true     Inability: Never true    Transportation needs     Medical: No     Non-medical: No   Tobacco Use    Smoking status: Never Smoker    Smokeless tobacco: Never Used   Substance and Sexual Activity    Alcohol use: No     Frequency: Never     Comment: " never    Drug use: No    Sexual activity: Yes     Partners: Male     Birth control/protection: See Surgical Hx     Comment: current partner over 30 years   Lifestyle    Physical activity     Days per week: 0 days     Minutes per session: Not on file    Stress: Not on file   Relationships    Social connections     Talks on phone: More than three times a week     Gets together: Twice a week     Attends [employer] service: More than 4 times per year     Active member of club or organization: No     Attends meetings of clubs or organizations: Never     Relationship status:    Other Topics Concern    Not on file   Social History Narrative    Not on file               Review of Systems     Constitutional: Negative    HENT: Negative  Eyes: Negative  Respiratory: Negative  Cardiovascular: Negative  Musculoskeletal: HPI  Skin: Negative  Neurological: Negative  Hematological: Negative  Endocrine: Negative                 Physical Exam    There were no vitals filed for this visit.  Body mass index is 47.66 kg/m².  Physical Examination:     General appearance -  well appearing, and in no distress  Mental status - awake  Neck - supple  Chest -  symmetric air entry  Heart - normal rate   Abdomen - soft      Assessment     1. Right foot pain    2. Morbid obesity with BMI of 45.0-49.9, adult    3. Sprain of right ankle, unspecified ligament, initial encounter          Plan

## 2020-06-22 VITALS
RESPIRATION RATE: 15 BRPM | OXYGEN SATURATION: 99 % | DIASTOLIC BLOOD PRESSURE: 55 MMHG | TEMPERATURE: 97 F | SYSTOLIC BLOOD PRESSURE: 111 MMHG | HEART RATE: 51 BPM

## 2020-07-07 NOTE — PROGRESS NOTES
[facility] Follow Up Evaluation    Referred by: Caroline Nielson PA-C  Reason for referral: back pain    CC:   Chief Complaint   Patient presents with    Follow-up      Last 3 PDI Scores 6/5/2020 2/17/2020   Pain Disability Index (PDI) 0 10     Interval HPI 7/8/20:  Mrs Shoemaker returns to clinic today for follow up.  She is s/p L5/S1 ILESI on 6/19/20 with about 55% relief of her low back pain, no additional relief with a second epidural steroid injection.  She reports sciatic pain has significant improvement, she reports standing prolonged periods of time causes low back pain.  She reports improvement in her foot strength, she is not shuffling her left foot like before the injection.  She continues to have decreased sensation about the lateral left lateral ankle.  She reports her symptoms are tolerable.        Pain Intervention History:  MRI lumbar spine c/w multilevel b/l facet arthropathy, L3-4 moderate central spinal stenosis and L5-S1 severe left foraminal stenosis, broad central disc protrusion approaches both S1 roots, the left S1 root is crowded between the facet joint changes and the disc protrusion, both S1 roots could be affected, left greater than right.  She is s/p L5/S1 ILESI on 5/21/20 with 55% relief.  She is s/p L5/S1 ILESI on 6/19/20 with continued 55% relief.    HPI:   Fatemeh Shoemaker is a 59 y.o. female who complains of back pain    Onset: more than a few years  Progression: since onset, pain is gradually worsening  Current Pain Score: 6/10  Timing: constant  Quality: aching  Radiation: yes, down the back of both legs  Associated numbness or weakness: no numbness, weakness  Exacerbated by: standing, walking  Allievated by: rest, leaning forward  Is Pain Level Acceptable?: No    Previous Therapies:  PT/OT:   HEP:   Interventions:   Surgery:  Medications:   - NSAIDS:   - MSK Relaxants: tizanidine  - TCAs: nortriptyline  - SNRIs: cymbalta  - Topicals:   - Anticonvulsants: gabapentin  -  Opioids:     History:    Current Outpatient Medications:     apixaban (ELIQUIS) 5 mg Tab, Take 1 tablet (5 mg total) by mouth 2 (two) times daily., Disp: 60 tablet, Rfl: 11    DULoxetine (CYMBALTA) 60 MG capsule, Take 2 capsules (120 mg total) by mouth once daily., Disp: 180 capsule, Rfl: 3    ergocalciferol (VITAMIN D2) 50,000 unit Cap, Take 50,000 Units by mouth every 7 days., Disp: , Rfl:     flecainide (TAMBOCOR) 100 MG Tab, Take 1 tablet (100 mg total) by mouth every 12 (twelve) hours., Disp: 180 tablet, Rfl: 3    gabapentin (NEURONTIN) 600 MG tablet, Take 1 tablet (600 mg total) by mouth 3 (three) times daily., Disp: 270 tablet, Rfl: 3    hydroCHLOROthiazide (HYDRODIURIL) 25 MG tablet, Take 1 tablet (25 mg total) by mouth once daily., Disp: 90 tablet, Rfl: 1    lisinopril 10 MG tablet, Take 1 tablet (10 mg total) by mouth once daily., Disp: 90 tablet, Rfl: 3    metoprolol tartrate (LOPRESSOR) 25 MG tablet, Take 1 tablet (25 mg total) by mouth 2 (two) times daily., Disp: 60 tablet, Rfl: 11    nortriptyline (PAMELOR) 25 MG capsule, Take 1 capsule (25 mg total) by mouth every evening., Disp: 90 capsule, Rfl: 3    omeprazole (PRILOSEC) 20 MG capsule, Take 1 capsule (20 mg total) by mouth once daily., Disp: 30 capsule, Rfl: 0    rosuvastatin (CRESTOR) 10 MG tablet, Take 10 mg by mouth every evening. , Disp: , Rfl:     tiZANidine (ZANAFLEX) 4 MG tablet, Take 1 tablet (4 mg total) by mouth every 6 (six) hours as needed., Disp: 30 tablet, Rfl: 5    valACYclovir (VALTREX) 500 MG tablet, Take 1 tablet (500 mg total) by mouth once daily., Disp: 30 tablet, Rfl: 11    estradioL (DIVIGEL) 1 mg/gram (0.1 %) topical gel, Place 1 g onto the skin once daily., Disp: 30 g, Rfl: 11    loratadine (CLARITIN ORAL), Take by mouth as needed., Disp: , Rfl:     Past Medical History:   Diagnosis Date    Anemia     Arrhythmia     Arthritis     Atrial fibrillation     history    Bronchitis     Encounter for blood  transfusion     General anesthetics causing adverse effect in therapeutic use     High blood pressure     Hyperlipidemia     Lump or mass in breast     benign     Neuropathy     Obesity     Ulcer        Past Surgical History:   Procedure Laterality Date    BREAST BIOPSY Right 2017    myofibroblastoma     BREAST LUMPECTOMY Right 12/21/2017    CHOLECYSTECTOMY  12/28/2017    Dr[facility] T[facility]U [facility], [facility]     csection      CS x 2    CYSTOSCOPY N/A 9/3/2019    Procedure: CYSTOSCOPY;  Surgeon: Noemi Pierre MD;  Location: [facility];  Service: OB/GYN;  Laterality: N/A;    DILATION AND CURETTAGE OF UTERUS      EPIDURAL STEROID INJECTION INTO LUMBAR SPINE N/A 5/21/2020    Procedure: Injection-steroid-epidural-lumbar L5/S1;  Surgeon: Gurjit Tavarez MD;  Location: [facility];  Service: Pain Management;  Laterality: N/A;    EPIDURAL STEROID INJECTION INTO LUMBAR SPINE N/A 6/19/2020    Procedure: Injection-steroid-epidural-lumbar L5/S1;  Surgeon: Gurjit Tavarez MD;  Location: [facility];  Service: Pain Management;  Laterality: N/A;    HIP PINNING      HYSTERECTOMY      LAPAROSCOPIC SALPINGO-OOPHORECTOMY Bilateral 9/3/2019    Procedure: SALPINGO-OOPHORECTOMY, LAPAROSCOPIC;  Surgeon: Noemi Pierre MD;  Location: [facility];  Service: OB/GYN;  Laterality: Bilateral;  Dr. Bentley to assist. No resident needed.     LAPAROSCOPIC TOTAL HYSTERECTOMY N/A 9/3/2019    Procedure: HYSTERECTOMY, TOTAL, LAPAROSCOPIC;  Surgeon: Noemi Pierre MD;  Location: [facility];  Service: OB/GYN;  Laterality: N/A;    NASAL SEPTUM SURGERY      OOPHORECTOMY      TOTAL HIP ARTHROPLASTY Right     TUBAL LIGATION  1997       Family History   Problem Relation Age of Onset    Colon cancer Maternal Grandmother 70        mets to ovary    Eclampsia Paternal Grandmother     Stroke Father 57    Colon cancer Father     Hypertension Father     Hypertension Mother     Hypertension Brother     No Known Problems Daughter     Stomach cancer Neg Hx      "Esophageal cancer Neg Hx     Breast cancer Neg Hx     Miscarriages / Stillbirths Neg Hx     Ovarian cancer Neg Hx        Social History     Socioeconomic History    Marital status: Significant Other     Spouse name: Not on file    Number of children: Not on file    Years of education: Not on file    Highest education level: Not on file   Occupational History    Not on file   Social Needs    Financial resource strain: Somewhat hard    Food insecurity     Worry: Never true     Inability: Never true    Transportation needs     Medical: No     Non-medical: No   Tobacco Use    Smoking status: Never Smoker    Smokeless tobacco: Never Used   Substance and Sexual Activity    Alcohol use: No     Frequency: Never     Comment: never    Drug use: No    Sexual activity: Yes     Partners: Male     Birth control/protection: See Surgical Hx     Comment: current partner over 30 years   Lifestyle    Physical activity     Days per week: 0 days     Minutes per session: Not on file    Stress: Not on file   Relationships    Social connections     Talks on phone: More than three times a week     Gets together: Twice a week     Attends Gnosticist service: More than 4 times per year     Active member of club or organization: No     Attends meetings of clubs or organizations: Never     Relationship status:    Other Topics Concern    Not on file   Social History Narrative    Not on file       Review of patient's allergies indicates:   Allergen Reactions    Aspirin Other (See Comments)     "I don't take it because I've had ulcers"   ulcers  "I don't take it because I've had ulcers"     Meperidine Other (See Comments)     Felt like she was about to pass put after taking       Review of Systems:  General ROS: negative for - fever  Psychological ROS: negative for - hostility  Hematological and Lymphatic ROS: positive for - bruising  Endocrine ROS: negative for - unexpected weight changes  Respiratory ROS: no cough, " "shortness of breath, or wheezing  Cardiovascular ROS: no chest pain or dyspnea on exertion  Gastrointestinal ROS: no abdominal pain, change in bowel habits, or black or bloody stools  Musculoskeletal ROS: negative for - muscular weakness  Neurological ROS: negative for - bowel and bladder control changes  Dermatological ROS: negative for rash    Physical Exam:  Vitals:    07/08/20 1515 07/08/20 1517   BP: 124/70    Pulse: (!) 59    Resp: 18    Weight: 128.8 kg (284 lb)    Height: 5' 5" (1.651 m)    PainSc:   3   3   PainLoc: Back      Body mass index is 47.26 kg/m².     Gen: NAD  Gait: gait intact  Psych:  Mood appropriate for given condition  HEENT: eyes anicteric   GI: Abd soft  CV: RRR  Lungs: breathing unlabored   ROM: limited AROM of the L spine in all planes, full ROM at ankles, knees and hips  Lumbar flexion 90 degrees, extension 50 degrees, side bending 30 degrees.    Sensation: intact to light touch in all dermatomes tested from L2-S1 bilaterally, except decreased over the left L5  Reflexes: 1/1 b/l patella and 1+ b/l achilles  Palpation: Diffusely tender over lumbar paraspinals  -TTP over the b/l greater trochanters,  +TTP bilateral SI joint L>R  Tone: normal in the b/l knees and hips   Skin: intact  Extremities: No edema in b/l ankles or hands  Provacative tests: mildly + b/l axial facet loading       Right Left   L2/3 Iliacus Hip flexion  5  5   L3/4 Qudratus Femoris Knee Extension  5  5   L4/5 Tib Anterior Ankle Dorsiflexion   5  5   L5/S1 Extensor Hallicus Longus Great toe extension  5  5   L4/5 Tib Anterior/Posterior Inversion  5  5   L5/S1 Extensor Digitorum Longus, Peronues Eversion  5  5   S1/S2 Gastroc/Soleus Plantar Flexion  5  5       Imaging:  MRI lumbar spine 6/11/18  FINDINGS:  Vertebral column: The study is motion degraded.  There is multilevel degenerative change.  There is marked disc space narrowing towards the right at the L3-4 level where there is associated degenerative endplate signal " change.  There is no other significant disc space narrowing.  There is no fracture.  Baseline marrow signal intensity is normal.  Anterior endplate osteophyte formation is also present at the L2-3 level.  There is subtle trace anterolisthesis of L4 on L5.    Spinal canal, conus, epidural space: Spinal canal is developmentally normal.  The conus is normal in location, contour and signal intensity, terminating at the level of T12-L1.  There is no abnormal epidural collection or mass.    Findings by level:    On the sagittal images, there is minimal bulging of the annulus and mild facet joint arthropathy at the T10-11 level but there is no spinal canal or significant foraminal stenosis.  At the T11-T12 level, there is a shallow broad central disc protrusion which narrows the ventral subarachnoid space but again there is no significant spinal canal or foraminal stenosis and there is no cord compression.    T12-L1: There is minimal bulging of the annulus and there is mild facet joint arthropathy.  There is no spinal canal or significant foraminal stenosis.  L1-2: There is moderate facet joint arthropathy.  There is minimal bulging of the annulus with a tiny 2 mm central disc protrusion.  There is no spinal canal or significant foraminal stenosis.  L2-3: There is a moderate diffuse disc bulge with marked facet joint arthropathy.  There is mildly prominent dorsal epidural fat.  There is flattening of the ventral dural sac.  There is moderate central spinal stenosis.  AP measurement of the dural sac is 7.5 mm.  There is no significant foraminal stenosis.  L3-4: There is marked disc space narrowing towards the right.  There is a diffuse disc bulge with osteophytic ridging and superimposed broad right paracentral and foraminal disc protrusion.  There is moderate-to-marked facet joint arthropathy with ligamentum flavum thickening, right greater than left.  Also, there is mildly prominent dorsal epidural fat.  There is moderate  central spinal stenosis.  There is severe right lateral recess and foraminal stenosis with mild-to-moderate left foraminal stenosis.  L4-5: There is marked facet joint arthropathy.  There is a diffuse disc bulge with superimposed broad central disc protrusion.  There is mild spinal stenosis.  There is mild-to-moderate bilateral foraminal stenosis.  L5-S1: There is a diffuse disc bulge with superimposed broad central disc protrusion with annular fissure.  There is marked left and moderate-to-marked right facet joint arthropathy with ligamentum flavum thickening.  There is no significant spinal stenosis.  The right foramina is patent.  There is severe left foraminal stenosis.  The broad central disc protrusion approaches both S1 roots.  The left S1 root is crowded between the facet joint changes and the disc protrusion.  Both S1 roots could be affected, left greater than right.    Soft tissues, other: The prevertebral soft tissues are normal.  The aorta is mildly ectatic.    Labs:  BMP  Lab Results   Component Value Date     08/01/2019    K 3.8 08/01/2019     08/01/2019    CO2 28 08/01/2019    BUN 11 08/01/2019    CREATININE 0.7 08/01/2019    CALCIUM 9.6 08/01/2019    ANIONGAP 11 08/01/2019    ESTGFRAFRICA >60 08/01/2019    EGFRNONAA >60 08/01/2019     Lab Results   Component Value Date    ALT 11 06/22/2018    AST 15 06/22/2018    ALKPHOS 46 (L) 06/22/2018    BILITOT 0.9 06/22/2018     Lab Results   Component Value Date    WBC 16.11 (H) 09/04/2019    HGB 9.4 (L) 09/04/2019    HCT 28.9 (L) 09/04/2019    MCV 85 09/04/2019     09/04/2019           Assessment:   Problem List Items Addressed This Visit        Neuro    Lumbar radiculopathy    Lumbar spondylosis    Spinal stenosis of lumbar region       Orthopedic    Chronic bilateral low back pain with bilateral sciatica - Primary          59 y.o. year old female with PMH HTN, a-fib on eliquis presents to the office with back pain.  She's had this pain  for over 5 years and it has been gradually worsening.  No history of prior back surgery.   She has had chronic numbness in the top of her feet for years.  Her pain is worse with standing and walking and relieved with rest and leaning forward.      7/8/20 - Mrs Shoemaker returns to clinic today for follow up.  She is s/p L5/S1 ILESI on 6/19/20 with about 55% relief of her low back pain, no additional relief with a second epidural steroid injection.  She reports sciatic pain has significant improvement, she reports standing prolonged periods of time causes low back pain.  She reports improvement in her foot strength, she is not shuffling her left foot like before the injection.  She continues to have decreased sensation about the lateral left lateral ankle.  She continues cymbalta and gabapentin.  She reports her low back symptoms are tolerable and she would like to work on her left knee pain and right ankle sprain, she is wearing a CAM boot today.  On exam, she has 5/5 b/l LE strength, sensation intact L2-S1 b/l with decreased sensation over left lateral ankle. We reviewed her lumbar spine MRI from 2018 c/w MRI lumbar spine c/w multilevel b/l facet arthropathy, L3-4 moderate central spinal stenosis and L5-S1 severe left foraminal stenosis, broad central disc protrusion approaches both S1 roots, the left S1 root is crowded between the facet joint changes and the disc protrusion, both S1 roots could be affected, left greater than right.  We will update her lumbar spine MRI and refer her to neurosurgery for surgical consultation as her pain and numbness continues despite conservative treatment and YUE.  She will follow up prn.    Treatment Plan:   Procedures: none  PT/OT/HEP: continue HEP  Medications: continue medications as prescribed  Labs: Reviewed and medications are appropriately dosed for current hepatorenal function.  Imaging: Lumbar Spine MRI without constrast  : Not applicable    Attending physician:  Gurjit  NICOLE Tavarez.  Interventional Pain Medicine / Anesthesiology

## 2020-07-08 ENCOUNTER — OFFICE VISIT (OUTPATIENT)
Dept: PAIN MEDICINE | Facility: CLINIC | Age: 60
End: 2020-07-08
Payer: COMMERCIAL

## 2020-07-08 VITALS
WEIGHT: 284 LBS | RESPIRATION RATE: 18 BRPM | DIASTOLIC BLOOD PRESSURE: 70 MMHG | HEART RATE: 59 BPM | SYSTOLIC BLOOD PRESSURE: 124 MMHG | HEIGHT: 65 IN | BODY MASS INDEX: 47.32 KG/M2

## 2020-07-08 DIAGNOSIS — G89.29 CHRONIC BILATERAL LOW BACK PAIN WITH BILATERAL SCIATICA: Primary | ICD-10-CM

## 2020-07-08 DIAGNOSIS — M54.16 LUMBAR RADICULOPATHY: ICD-10-CM

## 2020-07-08 DIAGNOSIS — M54.42 CHRONIC BILATERAL LOW BACK PAIN WITH BILATERAL SCIATICA: Primary | ICD-10-CM

## 2020-07-08 DIAGNOSIS — M54.41 CHRONIC BILATERAL LOW BACK PAIN WITH BILATERAL SCIATICA: Primary | ICD-10-CM

## 2020-07-08 DIAGNOSIS — M54.9 DORSALGIA, UNSPECIFIED: ICD-10-CM

## 2020-07-08 DIAGNOSIS — M47.816 LUMBAR SPONDYLOSIS: ICD-10-CM

## 2020-07-08 DIAGNOSIS — M48.061 SPINAL STENOSIS OF LUMBAR REGION, UNSPECIFIED WHETHER NEUROGENIC CLAUDICATION PRESENT: ICD-10-CM

## 2020-07-08 PROCEDURE — 99999 PR PBB SHADOW E&M-EST. PATIENT-LVL V: ICD-10-PCS | Mod: PBBFAC,,, | Performed by: NURSE PRACTITIONER

## 2020-07-08 PROCEDURE — 99214 PR OFFICE/OUTPT VISIT, EST, LEVL IV, 30-39 MIN: ICD-10-PCS | Mod: S$GLB,,, | Performed by: NURSE PRACTITIONER

## 2020-07-08 PROCEDURE — 99999 PR PBB SHADOW E&M-EST. PATIENT-LVL V: CPT | Mod: PBBFAC,,, | Performed by: NURSE PRACTITIONER

## 2020-07-08 PROCEDURE — 99214 OFFICE O/P EST MOD 30 MIN: CPT | Mod: S$GLB,,, | Performed by: NURSE PRACTITIONER

## 2020-07-09 ENCOUNTER — TELEPHONE (OUTPATIENT)
Dept: PAIN MEDICINE | Facility: CLINIC | Age: 60
End: 2020-07-09

## 2020-07-09 NOTE — TELEPHONE ENCOUNTER
Attempted to call Mrs Shoemaker, I would like to update her MRI and have her see Neurosurgery for consultation.  Left message for her to call back.

## 2020-07-09 NOTE — TELEPHONE ENCOUNTER
----- Message from Carolyn Metzger MA sent at 7/9/2020 10:42 AM CDT -----  Type:  Patient Returning Call    Who Called:  Fatemeh  Who Left Message for Patient:  Yumiko  Does the patient know what this is regarding?:  unknown  Best Call Back Number:    Additional Information:  patient states that on the voice mail she asked for patient kyle.  She is wondering if she meant to call her.  Please call to discuss

## 2020-07-15 ENCOUNTER — TELEPHONE (OUTPATIENT)
Dept: NEUROSURGERY | Facility: CLINIC | Age: 60
End: 2020-07-15

## 2020-07-15 NOTE — TELEPHONE ENCOUNTER
----- Message from Caitlin Srikanth sent at 7/15/2020 11:57 AM CDT -----  Regarding: mistake made in excepting sooner apt  Contact: patient  Type: Needs Medical Advice  Who Called:  patient  Best Call Back Number: [phone]  Additional Information: Patient excepted sooner doctor apt. But then realized she had to have MRI before she sees doctor.  There are no available apt. To coincide with apt with doctor and mri that she can come to .  She would like to speak to the nurse to see if she can get old apt back.  Please call to advise.  thanks

## 2020-07-15 NOTE — TELEPHONE ENCOUNTER
Returned pt's call to notify that I have rescheduled her apt back to 7/27. No answer. Left voicemail.

## 2020-07-17 ENCOUNTER — OFFICE VISIT (OUTPATIENT)
Dept: ORTHOPEDICS | Facility: CLINIC | Age: 60
End: 2020-07-17
Payer: COMMERCIAL

## 2020-07-17 VITALS — HEIGHT: 65 IN | BODY MASS INDEX: 47.31 KG/M2 | WEIGHT: 283.94 LBS

## 2020-07-17 DIAGNOSIS — S93.401D SPRAIN OF RIGHT ANKLE, UNSPECIFIED LIGAMENT, SUBSEQUENT ENCOUNTER: ICD-10-CM

## 2020-07-17 DIAGNOSIS — M79.671 RIGHT FOOT PAIN: Primary | ICD-10-CM

## 2020-07-17 DIAGNOSIS — E66.01 MORBID OBESITY WITH BMI OF 45.0-49.9, ADULT: ICD-10-CM

## 2020-07-17 PROCEDURE — 99213 OFFICE O/P EST LOW 20 MIN: CPT | Mod: S$GLB,,, | Performed by: ORTHOPAEDIC SURGERY

## 2020-07-17 PROCEDURE — 3008F PR BODY MASS INDEX (BMI) DOCUMENTED: ICD-10-PCS | Mod: CPTII,S$GLB,, | Performed by: ORTHOPAEDIC SURGERY

## 2020-07-17 PROCEDURE — 3008F BODY MASS INDEX DOCD: CPT | Mod: CPTII,S$GLB,, | Performed by: ORTHOPAEDIC SURGERY

## 2020-07-17 PROCEDURE — 99999 PR PBB SHADOW E&M-EST. PATIENT-LVL III: ICD-10-PCS | Mod: PBBFAC,,, | Performed by: ORTHOPAEDIC SURGERY

## 2020-07-17 PROCEDURE — 99999 PR PBB SHADOW E&M-EST. PATIENT-LVL III: CPT | Mod: PBBFAC,,, | Performed by: ORTHOPAEDIC SURGERY

## 2020-07-17 PROCEDURE — 99213 PR OFFICE/OUTPT VISIT, EST, LEVL III, 20-29 MIN: ICD-10-PCS | Mod: S$GLB,,, | Performed by: ORTHOPAEDIC SURGERY

## 2020-07-17 NOTE — PROGRESS NOTES
About a month out from her ankle sprain.  She is feeling somewhat better.    Exam shows some tenderness throughout the ankle both medially and laterally, no instability is detected, no signs infection    Assessment:  Right ankle sprain resolving 1 month out    Plan:  Stirrup splint, slowly wean the use of assistive devices, we offer therapy she wants to try home exercise program will get her set up with that and will check her back in about 1 month

## 2020-07-27 ENCOUNTER — HOSPITAL ENCOUNTER (OUTPATIENT)
Dept: RADIOLOGY | Facility: HOSPITAL | Age: 60
Discharge: HOME | End: 2020-07-27
Attending: NURSE PRACTITIONER
Payer: COMMERCIAL

## 2020-07-27 ENCOUNTER — OFFICE VISIT (OUTPATIENT)
Dept: NEUROSURGERY | Facility: CLINIC | Age: 60
End: 2020-07-27
Payer: COMMERCIAL

## 2020-07-27 ENCOUNTER — CLINICAL SUPPORT (OUTPATIENT)
Dept: AUDIOLOGY | Facility: CLINIC | Age: 60
End: 2020-07-27
Payer: COMMERCIAL

## 2020-07-27 ENCOUNTER — OFFICE VISIT (OUTPATIENT)
Dept: OTOLARYNGOLOGY | Facility: CLINIC | Age: 60
End: 2020-07-27
Payer: COMMERCIAL

## 2020-07-27 VITALS
HEIGHT: 65 IN | SYSTOLIC BLOOD PRESSURE: 130 MMHG | BODY MASS INDEX: 47.23 KG/M2 | WEIGHT: 283.5 LBS | TEMPERATURE: 98 F | DIASTOLIC BLOOD PRESSURE: 78 MMHG

## 2020-07-27 VITALS
HEIGHT: 65 IN | SYSTOLIC BLOOD PRESSURE: 130 MMHG | BODY MASS INDEX: 47.23 KG/M2 | DIASTOLIC BLOOD PRESSURE: 78 MMHG | WEIGHT: 283.5 LBS

## 2020-07-27 DIAGNOSIS — R42 DIZZINESS: Primary | ICD-10-CM

## 2020-07-27 DIAGNOSIS — H90.3 BILATERAL HIGH FREQUENCY SENSORINEURAL HEARING LOSS: ICD-10-CM

## 2020-07-27 DIAGNOSIS — M54.9 DORSALGIA, UNSPECIFIED: ICD-10-CM

## 2020-07-27 DIAGNOSIS — D17.1 LIPOMA OF TORSO: ICD-10-CM

## 2020-07-27 DIAGNOSIS — H93.13 TINNITUS, BILATERAL: Primary | ICD-10-CM

## 2020-07-27 DIAGNOSIS — H93.13 TINNITUS, BILATERAL: ICD-10-CM

## 2020-07-27 DIAGNOSIS — R26.89 IMBALANCE: ICD-10-CM

## 2020-07-27 DIAGNOSIS — M54.16 LUMBAR RADICULOPATHY: ICD-10-CM

## 2020-07-27 DIAGNOSIS — R26.81 GAIT INSTABILITY: Primary | ICD-10-CM

## 2020-07-27 PROCEDURE — 72148 MRI LUMBAR SPINE W/O DYE: CPT | Mod: TC,PO

## 2020-07-27 PROCEDURE — 3075F SYST BP GE 130 - 139MM HG: CPT | Mod: CPTII,S$GLB,, | Performed by: NURSE PRACTITIONER

## 2020-07-27 PROCEDURE — 99203 OFFICE O/P NEW LOW 30 MIN: CPT | Mod: S$GLB,,, | Performed by: NURSE PRACTITIONER

## 2020-07-27 PROCEDURE — 92557 COMPREHENSIVE HEARING TEST: CPT | Mod: S$GLB,,, | Performed by: AUDIOLOGIST-HEARING AID FITTER

## 2020-07-27 PROCEDURE — 92567 TYMPANOMETRY: CPT | Mod: S$GLB,,, | Performed by: AUDIOLOGIST-HEARING AID FITTER

## 2020-07-27 PROCEDURE — 3078F DIAST BP <80 MM HG: CPT | Mod: CPTII,S$GLB,, | Performed by: NURSE PRACTITIONER

## 2020-07-27 PROCEDURE — 99203 PR OFFICE/OUTPT VISIT, NEW, LEVL III, 30-44 MIN: ICD-10-PCS | Mod: S$GLB,,, | Performed by: NURSE PRACTITIONER

## 2020-07-27 PROCEDURE — 99999 PR PBB SHADOW E&M-EST. PATIENT-LVL V: CPT | Mod: PBBFAC,,, | Performed by: NEUROLOGICAL SURGERY

## 2020-07-27 PROCEDURE — 99999 PR PBB SHADOW E&M-EST. PATIENT-LVL III: ICD-10-PCS | Mod: PBBFAC,,, | Performed by: NURSE PRACTITIONER

## 2020-07-27 PROCEDURE — 92567 PR TYMPA2METRY: ICD-10-PCS | Mod: S$GLB,,, | Performed by: AUDIOLOGIST-HEARING AID FITTER

## 2020-07-27 PROCEDURE — 72148 MRI LUMBAR SPINE W/O DYE: CPT | Mod: 26,,, | Performed by: RADIOLOGY

## 2020-07-27 PROCEDURE — 99244 PR OFFICE CONSULTATION,LEVEL IV: ICD-10-PCS | Mod: S$GLB,,, | Performed by: NEUROLOGICAL SURGERY

## 2020-07-27 PROCEDURE — 99999 PR PBB SHADOW E&M-EST. PATIENT-LVL III: CPT | Mod: PBBFAC,,, | Performed by: NURSE PRACTITIONER

## 2020-07-27 PROCEDURE — 3078F PR MOST RECENT DIASTOLIC BLOOD PRESSURE < 80 MM HG: ICD-10-PCS | Mod: CPTII,S$GLB,, | Performed by: NURSE PRACTITIONER

## 2020-07-27 PROCEDURE — 3008F PR BODY MASS INDEX (BMI) DOCUMENTED: ICD-10-PCS | Mod: CPTII,S$GLB,, | Performed by: NURSE PRACTITIONER

## 2020-07-27 PROCEDURE — 92557 PR COMPREHENSIVE HEARING TEST: ICD-10-PCS | Mod: S$GLB,,, | Performed by: AUDIOLOGIST-HEARING AID FITTER

## 2020-07-27 PROCEDURE — 3008F BODY MASS INDEX DOCD: CPT | Mod: CPTII,S$GLB,, | Performed by: NURSE PRACTITIONER

## 2020-07-27 PROCEDURE — 3075F PR MOST RECENT SYSTOLIC BLOOD PRESS GE 130-139MM HG: ICD-10-PCS | Mod: CPTII,S$GLB,, | Performed by: NURSE PRACTITIONER

## 2020-07-27 PROCEDURE — 99999 PR PBB SHADOW E&M-EST. PATIENT-LVL V: ICD-10-PCS | Mod: PBBFAC,,, | Performed by: NEUROLOGICAL SURGERY

## 2020-07-27 PROCEDURE — 99999 PR PBB SHADOW E&M-EST. PATIENT-LVL I: ICD-10-PCS | Mod: PBBFAC,,,

## 2020-07-27 PROCEDURE — 99244 OFF/OP CNSLTJ NEW/EST MOD 40: CPT | Mod: S$GLB,,, | Performed by: NEUROLOGICAL SURGERY

## 2020-07-27 PROCEDURE — 72148 MRI LUMBAR SPINE WITHOUT CONTRAST: ICD-10-PCS | Mod: 26,,, | Performed by: RADIOLOGY

## 2020-07-27 PROCEDURE — 99999 PR PBB SHADOW E&M-EST. PATIENT-LVL I: CPT | Mod: PBBFAC,,,

## 2020-07-27 NOTE — PROGRESS NOTES
Fatemeh Shoemaker was seen 07/27/2020 for an audiological evaluation. Patient complains of dizziness and occasional tinnitus AU. Pt reports a Hx of loud noise exposure.     Results reveal a normal through 6K Hz sloping to moderate HF sensorineural hearing loss for the right ear, and  Normal through 3K Hz sloping to moderately severe HF sensorineural hearing loss for the left ear.    Speech Reception Thresholds were  5 dBHL for the right ear and 5 dBHL for the left ear.    Word recognition scores were excellent for the right ear and excellent for the left ear.   Tympanograms were Type A for the right ear and Type A for the left ear.    Audiogram results were reviewed in detail with patient and all questions were answered. Results will be reviewed by ENT at the completion of this note. Recommend further medical evaluation for the asymmetry and dizziness, left ear amplification pending medical clearance if pt feels she has significant difficulty understanding conversation, repeat hearing test in one year due to tinnitus and hearing protection in loud noise. Pt is not interested in a hearing aid at this time.

## 2020-07-27 NOTE — LETTER
July 29, 2020      Yumiko Lei, APRN  [address] [facility] [address] [place] [address]           [facility]  [address] [place] [address]  Phone: [phone]  Fax: [phone]          Patient: Fatemeh Shoemaker   MR Number: [id number]   YOB: 1960   Date of Visit: 7/27/2020       Dear Yumiko Lei:    Thank you for referring Fatemeh Shoemaker to me for evaluation. Attached you will find relevant portions of my assessment and plan of care.    If you have questions, please do not hesitate to call me. I look forward to following Fatemeh Shoemaker along with you.    Sincerely,    Aurora Hong MD    Enclosure  CC:  No Recipients    If you would like to receive this communication electronically, please contact [email or web address] or [phone] to request more information on [employer] Link access.    For providers and/or their staff who would like to refer a patient to [facility], please contact us through our one-stop-shop provider referral line, [facility], at [phone].    If you feel you have received this communication in error or would no longer like to receive these types of communications, please e-mail [email or web address]

## 2020-07-27 NOTE — PROGRESS NOTES
Subjective:       Patient ID: Fatemeh Shoemaker is a 59 y.o. female.    Chief Complaint: No chief complaint on file.    HPI   Patient is new (last seen >3 years ago for epistaxis). She is self-referred for vertigo. Patient saw neurosurgeon this morning for antalgic gait. Patient states when she leans forward to clean litter box, she feels pressure inside her head, becomes wobbly and develops a slight headache. She tends to lean to one side while walking straight. No vertigo. No bed spins.     Review of Systems   Constitutional: Negative.    HENT: Negative.    Eyes: Negative.    Respiratory: Negative.    Cardiovascular: Negative.    Gastrointestinal: Negative.    Musculoskeletal: Positive for gait problem.   Integumentary:  Negative.   Neurological:        Imbalance   Hematological: Negative.    Psychiatric/Behavioral: Negative.          Objective:      Physical Exam  Vitals signs and nursing note reviewed.   Constitutional:       General: She is not in acute distress.     Appearance: She is well-developed. She is not ill-appearing or diaphoretic.   HENT:      Head: Normocephalic and atraumatic.      Right Ear: Hearing, tympanic membrane, ear canal and external ear normal. No middle ear effusion. Tympanic membrane is not erythematous.      Left Ear: Hearing, tympanic membrane, ear canal and external ear normal.  No middle ear effusion. Tympanic membrane is not erythematous.      Nose: Nose normal.      Mouth/Throat:      Pharynx: Uvula midline.   Eyes:      General: Lids are normal. No scleral icterus.        Right eye: No discharge.         Left eye: No discharge.   Neck:      Musculoskeletal: Normal range of motion and neck supple.      Trachea: Trachea normal. No tracheal deviation.   Cardiovascular:      Rate and Rhythm: Normal rate.   Pulmonary:      Effort: Pulmonary effort is normal. No respiratory distress.      Breath sounds: No stridor. No wheezing.   Musculoskeletal: Normal range of motion.   Skin:      General: Skin is warm and dry.      Coloration: Skin is not pale.   Neurological:      Mental Status: She is alert and oriented to person, place, and time.      Coordination: Coordination normal.      Gait: Gait normal.   Psychiatric:         Speech: Speech normal.         Behavior: Behavior normal. Behavior is cooperative.         Thought Content: Thought content normal.         Judgment: Judgment normal.       Negative Wendy-Hallpike AU    Assessment:     Tinnitus AU    Mild/moderate high-frequency SNHL  Imbalance  Plan:     Repeat audiogram in one year to monitor asymmetry    Lengthy discussion regarding vertigo versus imbalance. No evidence of BPPV reji today. Discussed VNG. Patient to call back if vertiginous and we can proceed with VNG.   Return as needed for any ENT concerns.

## 2020-07-27 NOTE — PROGRESS NOTES
I have seen the patient, reviewed the Advanced Practice Provider's history and physical, assessment and plan. I have personally interviewed and examined the patient at bedside and interpreted the relevant imaging and lab work and I agree with the findings. I personally performed the documented services. See below for any additional comments.    MRI of the lumbar spine shows severe disc degeneration at L3-L4 with likely some focal coronal deformity at that level.  This is associated with central stenosis and severe right lateral recess and foraminal stenosis.  At L5-S1, there is abnormal anatomy.  The left L5 pedicle has a cystic lesion and has a larger diameter than the right one.  There is also severe crowding of the left lateral recess.  It is difficult to identify the source of this lesion.  In may be bone or soft tissue.  There is associated severe left L5-S1 foraminal stenosis.    Several years of severe axial low back pain radiating to the bilateral posterior lower extremities, left greater than right.  This is associated with bilateral foot numbness and paresthesias.  The patient also endorses progressive gait dysfunction and imbalance.  She has fallen secondary to those symptoms.  The back and leg pain is worse with prolonged standing.  She had YUE with good but temporary relief of her back and leg symptoms.  She also has noted a thoracic subcutaneous soft, nontender mass that has been growing over the years with more rapid growth recently.    On exam, she has an antalgic gait and uses a cane.  She recently injured her right ankle and has an ankle brace.  There is a approximately 20 cm soft, nontender subcutaneous lesion in the upper paramedian thoracic area.  This is suggestive of a large lipoma.  The overlying skin is intact.  She has a positive Romberg.  She has normal strength except for her right ankle dorsiflexion plantar flexion which is pain limited secondary to her injury.  There is decreased  sensation bilaterally in the L5 greater than S1 distributions.  She has no myelopathic reflexes.    Suspect neurogenic claudication likely secondary to L3-L4 and L5-S1 pathology.  It is unclear what the pathology is at L5-S1 given the abnormal anatomy there.  This will need further characterization.  Differential includes congenital malformation, sequela from fracture, benign bone lesion or malignancy.  The rapid growth of her subcutaneous mass is also warranting further investigation.  Her balance difficulties are also somewhat inconsistent with simple neurogenic claudication and will require further imaging.  Therefore we will order MRI of the cervical, thoracic and lumbar spine with and without contrast to rule out any cervical or thoracic compressive pathology and to further investigate the lesion in her L5 vertebra.  Will also order dynamic x-rays of the lumbar spine to rule out instability and a CT scan of her lumbar spine without contrast to examine the bony anatomy associated with that L5 lesion.  Follow-up after the above.

## 2020-07-27 NOTE — PROGRESS NOTES
Neurosurgery History & Physical    Patient ID: Fatemeh Shoemaker is a 59 y.o. female.    Chief Complaint   Patient presents with    Lumbar Spine Pain (L-Spine)     LBP for years with worsening of symptoms over the last few months. Patient decribes low back pain that radiates down her L leg to the arch of her L foot. Patient states this is a constant ache that is aggrevated by standing. Denies any alleviating factors.Patient also notes that she becomes extremely dizzy when she bends over to do anything. Denies bladder or bowel dysfunction. Previous YUE x 2 with Dr. Tavarez, with last being in June with no relief. Denies any recent PT.       Review of Systems   Constitutional: Negative for chills, diaphoresis, fatigue and fever.   HENT: Negative for congestion, ear pain, rhinorrhea, sneezing, sore throat and tinnitus.    Eyes: Negative for photophobia, pain, redness and visual disturbance.   Respiratory: Negative for cough, chest tightness, shortness of breath and wheezing.    Cardiovascular: Negative for chest pain, palpitations and leg swelling.   Gastrointestinal: Negative for abdominal distention, abdominal pain, constipation, diarrhea, nausea and vomiting.   Genitourinary: Negative for difficulty urinating, dysuria, frequency and urgency.   Musculoskeletal: Positive for back pain and gait problem. Negative for myalgias and neck pain.   Skin: Negative for pallor and rash.   Neurological: Positive for numbness. Negative for dizziness, seizures, speech difficulty, weakness and headaches.   Psychiatric/Behavioral: Negative for confusion and hallucinations.       Past Medical History:   Diagnosis Date    Anemia     Arrhythmia     Arthritis     Atrial fibrillation     history    Bronchitis     Encounter for blood transfusion     General anesthetics causing adverse effect in therapeutic use     High blood pressure     Hyperlipidemia     Lump or mass in breast     benign     Neuropathy     Obesity     Ulcer   "    Social History     Socioeconomic History    Marital status: Significant Other     Spouse name: Not on file    Number of children: Not on file    Years of education: Not on file    Highest education level: Not on file   Occupational History    Not on file   Social Needs    Financial resource strain: Somewhat hard    Food insecurity     Worry: Never true     Inability: Never true    Transportation needs     Medical: No     Non-medical: No   Tobacco Use    Smoking status: Never Smoker    Smokeless tobacco: Never Used   Substance and Sexual Activity    Alcohol use: No     Frequency: Never     Comment: never    Drug use: No    Sexual activity: Yes     Partners: Male     Birth control/protection: See Surgical Hx     Comment: current partner over 30 years   Lifestyle    Physical activity     Days per week: 0 days     Minutes per session: Not on file    Stress: Not on file   Relationships    Social connections     Talks on phone: More than three times a week     Gets together: Twice a week     Attends Taoism service: More than 4 times per year     Active member of club or organization: No     Attends meetings of clubs or organizations: Never     Relationship status:    Other Topics Concern    Not on file   Social History Narrative    Not on file     Family History   Problem Relation Age of Onset    Colon cancer Maternal Grandmother 70        mets to ovary    Eclampsia Paternal Grandmother     Stroke Father 57    Colon cancer Father     Hypertension Father     Hypertension Mother     Hypertension Brother     No Known Problems Daughter     Stomach cancer Neg Hx     Esophageal cancer Neg Hx     Breast cancer Neg Hx     Miscarriages / Stillbirths Neg Hx     Ovarian cancer Neg Hx      Review of patient's allergies indicates:   Allergen Reactions    Aspirin Other (See Comments)     "I don't take it because I've had ulcers"   ulcers  "I don't take it because I've had ulcers"     " "Meperidine Other (See Comments)     Felt like she was about to pass put after taking       Current Outpatient Medications:     apixaban (ELIQUIS) 5 mg Tab, Take 1 tablet (5 mg total) by mouth 2 (two) times daily., Disp: 60 tablet, Rfl: 11    DULoxetine (CYMBALTA) 60 MG capsule, Take 2 capsules (120 mg total) by mouth once daily., Disp: 180 capsule, Rfl: 3    ergocalciferol (VITAMIN D2) 50,000 unit Cap, Take 50,000 Units by mouth every 7 days., Disp: , Rfl:     flecainide (TAMBOCOR) 100 MG Tab, Take 1 tablet (100 mg total) by mouth every 12 (twelve) hours., Disp: 180 tablet, Rfl: 3    gabapentin (NEURONTIN) 600 MG tablet, Take 1 tablet (600 mg total) by mouth 3 (three) times daily., Disp: 270 tablet, Rfl: 3    hydroCHLOROthiazide (HYDRODIURIL) 25 MG tablet, Take 1 tablet (25 mg total) by mouth once daily., Disp: 90 tablet, Rfl: 1    lisinopril 10 MG tablet, Take 1 tablet (10 mg total) by mouth once daily., Disp: 90 tablet, Rfl: 3    metoprolol tartrate (LOPRESSOR) 25 MG tablet, Take 1 tablet (25 mg total) by mouth 2 (two) times daily., Disp: 60 tablet, Rfl: 11    nortriptyline (PAMELOR) 25 MG capsule, Take 1 capsule (25 mg total) by mouth every evening., Disp: 90 capsule, Rfl: 3    omeprazole (PRILOSEC) 20 MG capsule, Take 1 capsule (20 mg total) by mouth once daily., Disp: 30 capsule, Rfl: 0    rosuvastatin (CRESTOR) 10 MG tablet, Take 10 mg by mouth every evening. , Disp: , Rfl:     tiZANidine (ZANAFLEX) 4 MG tablet, Take 1 tablet (4 mg total) by mouth every 6 (six) hours as needed., Disp: 30 tablet, Rfl: 5    valACYclovir (VALTREX) 500 MG tablet, Take 1 tablet (500 mg total) by mouth once daily., Disp: 30 tablet, Rfl: 11    estradioL (DIVIGEL) 1 mg/gram (0.1 %) topical gel, Place 1 g onto the skin once daily., Disp: 30 g, Rfl: 11    loratadine (CLARITIN ORAL), Take by mouth as needed., Disp: , Rfl:   Blood pressure 130/78, temperature 98 °F (36.7 °C), height 5' 5" (1.651 m), weight 128.6 kg (283 lb " 8.2 oz), last menstrual period 2017.      Neurologic Exam     Mental Status   Oriented to person, place, and time.   Oriented to person.   Oriented to place.   Oriented to time.   Follows 3 step commands.   Attention: normal. Concentration: normal.   Speech: speech is normal   Level of consciousness: alert  Knowledge: consistent with education.   Able to name object. Able to read. Able to repeat. Able to write. Normal comprehension.      Cranial Nerves      CN II   Visual acuity: normal  Right visual field deficit: none  Left visual field deficit: none      CN III, IV, VI   Pupils are equal, round, and reactive to light.  Right pupil: Size: 3 mm. Shape: regular. Reactivity: brisk. Consensual response: intact.   Left pupil: Size: 3 mm. Shape: regular. Reactivity: brisk. Consensual response: intact.   CN III: no CN III palsy  CN VI: no CN VI palsy  Nystagmus: none   Diplopia: none  Ophthalmoparesis: none  Conjugate gaze: present     CN V   Right facial sensation deficit: none  Left facial sensation deficit: none     CN VII   Right facial weakness: none  Left facial weakness: none     CN VIII   Hearing: intact     CN IX, X   CN IX normal.   CN X normal.      CN XI   Right sternocleidomastoid strength: normal  Left sternocleidomastoid strength: normal  Right trapezius strength: normal  Left trapezius strength: normal     CN XII   Fasciculations: absent  Tongue deviation: none     Motor Exam   Muscle bulk: normal  Overall muscle tone: normal  Right arm pronator drift: absent  Left arm pronator drift: absent     Strength   Right deltoid: 5/5  Left deltoid: 5/5  Right biceps: 5/5  Left biceps: 5/5  Right triceps: 5/5  Left triceps: 5/5  Right wrist flexion: 5/5  Left wrist flexion: 5/5  Right wrist extension: 5/5  Left wrist extension: 5/5  Right interossei: 5/5  Left interossei: 5/5  Right iliopsoas: 5/5  Left iliopsoas: 5/5  Right quadriceps: 5/5  Left quadriceps: 5/5  Right hamstrin/5  Left hamstring:  5/5  Right anterior tibial: 4/5-pain limited due to injury  Left anterior tibial: 5/5  Right posterior tibial: 5/5  Left posterior tibial: 5/5  Right peroneal: 5/5  Left peroneal: 5/5  Right gastroc: 4/5-pain limited due to injury  Left gastroc: 5/5     Sensory Exam   Right arm light touch: normal  Left arm light touch: normal  Right leg light touch: decreased in L5 and S1 distribution; worse in the L5 distribution  Left leg light touch: decreased in L5 and S1 distribution; worse in the L5 distribution     Gait, Coordination, and Reflexes      Gait  Gait: antalgic; ambulates with cane     Coordination   Romberg: mildly positive  Finger to nose coordination: normal  Heel to shin coordination: normal  Tandem walking coordination: normal     Tremor   Resting tremor: absent  Intention tremor: absent  Action tremor: absent     Reflexes   Right brachioradialis: 2+  Left brachioradialis: 2+  Right biceps: 2+  Left biceps: 2+  Right triceps: 2+  Left triceps: 2+  Right patellar: 2+  Left patellar: 2+  Right achilles: 0  Left achilles: 0  Right [place]: absent  Left [place]: absent  Right ankle clonus: absent  Left ankle clonus: absent  Right: plantar: normal  Left plantar: normal     Physical Exam  Constitutional: Oriented to person, place, and time. Appears well-developed and well-nourished.   HENT:   Head: Normocephalic and atraumatic.   Eyes: Pupils are equal, round, and reactive to light.   Neck: Normal range of motion. Neck supple.   Cardiovascular: Normal rate.    Pulmonary/Chest: Effort normal.   Musculoskeletal: Normal range of motion. Exhibits no edema.   Neurological: Alert and oriented to person, place, and time. Normal Finger-Nose-Finger Test, a normal Heel to Shin Test, a normal Romberg Test and a normal Tandem Gait Test. Gait normal.   Reflex Scores:       Tricep reflexes are 2+ on the right side and 2+ on the left side.       Bicep reflexes are 2+ on the right side and 2+ on the left side.        Brachioradialis reflexes are 2+ on the right side and 2+ on the left side.       Patellar reflexes are 2+ on the right side and 2+ on the left side.       Achilles reflexes are 0 on the right side and 0 on the left side.  Skin: Skin is warm, dry and intact.   Psychiatric: Normal mood and affect. Speech is normal and behavior is normal. Judgment and thought content normal.   Nursing note and vitals reviewed.    Provider dictation:  I reviewed the imaging.   MRI of the lumbar spine shows severe disc degeneration at L3-L4 with central stenosis and severe right lateral recess and foraminal stenosis.  At L5-S1, there is abnormal anatomy.  The left L5 pedicle has a cystic lesion and has a larger diameter than the right one.  There is also severe crowding of the left lateral recess.  It is difficult to identify the source of this lesion.  In may be bone or soft tissue.  There is associated severe left L5-S1 foraminal stenosis.    The patient is a 59 year old female who presents for neurosurgical consultation referred by Dr. Tavarez. Patient reports chronic back pain. Recently she reports worsening lower back pain and bilateral leg pain, left > right. The pain is in the posterior aspect of the leg and shoots into the plantar aspect of her feet. She underwent L5-S1 YUE with Dr. Tavarez in February and again in June. She reports some improvement in pain with the first YUE, but denies improvement following the second YUE. She also reports numbness/tingling in all toes of both feet. Patient has difficulty with balance over the past year and she reports 3-4 falls in the past 2 months. She also reports dropping objects from her hands. Denies neck pain. She also has noted a thoracic subcutaneous soft, nontender mass that has been growing over the years with more rapid growth recently.    On exam patient has decreased sensation in bilateral L5 and S1 distribution; worse in the L5 distribution. Absent B/L achilles reflex. Full strength,  except 4/5 right DF and PF which is pain limited due to injury. Positive Romberg. No myelopathic reflexes. There is a approximately 20 cm soft, nontender subcutaneous lesion in the upper paramedian thoracic area.     We will obtain MRI of the cervical, thoracic and lumbar spine with and without contrast to rule out any cervical or thoracic compressive pathology and to further investigate the lesion in her L5 vertebra. We will also order dynamic x-rays of the lumbar spine to rule out instability and a CT scan of her lumbar spine without contrast to examine the bony anatomy associated with that L5 lesion. She will follow-up after the above imaging.     1. Gait instability  MRI Cervical Spine W WO Cont   2. Lumbar radiculopathy  Ambulatory referral/consult to Neurosurgery    MRI Lumbar Spine W WO Contrast    X-Ray Lumbar Complete With Flex And Ext    CT Lumbar Spine Without Contrast   3. Lipoma of torso  MRI Thoracic Spine W WO Contrast

## 2020-08-04 ENCOUNTER — PATIENT OUTREACH (OUTPATIENT)
Dept: OTHER | Facility: OTHER | Age: 60
End: 2020-08-04

## 2020-08-04 NOTE — PROGRESS NOTES
Digital Medicine: Clinician Follow-Up    Pt reports low BP last Thursday and Friday w/ hypotensive sx. She does not recall any changes in nutrition. She was able to walk to her car and drink some gatorade which helped BP.       The history is provided by the patient.   Follow-up reason(s): responding to patient message.     Readings are trending down     Patient is experiencing signs/symptoms of hypotension.   sluggish, fatigue    Additional Follow-up details: Prior HCTZ reduction caused BP to increase and pt titrated back to 25 mg.       Last 5 Patient Entered Readings                                      Current 30 Day Average: 126/71     Recent Readings 8/4/2020 7/31/2020 7/31/2020 7/31/2020 7/31/2020    SBP (mmHg) 128 111 102 100 111    DBP (mmHg) 72 72 65 66 68    Pulse 54 54 52 53 57               Depression Screening  Did not address depression screening.    Sleep Apnea Screening    Did not address sleep apnea screening.     Medication Affordability Screening  Did not address medication affordability screening.     Medication Adherence-Medication adherence was assessed.          ASSESSMENT(S)  Patients BP average is 126/71 mmHg, which is at goal. Patient's BP goal is less than or equal to 130/80 per 2017 ACC/AHA Hypertension Guidelines.  Although BP is controlled pt experiencing hypotensive sx with occasional lows. Need to monitor potential causes such as changes in nutrition or hydration.    PLAN  Additional monitoring needed: Pt to monitor s/sx of hypotension.   Continue current therapy: Continue current HTN therapy at this time. f/u in 3 weeks. Need updated labs at that time. If K+ is low (curently 3.8), consider decreasing HCTZ to 12.5 mg and increasing lisinopril to 20 mg if further BP control is needed. if BP remains low and K+ adequate, decrease lisinpril to 5mg.     Patient verbalizes understanding. Patient did not express questions or concerns and patient has contact information if  needed.          There are no preventive care reminders to display for this patient.      Hypertension Medications             hydroCHLOROthiazide (HYDRODIURIL) 25 MG tablet Take 1 tablet (25 mg total) by mouth once daily.    lisinopril 10 MG tablet Take 1 tablet (10 mg total) by mouth once daily.    metoprolol tartrate (LOPRESSOR) 25 MG tablet Take 1 tablet (25 mg total) by mouth 2 (two) times daily.

## 2020-08-14 ENCOUNTER — HOSPITAL ENCOUNTER (OUTPATIENT)
Dept: RADIOLOGY | Facility: HOSPITAL | Age: 60
Discharge: HOME | End: 2020-08-14
Attending: PHYSICIAN ASSISTANT
Payer: COMMERCIAL

## 2020-08-14 ENCOUNTER — PATIENT OUTREACH (OUTPATIENT)
Dept: OTHER | Facility: OTHER | Age: 60
End: 2020-08-14

## 2020-08-14 ENCOUNTER — TELEPHONE (OUTPATIENT)
Dept: NEUROSURGERY | Facility: CLINIC | Age: 60
End: 2020-08-14

## 2020-08-14 DIAGNOSIS — M54.16 LUMBAR RADICULOPATHY: ICD-10-CM

## 2020-08-14 DIAGNOSIS — D17.1 LIPOMA OF TORSO: ICD-10-CM

## 2020-08-14 DIAGNOSIS — R26.81 GAIT INSTABILITY: ICD-10-CM

## 2020-08-14 LAB
ALBUMIN/GLOBULIN RATIO: 1.5 (ref 1–2.5)
ALBUMIN: 3.8 G/DL (ref 3.6–5.1)
ALP ISOS SERPL LEV INH-CCNC: 43 U/L (ref 37–153)
ALT: 11 U/L (ref 6–29)
AST: 14 U/L (ref 10–35)
BILIRUBIN TOTAL: 0.4 MG/DL (ref 0.2–1.2)
BUN/CREAT RATIO: NORMAL
CALCIUM SERPL-MCNC: 9 MG/DL (ref 8.6–10.4)
CHLORIDE: 102 MMOL/L (ref 98–110)
CO2 SERPL-SCNC: 27 MMOL/L (ref 21–32)
GLOBULIN: 2.6 G/DL (ref 1.9–3.7)
GLUCOSE: 81 MG/DL (ref 65–99)
POTASSIUM: 3.9 MMOL/L (ref 3.5–5.3)
PROT SNV-MCNC: 6.4 G/DL (ref 6.1–8.1)
SODIUM: 138 MMOL/L (ref 135–146)
UREA NITROGEN (BUN): 19 MG/DL (ref 7–25)

## 2020-08-14 PROCEDURE — 72131 CT LUMBAR SPINE W/O DYE: CPT | Mod: TC,PO

## 2020-08-14 PROCEDURE — 72156 MRI NECK SPINE W/O & W/DYE: CPT | Mod: 26,,, | Performed by: RADIOLOGY

## 2020-08-14 PROCEDURE — 72157 MRI THORACIC SPINE W WO CONTRAST: ICD-10-PCS | Mod: 26,,, | Performed by: RADIOLOGY

## 2020-08-14 PROCEDURE — 72131 CT LUMBAR SPINE W/O DYE: CPT | Mod: 26,,, | Performed by: RADIOLOGY

## 2020-08-14 PROCEDURE — 25500020 PHARM REV CODE 255: Mod: PO | Performed by: PHYSICIAN ASSISTANT

## 2020-08-14 PROCEDURE — 72110 X-RAY EXAM L-2 SPINE 4/>VWS: CPT | Mod: TC,FY,PO

## 2020-08-14 PROCEDURE — 72158 MRI LUMBAR SPINE W/O & W/DYE: CPT | Mod: TC,PO

## 2020-08-14 PROCEDURE — A9585 GADOBUTROL INJECTION: HCPCS | Mod: PO | Performed by: PHYSICIAN ASSISTANT

## 2020-08-14 PROCEDURE — 72131 CT LUMBAR SPINE WITHOUT CONTRAST: ICD-10-PCS | Mod: 26,,, | Performed by: RADIOLOGY

## 2020-08-14 PROCEDURE — 72157 MRI CHEST SPINE W/O & W/DYE: CPT | Mod: 26,,, | Performed by: RADIOLOGY

## 2020-08-14 PROCEDURE — 72156 MRI NECK SPINE W/O & W/DYE: CPT | Mod: TC,PO

## 2020-08-14 PROCEDURE — 72156 MRI CERVICAL SPINE W WO CONTRAST: ICD-10-PCS | Mod: 26,,, | Performed by: RADIOLOGY

## 2020-08-14 PROCEDURE — 72157 MRI CHEST SPINE W/O & W/DYE: CPT | Mod: TC,PO

## 2020-08-14 PROCEDURE — 72158 MRI LUMBAR SPINE W/O & W/DYE: CPT | Mod: 26,,, | Performed by: RADIOLOGY

## 2020-08-14 PROCEDURE — 72158 MRI LUMBAR SPINE W WO CONTRAST: ICD-10-PCS | Mod: 26,,, | Performed by: RADIOLOGY

## 2020-08-14 PROCEDURE — 72110 XR LUMBAR SPINE 5 VIEW WITH FLEX AND EXT: ICD-10-PCS | Mod: 26,,, | Performed by: RADIOLOGY

## 2020-08-14 PROCEDURE — 72110 X-RAY EXAM L-2 SPINE 4/>VWS: CPT | Mod: 26,,, | Performed by: RADIOLOGY

## 2020-08-14 RX ORDER — GADOBUTROL 604.72 MG/ML
10 INJECTION INTRAVENOUS
Status: COMPLETED | OUTPATIENT
Start: 2020-08-14 | End: 2020-08-14

## 2020-08-14 RX ADMIN — GADOBUTROL 10 ML: 604.72 INJECTION INTRAVENOUS at 10:08

## 2020-08-14 NOTE — TELEPHONE ENCOUNTER
""Good morning  and staff,     MsChings procedure [id number] is not authorized by [facility] [employer] a peer to peer is needed please call [phone]"      Called [employer] to complete peer to peer.     Approval # [id number]  9/9/2020 expiration     Nika Martinez PA-C  [facility]     "

## 2020-08-18 ENCOUNTER — OFFICE VISIT (OUTPATIENT)
Dept: NEUROSURGERY | Facility: CLINIC | Age: 60
End: 2020-08-18
Payer: COMMERCIAL

## 2020-08-18 VITALS
BODY MASS INDEX: 47.02 KG/M2 | SYSTOLIC BLOOD PRESSURE: 114 MMHG | HEIGHT: 65 IN | DIASTOLIC BLOOD PRESSURE: 76 MMHG | WEIGHT: 282.19 LBS | TEMPERATURE: 98 F

## 2020-08-18 DIAGNOSIS — R26.89 IMPAIRMENT OF BALANCE: ICD-10-CM

## 2020-08-18 DIAGNOSIS — M54.16 LUMBAR RADICULOPATHY: Primary | ICD-10-CM

## 2020-08-18 PROCEDURE — 99999 PR PBB SHADOW E&M-EST. PATIENT-LVL IV: ICD-10-PCS | Mod: PBBFAC,,, | Performed by: NEUROLOGICAL SURGERY

## 2020-08-18 PROCEDURE — 3008F BODY MASS INDEX DOCD: CPT | Mod: CPTII,S$GLB,, | Performed by: NEUROLOGICAL SURGERY

## 2020-08-18 PROCEDURE — 3074F PR MOST RECENT SYSTOLIC BLOOD PRESSURE < 130 MM HG: ICD-10-PCS | Mod: CPTII,S$GLB,, | Performed by: NEUROLOGICAL SURGERY

## 2020-08-18 PROCEDURE — 99214 PR OFFICE/OUTPT VISIT, EST, LEVL IV, 30-39 MIN: ICD-10-PCS | Mod: S$GLB,,, | Performed by: NEUROLOGICAL SURGERY

## 2020-08-18 PROCEDURE — 3078F DIAST BP <80 MM HG: CPT | Mod: CPTII,S$GLB,, | Performed by: NEUROLOGICAL SURGERY

## 2020-08-18 PROCEDURE — 99999 PR PBB SHADOW E&M-EST. PATIENT-LVL IV: CPT | Mod: PBBFAC,,, | Performed by: NEUROLOGICAL SURGERY

## 2020-08-18 PROCEDURE — 3008F PR BODY MASS INDEX (BMI) DOCUMENTED: ICD-10-PCS | Mod: CPTII,S$GLB,, | Performed by: NEUROLOGICAL SURGERY

## 2020-08-18 PROCEDURE — 99214 OFFICE O/P EST MOD 30 MIN: CPT | Mod: S$GLB,,, | Performed by: NEUROLOGICAL SURGERY

## 2020-08-18 PROCEDURE — 3074F SYST BP LT 130 MM HG: CPT | Mod: CPTII,S$GLB,, | Performed by: NEUROLOGICAL SURGERY

## 2020-08-18 PROCEDURE — 3078F PR MOST RECENT DIASTOLIC BLOOD PRESSURE < 80 MM HG: ICD-10-PCS | Mod: CPTII,S$GLB,, | Performed by: NEUROLOGICAL SURGERY

## 2020-08-18 NOTE — PROGRESS NOTES
Neurosurgery History & Physical    Patient ID: Fatemeh Shoemaker is a 59 y.o. female.    Chief Complaint   Patient presents with    Results     Patient here for results of imaging       Review of Systems   Constitutional: Negative for chills, diaphoresis, fatigue and fever.   HENT: Negative for congestion, ear pain, rhinorrhea, sneezing, sore throat and tinnitus.    Eyes: Negative for photophobia, pain, redness and visual disturbance.   Respiratory: Negative for cough, chest tightness, shortness of breath and wheezing.    Cardiovascular: Negative for chest pain, palpitations and leg swelling.   Gastrointestinal: Negative for abdominal distention, abdominal pain, constipation, diarrhea, nausea and vomiting.   Genitourinary: Negative for difficulty urinating, dysuria, frequency and urgency.   Musculoskeletal: Positive for back pain and gait problem. Negative for myalgias and neck pain.   Skin: Negative for pallor and rash.   Neurological: Positive for numbness. Negative for dizziness, seizures, speech difficulty, weakness and headaches.   Psychiatric/Behavioral: Negative for confusion and hallucinations.       Past Medical History:   Diagnosis Date    Anemia     Arrhythmia     Arthritis     Atrial fibrillation     history    Bronchitis     Encounter for blood transfusion     General anesthetics causing adverse effect in therapeutic use     High blood pressure     Hyperlipidemia     Lump or mass in breast     benign     Neuropathy     Obesity     Ulcer      Social History     Socioeconomic History    Marital status: Significant Other     Spouse name: Not on file    Number of children: Not on file    Years of education: Not on file    Highest education level: Not on file   Occupational History    Not on file   Social Needs    Financial resource strain: Somewhat hard    Food insecurity     Worry: Never true     Inability: Never true    Transportation needs     Medical: No     Non-medical: No  "  Tobacco Use    Smoking status: Never Smoker    Smokeless tobacco: Never Used   Substance and Sexual Activity    Alcohol use: No     Frequency: Never     Comment: never    Drug use: No    Sexual activity: Yes     Partners: Male     Birth control/protection: See Surgical Hx     Comment: current partner over 30 years   Lifestyle    Physical activity     Days per week: 0 days     Minutes per session: Not on file    Stress: Not on file   Relationships    Social connections     Talks on phone: More than three times a week     Gets together: Twice a week     Attends Gnosticism service: More than 4 times per year     Active member of club or organization: No     Attends meetings of clubs or organizations: Never     Relationship status:    Other Topics Concern    Not on file   Social History Narrative    Not on file     Family History   Problem Relation Age of Onset    Colon cancer Maternal Grandmother 70        mets to ovary    Eclampsia Paternal Grandmother     Stroke Father 57    Colon cancer Father     Hypertension Father     Hypertension Mother     Hypertension Brother     No Known Problems Daughter     Stomach cancer Neg Hx     Esophageal cancer Neg Hx     Breast cancer Neg Hx     Miscarriages / Stillbirths Neg Hx     Ovarian cancer Neg Hx      Review of patient's allergies indicates:   Allergen Reactions    Aspirin Other (See Comments)     "I don't take it because I've had ulcers"   ulcers  "I don't take it because I've had ulcers"     Meperidine Other (See Comments)     Felt like she was about to pass put after taking       Current Outpatient Medications:     apixaban (ELIQUIS) 5 mg Tab, Take 1 tablet (5 mg total) by mouth 2 (two) times daily., Disp: 60 tablet, Rfl: 11    DULoxetine (CYMBALTA) 60 MG capsule, Take 2 capsules (120 mg total) by mouth once daily., Disp: 180 capsule, Rfl: 3    ergocalciferol (VITAMIN D2) 50,000 unit Cap, Take 50,000 Units by mouth every 7 days., Disp: " ", Rfl:     flecainide (TAMBOCOR) 100 MG Tab, Take 1 tablet (100 mg total) by mouth every 12 (twelve) hours., Disp: 180 tablet, Rfl: 3    gabapentin (NEURONTIN) 600 MG tablet, Take 1 tablet (600 mg total) by mouth 3 (three) times daily., Disp: 270 tablet, Rfl: 3    hydroCHLOROthiazide (HYDRODIURIL) 25 MG tablet, Take 1 tablet (25 mg total) by mouth once daily., Disp: 90 tablet, Rfl: 1    lisinopril 10 MG tablet, Take 1 tablet (10 mg total) by mouth once daily., Disp: 90 tablet, Rfl: 3    metoprolol tartrate (LOPRESSOR) 25 MG tablet, Take 1 tablet (25 mg total) by mouth 2 (two) times daily., Disp: 60 tablet, Rfl: 11    nortriptyline (PAMELOR) 25 MG capsule, Take 1 capsule (25 mg total) by mouth every evening., Disp: 90 capsule, Rfl: 3    omeprazole (PRILOSEC) 20 MG capsule, Take 1 capsule (20 mg total) by mouth once daily., Disp: 30 capsule, Rfl: 0    rosuvastatin (CRESTOR) 10 MG tablet, Take 10 mg by mouth every evening. , Disp: , Rfl:     tiZANidine (ZANAFLEX) 4 MG tablet, Take 1 tablet (4 mg total) by mouth every 6 (six) hours as needed., Disp: 30 tablet, Rfl: 5    valACYclovir (VALTREX) 500 MG tablet, Take 1 tablet (500 mg total) by mouth once daily., Disp: 30 tablet, Rfl: 11    estradioL (DIVIGEL) 1 mg/gram (0.1 %) topical gel, Place 1 g onto the skin once daily., Disp: 30 g, Rfl: 11    loratadine (CLARITIN ORAL), Take by mouth as needed., Disp: , Rfl:   Blood pressure 114/76, temperature 98 °F (36.7 °C), height 5' 5" (1.651 m), weight 128 kg (282 lb 3 oz), last menstrual period 09/08/2017.      Neurologic Exam     Mental Status   Oriented to person, place, and time.   Oriented to person.   Oriented to place.   Oriented to time.   Follows 3 step commands.   Attention: normal. Concentration: normal.   Speech: speech is normal   Level of consciousness: alert  Knowledge: consistent with education.   Able to name object. Able to read. Able to repeat. Able to write. Normal comprehension.      Cranial " Nerves      CN II   Visual acuity: normal  Right visual field deficit: none  Left visual field deficit: none      CN III, IV, VI   Pupils are equal, round, and reactive to light.  Right pupil: Size: 3 mm. Shape: regular. Reactivity: brisk. Consensual response: intact.   Left pupil: Size: 3 mm. Shape: regular. Reactivity: brisk. Consensual response: intact.   CN III: no CN III palsy  CN VI: no CN VI palsy  Nystagmus: none   Diplopia: none  Ophthalmoparesis: none  Conjugate gaze: present     CN V   Right facial sensation deficit: none  Left facial sensation deficit: none     CN VII   Right facial weakness: none  Left facial weakness: none     CN VIII   Hearing: intact     CN IX, X   CN IX normal.   CN X normal.      CN XI   Right sternocleidomastoid strength: normal  Left sternocleidomastoid strength: normal  Right trapezius strength: normal  Left trapezius strength: normal     CN XII   Fasciculations: absent  Tongue deviation: none     Motor Exam   Muscle bulk: normal  Overall muscle tone: normal  Right arm pronator drift: absent  Left arm pronator drift: absent     Strength   Right deltoid: 5/5  Left deltoid: 5/5  Right biceps: 5/5  Left biceps: 5/5  Right triceps: 5/5  Left triceps: 5/5  Right wrist flexion: 5/5  Left wrist flexion: 5/5  Right wrist extension: 5/5  Left wrist extension: 5/5  Right interossei: 5/5  Left interossei: 5/5  Right iliopsoas: 5/5  Left iliopsoas: 5/5  Right quadriceps: 5/5  Left quadriceps: 5/5  Right hamstrin/5  Left hamstrin/5  Right anterior tibial: 5/5  Left anterior tibial: 5/5  Right posterior tibial: 5/5  Left posterior tibial: 5/5  Right peroneal: 5/5  Left peroneal: 5/5  Right gastroc: 5/5  Left gastroc: 5/5     Sensory Exam   Right arm light touch: normal  Left arm light touch: normal  Right leg light touch: decreased in L5 and S1 distribution; worse in the L5 distribution on the right  Left leg light touch: decreased in L5 and S1 distribution; worse in the S1  distribution on the left     Gait, Coordination, and Reflexes      Gait  Gait: antalgic; ambulates with cane     Coordination   Romberg: mildly positive  Finger to nose coordination: normal  Heel to shin coordination: normal  Tandem walking coordination: normal     Tremor   Resting tremor: absent  Intention tremor: absent  Action tremor: absent     Reflexes   Right brachioradialis: 2+  Left brachioradialis: 2+  Right biceps: 2+  Left biceps: 2+  Right triceps: 2+  Left triceps: 2+  Right patellar: 2+  Left patellar: 1+  Right achilles: 0  Left achilles: 0  Right [place]: absent  Left [place]: absent  Right ankle clonus: absent  Left ankle clonus: absent  Right: plantar: normal  Left plantar: normal     Physical Exam  Constitutional: Oriented to person, place, and time. Appears well-developed and well-nourished.   HENT:   Head: Normocephalic and atraumatic.   Eyes: Pupils are equal, round, and reactive to light.   Neck: Normal range of motion. Neck supple.   Cardiovascular: Normal rate.    Pulmonary/Chest: Effort normal.   Musculoskeletal: Normal range of motion. Exhibits no edema.   Neurological: Alert and oriented to person, place, and time. Normal Finger-Nose-Finger Test, a normal Heel to Shin Test, a normal Romberg Test and a normal Tandem Gait Test. Gait normal.   Reflex Scores:       Tricep reflexes are 2+ on the right side and 2+ on the left side.       Bicep reflexes are 2+ on the right side and 2+ on the left side.       Brachioradialis reflexes are 2+ on the right side and 2+ on the left side.       Patellar reflexes are 2+ on the right side and 1+ on the left side.       Achilles reflexes are 0 on the right side and 0 on the left side.  Skin: Skin is warm, dry and intact.   Psychiatric: Normal mood and affect. Speech is normal and behavior is normal. Judgment and thought content normal.   Nursing note and vitals reviewed.    Provider dictation:  I reviewed the imaging.   See note from Dr. Hong.  "    "The patient is a 59 year old female who presents for neurosurgical consultation referred by Dr. Tavarez. Patient reports chronic back pain. Recently she reports worsening lower back pain and bilateral leg pain, left > right. The pain is in the posterior aspect of the leg and shoots into the plantar aspect of her feet. She underwent L5-S1 YUE with Dr. Tavarez in February and again in June. She reports some improvement in pain with the first YUE, but denies improvement following the second YUE. She also reports numbness/tingling in all toes of both feet. Patient has difficulty with balance over the past year and she reports 3-4 falls in the past 2 months. She also reports dropping objects from her hands. Denies neck pain. She also has noted a thoracic subcutaneous soft, nontender mass that has been growing over the years with more rapid growth recently."    Since last visit patient reports continued balance difficulty and recent fall when bending marshall to scoop dog food and fell forward. Patient states she has continued lower back pain that is worse with standing. Sitting provides some relief. She reports weakness/tiredness of both legs. She has bilateral foot pain/numbness and left sciatica pain that starts in the left buttock into the posterior leg and plantar aspect of her left foot. She will have occasional right posterior leg pain, but not as severe.     On exam there is resolution of her prior pain limited right ankle weakness. She has severe numbness in the right L5 dermatome and left S1 dermatome and moderate to severe in the right S1 and the left L5 dermatomes.    This is a very complex patient with most likely multiple causes of her symptoms. Her main complaint is axial low back pain and she would most likely require a multi-level fusion for this which is high risk in this patient. We will obtain an EMG/NCS of bilateral lower extremities to further evaluate the bilateral foot numbness. We will also order " physical therapy since patient has not completed a full course of treatment. We will also refer her to neurology for further work-up of gait instability. She has seen a neurologist on the [facility] in the past, but would like to establish care here. She will follow-up in 6 weeks.      1. Lumbar radiculopathy  EMG W/ ULTRASOUND AND NERVE CONDUCTION TEST 2 Extremities    Ambulatory referral/consult to Physical/Occupational Therapy   2. Impairment of balance  Ambulatory referral/consult to Neurology

## 2020-08-18 NOTE — PROGRESS NOTES
I have seen the patient, reviewed the Advanced Practice Provider's history and physical, assessment and plan. I have personally interviewed and examined the patient at bedside and interpreted the relevant imaging and lab work and I agree with the findings. I personally performed the documented services. See below for any additional comments.    MRI of the cervical spine is fairly unremarkable except for a thin dilatation of the central canal from C6-C7 without associated abnormal enhancement or mass lesion.  MRI of the thoracic spine is unremarkable.  There is no abnormal enhancement in the cervical, thoracic or lumbar spine.  Specifically, the left cystic L5 pedicle lesions do not enhance.  Signal characteristics of the lesions are consistent with fat.  On CT, the cystic lesions are surrounded by sclerotic bone and have a benign appearance.  There is significant heterotopic bone formation originating from the left L5 pedicle/inferior articular process and the left superior articular process of S1.  This is resulting in severe left L5-S1 lateral recess stenosis.  It appears that the left L5 nerve root is fully encased in a very narrow bony canal along its exiting course.    The patient continues to complain of severe axial low back pain worse with motion as well as bilateral foot pain and numbness and left posterior leg sciatica pain.  She continues to endorse imbalance and states that she has fallen merely by leaning forward and losing her balance.  She also recognizes that she has knee pathology and a recent right ankle injury which are contributing factors.  She states that her right ankle has been improving.    Exam is significant for resolution of her prior pain limited right ankle weakness.  She has severe numbness in the right L5 dermatome and left S1 dermatome and moderate to severe in the right S1 and the left L5 dermatomes.    Very complex patient with likely a multifactorial etiology of her symptoms.  The  severe facet arthropathy, disc degeneration, multilevel stenosis and exuberant left L5-S1 facet hypertrophy are likely factors but psoas her morbid obesity, physical deconditioning and knee pathology.  I do not suspect any malignancy in her L5 vertebra.  Rather, I suspect that these are all reactive changes.  Any surgical decompression would be high risk given the severely distorted anatomy and the heterotopic bone encasing the left L5 nerve.  While she is complaining of bilateral foot pain and left sciatica pain, her axial low back pain is the main complaint and therefore definitive treatment would most likely include a multilevel lumbosacral fusion, which would be a very high risk procedure in this patient.  I will order an EMG nerve conduction test of the lower extremities to better characterize the source of her bilateral foot numbness.  Will order physical therapy as she never truly completed a course of physical therapy for her lumbar spine issues.  Lastly, her significant complain of gait instability and her positive Romberg need to be further investigated.  There are no spinal cord issues on imaging that would account for true gait ataxia.  While this may be related to proprioceptive deficits from her lumbar spine disease or potentially peripheral neuropathy, other causes should be ruled out by Neurology and we will refer her to 1 of our local neurologists as she would like to establish care on the [place].  Follow-up in 6 weeks.

## 2020-08-27 ENCOUNTER — TELEPHONE (OUTPATIENT)
Dept: NEUROLOGY | Facility: CLINIC | Age: 60
End: 2020-08-27

## 2020-08-31 ENCOUNTER — PATIENT OUTREACH (OUTPATIENT)
Dept: OTHER | Facility: OTHER | Age: 60
End: 2020-08-31

## 2020-08-31 NOTE — PROGRESS NOTES
Digital Medicine: Clinician Follow-Up    Called patient to f/u. She is doing well in regard to BP. She reports she has been having back problems and fallen several times. She is currently seeing a neurologist. She has dealt with this in the past d/t hip replacement in 2008, but these issues have worsened over the past year and a half. She is also considering weight loss surgery if this can potentially help with the issues she is experiencing.     Patient recently saw outside PCP and had lab work done approx 3 weeks ago.     The history is provided by the patient.   Follow-up reason(s): routine follow up.     Hypertension    Patient readings are stable   Patient is not experiencing signs/symptoms of hypotension. Denies further episode of hypotension w/ symptoms.           Last 5 Patient Entered Readings                                      Current 30 Day Average: 120/75     Recent Readings 8/30/2020 8/11/2020 8/11/2020 8/11/2020 8/11/2020    SBP (mmHg) 116 122 126 127 137    DBP (mmHg) 79 60 78 90 81    Pulse 62 61 98 99 100               Depression Screening  Did not address depression screening.    Sleep Apnea Screening    Did not address sleep apnea screening.     Medication Affordability Screening  Did not address medication affordability screening.     Medication Adherence-Medication adherence was assessed.          ASSESSMENT(S)  Patients BP average is 120/75 mmHg, which is at goal. Patient's BP goal is less than or equal to 130/80 per 2017 ACC/AHA Hypertension Guidelines.    No HTN med changes at this time as patient is no longer experiencing hypotension.       Hypertension Plan  Continue current therapy. HCTZ as prescribed.   Continue current diet/physical activity routine. Low salt, portion control to promote weight loss.   I encouraged patient to make neuro appt and keep other upcoming appts to discover cause of weight issues. patient will fax me her recent lab results. F/u in 6 months.      Addressed any  questions or concerns and patient has my contact information if needed prior to next outreach. Patient verbalizes understanding.            There are no preventive care reminders to display for this patient.      Hypertension Medications             hydroCHLOROthiazide (HYDRODIURIL) 25 MG tablet Take 1 tablet (25 mg total) by mouth once daily.    lisinopril 10 MG tablet Take 1 tablet (10 mg total) by mouth once daily.    metoprolol tartrate (LOPRESSOR) 25 MG tablet Take 1 tablet (25 mg total) by mouth 2 (two) times daily.

## 2020-09-01 ENCOUNTER — TELEPHONE (OUTPATIENT)
Dept: NEUROSURGERY | Facility: CLINIC | Age: 60
End: 2020-09-01

## 2020-09-01 NOTE — TELEPHONE ENCOUNTER
Patient needs bilateral lower extremity EMG, orders in . Please call to schedule.    patient has a general referral to neurology, just next available for gait instability and lumbar radiculopathy.

## 2020-09-04 NOTE — PROGRESS NOTES
"Digital Medicine: Health  Follow-Up    The history is provided by the patient.             Reason for review: Blood pressure at goal        Topics Covered on Call: physical activity and Diet    Additional Follow-up details: Called patient for follow up. Average /78- remains controlled. She reports feeling well lately, denies hypotension symptoms.     Noted patient is interested in weight loss. She denies needing any resources or help with this. Encouraged her to reach out if anything comes up or has any questions.         Diet-no change to diet    No change to diet.  Patient reports eating or drinking the following: Patient states there have been no changes to her diet. She follows a low sodium diet.      Physical Activity-no change to routine  No change to exercise routine.       Additional physical activity details: Patient states she had an ankle injury ~2 months ago. She does try to do as much activity as her body allows and is "working herself up" to being active. Patient states she tries to get extra steps in throughout the day.       Medication Adherence-Medication adherence was assessed.      Substance, Sleep, Stress-Not assessed      Continue current diet/physical activity routine.       Addressed any questions or concerns and patient has my contact information if needed prior to next outreach. Patient verbalizes understanding.      Explained the importance of self-monitoring and medication adherence. Encouraged the patient to communicate with their health  for lifestyle modifications to help improve or maintain a healthy lifestyle.            There are no preventive care reminders to display for this patient.    Last 5 Patient Entered Readings                                      Current 30 Day Average: 123/78     Recent Readings 9/3/2020 8/30/2020 8/11/2020 8/11/2020 8/11/2020    SBP (mmHg) 141 116 122 126 127    DBP (mmHg) 86 79 60 78 90    Pulse 62 62 61 98 99               "

## 2020-09-08 ENCOUNTER — TELEPHONE (OUTPATIENT)
Dept: NEUROLOGY | Facility: CLINIC | Age: 60
End: 2020-09-08

## 2020-09-08 NOTE — TELEPHONE ENCOUNTER
----- Message from Rajendra Triplett sent at 9/8/2020  8:17 AM CDT -----  Pharmacy Calling    Reason for call: states note received from office said PA approved, but per pharmacy, PA is still not available    Pharmacy Name: [facility]    Prescription Name: CYMBALTA    Phone Number: [phone]    Additional Information: Pharmacy not able to confirm pt's address on file

## 2020-09-08 NOTE — TELEPHONE ENCOUNTER
Spoke with [employer] and they state that they only fill pt's cymbalta for 30 capsules for 15 days or she can [facility] for 45 days. LVM for pt to return call.

## 2020-09-11 ENCOUNTER — OFFICE VISIT (OUTPATIENT)
Dept: ORTHOPEDICS | Facility: CLINIC | Age: 60
End: 2020-09-11
Payer: COMMERCIAL

## 2020-09-11 VITALS — WEIGHT: 282.19 LBS | BODY MASS INDEX: 47.02 KG/M2 | HEIGHT: 65 IN

## 2020-09-11 DIAGNOSIS — M25.562 CHRONIC PAIN OF LEFT KNEE: Primary | ICD-10-CM

## 2020-09-11 DIAGNOSIS — M17.12 OSTEOARTHRITIS OF LEFT KNEE, UNSPECIFIED OSTEOARTHRITIS TYPE: ICD-10-CM

## 2020-09-11 DIAGNOSIS — E66.01 MORBID OBESITY WITH BMI OF 45.0-49.9, ADULT: ICD-10-CM

## 2020-09-11 DIAGNOSIS — G89.29 CHRONIC PAIN OF LEFT KNEE: Primary | ICD-10-CM

## 2020-09-11 PROCEDURE — 20610 LARGE JOINT ASPIRATION/INJECTION: L KNEE: ICD-10-PCS | Mod: LT,S$GLB,, | Performed by: ORTHOPAEDIC SURGERY

## 2020-09-11 PROCEDURE — 99214 OFFICE O/P EST MOD 30 MIN: CPT | Mod: 25,S$GLB,, | Performed by: ORTHOPAEDIC SURGERY

## 2020-09-11 PROCEDURE — 3008F PR BODY MASS INDEX (BMI) DOCUMENTED: ICD-10-PCS | Mod: CPTII,S$GLB,, | Performed by: ORTHOPAEDIC SURGERY

## 2020-09-11 PROCEDURE — 99999 PR PBB SHADOW E&M-EST. PATIENT-LVL III: CPT | Mod: PBBFAC,,, | Performed by: ORTHOPAEDIC SURGERY

## 2020-09-11 PROCEDURE — 3008F BODY MASS INDEX DOCD: CPT | Mod: CPTII,S$GLB,, | Performed by: ORTHOPAEDIC SURGERY

## 2020-09-11 PROCEDURE — 99214 PR OFFICE/OUTPT VISIT, EST, LEVL IV, 30-39 MIN: ICD-10-PCS | Mod: 25,S$GLB,, | Performed by: ORTHOPAEDIC SURGERY

## 2020-09-11 PROCEDURE — 20610 DRAIN/INJ JOINT/BURSA W/O US: CPT | Mod: LT,S$GLB,, | Performed by: ORTHOPAEDIC SURGERY

## 2020-09-11 PROCEDURE — 99999 PR PBB SHADOW E&M-EST. PATIENT-LVL III: ICD-10-PCS | Mod: PBBFAC,,, | Performed by: ORTHOPAEDIC SURGERY

## 2020-09-11 RX ORDER — TRIAMCINOLONE ACETONIDE 40 MG/ML
40 INJECTION, SUSPENSION INTRA-ARTICULAR; INTRAMUSCULAR
Status: DISCONTINUED | OUTPATIENT
Start: 2020-09-11 | End: 2020-09-11 | Stop reason: HOSPADM

## 2020-09-11 RX ADMIN — TRIAMCINOLONE ACETONIDE 40 MG: 40 INJECTION, SUSPENSION INTRA-ARTICULAR; INTRAMUSCULAR at 09:09

## 2020-09-11 NOTE — PROGRESS NOTES
59 years old complaining pain in left knee which has had now for few years time.  Seven the pain scale.  Walking makes it worse.  She reports that had injections in the past responded well.  Requesting injection today into her left knee     exam shows tenderness the joint line without signs infection or instability     x-rays show arthritic changes     assessment: Left knee arthrosis     plan: Kenalog injection to left knee, continue with strengthening weight loss, follow-up as needed    Further History  Aching pain  Worse with activity  Relieved with rest  No other associated symptoms  No other radiation    Further Exam  Alert and oriented  Pleasant  Contralateral limb has appropriate range of motion for age and condition  Contralateral limb has appropriate strength for age and condition  Contralateral limb has appropriate stability  for age and condition  No adenopathy  Pulses are appropriate for current condition  Skin is intact        Chief Complaint    Chief Complaint   Patient presents with    Left Knee - Pain       HPI  Fatemeh Shoemaker is a 59 y.o.  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 TOLU Bowers, PH     csection      CS x 2    CYSTOSCOPY N/A 9/3/2019    Procedure: CYSTOSCOPY;  Surgeon: Noemi Pierre MD;  Location: [facility];  Service: OB/GYN;  Laterality: N/A;    DILATION AND CURETTAGE OF UTERUS      EPIDURAL STEROID  INJECTION INTO LUMBAR SPINE N/A 5/21/2020    Procedure: Injection-steroid-epidural-lumbar L5/S1;  Surgeon: Gurjit Tavarez MD;  Location: [facility] OR;  Service: Pain Management;  Laterality: N/A;    EPIDURAL STEROID INJECTION INTO LUMBAR SPINE N/A 6/19/2020    Procedure: Injection-steroid-epidural-lumbar L5/S1;  Surgeon: Gurjit Tavarez MD;  Location: [facility] OR;  Service: Pain Management;  Laterality: N/A;    HIP PINNING      HYSTERECTOMY      LAPAROSCOPIC SALPINGO-OOPHORECTOMY Bilateral 9/3/2019    Procedure: SALPINGO-OOPHORECTOMY, LAPAROSCOPIC;  Surgeon: Noemi Pierre MD;  Location: [facility] OR;  Service: OB/GYN;  Laterality: Bilateral;  Dr. Bentley to assist. No resident needed.     LAPAROSCOPIC TOTAL HYSTERECTOMY N/A 9/3/2019    Procedure: HYSTERECTOMY, TOTAL, LAPAROSCOPIC;  Surgeon: Noemi Pierre MD;  Location: [facility] OR;  Service: OB/GYN;  Laterality: N/A;    NASAL SEPTUM SURGERY      OOPHORECTOMY      TOTAL HIP ARTHROPLASTY Right     TUBAL LIGATION  1997       Medications  Current Outpatient Medications   Medication Sig    apixaban (ELIQUIS) 5 mg Tab Take 1 tablet (5 mg total) by mouth 2 (two) times daily.    DULoxetine (CYMBALTA) 60 MG capsule Take 2 capsules (120 mg total) by mouth once daily.    ergocalciferol (VITAMIN D2) 50,000 unit Cap Take 50,000 Units by mouth every 7 days.    flecainide (TAMBOCOR) 100 MG Tab Take 1 tablet (100 mg total) by mouth every 12 (twelve) hours.    gabapentin (NEURONTIN) 600 MG tablet Take 1 tablet (600 mg total) by mouth 3 (three) times daily.    hydroCHLOROthiazide (HYDRODIURIL) 25 MG tablet Take 1 tablet (25 mg total) by mouth once daily.    lisinopril 10 MG tablet Take 1 tablet (10 mg total) by mouth once daily.    metoprolol tartrate (LOPRESSOR) 25 MG tablet Take 1 tablet (25 mg total) by mouth 2 (two) times daily.    nortriptyline (PAMELOR) 25 MG capsule Take 1 capsule (25 mg total) by mouth every evening.    omeprazole (PRILOSEC) 20 MG capsule Take 1  "capsule (20 mg total) by mouth once daily.    rosuvastatin (CRESTOR) 10 MG tablet Take 10 mg by mouth every evening.     tiZANidine (ZANAFLEX) 4 MG tablet Take 1 tablet (4 mg total) by mouth every 6 (six) hours as needed.    valACYclovir (VALTREX) 500 MG tablet Take 1 tablet (500 mg total) by mouth once daily.    estradioL (DIVIGEL) 1 mg/gram (0.1 %) topical gel Place 1 g onto the skin once daily.    loratadine (CLARITIN ORAL) Take by mouth as needed.     No current facility-administered medications for this visit.        Allergies  Review of patient's allergies indicates:   Allergen Reactions    Aspirin Other (See Comments)     "I don't take it because I've had ulcers"   ulcers  "I don't take it because I've had ulcers"     Meperidine Other (See Comments)     Felt like she was about to pass put after taking       Family History  Family History   Problem Relation Age of Onset    Colon cancer Maternal Grandmother 70        mets to ovary    Eclampsia Paternal Grandmother     Stroke Father 57    Colon cancer Father     Hypertension Father     Hypertension Mother     Hypertension Brother     No Known Problems Daughter     Stomach cancer Neg Hx     Esophageal cancer Neg Hx     Breast cancer Neg Hx     Miscarriages / Stillbirths Neg Hx     Ovarian cancer Neg Hx        Social History  Social History     Socioeconomic History    Marital status: Significant Other     Spouse name: Not on file    Number of children: Not on file    Years of education: Not on file    Highest education level: Not on file   Occupational History    Not on file   Social Needs    Financial resource strain: Somewhat hard    Food insecurity     Worry: Never true     Inability: Never true    Transportation needs     Medical: No     Non-medical: No   Tobacco Use    Smoking status: Never Smoker    Smokeless tobacco: Never Used   Substance and Sexual Activity    Alcohol use: No     Frequency: Never     Comment: never    Drug " use: No    Sexual activity: Yes     Partners: Male     Birth control/protection: See Surgical Hx     Comment: current partner over 30 years   Lifestyle    Physical activity     Days per week: 0 days     Minutes per session: Not on file    Stress: Not on file   Relationships    Social connections     Talks on phone: More than three times a week     Gets together: Twice a week     Attends Mosque service: More than 4 times per year     Active member of club or organization: No     Attends meetings of clubs or organizations: Never     Relationship status:    Other Topics Concern    Not on file   Social History Narrative    Not on file               Review of Systems     Constitutional: Negative    HENT: Negative  Eyes: Negative  Respiratory: Negative  Cardiovascular: Negative  Musculoskeletal: HPI  Skin: Negative  Neurological: Negative  Hematological: Negative  Endocrine: Negative                 Physical Exam    There were no vitals filed for this visit.  Body mass index is 46.96 kg/m².  Physical Examination:     General appearance -  well appearing, and in no distress  Mental status - awake  Neck - supple  Chest -  symmetric air entry  Heart - normal rate   Abdomen - soft      Assessment     1. Chronic pain of left knee    2. Osteoarthritis of left knee, unspecified osteoarthritis type          Plan

## 2020-09-11 NOTE — PROCEDURES
Large Joint Aspiration/Injection: L knee    Date/Time: 9/11/2020 9:15 AM  Performed by: Justice iGbbs MD  Authorized by: Justice Gibbs MD     Consent Done?:  Yes (Verbal)  Timeout: prior to procedure the correct patient, procedure, and site was verified    Prep: patient was prepped and draped in usual sterile fashion      Details:  Needle Size:  21 G  Approach:  Anterolateral  Location:  Knee  Site:  L knee  Medications:  40 mg triamcinolone acetonide 40 mg/mL  Patient tolerance:  Patient tolerated the procedure well with no immediate complications

## 2020-09-14 ENCOUNTER — TELEPHONE (OUTPATIENT)
Dept: NEUROLOGY | Facility: CLINIC | Age: 60
End: 2020-09-14

## 2020-09-14 NOTE — TELEPHONE ENCOUNTER
Spoke with pt regarding cymbalta rx. States that she would like to keep the rx like it is at the pharmacy by getting 30 caps for 15 days per her insurance. If she changes her mind, then she will notify pharmacy and us. No other c/o.

## 2020-09-18 ENCOUNTER — OFFICE VISIT (OUTPATIENT)
Dept: CARDIOLOGY | Facility: CLINIC | Age: 60
End: 2020-09-18
Payer: COMMERCIAL

## 2020-09-18 ENCOUNTER — PATIENT MESSAGE (OUTPATIENT)
Dept: CARDIOLOGY | Facility: CLINIC | Age: 60
End: 2020-09-18

## 2020-09-18 ENCOUNTER — PATIENT MESSAGE (OUTPATIENT)
Dept: ELECTROPHYSIOLOGY | Facility: CLINIC | Age: 60
End: 2020-09-18

## 2020-09-18 VITALS — HEIGHT: 65 IN | WEIGHT: 290.56 LBS | BODY MASS INDEX: 48.41 KG/M2

## 2020-09-18 DIAGNOSIS — I49.8 OTHER SPECIFIED CARDIAC ARRHYTHMIAS: Primary | ICD-10-CM

## 2020-09-18 DIAGNOSIS — I49.8 OTHER SPECIFIED CARDIAC ARRHYTHMIAS: ICD-10-CM

## 2020-09-18 DIAGNOSIS — I10 ESSENTIAL HYPERTENSION: ICD-10-CM

## 2020-09-18 DIAGNOSIS — I48.0 PAROXYSMAL ATRIAL FIBRILLATION: Primary | ICD-10-CM

## 2020-09-18 DIAGNOSIS — E66.01 MORBID OBESITY WITH BMI OF 45.0-49.9, ADULT: ICD-10-CM

## 2020-09-18 PROCEDURE — 93000 RHYTHM STRIP: ICD-10-PCS | Mod: S$GLB,,, | Performed by: INTERNAL MEDICINE

## 2020-09-18 PROCEDURE — 99999 PR PBB SHADOW E&M-EST. PATIENT-LVL III: CPT | Mod: PBBFAC,,, | Performed by: INTERNAL MEDICINE

## 2020-09-18 PROCEDURE — 99214 PR OFFICE/OUTPT VISIT, EST, LEVL IV, 30-39 MIN: ICD-10-PCS | Mod: S$GLB,,, | Performed by: INTERNAL MEDICINE

## 2020-09-18 PROCEDURE — 99999 PR PBB SHADOW E&M-EST. PATIENT-LVL III: ICD-10-PCS | Mod: PBBFAC,,, | Performed by: INTERNAL MEDICINE

## 2020-09-18 PROCEDURE — 99214 OFFICE O/P EST MOD 30 MIN: CPT | Mod: S$GLB,,, | Performed by: INTERNAL MEDICINE

## 2020-09-18 PROCEDURE — 93000 ELECTROCARDIOGRAM COMPLETE: CPT | Mod: S$GLB,,, | Performed by: INTERNAL MEDICINE

## 2020-09-18 PROCEDURE — 3008F PR BODY MASS INDEX (BMI) DOCUMENTED: ICD-10-PCS | Mod: CPTII,S$GLB,, | Performed by: INTERNAL MEDICINE

## 2020-09-18 PROCEDURE — 3008F BODY MASS INDEX DOCD: CPT | Mod: CPTII,S$GLB,, | Performed by: INTERNAL MEDICINE

## 2020-09-18 RX ORDER — OMEPRAZOLE 20 MG/1
20 CAPSULE, DELAYED RELEASE ORAL DAILY
Qty: 30 CAPSULE | Refills: 11 | Status: SHIPPED | OUTPATIENT
Start: 2020-09-18 | End: 2021-10-07 | Stop reason: SDUPTHER

## 2020-09-18 NOTE — PROGRESS NOTES
"Subjective:    Patient ID:  Fatemeh Shoemaker is a 59 y.o. female who presents for follow-up of Atrial Fibrillation      HPI    Prior Hx:  I had the pleasure of seeing Mrs. Shoemaker today in our electrophysiology clinic in follow-up for her atrial fibrillation. As you are aware she is a pleasant 59 year-old woman with hypertension, morbid obesity and prior bleeding esophageal ulcer. She was in her usual state of health until this past year. She noted intermittent palpitations/fluttering in her chest. Sometimes accompanied by chest discomfort. She reported she was visiting her parent's [address] [address] in [place], [place] and developed rapid palpitations and a nosebleed. She went to [facility]. She was noted to be in atrial fibrillation with rapid ventricular response (rate of 150bpm). She was observed and converted spontaneously to sinus rhythm. She was discharged with metoprolol increased metoprolol (100mg bid from 25 mg bid) and referred here for evaluation. She noted having these symptoms of palpitations 3-4 times a month. She noted sometimes she also can feel a "pounding" sensation in her neck. She has chronic shortness of breath with exertion that she attributes to being overweight. She has also noted recurrence of a center chest discomfort similar to her prior ulcer but denies any melena or bloody stools. An echocardiogram showed preserved LVEF and PET stress showed no ischemia.  She was started on flecainide.     Since starting flecainide she has had only rare, short episodes of palpitations.     Mrs. Shoemaker was seen by Libby Neff 1/2020 with only noting rare palpitations.     Interim Hx:   Mrs. Shoemaker presents today for follow-up. She is doing well. She reports occasional short episodes of palpitations that she believes is AF (longest episode 90 minutes). She is having knee issues and low back issues resulting in loss of balance. She is requesting referral to " bariatric surgery.    My interpretation of today's in clinic electrocardiogram is sinus rhythm at 51 bpm with narrow QRS.    Review of Systems   Constitution: Negative for fever.   HENT: Negative.  Negative for congestion and sore throat.    Eyes: Negative.  Negative for blurred vision and visual disturbance.   Cardiovascular: Positive for irregular heartbeat and palpitations. Negative for chest pain, dyspnea on exertion, near-syncope, orthopnea, paroxysmal nocturnal dyspnea and syncope.   Respiratory: Negative for cough and shortness of breath.    Hematologic/Lymphatic: Negative for bleeding problem. Does not bruise/bleed easily.   Skin: Negative.    Musculoskeletal: Positive for arthritis and joint pain.   Gastrointestinal: Negative for bloating, abdominal pain, hematochezia and melena.   Neurological: Positive for loss of balance. Negative for dizziness, focal weakness and weakness.        Objective:    Physical Exam   Constitutional: She is oriented to person, place, and time. She appears well-developed and well-nourished. No distress.   HENT:   Head: Normocephalic and atraumatic.   Eyes: Conjunctivae are normal. Right eye exhibits no discharge. Left eye exhibits no discharge.   Neck: Neck supple. No JVD present.   Cardiovascular: Normal rate, regular rhythm and normal heart sounds. Exam reveals no gallop and no friction rub.   No murmur heard.  Pulmonary/Chest: Effort normal and breath sounds normal. No respiratory distress. She has no wheezes. She has no rales.   Abdominal: Soft. Bowel sounds are normal. She exhibits no distension. There is no abdominal tenderness. There is no rebound.   Musculoskeletal:         General: No edema.   Neurological: She is oriented to person, place, and time.   Skin: Skin is warm and dry. She is not diaphoretic.   Psychiatric: She has a normal mood and affect. Her behavior is normal. Judgment and thought content normal.   Vitals reviewed.        Assessment:       1. Paroxysmal  atrial fibrillation    2. Essential hypertension    3. Morbid obesity with BMI of 45.0-49.9, adult    4. Other specified cardiac arrhythmias         Plan:       In summary, Mrs. Shoemaker is a pleasant 59 year-old woman with hypertension, morbid obesity and prior bleeding esophageal ulcer who presents for evaluation of symptomatic paroxysmal atrial fibrillation. She has had symptomatic benefit with flecainide/metoprolol with infrequent episodes of AF. She is on eliquis for stroke risk reduction. She understands bleeding and reversibility issues. Continue current plan. RTC in 6 months, sooner if needed. Will place referral to bariatric surgery as I feel she will have significant benefit with weight loss with respect to her AF and joint disease.    Thank you for allowing me to participate in the care of this patient. Please do not hesitate to call me with any questions or concerns.    Manyn Bojorquez MD, PhD  Cardiac Electrophysiology

## 2020-09-19 PROBLEM — I95.9 HYPOTENSION: Status: ACTIVE | Noted: 2020-09-19

## 2020-09-19 PROBLEM — R00.1 BRADYCARDIA: Status: ACTIVE | Noted: 2020-09-19

## 2020-09-20 ENCOUNTER — PATIENT MESSAGE (OUTPATIENT)
Dept: OTHER | Facility: OTHER | Age: 60
End: 2020-09-20

## 2020-09-21 DIAGNOSIS — I48.0 PAROXYSMAL ATRIAL FIBRILLATION: ICD-10-CM

## 2020-09-21 DIAGNOSIS — Z95.0 CARDIAC PACEMAKER IN SITU: Primary | ICD-10-CM

## 2020-09-22 ENCOUNTER — TELEPHONE (OUTPATIENT)
Dept: CARDIOLOGY | Facility: CLINIC | Age: 60
End: 2020-09-22

## 2020-09-22 ENCOUNTER — DOCUMENTATION ONLY (OUTPATIENT)
Dept: CARDIOLOGY | Facility: CLINIC | Age: 60
End: 2020-09-22

## 2020-09-22 NOTE — TELEPHONE ENCOUNTER
----- Message from Kamryn Myers sent at 9/22/2020  3:14 PM CDT -----  Regarding: Dr Katz put a pacemaker in pt yesterday  Contact: Fatemeh pt  Type: Needs Medical Advice  Who Called:  Fatemeh  Symptoms (please be specific):  pt is in AFIB  How long has patient had these symptoms:  just started  Best Call Back Number: adv'd pt to go to ER  Additional Information: BRIANDA

## 2020-09-23 ENCOUNTER — TELEPHONE (OUTPATIENT)
Dept: CARDIOLOGY | Facility: CLINIC | Age: 60
End: 2020-09-23

## 2020-09-23 NOTE — TELEPHONE ENCOUNTER
----- Message from Juliette Evans LPN sent at 9/23/2020  3:03 PM CDT -----  Regarding: FW: Pt Message    ----- Message -----  From: Lucia Okeefe  Sent: 9/23/2020  12:24 PM CDT  To: Jelly GASPAR Staff  Subject: Pt Message                                       Type: Needs Medical Advice  Who Called: Pt  Best Call Back Number: [phone]  Additional Information: patient is requesting a call back in regards to her Pacemaker after pacemaker was placed in she had to go to [facility]. Please and thank you

## 2020-09-23 NOTE — TELEPHONE ENCOUNTER
Spoke with pt and informed us that she went back to the ER after getting her pacemaker implanted.  They told her that it was controlled Afib.  Pt confirmed that she is currently complying with medication orders.  Informed pt that her Home monitor was ordered and should be arriving soon.  Instructed her to plug it in by her bed within 6 ft of where she sleeps.  Reviewed [place] with the pt, signs of infection, and ROM restrictions.  Pt verbalized understanding.  Let her know to call us with any questions and confirmed date and time of appt in the pacemaker clinic to check device and for wound check.

## 2020-09-24 ENCOUNTER — OFFICE VISIT (OUTPATIENT)
Dept: OPTOMETRY | Facility: CLINIC | Age: 60
End: 2020-09-24
Payer: COMMERCIAL

## 2020-09-24 DIAGNOSIS — H52.7 REFRACTIVE ERROR: Primary | ICD-10-CM

## 2020-09-24 DIAGNOSIS — H52.7 REFRACTIVE ERROR: ICD-10-CM

## 2020-09-24 DIAGNOSIS — H40.033 ANATOMICAL NARROW ANGLE, BILATERAL: ICD-10-CM

## 2020-09-24 DIAGNOSIS — H25.13 NUCLEAR SCLEROSIS OF BOTH EYES: Primary | ICD-10-CM

## 2020-09-24 PROCEDURE — 92004 PR EYE EXAM, NEW PATIENT,COMPREHESV: ICD-10-PCS | Mod: S$GLB,,, | Performed by: OPTOMETRIST

## 2020-09-24 PROCEDURE — 92015 DETERMINE REFRACTIVE STATE: CPT | Mod: S$GLB,,, | Performed by: OPTOMETRIST

## 2020-09-24 PROCEDURE — 99999 PR PBB SHADOW E&M-EST. PATIENT-LVL II: CPT | Mod: PBBFAC,,, | Performed by: OPTOMETRIST

## 2020-09-24 PROCEDURE — 99499 UNLISTED E&M SERVICE: CPT | Mod: S$GLB,,, | Performed by: OPTOMETRIST

## 2020-09-24 PROCEDURE — 92015 PR REFRACTION: ICD-10-PCS | Mod: S$GLB,,, | Performed by: OPTOMETRIST

## 2020-09-24 PROCEDURE — 99999 PR PBB SHADOW E&M-EST. PATIENT-LVL III: ICD-10-PCS | Mod: PBBFAC,,, | Performed by: OPTOMETRIST

## 2020-09-24 PROCEDURE — 92004 COMPRE OPH EXAM NEW PT 1/>: CPT | Mod: S$GLB,,, | Performed by: OPTOMETRIST

## 2020-09-24 PROCEDURE — 99999 PR PBB SHADOW E&M-EST. PATIENT-LVL III: CPT | Mod: PBBFAC,,, | Performed by: OPTOMETRIST

## 2020-09-24 PROCEDURE — 99999 PR PBB SHADOW E&M-EST. PATIENT-LVL II: ICD-10-PCS | Mod: PBBFAC,,, | Performed by: OPTOMETRIST

## 2020-09-24 PROCEDURE — 99499 NO LOS: ICD-10-PCS | Mod: S$GLB,,, | Performed by: OPTOMETRIST

## 2020-09-24 NOTE — PROGRESS NOTES
Assessment /Plan     For exam results, see Encounter Report.    Refractive error      1. No clfit done.

## 2020-09-24 NOTE — PROGRESS NOTES
HPI     Concerns About Ocular Health      Additional comments: Ocular health exam              Comments     DLE: x 2 yrs    Pt states not seeing well with present gls. Having more trouble at near   and intermediate va. No floaters or flashes. Had pace maker put in Monday   and went home Tuesday.           Last edited by Cheryle Quintana on 9/24/2020 10:48 AM. (History)            Assessment /Plan     For exam results, see Encounter Report.    Nuclear sclerosis of both eyes    Refractive error    Anatomical narrow angle, bilateral      1. Educated pt on presence of cataracts and effects on vision. No surgery at this time. Recheck in one year.  2. New Spec Rx given. Different lens options discussed with patient. RTC 1 year full exam.  3. IOP fine, monitor yearly with gonio.

## 2020-09-30 ENCOUNTER — CLINICAL SUPPORT (OUTPATIENT)
Dept: CARDIOLOGY | Facility: CLINIC | Age: 60
End: 2020-09-30
Payer: COMMERCIAL

## 2020-09-30 DIAGNOSIS — R00.1 SYMPTOMATIC BRADYCARDIA: ICD-10-CM

## 2020-09-30 DIAGNOSIS — Z95.0 CARDIAC PACEMAKER IN SITU: Primary | ICD-10-CM

## 2020-09-30 DIAGNOSIS — I48.0 PAROXYSMAL ATRIAL FIBRILLATION: ICD-10-CM

## 2020-09-30 DIAGNOSIS — Z95.0 CARDIAC PACEMAKER IN SITU: ICD-10-CM

## 2020-10-01 ENCOUNTER — TELEPHONE (OUTPATIENT)
Dept: CARDIOLOGY | Facility: CLINIC | Age: 60
End: 2020-10-01

## 2020-10-01 NOTE — TELEPHONE ENCOUNTER
----- Message from Jaden Andrade sent at 10/1/2020 11:43 AM CDT -----  Regarding: Pt [phone]  Would like to get medical advice.  Symptoms (please be specific):  at 11:15 /107 P 124 and a few minutes later 127/88 p 61 and at 11:30am 136/96  How long has patient had these symptoms:  Today     Comments:  Today she feels tired and fell asleep at work and she headache (level 6 or 7)

## 2020-10-01 NOTE — TELEPHONE ENCOUNTER
SPOKE W/ PT, SHE IS C/O INCREASED BP, FATIGUE, FALLING ASLEEP AT WORK, INCREASED HR - RESCHEDULED HER TO COME IN TOMORROW TO SEE DR. AMBROCIO

## 2020-10-02 ENCOUNTER — TELEPHONE (OUTPATIENT)
Dept: CARDIOLOGY | Facility: CLINIC | Age: 60
End: 2020-10-02

## 2020-10-02 ENCOUNTER — OFFICE VISIT (OUTPATIENT)
Dept: CARDIOLOGY | Facility: CLINIC | Age: 60
End: 2020-10-02
Payer: COMMERCIAL

## 2020-10-02 VITALS
HEART RATE: 60 BPM | WEIGHT: 293 LBS | BODY MASS INDEX: 48.82 KG/M2 | DIASTOLIC BLOOD PRESSURE: 84 MMHG | SYSTOLIC BLOOD PRESSURE: 148 MMHG | HEIGHT: 65 IN

## 2020-10-02 DIAGNOSIS — E66.01 MORBID OBESITY WITH BMI OF 45.0-49.9, ADULT: ICD-10-CM

## 2020-10-02 DIAGNOSIS — I10 ESSENTIAL HYPERTENSION: ICD-10-CM

## 2020-10-02 DIAGNOSIS — R00.1 SYMPTOMATIC BRADYCARDIA: ICD-10-CM

## 2020-10-02 DIAGNOSIS — I48.0 PAF (PAROXYSMAL ATRIAL FIBRILLATION): Primary | ICD-10-CM

## 2020-10-02 DIAGNOSIS — I49.9 CARDIAC ARRHYTHMIA, UNSPECIFIED CARDIAC ARRHYTHMIA TYPE: Primary | ICD-10-CM

## 2020-10-02 PROCEDURE — 99999 PR PBB SHADOW E&M-EST. PATIENT-LVL III: CPT | Mod: PBBFAC,,, | Performed by: INTERNAL MEDICINE

## 2020-10-02 PROCEDURE — 99214 OFFICE O/P EST MOD 30 MIN: CPT | Mod: S$GLB,,, | Performed by: INTERNAL MEDICINE

## 2020-10-02 PROCEDURE — 3077F PR MOST RECENT SYSTOLIC BLOOD PRESSURE >= 140 MM HG: ICD-10-PCS | Mod: CPTII,S$GLB,, | Performed by: INTERNAL MEDICINE

## 2020-10-02 PROCEDURE — 3079F DIAST BP 80-89 MM HG: CPT | Mod: CPTII,S$GLB,, | Performed by: INTERNAL MEDICINE

## 2020-10-02 PROCEDURE — 99999 PR PBB SHADOW E&M-EST. PATIENT-LVL III: ICD-10-PCS | Mod: PBBFAC,,, | Performed by: INTERNAL MEDICINE

## 2020-10-02 PROCEDURE — 99214 PR OFFICE/OUTPT VISIT, EST, LEVL IV, 30-39 MIN: ICD-10-PCS | Mod: S$GLB,,, | Performed by: INTERNAL MEDICINE

## 2020-10-02 PROCEDURE — 3079F PR MOST RECENT DIASTOLIC BLOOD PRESSURE 80-89 MM HG: ICD-10-PCS | Mod: CPTII,S$GLB,, | Performed by: INTERNAL MEDICINE

## 2020-10-02 PROCEDURE — 3008F PR BODY MASS INDEX (BMI) DOCUMENTED: ICD-10-PCS | Mod: CPTII,S$GLB,, | Performed by: INTERNAL MEDICINE

## 2020-10-02 PROCEDURE — 3008F BODY MASS INDEX DOCD: CPT | Mod: CPTII,S$GLB,, | Performed by: INTERNAL MEDICINE

## 2020-10-02 PROCEDURE — 3077F SYST BP >= 140 MM HG: CPT | Mod: CPTII,S$GLB,, | Performed by: INTERNAL MEDICINE

## 2020-10-02 NOTE — PROGRESS NOTES
Subjective:    Patient ID:  Fatemeh Shoemaker is a 59 y.o. female who presents for follow-up of PAF f/u, Fatigue, and Headache      HPI  Had PPM placed last week due to severe symptomatic bradycardia  She comes with extreme tiredness and fatigue despite getting PPM      Review of Systems   Constitution: Negative for decreased appetite, malaise/fatigue, weight gain and weight loss.   Cardiovascular: Negative for chest pain, dyspnea on exertion, leg swelling, palpitations and syncope.   Respiratory: Negative for cough and shortness of breath.    Gastrointestinal: Negative.    Neurological: Negative for weakness.   All other systems reviewed and are negative.       Objective:      Physical Exam   Constitutional: She is oriented to person, place, and time. She appears well-developed and well-nourished.   HENT:   Head: Normocephalic.   Eyes: Pupils are equal, round, and reactive to light.   Neck: Normal range of motion. Neck supple. No JVD present. Carotid bruit is not present. No thyromegaly present.   Cardiovascular: Normal rate, regular rhythm, normal heart sounds, intact distal pulses and normal pulses. PMI is not displaced. Exam reveals no gallop.   No murmur heard.  Pulmonary/Chest: Effort normal and breath sounds normal.   Abdominal: Soft. Normal appearance. She exhibits no mass. There is no hepatosplenomegaly. There is no abdominal tenderness.   Musculoskeletal: Normal range of motion.         General: No edema.   Neurological: She is alert and oriented to person, place, and time. She has normal strength and normal reflexes. No sensory deficit.   Skin: Skin is warm and intact.   Psychiatric: She has a normal mood and affect.   Nursing note and vitals reviewed.        Assessment:       1. PAF (paroxysmal atrial fibrillation)    2. Essential hypertension    3. Morbid obesity with BMI of 45.0-49.9, adult         Plan:   Sleep study  Continue all cardiac medications  Regular exercise program  Weight loss  Follow up  with dr Tee in 2-3 weeks  6 m f/u

## 2020-10-16 PROBLEM — Z95.0 PACEMAKER: Status: ACTIVE | Noted: 2020-10-16

## 2020-10-16 NOTE — PROGRESS NOTES
Subjective:    Patient ID:  Fatemeh Shoemaker is a 59 y.o. female who presents for follow-up of [facility] (PAF/former pt of Dr Bojorquez)      HPI 58 yo female with paroxysmal AF, bradycardia, Htn, PPM, morbid obesity, esophageal ulcer.  Has been managed in the past by Dr. Bojorquez. Seeing Dr. Reaves for primary cardiology. Switching EP providers because of location.    Background:  Has had paroxysmal AF, symptomatic, initially diagnosed in 2016. Has been on metoprolol and Flecainide 100 mg BID.  Has been referred for consideration of bariatric surgery.    Update:    Presented to [facility] with symptomatic bradycardia.   Echo 9/20/20 normal biventricular structure and function, CHRISTIAN 37.3  Dual chamber PPM implanted 9/21/20 (Dr. Reaves).  Returned to the hospital day of discharge with symptomatic AF. She is on lopressor 25 mg BID. This was not changed.  Has been referred for evaluation of sleep apnea.  Has been referred for evaluation of bariatric surgery.    AF burden is 3%, longest episode being 2 hours 26 minutes.    CHADSVASc is 2. On Eliquis 5 mg BID.      Review of Systems   Constitution: Positive for malaise/fatigue. Negative for fever.   HENT: Negative for congestion and sore throat.    Cardiovascular: Negative for chest pain, dyspnea on exertion, irregular heartbeat, leg swelling, near-syncope, orthopnea, palpitations, paroxysmal nocturnal dyspnea and syncope.   Respiratory: Negative for cough and shortness of breath.    Gastrointestinal: Negative for abdominal pain, constipation and diarrhea.   Neurological: Negative for dizziness, light-headedness and weakness.   Psychiatric/Behavioral: Negative for depression. The patient is not nervous/anxious.         Objective:    Physical Exam   Constitutional: She is oriented to person, place, and time. She appears well-developed and well-nourished.   Eyes: Conjunctivae are normal. No scleral icterus.   Neck: No JVD present. No tracheal deviation present.    Cardiovascular: Normal rate and regular rhythm. PMI is not displaced.   Pulmonary/Chest: Effort normal and breath sounds normal. No respiratory distress.   Abdominal: Soft. There is no hepatosplenomegaly. There is no abdominal tenderness.   Musculoskeletal:         General: No tenderness or edema.   Neurological: She is alert and oriented to person, place, and time.   Skin: Skin is warm and dry. No rash noted.   Psychiatric: She has a normal mood and affect. Her behavior is normal.         Assessment:       1. Paroxysmal atrial fibrillation    2. Symptomatic bradycardia    3. Essential hypertension    4. Morbid obesity with BMI of 45.0-49.9, adult    5. Ulcer of esophagus with bleeding    6. Pacemaker         Plan:           Strongly recommended she follow up with evaluation for sleep apnea and weight surgery.    Increase metoprolol to 50 mg BID.     If continues to have AF or has worsening dizziness, increase Flecainide to 150 mg BID.  F/u in 6 months.

## 2020-10-19 ENCOUNTER — OFFICE VISIT (OUTPATIENT)
Dept: CARDIOLOGY | Facility: CLINIC | Age: 60
End: 2020-10-19
Payer: COMMERCIAL

## 2020-10-19 ENCOUNTER — OFFICE VISIT (OUTPATIENT)
Dept: FAMILY MEDICINE | Facility: CLINIC | Age: 60
End: 2020-10-19
Payer: COMMERCIAL

## 2020-10-19 ENCOUNTER — HOSPITAL ENCOUNTER (OUTPATIENT)
Dept: RADIOLOGY | Facility: HOSPITAL | Age: 60
Discharge: HOME | End: 2020-10-19
Attending: PHYSICIAN ASSISTANT
Payer: COMMERCIAL

## 2020-10-19 ENCOUNTER — TELEPHONE (OUTPATIENT)
Dept: FAMILY MEDICINE | Facility: CLINIC | Age: 60
End: 2020-10-19

## 2020-10-19 ENCOUNTER — PATIENT MESSAGE (OUTPATIENT)
Dept: CARDIOLOGY | Facility: CLINIC | Age: 60
End: 2020-10-19

## 2020-10-19 VITALS
HEART RATE: 60 BPM | SYSTOLIC BLOOD PRESSURE: 145 MMHG | BODY MASS INDEX: 48.82 KG/M2 | HEIGHT: 65 IN | DIASTOLIC BLOOD PRESSURE: 80 MMHG | WEIGHT: 293 LBS

## 2020-10-19 VITALS
WEIGHT: 293 LBS | SYSTOLIC BLOOD PRESSURE: 132 MMHG | DIASTOLIC BLOOD PRESSURE: 82 MMHG | OXYGEN SATURATION: 98 % | HEART RATE: 60 BPM | BODY MASS INDEX: 49.12 KG/M2

## 2020-10-19 DIAGNOSIS — Z95.0 PACEMAKER: ICD-10-CM

## 2020-10-19 DIAGNOSIS — M47.816 LUMBAR SPONDYLOSIS: ICD-10-CM

## 2020-10-19 DIAGNOSIS — E78.5 HYPERLIPIDEMIA, UNSPECIFIED HYPERLIPIDEMIA TYPE: ICD-10-CM

## 2020-10-19 DIAGNOSIS — S89.92XA KNEE INJURY, LEFT, INITIAL ENCOUNTER: ICD-10-CM

## 2020-10-19 DIAGNOSIS — I48.0 PAROXYSMAL ATRIAL FIBRILLATION: Primary | ICD-10-CM

## 2020-10-19 DIAGNOSIS — Z00.00 PREVENTATIVE HEALTH CARE: ICD-10-CM

## 2020-10-19 DIAGNOSIS — M54.9 DORSALGIA, UNSPECIFIED: ICD-10-CM

## 2020-10-19 DIAGNOSIS — R00.1 SYMPTOMATIC BRADYCARDIA: ICD-10-CM

## 2020-10-19 DIAGNOSIS — K22.11 ULCER OF ESOPHAGUS WITH BLEEDING: ICD-10-CM

## 2020-10-19 DIAGNOSIS — E66.01 MORBID OBESITY WITH BMI OF 45.0-49.9, ADULT: ICD-10-CM

## 2020-10-19 DIAGNOSIS — G62.9 NEUROPATHY: ICD-10-CM

## 2020-10-19 DIAGNOSIS — I10 ESSENTIAL HYPERTENSION: ICD-10-CM
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
## 2020-10-19 DIAGNOSIS — T14.8XXA TRAUMATIC HEMATOMA: Primary | ICD-10-CM

## 2020-10-19 DIAGNOSIS — Z90.710 S/P LAPAROSCOPIC HYSTERECTOMY: ICD-10-CM

## 2020-10-19 DIAGNOSIS — L60.2 THICKENED NAILS: ICD-10-CM

## 2020-10-19 DIAGNOSIS — I48.0 PAROXYSMAL ATRIAL FIBRILLATION: ICD-10-CM

## 2020-10-19 DIAGNOSIS — T14.8XXA TRAUMATIC HEMATOMA: ICD-10-CM

## 2020-10-19 PROBLEM — R04.0 EPISTAXIS: Status: RESOLVED | Noted: 2017-01-30 | Resolved: 2020-10-19

## 2020-10-19 PROCEDURE — 3079F DIAST BP 80-89 MM HG: CPT | Mod: CPTII,S$GLB,, | Performed by: INTERNAL MEDICINE

## 2020-10-19 PROCEDURE — 3075F SYST BP GE 130 - 139MM HG: CPT | Mod: CPTII,S$GLB,, | Performed by: PHYSICIAN ASSISTANT

## 2020-10-19 PROCEDURE — 90471 FLU VACCINE (QUAD) GREATER THAN OR EQUAL TO 3YO PRESERVATIVE FREE IM: ICD-10-PCS | Mod: S$GLB,,, | Performed by: PHYSICIAN ASSISTANT

## 2020-10-19 PROCEDURE — 3077F PR MOST RECENT SYSTOLIC BLOOD PRESSURE >= 140 MM HG: ICD-10-PCS | Mod: CPTII,S$GLB,, | Performed by: INTERNAL MEDICINE

## 2020-10-19 PROCEDURE — 76857 US EXAM PELVIC LIMITED: CPT | Mod: TC,PO

## 2020-10-19 PROCEDURE — 76857 US EXAM PELVIC LIMITED: CPT | Mod: 26,,, | Performed by: RADIOLOGY

## 2020-10-19 PROCEDURE — 72100 XR LUMBAR SPINE AP AND LATERAL: ICD-10-PCS | Mod: 26,,, | Performed by: RADIOLOGY

## 2020-10-19 PROCEDURE — 3079F PR MOST RECENT DIASTOLIC BLOOD PRESSURE 80-89 MM HG: ICD-10-PCS | Mod: CPTII,S$GLB,, | Performed by: INTERNAL MEDICINE

## 2020-10-19 PROCEDURE — 72100 X-RAY EXAM L-S SPINE 2/3 VWS: CPT | Mod: TC,FY,PO

## 2020-10-19 PROCEDURE — 99204 PR OFFICE/OUTPT VISIT, NEW, LEVL IV, 45-59 MIN: ICD-10-PCS | Mod: 25,S$GLB,, | Performed by: PHYSICIAN ASSISTANT

## 2020-10-19 PROCEDURE — 3077F SYST BP >= 140 MM HG: CPT | Mod: CPTII,S$GLB,, | Performed by: INTERNAL MEDICINE

## 2020-10-19 PROCEDURE — 3079F DIAST BP 80-89 MM HG: CPT | Mod: CPTII,S$GLB,, | Performed by: PHYSICIAN ASSISTANT

## 2020-10-19 PROCEDURE — 99999 PR PBB SHADOW E&M-EST. PATIENT-LVL IV: CPT | Mod: PBBFAC,,, | Performed by: INTERNAL MEDICINE

## 2020-10-19 PROCEDURE — 73562 X-RAY EXAM OF KNEE 3: CPT | Mod: 26,LT,, | Performed by: RADIOLOGY

## 2020-10-19 PROCEDURE — 73562 XR KNEE 3 VIEW LEFT: ICD-10-PCS | Mod: 26,LT,, | Performed by: RADIOLOGY

## 2020-10-19 PROCEDURE — 3008F PR BODY MASS INDEX (BMI) DOCUMENTED: ICD-10-PCS | Mod: CPTII,S$GLB,, | Performed by: INTERNAL MEDICINE

## 2020-10-19 PROCEDURE — 73562 X-RAY EXAM OF KNEE 3: CPT | Mod: TC,FY,PO,LT

## 2020-10-19 PROCEDURE — 90471 IMMUNIZATION ADMIN: CPT | Mod: S$GLB,,, | Performed by: PHYSICIAN ASSISTANT

## 2020-10-19 PROCEDURE — 99999 PR PBB SHADOW E&M-EST. PATIENT-LVL IV: ICD-10-PCS | Mod: PBBFAC,,, | Performed by: INTERNAL MEDICINE

## 2020-10-19 PROCEDURE — 3008F BODY MASS INDEX DOCD: CPT | Mod: CPTII,S$GLB,, | Performed by: INTERNAL MEDICINE

## 2020-10-19 PROCEDURE — 99999 PR PBB SHADOW E&M-EST. PATIENT-LVL V: ICD-10-PCS | Mod: PBBFAC,,, | Performed by: PHYSICIAN ASSISTANT

## 2020-10-19 PROCEDURE — 3008F PR BODY MASS INDEX (BMI) DOCUMENTED: ICD-10-PCS | Mod: CPTII,S$GLB,, | Performed by: PHYSICIAN ASSISTANT

## 2020-10-19 PROCEDURE — 3075F PR MOST RECENT SYSTOLIC BLOOD PRESS GE 130-139MM HG: ICD-10-PCS | Mod: CPTII,S$GLB,, | Performed by: PHYSICIAN ASSISTANT

## 2020-10-19 PROCEDURE — 72220 X-RAY EXAM SACRUM TAILBONE: CPT | Mod: TC,FY,PO

## 2020-10-19 PROCEDURE — 3079F PR MOST RECENT DIASTOLIC BLOOD PRESSURE 80-89 MM HG: ICD-10-PCS | Mod: CPTII,S$GLB,, | Performed by: PHYSICIAN ASSISTANT

## 2020-10-19 PROCEDURE — 90686 IIV4 VACC NO PRSV 0.5 ML IM: CPT | Mod: S$GLB,,, | Performed by: PHYSICIAN ASSISTANT

## 2020-10-19 PROCEDURE — 99204 OFFICE O/P NEW MOD 45 MIN: CPT | Mod: 25,S$GLB,, | Performed by: PHYSICIAN ASSISTANT

## 2020-10-19 PROCEDURE — 99214 PR OFFICE/OUTPT VISIT, EST, LEVL IV, 30-39 MIN: ICD-10-PCS | Mod: S$GLB,,, | Performed by: INTERNAL MEDICINE

## 2020-10-19 PROCEDURE — 72100 X-RAY EXAM L-S SPINE 2/3 VWS: CPT | Mod: 26,,, | Performed by: RADIOLOGY

## 2020-10-19 PROCEDURE — 72220 X-RAY EXAM SACRUM TAILBONE: CPT | Mod: 26,,, | Performed by: RADIOLOGY

## 2020-10-19 PROCEDURE — 99999 PR PBB SHADOW E&M-EST. PATIENT-LVL V: CPT | Mod: PBBFAC,,, | Performed by: PHYSICIAN ASSISTANT

## 2020-10-19 PROCEDURE — 76857 US SOFT TISSUE BUTTOCKS: ICD-10-PCS | Mod: 26,,, | Performed by: RADIOLOGY

## 2020-10-19 PROCEDURE — 90686 FLU VACCINE (QUAD) GREATER THAN OR EQUAL TO 3YO PRESERVATIVE FREE IM: ICD-10-PCS | Mod: S$GLB,,, | Performed by: PHYSICIAN ASSISTANT

## 2020-10-19 PROCEDURE — 72220 XR SACRUM AND COCCYX: ICD-10-PCS | Mod: 26,,, | Performed by: RADIOLOGY

## 2020-10-19 PROCEDURE — 99214 OFFICE O/P EST MOD 30 MIN: CPT | Mod: S$GLB,,, | Performed by: INTERNAL MEDICINE

## 2020-10-19 PROCEDURE — 3008F BODY MASS INDEX DOCD: CPT | Mod: CPTII,S$GLB,, | Performed by: PHYSICIAN ASSISTANT

## 2020-10-19 RX ORDER — METOPROLOL TARTRATE 50 MG/1
50 TABLET ORAL 2 TIMES DAILY
Qty: 180 TABLET | Refills: 3 | Status: SHIPPED | OUTPATIENT
Start: 2020-10-19 | End: 2021-12-20 | Stop reason: SDUPTHER

## 2020-10-19 RX ORDER — ROSUVASTATIN CALCIUM 10 MG/1
10 TABLET, COATED ORAL NIGHTLY
Qty: 90 TABLET | Refills: 3 | Status: SHIPPED | OUTPATIENT
Start: 2020-10-19 | End: 2021-08-04 | Stop reason: SDUPTHER

## 2020-10-19 NOTE — PROGRESS NOTES
Subjective:       Patient ID: Fatemeh Shoemaker is a 59 y.o. female.    Chief Complaint: Fall (yesterday and friday ) and Back Pain    HPI     Pt is new to me, PCP     Pt is a 59 year old female with lumbar DDD, HTN, PAF with pacer, s/pTAH-BSO 2019, and esophageal ulcer hx. She presents today complaining of low back  pain on her right side after falling and hitting her tailbone on the toilet one week ago. She states that she has a hard lump and large bruise on her lower back due to the injury.  Yesterday,  she was getting out of bed in the middle of the night to use the bathroom. She lost her footing and fell onto her knees onto the carpet. Her TV fell and grazed her back before hitting the floor, but this was not painful. Since the fall, her left knee has been hurting her.     Pt. States that she has been falling more frequently lately. She does not lose consciousness and does not feel dizzy or lightheaded. She does have significant peripheral neuropathy and states she has an EMG scheduled that was ordered by neurology.     Also, pt states her BP has been more elevated than usual. She is on the digital HTN monitoring program.     Review of Systems   Constitutional: Negative for chills and fever.   HENT: Negative for nasal congestion, nosebleeds, postnasal drip, rhinorrhea, sinus pressure/congestion and sore throat.    Respiratory: Positive for shortness of breath (with exertion). Negative for cough, chest tightness and wheezing.    Cardiovascular: Positive for palpitations (can feel pacer). Negative for chest pain.   Gastrointestinal: Negative for abdominal pain, change in bowel habit, constipation, diarrhea, nausea, vomiting and change in bowel habit.   Genitourinary: Positive for frequency and nocturia. Negative for difficulty urinating, dysuria, flank pain, hematuria, urgency and vaginal bleeding.   Musculoskeletal: Positive for arthralgias (left knee pain).   Integumentary:  Negative for rash.   Neurological:  Positive for headaches (improving since pacer was placed). Negative for dizziness, vertigo, syncope, weakness and light-headedness.   Psychiatric/Behavioral: Negative for sleep disturbance. The patient is not nervous/anxious.          Objective:      Physical Exam  Vitals signs and nursing note reviewed.   Constitutional:       General: She is not in acute distress.     Appearance: Normal appearance. She is obese. She is not ill-appearing, toxic-appearing or diaphoretic.      Comments: With cane   HENT:      Head: Normocephalic and atraumatic.      Right Ear: External ear normal.      Left Ear: External ear normal.   Eyes:      General: No scleral icterus.        Right eye: No discharge.         Left eye: No discharge.      Extraocular Movements: Extraocular movements intact.      Conjunctiva/sclera: Conjunctivae normal.      Pupils: Pupils are equal, round, and reactive to light.   Neck:      Musculoskeletal: Normal range of motion and neck supple.   Cardiovascular:      Rate and Rhythm: Normal rate and regular rhythm.      Pulses: Normal pulses.           Dorsalis pedis pulses are 2+ on the right side and 2+ on the left side.      Heart sounds: Normal heart sounds. No murmur. No friction rub. No gallop.       Comments: Cap refill <2 bilateral LE  Pulmonary:      Effort: No respiratory distress.      Breath sounds: Normal breath sounds. No stridor. No wheezing, rhonchi or rales.   Abdominal:      General: Abdomen is flat. Bowel sounds are normal. There is no distension.      Palpations: Abdomen is soft. There is no mass.      Tenderness: There is no abdominal tenderness. There is no guarding or rebound.   Musculoskeletal:         General: No deformity or signs of injury.      Right hip: Normal. She exhibits normal range of motion, normal strength, no tenderness, no bony tenderness, no swelling, no crepitus, no deformity and no laceration.      Lumbar back: She exhibits tenderness, bony tenderness and pain. She  exhibits normal range of motion, no swelling, no edema, no deformity, no laceration, no spasm and normal pulse.        Back:       Right lower leg: No edema.      Left lower leg: No edema.   Lymphadenopathy:      Cervical: No cervical adenopathy.   Skin:     General: Skin is warm.      Capillary Refill: Capillary refill takes less than 2 seconds.      Coloration: Skin is not jaundiced or pale.      Findings: No lesion or rash.             Comments: Large ecchymosis at right buttock, firm to touch   Neurological:      General: No focal deficit present.      Mental Status: She is alert and oriented to person, place, and time. Mental status is at baseline.      Cranial Nerves: Cranial nerves are intact.      Sensory: Sensation is intact.      Motor: Motor function is intact. No weakness (of either LE).      Gait: Gait is intact.   Psychiatric:         Mood and Affect: Mood normal.         Behavior: Behavior normal.         Thought Content: Thought content normal.         Judgment: Judgment normal.             Assessment:       1. Traumatic hematoma    2. Fall, initial encounter    3. Dorsalgia, unspecified    4. Knee injury, left, initial encounter    5. Neuropathy    6. Lumbar spondylosis    7. Preventative health care    8. Hyperlipidemia, unspecified hyperlipidemia type    9. Essential hypertension    10. Paroxysmal atrial fibrillation    11. Pacemaker    12. S/P TLH/BSO, 9/3/19    13. Morbid obesity with BMI of 45.0-49.9, adult    14. Thickened nails        Plan:       1. Traumatic hematoma  - US Soft Tissue Buttocks; Future  -heat to affected area  -pt is on eliquis    2. Fall, initial encounter  - X-Ray Sacrum And Coccyx; Future    3. Dorsalgia, unspecified  - X-Ray Lumbar Spine AP And Lateral; Future    4. Knee injury, left, initial encounter  - X-Ray Knee 3 View Left; Future    5. Neuropathy  -continue gabapentin, follow through with EMG ordered by neurology, likely contributing strongly to fallls    6. Lumbar  spondylosis    7. Preventative health care  - Lipid Panel; Future  - CBC auto differential; Future  - Hemoglobin A1C; Future    8. Hyperlipidemia, unspecified hyperlipidemia type  - rosuvastatin (CRESTOR) 10 MG tablet; Take 1 tablet (10 mg total) by mouth every evening.  Dispense: 90 tablet; Refill: 3    9. Essential hypertension  -saw cardiology this AM, increased metoprolol dosage, will see if this improves home BP readings    10. Paroxysmal atrial fibrillation  -followed by cardiology, with pacer, on flecainide    11. Pacemaker    12. S/P TLH/BSO, 9/3/19    13. Morbid obesity with BMI of 45.0-49.9, adult  -deconditioning likely contributing to falls and ASHBY, cardiology referred to bariatric surgery  -sleep studies scheduled    14. Thickened nails  - Ambulatory referral/consult to Podiatry; Future    F/U 2 weeks.   F/U Dr. Wheat 3 months.        CARE GAPS:  Flu shot today.

## 2020-10-19 NOTE — PATIENT INSTRUCTIONS
A few reminders from today:    Schedule labs.   I refilled your crestor.   Schedule x rays and ultrasound.  Schedule podiatry.   F/U 2 weeks.       Follow up with me if needed.   Please go to ER/urgent care if after hours or symptoms persist/worsen.       Do not hesitate to get in touch with me should you have any further questions.     Thank you for trusting me with your care!  I wish you health and happiness.    -Crystal Burks PA-C

## 2020-10-22 ENCOUNTER — LAB VISIT (OUTPATIENT)
Dept: LAB | Facility: HOSPITAL | Age: 60
End: 2020-10-22
Attending: PHYSICIAN ASSISTANT
Payer: COMMERCIAL

## 2020-10-22 DIAGNOSIS — Z00.00 PREVENTATIVE HEALTH CARE: ICD-10-CM

## 2020-10-22 LAB
BASOPHILS # BLD AUTO: 0.04 K/UL (ref 0–0.2)
BASOPHILS NFR BLD: 0.5 % (ref 0–1.9)
CHOLEST SERPL-MCNC: 190 MG/DL (ref 120–199)
CHOLEST/HDLC SERPL: 5 {RATIO} (ref 2–5)
DIFFERENTIAL METHOD: ABNORMAL
EOSINOPHIL # BLD AUTO: 0.1 K/UL (ref 0–0.5)
EOSINOPHIL NFR BLD: 1.4 % (ref 0–8)
ERYTHROCYTE [DISTWIDTH] IN BLOOD BY AUTOMATED COUNT: 15.8 % (ref 11.5–14.5)
ESTIMATED AVG GLUCOSE: 123 MG/DL (ref 68–131)
HBA1C MFR BLD HPLC: 5.9 % (ref 4–5.6)
HCT VFR BLD AUTO: 30.3 % (ref 37–48.5)
HDLC SERPL-MCNC: 38 MG/DL (ref 40–75)
HDLC SERPL: 20 % (ref 20–50)
HGB BLD-MCNC: 8.7 G/DL (ref 12–16)
IMM GRANULOCYTES # BLD AUTO: 0.03 K/UL (ref 0–0.04)
IMM GRANULOCYTES NFR BLD AUTO: 0.4 % (ref 0–0.5)
LDLC SERPL CALC-MCNC: 98 MG/DL (ref 63–159)
LYMPHOCYTES # BLD AUTO: 2.9 K/UL (ref 1–4.8)
LYMPHOCYTES NFR BLD: 35.7 % (ref 18–48)
MCH RBC QN AUTO: 24.4 PG (ref 27–31)
MCHC RBC AUTO-ENTMCNC: 28.7 G/DL (ref 32–36)
MCV RBC AUTO: 85 FL (ref 82–98)
MONOCYTES # BLD AUTO: 0.6 K/UL (ref 0.3–1)
MONOCYTES NFR BLD: 7.5 % (ref 4–15)
NEUTROPHILS # BLD AUTO: 4.4 K/UL (ref 1.8–7.7)
NEUTROPHILS NFR BLD: 54.5 % (ref 38–73)
NONHDLC SERPL-MCNC: 152 MG/DL
NRBC BLD-RTO: 0 /100 WBC
PLATELET # BLD AUTO: 324 K/UL (ref 150–350)
PMV BLD AUTO: 10.7 FL (ref 9.2–12.9)
RBC # BLD AUTO: 3.56 M/UL (ref 4–5.4)
TRIGL SERPL-MCNC: 270 MG/DL (ref 30–150)
WBC # BLD AUTO: 8.02 K/UL (ref 3.9–12.7)

## 2020-10-22 PROCEDURE — 80061 LIPID PANEL: CPT

## 2020-10-22 PROCEDURE — 83036 HEMOGLOBIN GLYCOSYLATED A1C: CPT

## 2020-10-22 PROCEDURE — 36415 COLL VENOUS BLD VENIPUNCTURE: CPT | Mod: PO

## 2020-10-22 PROCEDURE — 85025 COMPLETE CBC W/AUTO DIFF WBC: CPT

## 2020-10-23 ENCOUNTER — TELEPHONE (OUTPATIENT)
Dept: FAMILY MEDICINE | Facility: CLINIC | Age: 60
End: 2020-10-23

## 2020-10-23 ENCOUNTER — LAB VISIT (OUTPATIENT)
Dept: LAB | Facility: HOSPITAL | Age: 60
End: 2020-10-23
Attending: PHYSICIAN ASSISTANT
Payer: COMMERCIAL

## 2020-10-23 DIAGNOSIS — D50.9 MICROCYTIC ANEMIA: ICD-10-CM

## 2020-10-23 DIAGNOSIS — D50.9 MICROCYTIC ANEMIA: Primary | ICD-10-CM

## 2020-10-23 LAB
FERRITIN SERPL-MCNC: 26 NG/ML (ref 20–300)
IRON SERPL-MCNC: 21 UG/DL (ref 30–160)
SATURATED IRON: 5 % (ref 20–50)
TOTAL IRON BINDING CAPACITY: 428 UG/DL (ref 250–450)
TRANSFERRIN SERPL-MCNC: 289 MG/DL (ref 200–375)

## 2020-10-23 PROCEDURE — 36415 COLL VENOUS BLD VENIPUNCTURE: CPT | Mod: PO

## 2020-10-23 PROCEDURE — 82607 VITAMIN B-12: CPT

## 2020-10-23 PROCEDURE — 83540 ASSAY OF IRON: CPT

## 2020-10-23 PROCEDURE — 82728 ASSAY OF FERRITIN: CPT

## 2020-10-23 PROCEDURE — 82746 ASSAY OF FOLIC ACID SERUM: CPT

## 2020-10-23 RX ORDER — FERROUS SULFATE 325(65) MG
325 TABLET ORAL 2 TIMES DAILY
Qty: 60 TABLET | Refills: 5 | Status: SHIPPED | OUTPATIENT
Start: 2020-10-23 | End: 2021-09-14

## 2020-10-24 LAB
FOLATE SERPL-MCNC: 6.7 NG/ML (ref 4–24)
VIT B12 SERPL-MCNC: 316 PG/ML (ref 210–950)

## 2020-10-26 ENCOUNTER — PATIENT MESSAGE (OUTPATIENT)
Dept: FAMILY MEDICINE | Facility: CLINIC | Age: 60
End: 2020-10-26

## 2020-10-26 DIAGNOSIS — D50.8 OTHER IRON DEFICIENCY ANEMIA: Primary | ICD-10-CM

## 2020-10-26 NOTE — TELEPHONE ENCOUNTER
Called pt with iron lab results. Start taking iron. She understands. Also needs to complete stool tests. She will do that this week.

## 2020-10-27 ENCOUNTER — PATIENT MESSAGE (OUTPATIENT)
Dept: FAMILY MEDICINE | Facility: CLINIC | Age: 60
End: 2020-10-27

## 2020-10-29 NOTE — PROGRESS NOTES
Subjective:       Patient ID: Fatemeh Shoemaker is a 59 y.o. female.    Chief Complaint: Follow-up (Lab work)    HPI     Pt is known to me, PCP soon to establish with Dr. Wheat.     Pt is a 59 year old female with lumbar DDD, HTN, PAF with pacer, s/pTAH-BSO 2019, and esophageal ulcer hx. She presents today for 2 week f/u regarding traumatic hematoma of right buttock, along with follow up to discuss iron deficiency anemia.  Her bruising has resolved, but hematoma is still palpable underneath skin. She does admit to fatigue, likely associated with iron deficiency. Denies dizziness, headaches, SOB, CP. She does have some SOB with significant exertion, but this is her baseline. She shows me her BP log from the digital HTN program, and most readings are elevated. She states she has been having episodes of intermittent palpitations where she believes she is in A-Fib.     Review of Systems   Constitutional: Positive for fatigue. Negative for chills and fever.   HENT: Negative for nasal congestion, nosebleeds, postnasal drip, rhinorrhea, sinus pressure/congestion, sore throat and trouble swallowing.    Respiratory: Negative for cough, choking, chest tightness, shortness of breath and wheezing.    Cardiovascular: Positive for palpitations. Negative for chest pain.   Gastrointestinal: Negative for blood in stool, change in bowel habit, constipation, diarrhea, vomiting, reflux and change in bowel habit.   Genitourinary: Negative for difficulty urinating, dysuria, frequency, hematuria and urgency.   Integumentary:  Negative for rash.   Neurological: Negative for dizziness, vertigo, weakness, light-headedness and headaches.   Psychiatric/Behavioral: Negative for sleep disturbance. The patient is not nervous/anxious.          Objective:      Physical Exam  Vitals signs and nursing note reviewed.   Constitutional:       General: She is not in acute distress.     Appearance: Normal appearance. She is obese. She is not  ill-appearing, toxic-appearing or diaphoretic.   HENT:      Head: Normocephalic and atraumatic.      Right Ear: External ear normal.      Left Ear: External ear normal.   Eyes:      General: No scleral icterus.        Right eye: No discharge.         Left eye: No discharge.      Extraocular Movements: Extraocular movements intact.      Conjunctiva/sclera: Conjunctivae normal.      Pupils: Pupils are equal, round, and reactive to light.   Neck:      Musculoskeletal: Normal range of motion and neck supple.   Cardiovascular:      Rate and Rhythm: Normal rate and regular rhythm.      Pulses: Normal pulses.      Heart sounds: Normal heart sounds. No murmur. No friction rub. No gallop.    Pulmonary:      Effort: Pulmonary effort is normal. No respiratory distress.      Breath sounds: Normal breath sounds. No stridor. No wheezing, rhonchi or rales.   Abdominal:      General: Abdomen is flat. Bowel sounds are normal. There is no distension.      Palpations: Abdomen is soft. There is no mass.      Tenderness: There is no abdominal tenderness. There is no guarding or rebound.   Musculoskeletal:         General: No deformity or signs of injury.      Right lower leg: No edema.      Left lower leg: No edema.   Lymphadenopathy:      Cervical: No cervical adenopathy.   Skin:     General: Skin is warm.      Coloration: Skin is not jaundiced or pale.      Findings: No lesion or rash.             Comments: Hematoma palpable at superior aspect of right buttock. No tenderness to palpation.     Small amount of residual ecchymosis settling at bottom of right buttock   Neurological:      General: No focal deficit present.      Mental Status: She is alert and oriented to person, place, and time. Mental status is at baseline.   Psychiatric:         Mood and Affect: Mood normal.         Behavior: Behavior normal.         Thought Content: Thought content normal.         Judgment: Judgment normal.         Assessment:       1. Traumatic  hematoma    2. Essential hypertension    3. Paroxysmal atrial fibrillation    4. Pacemaker    5. Preventative health care    6. Thickened nails        Plan:       1. Traumatic hematoma  -healing as expected    2. Essential hypertension  - lisinopriL 10 MG tablet; Take 1 tablet (10 mg total) by mouth once daily.  Dispense: 90 tablet; Refill: 3    3. Paroxysmal atrial fibrillation  4. Pacemaker  -continue metoprolol and flecainide  -Pt will contact Dr. Tee regarding flecainade dosing. I reviewed his note and he stated he would like her to let him know if still experiencing A-fib and he will adjust dosage.     5. Preventative health care  - (In Office Administered) Td Vaccine    6. Thickened nails  - Ambulatory referral/consult to Podiatry; Future    7. Iron deficiency anemia  -continue iron supplementation with daily fiber  -given occult blood x3 today    F/U 1 month       CARE GAPS:  Td today

## 2020-11-02 ENCOUNTER — OFFICE VISIT (OUTPATIENT)
Dept: FAMILY MEDICINE | Facility: CLINIC | Age: 60
End: 2020-11-02
Payer: COMMERCIAL

## 2020-11-02 VITALS
SYSTOLIC BLOOD PRESSURE: 140 MMHG | WEIGHT: 293 LBS | HEART RATE: 84 BPM | OXYGEN SATURATION: 98 % | DIASTOLIC BLOOD PRESSURE: 88 MMHG | BODY MASS INDEX: 48.79 KG/M2

## 2020-11-02 DIAGNOSIS — T14.8XXA TRAUMATIC HEMATOMA: Primary | ICD-10-CM

## 2020-11-02 DIAGNOSIS — I48.0 PAROXYSMAL ATRIAL FIBRILLATION: ICD-10-CM

## 2020-11-02 DIAGNOSIS — Z95.0 PACEMAKER: ICD-10-CM

## 2020-11-02 DIAGNOSIS — Z00.00 PREVENTATIVE HEALTH CARE: ICD-10-CM

## 2020-11-02 DIAGNOSIS — L60.2 THICKENED NAILS: ICD-10-CM

## 2020-11-02 DIAGNOSIS — I10 ESSENTIAL HYPERTENSION: ICD-10-CM

## 2020-11-02 DIAGNOSIS — D50.8 OTHER IRON DEFICIENCY ANEMIA: ICD-10-CM

## 2020-11-02 PROCEDURE — 99214 PR OFFICE/OUTPT VISIT, EST, LEVL IV, 30-39 MIN: ICD-10-PCS | Mod: 25,S$GLB,, | Performed by: PHYSICIAN ASSISTANT

## 2020-11-02 PROCEDURE — 90471 IMMUNIZATION ADMIN: CPT | Mod: S$GLB,,, | Performed by: PHYSICIAN ASSISTANT

## 2020-11-02 PROCEDURE — 99999 PR PBB SHADOW E&M-EST. PATIENT-LVL V: CPT | Mod: PBBFAC,,, | Performed by: PHYSICIAN ASSISTANT

## 2020-11-02 PROCEDURE — 90471 TD VACCINE GREATER THAN OR EQUAL TO 7YO WITH PRESERVATIVE IM: ICD-10-PCS | Mod: S$GLB,,, | Performed by: PHYSICIAN ASSISTANT

## 2020-11-02 PROCEDURE — 3077F PR MOST RECENT SYSTOLIC BLOOD PRESSURE >= 140 MM HG: ICD-10-PCS | Mod: CPTII,S$GLB,, | Performed by: PHYSICIAN ASSISTANT

## 2020-11-02 PROCEDURE — 90714 TD VACCINE GREATER THAN OR EQUAL TO 7YO WITH PRESERVATIVE IM: ICD-10-PCS | Mod: S$GLB,,, | Performed by: PHYSICIAN ASSISTANT

## 2020-11-02 PROCEDURE — 3008F BODY MASS INDEX DOCD: CPT | Mod: CPTII,S$GLB,, | Performed by: PHYSICIAN ASSISTANT

## 2020-11-02 PROCEDURE — 99999 PR PBB SHADOW E&M-EST. PATIENT-LVL V: ICD-10-PCS | Mod: PBBFAC,,, | Performed by: PHYSICIAN ASSISTANT

## 2020-11-02 PROCEDURE — 3079F PR MOST RECENT DIASTOLIC BLOOD PRESSURE 80-89 MM HG: ICD-10-PCS | Mod: CPTII,S$GLB,, | Performed by: PHYSICIAN ASSISTANT

## 2020-11-02 PROCEDURE — 99214 OFFICE O/P EST MOD 30 MIN: CPT | Mod: 25,S$GLB,, | Performed by: PHYSICIAN ASSISTANT

## 2020-11-02 PROCEDURE — 3008F PR BODY MASS INDEX (BMI) DOCUMENTED: ICD-10-PCS | Mod: CPTII,S$GLB,, | Performed by: PHYSICIAN ASSISTANT

## 2020-11-02 PROCEDURE — 90714 TD VACC NO PRESV 7 YRS+ IM: CPT | Mod: S$GLB,,, | Performed by: PHYSICIAN ASSISTANT

## 2020-11-02 PROCEDURE — 3077F SYST BP >= 140 MM HG: CPT | Mod: CPTII,S$GLB,, | Performed by: PHYSICIAN ASSISTANT

## 2020-11-02 PROCEDURE — 3079F DIAST BP 80-89 MM HG: CPT | Mod: CPTII,S$GLB,, | Performed by: PHYSICIAN ASSISTANT

## 2020-11-02 RX ORDER — LISINOPRIL 10 MG/1
10 TABLET ORAL DAILY
Qty: 90 TABLET | Refills: 3 | Status: SHIPPED | OUTPATIENT
Start: 2020-11-02 | End: 2021-10-29 | Stop reason: SDUPTHER

## 2020-11-02 NOTE — PATIENT INSTRUCTIONS
A few reminders from today:    Perform stool tests.   Get in touch with Dr. Tee's office.   Schedule podiatry appointment.   Repeat labs 11/27.   Continue stool softener or daily fiber (metamucil) with iron.   Monitor BP and send me results.     Follow up with me if needed.   Please go to ER/urgent care if after hours or symptoms persist/worsen.       Do not hesitate to get in touch with me should you have any further questions.     Thank you for trusting me with your care!  I wish you health and happiness.    -Crystal Burks PA-C

## 2020-11-04 ENCOUNTER — PATIENT MESSAGE (OUTPATIENT)
Dept: CARDIOLOGY | Facility: CLINIC | Age: 60
End: 2020-11-04

## 2020-11-04 DIAGNOSIS — I48.0 PAROXYSMAL ATRIAL FIBRILLATION: ICD-10-CM

## 2020-11-04 RX ORDER — FLECAINIDE ACETATE 150 MG/1
150 TABLET ORAL EVERY 12 HOURS
Qty: 60 TABLET | Refills: 11 | Status: SHIPPED | OUTPATIENT
Start: 2020-11-04 | End: 2021-11-05 | Stop reason: SDUPTHER

## 2020-11-08 ENCOUNTER — PATIENT MESSAGE (OUTPATIENT)
Dept: FAMILY MEDICINE | Facility: CLINIC | Age: 60
End: 2020-11-08

## 2020-11-12 ENCOUNTER — OFFICE VISIT (OUTPATIENT)
Dept: FAMILY MEDICINE | Facility: CLINIC | Age: 60
End: 2020-11-12
Payer: COMMERCIAL

## 2020-11-12 ENCOUNTER — TELEPHONE (OUTPATIENT)
Dept: FAMILY MEDICINE | Facility: CLINIC | Age: 60
End: 2020-11-12

## 2020-11-12 ENCOUNTER — PATIENT MESSAGE (OUTPATIENT)
Dept: FAMILY MEDICINE | Facility: CLINIC | Age: 60
End: 2020-11-12

## 2020-11-12 ENCOUNTER — LAB VISIT (OUTPATIENT)
Dept: LAB | Facility: HOSPITAL | Age: 60
End: 2020-11-12
Attending: OPHTHALMOLOGY
Payer: COMMERCIAL

## 2020-11-12 DIAGNOSIS — J02.9 SORE THROAT: ICD-10-CM

## 2020-11-12 DIAGNOSIS — R05.9 COUGH: ICD-10-CM

## 2020-11-12 DIAGNOSIS — J20.8 ACUTE BRONCHITIS DUE TO OTHER SPECIFIED ORGANISMS: Primary | ICD-10-CM

## 2020-11-12 DIAGNOSIS — D50.9 MICROCYTIC ANEMIA: ICD-10-CM

## 2020-11-12 DIAGNOSIS — Z20.822 SUSPECTED COVID-19 VIRUS INFECTION: ICD-10-CM

## 2020-11-12 LAB
CTP QC/QA: YES
S PYO RRNA THROAT QL PROBE: NEGATIVE

## 2020-11-12 PROCEDURE — 87081 CULTURE SCREEN ONLY: CPT

## 2020-11-12 PROCEDURE — 87880 POCT RAPID STREP A: ICD-10-PCS | Mod: QW,,, | Performed by: PHYSICIAN ASSISTANT

## 2020-11-12 PROCEDURE — 99214 PR OFFICE/OUTPT VISIT, EST, LEVL IV, 30-39 MIN: ICD-10-PCS | Mod: 95,,, | Performed by: PHYSICIAN ASSISTANT

## 2020-11-12 PROCEDURE — U0003 INFECTIOUS AGENT DETECTION BY NUCLEIC ACID (DNA OR RNA); SEVERE ACUTE RESPIRATORY SYNDROME CORONAVIRUS 2 (SARS-COV-2) (CORONAVIRUS DISEASE [COVID-19]), AMPLIFIED PROBE TECHNIQUE, MAKING USE OF HIGH THROUGHPUT TECHNOLOGIES AS DESCRIBED BY CMS-2020-01-R: HCPCS

## 2020-11-12 PROCEDURE — 87880 STREP A ASSAY W/OPTIC: CPT | Mod: QW,,, | Performed by: PHYSICIAN ASSISTANT

## 2020-11-12 PROCEDURE — 82272 OCCULT BLD FECES 1-3 TESTS: CPT | Mod: 91

## 2020-11-12 PROCEDURE — 99214 OFFICE O/P EST MOD 30 MIN: CPT | Mod: 95,,, | Performed by: PHYSICIAN ASSISTANT

## 2020-11-12 RX ORDER — ALBUTEROL SULFATE 90 UG/1
2 AEROSOL, METERED RESPIRATORY (INHALATION) EVERY 6 HOURS PRN
Qty: 18 G | Refills: 0 | Status: SHIPPED | OUTPATIENT
Start: 2020-11-12 | End: 2020-12-02 | Stop reason: SDUPTHER

## 2020-11-12 RX ORDER — FLUTICASONE PROPIONATE 50 MCG
2 SPRAY, SUSPENSION (ML) NASAL DAILY
Qty: 18.2 ML | Refills: 0 | Status: SHIPPED | OUTPATIENT
Start: 2020-11-12 | End: 2020-11-22

## 2020-11-12 RX ORDER — LORATADINE 10 MG/1
10 TABLET ORAL DAILY
Qty: 15 TABLET | Refills: 0 | Status: SHIPPED | OUTPATIENT
Start: 2020-11-12 | End: 2020-12-09 | Stop reason: ALTCHOICE

## 2020-11-12 RX ORDER — METHYLPREDNISOLONE 4 MG/1
TABLET ORAL
Qty: 1 PACKAGE | Refills: 0 | Status: SHIPPED | OUTPATIENT
Start: 2020-11-12 | End: 2020-12-09 | Stop reason: ALTCHOICE

## 2020-11-12 NOTE — PROGRESS NOTES
Subjective:       Patient ID: Fatemeh Shoemaker is a 59 y.o. female.      VIRTUAL TELENOTE    Start time: 11:40am  Chief complaint: sore throat  The patient location is: her home  The patient arrived at: 11:40am  Present with the patient at the time of the telemed/virtual assessment: nobody  End time:  12:00pm  Total time spent with patient: 20 min    The attending portion of this evaluation, treatment, and documentation was performed per Crystal Burks PA-C via audiovisual visit.     Chief Complaint: sore throat    HPI     Pt is known to me, PCP Dr. Wheat.     Pt is a 59 year old female wih lumbar spondylosis, HTN, and pacemaker. She presents today complaining of a sore throat for the last few days. Her mom was visiting this weekend and her mom did have bronchitis. She states her throat last night felt like it was inflamed, less so today. No possible new exposures to consider an allergic reaction. She is moving air fine. She admits to a dry, very bothersome cough with some wheezing. No fever, chills, diarrhea, nausea.     Review of Systems   Constitutional: Negative for chills and fever.   HENT: Positive for nasal congestion, postnasal drip, rhinorrhea, sore throat and trouble swallowing. Negative for drooling, ear discharge, ear pain and sinus pressure/congestion.    Respiratory: Positive for cough (dry), shortness of breath (not gasping, just winded) and wheezing. Negative for chest tightness and stridor.    Cardiovascular: Negative for chest pain and palpitations.   Gastrointestinal: Negative for abdominal pain, constipation, diarrhea, nausea and vomiting.   Genitourinary: Negative for difficulty urinating, dysuria, flank pain, frequency and urgency.   Musculoskeletal: Negative for myalgias and neck pain.   Integumentary:  Negative for rash.   Neurological: Positive for headaches (mild headache). Negative for dizziness, tremors, syncope, weakness and light-headedness.         Objective:      Physical  Exam  Constitutional:       General: She is not in acute distress.     Appearance: Normal appearance. She is not ill-appearing, toxic-appearing or diaphoretic.   HENT:      Head: Normocephalic and atraumatic.   Pulmonary:      Effort: No respiratory distress.   Neurological:      General: No focal deficit present.      Mental Status: She is alert and oriented to person, place, and time.   Psychiatric:         Mood and Affect: Mood normal.         Behavior: Behavior normal.         Thought Content: Thought content normal.         Judgment: Judgment normal.       Full exam was not able to be performed today, as this was a virtual visit.         Assessment:       1. Acute bronchitis due to other specified organisms    2. Suspected COVID-19 virus infection    3. Sore throat        Plan:       1. Acute bronchitis due to other specified organisms  - albuterol (VENTOLIN HFA) 90 mcg/actuation inhaler; Inhale 2 puffs into the lungs every 6 (six) hours as needed for Wheezing. Rescue  Dispense: 18 g; Refill: 0  - fluticasone propionate (FLONASE) 50 mcg/actuation nasal spray; 2 sprays (100 mcg total) by Each Nostril route once daily. for 10 days  Dispense: 18.2 mL; Refill: 0  - loratadine (CLARITIN) 10 mg tablet; Take 1 tablet (10 mg total) by mouth once daily. for 15 days  Dispense: 15 tablet; Refill: 0  - methylPREDNISolone (MEDROL DOSEPACK) 4 mg tablet; use as directed  Dispense: 1 Package; Refill: 0    2. Suspected COVID-19 virus infection  -isolate until results return  -ER precautions given    3. Sore throat  - Group A Strep, Molecular  - COVID-19 Routine Screening; Future    F/U pending results.     CARE GAPS:  Not addressed today.

## 2020-11-12 NOTE — PATIENT INSTRUCTIONS
A few reminders from today:    Start medication- flonase, claritin, medrol dosepak, and albuterol.   Hydrate, eat healthy, take multivitamin.   Isolate until covid results return.       Follow up with me if needed.   Please go to ER/urgent care if after hours or symptoms persist/worsen.       Do not hesitate to get in touch with me should you have any further questions.     Thank you for trusting me with your care!  I wish you health and happiness.    -Crystal Burks PA-C
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
## 2020-11-13 ENCOUNTER — PATIENT MESSAGE (OUTPATIENT)
Dept: FAMILY MEDICINE | Facility: CLINIC | Age: 60
End: 2020-11-13

## 2020-11-13 ENCOUNTER — TELEPHONE (OUTPATIENT)
Dept: NEUROLOGY | Facility: CLINIC | Age: 60
End: 2020-11-13

## 2020-11-13 DIAGNOSIS — R19.5 POSITIVE FECAL OCCULT BLOOD TEST: Primary | ICD-10-CM

## 2020-11-13 LAB
OB PNL STL: POSITIVE
SARS-COV-2 RNA RESP QL NAA+PROBE: NOT DETECTED

## 2020-11-13 NOTE — TELEPHONE ENCOUNTER
Spoke with cortes with [facility] to authorize 1 refill on nortriptyline hcl 25mg caps; disp#90.
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
## 2020-11-14 LAB — BACTERIA THROAT CULT: NORMAL

## 2020-11-15 ENCOUNTER — PATIENT MESSAGE (OUTPATIENT)
Dept: OTHER | Facility: OTHER | Age: 60
End: 2020-11-15

## 2020-11-16 ENCOUNTER — TELEPHONE (OUTPATIENT)
Dept: GASTROENTEROLOGY | Facility: CLINIC | Age: 60
End: 2020-11-16
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
## 2020-11-18 ENCOUNTER — OFFICE VISIT (OUTPATIENT)
Dept: FAMILY MEDICINE | Facility: CLINIC | Age: 60
End: 2020-11-18
Payer: COMMERCIAL

## 2020-11-18 ENCOUNTER — TELEPHONE (OUTPATIENT)
Dept: FAMILY MEDICINE | Facility: CLINIC | Age: 60
End: 2020-11-18

## 2020-11-18 ENCOUNTER — OFFICE VISIT (OUTPATIENT)
Dept: PODIATRY | Facility: CLINIC | Age: 60
End: 2020-11-18
Payer: COMMERCIAL

## 2020-11-18 VITALS — WEIGHT: 293 LBS | BODY MASS INDEX: 48.82 KG/M2 | HEIGHT: 65 IN

## 2020-11-18 VITALS
OXYGEN SATURATION: 97 % | TEMPERATURE: 99 F | SYSTOLIC BLOOD PRESSURE: 140 MMHG | DIASTOLIC BLOOD PRESSURE: 88 MMHG | HEART RATE: 64 BPM

## 2020-11-18 DIAGNOSIS — J40 BRONCHITIS: ICD-10-CM

## 2020-11-18 DIAGNOSIS — R06.09 DYSPNEA ON EXERTION: ICD-10-CM

## 2020-11-18 DIAGNOSIS — M25.571 RIGHT ANKLE PAIN, UNSPECIFIED CHRONICITY: ICD-10-CM
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
## 2020-11-18 DIAGNOSIS — G60.9 IDIOPATHIC PERIPHERAL NEUROPATHY: Primary | ICD-10-CM

## 2020-11-18 DIAGNOSIS — B35.1 ONYCHOMYCOSIS DUE TO DERMATOPHYTE: ICD-10-CM

## 2020-11-18 DIAGNOSIS — Z95.0 PACEMAKER: ICD-10-CM

## 2020-11-18 DIAGNOSIS — L60.2 THICKENED NAILS: ICD-10-CM

## 2020-11-18 PROCEDURE — 99202 PR OFFICE/OUTPT VISIT, NEW, LEVL II, 15-29 MIN: ICD-10-PCS | Mod: S$GLB,,, | Performed by: PODIATRIST

## 2020-11-18 PROCEDURE — 3077F PR MOST RECENT SYSTOLIC BLOOD PRESSURE >= 140 MM HG: ICD-10-PCS | Mod: CPTII,S$GLB,, | Performed by: PODIATRIST

## 2020-11-18 PROCEDURE — 99999 PR PBB SHADOW E&M-EST. PATIENT-LVL IV: CPT | Mod: PBBFAC,,, | Performed by: PODIATRIST

## 2020-11-18 PROCEDURE — 99999 PR PBB SHADOW E&M-EST. PATIENT-LVL IV: CPT | Mod: PBBFAC,,, | Performed by: PHYSICIAN ASSISTANT

## 2020-11-18 PROCEDURE — 99999 PR PBB SHADOW E&M-EST. PATIENT-LVL IV: ICD-10-PCS | Mod: PBBFAC,,, | Performed by: PHYSICIAN ASSISTANT

## 2020-11-18 PROCEDURE — 99214 OFFICE O/P EST MOD 30 MIN: CPT | Mod: S$GLB,,, | Performed by: PHYSICIAN ASSISTANT

## 2020-11-18 PROCEDURE — 1126F PR PAIN SEVERITY QUANTIFIED, NO PAIN PRESENT: ICD-10-PCS | Mod: S$GLB,,, | Performed by: PODIATRIST

## 2020-11-18 PROCEDURE — 1126F AMNT PAIN NOTED NONE PRSNT: CPT | Mod: S$GLB,,, | Performed by: PODIATRIST

## 2020-11-18 PROCEDURE — 99202 OFFICE O/P NEW SF 15 MIN: CPT | Mod: S$GLB,,, | Performed by: PODIATRIST

## 2020-11-18 PROCEDURE — 3078F PR MOST RECENT DIASTOLIC BLOOD PRESSURE < 80 MM HG: ICD-10-PCS | Mod: CPTII,S$GLB,, | Performed by: PODIATRIST

## 2020-11-18 PROCEDURE — 99999 PR PBB SHADOW E&M-EST. PATIENT-LVL IV: ICD-10-PCS | Mod: PBBFAC,,, | Performed by: PODIATRIST

## 2020-11-18 PROCEDURE — 3078F DIAST BP <80 MM HG: CPT | Mod: CPTII,S$GLB,, | Performed by: PODIATRIST

## 2020-11-18 PROCEDURE — 3079F PR MOST RECENT DIASTOLIC BLOOD PRESSURE 80-89 MM HG: ICD-10-PCS | Mod: CPTII,S$GLB,, | Performed by: PHYSICIAN ASSISTANT

## 2020-11-18 PROCEDURE — 3008F BODY MASS INDEX DOCD: CPT | Mod: CPTII,S$GLB,, | Performed by: PODIATRIST

## 2020-11-18 PROCEDURE — 3079F DIAST BP 80-89 MM HG: CPT | Mod: CPTII,S$GLB,, | Performed by: PHYSICIAN ASSISTANT

## 2020-11-18 PROCEDURE — 3008F PR BODY MASS INDEX (BMI) DOCUMENTED: ICD-10-PCS | Mod: CPTII,S$GLB,, | Performed by: PODIATRIST

## 2020-11-18 PROCEDURE — 99214 PR OFFICE/OUTPT VISIT, EST, LEVL IV, 30-39 MIN: ICD-10-PCS | Mod: S$GLB,,, | Performed by: PHYSICIAN ASSISTANT

## 2020-11-18 PROCEDURE — 3077F SYST BP >= 140 MM HG: CPT | Mod: CPTII,S$GLB,, | Performed by: PODIATRIST

## 2020-11-18 PROCEDURE — 3077F PR MOST RECENT SYSTOLIC BLOOD PRESSURE >= 140 MM HG: ICD-10-PCS | Mod: CPTII,S$GLB,, | Performed by: PHYSICIAN ASSISTANT

## 2020-11-18 PROCEDURE — 3077F SYST BP >= 140 MM HG: CPT | Mod: CPTII,S$GLB,, | Performed by: PHYSICIAN ASSISTANT

## 2020-11-18 RX ORDER — METAXALONE 400 MG/1
400 TABLET ORAL 3 TIMES DAILY
Qty: 20 TABLET | Refills: 0 | Status: SHIPPED | OUTPATIENT
Start: 2020-11-18 | End: 2020-11-18

## 2020-11-18 RX ORDER — METHOCARBAMOL 750 MG/1
750 TABLET, FILM COATED ORAL 3 TIMES DAILY PRN
Qty: 15 TABLET | Refills: 0 | OUTPATIENT
Start: 2020-11-18 | End: 2020-11-23

## 2020-11-18 RX ORDER — FLUTICASONE FUROATE AND VILANTEROL TRIFENATATE 100; 25 UG/1; UG/1
1 POWDER RESPIRATORY (INHALATION) DAILY
Qty: 6030 EACH | Refills: 11 | Status: SHIPPED | OUTPATIENT
Start: 2020-11-18 | End: 2022-03-21 | Stop reason: SDUPTHER

## 2020-11-18 NOTE — LETTER
November 29, 2020      Crystal Burks PA-C  [address] [facility] [address] [place] [address]           [facility]  [address] [place] [address]  Phone: [phone]          Patient: Fatemeh Shoemaker   MR Number: [id number]   YOB: 1960   Date of Visit: 11/18/2020       Dear Crystal Burks:    Thank you for referring Fatemeh Shoemaker to me for evaluation. Attached you will find relevant portions of my assessment and plan of care.    If you have questions, please do not hesitate to call me. I look forward to following Fatemeh Shoemaker along with you.    Sincerely,    Ezra Arriaga, CHATO    Enclosure  CC:  No Recipients    If you would like to receive this communication electronically, please contact [email or web address] or [phone] to request more information on [employer] Link access.    For providers and/or their staff who would like to refer a patient to [facility], please contact us through our one-stop-shop provider referral line, [facility] , at [phone].    If you feel you have received this communication in error or would no longer like to receive these types of communications, please e-mail [email or web address]

## 2020-11-18 NOTE — PATIENT INSTRUCTIONS
A few reminders from today:    Start new inhaler.   Skelaxin as needed for pain/spasm. Do not drink or drive on this medication.   Tramadol as needed for pain. May supplement with tylenol.   F/U scheduled 12/2  Follow up with me if needed.   Please go to ER/urgent care if after hours or symptoms persist/worsen.       Do not hesitate to get in touch with me should you have any further questions.     Thank you for trusting me with your care!  I wish you health and happiness.    -Crystal Burks PA-C

## 2020-11-18 NOTE — TELEPHONE ENCOUNTER
----- Message from Zo Turcios, Patient Care Assistant sent at 11/18/2020  4:42 PM CST -----  Regarding: advise  Contact: Johanna with [facility]  Type: Needs Medical Advice  Who Called:  Johanna with [facility] pharmacy  Pharmacy name and phone #:    [facility] - [address] [address]  Phone: [phone] Fax: [phone]  Best Call Back Number: [phone]  Additional Information: Johanna with [facility] states they can't get metaxalone (SKELAXIN) 400 mg tablet only 800 MG tablet. Please call to advice. Thanks!

## 2020-11-19 ENCOUNTER — PATIENT MESSAGE (OUTPATIENT)
Dept: FAMILY MEDICINE | Facility: CLINIC | Age: 60
End: 2020-11-19

## 2020-11-19 ENCOUNTER — PATIENT OUTREACH (OUTPATIENT)
Dept: OTHER | Facility: OTHER | Age: 60
End: 2020-11-19
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
## 2020-11-29 ENCOUNTER — PATIENT MESSAGE (OUTPATIENT)
Dept: FAMILY MEDICINE | Facility: CLINIC | Age: 60
End: 2020-11-29

## 2020-11-30 NOTE — PROGRESS NOTES
Subjective:      Patient ID: Fatemeh Shoemaker is a 59 y.o. female.    Chief Complaint: Nail Problem (thick nails, PCP-(CONNER Huber)-11/12/2020), Foot Problem (Neuropathy), and Ankle Pain (Right ankle pain and injury earlier this year)    Fatemeh is a 59 y.o. female who presents to the podiatry clinic  with complaint of  right foot pain. Onset of the symptoms was several months ago. Precipitating event: inversion injury to ankle. Current symptoms include: ability to bear weight, but with some pain and worsening symptoms after a period of activity. Aggravating factors: any weight bearing. Symptoms have gradually improved. Patient has had prior foot problems. Evaluation to date: none. Treatment to date: none. Patients rates pain 0/10 on pain scale.    Patient also relates to long thick nails seeking possible treatments for her nails    Review of Systems   Constitution: Negative for chills and fever.   Cardiovascular: Negative for claudication and leg swelling.   Respiratory: Negative for shortness of breath.    Skin: Positive for nail changes. Negative for itching and rash.   Musculoskeletal: Positive for joint pain. Negative for muscle cramps, muscle weakness and myalgias.   Gastrointestinal: Negative for nausea and vomiting.   Neurological: Negative for focal weakness, loss of balance, numbness and paresthesias.           Objective:      Physical Exam  Constitutional:       General: She is not in acute distress.     Appearance: She is well-developed. She is not diaphoretic.   Cardiovascular:      Pulses:           Dorsalis pedis pulses are 2+ on the right side and 2+ on the left side.        Posterior tibial pulses are 2+ on the right side and 2+ on the left side.      Comments: < 3 sec capillary refill time to toes 1-5 bilateral. Toes and feet are warm to touch proximally with normal distal cooling b/l. There is some hair growth on the feet and toes b/l. There is no edema b/l. No spider veins or  varicosities present b/l.     Musculoskeletal:      Comments: Equinus noted b/l ankles with < 10 deg DF noted. MMT 5/5 in DF/PF/Inv/Ev resistance with no reproduction of pain in any direction. Passive range of motion of ankle and pedal joints is painless b/l.    RIght ankle some discomfort to the lateral AFTL area some discomfort with inversion of ankle     Skin:     General: Skin is warm and dry.      Coloration: Skin is not pale.      Findings: No abrasion, bruising, burn, ecchymosis, erythema, laceration, lesion, petechiae or rash.      Nails: There is no clubbing.        Comments: Skin temperature, texture and turgor within normal limits.    Nails 1-5 bilateral are thick 3-4 mm and discolored with subungual debris     Neurological:      Mental Status: She is alert and oriented to person, place, and time.      Sensory: No sensory deficit.      Motor: No tremor, atrophy or abnormal muscle tone.      Comments: Negative tinel sign bilateral.   Psychiatric:         Behavior: Behavior normal.               Assessment:       Encounter Diagnoses   Name Primary?    Thickened nails     Idiopathic peripheral neuropathy Yes    Right ankle pain, unspecified chronicity     Onychomycosis due to dermatophyte          Plan:       Fatemeh was seen today for nail problem, foot problem and ankle pain.    Diagnoses and all orders for this visit:    Idiopathic peripheral neuropathy    Thickened nails  -     Ambulatory referral/consult to Podiatry    Right ankle pain, unspecified chronicity    Onychomycosis due to dermatophyte      I counseled the patient on her conditions, their implications and medical management.    Discussed using an ankle brace as needed, if it is not improving still she can go into a boot and some imaging can be done but it seems to be getting better with time.    Discussed treatment options for fungal toenails to include topical vs oral vs laser vs combined therapy in detail.    Declined nail treatments due  to cost vs low efficacy    Return PRN    Ezra Arriaga, NANCYM

## 2020-12-01 ENCOUNTER — PATIENT MESSAGE (OUTPATIENT)
Dept: FAMILY MEDICINE | Facility: CLINIC | Age: 60
End: 2020-12-01

## 2020-12-02 ENCOUNTER — OFFICE VISIT (OUTPATIENT)
Dept: FAMILY MEDICINE | Facility: CLINIC | Age: 60
End: 2020-12-02
Payer: COMMERCIAL

## 2020-12-02 DIAGNOSIS — J20.8 VIRAL BRONCHITIS: ICD-10-CM

## 2020-12-02 PROCEDURE — 99214 PR OFFICE/OUTPT VISIT, EST, LEVL IV, 30-39 MIN: ICD-10-PCS | Mod: 95,,, | Performed by: PHYSICIAN ASSISTANT

## 2020-12-02 PROCEDURE — 99214 OFFICE O/P EST MOD 30 MIN: CPT | Mod: 95,,, | Performed by: PHYSICIAN ASSISTANT

## 2020-12-02 RX ORDER — ALBUTEROL SULFATE 90 UG/1
2 AEROSOL, METERED RESPIRATORY (INHALATION) EVERY 6 HOURS PRN
Qty: 18 G | Refills: 3 | Status: SHIPPED | OUTPATIENT
Start: 2020-12-02 | End: 2020-12-09 | Stop reason: ALTCHOICE

## 2020-12-02 NOTE — PATIENT INSTRUCTIONS
A few reminders from today:    Continue augmentin.   Continue bromfed, if need a refill let me know.   Continue breo daily.   Use albuterol as prescribed.   Use flonase.   Keep monitoring blood pressure      Follow up with me if needed.   Please go to ER/urgent care if after hours or symptoms persist/worsen.     Do not hesitate to get in touch with me should you have any further questions.     Thank you for trusting me with your care!  I wish you health and happiness.    -Crystal Burks PA-C
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
## 2020-12-07 ENCOUNTER — TELEPHONE (OUTPATIENT)
Dept: BARIATRICS | Facility: CLINIC | Age: 60
End: 2020-12-07

## 2020-12-07 ENCOUNTER — TELEPHONE (OUTPATIENT)
Dept: GASTROENTEROLOGY | Facility: CLINIC | Age: 60
End: 2020-12-07

## 2020-12-07 NOTE — TELEPHONE ENCOUNTER
----- Message from Real Brown sent at 12/4/2020  1:05 PM CST -----  Consult scheduled for 12/10/20, services are non covered. Pt request appointment cancellation.

## 2020-12-07 NOTE — TELEPHONE ENCOUNTER
Spoke with pt and offered to schedule cscope. Pt declined and states that she will call us back later next year to schedule. Phone number provided for call back. Order canceled, prescriber notified.

## 2020-12-09 ENCOUNTER — HOSPITAL ENCOUNTER (OUTPATIENT)
Dept: RADIOLOGY | Facility: HOSPITAL | Age: 60
Discharge: HOME | End: 2020-12-09
Attending: PHYSICIAN ASSISTANT
Payer: COMMERCIAL

## 2020-12-09 ENCOUNTER — PATIENT MESSAGE (OUTPATIENT)
Dept: FAMILY MEDICINE | Facility: CLINIC | Age: 60
End: 2020-12-09

## 2020-12-09 ENCOUNTER — OFFICE VISIT (OUTPATIENT)
Dept: FAMILY MEDICINE | Facility: CLINIC | Age: 60
End: 2020-12-09
Payer: COMMERCIAL

## 2020-12-09 VITALS
OXYGEN SATURATION: 98 % | WEIGHT: 293 LBS | DIASTOLIC BLOOD PRESSURE: 60 MMHG | TEMPERATURE: 98 F | BODY MASS INDEX: 49.38 KG/M2 | HEART RATE: 70 BPM | SYSTOLIC BLOOD PRESSURE: 140 MMHG

## 2020-12-09 DIAGNOSIS — I10 ESSENTIAL HYPERTENSION: ICD-10-CM

## 2020-12-09 DIAGNOSIS — R43.0 LOSS OF SMELL: ICD-10-CM

## 2020-12-09 DIAGNOSIS — J44.1 COPD EXACERBATION: ICD-10-CM

## 2020-12-09 DIAGNOSIS — I48.0 PAROXYSMAL ATRIAL FIBRILLATION: ICD-10-CM

## 2020-12-09 DIAGNOSIS — J06.9 UPPER RESPIRATORY TRACT INFECTION, UNSPECIFIED TYPE: ICD-10-CM

## 2020-12-09 DIAGNOSIS — Z20.822 SUSPECTED COVID-19 VIRUS INFECTION: Primary | ICD-10-CM

## 2020-12-09 DIAGNOSIS — R09.81 NASAL CONGESTION: ICD-10-CM

## 2020-12-09 PROCEDURE — 3078F PR MOST RECENT DIASTOLIC BLOOD PRESSURE < 80 MM HG: ICD-10-PCS | Mod: CPTII,S$GLB,, | Performed by: PHYSICIAN ASSISTANT

## 2020-12-09 PROCEDURE — 3077F PR MOST RECENT SYSTOLIC BLOOD PRESSURE >= 140 MM HG: ICD-10-PCS | Mod: CPTII,S$GLB,, | Performed by: PHYSICIAN ASSISTANT

## 2020-12-09 PROCEDURE — 3008F BODY MASS INDEX DOCD: CPT | Mod: CPTII,S$GLB,, | Performed by: PHYSICIAN ASSISTANT

## 2020-12-09 PROCEDURE — 71046 X-RAY EXAM CHEST 2 VIEWS: CPT | Mod: TC,FY,PO

## 2020-12-09 PROCEDURE — 71046 X-RAY EXAM CHEST 2 VIEWS: CPT | Mod: 26,,, | Performed by: RADIOLOGY

## 2020-12-09 PROCEDURE — 3008F PR BODY MASS INDEX (BMI) DOCUMENTED: ICD-10-PCS | Mod: CPTII,S$GLB,, | Performed by: PHYSICIAN ASSISTANT

## 2020-12-09 PROCEDURE — 3077F SYST BP >= 140 MM HG: CPT | Mod: CPTII,S$GLB,, | Performed by: PHYSICIAN ASSISTANT

## 2020-12-09 PROCEDURE — 1126F AMNT PAIN NOTED NONE PRSNT: CPT | Mod: S$GLB,,, | Performed by: PHYSICIAN ASSISTANT

## 2020-12-09 PROCEDURE — 99214 PR OFFICE/OUTPT VISIT, EST, LEVL IV, 30-39 MIN: ICD-10-PCS | Mod: S$GLB,,, | Performed by: PHYSICIAN ASSISTANT

## 2020-12-09 PROCEDURE — 99999 PR PBB SHADOW E&M-EST. PATIENT-LVL V: ICD-10-PCS | Mod: PBBFAC,,, | Performed by: PHYSICIAN ASSISTANT

## 2020-12-09 PROCEDURE — 99999 PR PBB SHADOW E&M-EST. PATIENT-LVL V: CPT | Mod: PBBFAC,,, | Performed by: PHYSICIAN ASSISTANT

## 2020-12-09 PROCEDURE — 3078F DIAST BP <80 MM HG: CPT | Mod: CPTII,S$GLB,, | Performed by: PHYSICIAN ASSISTANT

## 2020-12-09 PROCEDURE — 99214 OFFICE O/P EST MOD 30 MIN: CPT | Mod: S$GLB,,, | Performed by: PHYSICIAN ASSISTANT

## 2020-12-09 PROCEDURE — 1126F PR PAIN SEVERITY QUANTIFIED, NO PAIN PRESENT: ICD-10-PCS | Mod: S$GLB,,, | Performed by: PHYSICIAN ASSISTANT

## 2020-12-09 PROCEDURE — 71046 XR CHEST PA AND LATERAL: ICD-10-PCS | Mod: 26,,, | Performed by: RADIOLOGY

## 2020-12-09 RX ORDER — LEVOCETIRIZINE DIHYDROCHLORIDE 5 MG/1
5 TABLET, FILM COATED ORAL NIGHTLY
Qty: 30 TABLET | Refills: 11 | Status: SHIPPED | OUTPATIENT
Start: 2020-12-09 | End: 2021-03-03

## 2020-12-09 RX ORDER — PREDNISONE 20 MG/1
40 TABLET ORAL DAILY
Qty: 10 TABLET | Refills: 0 | Status: SHIPPED | OUTPATIENT
Start: 2020-12-09 | End: 2020-12-14

## 2020-12-09 RX ORDER — DOXYCYCLINE 100 MG/1
100 CAPSULE ORAL EVERY 12 HOURS
Qty: 10 CAPSULE | Refills: 0 | Status: SHIPPED | OUTPATIENT
Start: 2020-12-09 | End: 2020-12-14

## 2020-12-09 NOTE — PROGRESS NOTES
Subjective:       Patient ID: Fatemeh Shoemaker is a 60 y.o. female.      Chief Complaint: Follow-up (cough)    HPI     Pt is well known to me, PCP Dr. Wheat.     Pt is a 60 year old female that presents today with a persistent cough that has been present since thanksgiving. Covid test is negative x2 and she has been treating this as a viral bronchitis. Last we spoke, she was on bromfed, breo, albuterol, flonase, and augmentin. She admits to lack of taste and smell now, along with rhinorrhea, tension headache, myalgias. She is out of her breo, but she will get a refill tomorrow. She states that her  is having similar symptoms.     BP Readings from Last 3 Encounters:   12/09/20 (!) 140/60   11/18/20 (!) 140/88   11/12/20 (!) 167/82       Review of Systems   Constitutional: Negative for chills and fever.   HENT: Positive for nasal congestion, postnasal drip, rhinorrhea, sinus pressure/congestion and sore throat. Negative for trouble swallowing.    Respiratory: Positive for cough, chest tightness, shortness of breath and wheezing. Negative for apnea and choking.    Cardiovascular: Negative for chest pain and palpitations.   Gastrointestinal: Positive for diarrhea and nausea. Negative for vomiting and reflux.   Genitourinary: Negative for difficulty urinating, dysuria, flank pain, frequency, hematuria and urgency.   Musculoskeletal: Positive for myalgias.   Integumentary:  Negative for rash.   Neurological: Positive for headaches. Negative for dizziness, vertigo, syncope and light-headedness.   Psychiatric/Behavioral: Negative for sleep disturbance. The patient is not nervous/anxious.          Objective:      Physical Exam  Vitals signs and nursing note reviewed.   Constitutional:       General: She is not in acute distress.     Appearance: Normal appearance. She is obese. She is ill-appearing. She is not toxic-appearing or diaphoretic.   HENT:      Head: Normocephalic and atraumatic.      Right Ear: Tympanic  membrane, ear canal and external ear normal. There is no impacted cerumen.      Left Ear: Tympanic membrane, ear canal and external ear normal. There is no impacted cerumen.      Nose: Nose normal. No congestion or rhinorrhea.      Mouth/Throat:      Mouth: Mucous membranes are moist.      Pharynx: No oropharyngeal exudate or posterior oropharyngeal erythema.   Eyes:      General: No scleral icterus.        Right eye: No discharge.         Left eye: No discharge.      Extraocular Movements: Extraocular movements intact.      Conjunctiva/sclera: Conjunctivae normal.      Pupils: Pupils are equal, round, and reactive to light.   Neck:      Musculoskeletal: Normal range of motion and neck supple.   Cardiovascular:      Rate and Rhythm: Normal rate and regular rhythm.      Pulses: Normal pulses.      Heart sounds: Normal heart sounds. No murmur. No friction rub. No gallop.    Pulmonary:      Effort: Pulmonary effort is normal. No respiratory distress.      Breath sounds: No stridor. Wheezing (diffuse, both lung fields) present. No rhonchi or rales.   Abdominal:      General: Abdomen is flat. Bowel sounds are normal. There is no distension.      Palpations: Abdomen is soft. There is no mass.      Tenderness: There is no abdominal tenderness. There is no guarding or rebound.   Musculoskeletal:         General: No deformity or signs of injury.      Right lower leg: No edema.      Left lower leg: No edema.   Lymphadenopathy:      Head:      Right side of head: No submental, submandibular, tonsillar, preauricular, posterior auricular or occipital adenopathy.      Left side of head: No submental, submandibular, tonsillar, preauricular, posterior auricular or occipital adenopathy.      Cervical: No cervical adenopathy.      Upper Body:      Right upper body: No supraclavicular adenopathy.      Left upper body: No supraclavicular adenopathy.   Skin:     General: Skin is warm.      Capillary Refill: Capillary refill takes less  than 2 seconds.      Coloration: Skin is not jaundiced or pale.      Findings: No lesion or rash.   Neurological:      General: No focal deficit present.      Mental Status: She is alert and oriented to person, place, and time. Mental status is at baseline.      Motor: No weakness.      Gait: Gait normal.   Psychiatric:         Attention and Perception: Attention and perception normal.         Mood and Affect: Mood and affect normal.         Speech: Speech normal.         Behavior: Behavior normal. Behavior is cooperative.         Thought Content: Thought content normal.         Cognition and Memory: Cognition and memory normal.         Judgment: Judgment normal.         Assessment:       1. Suspected COVID-19 virus infection    2. Upper respiratory tract infection, unspecified type    3. COPD exacerbation    4. Loss of smell    5. Nasal congestion    6. Essential hypertension    7. Paroxysmal atrial fibrillation        Plan:       1. Suspected COVID-19 virus infection  -isolate until results return  -notify contacts  -ER precautions given    2. Upper respiratory tract infection, unspecified type  - doxycycline (VIBRAMYCIN) 100 MG Cap; Take 1 capsule (100 mg total) by mouth every 12 (twelve) hours. Take with food for 5 days  Dispense: 10 capsule; Refill: 0    3. COPD exacerbation  - ipratropium-albuteroL (COMBIVENT)  mcg/actuation inhaler; Inhale 1 puff into the lungs every 6 (six) hours as needed for Wheezing or Shortness of Breath. Rescue  Dispense: 4 g; Refill: 2  - X-Ray Chest PA And Lateral; Future  - predniSONE (DELTASONE) 20 MG tablet; Take 2 tablets (40 mg total) by mouth once daily. Take with food for 5 days  Dispense: 10 tablet; Refill: 0    4. Loss of smell  - COVID-19 Routine Screening; Future    5. Nasal congestion  -continue flonase  - levocetirizine (XYZAL) 5 MG tablet; Take 1 tablet (5 mg total) by mouth every evening.  Dispense: 30 tablet; Refill: 11    6. Essential hypertension  -continue to  monitor at home, followed by Dr. Wheat    7. Paroxysmal atrial fibrillation  -followed by cardiology, stable    F/U pending results.     CARE GAPS: not addressed today

## 2020-12-09 NOTE — PATIENT INSTRUCTIONS
A few reminders from today:    Start new inhaler INSTEAD of albuterol.   Continue breo.   Start steroid.   CXR today.   Start xyzal.   Continue flonase.   Start doxycycline.     Follow up with me if needed.   Please go to ER/urgent care if after hours or symptoms persist/worsen.       Do not hesitate to get in touch with me should you have any further questions.     Thank you for trusting me with your care!  I wish you health and happiness.    -Crystal Burks PA-C

## 2020-12-10 ENCOUNTER — PATIENT MESSAGE (OUTPATIENT)
Dept: ADMINISTRATIVE | Facility: OTHER | Age: 60
End: 2020-12-10

## 2020-12-10 ENCOUNTER — TELEPHONE (OUTPATIENT)
Dept: FAMILY MEDICINE | Facility: CLINIC | Age: 60
End: 2020-12-10

## 2020-12-10 DIAGNOSIS — U07.1 COVID-19: Primary | ICD-10-CM

## 2020-12-10 NOTE — TELEPHONE ENCOUNTER
Called to discuss covid results. Surveillance ordered. ER precautions reiterated. Pt doing better today, she states.

## 2020-12-11 ENCOUNTER — PATIENT MESSAGE (OUTPATIENT)
Dept: ADMINISTRATIVE | Facility: OTHER | Age: 60
End: 2020-12-11

## 2020-12-11 ENCOUNTER — NURSE TRIAGE (OUTPATIENT)
Dept: ADMINISTRATIVE | Facility: CLINIC | Age: 60
End: 2020-12-11

## 2020-12-11 NOTE — TELEPHONE ENCOUNTER
Talked to the patient and she will do the vitals a little bit later because she is working on it.     Reason for Disposition   Caller has cancelled the call before the first contact    Protocols used: NO CONTACT OR DUPLICATE CONTACT CALL-A-[place]

## 2020-12-12 ENCOUNTER — PATIENT MESSAGE (OUTPATIENT)
Dept: ADMINISTRATIVE | Facility: OTHER | Age: 60
End: 2020-12-12

## 2020-12-13 ENCOUNTER — PATIENT MESSAGE (OUTPATIENT)
Dept: ADMINISTRATIVE | Facility: OTHER | Age: 60
End: 2020-12-13

## 2020-12-14 ENCOUNTER — PATIENT MESSAGE (OUTPATIENT)
Dept: ADMINISTRATIVE | Facility: OTHER | Age: 60
End: 2020-12-14

## 2020-12-14 ENCOUNTER — NURSE TRIAGE (OUTPATIENT)
Dept: ADMINISTRATIVE | Facility: CLINIC | Age: 60
End: 2020-12-14

## 2020-12-15 ENCOUNTER — PATIENT MESSAGE (OUTPATIENT)
Dept: ADMINISTRATIVE | Facility: OTHER | Age: 60
End: 2020-12-15

## 2020-12-15 ENCOUNTER — NURSE TRIAGE (OUTPATIENT)
Dept: ADMINISTRATIVE | Facility: CLINIC | Age: 60
End: 2020-12-15

## 2020-12-16 ENCOUNTER — PATIENT MESSAGE (OUTPATIENT)
Dept: ADMINISTRATIVE | Facility: OTHER | Age: 60
End: 2020-12-16

## 2020-12-17 ENCOUNTER — PATIENT MESSAGE (OUTPATIENT)
Dept: ADMINISTRATIVE | Facility: OTHER | Age: 60
End: 2020-12-17

## 2020-12-18 ENCOUNTER — PATIENT MESSAGE (OUTPATIENT)
Dept: ADMINISTRATIVE | Facility: OTHER | Age: 60
End: 2020-12-18

## 2020-12-18 ENCOUNTER — NURSE TRIAGE (OUTPATIENT)
Dept: ADMINISTRATIVE | Facility: CLINIC | Age: 60
End: 2020-12-18

## 2020-12-18 NOTE — TELEPHONE ENCOUNTER
Patient initially escalated due to no response, however patient entered vitals prior to phone call. Vital signs and symptoms did not trigger a second escalation; therefore, no follow up call is needed at this time.    Reason for Disposition   Caller has cancelled the call before the first contact    Additional Information   Negative: Caller has already spoken with the PCP (or office), and has no further questions   Negative: Caller has already spoken with another triager and has no further questions   Negative: Caller has already spoken with another triager or PCP (or office), and has further questions and triager able to answer questions.   Negative: Busy signal.  First attempt to contact caller.  Follow-up call scheduled within 15 minutes.   Negative: No answer.  First attempt to contact caller.  Follow-up call scheduled within 15 minutes.   Negative: Message left on identified voicemail   Negative: Message left on unidentified voice mail. Phone number verified.   Negative: Message left with person in household   Negative: Wrong number reached. Phone number verified.   Negative: Second attempt to contact family AND no contact made. Phone number verified.   Negative: Cell phone out of range. Phone number verified.   Negative: Patient already left for the hospital/clinic   Negative: Unable to complete triage due to phone connection issues    Protocols used: NO CONTACT OR DUPLICATE CONTACT CALL-A-[place]

## 2020-12-19 ENCOUNTER — PATIENT MESSAGE (OUTPATIENT)
Dept: ADMINISTRATIVE | Facility: OTHER | Age: 60
End: 2020-12-19

## 2020-12-19 ENCOUNTER — NURSE TRIAGE (OUTPATIENT)
Dept: ADMINISTRATIVE | Facility: CLINIC | Age: 60
End: 2020-12-19

## 2020-12-19 NOTE — TELEPHONE ENCOUNTER
O2 94%, HR 68, T ?, Fever N, Worse N, SOB  1/1     Covid surveillance program escalation due to abnormal vital signs or entry. Follow up call, patient submitted vital signs before getting out of bed. Reviewed use of pulse ox device and when to take vital signs. Rechecked, 97%. With that change, VS are WNL, no need for further action at this time.

## 2020-12-20 ENCOUNTER — PATIENT MESSAGE (OUTPATIENT)
Dept: ADMINISTRATIVE | Facility: OTHER | Age: 60
End: 2020-12-20

## 2020-12-21 ENCOUNTER — PATIENT MESSAGE (OUTPATIENT)
Dept: ADMINISTRATIVE | Facility: OTHER | Age: 60
End: 2020-12-21

## 2020-12-22 ENCOUNTER — PATIENT MESSAGE (OUTPATIENT)
Dept: ADMINISTRATIVE | Facility: OTHER | Age: 60
End: 2020-12-22

## 2020-12-23 ENCOUNTER — PATIENT MESSAGE (OUTPATIENT)
Dept: ADMINISTRATIVE | Facility: OTHER | Age: 60
End: 2020-12-23

## 2020-12-23 ENCOUNTER — NURSE TRIAGE (OUTPATIENT)
Dept: ADMINISTRATIVE | Facility: CLINIC | Age: 60
End: 2020-12-23

## 2020-12-24 ENCOUNTER — PATIENT MESSAGE (OUTPATIENT)
Dept: ADMINISTRATIVE | Facility: OTHER | Age: 60
End: 2020-12-24

## 2020-12-27 ENCOUNTER — CLINICAL SUPPORT (OUTPATIENT)
Dept: CARDIOLOGY | Facility: CLINIC | Age: 60
End: 2020-12-27
Payer: COMMERCIAL

## 2020-12-27 DIAGNOSIS — Z95.0 PRESENCE OF CARDIAC PACEMAKER: ICD-10-CM

## 2020-12-27 PROCEDURE — 93294 CARDIAC DEVICE CHECK - REMOTE: ICD-10-PCS | Mod: ,,, | Performed by: INTERNAL MEDICINE

## 2020-12-27 PROCEDURE — 93294 REM INTERROG EVL PM/LDLS PM: CPT | Mod: ,,, | Performed by: INTERNAL MEDICINE

## 2020-12-27 PROCEDURE — 93296 REM INTERROG EVL PM/IDS: CPT | Mod: PBBFAC,PO | Performed by: INTERNAL MEDICINE

## 2020-12-28 ENCOUNTER — PATIENT MESSAGE (OUTPATIENT)
Dept: FAMILY MEDICINE | Facility: CLINIC | Age: 60
End: 2020-12-28

## 2020-12-28 ENCOUNTER — TELEPHONE (OUTPATIENT)
Dept: FAMILY MEDICINE | Facility: CLINIC | Age: 60
End: 2020-12-28

## 2020-12-29 ENCOUNTER — PATIENT MESSAGE (OUTPATIENT)
Dept: FAMILY MEDICINE | Facility: CLINIC | Age: 60
End: 2020-12-29

## 2020-12-31 ENCOUNTER — PATIENT MESSAGE (OUTPATIENT)
Dept: FAMILY MEDICINE | Facility: CLINIC | Age: 60
End: 2020-12-31

## 2020-12-31 ENCOUNTER — OFFICE VISIT (OUTPATIENT)
Dept: FAMILY MEDICINE | Facility: CLINIC | Age: 60
End: 2020-12-31
Payer: COMMERCIAL

## 2020-12-31 DIAGNOSIS — I48.0 PAROXYSMAL ATRIAL FIBRILLATION: Primary | ICD-10-CM

## 2020-12-31 DIAGNOSIS — I10 ESSENTIAL HYPERTENSION: ICD-10-CM

## 2020-12-31 DIAGNOSIS — Z95.0 PACEMAKER: ICD-10-CM

## 2020-12-31 PROCEDURE — 99214 OFFICE O/P EST MOD 30 MIN: CPT | Mod: 95,,, | Performed by: PHYSICIAN ASSISTANT

## 2020-12-31 PROCEDURE — 99214 PR OFFICE/OUTPT VISIT, EST, LEVL IV, 30-39 MIN: ICD-10-PCS | Mod: 95,,, | Performed by: PHYSICIAN ASSISTANT

## 2020-12-31 RX ORDER — METOPROLOL TARTRATE 100 MG/1
TABLET ORAL
Qty: 60 TABLET | Refills: 11 | Status: SHIPPED | OUTPATIENT
Start: 2020-12-31 | End: 2022-03-07 | Stop reason: SDUPTHER

## 2020-12-31 NOTE — PATIENT INSTRUCTIONS
A few reminders from today:    Metoprolol 50mg in the morning and 100mg in the evening.   Keep lisinopril the same for now.     Follow up with me if needed.   Please go to ER/urgent care if after hours or symptoms persist/worsen.     Do not hesitate to get in touch with me should you have any further questions.     Thank you for trusting me with your care!  I wish you health and happiness.    -Crystal Burks PA-C

## 2020-12-31 NOTE — PROGRESS NOTES
Subjective:       Patient ID: Fatemeh Shoemaker is a 60 y.o. female.    The patient location is: her home  The chief complaint leading to consultation is: elevated HR, BP, covid follow up    Visit type: audiovisual    Face to Face time with patient: 20  minutes of total time spent on the encounter, which includes face to face time and non-face to face time preparing to see the patient (eg, review of tests), Obtaining and/or reviewing separately obtained history, Documenting clinical information in the electronic or other health record, Independently interpreting results (not separately reported) and communicating results to the patient/family/caregiver, or Care coordination (not separately reported).     Each patient to whom he or she provides medical services by telemedicine is:  (1) informed of the relationship between the physician and patient and the respective role of any other health care provider with respect to management of the patient; and (2) notified that he or she may decline to receive medical services by telemedicine and may withdraw from such care at any time.    Notes:     Chief Complaint: covid f/u, BP    HPI     Pt is well known to me, PCP Dr. Wheat.     Pt is a 60 year old female with multiple chronic medical problems. She presents today complaining of continued elevated blood pressure. She is aware of a few bouts of A-fib over the past few days. She can tell she is in it because she begins to experience palpitations. A few of her home monitor readings show a HR of about 110, but the highest reading was 130. No SOB or chest pain, lightheadedness. She sent a message in regarding her BP, which has seemed to stay a bit above average in the 140s-150s/80s-90s.     Lastly, she feels she had a relatively mild course of covid. She recovered without much difficulty. Her congestion has improved significantly. She is no longer SOB or wheezing.     Review of Systems   Constitutional: Negative for chills  and fever.   HENT: Negative for nasal congestion, nosebleeds, postnasal drip, rhinorrhea, sinus pressure/congestion, sore throat and trouble swallowing.    Respiratory: Positive for shortness of breath (improving). Negative for cough, chest tightness and wheezing.    Cardiovascular: Positive for palpitations. Negative for chest pain.   Gastrointestinal: Negative for abdominal pain, change in bowel habit, constipation, diarrhea, nausea, vomiting, reflux and change in bowel habit.   Genitourinary: Negative for difficulty urinating, dysuria, flank pain, frequency, nocturia and urgency.   Integumentary:  Negative for rash.   Neurological: Positive for headaches (mild tolerable). Negative for dizziness, vertigo, tremors and syncope.   Psychiatric/Behavioral: Negative for sleep disturbance. The patient is not nervous/anxious.          Objective:      Physical Exam  Constitutional:       General: She is not in acute distress.     Appearance: Normal appearance. She is not ill-appearing, toxic-appearing or diaphoretic.   HENT:      Head: Normocephalic and atraumatic.   Pulmonary:      Effort: No respiratory distress.   Neurological:      General: No focal deficit present.      Mental Status: She is alert and oriented to person, place, and time.   Psychiatric:         Mood and Affect: Mood normal.         Behavior: Behavior normal.         Thought Content: Thought content normal.         Judgment: Judgment normal.       Full exam was not able to be performed today, as this was a virtual visit.     Assessment:       1. Paroxysmal atrial fibrillation    2. Essential hypertension    3. Pacemaker        Plan:       1. Paroxysmal atrial fibrillation  -will increase metoprolol for further rate control, 50mg in the morning and 100mg in the evening daily    - metoprolol tartrate (LOPRESSOR) 100 MG tablet; Take 1 tablet by mouth in the evening.  Dispense: 60 tablet; Refill: 11    -continue to monitor HR and symptoms  -F/U with  cardiology  -messaged Dr. Tee regarding my plan and he is agreeable    2. Essential hypertension  -continue to monitor, see if improvement on metoprolol. If no improvement, will increase lisinopril dosage    3. Pacemaker  -followed by Dr. Tee, has check scheduled for january F/U with me as needed. RTC/ER precautions given    CARE GAPS:not addressed during this virtual visit.

## 2021-01-06 ENCOUNTER — TELEPHONE (OUTPATIENT)
Dept: FAMILY MEDICINE | Facility: CLINIC | Age: 61
End: 2021-01-06

## 2021-01-13 ENCOUNTER — PATIENT MESSAGE (OUTPATIENT)
Dept: FAMILY MEDICINE | Facility: CLINIC | Age: 61
End: 2021-01-13

## 2021-01-13 ENCOUNTER — OFFICE VISIT (OUTPATIENT)
Dept: FAMILY MEDICINE | Facility: CLINIC | Age: 61
End: 2021-01-13
Payer: COMMERCIAL

## 2021-01-13 ENCOUNTER — CLINICAL SUPPORT (OUTPATIENT)
Dept: CARDIOLOGY | Facility: CLINIC | Age: 61
End: 2021-01-13
Attending: INTERNAL MEDICINE
Payer: COMMERCIAL

## 2021-01-13 DIAGNOSIS — R00.1 SYMPTOMATIC BRADYCARDIA: ICD-10-CM

## 2021-01-13 DIAGNOSIS — Z95.0 CARDIAC PACEMAKER IN SITU: ICD-10-CM

## 2021-01-13 DIAGNOSIS — J02.9 SORE THROAT: Primary | ICD-10-CM

## 2021-01-13 PROCEDURE — 99213 PR OFFICE/OUTPT VISIT, EST, LEVL III, 20-29 MIN: ICD-10-PCS | Mod: 95,,, | Performed by: PHYSICIAN ASSISTANT

## 2021-01-13 PROCEDURE — 99213 OFFICE O/P EST LOW 20 MIN: CPT | Mod: 95,,, | Performed by: PHYSICIAN ASSISTANT

## 2021-01-13 PROCEDURE — 99999 PR PBB SHADOW E&M-EST. PATIENT-LVL I: CPT | Mod: PBBFAC,,,

## 2021-01-13 PROCEDURE — 99999 PR PBB SHADOW E&M-EST. PATIENT-LVL I: ICD-10-PCS | Mod: PBBFAC,,,

## 2021-01-25 ENCOUNTER — PATIENT MESSAGE (OUTPATIENT)
Dept: PAIN MEDICINE | Facility: CLINIC | Age: 61
End: 2021-01-25

## 2021-01-25 ENCOUNTER — TELEPHONE (OUTPATIENT)
Dept: ORTHOPEDICS | Facility: CLINIC | Age: 61
End: 2021-01-25

## 2021-02-02 ENCOUNTER — PATIENT OUTREACH (OUTPATIENT)
Dept: ADMINISTRATIVE | Facility: OTHER | Age: 61
End: 2021-02-02

## 2021-02-04 ENCOUNTER — OFFICE VISIT (OUTPATIENT)
Dept: ORTHOPEDICS | Facility: CLINIC | Age: 61
End: 2021-02-04
Payer: COMMERCIAL

## 2021-02-04 VITALS — HEIGHT: 65 IN | WEIGHT: 293 LBS | BODY MASS INDEX: 48.82 KG/M2

## 2021-02-04 DIAGNOSIS — M17.12 PRIMARY OSTEOARTHRITIS OF LEFT KNEE: ICD-10-CM

## 2021-02-04 DIAGNOSIS — M25.562 CHRONIC PAIN OF LEFT KNEE: Primary | ICD-10-CM

## 2021-02-04 DIAGNOSIS — E66.01 MORBID OBESITY WITH BMI OF 45.0-49.9, ADULT: ICD-10-CM

## 2021-02-04 DIAGNOSIS — G89.29 CHRONIC PAIN OF LEFT KNEE: Primary | ICD-10-CM

## 2021-02-04 PROCEDURE — 99999 PR PBB SHADOW E&M-EST. PATIENT-LVL IV: CPT | Mod: PBBFAC,,, | Performed by: ORTHOPAEDIC SURGERY

## 2021-02-04 PROCEDURE — 99214 PR OFFICE/OUTPT VISIT, EST, LEVL IV, 30-39 MIN: ICD-10-PCS | Mod: 25,S$GLB,, | Performed by: ORTHOPAEDIC SURGERY

## 2021-02-04 PROCEDURE — 1125F PR PAIN SEVERITY QUANTIFIED, PAIN PRESENT: ICD-10-PCS | Mod: S$GLB,,, | Performed by: ORTHOPAEDIC SURGERY

## 2021-02-04 PROCEDURE — 99999 PR PBB SHADOW E&M-EST. PATIENT-LVL IV: ICD-10-PCS | Mod: PBBFAC,,, | Performed by: ORTHOPAEDIC SURGERY

## 2021-02-04 PROCEDURE — 20610 DRAIN/INJ JOINT/BURSA W/O US: CPT | Mod: LT,S$GLB,, | Performed by: ORTHOPAEDIC SURGERY

## 2021-02-04 PROCEDURE — 3008F BODY MASS INDEX DOCD: CPT | Mod: CPTII,S$GLB,, | Performed by: ORTHOPAEDIC SURGERY

## 2021-02-04 PROCEDURE — 99214 OFFICE O/P EST MOD 30 MIN: CPT | Mod: 25,S$GLB,, | Performed by: ORTHOPAEDIC SURGERY

## 2021-02-04 PROCEDURE — 1125F AMNT PAIN NOTED PAIN PRSNT: CPT | Mod: S$GLB,,, | Performed by: ORTHOPAEDIC SURGERY

## 2021-02-04 PROCEDURE — 3008F PR BODY MASS INDEX (BMI) DOCUMENTED: ICD-10-PCS | Mod: CPTII,S$GLB,, | Performed by: ORTHOPAEDIC SURGERY

## 2021-02-04 PROCEDURE — 20610 LARGE JOINT ASPIRATION/INJECTION: L KNEE: ICD-10-PCS | Mod: LT,S$GLB,, | Performed by: ORTHOPAEDIC SURGERY

## 2021-02-04 RX ORDER — TRIAMCINOLONE ACETONIDE 40 MG/ML
40 INJECTION, SUSPENSION INTRA-ARTICULAR; INTRAMUSCULAR
Status: DISCONTINUED | OUTPATIENT
Start: 2021-02-04 | End: 2021-02-04 | Stop reason: HOSPADM

## 2021-02-04 RX ADMIN — TRIAMCINOLONE ACETONIDE 40 MG: 40 INJECTION, SUSPENSION INTRA-ARTICULAR; INTRAMUSCULAR at 02:02

## 2021-02-25 ENCOUNTER — PATIENT MESSAGE (OUTPATIENT)
Dept: NEUROSURGERY | Facility: CLINIC | Age: 61
End: 2021-02-25

## 2021-03-01 ENCOUNTER — PATIENT MESSAGE (OUTPATIENT)
Dept: FAMILY MEDICINE | Facility: CLINIC | Age: 61
End: 2021-03-01

## 2021-03-01 DIAGNOSIS — G62.9 NEUROPATHY: ICD-10-CM

## 2021-03-01 DIAGNOSIS — R20.2 PARESTHESIAS: ICD-10-CM

## 2021-03-02 RX ORDER — NORTRIPTYLINE HYDROCHLORIDE 25 MG/1
25 CAPSULE ORAL NIGHTLY
Qty: 90 CAPSULE | Refills: 3 | OUTPATIENT
Start: 2021-03-02 | End: 2022-03-02

## 2021-03-02 RX ORDER — DULOXETIN HYDROCHLORIDE 60 MG/1
120 CAPSULE, DELAYED RELEASE ORAL NIGHTLY
Qty: 30 CAPSULE | Refills: 3 | OUTPATIENT
Start: 2021-03-02 | End: 2022-03-02

## 2021-03-03 ENCOUNTER — OFFICE VISIT (OUTPATIENT)
Dept: FAMILY MEDICINE | Facility: CLINIC | Age: 61
End: 2021-03-03
Payer: COMMERCIAL

## 2021-03-03 ENCOUNTER — PATIENT MESSAGE (OUTPATIENT)
Dept: CARDIOLOGY | Facility: CLINIC | Age: 61
End: 2021-03-03

## 2021-03-03 ENCOUNTER — PATIENT MESSAGE (OUTPATIENT)
Dept: FAMILY MEDICINE | Facility: CLINIC | Age: 61
End: 2021-03-03

## 2021-03-03 VITALS
HEART RATE: 80 BPM | WEIGHT: 293 LBS | BODY MASS INDEX: 48.82 KG/M2 | OXYGEN SATURATION: 98 % | HEIGHT: 65 IN | DIASTOLIC BLOOD PRESSURE: 84 MMHG | SYSTOLIC BLOOD PRESSURE: 132 MMHG

## 2021-03-03 DIAGNOSIS — I49.9 CARDIAC ARRHYTHMIA, UNSPECIFIED CARDIAC ARRHYTHMIA TYPE: ICD-10-CM

## 2021-03-03 DIAGNOSIS — R00.1 SYMPTOMATIC BRADYCARDIA: Primary | ICD-10-CM

## 2021-03-03 DIAGNOSIS — Z12.31 ENCOUNTER FOR SCREENING MAMMOGRAM FOR BREAST CANCER: ICD-10-CM

## 2021-03-03 DIAGNOSIS — E66.01 MORBID OBESITY WITH BMI OF 45.0-49.9, ADULT: ICD-10-CM

## 2021-03-03 DIAGNOSIS — R20.2 PARESTHESIAS: ICD-10-CM

## 2021-03-03 DIAGNOSIS — I10 ESSENTIAL HYPERTENSION: ICD-10-CM

## 2021-03-03 DIAGNOSIS — G47.30 SLEEP-DISORDERED BREATHING: ICD-10-CM

## 2021-03-03 DIAGNOSIS — G62.9 NEUROPATHY: ICD-10-CM

## 2021-03-03 DIAGNOSIS — I48.0 PAROXYSMAL ATRIAL FIBRILLATION: ICD-10-CM

## 2021-03-03 PROBLEM — M54.16 LUMBAR RADICULOPATHY: Status: RESOLVED | Noted: 2020-02-17 | Resolved: 2021-03-03

## 2021-03-03 PROBLEM — I95.9 HYPOTENSION: Status: RESOLVED | Noted: 2020-09-19 | Resolved: 2021-03-03

## 2021-03-03 PROCEDURE — 99214 PR OFFICE/OUTPT VISIT, EST, LEVL IV, 30-39 MIN: ICD-10-PCS | Mod: S$GLB,,, | Performed by: INTERNAL MEDICINE

## 2021-03-03 PROCEDURE — 3075F SYST BP GE 130 - 139MM HG: CPT | Mod: CPTII,S$GLB,, | Performed by: INTERNAL MEDICINE

## 2021-03-03 PROCEDURE — 3079F PR MOST RECENT DIASTOLIC BLOOD PRESSURE 80-89 MM HG: ICD-10-PCS | Mod: CPTII,S$GLB,, | Performed by: INTERNAL MEDICINE

## 2021-03-03 PROCEDURE — 99214 OFFICE O/P EST MOD 30 MIN: CPT | Mod: S$GLB,,, | Performed by: INTERNAL MEDICINE

## 2021-03-03 PROCEDURE — 3008F BODY MASS INDEX DOCD: CPT | Mod: CPTII,S$GLB,, | Performed by: INTERNAL MEDICINE

## 2021-03-03 PROCEDURE — 3075F PR MOST RECENT SYSTOLIC BLOOD PRESS GE 130-139MM HG: ICD-10-PCS | Mod: CPTII,S$GLB,, | Performed by: INTERNAL MEDICINE

## 2021-03-03 PROCEDURE — 3079F DIAST BP 80-89 MM HG: CPT | Mod: CPTII,S$GLB,, | Performed by: INTERNAL MEDICINE

## 2021-03-03 PROCEDURE — 99999 PR PBB SHADOW E&M-EST. PATIENT-LVL IV: ICD-10-PCS | Mod: PBBFAC,,, | Performed by: INTERNAL MEDICINE

## 2021-03-03 PROCEDURE — 3008F PR BODY MASS INDEX (BMI) DOCUMENTED: ICD-10-PCS | Mod: CPTII,S$GLB,, | Performed by: INTERNAL MEDICINE

## 2021-03-03 PROCEDURE — 99999 PR PBB SHADOW E&M-EST. PATIENT-LVL IV: CPT | Mod: PBBFAC,,, | Performed by: INTERNAL MEDICINE

## 2021-03-03 RX ORDER — DULOXETIN HYDROCHLORIDE 60 MG/1
60 CAPSULE, DELAYED RELEASE ORAL DAILY
Qty: 90 CAPSULE | Refills: 3 | Status: SHIPPED | OUTPATIENT
Start: 2021-03-03 | End: 2022-03-21 | Stop reason: SDUPTHER

## 2021-03-03 RX ORDER — NORTRIPTYLINE HYDROCHLORIDE 25 MG/1
25 CAPSULE ORAL NIGHTLY
Qty: 90 CAPSULE | Refills: 3 | Status: SHIPPED | OUTPATIENT
Start: 2021-03-03 | End: 2022-01-10

## 2021-03-03 RX ORDER — GABAPENTIN 600 MG/1
TABLET ORAL
Qty: 270 TABLET | Refills: 3 | Status: SHIPPED | OUTPATIENT
Start: 2021-03-03 | End: 2021-09-29

## 2021-03-04 ENCOUNTER — PATIENT MESSAGE (OUTPATIENT)
Dept: FAMILY MEDICINE | Facility: CLINIC | Age: 61
End: 2021-03-04

## 2021-03-04 RX ORDER — DICLOFENAC SODIUM 10 MG/G
2 GEL TOPICAL 4 TIMES DAILY
Qty: 50 G | Refills: 3 | Status: SHIPPED | OUTPATIENT
Start: 2021-03-04 | End: 2022-03-21 | Stop reason: SDUPTHER

## 2021-03-27 ENCOUNTER — CLINICAL SUPPORT (OUTPATIENT)
Dept: CARDIOLOGY | Facility: CLINIC | Age: 61
End: 2021-03-27
Payer: COMMERCIAL

## 2021-03-27 DIAGNOSIS — Z95.0 PRESENCE OF CARDIAC PACEMAKER: ICD-10-CM

## 2021-03-27 PROCEDURE — 93294 REM INTERROG EVL PM/LDLS PM: CPT | Mod: S$GLB,,, | Performed by: INTERNAL MEDICINE

## 2021-03-27 PROCEDURE — 93296 CARDIAC DEVICE CHECK - REMOTE: ICD-10-PCS | Mod: S$GLB,,, | Performed by: INTERNAL MEDICINE

## 2021-03-27 PROCEDURE — 93294 CARDIAC DEVICE CHECK - REMOTE: ICD-10-PCS | Mod: S$GLB,,, | Performed by: INTERNAL MEDICINE

## 2021-03-27 PROCEDURE — 93296 REM INTERROG EVL PM/IDS: CPT | Mod: S$GLB,,, | Performed by: INTERNAL MEDICINE

## 2021-04-05 ENCOUNTER — PATIENT OUTREACH (OUTPATIENT)
Dept: ADMINISTRATIVE | Facility: OTHER | Age: 61
End: 2021-04-05

## 2021-04-06 ENCOUNTER — PATIENT MESSAGE (OUTPATIENT)
Dept: FAMILY MEDICINE | Facility: CLINIC | Age: 61
End: 2021-04-06

## 2021-04-07 ENCOUNTER — HOSPITAL ENCOUNTER (OUTPATIENT)
Dept: RADIOLOGY | Facility: HOSPITAL | Age: 61
Discharge: HOME | End: 2021-04-07
Attending: INTERNAL MEDICINE
Payer: COMMERCIAL

## 2021-04-07 ENCOUNTER — PATIENT MESSAGE (OUTPATIENT)
Dept: CARDIOLOGY | Facility: CLINIC | Age: 61
End: 2021-04-07

## 2021-04-07 ENCOUNTER — OFFICE VISIT (OUTPATIENT)
Dept: CARDIOLOGY | Facility: CLINIC | Age: 61
End: 2021-04-07
Payer: COMMERCIAL

## 2021-04-07 VITALS
BODY MASS INDEX: 48.82 KG/M2 | WEIGHT: 293 LBS | HEART RATE: 60 BPM | DIASTOLIC BLOOD PRESSURE: 74 MMHG | HEIGHT: 65 IN | SYSTOLIC BLOOD PRESSURE: 132 MMHG

## 2021-04-07 DIAGNOSIS — E66.01 MORBID OBESITY WITH BMI OF 45.0-49.9, ADULT: ICD-10-CM

## 2021-04-07 DIAGNOSIS — R00.1 SYMPTOMATIC BRADYCARDIA: ICD-10-CM

## 2021-04-07 DIAGNOSIS — Z95.0 PACEMAKER: ICD-10-CM

## 2021-04-07 DIAGNOSIS — Z12.31 ENCOUNTER FOR SCREENING MAMMOGRAM FOR BREAST CANCER: ICD-10-CM

## 2021-04-07 DIAGNOSIS — I48.0 PAROXYSMAL ATRIAL FIBRILLATION: Primary | ICD-10-CM

## 2021-04-07 DIAGNOSIS — I10 ESSENTIAL HYPERTENSION: ICD-10-CM

## 2021-04-07 PROCEDURE — 3008F PR BODY MASS INDEX (BMI) DOCUMENTED: ICD-10-PCS | Mod: CPTII,S$GLB,, | Performed by: INTERNAL MEDICINE

## 2021-04-07 PROCEDURE — 1126F PR PAIN SEVERITY QUANTIFIED, NO PAIN PRESENT: ICD-10-PCS | Mod: S$GLB,,, | Performed by: INTERNAL MEDICINE

## 2021-04-07 PROCEDURE — 77067 SCR MAMMO BI INCL CAD: CPT | Mod: TC,PO

## 2021-04-07 PROCEDURE — 3075F SYST BP GE 130 - 139MM HG: CPT | Mod: CPTII,S$GLB,, | Performed by: INTERNAL MEDICINE

## 2021-04-07 PROCEDURE — 1126F AMNT PAIN NOTED NONE PRSNT: CPT | Mod: S$GLB,,, | Performed by: INTERNAL MEDICINE

## 2021-04-07 PROCEDURE — 77063 MAMMO DIGITAL SCREENING BILAT WITH TOMO: ICD-10-PCS | Mod: 26,,, | Performed by: RADIOLOGY

## 2021-04-07 PROCEDURE — 99999 PR PBB SHADOW E&M-EST. PATIENT-LVL IV: CPT | Mod: PBBFAC,,, | Performed by: INTERNAL MEDICINE

## 2021-04-07 PROCEDURE — 77067 SCR MAMMO BI INCL CAD: CPT | Mod: 26,,, | Performed by: RADIOLOGY

## 2021-04-07 PROCEDURE — 3075F PR MOST RECENT SYSTOLIC BLOOD PRESS GE 130-139MM HG: ICD-10-PCS | Mod: CPTII,S$GLB,, | Performed by: INTERNAL MEDICINE

## 2021-04-07 PROCEDURE — 99214 OFFICE O/P EST MOD 30 MIN: CPT | Mod: S$GLB,,, | Performed by: INTERNAL MEDICINE

## 2021-04-07 PROCEDURE — 3078F PR MOST RECENT DIASTOLIC BLOOD PRESSURE < 80 MM HG: ICD-10-PCS | Mod: CPTII,S$GLB,, | Performed by: INTERNAL MEDICINE

## 2021-04-07 PROCEDURE — 3078F DIAST BP <80 MM HG: CPT | Mod: CPTII,S$GLB,, | Performed by: INTERNAL MEDICINE

## 2021-04-07 PROCEDURE — 77063 BREAST TOMOSYNTHESIS BI: CPT | Mod: 26,,, | Performed by: RADIOLOGY

## 2021-04-07 PROCEDURE — 99214 PR OFFICE/OUTPT VISIT, EST, LEVL IV, 30-39 MIN: ICD-10-PCS | Mod: S$GLB,,, | Performed by: INTERNAL MEDICINE

## 2021-04-07 PROCEDURE — 3008F BODY MASS INDEX DOCD: CPT | Mod: CPTII,S$GLB,, | Performed by: INTERNAL MEDICINE

## 2021-04-07 PROCEDURE — 99999 PR PBB SHADOW E&M-EST. PATIENT-LVL IV: ICD-10-PCS | Mod: PBBFAC,,, | Performed by: INTERNAL MEDICINE

## 2021-04-07 PROCEDURE — 77067 MAMMO DIGITAL SCREENING BILAT WITH TOMO: ICD-10-PCS | Mod: 26,,, | Performed by: RADIOLOGY

## 2021-04-08 ENCOUNTER — PATIENT MESSAGE (OUTPATIENT)
Dept: CARDIOLOGY | Facility: CLINIC | Age: 61
End: 2021-04-08

## 2021-04-09 DIAGNOSIS — I48.0 PAROXYSMAL ATRIAL FIBRILLATION: ICD-10-CM

## 2021-04-09 DIAGNOSIS — E66.01 MORBID OBESITY WITH BMI OF 45.0-49.9, ADULT: Primary | ICD-10-CM

## 2021-04-27 ENCOUNTER — OFFICE VISIT (OUTPATIENT)
Dept: FAMILY MEDICINE | Facility: CLINIC | Age: 61
End: 2021-04-27
Payer: COMMERCIAL

## 2021-04-27 VITALS
WEIGHT: 293 LBS | BODY MASS INDEX: 48.82 KG/M2 | SYSTOLIC BLOOD PRESSURE: 130 MMHG | DIASTOLIC BLOOD PRESSURE: 76 MMHG | HEART RATE: 60 BPM | OXYGEN SATURATION: 99 % | HEIGHT: 65 IN

## 2021-04-27 DIAGNOSIS — E66.01 MORBID OBESITY WITH BMI OF 45.0-49.9, ADULT: Primary | ICD-10-CM

## 2021-04-27 PROCEDURE — 99213 PR OFFICE/OUTPT VISIT, EST, LEVL III, 20-29 MIN: ICD-10-PCS | Mod: S$GLB,,, | Performed by: INTERNAL MEDICINE

## 2021-04-27 PROCEDURE — 99999 PR PBB SHADOW E&M-EST. PATIENT-LVL V: CPT | Mod: PBBFAC,,, | Performed by: INTERNAL MEDICINE

## 2021-04-27 PROCEDURE — 3008F PR BODY MASS INDEX (BMI) DOCUMENTED: ICD-10-PCS | Mod: CPTII,S$GLB,, | Performed by: INTERNAL MEDICINE

## 2021-04-27 PROCEDURE — 99213 OFFICE O/P EST LOW 20 MIN: CPT | Mod: S$GLB,,, | Performed by: INTERNAL MEDICINE

## 2021-04-27 PROCEDURE — 99999 PR PBB SHADOW E&M-EST. PATIENT-LVL V: ICD-10-PCS | Mod: PBBFAC,,, | Performed by: INTERNAL MEDICINE

## 2021-04-27 PROCEDURE — 3008F BODY MASS INDEX DOCD: CPT | Mod: CPTII,S$GLB,, | Performed by: INTERNAL MEDICINE

## 2021-05-20 ENCOUNTER — OFFICE VISIT (OUTPATIENT)
Dept: BARIATRICS | Facility: CLINIC | Age: 61
End: 2021-05-20
Payer: COMMERCIAL

## 2021-05-20 VITALS
RESPIRATION RATE: 16 BRPM | HEART RATE: 60 BPM | TEMPERATURE: 98 F | SYSTOLIC BLOOD PRESSURE: 128 MMHG | DIASTOLIC BLOOD PRESSURE: 71 MMHG | WEIGHT: 293 LBS | BODY MASS INDEX: 48.82 KG/M2 | HEIGHT: 65 IN

## 2021-05-20 DIAGNOSIS — I10 ESSENTIAL HYPERTENSION: ICD-10-CM

## 2021-05-20 DIAGNOSIS — E66.01 MORBID OBESITY WITH BMI OF 45.0-49.9, ADULT: Primary | ICD-10-CM

## 2021-05-20 DIAGNOSIS — E66.01 MORBID OBESITY: ICD-10-CM

## 2021-05-20 DIAGNOSIS — I48.0 PAROXYSMAL ATRIAL FIBRILLATION: ICD-10-CM

## 2021-05-20 PROCEDURE — 99999 PR PBB SHADOW E&M-EST. PATIENT-LVL V: ICD-10-PCS | Mod: PBBFAC,,, | Performed by: SURGERY

## 2021-05-20 PROCEDURE — 99999 PR PBB SHADOW E&M-EST. PATIENT-LVL V: CPT | Mod: PBBFAC,,, | Performed by: SURGERY

## 2021-05-20 PROCEDURE — 1126F PR PAIN SEVERITY QUANTIFIED, NO PAIN PRESENT: ICD-10-PCS | Mod: S$GLB,,, | Performed by: SURGERY

## 2021-05-20 PROCEDURE — 1126F AMNT PAIN NOTED NONE PRSNT: CPT | Mod: S$GLB,,, | Performed by: SURGERY

## 2021-05-20 PROCEDURE — 99215 PR OFFICE/OUTPT VISIT, EST, LEVL V, 40-54 MIN: ICD-10-PCS | Mod: S$GLB,,, | Performed by: SURGERY

## 2021-05-20 PROCEDURE — 3008F PR BODY MASS INDEX (BMI) DOCUMENTED: ICD-10-PCS | Mod: CPTII,S$GLB,, | Performed by: SURGERY

## 2021-05-20 PROCEDURE — 99215 OFFICE O/P EST HI 40 MIN: CPT | Mod: S$GLB,,, | Performed by: SURGERY

## 2021-05-20 PROCEDURE — 3008F BODY MASS INDEX DOCD: CPT | Mod: CPTII,S$GLB,, | Performed by: SURGERY

## 2021-05-20 RX ORDER — UBIDECARENONE 75 MG
1000 CAPSULE ORAL DAILY
COMMUNITY

## 2021-05-20 RX ORDER — TIZANIDINE 4 MG/1
4 TABLET ORAL EVERY 6 HOURS PRN
COMMUNITY
Start: 2021-02-16 | End: 2021-08-27 | Stop reason: SDUPTHER

## 2021-05-20 RX ORDER — ALBUTEROL SULFATE 90 UG/1
AEROSOL, METERED RESPIRATORY (INHALATION)
COMMUNITY
Start: 2020-12-30 | End: 2022-06-10

## 2021-06-03 ENCOUNTER — PATIENT MESSAGE (OUTPATIENT)
Dept: BARIATRICS | Facility: CLINIC | Age: 61
End: 2021-06-03

## 2021-06-25 ENCOUNTER — CLINICAL SUPPORT (OUTPATIENT)
Dept: CARDIOLOGY | Facility: HOSPITAL | Age: 61
End: 2021-06-25
Payer: COMMERCIAL

## 2021-06-25 ENCOUNTER — HOSPITAL ENCOUNTER (OUTPATIENT)
Dept: CARDIOLOGY | Facility: HOSPITAL | Age: 61
Discharge: HOME | End: 2021-06-25
Payer: COMMERCIAL

## 2021-06-25 DIAGNOSIS — Z95.0 PRESENCE OF CARDIAC PACEMAKER: ICD-10-CM

## 2021-06-25 PROCEDURE — 93296 REM INTERROG EVL PM/IDS: CPT | Mod: PO | Performed by: INTERNAL MEDICINE

## 2021-06-25 PROCEDURE — 93294 CARDIAC DEVICE CHECK - REMOTE: ICD-10-PCS | Mod: ,,, | Performed by: INTERNAL MEDICINE

## 2021-06-25 PROCEDURE — 93294 REM INTERROG EVL PM/LDLS PM: CPT | Mod: ,,, | Performed by: INTERNAL MEDICINE

## 2021-07-02 ENCOUNTER — TELEPHONE (OUTPATIENT)
Dept: BARIATRICS | Facility: CLINIC | Age: 61
End: 2021-07-02

## 2021-07-02 ENCOUNTER — CLINICAL SUPPORT (OUTPATIENT)
Dept: BARIATRICS | Facility: CLINIC | Age: 61
End: 2021-07-02

## 2021-07-02 ENCOUNTER — HOSPITAL ENCOUNTER (OUTPATIENT)
Dept: RADIOLOGY | Facility: HOSPITAL | Age: 61
Discharge: HOME | End: 2021-07-02
Attending: SURGERY
Payer: COMMERCIAL

## 2021-07-02 VITALS — WEIGHT: 290.13 LBS | HEIGHT: 65 IN | BODY MASS INDEX: 48.34 KG/M2

## 2021-07-02 DIAGNOSIS — E66.01 MORBID OBESITY WITH BMI OF 45.0-49.9, ADULT: ICD-10-CM

## 2021-07-02 DIAGNOSIS — Z71.3 DIETARY COUNSELING AND SURVEILLANCE: ICD-10-CM

## 2021-07-02 PROCEDURE — 97802 MEDICAL NUTRITION INDIV IN: CPT | Mod: S$GLB,,, | Performed by: DIETITIAN, REGISTERED

## 2021-07-02 PROCEDURE — 74240 FL UPPER GI: ICD-10-PCS | Mod: 26,,, | Performed by: RADIOLOGY

## 2021-07-02 PROCEDURE — 97802 PR MED NUTR THER, 1ST, INDIV, EA 15 MIN: ICD-10-PCS | Mod: S$GLB,,, | Performed by: DIETITIAN, REGISTERED

## 2021-07-02 PROCEDURE — 74240 X-RAY XM UPR GI TRC 1CNTRST: CPT | Mod: 26,,, | Performed by: RADIOLOGY

## 2021-07-02 PROCEDURE — 25500020 PHARM REV CODE 255: Mod: PO | Performed by: SURGERY

## 2021-07-02 PROCEDURE — 99999 PR PBB SHADOW E&M-EST. PATIENT-LVL IV: CPT | Mod: PBBFAC,,, | Performed by: DIETITIAN, REGISTERED

## 2021-07-02 PROCEDURE — A9698 NON-RAD CONTRAST MATERIALNOC: HCPCS | Mod: PO | Performed by: SURGERY

## 2021-07-02 PROCEDURE — 74240 X-RAY XM UPR GI TRC 1CNTRST: CPT | Mod: TC,FY,PO

## 2021-07-02 PROCEDURE — 99999 PR PBB SHADOW E&M-EST. PATIENT-LVL IV: ICD-10-PCS | Mod: PBBFAC,,, | Performed by: DIETITIAN, REGISTERED

## 2021-07-02 RX ADMIN — BARIUM SULFATE: 980 POWDER, FOR SUSPENSION ORAL at 09:07

## 2021-07-02 RX ADMIN — BARIUM SULFATE 355 ML: 0.6 SUSPENSION ORAL at 09:07

## 2021-07-14 ENCOUNTER — OFFICE VISIT (OUTPATIENT)
Dept: ORTHOPEDICS | Facility: CLINIC | Age: 61
End: 2021-07-14
Payer: COMMERCIAL

## 2021-07-14 VITALS — HEIGHT: 65 IN | BODY MASS INDEX: 48.34 KG/M2 | WEIGHT: 290.13 LBS

## 2021-07-14 DIAGNOSIS — E66.01 MORBID OBESITY WITH BMI OF 45.0-49.9, ADULT: ICD-10-CM

## 2021-07-14 DIAGNOSIS — M25.562 CHRONIC PAIN OF LEFT KNEE: Primary | ICD-10-CM

## 2021-07-14 DIAGNOSIS — G89.29 CHRONIC PAIN OF LEFT KNEE: ICD-10-CM

## 2021-07-14 DIAGNOSIS — G89.29 CHRONIC PAIN OF LEFT KNEE: Primary | ICD-10-CM

## 2021-07-14 DIAGNOSIS — M17.12 PRIMARY OSTEOARTHRITIS OF LEFT KNEE: Primary | ICD-10-CM

## 2021-07-14 DIAGNOSIS — M25.562 CHRONIC PAIN OF LEFT KNEE: ICD-10-CM

## 2021-07-14 PROCEDURE — 99214 PR OFFICE/OUTPT VISIT, EST, LEVL IV, 30-39 MIN: ICD-10-PCS | Mod: 25,S$GLB,, | Performed by: ORTHOPAEDIC SURGERY

## 2021-07-14 PROCEDURE — 3008F BODY MASS INDEX DOCD: CPT | Mod: CPTII,S$GLB,, | Performed by: ORTHOPAEDIC SURGERY

## 2021-07-14 PROCEDURE — 20610 LARGE JOINT ASPIRATION/INJECTION: L KNEE: ICD-10-PCS | Mod: LT,S$GLB,, | Performed by: ORTHOPAEDIC SURGERY

## 2021-07-14 PROCEDURE — 99999 PR PBB SHADOW E&M-EST. PATIENT-LVL IV: ICD-10-PCS | Mod: PBBFAC,,, | Performed by: ORTHOPAEDIC SURGERY

## 2021-07-14 PROCEDURE — 1125F AMNT PAIN NOTED PAIN PRSNT: CPT | Mod: S$GLB,,, | Performed by: ORTHOPAEDIC SURGERY

## 2021-07-14 PROCEDURE — 3008F PR BODY MASS INDEX (BMI) DOCUMENTED: ICD-10-PCS | Mod: CPTII,S$GLB,, | Performed by: ORTHOPAEDIC SURGERY

## 2021-07-14 PROCEDURE — 99214 OFFICE O/P EST MOD 30 MIN: CPT | Mod: 25,S$GLB,, | Performed by: ORTHOPAEDIC SURGERY

## 2021-07-14 PROCEDURE — 99999 PR PBB SHADOW E&M-EST. PATIENT-LVL IV: CPT | Mod: PBBFAC,,, | Performed by: ORTHOPAEDIC SURGERY

## 2021-07-14 PROCEDURE — 1125F PR PAIN SEVERITY QUANTIFIED, PAIN PRESENT: ICD-10-PCS | Mod: S$GLB,,, | Performed by: ORTHOPAEDIC SURGERY

## 2021-07-14 PROCEDURE — 20610 DRAIN/INJ JOINT/BURSA W/O US: CPT | Mod: LT,S$GLB,, | Performed by: ORTHOPAEDIC SURGERY

## 2021-07-14 RX ORDER — TRIAMCINOLONE ACETONIDE 40 MG/ML
40 INJECTION, SUSPENSION INTRA-ARTICULAR; INTRAMUSCULAR
Status: DISCONTINUED | OUTPATIENT
Start: 2021-07-14 | End: 2021-07-14 | Stop reason: HOSPADM

## 2021-07-14 RX ADMIN — TRIAMCINOLONE ACETONIDE 40 MG: 40 INJECTION, SUSPENSION INTRA-ARTICULAR; INTRAMUSCULAR at 08:07

## 2021-07-18 NOTE — TELEPHONE ENCOUNTER
Called to discuss labs and provide recommendations--needs anemia studies and occult blood test. ER precautions given for symptomatic anemia. TGs elevated- discussed diet and exercise. A1c borderline. Start iron AFTER iron studies, take with metamucil.    Private car

## 2021-08-04 DIAGNOSIS — E78.5 HYPERLIPIDEMIA, UNSPECIFIED HYPERLIPIDEMIA TYPE: ICD-10-CM

## 2021-08-04 RX ORDER — ROSUVASTATIN CALCIUM 10 MG/1
10 TABLET, COATED ORAL NIGHTLY
Qty: 90 TABLET | Refills: 3 | Status: SHIPPED | OUTPATIENT
Start: 2021-08-04 | End: 2022-08-26

## 2021-08-27 ENCOUNTER — PATIENT MESSAGE (OUTPATIENT)
Dept: FAMILY MEDICINE | Facility: CLINIC | Age: 61
End: 2021-08-27

## 2021-08-27 RX ORDER — TIZANIDINE 4 MG/1
4 TABLET ORAL EVERY 6 HOURS PRN
Qty: 60 TABLET | Refills: 1 | Status: SHIPPED | OUTPATIENT
Start: 2021-08-27 | End: 2022-01-10 | Stop reason: SDUPTHER

## 2021-09-13 ENCOUNTER — PATIENT MESSAGE (OUTPATIENT)
Dept: FAMILY MEDICINE | Facility: CLINIC | Age: 61
End: 2021-09-13

## 2021-09-13 ENCOUNTER — PATIENT MESSAGE (OUTPATIENT)
Dept: CARDIOLOGY | Facility: CLINIC | Age: 61
End: 2021-09-13

## 2021-09-14 ENCOUNTER — OFFICE VISIT (OUTPATIENT)
Dept: FAMILY MEDICINE | Facility: CLINIC | Age: 61
End: 2021-09-14
Payer: COMMERCIAL

## 2021-09-14 VITALS
HEART RATE: 67 BPM | BODY MASS INDEX: 47.95 KG/M2 | SYSTOLIC BLOOD PRESSURE: 120 MMHG | DIASTOLIC BLOOD PRESSURE: 72 MMHG | OXYGEN SATURATION: 98 % | WEIGHT: 283.75 LBS

## 2021-09-14 DIAGNOSIS — M21.962 DEFORMITY OF BOTH FEET: ICD-10-CM

## 2021-09-14 DIAGNOSIS — M21.961 DEFORMITY OF BOTH FEET: ICD-10-CM

## 2021-09-14 DIAGNOSIS — E66.01 MORBID OBESITY WITH BMI OF 45.0-49.9, ADULT: ICD-10-CM

## 2021-09-14 DIAGNOSIS — I48.0 PAROXYSMAL ATRIAL FIBRILLATION: ICD-10-CM

## 2021-09-14 DIAGNOSIS — Z95.0 PACEMAKER: ICD-10-CM

## 2021-09-14 DIAGNOSIS — R20.9 BILATERAL COLD FEET: Primary | ICD-10-CM

## 2021-09-14 DIAGNOSIS — I10 ESSENTIAL HYPERTENSION: ICD-10-CM

## 2021-09-14 DIAGNOSIS — Z23 NEED FOR VACCINATION AGAINST STREPTOCOCCUS PNEUMONIAE USING PNEUMOCOCCAL CONJUGATE VACCINE 13: ICD-10-CM

## 2021-09-14 PROCEDURE — 3008F PR BODY MASS INDEX (BMI) DOCUMENTED: ICD-10-PCS | Mod: CPTII,S$GLB,, | Performed by: PHYSICIAN ASSISTANT

## 2021-09-14 PROCEDURE — 3078F DIAST BP <80 MM HG: CPT | Mod: CPTII,S$GLB,, | Performed by: PHYSICIAN ASSISTANT

## 2021-09-14 PROCEDURE — 99999 PR PBB SHADOW E&M-EST. PATIENT-LVL V: CPT | Mod: PBBFAC,,, | Performed by: PHYSICIAN ASSISTANT

## 2021-09-14 PROCEDURE — 90670 PNEUMOCOCCAL CONJUGATE VACCINE 13-VALENT LESS THAN 5YO & GREATER THAN: ICD-10-PCS | Mod: S$GLB,,, | Performed by: PHYSICIAN ASSISTANT

## 2021-09-14 PROCEDURE — 99999 PR PBB SHADOW E&M-EST. PATIENT-LVL V: ICD-10-PCS | Mod: PBBFAC,,, | Performed by: PHYSICIAN ASSISTANT

## 2021-09-14 PROCEDURE — 90471 PNEUMOCOCCAL CONJUGATE VACCINE 13-VALENT LESS THAN 5YO & GREATER THAN: ICD-10-PCS | Mod: S$GLB,,, | Performed by: PHYSICIAN ASSISTANT

## 2021-09-14 PROCEDURE — 3074F SYST BP LT 130 MM HG: CPT | Mod: CPTII,S$GLB,, | Performed by: PHYSICIAN ASSISTANT

## 2021-09-14 PROCEDURE — 3078F PR MOST RECENT DIASTOLIC BLOOD PRESSURE < 80 MM HG: ICD-10-PCS | Mod: CPTII,S$GLB,, | Performed by: PHYSICIAN ASSISTANT

## 2021-09-14 PROCEDURE — 90670 PCV13 VACCINE IM: CPT | Mod: S$GLB,,, | Performed by: PHYSICIAN ASSISTANT

## 2021-09-14 PROCEDURE — 90471 IMMUNIZATION ADMIN: CPT | Mod: S$GLB,,, | Performed by: PHYSICIAN ASSISTANT

## 2021-09-14 PROCEDURE — 3008F BODY MASS INDEX DOCD: CPT | Mod: CPTII,S$GLB,, | Performed by: PHYSICIAN ASSISTANT

## 2021-09-14 PROCEDURE — 1159F MED LIST DOCD IN RCRD: CPT | Mod: CPTII,S$GLB,, | Performed by: PHYSICIAN ASSISTANT

## 2021-09-14 PROCEDURE — 1160F PR REVIEW ALL MEDS BY PRESCRIBER/CLIN PHARMACIST DOCUMENTED: ICD-10-PCS | Mod: CPTII,S$GLB,, | Performed by: PHYSICIAN ASSISTANT

## 2021-09-14 PROCEDURE — 3074F PR MOST RECENT SYSTOLIC BLOOD PRESSURE < 130 MM HG: ICD-10-PCS | Mod: CPTII,S$GLB,, | Performed by: PHYSICIAN ASSISTANT

## 2021-09-14 PROCEDURE — 4010F PR ACE/ARB THEARPY RXD/TAKEN: ICD-10-PCS | Mod: CPTII,S$GLB,, | Performed by: PHYSICIAN ASSISTANT

## 2021-09-14 PROCEDURE — 99214 PR OFFICE/OUTPT VISIT, EST, LEVL IV, 30-39 MIN: ICD-10-PCS | Mod: 25,S$GLB,, | Performed by: PHYSICIAN ASSISTANT

## 2021-09-14 PROCEDURE — 99214 OFFICE O/P EST MOD 30 MIN: CPT | Mod: 25,S$GLB,, | Performed by: PHYSICIAN ASSISTANT

## 2021-09-14 PROCEDURE — 4010F ACE/ARB THERAPY RXD/TAKEN: CPT | Mod: CPTII,S$GLB,, | Performed by: PHYSICIAN ASSISTANT

## 2021-09-14 PROCEDURE — 1159F PR MEDICATION LIST DOCUMENTED IN MEDICAL RECORD: ICD-10-PCS | Mod: CPTII,S$GLB,, | Performed by: PHYSICIAN ASSISTANT

## 2021-09-14 PROCEDURE — 1160F RVW MEDS BY RX/DR IN RCRD: CPT | Mod: CPTII,S$GLB,, | Performed by: PHYSICIAN ASSISTANT

## 2021-09-20 ENCOUNTER — HOSPITAL ENCOUNTER (OUTPATIENT)
Dept: RADIOLOGY | Facility: HOSPITAL | Age: 61
Discharge: HOME | End: 2021-09-20
Attending: PHYSICIAN ASSISTANT
Payer: COMMERCIAL

## 2021-09-20 DIAGNOSIS — R20.9 BILATERAL COLD FEET: ICD-10-CM

## 2021-09-20 PROCEDURE — 93922 US ARTERIAL LOWER EXTREMITY BILAT WITH ABI (XPD): ICD-10-PCS | Mod: 26,,, | Performed by: RADIOLOGY

## 2021-09-20 PROCEDURE — 93925 LOWER EXTREMITY STUDY: CPT | Mod: 26,,, | Performed by: RADIOLOGY

## 2021-09-20 PROCEDURE — 93922 UPR/L XTREMITY ART 2 LEVELS: CPT | Mod: 26,,, | Performed by: RADIOLOGY

## 2021-09-20 PROCEDURE — 93922 UPR/L XTREMITY ART 2 LEVELS: CPT | Mod: TC,PO

## 2021-09-20 PROCEDURE — 93925 US ARTERIAL LOWER EXTREMITY BILAT WITH ABI (XPD): ICD-10-PCS | Mod: 26,,, | Performed by: RADIOLOGY

## 2021-09-23 ENCOUNTER — CLINICAL SUPPORT (OUTPATIENT)
Dept: CARDIOLOGY | Facility: HOSPITAL | Age: 61
End: 2021-09-23
Payer: COMMERCIAL

## 2021-09-23 DIAGNOSIS — Z95.0 PRESENCE OF CARDIAC PACEMAKER: ICD-10-CM

## 2021-09-23 PROCEDURE — 93296 REM INTERROG EVL PM/IDS: CPT | Mod: PO | Performed by: INTERNAL MEDICINE

## 2021-09-23 PROCEDURE — 93294 CARDIAC DEVICE CHECK - REMOTE: ICD-10-PCS | Mod: ,,, | Performed by: INTERNAL MEDICINE

## 2021-09-23 PROCEDURE — 93294 REM INTERROG EVL PM/LDLS PM: CPT | Mod: ,,, | Performed by: INTERNAL MEDICINE

## 2021-09-29 ENCOUNTER — OFFICE VISIT (OUTPATIENT)
Dept: PODIATRY | Facility: CLINIC | Age: 61
End: 2021-09-29
Payer: COMMERCIAL

## 2021-09-29 DIAGNOSIS — G62.9 NEUROPATHY: ICD-10-CM

## 2021-09-29 PROCEDURE — 1159F PR MEDICATION LIST DOCUMENTED IN MEDICAL RECORD: ICD-10-PCS | Mod: CPTII,S$GLB,, | Performed by: PODIATRIST

## 2021-09-29 PROCEDURE — 99213 PR OFFICE/OUTPT VISIT, EST, LEVL III, 20-29 MIN: ICD-10-PCS | Mod: S$GLB,,, | Performed by: PODIATRIST

## 2021-09-29 PROCEDURE — 4010F ACE/ARB THERAPY RXD/TAKEN: CPT | Mod: CPTII,S$GLB,, | Performed by: PODIATRIST

## 2021-09-29 PROCEDURE — 99999 PR PBB SHADOW E&M-EST. PATIENT-LVL III: CPT | Mod: PBBFAC,,, | Performed by: PODIATRIST

## 2021-09-29 PROCEDURE — 99999 PR PBB SHADOW E&M-EST. PATIENT-LVL III: ICD-10-PCS | Mod: PBBFAC,,, | Performed by: PODIATRIST

## 2021-09-29 PROCEDURE — 1160F RVW MEDS BY RX/DR IN RCRD: CPT | Mod: CPTII,S$GLB,, | Performed by: PODIATRIST

## 2021-09-29 PROCEDURE — 1160F PR REVIEW ALL MEDS BY PRESCRIBER/CLIN PHARMACIST DOCUMENTED: ICD-10-PCS | Mod: CPTII,S$GLB,, | Performed by: PODIATRIST

## 2021-09-29 PROCEDURE — 4010F PR ACE/ARB THEARPY RXD/TAKEN: ICD-10-PCS | Mod: CPTII,S$GLB,, | Performed by: PODIATRIST

## 2021-09-29 PROCEDURE — 1159F MED LIST DOCD IN RCRD: CPT | Mod: CPTII,S$GLB,, | Performed by: PODIATRIST

## 2021-09-29 PROCEDURE — 99213 OFFICE O/P EST LOW 20 MIN: CPT | Mod: S$GLB,,, | Performed by: PODIATRIST

## 2021-09-29 RX ORDER — GABAPENTIN 600 MG/1
1200 TABLET ORAL 2 TIMES DAILY
Qty: 270 TABLET | Refills: 3 | Status: SHIPPED | OUTPATIENT
Start: 2021-09-29 | End: 2021-10-20 | Stop reason: ALTCHOICE

## 2021-10-04 ENCOUNTER — TELEPHONE (OUTPATIENT)
Dept: CARDIOLOGY | Facility: CLINIC | Age: 61
End: 2021-10-04

## 2021-10-04 ENCOUNTER — OFFICE VISIT (OUTPATIENT)
Dept: CARDIOLOGY | Facility: CLINIC | Age: 61
End: 2021-10-04
Payer: COMMERCIAL

## 2021-10-04 VITALS
SYSTOLIC BLOOD PRESSURE: 117 MMHG | HEIGHT: 65 IN | WEIGHT: 285.06 LBS | HEART RATE: 60 BPM | BODY MASS INDEX: 47.49 KG/M2 | DIASTOLIC BLOOD PRESSURE: 70 MMHG

## 2021-10-04 DIAGNOSIS — I48.0 PAROXYSMAL ATRIAL FIBRILLATION: Primary | ICD-10-CM

## 2021-10-04 DIAGNOSIS — I10 ESSENTIAL HYPERTENSION: ICD-10-CM

## 2021-10-04 DIAGNOSIS — E66.01 MORBID OBESITY WITH BMI OF 45.0-49.9, ADULT: ICD-10-CM

## 2021-10-04 DIAGNOSIS — Z95.0 PACEMAKER: ICD-10-CM

## 2021-10-04 PROCEDURE — 3078F DIAST BP <80 MM HG: CPT | Mod: CPTII,S$GLB,, | Performed by: PHYSICIAN ASSISTANT

## 2021-10-04 PROCEDURE — 99214 PR OFFICE/OUTPT VISIT, EST, LEVL IV, 30-39 MIN: ICD-10-PCS | Mod: S$GLB,,, | Performed by: PHYSICIAN ASSISTANT

## 2021-10-04 PROCEDURE — 3078F PR MOST RECENT DIASTOLIC BLOOD PRESSURE < 80 MM HG: ICD-10-PCS | Mod: CPTII,S$GLB,, | Performed by: PHYSICIAN ASSISTANT

## 2021-10-04 PROCEDURE — 99999 PR PBB SHADOW E&M-EST. PATIENT-LVL IV: CPT | Mod: PBBFAC,,, | Performed by: PHYSICIAN ASSISTANT

## 2021-10-04 PROCEDURE — 99999 PR PBB SHADOW E&M-EST. PATIENT-LVL IV: ICD-10-PCS | Mod: PBBFAC,,, | Performed by: PHYSICIAN ASSISTANT

## 2021-10-04 PROCEDURE — 3008F BODY MASS INDEX DOCD: CPT | Mod: CPTII,S$GLB,, | Performed by: PHYSICIAN ASSISTANT

## 2021-10-04 PROCEDURE — 3074F SYST BP LT 130 MM HG: CPT | Mod: CPTII,S$GLB,, | Performed by: PHYSICIAN ASSISTANT

## 2021-10-04 PROCEDURE — 1160F PR REVIEW ALL MEDS BY PRESCRIBER/CLIN PHARMACIST DOCUMENTED: ICD-10-PCS | Mod: CPTII,S$GLB,, | Performed by: PHYSICIAN ASSISTANT

## 2021-10-04 PROCEDURE — 3008F PR BODY MASS INDEX (BMI) DOCUMENTED: ICD-10-PCS | Mod: CPTII,S$GLB,, | Performed by: PHYSICIAN ASSISTANT

## 2021-10-04 PROCEDURE — 1159F PR MEDICATION LIST DOCUMENTED IN MEDICAL RECORD: ICD-10-PCS | Mod: CPTII,S$GLB,, | Performed by: PHYSICIAN ASSISTANT

## 2021-10-04 PROCEDURE — 1160F RVW MEDS BY RX/DR IN RCRD: CPT | Mod: CPTII,S$GLB,, | Performed by: PHYSICIAN ASSISTANT

## 2021-10-04 PROCEDURE — 99214 OFFICE O/P EST MOD 30 MIN: CPT | Mod: S$GLB,,, | Performed by: PHYSICIAN ASSISTANT

## 2021-10-04 PROCEDURE — 1159F MED LIST DOCD IN RCRD: CPT | Mod: CPTII,S$GLB,, | Performed by: PHYSICIAN ASSISTANT

## 2021-10-04 PROCEDURE — 4010F PR ACE/ARB THEARPY RXD/TAKEN: ICD-10-PCS | Mod: CPTII,S$GLB,, | Performed by: PHYSICIAN ASSISTANT

## 2021-10-04 PROCEDURE — 3074F PR MOST RECENT SYSTOLIC BLOOD PRESSURE < 130 MM HG: ICD-10-PCS | Mod: CPTII,S$GLB,, | Performed by: PHYSICIAN ASSISTANT

## 2021-10-04 PROCEDURE — 4010F ACE/ARB THERAPY RXD/TAKEN: CPT | Mod: CPTII,S$GLB,, | Performed by: PHYSICIAN ASSISTANT

## 2021-10-06 ENCOUNTER — PATIENT MESSAGE (OUTPATIENT)
Dept: FAMILY MEDICINE | Facility: CLINIC | Age: 61
End: 2021-10-06

## 2021-10-07 DIAGNOSIS — I48.0 PAROXYSMAL ATRIAL FIBRILLATION: ICD-10-CM

## 2021-10-07 RX ORDER — OMEPRAZOLE 20 MG/1
20 CAPSULE, DELAYED RELEASE ORAL EVERY MORNING
Qty: 30 CAPSULE | Refills: 11 | Status: SHIPPED | OUTPATIENT
Start: 2021-10-07 | End: 2022-10-12 | Stop reason: SDUPTHER

## 2021-10-08 ENCOUNTER — TELEPHONE (OUTPATIENT)
Dept: PAIN MEDICINE | Facility: CLINIC | Age: 61
End: 2021-10-08

## 2021-10-08 NOTE — TELEPHONE ENCOUNTER
----- Message from Carolyn Metzger MA sent at 10/8/2021 12:05 PM CDT -----  Type: Needs Medical Advice  Who Called:  Fatemeh  Alberto Call Back Number: [phone]  Additional Information: patient was dx with cellulitis at [facility] ER last night.  She would like to know how to proceed with testing on monday

## 2021-10-11 ENCOUNTER — HOSPITAL ENCOUNTER (OUTPATIENT)
Dept: RADIOLOGY | Facility: HOSPITAL | Age: 61
Discharge: HOME | End: 2021-10-11
Attending: PHYSICIAN ASSISTANT
Payer: COMMERCIAL

## 2021-10-11 ENCOUNTER — CLINICAL SUPPORT (OUTPATIENT)
Dept: CARDIOLOGY | Facility: HOSPITAL | Age: 61
End: 2021-10-11
Attending: PHYSICIAN ASSISTANT
Payer: COMMERCIAL

## 2021-10-11 ENCOUNTER — PATIENT MESSAGE (OUTPATIENT)
Dept: PODIATRY | Facility: CLINIC | Age: 61
End: 2021-10-11

## 2021-10-11 VITALS — BODY MASS INDEX: 48.65 KG/M2 | HEIGHT: 64 IN | WEIGHT: 285 LBS

## 2021-10-11 VITALS — WEIGHT: 282 LBS | BODY MASS INDEX: 48.14 KG/M2 | HEIGHT: 64 IN

## 2021-10-11 DIAGNOSIS — I48.0 PAROXYSMAL ATRIAL FIBRILLATION: ICD-10-CM

## 2021-10-11 LAB
CV PHARM DOSE: 0.4 MG
CV STRESS BASE HR: 60 BPM
DIASTOLIC BLOOD PRESSURE: 67 MMHG
NUC REST EJECTION FRACTION: 63
OHS CV CPX 1 MINUTE RECOVERY HEART RATE: 60 BPM
OHS CV CPX 85 PERCENT MAX PREDICTED HEART RATE MALE: 130
OHS CV CPX MAX PREDICTED HEART RATE: 153
OHS CV CPX PATIENT IS FEMALE: 1
OHS CV CPX PATIENT IS MALE: 0
OHS CV CPX PEAK DIASTOLIC BLOOD PRESSURE: 67 MMHG
OHS CV CPX PEAK HEAR RATE: 69 BPM
OHS CV CPX PEAK RATE PRESSURE PRODUCT: 9522
OHS CV CPX PEAK SYSTOLIC BLOOD PRESSURE: 138 MMHG
OHS CV CPX PERCENT MAX PREDICTED HEART RATE ACHIEVED: 45
OHS CV CPX RATE PRESSURE PRODUCT PRESENTING: 8280
OHS CV PHARM TIME: 1440 MIN
SYSTOLIC BLOOD PRESSURE: 138 MMHG

## 2021-10-11 PROCEDURE — 93306 TTE W/DOPPLER COMPLETE: CPT | Mod: PO

## 2021-10-11 PROCEDURE — A9502 TC99M TETROFOSMIN: HCPCS | Mod: PO

## 2021-10-11 PROCEDURE — 78452 STRESS TEST WITH MYOCARDIAL PERFUSION (CUPID ONLY): ICD-10-PCS | Mod: 26,,, | Performed by: INTERNAL MEDICINE

## 2021-10-11 PROCEDURE — 93018 PR CARDIAC STRESS TST,INTERP/REPT ONLY: ICD-10-PCS | Mod: ,,, | Performed by: INTERNAL MEDICINE

## 2021-10-11 PROCEDURE — 93306 ECHO (CUPID ONLY): ICD-10-PCS | Mod: 26,,, | Performed by: INTERNAL MEDICINE

## 2021-10-11 PROCEDURE — 78452 HT MUSCLE IMAGE SPECT MULT: CPT | Mod: 26,,, | Performed by: INTERNAL MEDICINE

## 2021-10-11 PROCEDURE — 93018 CV STRESS TEST I&R ONLY: CPT | Mod: ,,, | Performed by: INTERNAL MEDICINE

## 2021-10-11 PROCEDURE — 93016 STRESS TEST WITH MYOCARDIAL PERFUSION (CUPID ONLY): ICD-10-PCS | Mod: ,,, | Performed by: INTERNAL MEDICINE

## 2021-10-11 PROCEDURE — 93016 CV STRESS TEST SUPVJ ONLY: CPT | Mod: ,,, | Performed by: INTERNAL MEDICINE

## 2021-10-11 PROCEDURE — 93306 TTE W/DOPPLER COMPLETE: CPT | Mod: 26,,, | Performed by: INTERNAL MEDICINE

## 2021-10-11 PROCEDURE — 63600175 PHARM REV CODE 636 W HCPCS: Mod: PO | Performed by: PHYSICIAN ASSISTANT

## 2021-10-11 PROCEDURE — 78452 HT MUSCLE IMAGE SPECT MULT: CPT | Mod: PO

## 2021-10-11 PROCEDURE — 93017 CV STRESS TEST TRACING ONLY: CPT | Mod: PO

## 2021-10-11 RX ORDER — REGADENOSON 0.08 MG/ML
0.4 INJECTION, SOLUTION INTRAVENOUS ONCE
Status: COMPLETED | OUTPATIENT
Start: 2021-10-11 | End: 2021-10-11

## 2021-10-11 RX ADMIN — REGADENOSON 0.4 MG: 0.08 INJECTION, SOLUTION INTRAVENOUS at 02:10

## 2021-10-12 LAB
ASCENDING AORTA: 3.88 CM
AV INDEX (PROSTH): 0.86
AV MEAN GRADIENT: 4 MMHG
AV PEAK GRADIENT: 7 MMHG
AV VALVE AREA: 2.77 CM2
AV VELOCITY RATIO: 0.81
BSA FOR ECHO PROCEDURE: 2.4 M2
CV ECHO LV RWT: 0.38 CM
DOP CALC AO PEAK VEL: 1.36 M/S
DOP CALC AO VTI: 26.11 CM
DOP CALC LVOT AREA: 3.2 CM2
DOP CALC LVOT DIAMETER: 2.02 CM
DOP CALC LVOT PEAK VEL: 1.1 M/S
DOP CALC LVOT STROKE VOLUME: 72.29 CM3
DOP CALCLVOT PEAK VEL VTI: 22.57 CM
E WAVE DECELERATION TIME: 155.43 MSEC
E/A RATIO: 1.37
E/E' RATIO: 8.22 M/S
ECHO LV POSTERIOR WALL: 1.06 CM (ref 0.6–1.1)
EJECTION FRACTION: 55 %
FRACTIONAL SHORTENING: 34 % (ref 28–44)
INTERVENTRICULAR SEPTUM: 1.08 CM (ref 0.6–1.1)
LA MAJOR: 5.97 CM
LA MINOR: 6.3 CM
LA WIDTH: 4.18 CM
LEFT ATRIUM SIZE: 4.43 CM
LEFT ATRIUM VOLUME INDEX: 42.7 ML/M2
LEFT ATRIUM VOLUME: 96.49 CM3
LEFT INTERNAL DIMENSION IN SYSTOLE: 3.69 CM (ref 2.1–4)
LEFT VENTRICLE DIASTOLIC VOLUME INDEX: 68.58 ML/M2
LEFT VENTRICLE DIASTOLIC VOLUME: 154.99 ML
LEFT VENTRICLE MASS INDEX: 107 G/M2
LEFT VENTRICLE SYSTOLIC VOLUME INDEX: 25.5 ML/M2
LEFT VENTRICLE SYSTOLIC VOLUME: 57.7 ML
LEFT VENTRICULAR INTERNAL DIMENSION IN DIASTOLE: 5.62 CM (ref 3.5–6)
LEFT VENTRICULAR MASS: 241.7 G
LV LATERAL E/E' RATIO: 7.4 M/S
LV SEPTAL E/E' RATIO: 9.25 M/S
MV A" WAVE DURATION": 9.13 MSEC
MV PEAK A VEL: 0.54 M/S
MV PEAK E VEL: 0.74 M/S
MV STENOSIS PRESSURE HALF TIME: 45.08 MS
MV VALVE AREA P 1/2 METHOD: 4.88 CM2
PISA MRMAX VEL: 0.05 M/S
PISA TR MAX VEL: 2.33 M/S
PULM VEIN S/D RATIO: 0.73
PV PEAK D VEL: 0.37 M/S
PV PEAK S VEL: 0.27 M/S
RA MAJOR: 5.31 CM
RA PRESSURE: 3 MMHG
RA WIDTH: 3.57 CM
RIGHT VENTRICULAR END-DIASTOLIC DIMENSION: 3.66 CM
RV TISSUE DOPPLER FREE WALL SYSTOLIC VELOCITY 1 (APICAL 4 CHAMBER VIEW): 10.87 CM/S
SINUS: 3 CM
STJ: 2.81 CM
TDI LATERAL: 0.1 M/S
TDI SEPTAL: 0.08 M/S
TDI: 0.09 M/S
TR MAX PG: 22 MMHG
TRICUSPID ANNULAR PLANE SYSTOLIC EXCURSION: 1.87 CM
TV REST PULMONARY ARTERY PRESSURE: 25 MMHG

## 2021-10-13 ENCOUNTER — PATIENT MESSAGE (OUTPATIENT)
Dept: FAMILY MEDICINE | Facility: CLINIC | Age: 61
End: 2021-10-13

## 2021-10-14 ENCOUNTER — PATIENT MESSAGE (OUTPATIENT)
Dept: FAMILY MEDICINE | Facility: CLINIC | Age: 61
End: 2021-10-14
Payer: COMMERCIAL

## 2021-10-14 ENCOUNTER — PATIENT MESSAGE (OUTPATIENT)
Dept: FAMILY MEDICINE | Facility: CLINIC | Age: 61
End: 2021-10-14

## 2021-10-20 ENCOUNTER — OFFICE VISIT (OUTPATIENT)
Dept: FAMILY MEDICINE | Facility: CLINIC | Age: 61
End: 2021-10-20
Payer: COMMERCIAL

## 2021-10-20 ENCOUNTER — HOSPITAL ENCOUNTER (OUTPATIENT)
Dept: RADIOLOGY | Facility: HOSPITAL | Age: 61
Discharge: HOME | End: 2021-10-20
Attending: PHYSICIAN ASSISTANT
Payer: COMMERCIAL

## 2021-10-20 VITALS
RESPIRATION RATE: 18 BRPM | TEMPERATURE: 98 F | DIASTOLIC BLOOD PRESSURE: 62 MMHG | WEIGHT: 288.13 LBS | OXYGEN SATURATION: 96 % | SYSTOLIC BLOOD PRESSURE: 118 MMHG | BODY MASS INDEX: 49.19 KG/M2 | HEIGHT: 64 IN | HEART RATE: 60 BPM

## 2021-10-20 DIAGNOSIS — G62.9 PERIPHERAL POLYNEUROPATHY: Primary | ICD-10-CM

## 2021-10-20 DIAGNOSIS — M79.89 SOFT TISSUE MASS: ICD-10-CM

## 2021-10-20 PROCEDURE — 90471 IMMUNIZATION ADMIN: CPT | Mod: S$GLB,,, | Performed by: PHYSICIAN ASSISTANT

## 2021-10-20 PROCEDURE — 1159F MED LIST DOCD IN RCRD: CPT | Mod: CPTII,S$GLB,, | Performed by: PHYSICIAN ASSISTANT

## 2021-10-20 PROCEDURE — 3078F PR MOST RECENT DIASTOLIC BLOOD PRESSURE < 80 MM HG: ICD-10-PCS | Mod: CPTII,S$GLB,, | Performed by: PHYSICIAN ASSISTANT

## 2021-10-20 PROCEDURE — 99214 PR OFFICE/OUTPT VISIT, EST, LEVL IV, 30-39 MIN: ICD-10-PCS | Mod: 25,S$GLB,, | Performed by: PHYSICIAN ASSISTANT

## 2021-10-20 PROCEDURE — 3008F PR BODY MASS INDEX (BMI) DOCUMENTED: ICD-10-PCS | Mod: CPTII,S$GLB,, | Performed by: PHYSICIAN ASSISTANT

## 2021-10-20 PROCEDURE — 90686 IIV4 VACC NO PRSV 0.5 ML IM: CPT | Mod: S$GLB,,, | Performed by: PHYSICIAN ASSISTANT

## 2021-10-20 PROCEDURE — 1160F PR REVIEW ALL MEDS BY PRESCRIBER/CLIN PHARMACIST DOCUMENTED: ICD-10-PCS | Mod: CPTII,S$GLB,, | Performed by: PHYSICIAN ASSISTANT

## 2021-10-20 PROCEDURE — 90686 FLU VACCINE (QUAD) GREATER THAN OR EQUAL TO 3YO PRESERVATIVE FREE IM: ICD-10-PCS | Mod: S$GLB,,, | Performed by: PHYSICIAN ASSISTANT

## 2021-10-20 PROCEDURE — 4010F ACE/ARB THERAPY RXD/TAKEN: CPT | Mod: CPTII,S$GLB,, | Performed by: PHYSICIAN ASSISTANT

## 2021-10-20 PROCEDURE — 3078F DIAST BP <80 MM HG: CPT | Mod: CPTII,S$GLB,, | Performed by: PHYSICIAN ASSISTANT

## 2021-10-20 PROCEDURE — 76882 US LMTD JT/FCL EVL NVASC XTR: CPT | Mod: 26,RT,, | Performed by: RADIOLOGY

## 2021-10-20 PROCEDURE — 99999 PR PBB SHADOW E&M-EST. PATIENT-LVL V: CPT | Mod: PBBFAC,,, | Performed by: PHYSICIAN ASSISTANT

## 2021-10-20 PROCEDURE — 3008F BODY MASS INDEX DOCD: CPT | Mod: CPTII,S$GLB,, | Performed by: PHYSICIAN ASSISTANT

## 2021-10-20 PROCEDURE — 4010F PR ACE/ARB THEARPY RXD/TAKEN: ICD-10-PCS | Mod: CPTII,S$GLB,, | Performed by: PHYSICIAN ASSISTANT

## 2021-10-20 PROCEDURE — 90471 FLU VACCINE (QUAD) GREATER THAN OR EQUAL TO 3YO PRESERVATIVE FREE IM: ICD-10-PCS | Mod: S$GLB,,, | Performed by: PHYSICIAN ASSISTANT

## 2021-10-20 PROCEDURE — 99999 PR PBB SHADOW E&M-EST. PATIENT-LVL V: ICD-10-PCS | Mod: PBBFAC,,, | Performed by: PHYSICIAN ASSISTANT

## 2021-10-20 PROCEDURE — 3074F PR MOST RECENT SYSTOLIC BLOOD PRESSURE < 130 MM HG: ICD-10-PCS | Mod: CPTII,S$GLB,, | Performed by: PHYSICIAN ASSISTANT

## 2021-10-20 PROCEDURE — 76882 US SOFT TISSUE, LOWER EXTREMITY, RIGHT: ICD-10-PCS | Mod: 26,RT,, | Performed by: RADIOLOGY

## 2021-10-20 PROCEDURE — 1159F PR MEDICATION LIST DOCUMENTED IN MEDICAL RECORD: ICD-10-PCS | Mod: CPTII,S$GLB,, | Performed by: PHYSICIAN ASSISTANT

## 2021-10-20 PROCEDURE — 1160F RVW MEDS BY RX/DR IN RCRD: CPT | Mod: CPTII,S$GLB,, | Performed by: PHYSICIAN ASSISTANT

## 2021-10-20 PROCEDURE — 99214 OFFICE O/P EST MOD 30 MIN: CPT | Mod: 25,S$GLB,, | Performed by: PHYSICIAN ASSISTANT

## 2021-10-20 PROCEDURE — 76882 US LMTD JT/FCL EVL NVASC XTR: CPT | Mod: TC,PO,RT

## 2021-10-20 PROCEDURE — 3074F SYST BP LT 130 MM HG: CPT | Mod: CPTII,S$GLB,, | Performed by: PHYSICIAN ASSISTANT

## 2021-10-20 RX ORDER — GABAPENTIN 800 MG/1
800 TABLET ORAL 3 TIMES DAILY
Qty: 90 TABLET | Refills: 11 | Status: SHIPPED | OUTPATIENT
Start: 2021-10-20 | End: 2022-01-10

## 2021-11-03 ENCOUNTER — OFFICE VISIT (OUTPATIENT)
Dept: FAMILY MEDICINE | Facility: CLINIC | Age: 61
End: 2021-11-03
Payer: COMMERCIAL

## 2021-11-03 ENCOUNTER — PATIENT MESSAGE (OUTPATIENT)
Dept: FAMILY MEDICINE | Facility: CLINIC | Age: 61
End: 2021-11-03

## 2021-11-03 ENCOUNTER — HOSPITAL ENCOUNTER (OUTPATIENT)
Dept: RADIOLOGY | Facility: HOSPITAL | Age: 61
Discharge: HOME | End: 2021-11-03
Attending: PHYSICIAN ASSISTANT
Payer: COMMERCIAL

## 2021-11-03 VITALS
WEIGHT: 288.38 LBS | OXYGEN SATURATION: 97 % | SYSTOLIC BLOOD PRESSURE: 118 MMHG | HEIGHT: 64 IN | HEART RATE: 61 BPM | DIASTOLIC BLOOD PRESSURE: 64 MMHG | BODY MASS INDEX: 49.23 KG/M2

## 2021-11-03 DIAGNOSIS — S99.921A INJURY OF RIGHT FOOT, INITIAL ENCOUNTER: ICD-10-CM

## 2021-11-03 DIAGNOSIS — Z12.11 SCREEN FOR COLON CANCER: ICD-10-CM

## 2021-11-03 DIAGNOSIS — S99.921A INJURY OF RIGHT FOOT, INITIAL ENCOUNTER: Primary | ICD-10-CM

## 2021-11-03 DIAGNOSIS — D50.9 IRON DEFICIENCY ANEMIA, UNSPECIFIED IRON DEFICIENCY ANEMIA TYPE: ICD-10-CM

## 2021-11-03 DIAGNOSIS — R19.5 OCCULT BLOOD IN STOOLS: ICD-10-CM

## 2021-11-03 PROCEDURE — 3078F PR MOST RECENT DIASTOLIC BLOOD PRESSURE < 80 MM HG: ICD-10-PCS | Mod: CPTII,S$GLB,, | Performed by: PHYSICIAN ASSISTANT

## 2021-11-03 PROCEDURE — 4010F ACE/ARB THERAPY RXD/TAKEN: CPT | Mod: CPTII,S$GLB,, | Performed by: PHYSICIAN ASSISTANT

## 2021-11-03 PROCEDURE — 3078F DIAST BP <80 MM HG: CPT | Mod: CPTII,S$GLB,, | Performed by: PHYSICIAN ASSISTANT

## 2021-11-03 PROCEDURE — 4010F PR ACE/ARB THEARPY RXD/TAKEN: ICD-10-PCS | Mod: CPTII,S$GLB,, | Performed by: PHYSICIAN ASSISTANT

## 2021-11-03 PROCEDURE — 3008F PR BODY MASS INDEX (BMI) DOCUMENTED: ICD-10-PCS | Mod: CPTII,S$GLB,, | Performed by: PHYSICIAN ASSISTANT

## 2021-11-03 PROCEDURE — 73630 XR FOOT COMPLETE 3 VIEW RIGHT: ICD-10-PCS | Mod: 26,RT,, | Performed by: RADIOLOGY

## 2021-11-03 PROCEDURE — 3074F SYST BP LT 130 MM HG: CPT | Mod: CPTII,S$GLB,, | Performed by: PHYSICIAN ASSISTANT

## 2021-11-03 PROCEDURE — 73630 X-RAY EXAM OF FOOT: CPT | Mod: 26,RT,, | Performed by: RADIOLOGY

## 2021-11-03 PROCEDURE — 99214 OFFICE O/P EST MOD 30 MIN: CPT | Mod: S$GLB,,, | Performed by: PHYSICIAN ASSISTANT

## 2021-11-03 PROCEDURE — 3074F PR MOST RECENT SYSTOLIC BLOOD PRESSURE < 130 MM HG: ICD-10-PCS | Mod: CPTII,S$GLB,, | Performed by: PHYSICIAN ASSISTANT

## 2021-11-03 PROCEDURE — 3008F BODY MASS INDEX DOCD: CPT | Mod: CPTII,S$GLB,, | Performed by: PHYSICIAN ASSISTANT

## 2021-11-03 PROCEDURE — 73630 X-RAY EXAM OF FOOT: CPT | Mod: TC,FY,PO,RT

## 2021-11-03 PROCEDURE — 99999 PR PBB SHADOW E&M-EST. PATIENT-LVL V: ICD-10-PCS | Mod: PBBFAC,,, | Performed by: PHYSICIAN ASSISTANT

## 2021-11-03 PROCEDURE — 99999 PR PBB SHADOW E&M-EST. PATIENT-LVL V: CPT | Mod: PBBFAC,,, | Performed by: PHYSICIAN ASSISTANT

## 2021-11-03 PROCEDURE — 99214 PR OFFICE/OUTPT VISIT, EST, LEVL IV, 30-39 MIN: ICD-10-PCS | Mod: S$GLB,,, | Performed by: PHYSICIAN ASSISTANT

## 2021-11-04 ENCOUNTER — TELEPHONE (OUTPATIENT)
Dept: CARDIOLOGY | Facility: HOSPITAL | Age: 61
End: 2021-11-04
Payer: COMMERCIAL

## 2021-11-04 DIAGNOSIS — Z95.0 PACEMAKER: Primary | ICD-10-CM

## 2021-11-04 DIAGNOSIS — I48.0 PAROXYSMAL ATRIAL FIBRILLATION: ICD-10-CM

## 2021-11-05 DIAGNOSIS — I48.0 PAROXYSMAL ATRIAL FIBRILLATION: ICD-10-CM

## 2021-11-05 PROBLEM — G47.33 OBSTRUCTIVE SLEEP APNEA SYNDROME: Status: ACTIVE | Noted: 2021-11-05

## 2021-11-05 RX ORDER — FLECAINIDE ACETATE 150 MG/1
150 TABLET ORAL EVERY 12 HOURS
Qty: 60 TABLET | Refills: 11 | Status: SHIPPED | OUTPATIENT
Start: 2021-11-05 | End: 2022-09-08 | Stop reason: SDUPTHER

## 2021-11-08 ENCOUNTER — PATIENT MESSAGE (OUTPATIENT)
Dept: PODIATRY | Facility: CLINIC | Age: 61
End: 2021-11-08

## 2021-11-08 ENCOUNTER — OFFICE VISIT (OUTPATIENT)
Dept: CARDIOLOGY | Facility: CLINIC | Age: 61
End: 2021-11-08
Payer: COMMERCIAL

## 2021-11-08 ENCOUNTER — OFFICE VISIT (OUTPATIENT)
Dept: PODIATRY | Facility: CLINIC | Age: 61
End: 2021-11-08
Payer: COMMERCIAL

## 2021-11-08 ENCOUNTER — CLINICAL SUPPORT (OUTPATIENT)
Dept: CARDIOLOGY | Facility: HOSPITAL | Age: 61
End: 2021-11-08
Attending: INTERNAL MEDICINE
Payer: COMMERCIAL

## 2021-11-08 VITALS
HEIGHT: 64 IN | SYSTOLIC BLOOD PRESSURE: 138 MMHG | DIASTOLIC BLOOD PRESSURE: 81 MMHG | HEART RATE: 63 BPM | BODY MASS INDEX: 49.5 KG/M2

## 2021-11-08 DIAGNOSIS — G47.33 OBSTRUCTIVE SLEEP APNEA SYNDROME: ICD-10-CM

## 2021-11-08 DIAGNOSIS — G62.9 NEUROPATHY: Primary | ICD-10-CM

## 2021-11-08 DIAGNOSIS — Z95.0 PACEMAKER: ICD-10-CM

## 2021-11-08 DIAGNOSIS — I48.0 PAROXYSMAL ATRIAL FIBRILLATION: Primary | ICD-10-CM

## 2021-11-08 DIAGNOSIS — G57.01 COMPRESSION OF COMMON PERONEAL NERVE OF RIGHT LOWER EXTREMITY: ICD-10-CM

## 2021-11-08 DIAGNOSIS — G60.9 IDIOPATHIC PERIPHERAL NEUROPATHY: ICD-10-CM

## 2021-11-08 DIAGNOSIS — E66.01 MORBID OBESITY WITH BMI OF 45.0-49.9, ADULT: ICD-10-CM

## 2021-11-08 DIAGNOSIS — I10 ESSENTIAL HYPERTENSION: ICD-10-CM

## 2021-11-08 DIAGNOSIS — I48.0 PAROXYSMAL ATRIAL FIBRILLATION: ICD-10-CM

## 2021-11-08 DIAGNOSIS — R00.1 SYMPTOMATIC BRADYCARDIA: ICD-10-CM

## 2021-11-08 PROCEDURE — 3008F PR BODY MASS INDEX (BMI) DOCUMENTED: ICD-10-PCS | Mod: CPTII,S$GLB,, | Performed by: INTERNAL MEDICINE

## 2021-11-08 PROCEDURE — 4010F ACE/ARB THERAPY RXD/TAKEN: CPT | Mod: CPTII,S$GLB,, | Performed by: INTERNAL MEDICINE

## 2021-11-08 PROCEDURE — 3075F PR MOST RECENT SYSTOLIC BLOOD PRESS GE 130-139MM HG: ICD-10-PCS | Mod: CPTII,S$GLB,, | Performed by: INTERNAL MEDICINE

## 2021-11-08 PROCEDURE — 76942 ECHO GUIDE FOR BIOPSY: CPT | Mod: S$GLB,,, | Performed by: PODIATRIST

## 2021-11-08 PROCEDURE — 4010F ACE/ARB THERAPY RXD/TAKEN: CPT | Mod: CPTII,S$GLB,, | Performed by: PODIATRIST

## 2021-11-08 PROCEDURE — 99999 PR PBB SHADOW E&M-EST. PATIENT-LVL III: ICD-10-PCS | Mod: PBBFAC,,, | Performed by: INTERNAL MEDICINE

## 2021-11-08 PROCEDURE — 1159F MED LIST DOCD IN RCRD: CPT | Mod: CPTII,S$GLB,, | Performed by: INTERNAL MEDICINE

## 2021-11-08 PROCEDURE — 1159F PR MEDICATION LIST DOCUMENTED IN MEDICAL RECORD: ICD-10-PCS | Mod: CPTII,S$GLB,, | Performed by: INTERNAL MEDICINE

## 2021-11-08 PROCEDURE — 4010F PR ACE/ARB THEARPY RXD/TAKEN: ICD-10-PCS | Mod: CPTII,S$GLB,, | Performed by: PODIATRIST

## 2021-11-08 PROCEDURE — 93280 PM DEVICE PROGR EVAL DUAL: CPT | Mod: 26,,, | Performed by: INTERNAL MEDICINE

## 2021-11-08 PROCEDURE — 99499 UNLISTED E&M SERVICE: CPT | Mod: S$GLB,,, | Performed by: PODIATRIST

## 2021-11-08 PROCEDURE — 99215 PR OFFICE/OUTPT VISIT, EST, LEVL V, 40-54 MIN: ICD-10-PCS | Mod: S$GLB,,, | Performed by: INTERNAL MEDICINE

## 2021-11-08 PROCEDURE — 76942 PR U/S GUIDANCE FOR NEEDLE GUIDANCE: ICD-10-PCS | Mod: S$GLB,,, | Performed by: PODIATRIST

## 2021-11-08 PROCEDURE — 1160F RVW MEDS BY RX/DR IN RCRD: CPT | Mod: CPTII,S$GLB,, | Performed by: INTERNAL MEDICINE

## 2021-11-08 PROCEDURE — 99999 PR PBB SHADOW E&M-EST. PATIENT-LVL I: CPT | Mod: PBBFAC,,, | Performed by: PODIATRIST

## 2021-11-08 PROCEDURE — 3075F SYST BP GE 130 - 139MM HG: CPT | Mod: CPTII,S$GLB,, | Performed by: INTERNAL MEDICINE

## 2021-11-08 PROCEDURE — 3079F PR MOST RECENT DIASTOLIC BLOOD PRESSURE 80-89 MM HG: ICD-10-PCS | Mod: CPTII,S$GLB,, | Performed by: INTERNAL MEDICINE

## 2021-11-08 PROCEDURE — 99999 PR PBB SHADOW E&M-EST. PATIENT-LVL I: ICD-10-PCS | Mod: PBBFAC,,, | Performed by: PODIATRIST

## 2021-11-08 PROCEDURE — 93280 CARDIAC DEVICE CHECK - IN CLINIC & HOSPITAL: ICD-10-PCS | Mod: 26,,, | Performed by: INTERNAL MEDICINE

## 2021-11-08 PROCEDURE — 4010F PR ACE/ARB THEARPY RXD/TAKEN: ICD-10-PCS | Mod: CPTII,S$GLB,, | Performed by: INTERNAL MEDICINE

## 2021-11-08 PROCEDURE — 1160F PR REVIEW ALL MEDS BY PRESCRIBER/CLIN PHARMACIST DOCUMENTED: ICD-10-PCS | Mod: CPTII,S$GLB,, | Performed by: INTERNAL MEDICINE

## 2021-11-08 PROCEDURE — 3008F BODY MASS INDEX DOCD: CPT | Mod: CPTII,S$GLB,, | Performed by: INTERNAL MEDICINE

## 2021-11-08 PROCEDURE — 3079F DIAST BP 80-89 MM HG: CPT | Mod: CPTII,S$GLB,, | Performed by: INTERNAL MEDICINE

## 2021-11-08 PROCEDURE — 99215 OFFICE O/P EST HI 40 MIN: CPT | Mod: S$GLB,,, | Performed by: INTERNAL MEDICINE

## 2021-11-08 PROCEDURE — 99999 PR PBB SHADOW E&M-EST. PATIENT-LVL III: CPT | Mod: PBBFAC,,, | Performed by: INTERNAL MEDICINE

## 2021-11-08 PROCEDURE — 64450 PR NERVE BLOCK INJ, ANES/STEROID, OTHER PERIPHERAL: ICD-10-PCS | Mod: RT,S$GLB,, | Performed by: PODIATRIST

## 2021-11-08 PROCEDURE — 99499 NO LOS: ICD-10-PCS | Mod: S$GLB,,, | Performed by: PODIATRIST

## 2021-11-08 PROCEDURE — 64450 NJX AA&/STRD OTHER PN/BRANCH: CPT | Mod: RT,S$GLB,, | Performed by: PODIATRIST

## 2021-11-09 ENCOUNTER — TELEPHONE (OUTPATIENT)
Dept: GASTROENTEROLOGY | Facility: CLINIC | Age: 61
End: 2021-11-09
Payer: COMMERCIAL

## 2021-11-09 ENCOUNTER — TELEPHONE (OUTPATIENT)
Dept: ELECTROPHYSIOLOGY | Facility: CLINIC | Age: 61
End: 2021-11-09
Payer: COMMERCIAL

## 2021-11-11 ENCOUNTER — OFFICE VISIT (OUTPATIENT)
Dept: CARDIOLOGY | Facility: CLINIC | Age: 61
End: 2021-11-11
Payer: COMMERCIAL

## 2021-11-11 VITALS
WEIGHT: 283.31 LBS | DIASTOLIC BLOOD PRESSURE: 83 MMHG | HEART RATE: 60 BPM | BODY MASS INDEX: 48.37 KG/M2 | SYSTOLIC BLOOD PRESSURE: 141 MMHG | HEIGHT: 64 IN

## 2021-11-11 DIAGNOSIS — Z95.0 PACEMAKER: ICD-10-CM

## 2021-11-11 DIAGNOSIS — E66.01 MORBID OBESITY WITH BMI OF 45.0-49.9, ADULT: ICD-10-CM

## 2021-11-11 DIAGNOSIS — I10 ESSENTIAL HYPERTENSION: ICD-10-CM

## 2021-11-11 DIAGNOSIS — I48.0 PAROXYSMAL ATRIAL FIBRILLATION: Primary | ICD-10-CM

## 2021-11-11 PROCEDURE — 99214 OFFICE O/P EST MOD 30 MIN: CPT | Mod: S$GLB,,, | Performed by: INTERNAL MEDICINE

## 2021-11-11 PROCEDURE — 3008F BODY MASS INDEX DOCD: CPT | Mod: CPTII,S$GLB,, | Performed by: INTERNAL MEDICINE

## 2021-11-11 PROCEDURE — 3008F PR BODY MASS INDEX (BMI) DOCUMENTED: ICD-10-PCS | Mod: CPTII,S$GLB,, | Performed by: INTERNAL MEDICINE

## 2021-11-11 PROCEDURE — 3079F PR MOST RECENT DIASTOLIC BLOOD PRESSURE 80-89 MM HG: ICD-10-PCS | Mod: CPTII,S$GLB,, | Performed by: INTERNAL MEDICINE

## 2021-11-11 PROCEDURE — 3079F DIAST BP 80-89 MM HG: CPT | Mod: CPTII,S$GLB,, | Performed by: INTERNAL MEDICINE

## 2021-11-11 PROCEDURE — 3077F PR MOST RECENT SYSTOLIC BLOOD PRESSURE >= 140 MM HG: ICD-10-PCS | Mod: CPTII,S$GLB,, | Performed by: INTERNAL MEDICINE

## 2021-11-11 PROCEDURE — 1160F RVW MEDS BY RX/DR IN RCRD: CPT | Mod: CPTII,S$GLB,, | Performed by: INTERNAL MEDICINE

## 2021-11-11 PROCEDURE — 99214 PR OFFICE/OUTPT VISIT, EST, LEVL IV, 30-39 MIN: ICD-10-PCS | Mod: S$GLB,,, | Performed by: INTERNAL MEDICINE

## 2021-11-11 PROCEDURE — 1160F PR REVIEW ALL MEDS BY PRESCRIBER/CLIN PHARMACIST DOCUMENTED: ICD-10-PCS | Mod: CPTII,S$GLB,, | Performed by: INTERNAL MEDICINE

## 2021-11-11 PROCEDURE — 1159F PR MEDICATION LIST DOCUMENTED IN MEDICAL RECORD: ICD-10-PCS | Mod: CPTII,S$GLB,, | Performed by: INTERNAL MEDICINE

## 2021-11-11 PROCEDURE — 99999 PR PBB SHADOW E&M-EST. PATIENT-LVL IV: ICD-10-PCS | Mod: PBBFAC,,, | Performed by: INTERNAL MEDICINE

## 2021-11-11 PROCEDURE — 4010F ACE/ARB THERAPY RXD/TAKEN: CPT | Mod: CPTII,S$GLB,, | Performed by: INTERNAL MEDICINE

## 2021-11-11 PROCEDURE — 99999 PR PBB SHADOW E&M-EST. PATIENT-LVL IV: CPT | Mod: PBBFAC,,, | Performed by: INTERNAL MEDICINE

## 2021-11-11 PROCEDURE — 1159F MED LIST DOCD IN RCRD: CPT | Mod: CPTII,S$GLB,, | Performed by: INTERNAL MEDICINE

## 2021-11-11 PROCEDURE — 4010F PR ACE/ARB THEARPY RXD/TAKEN: ICD-10-PCS | Mod: CPTII,S$GLB,, | Performed by: INTERNAL MEDICINE

## 2021-11-11 PROCEDURE — 3077F SYST BP >= 140 MM HG: CPT | Mod: CPTII,S$GLB,, | Performed by: INTERNAL MEDICINE

## 2021-11-17 PROBLEM — D50.9 IRON DEFICIENCY ANEMIA: Status: ACTIVE | Noted: 2021-11-17

## 2021-11-17 PROBLEM — R19.5 OCCULT BLOOD IN STOOLS: Status: ACTIVE | Noted: 2021-11-17

## 2021-11-18 ENCOUNTER — OFFICE VISIT (OUTPATIENT)
Dept: PAIN MEDICINE | Facility: CLINIC | Age: 61
End: 2021-11-18
Payer: COMMERCIAL

## 2021-11-18 ENCOUNTER — PATIENT OUTREACH (OUTPATIENT)
Dept: ADMINISTRATIVE | Facility: OTHER | Age: 61
End: 2021-11-18
Payer: COMMERCIAL

## 2021-11-18 ENCOUNTER — TELEPHONE (OUTPATIENT)
Dept: PAIN MEDICINE | Facility: CLINIC | Age: 61
End: 2021-11-18

## 2021-11-18 VITALS
SYSTOLIC BLOOD PRESSURE: 143 MMHG | WEIGHT: 287.06 LBS | DIASTOLIC BLOOD PRESSURE: 68 MMHG | BODY MASS INDEX: 49.01 KG/M2 | HEIGHT: 64 IN | HEART RATE: 61 BPM

## 2021-11-18 DIAGNOSIS — M50.30 DDD (DEGENERATIVE DISC DISEASE), CERVICAL: ICD-10-CM

## 2021-11-18 DIAGNOSIS — G62.9 PERIPHERAL POLYNEUROPATHY: ICD-10-CM

## 2021-11-18 DIAGNOSIS — M54.16 LUMBAR RADICULOPATHY: Primary | ICD-10-CM

## 2021-11-18 PROCEDURE — 99214 OFFICE O/P EST MOD 30 MIN: CPT | Mod: S$GLB,,,

## 2021-11-18 PROCEDURE — 99999 PR PBB SHADOW E&M-EST. PATIENT-LVL V: CPT | Mod: PBBFAC,,,

## 2021-11-18 PROCEDURE — 1159F MED LIST DOCD IN RCRD: CPT | Mod: CPTII,S$GLB,,

## 2021-11-18 PROCEDURE — 3078F PR MOST RECENT DIASTOLIC BLOOD PRESSURE < 80 MM HG: ICD-10-PCS | Mod: CPTII,S$GLB,,

## 2021-11-18 PROCEDURE — 3008F BODY MASS INDEX DOCD: CPT | Mod: CPTII,S$GLB,,

## 2021-11-18 PROCEDURE — 99214 PR OFFICE/OUTPT VISIT, EST, LEVL IV, 30-39 MIN: ICD-10-PCS | Mod: S$GLB,,,

## 2021-11-18 PROCEDURE — 1159F PR MEDICATION LIST DOCUMENTED IN MEDICAL RECORD: ICD-10-PCS | Mod: CPTII,S$GLB,,

## 2021-11-18 PROCEDURE — 3077F SYST BP >= 140 MM HG: CPT | Mod: CPTII,S$GLB,,

## 2021-11-18 PROCEDURE — 4010F PR ACE/ARB THEARPY RXD/TAKEN: ICD-10-PCS | Mod: CPTII,S$GLB,,

## 2021-11-18 PROCEDURE — 3077F PR MOST RECENT SYSTOLIC BLOOD PRESSURE >= 140 MM HG: ICD-10-PCS | Mod: CPTII,S$GLB,,

## 2021-11-18 PROCEDURE — 99999 PR PBB SHADOW E&M-EST. PATIENT-LVL V: ICD-10-PCS | Mod: PBBFAC,,,

## 2021-11-18 PROCEDURE — 4010F ACE/ARB THERAPY RXD/TAKEN: CPT | Mod: CPTII,S$GLB,,

## 2021-11-18 PROCEDURE — 3078F DIAST BP <80 MM HG: CPT | Mod: CPTII,S$GLB,,

## 2021-11-18 PROCEDURE — 3008F PR BODY MASS INDEX (BMI) DOCUMENTED: ICD-10-PCS | Mod: CPTII,S$GLB,,

## 2021-11-18 RX ORDER — ALPRAZOLAM 0.5 MG/1
1 TABLET, ORALLY DISINTEGRATING ORAL ONCE AS NEEDED
Status: CANCELLED | OUTPATIENT
Start: 2021-12-01 | End: 2033-04-28

## 2021-11-30 ENCOUNTER — TELEPHONE (OUTPATIENT)
Dept: PAIN MEDICINE | Facility: CLINIC | Age: 61
End: 2021-11-30
Payer: COMMERCIAL

## 2021-12-01 ENCOUNTER — HOSPITAL ENCOUNTER (OUTPATIENT)
Dept: RADIOLOGY | Facility: HOSPITAL | Age: 61
Discharge: HOME | End: 2021-12-01
Attending: ANESTHESIOLOGY
Payer: COMMERCIAL

## 2021-12-01 ENCOUNTER — HOSPITAL ENCOUNTER (OUTPATIENT)
Facility: HOSPITAL | Age: 61
Discharge: HOME | End: 2021-12-01
Attending: ANESTHESIOLOGY | Admitting: ANESTHESIOLOGY
Payer: COMMERCIAL

## 2021-12-01 VITALS
TEMPERATURE: 97 F | BODY MASS INDEX: 48.65 KG/M2 | SYSTOLIC BLOOD PRESSURE: 134 MMHG | DIASTOLIC BLOOD PRESSURE: 72 MMHG | WEIGHT: 285 LBS | RESPIRATION RATE: 16 BRPM | HEIGHT: 64 IN | HEART RATE: 60 BPM | OXYGEN SATURATION: 96 %

## 2021-12-01 DIAGNOSIS — M54.16 LUMBAR RADICULOPATHY: Primary | ICD-10-CM

## 2021-12-01 DIAGNOSIS — M54.16 LUMBAR RADICULOPATHY: ICD-10-CM

## 2021-12-01 PROCEDURE — 76000 FLUOROSCOPY <1 HR PHYS/QHP: CPT | Mod: TC,PO

## 2021-12-01 PROCEDURE — 25500020 PHARM REV CODE 255: Mod: PO | Performed by: ANESTHESIOLOGY

## 2021-12-01 PROCEDURE — 62323 PR INJ LUMBAR/SACRAL, W/IMAGING GUIDANCE: ICD-10-PCS | Mod: ,,, | Performed by: ANESTHESIOLOGY

## 2021-12-01 PROCEDURE — 25000003 PHARM REV CODE 250: Mod: PO | Performed by: ANESTHESIOLOGY

## 2021-12-01 PROCEDURE — 62323 NJX INTERLAMINAR LMBR/SAC: CPT | Mod: PO | Performed by: ANESTHESIOLOGY

## 2021-12-01 PROCEDURE — 62323 NJX INTERLAMINAR LMBR/SAC: CPT | Mod: ,,, | Performed by: ANESTHESIOLOGY

## 2021-12-01 PROCEDURE — 63600175 PHARM REV CODE 636 W HCPCS: Mod: PO | Performed by: ANESTHESIOLOGY

## 2021-12-01 RX ORDER — METHYLPREDNISOLONE ACETATE 80 MG/ML
INJECTION, SUSPENSION INTRA-ARTICULAR; INTRALESIONAL; INTRAMUSCULAR; SOFT TISSUE
Status: DISCONTINUED | OUTPATIENT
Start: 2021-12-01 | End: 2021-12-01 | Stop reason: HOSPADM

## 2021-12-01 RX ORDER — ALPRAZOLAM 0.5 MG/1
1 TABLET, ORALLY DISINTEGRATING ORAL ONCE AS NEEDED
Status: COMPLETED | OUTPATIENT
Start: 2021-12-01 | End: 2021-12-01

## 2021-12-01 RX ORDER — LIDOCAINE HYDROCHLORIDE 10 MG/ML
INJECTION, SOLUTION EPIDURAL; INFILTRATION; INTRACAUDAL; PERINEURAL
Status: DISCONTINUED | OUTPATIENT
Start: 2021-12-01 | End: 2021-12-01 | Stop reason: HOSPADM

## 2021-12-01 RX ADMIN — ALPRAZOLAM 1 MG: 0.5 TABLET, ORALLY DISINTEGRATING ORAL at 02:12

## 2021-12-20 DIAGNOSIS — I48.0 PAROXYSMAL ATRIAL FIBRILLATION: ICD-10-CM

## 2021-12-20 RX ORDER — METOPROLOL TARTRATE 50 MG/1
50 TABLET ORAL 2 TIMES DAILY
Qty: 180 TABLET | Refills: 3 | Status: SHIPPED | OUTPATIENT
Start: 2021-12-20 | End: 2022-04-18

## 2022-01-03 ENCOUNTER — TELEPHONE (OUTPATIENT)
Dept: GASTROENTEROLOGY | Facility: CLINIC | Age: 62
End: 2022-01-03
Payer: COMMERCIAL

## 2022-01-07 DIAGNOSIS — G62.9 NEUROPATHY: ICD-10-CM

## 2022-01-07 NOTE — TELEPHONE ENCOUNTER
No new care gaps identified.  Powered by [employer]. Reference number: [id number].   1/07/2022 1:40:55 PM CST

## 2022-01-10 ENCOUNTER — OFFICE VISIT (OUTPATIENT)
Dept: PAIN MEDICINE | Facility: CLINIC | Age: 62
End: 2022-01-10
Payer: COMMERCIAL

## 2022-01-10 ENCOUNTER — PATIENT OUTREACH (OUTPATIENT)
Dept: ADMINISTRATIVE | Facility: OTHER | Age: 62
End: 2022-01-10
Payer: COMMERCIAL

## 2022-01-10 ENCOUNTER — TELEPHONE (OUTPATIENT)
Dept: ORTHOPEDICS | Facility: CLINIC | Age: 62
End: 2022-01-10
Payer: COMMERCIAL

## 2022-01-10 VITALS
HEART RATE: 70 BPM | HEIGHT: 64 IN | BODY MASS INDEX: 47.86 KG/M2 | DIASTOLIC BLOOD PRESSURE: 76 MMHG | WEIGHT: 280.31 LBS | SYSTOLIC BLOOD PRESSURE: 133 MMHG

## 2022-01-10 DIAGNOSIS — G62.9 PERIPHERAL POLYNEUROPATHY: ICD-10-CM

## 2022-01-10 DIAGNOSIS — M54.16 LUMBAR RADICULOPATHY: ICD-10-CM

## 2022-01-10 DIAGNOSIS — M25.562 LEFT KNEE PAIN, UNSPECIFIED CHRONICITY: Primary | ICD-10-CM

## 2022-01-10 PROCEDURE — 3078F PR MOST RECENT DIASTOLIC BLOOD PRESSURE < 80 MM HG: ICD-10-PCS | Mod: CPTII,S$GLB,,

## 2022-01-10 PROCEDURE — 3008F PR BODY MASS INDEX (BMI) DOCUMENTED: ICD-10-PCS | Mod: CPTII,S$GLB,,

## 2022-01-10 PROCEDURE — 3075F PR MOST RECENT SYSTOLIC BLOOD PRESS GE 130-139MM HG: ICD-10-PCS | Mod: CPTII,S$GLB,,

## 2022-01-10 PROCEDURE — 99999 PR PBB SHADOW E&M-EST. PATIENT-LVL IV: CPT | Mod: PBBFAC,,,

## 2022-01-10 PROCEDURE — 99999 PR PBB SHADOW E&M-EST. PATIENT-LVL IV: ICD-10-PCS | Mod: PBBFAC,,,

## 2022-01-10 PROCEDURE — 99213 OFFICE O/P EST LOW 20 MIN: CPT | Mod: S$GLB,,,

## 2022-01-10 PROCEDURE — 3008F BODY MASS INDEX DOCD: CPT | Mod: CPTII,S$GLB,,

## 2022-01-10 PROCEDURE — 3078F DIAST BP <80 MM HG: CPT | Mod: CPTII,S$GLB,,

## 2022-01-10 PROCEDURE — 99213 PR OFFICE/OUTPT VISIT, EST, LEVL III, 20-29 MIN: ICD-10-PCS | Mod: S$GLB,,,

## 2022-01-10 PROCEDURE — 3075F SYST BP GE 130 - 139MM HG: CPT | Mod: CPTII,S$GLB,,

## 2022-01-10 RX ORDER — NORTRIPTYLINE HYDROCHLORIDE 25 MG/1
CAPSULE ORAL
Qty: 90 CAPSULE | Refills: 3 | Status: SHIPPED | OUTPATIENT
Start: 2022-01-10 | End: 2023-01-12

## 2022-01-10 RX ORDER — TRAMADOL HYDROCHLORIDE 50 MG/1
50 TABLET ORAL EVERY 6 HOURS PRN
Qty: 21 TABLET | Refills: 0 | Status: SHIPPED | OUTPATIENT
Start: 2022-01-10 | End: 2022-01-17

## 2022-01-10 RX ORDER — GABAPENTIN 600 MG/1
600 TABLET ORAL 2 TIMES DAILY
Qty: 60 TABLET | Refills: 6 | Status: SHIPPED | OUTPATIENT
Start: 2022-01-10 | End: 2022-10-26 | Stop reason: SDUPTHER

## 2022-01-10 RX ORDER — TIZANIDINE 4 MG/1
4 TABLET ORAL EVERY 6 HOURS PRN
Qty: 60 TABLET | Refills: 1 | Status: SHIPPED | OUTPATIENT
Start: 2022-01-10 | End: 2022-04-20

## 2022-01-10 NOTE — PROGRESS NOTES
Health Maintenance Due   Topic Date Due    Shingles Vaccine (1 of 2) Never done    Colorectal Cancer Screening  11/13/2020    COVID-19 Vaccine (3 - Booster for Moderna series) 09/30/2021    Pneumococcal Vaccines (Age 0-64) (1 of 2 - PPSV23) 11/09/2021     Updates were requested from care everywhere.  Chart was reviewed for overdue Proactive [facility] Encounters ([facility]) topics (CRS, Breast Cancer Screening, Eye exam)  Health Maintenance has been updated.  LINKS immunization registry triggered.  Immunizations were reconciled.

## 2022-01-10 NOTE — PROGRESS NOTES
[facility] Follow Up Evaluation    Referred by: Caroline Nielson PA-C  Reason for referral: back pain    CC:   Chief Complaint   Patient presents with    Low-back Pain     Somewhat improved    Knee Pain     left    Foot Pain     right      Last 3 PDI Scores 6/5/2020 2/17/2020   Pain Disability Index (PDI) 0 10       Interval HPI 1/10/2022: Fatemeh Shoemaker returns to the clinic for follow up. She is s/p L5/S1 ILESI on 12/01/21 with 75% relief of her lower back pain. The pain that radiated down her legs has completely resolved. Today she is reporting increased left knee and right ankle pain, 8/10. She states she has had chronic left knee pain that just recently has worsened when she was trying to get out of bed. She finds it difficult to drive due to the pain. Additionally she her right ankle pain has worsened. She reports have an injury to that ankle over year ago and was treated by Dr. Gibbs. She states the pain has been intermittent in the past but lately it has worsened. Denies any new numbness, weakness or changes with bowel or bladder function. She continues to tolerate Gabapentin 600mg BID, Zanaflex and Cymbalta with moderate relief. She continues to ambulate with a rollator now due to her knee pain.           Pain Intervention History:     - s/p L5/S1 ILESI on 5/21/20 with 55% relief.   - s/p L5/S1 ILESI on 6/19/20 with continued 55% relief.  - s/p L5/S1 ILESI on 12/01/21 with 75% relief     HPI:   Fatemeh Shoemaker is a 61 y.o. female who complains of back pain    Onset: more than a few years  Progression: since onset, pain is stable  Current Pain Score: 8/10  Timing: constant  Quality: aching  Radiation: yes, down the back of both legs  Associated numbness or weakness: yes numbness b/l feet up to mid calf. Weakness with walking long distances and standing  Exacerbated by: standing, walking  Allievated by: rest, leaning forward  Is Pain Level Acceptable?: No    Previous  Therapies:  PT/OT: no  HEP:   Interventions:   Surgery:  Medications:   - NSAIDS:   - MSK Relaxants: tizanidine  - TCAs: nortriptyline  - SNRIs: cymbalta  - Topicals:   - Anticonvulsants: gabapentin  - Opioids:     History:    Current Outpatient Medications:     apixaban (ELIQUIS) 5 mg Tab, Take 1 tablet (5 mg total) by mouth 2 (two) times daily., Disp: 60 tablet, Rfl: 2    cyanocobalamin 500 MCG tablet, Take 1,000 mcg by mouth once daily., Disp: , Rfl:     diclofenac sodium (VOLTAREN) 1 % Gel, Apply 2 g topically 4 (four) times daily., Disp: 50 g, Rfl: 3    DULoxetine (CYMBALTA) 60 MG capsule, Take 1 capsule (60 mg total) by mouth once daily., Disp: 90 capsule, Rfl: 3    ergocalciferol (ERGOCALCIFEROL) 50,000 unit Cap, Take 50,000 Units by mouth every Saturday. , Disp: , Rfl:     flecainide (TAMBOCOR) 150 MG Tab, Take 1 tablet (150 mg total) by mouth every 12 (twelve) hours., Disp: 60 tablet, Rfl: 11    fluticasone furoate-vilanteroL (BREO ELLIPTA) 100-25 mcg/dose diskus inhaler, Inhale 1 puff into the lungs once daily. Controller, Disp: 6030 each, Rfl: 11    gabapentin (NEURONTIN) 800 MG tablet, Take 1 tablet (800 mg total) by mouth 3 (three) times daily., Disp: 90 tablet, Rfl: 11    ipratropium-albuteroL (COMBIVENT)  mcg/actuation inhaler, Inhale 1 puff into the lungs every 6 (six) hours as needed for Wheezing or Shortness of Breath. Rescue, Disp: 4 g, Rfl: 2    lisinopriL 10 MG tablet, Take 1 tablet (10 mg total) by mouth once daily., Disp: 90 tablet, Rfl: 1    melatonin 5 mg TbDL, Take 1 tablet by mouth nightly as needed (sleep)., Disp: , Rfl:     metoprolol tartrate (LOPRESSOR) 100 MG tablet, Take 1 tablet by mouth in the evening. (Patient taking differently: 2 (two) times daily. 50mg in the morning and 100mg in the evening.), Disp: 60 tablet, Rfl: 11    metoprolol tartrate (LOPRESSOR) 50 MG tablet, Take 1 tablet (50 mg total) by mouth 2 (two) times daily., Disp: 180 tablet, Rfl: 3     nortriptyline (PAMELOR) 25 MG capsule, TAKE ONE CAPSULE BY MOUTH EVERY EVENING, Disp: 90 capsule, Rfl: 3    omeprazole (PRILOSEC) 20 MG capsule, Take 1 capsule (20 mg total) by mouth every morning., Disp: 30 capsule, Rfl: 11    pulse oximeter (PULSE OXIMETER) device, Use twice daily at 8 AM and 3 PM and record the value in MyChart as directed., Disp: 1 each, Rfl: 0    rosuvastatin (CRESTOR) 10 MG tablet, Take 1 tablet (10 mg total) by mouth every evening., Disp: 90 tablet, Rfl: 3    tiZANidine (ZANAFLEX) 4 MG tablet, Take 1 tablet (4 mg total) by mouth every 6 (six) hours as needed., Disp: 60 tablet, Rfl: 1    valACYclovir (VALTREX) 500 MG tablet, Take 1 tablet (500 mg total) by mouth daily as needed (fever blister)., Disp: , Rfl:     VENTOLIN HFA 90 mcg/actuation inhaler, INHALE TWO PUFFS into the lungs EVERY 6 HOURS AS NEEDED for FOR WHEEZING (rescue), Disp: , Rfl:     Past Medical History:   Diagnosis Date    Anemia     Anticoagulant long-term use     Arrhythmia     Arthritis     Atrial fibrillation     history    Bronchitis     Encounter for blood transfusion     General anesthetics causing adverse effect in therapeutic use     High blood pressure     Hyperlipidemia     Lump or mass in breast     benign     Neuropathy     Obesity     Obstructive sleep apnea syndrome 2021    Ulcer        Past Surgical History:   Procedure Laterality Date    A-V CARDIAC PACEMAKER INSERTION Left 2020    Procedure: INSERTION, CARDIAC PACEMAKER, DUAL CHAMBER;  Surgeon: Bert Reaves MD;  Location: [facility];  Service: Cardiology;  Laterality: Left;    BREAST BIOPSY Right 2017    myofibroblastoma     BREAST LUMPECTOMY Right 2017     SECTION  10/25/1986    Other c/section 10/26/1997    CHOLECYSTECTOMY  2017    Dr. TOLU [facility]ers, [facility]     csection      CS x 2    CYSTOSCOPY N/A 9/3/2019    Procedure: CYSTOSCOPY;  Surgeon: Noemi Pierre MD;  Location: [facility] OR;  Service:  OB/GYN;  Laterality: N/A;    DILATION AND CURETTAGE OF UTERUS      EPIDURAL STEROID INJECTION INTO LUMBAR SPINE N/A 5/21/2020    Procedure: Injection-steroid-epidural-lumbar L5/S1;  Surgeon: Gurjit Tavarez MD;  Location: [facility] OR;  Service: Pain Management;  Laterality: N/A;    EPIDURAL STEROID INJECTION INTO LUMBAR SPINE N/A 6/19/2020    Procedure: Injection-steroid-epidural-lumbar L5/S1;  Surgeon: Gurjit Tavarez MD;  Location: [facility] OR;  Service: Pain Management;  Laterality: N/A;    EPIDURAL STEROID INJECTION INTO LUMBAR SPINE N/A 12/1/2021    Procedure: Injection-steroid-epidural-lumbar L5/S1;  Surgeon: Gurjit Tavarez MD;  Location: [facility] OR;  Service: Pain Management;  Laterality: N/A;    FRACTURE SURGERY  04/86    Dislocated  hip total rt hip 08/08    HIP PINNING      HYSTERECTOMY      JOINT REPLACEMENT  08/2008    LAPAROSCOPIC SALPINGO-OOPHORECTOMY Bilateral 9/3/2019    Procedure: SALPINGO-OOPHORECTOMY, LAPAROSCOPIC;  Surgeon: Noemi Pierre MD;  Location: [facility];  Service: OB/GYN;  Laterality: Bilateral;  Dr. Bentley to assist. No resident needed.     LAPAROSCOPIC TOTAL HYSTERECTOMY N/A 9/3/2019    Procedure: HYSTERECTOMY, TOTAL, LAPAROSCOPIC;  Surgeon: Noemi Pierre MD;  Location: [facility];  Service: OB/GYN;  Laterality: N/A;    NASAL SEPTUM SURGERY      OOPHORECTOMY      TOTAL HIP ARTHROPLASTY Right     TUBAL LIGATION  1997       Family History   Problem Relation Age of Onset    Colon cancer Maternal Grandmother 70        mets to ovary    Cancer Maternal Grandmother     Arthritis Paternal Grandfather     Eclampsia Paternal Grandmother     Cataracts Maternal Grandfather     Stroke Father 57    Colon cancer Father     Hypertension Father     Alcohol abuse Father     Cancer Father         Passed away July 10 2019    Hypertension Mother     Cataracts Mother     Hypertension Brother         Age 54 hypertension heart    Early death Brother         Hypertension cardio disease     "No Known Problems Daughter     Stomach cancer Neg Hx     Esophageal cancer Neg Hx     Breast cancer Neg Hx     Miscarriages / Stillbirths Neg Hx     Ovarian cancer Neg Hx     Glaucoma Neg Hx     Macular degeneration Neg Hx     Retinal detachment Neg Hx     Strabismus Neg Hx        Social History     Socioeconomic History    Marital status: Significant Other   Tobacco Use    Smoking status: Never Smoker    Smokeless tobacco: Never Used   Substance and Sexual Activity    Alcohol use: No     Comment: never    Drug use: Never    Sexual activity: Yes     Partners: Male     Birth control/protection: Other-see comments     Comment: Hysterectomy 9/3/19       Review of patient's allergies indicates:   Allergen Reactions    Aspirin Other (See Comments)     "I don't take it because I've had ulcers"   ulcers  "I don't take it because I've had ulcers"     Meperidine Other (See Comments)     Felt like she was about to pass put after taking       Review of Systems:  General ROS: negative for - fever  Psychological ROS: negative for - hostility  Hematological and Lymphatic ROS: positive for - bruising  Endocrine ROS: negative for - unexpected weight changes  Respiratory ROS: no cough, shortness of breath, or wheezing  Cardiovascular ROS: no chest pain or dyspnea on exertion  Gastrointestinal ROS: no abdominal pain, change in bowel habits, or black or bloody stools  Musculoskeletal ROS: negative for - muscular weakness  Neurological ROS: negative for - bowel and bladder control changes  Dermatological ROS: negative for rash    Physical Exam:  Vitals:    01/10/22 1120   BP: 133/76   Pulse: 70   Weight: 127.2 kg (280 lb 5 oz)   Height: 5' 4" (1.626 m)   PainSc:   8   PainLoc: Back     Body mass index is 48.12 kg/m².     Gen: NAD  Gait: gait antalgic due to left knee pain  Psych:  Mood appropriate for given condition  HEENT: eyes anicteric   GI: Abd soft  CV: RRR  Lungs: breathing unlabored   ROM: limited AROM of the L " spine in all planes, full ROM at ankles, knees and hips  Lumbar flexion 90 degrees, extension 50 degrees, side bending 30 degrees.    Sensation: intact to light touch in all dermatomes tested from L2-S1 bilaterally, except for b/l feet up to mid calf.   Reflexes: 0/0 b/l patella and 0/0 b/l achilles  Palpation: Diffusely tender over lumbar paraspinals  -TTP over the b/l greater trochanters,  -TTP bilateral SI joint  Tone: normal in the b/l knees and hips   Skin: intact, no rashes visible. No color changes in b/l lower extremities.   Extremities: No edema in b/l ankles or hands. Temperature asymmetry in feet, right foot warm to touch > Left foot. No allodynia. No swelling in knees, no temperature asymmetry in b/l knees.   Provacative Tests: no increase pain with valgus, varus or lachmans test.        Right Left   L2/3 Iliacus Hip flexion  5  5   L3/4 Qudratus Femoris Knee Extension  5  5   L4/5 Tib Anterior Ankle Dorsiflexion   5  5   L5/S1 Extensor Hallicus Longus Great toe extension  5  5                 S1/S2 Gastroc/Soleus Plantar Flexion  5  5       Imaging:  MRI lumbar spine 6/11/18  FINDINGS:  Vertebral column: The study is motion degraded.  There is multilevel degenerative change.  There is marked disc space narrowing towards the right at the L3-4 level where there is associated degenerative endplate signal change.  There is no other significant disc space narrowing.  There is no fracture.  Baseline marrow signal intensity is normal.  Anterior endplate osteophyte formation is also present at the L2-3 level.  There is subtle trace anterolisthesis of L4 on L5.    Spinal canal, conus, epidural space: Spinal canal is developmentally normal.  The conus is normal in location, contour and signal intensity, terminating at the level of T12-L1.  There is no abnormal epidural collection or mass.    Findings by level:    On the sagittal images, there is minimal bulging of the annulus and mild facet joint arthropathy at  the T10-11 level but there is no spinal canal or significant foraminal stenosis.  At the T11-T12 level, there is a shallow broad central disc protrusion which narrows the ventral subarachnoid space but again there is no significant spinal canal or foraminal stenosis and there is no cord compression.    T12-L1: There is minimal bulging of the annulus and there is mild facet joint arthropathy.  There is no spinal canal or significant foraminal stenosis.  L1-2: There is moderate facet joint arthropathy.  There is minimal bulging of the annulus with a tiny 2 mm central disc protrusion.  There is no spinal canal or significant foraminal stenosis.  L2-3: There is a moderate diffuse disc bulge with marked facet joint arthropathy.  There is mildly prominent dorsal epidural fat.  There is flattening of the ventral dural sac.  There is moderate central spinal stenosis.  AP measurement of the dural sac is 7.5 mm.  There is no significant foraminal stenosis.  L3-4: There is marked disc space narrowing towards the right.  There is a diffuse disc bulge with osteophytic ridging and superimposed broad right paracentral and foraminal disc protrusion.  There is moderate-to-marked facet joint arthropathy with ligamentum flavum thickening, right greater than left.  Also, there is mildly prominent dorsal epidural fat.  There is moderate central spinal stenosis.  There is severe right lateral recess and foraminal stenosis with mild-to-moderate left foraminal stenosis.  L4-5: There is marked facet joint arthropathy.  There is a diffuse disc bulge with superimposed broad central disc protrusion.  There is mild spinal stenosis.  There is mild-to-moderate bilateral foraminal stenosis.  L5-S1: There is a diffuse disc bulge with superimposed broad central disc protrusion with annular fissure.  There is marked left and moderate-to-marked right facet joint arthropathy with ligamentum flavum thickening.  There is no significant spinal stenosis.   The right foramina is patent.  There is severe left foraminal stenosis.  The broad central disc protrusion approaches both S1 roots.  The left S1 root is crowded between the facet joint changes and the disc protrusion.  Both S1 roots could be affected, left greater than right.    Soft tissues, other: The prevertebral soft tissues are normal.  The aorta is mildly ectatic.    MRI Cervical Spine 08/14/20  FINDINGS:  The current examination once again demonstrates a 2 mm AP diameter cystic structure within the central aspect of the cervical cord at C6-C7 which extends over an approximately 29 mm craniocaudad dimension from the superior endplate of C6 to the C7-T1 intervertebral disc space (series 2, image 7 and series 5, image 19).  No abnormal adjacent cord edema or abnormal intramedullary enhancement.  Differential considerations include nonspecific dilatation of the central canal of the spinal cord and syringohydromyelia.     The visualized posterior fossa structures are unremarkable.  No Chiari malformation.  There is mucoperiosteal thickening observed within the ethmoid air cells bilaterally.     There is a C3 vertebral body hemangioma which shows no abnormal enhancement following contrast administration.  No pathologic marrow replacement process or cervical spine fracture.  There is degenerative disc desiccation observed at every cervical level.     The incidentally observed soft tissues of the neck are unremarkable.     C2-C3: There is right uncovertebral spurring present.  No disc protrusion or extrusion. No central canal stenosis or neuroforaminal stenosis.  There is ligamentum flavum thickening.  C3-C4: There is a minimal posterior disc osteophyte complex and uncovertebral spurring.  No disc protrusion or extrusion. No central canal stenosis or neuroforaminal stenosis.  C4-C5: There is left uncovertebral spurring.  There is a broad central disc protrusion.  There is effacement of the anterior CSF sleeve.  No  central canal stenosis.  There is, at most, mild right neuroforaminal stenosis as a result of right facet arthropathy.  C5-C6: There is left facet arthropathy.  No central canal stenosis or neuroforaminal stenosis.  C6-C7: There is a 6 mm nonenhancing perineural nerve root sleeve cyst present in the left neural foramen, similar to the prior exam.  There is bilateral uncovertebral spurring, left greater than right.  There is mild ligamentum flavum thickening.  No central canal stenosis.  There is, at most, mild bilateral neuroforaminal stenosis.  There is right facet arthropathy.  C7-T1: There is bilateral facet arthropathy, right greater than left.  No disc protrusion or extrusion. No central canal stenosis or neuroforaminal stenosis.     Impression:     1. No interval detrimental change when compared to the prior study.  29 mm craniocaudad dimension tubular cystic structure within the central aspect of the spinal cord at C6-C7 which either represents nonspecific mild dilatation of the central canal (up to 2 mm in diameter) or syringohydromyelia.  No abnormal cord edema/abnormal intramedullary enhancement.  2. 6 mm nonenhancing left foraminal perineural nerve root sleeve cyst at C6-C7.  3. Minor degenerative change of the cervical spine as detailed above.  4. C3 vertebral body hemangioma, unchanged.  No evidence of pathologic marrow replacement process.    MRI Thoracic Spine 08/14/20    FINDINGS:  There are no abnormal enhancing masses present within the thoracic spine to suggest a pathologic marrow replacement process.  No acute thoracic compression fracture appreciated.  There is a nonenhancing incidentally observed T1 hyperintense T5 vertebral body hemangioma.  There is degenerative disc desiccation at every thoracic level.  The thoracic spinal cord is normal in signal intensity with no evidence of cord edema, myelomalacia, or abnormal intramedullary enhancement.  No enhancing epidural masses or epidural fluid  collections.  No imaging evidence of epidural lipomatosis.     The incidentally observed soft tissues of the chest are unremarkable.     T1-T2: There is a broad disc bulge and a superimposed broad left paracentral disc protrusion which effaces the anterior CSF sleeve and flattens the left anterior thoracic spinal cord.  No central canal stenosis or neuroforaminal stenosis.  T4-T5: There is an 11 mm large broad left paracentral disc protrusion which flattens the left anterior aspect of the thoracic spinal cord.  There is a suggestion of abnormal cord signal on series 6, image 15 which could reflect underlying cord edema/myelomalacia.  No abnormal intramedullary enhancement.  No overall central canal stenosis or neuroforaminal stenosis.  T5-T6: There is a minimal broad left paracentral disc protrusion.  No central canal stenosis or neuroforaminal stenosis.  T6-T7: There is a broad right paracentral disc protrusion on series 6, image 21.  No central canal stenosis or neuroforaminal stenosis.  T8-T9: There is a broad right paracentral disc protrusion which effaces the right anterior CSF sleeve.  No central canal stenosis or neuroforaminal stenosis.  There is ligamentum flavum thickening.  T10-T11: There is a small broad central disc protrusion on series 6, image 34.  No central canal stenosis.  No definite neuroforaminal stenosis.  T11-T12: There is a broad central disc protrusion which effaces the anterior CSF sleeve.  There is bilateral hypertrophic facet arthropathy.  No central canal stenosis or neuroforaminal stenosis.     Impression:     1. No imaging evidence of a pathologic marrow replacement process.  2. Multilevel degenerative change of the thoracic spine with disc protrusions observed at multiple thoracic levels, most pronounced at T4-T5 where a broad left paracentral disc protrusion and flattens the left anterior aspect of the thoracic spinal cord.  There is possible cord edema noted.  No abnormal  intramedullary enhancement.  No cord syrinx.  3. T5 vertebral body hemangioma.    MRI Lumbar Spine 08/14/20  FINDINGS:  There is an L1 vertebral body hemangioma present.  There is a 9 mm focus of T1/T2 signal hyperintensity noted within the left pedicle of L5 which is favored to represent either focal fat deposition or a hemangioma.  No associated enhancement following contrast administration.  No additional concerning enhancing bone lesions elsewhere in the lumbar spine.  No acute lumbar compression fracture.  No spondylolisthesis.  No definite pars defects.  There is multilevel degenerative disc desiccation present at virtually every visualized lower thoracic and lumbar level with disc space narrowing and degenerative endplate change noted at L3-L4.  There is a levoscoliotic curvature of the lumbar spine, apex at L3-L4.     The conus medullaris terminates at L1.  The visualized lower thoracic spinal cord is normal in signal intensity with no evidence of cord edema, myelomalacia, or cord syrinx.  No abnormal intramedullary enhancement.  No peripherally enhancing epidural fluid collections.  The paraspinous musculature is unremarkable.     The incidentally observed abdominal/retroperitoneal soft tissues demonstrate diverticulosis coli..     T11-T12: There is a broad central disc protrusion which effaces the anterior CSF sleeve.  There is, at most, mild overall central canal stenosis.  No neuroforaminal stenosis.  T12-L1: No disc protrusion or extrusion. No central canal stenosis or neuroforaminal stenosis.  L1-L2: There is a broad right paracentral disc protrusion.  No disc extrusion.  No central canal stenosis or neuroforaminal stenosis.  L2-L3: There is a broad disc bulge which extends into both neural foramina.  There is prominent bilateral hypertrophic facet arthropathy.  There is mild overall central canal stenosis.  No definite neuroforaminal stenosis.  L3-L4: There is a broad right  paracentral/foraminal/extraforaminal osteophyte which abuts and displaces the descending right L4 nerve in the right lateral recess.  Please correlate clinically for symptoms referable to the right L4 nerve.  There is right lateral recess stenosis.  There is ligamentum flavum thickening and hypertrophic facet arthropathy.  There is moderate right neuroforaminal stenosis.  No left neuroforaminal stenosis.  There is moderate overall central canal stenosis.  L4-L5: There is a minimal diffuse broad disc bulge which extends into both neural foramina.  There is abutment of the exited right L4 nerve in the right extraforaminal soft tissues on series 7, image 21.  Please correlate clinically for symptoms referable to the right L4 nerve.  There is bilateral severe hypertrophic facet arthropathy present, left greater than right, resulting in prominent left lateral recess stenosis.  There is moderate-severe left neuroforaminal stenosis (series 9, image 16 and series 7, image 19).  No right neuroforaminal stenosis.  There is severe central canal stenosis.  L5-S1: The left L5 pedicle is relatively hypoplastic in size as compared to the right L5 pedicle, and there is a 9 mm focus of T1/T2 signal hyperintensity within the left L5 pedicle on series 5, image 11 which shows no abnormal enhancement following contrast administration, most likely a focus of focal fat deposition (see postcontrast T1 fat-suppressed images) or a hemangioma.  There is prominent bilateral hypertrophic facet arthropathy, left greater than right, resulting in severe proximal left neuroforaminal stenosis.  Please correlate clinically for symptoms referable to the exiting left L5 nerve (series 7, image 25, series 9, image 22, and series 6, image 22).  There is is also posterior displacement of the descending left S1 nerve in the left lateral recess.  Please correlate clinically for symptoms referable to the left S1 nerve.  There is a broad central disc  protrusion at L5-S1 which abuts both descending S1 nerves and which could produce symptoms referable to both S1 nerves, similar to the prior exam.  No right neuroforaminal stenosis.  There is moderate overall central canal stenosis.     There is prominent epidural fat deposition observed within the lumbosacral spinal canal from L4-L5 through the sacral canal.     Impression:     1. No appreciable interval detrimental change when compared to the prior exam.  2. 9 mm nonenhancing focus of T1/T2 signal hyperintensity within the left pedicle of L5 is favored to represent either a hemangioma or focal fat deposition.  No adjacent osseous edema appreciated.  No additional concerning bone lesions elsewhere in the visualized lumbar spine.  No appreciable interval detrimental change over the course of multiple prior examinations dating back to 06/11/2018.  3. Advanced multilevel degenerative change of the lumbar spine which combines with a levoscoliotic curvature of the lumbar spine to generate multilevel central canal and neuroforaminal stenosis.  Additional details are provided above.  4. Severe left neuroforaminal stenosis at L5-S1 and left lateral recess stenosis at L5-S1 as a result of prominent left facet arthropathy.  Please correlate clinically for symptoms referable to the left L5 and S1 nerves.  There is also a broad central disc protrusion at L5-S1, similar to the prior examination, which could produce symptoms referable to the S1 nerves, left greater than right.  Please correlate clinically.  5. 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    Labs:  BMP  Lab Results   Component Value Date     10/06/2021    K 4.2 10/06/2021     10/06/2021    CO2 24 10/06/2021    BUN 17 10/06/2021    CREATININE 0.56 10/06/2021    CALCIUM 9.2 10/06/2021    ANIONGAP 10 10/06/2021    ESTGFRAFRICA >60 10/06/2021    EGFRNONAA >60 10/06/2021     Lab Results   Component Value Date     ALT 16 10/06/2021    AST 27 10/06/2021    ALKPHOS 39 10/06/2021    BILITOT 0.3 10/06/2021     Lab Results   Component Value Date    WBC 16.11 (H) 09/04/2019    HGB 9.4 (L) 09/04/2019    HCT 28.9 (L) 09/04/2019    MCV 85 09/04/2019     09/04/2019           Assessment:   Problem List Items Addressed This Visit    None     Visit Diagnoses     Left knee pain, unspecified chronicity    -  Primary    Relevant Orders    Ambulatory referral/consult to Physical Medicine Rehab    Peripheral polyneuropathy        Lumbar radiculopathy        Relevant Medications    gabapentin (NEURONTIN) 600 MG tablet          61 y.o. year old female with PMH HTN, a-fib on eliquis presents to the office with back pain.  She's had this pain for over 5 years and it has been gradually worsening.  No history of prior back surgery.   She has had chronic numbness in the top of her feet for years.  Her pain is worse with standing and walking and relieved with rest and leaning forward.        1/10/2022: Fatemeh Shoemaker returns to the clinic for follow up. She is s/p L5/S1 ILESI on 12/01/21 with 75% relief of her lower back pain. The pain that radiated down her legs has completely resolved. Today she is reporting increased left knee and right ankle pain, 8/10. She states she has had chronic left knee pain that just recently has worsened when she was trying to get out of bed. She finds it difficult to drive due to the pain. Additionally she her right ankle pain has worsened. She reports have an injury to that ankle over year ago and was treated by Dr. Gibbs. She states the pain has been intermittent in the past but lately it has worsened. Denies any new numbness, weakness or changes with bowel or bladder function. She continues to tolerate Gabapentin 600mg BID, Zanaflex and Cymbalta with moderate relief. She continues to ambulate with a rollator now due to her knee pain.         - today she is full 5/5 strength on exam in her lower  extremities. With intact to light touch in all dermatomes tested from L2-S1 bilaterally, except for b/l feet up to mid calf. DTR: 0/0 b/l patella and 0/0 b/l achilles  - She is s/p L5/S1 ILESI on 12/01/21 with 75% relief of her lower back pain.  - today majority of her back pain is resolved, with her reporting some remaining back pain that is tolerable.   - for her knee pain I placed a referral to PM&R for further evaluation and possible injection. She reports being told by Dr. Gibbs that she is most likely due for a knee replacement, but she is not ready to commit to such a surgery right now.   - I believe her right ankle pain is being exacerbated by her left knee pain and should resolve when she doesn't favor that leg as much.   - she has been told in 2020 by Dr. Hong that she is not a surgical candidate.   - I reviewed her Lumbar MRI with her in clinic and personally reviewed it and it is c/w advanced multilevel degenerative change of the lumbar spine which combines with a levoscoliotic curvature of the lumbar spine to generate multilevel central canal and neuroforaminal stenosis. Moderate central canal stenosis at L3/4, L5/S1 and severe at L4/5.  - she continues to take gabapentin 600mg BID, Zanaflex and Cymbalta.   - gabapentin and zanaflex refilled today.   - follow up as needed

## 2022-01-11 ENCOUNTER — OFFICE VISIT (OUTPATIENT)
Dept: PHYSICAL MEDICINE AND REHAB | Facility: CLINIC | Age: 62
End: 2022-01-11
Payer: COMMERCIAL

## 2022-01-11 VITALS — WEIGHT: 280.44 LBS | HEIGHT: 64 IN | BODY MASS INDEX: 47.88 KG/M2

## 2022-01-11 DIAGNOSIS — M25.562 LEFT KNEE PAIN, UNSPECIFIED CHRONICITY: Primary | ICD-10-CM

## 2022-01-11 DIAGNOSIS — M25.562 CHRONIC PAIN OF LEFT KNEE: ICD-10-CM

## 2022-01-11 DIAGNOSIS — G89.29 CHRONIC PAIN OF LEFT KNEE: ICD-10-CM

## 2022-01-11 DIAGNOSIS — M17.12 PRIMARY OSTEOARTHRITIS OF LEFT KNEE: Primary | ICD-10-CM

## 2022-01-11 PROCEDURE — 99999 PR PBB SHADOW E&M-EST. PATIENT-LVL IV: CPT | Mod: PBBFAC,,, | Performed by: PHYSICAL MEDICINE & REHABILITATION

## 2022-01-11 PROCEDURE — 1159F MED LIST DOCD IN RCRD: CPT | Mod: CPTII,S$GLB,, | Performed by: PHYSICAL MEDICINE & REHABILITATION

## 2022-01-11 PROCEDURE — 3008F BODY MASS INDEX DOCD: CPT | Mod: CPTII,S$GLB,, | Performed by: PHYSICAL MEDICINE & REHABILITATION

## 2022-01-11 PROCEDURE — 99204 PR OFFICE/OUTPT VISIT, NEW, LEVL IV, 45-59 MIN: ICD-10-PCS | Mod: S$GLB,,, | Performed by: PHYSICAL MEDICINE & REHABILITATION

## 2022-01-11 PROCEDURE — 1160F PR REVIEW ALL MEDS BY PRESCRIBER/CLIN PHARMACIST DOCUMENTED: ICD-10-PCS | Mod: CPTII,S$GLB,, | Performed by: PHYSICAL MEDICINE & REHABILITATION

## 2022-01-11 PROCEDURE — 99999 PR PBB SHADOW E&M-EST. PATIENT-LVL IV: ICD-10-PCS | Mod: PBBFAC,,, | Performed by: PHYSICAL MEDICINE & REHABILITATION

## 2022-01-11 PROCEDURE — 1159F PR MEDICATION LIST DOCUMENTED IN MEDICAL RECORD: ICD-10-PCS | Mod: CPTII,S$GLB,, | Performed by: PHYSICAL MEDICINE & REHABILITATION

## 2022-01-11 PROCEDURE — 99204 OFFICE O/P NEW MOD 45 MIN: CPT | Mod: S$GLB,,, | Performed by: PHYSICAL MEDICINE & REHABILITATION

## 2022-01-11 PROCEDURE — 1160F RVW MEDS BY RX/DR IN RCRD: CPT | Mod: CPTII,S$GLB,, | Performed by: PHYSICAL MEDICINE & REHABILITATION

## 2022-01-11 PROCEDURE — 3008F PR BODY MASS INDEX (BMI) DOCUMENTED: ICD-10-PCS | Mod: CPTII,S$GLB,, | Performed by: PHYSICAL MEDICINE & REHABILITATION

## 2022-01-11 RX ORDER — CELECOXIB 200 MG/1
200 CAPSULE ORAL DAILY PRN
Qty: 30 CAPSULE | Refills: 1 | Status: SHIPPED | OUTPATIENT
Start: 2022-01-11 | End: 2022-03-22 | Stop reason: SDUPTHER

## 2022-01-11 NOTE — PROGRESS NOTES
[facility]    Consulting Provider: Adriel Buckner    CHIEF COMPLAINT:   Chief Complaint   Patient presents with    Knee Pain     Left knee     HISTORY OF PRESENT ILLNESS: Fatemeh Shoemaker is a 61 y.o. female who presents to me for the 1st time for evaluation and treatment of left knee pain.  She rates her pain as a 6 on a scale of 1-10.  The pain has been chronic for many years.  She has seen Orthopedic surgery and discussed knee replacement, however she feels she is not quite ready for this yet.  She locates pain to the medial and lateral aspect of the knee.  She notes some popping at times.  She also has some swelling.  She has had prior injections of corticosteroid with good but temporary relief.    Review of Systems   Constitutional: Negative for fever.   HENT: Negative for drooling.    Eyes: Negative for discharge.   Respiratory: Negative for choking.    Cardiovascular: Negative for chest pain.   Genitourinary: Negative for flank pain.   Skin: Negative for wound.   Allergic/Immunologic: Negative for immunocompromised state.   Neurological: Negative for tremors and syncope.   Psychiatric/Behavioral: Negative for behavioral problems.     Past Medical History:   Past Medical History:   Diagnosis Date    Anemia     Anticoagulant long-term use     Arrhythmia     Arthritis     Atrial fibrillation     history    Bronchitis     Encounter for blood transfusion     General anesthetics causing adverse effect in therapeutic use     High blood pressure     Hyperlipidemia     Lump or mass in breast     benign     Neuropathy     Obesity     Obstructive sleep apnea syndrome 11/5/2021    Ulcer        Past Surgical History:   Past Surgical History:   Procedure Laterality Date    A-V CARDIAC PACEMAKER INSERTION Left 9/21/2020    Procedure: INSERTION, CARDIAC PACEMAKER, DUAL CHAMBER;  Surgeon: Bert Reaves MD;  Location: [facility];  Service: Cardiology;  Laterality: Left;     BREAST BIOPSY Right 2017    myofibroblastoma     BREAST LUMPECTOMY Right 2017     SECTION  10/25/1986    Other c/section 10/26/1997    CHOLECYSTECTOMY  2017    Dr. TOLU Bowers, [facility]     csection      CS x 2    CYSTOSCOPY N/A 9/3/2019    Procedure: CYSTOSCOPY;  Surgeon: Noemi Pierre MD;  Location: [facility];  Service: OB/GYN;  Laterality: N/A;    DILATION AND CURETTAGE OF UTERUS      EPIDURAL STEROID INJECTION INTO LUMBAR SPINE N/A 2020    Procedure: Injection-steroid-epidural-lumbar L5/S1;  Surgeon: Gurjit Tavarez MD;  Location: [facility] OR;  Service: Pain Management;  Laterality: N/A;    EPIDURAL STEROID INJECTION INTO LUMBAR SPINE N/A 2020    Procedure: Injection-steroid-epidural-lumbar L5/S1;  Surgeon: Gurjit Tavarez MD;  Location: [facility] OR;  Service: Pain Management;  Laterality: N/A;    EPIDURAL STEROID INJECTION INTO LUMBAR SPINE N/A 2021    Procedure: Injection-steroid-epidural-lumbar L5/S1;  Surgeon: Gurjit Tavarez MD;  Location: [facility] OR;  Service: Pain Management;  Laterality: N/A;    FRACTURE SURGERY      Dislocated  hip total rt hip     HIP PINNING      HYSTERECTOMY      JOINT REPLACEMENT  2008    LAPAROSCOPIC SALPINGO-OOPHORECTOMY Bilateral 9/3/2019    Procedure: SALPINGO-OOPHORECTOMY, LAPAROSCOPIC;  Surgeon: Noemi Pierre MD;  Location: [facility];  Service: OB/GYN;  Laterality: Bilateral;  Dr. Bentley to assist. No resident needed.     LAPAROSCOPIC TOTAL HYSTERECTOMY N/A 9/3/2019    Procedure: HYSTERECTOMY, TOTAL, LAPAROSCOPIC;  Surgeon: Noemi Pierre MD;  Location: [facility];  Service: OB/GYN;  Laterality: N/A;    NASAL SEPTUM SURGERY      OOPHORECTOMY      TOTAL HIP ARTHROPLASTY Right     TUBAL LIGATION         Family History:   Family History   Problem Relation Age of Onset    Colon cancer Maternal Grandmother 70        mets to ovary    Cancer Maternal Grandmother     Arthritis Paternal Grandfather     Eclampsia Paternal  Grandmother     Cataracts Maternal Grandfather     Stroke Father 57    Colon cancer Father     Hypertension Father     Alcohol abuse Father     Cancer Father         Passed away July 10 2019    Hypertension Mother     Cataracts Mother     Hypertension Brother         Age 54 hypertension heart    Early death Brother         Hypertension cardio disease    No Known Problems Daughter     Stomach cancer Neg Hx     Esophageal cancer Neg Hx     Breast cancer Neg Hx     Miscarriages / Stillbirths Neg Hx     Ovarian cancer Neg Hx     Glaucoma Neg Hx     Macular degeneration Neg Hx     Retinal detachment Neg Hx     Strabismus Neg Hx        Medications:   Current Outpatient Medications on File Prior to Visit   Medication Sig Dispense Refill    apixaban (ELIQUIS) 5 mg Tab Take 1 tablet (5 mg total) by mouth 2 (two) times daily. 60 tablet 2    cyanocobalamin 500 MCG tablet Take 1,000 mcg by mouth once daily.      diclofenac sodium (VOLTAREN) 1 % Gel Apply 2 g topically 4 (four) times daily. 50 g 3    DULoxetine (CYMBALTA) 60 MG capsule Take 1 capsule (60 mg total) by mouth once daily. 90 capsule 3    ergocalciferol (ERGOCALCIFEROL) 50,000 unit Cap Take 50,000 Units by mouth every Saturday.       flecainide (TAMBOCOR) 150 MG Tab Take 1 tablet (150 mg total) by mouth every 12 (twelve) hours. 60 tablet 11    fluticasone furoate-vilanteroL (BREO ELLIPTA) 100-25 mcg/dose diskus inhaler Inhale 1 puff into the lungs once daily. Controller 6030 each 11    gabapentin (NEURONTIN) 600 MG tablet Take 1 tablet (600 mg total) by mouth 2 (two) times daily. 60 tablet 06    ipratropium-albuteroL (COMBIVENT)  mcg/actuation inhaler Inhale 1 puff into the lungs every 6 (six) hours as needed for Wheezing or Shortness of Breath. Rescue 4 g 2    lisinopriL 10 MG tablet Take 1 tablet (10 mg total) by mouth once daily. 90 tablet 1    melatonin 5 mg TbDL Take 1 tablet by mouth nightly as needed (sleep).       "metoprolol tartrate (LOPRESSOR) 100 MG tablet Take 1 tablet by mouth in the evening. (Patient taking differently: 2 (two) times daily. 50mg in the morning and 100mg in the evening.) 60 tablet 11    metoprolol tartrate (LOPRESSOR) 50 MG tablet Take 1 tablet (50 mg total) by mouth 2 (two) times daily. 180 tablet 3    nortriptyline (PAMELOR) 25 MG capsule TAKE ONE CAPSULE BY MOUTH EVERY EVENING 90 capsule 3    omeprazole (PRILOSEC) 20 MG capsule Take 1 capsule (20 mg total) by mouth every morning. 30 capsule 11    pneumococcal 23-chayo ps (PNEUMOVAX-23) 25 mcg/0.5 mL vaccine Inject 0.5 mLs into the muscle once. For one dose. for 1 dose 0.5 mL 0    pulse oximeter (PULSE OXIMETER) device Use twice daily at 8 AM and 3 PM and record the value in [facility] as directed. 1 each 0    rosuvastatin (CRESTOR) 10 MG tablet Take 1 tablet (10 mg total) by mouth every evening. 90 tablet 3    tiZANidine (ZANAFLEX) 4 MG tablet Take 1 tablet (4 mg total) by mouth every 6 (six) hours as needed. 60 tablet 01    traMADoL (ULTRAM) 50 mg tablet Take 1 tablet (50 mg total) by mouth every 6 (six) hours as needed for Pain. 21 tablet 0    valACYclovir (VALTREX) 500 MG tablet Take 1 tablet (500 mg total) by mouth daily as needed (fever blister).      VENTOLIN HFA 90 mcg/actuation inhaler INHALE TWO PUFFS into the lungs EVERY 6 HOURS AS NEEDED for FOR WHEEZING (rescue)       No current facility-administered medications on file prior to visit.       Allergies:   Review of patient's allergies indicates:   Allergen Reactions    Aspirin Other (See Comments)     "I don't take it because I've had ulcers"   ulcers  "I don't take it because I've had ulcers"     Meperidine Other (See Comments)     Felt like she was about to pass put after taking       Social History:   Social History     Socioeconomic History    Marital status: Significant Other   Tobacco Use    Smoking status: Never Smoker    Smokeless tobacco: Never Used   Substance and Sexual " "Activity    Alcohol use: No     Comment: never    Drug use: Never    Sexual activity: Yes     Partners: Male     Birth control/protection: Other-see comments     Comment: Hysterectomy 9/3/19     PHYSICAL EXAMINATION:   General    Vitals:    01/11/22 1512   Weight: 127.2 kg (280 lb 6.8 oz)   Height: 5' 4" (1.626 m)     Constitutional: Oriented to person, place, and time. No apparent distress. Pleasant.  HENT:   Head: Normocephalic and atraumatic.   Eyes: Right eye exhibits no discharge. Left eye exhibits no discharge. No scleral icterus.   Pulmonary/Chest: Effort normal. No respiratory distress.   Abdominal: There is no guarding.   Neurological: Alert and oriented to person, place, and time.   Psychiatric: Behavior is normal.   Left Knee Exam     Tenderness   The patient is experiencing tenderness in the medial joint line, lateral joint line and pes anserinus.    Range of Motion   Left knee extension: 3.   Flexion: 100     Tests   Varus: negative Valgus: negative  Lachman:  Anterior - negative      Patellar apprehension: negative    Other   Erythema: absent  Scars: absent  Sensation: normal  Pulse: present  Swelling: none        INSPECTION: There is no swelling, ecchymoses, erythema or gross deformity.  GAIT/DYNAMIC:  Antalgic, using the aid of a cane.    Imaging  X-ray of left knee from 10/19/2020: There is severe bilateral medial tibiofemoral compartment joint space loss (Kellgren Isaiah Grade IV) along with bulky tricompartment marginal osteophyte formation.  No left knee joint effusion.    Data Reviewed: X-ray    Supportive Actions: Independent visualization of images or test specimens    ASSESSMENT:   1. Primary osteoarthritis of left knee    2. Chronic pain of left knee      PLAN:     1. Time was spent reviewing the above diagnosis in depth with Fatemeh today, including acute management and rehabilitation.     2. We discussed options for her severe left knee osteoarthritis (Kellgren Isaiah Grade IV).  She " "feels she is not quite ready mentally for knee arthroplasty.  We discussed that her osteoarthritis is to severe to be significantly aided by biologic injections such as PRP or stem cell.  Unfortunately, her insurance would not cover genicular radiofrequency ablation.  That leaves injection of hyaluronic acid is the only injection that she has not had quite yet.  I do believe this is reasonable in the hopes of reducing some of her pain and reducing the need for oral pain management.    3. I prescribe Celebrex 200 mg to take daily with meals to help with her pain well we seek insurance prior authorization for injection of hyaluronic acid.      4. RTC in 1-2 weeks for left knee injection of hyaluronic acid.    This is a consult from Adriel Buckner. Please see the "Communications" section of Epic to see how the consulting physician received the report of today's findings and recommendations. If it's an [facility] physician, it will be forwarded to his/her "in basket".    The above note was completed, in part, with the aid of Dragon dictation software/hardware. Translation errors may be present.      "

## 2022-01-18 ENCOUNTER — OFFICE VISIT (OUTPATIENT)
Dept: PHYSICAL MEDICINE AND REHAB | Facility: CLINIC | Age: 62
End: 2022-01-18
Payer: COMMERCIAL

## 2022-01-18 VITALS — WEIGHT: 280.44 LBS | BODY MASS INDEX: 47.88 KG/M2 | HEIGHT: 64 IN

## 2022-01-18 DIAGNOSIS — M17.12 PRIMARY OSTEOARTHRITIS OF LEFT KNEE: ICD-10-CM

## 2022-01-18 DIAGNOSIS — M25.562 CHRONIC PAIN OF LEFT KNEE: Primary | ICD-10-CM

## 2022-01-18 DIAGNOSIS — G89.29 CHRONIC PAIN OF LEFT KNEE: Primary | ICD-10-CM

## 2022-01-18 PROCEDURE — 1159F PR MEDICATION LIST DOCUMENTED IN MEDICAL RECORD: ICD-10-PCS | Mod: CPTII,S$GLB,, | Performed by: PHYSICAL MEDICINE & REHABILITATION

## 2022-01-18 PROCEDURE — 99999 PR PBB SHADOW E&M-EST. PATIENT-LVL IV: ICD-10-PCS | Mod: PBBFAC,,, | Performed by: PHYSICAL MEDICINE & REHABILITATION

## 2022-01-18 PROCEDURE — 99999 PR PBB SHADOW E&M-EST. PATIENT-LVL IV: CPT | Mod: PBBFAC,,, | Performed by: PHYSICAL MEDICINE & REHABILITATION

## 2022-01-18 PROCEDURE — 99499 NO LOS: ICD-10-PCS | Mod: S$GLB,,, | Performed by: PHYSICAL MEDICINE & REHABILITATION

## 2022-01-18 PROCEDURE — 3008F BODY MASS INDEX DOCD: CPT | Mod: CPTII,S$GLB,, | Performed by: PHYSICAL MEDICINE & REHABILITATION

## 2022-01-18 PROCEDURE — 3008F PR BODY MASS INDEX (BMI) DOCUMENTED: ICD-10-PCS | Mod: CPTII,S$GLB,, | Performed by: PHYSICAL MEDICINE & REHABILITATION

## 2022-01-18 PROCEDURE — 1160F RVW MEDS BY RX/DR IN RCRD: CPT | Mod: CPTII,S$GLB,, | Performed by: PHYSICAL MEDICINE & REHABILITATION

## 2022-01-18 PROCEDURE — 20611 LARGE JOINT ASPIRATION/INJECTION: L KNEE: ICD-10-PCS | Mod: LT,S$GLB,, | Performed by: PHYSICAL MEDICINE & REHABILITATION

## 2022-01-18 PROCEDURE — 1159F MED LIST DOCD IN RCRD: CPT | Mod: CPTII,S$GLB,, | Performed by: PHYSICAL MEDICINE & REHABILITATION

## 2022-01-18 PROCEDURE — 1160F PR REVIEW ALL MEDS BY PRESCRIBER/CLIN PHARMACIST DOCUMENTED: ICD-10-PCS | Mod: CPTII,S$GLB,, | Performed by: PHYSICAL MEDICINE & REHABILITATION

## 2022-01-18 PROCEDURE — 20611 DRAIN/INJ JOINT/BURSA W/US: CPT | Mod: LT,S$GLB,, | Performed by: PHYSICAL MEDICINE & REHABILITATION

## 2022-01-18 PROCEDURE — 99499 UNLISTED E&M SERVICE: CPT | Mod: S$GLB,,, | Performed by: PHYSICAL MEDICINE & REHABILITATION

## 2022-01-18 NOTE — PROCEDURES
Large Joint Aspiration/Injection: L knee    Date/Time: 1/18/2022 1:00 PM  Performed by: Bryan Jones MD  Authorized by: Bryan Jones MD     Consent Done?:  Yes (Verbal)  Indications:  Pain  Site marked: the procedure site was marked    Timeout: prior to procedure the correct patient, procedure, and site was verified    Prep: patient was prepped and draped in usual sterile fashion    Local anesthesia used?: No      Details:  Needle Size:  22 G  Ultrasonic Guidance for needle placement?: Yes    Images are saved and documented.  Approach: in plane, lat to med.  Location:  Knee  Site:  L knee  Medications:  48 mg hylan g-f 20 48 mg/6 mL  Patient tolerance:  Patient tolerated the procedure well with no immediate complications     Ultrasound guidance was used for correct needle placement, the images were saved will be uploaded to EMR.

## 2022-01-18 NOTE — PROGRESS NOTES
[facility]    CHIEF COMPLAINT:   Chief Complaint   Patient presents with    Knee Pain     Left knee pain     HISTORY OF PRESENT ILLNESS: Fatemeh Shoemaker is a 61 y.o. female who presents to me in follow-up for scheduled injection of hyaluronic acid to her left knee.  She reports no significant change in her condition since last visit.  She has persisting pain over the anterior knee.    Previous HPI  She rates her pain as a 6 on a scale of 1-10.  The pain has been chronic for many years.  She has seen Orthopedic surgery and discussed knee replacement, however she feels she is not quite ready for this yet.  She locates pain to the medial and lateral aspect of the knee.  She notes some popping at times.  She also has some swelling.  She has had prior injections of corticosteroid with good but temporary relief.    Review of Systems   Constitutional: Negative for fever.   HENT: Negative for drooling.    Eyes: Negative for discharge.   Respiratory: Negative for choking.    Cardiovascular: Negative for chest pain.   Genitourinary: Negative for flank pain.   Skin: Negative for wound.   Allergic/Immunologic: Negative for immunocompromised state.   Neurological: Negative for tremors and syncope.   Psychiatric/Behavioral: Negative for behavioral problems.     Past Medical History:   Past Medical History:   Diagnosis Date    Anemia     Anticoagulant long-term use     Arrhythmia     Arthritis     Atrial fibrillation     history    Bronchitis     Encounter for blood transfusion     General anesthetics causing adverse effect in therapeutic use     High blood pressure     Hyperlipidemia     Lump or mass in breast     benign     Neuropathy     Obesity     Obstructive sleep apnea syndrome 11/5/2021    Ulcer        Past Surgical History:   Past Surgical History:   Procedure Laterality Date    A-V CARDIAC PACEMAKER INSERTION Left 9/21/2020    Procedure: INSERTION, CARDIAC PACEMAKER, DUAL CHAMBER;   Surgeon: Bert Reaves MD;  Location: [facility] CATH;  Service: Cardiology;  Laterality: Left;    BREAST BIOPSY Right 2017    myofibroblastoma     BREAST LUMPECTOMY Right 2017     SECTION  10/25/1986    Other c/section 10/26/1997    CHOLECYSTECTOMY  2017    Dr. TOLU Bowers, [facility]     csection      CS x 2    CYSTOSCOPY N/A 9/3/2019    Procedure: CYSTOSCOPY;  Surgeon: Noemi Pierre MD;  Location: [facility];  Service: OB/GYN;  Laterality: N/A;    DILATION AND CURETTAGE OF UTERUS      EPIDURAL STEROID INJECTION INTO LUMBAR SPINE N/A 2020    Procedure: Injection-steroid-epidural-lumbar L5/S1;  Surgeon: Gurjit Tavarez MD;  Location: [facility] OR;  Service: Pain Management;  Laterality: N/A;    EPIDURAL STEROID INJECTION INTO LUMBAR SPINE N/A 2020    Procedure: Injection-steroid-epidural-lumbar L5/S1;  Surgeon: Gurjit Tavarez MD;  Location: [facility] OR;  Service: Pain Management;  Laterality: N/A;    EPIDURAL STEROID INJECTION INTO LUMBAR SPINE N/A 2021    Procedure: Injection-steroid-epidural-lumbar L5/S1;  Surgeon: Gurjit Tavarez MD;  Location: [facility] OR;  Service: Pain Management;  Laterality: N/A;    FRACTURE SURGERY      Dislocated  hip total rt hip     HIP PINNING      HYSTERECTOMY      JOINT REPLACEMENT  2008    LAPAROSCOPIC SALPINGO-OOPHORECTOMY Bilateral 9/3/2019    Procedure: SALPINGO-OOPHORECTOMY, LAPAROSCOPIC;  Surgeon: Noemi Pierre MD;  Location: [facility];  Service: OB/GYN;  Laterality: Bilateral;  Dr. Bentley to assist. No resident needed.     LAPAROSCOPIC TOTAL HYSTERECTOMY N/A 9/3/2019    Procedure: HYSTERECTOMY, TOTAL, LAPAROSCOPIC;  Surgeon: Noemi Pierre MD;  Location: [facility];  Service: OB/GYN;  Laterality: N/A;    NASAL SEPTUM SURGERY      OOPHORECTOMY      TOTAL HIP ARTHROPLASTY Right     TUBAL LIGATION         Family History:   Family History   Problem Relation Age of Onset    Colon cancer Maternal Grandmother 70        mets  to ovary    Cancer Maternal Grandmother     Arthritis Paternal Grandfather     Eclampsia Paternal Grandmother     Cataracts Maternal Grandfather     Stroke Father 57    Colon cancer Father     Hypertension Father     Alcohol abuse Father     Cancer Father         Passed away July 10 2019    Hypertension Mother     Cataracts Mother     Hypertension Brother         Age 54 hypertension heart    Early death Brother         Hypertension cardio disease    No Known Problems Daughter     Stomach cancer Neg Hx     Esophageal cancer Neg Hx     Breast cancer Neg Hx     Miscarriages / Stillbirths Neg Hx     Ovarian cancer Neg Hx     Glaucoma Neg Hx     Macular degeneration Neg Hx     Retinal detachment Neg Hx     Strabismus Neg Hx        Medications:   Current Outpatient Medications on File Prior to Visit   Medication Sig Dispense Refill    apixaban (ELIQUIS) 5 mg Tab Take 1 tablet (5 mg total) by mouth 2 (two) times daily. 60 tablet 2    celecoxib (CELEBREX) 200 MG capsule Take 1 capsule (200 mg total) by mouth daily as needed for Pain. 30 capsule 1    cyanocobalamin 500 MCG tablet Take 1,000 mcg by mouth once daily.      diclofenac sodium (VOLTAREN) 1 % Gel Apply 2 g topically 4 (four) times daily. 50 g 3    DULoxetine (CYMBALTA) 60 MG capsule Take 1 capsule (60 mg total) by mouth once daily. 90 capsule 3    ergocalciferol (ERGOCALCIFEROL) 50,000 unit Cap Take 50,000 Units by mouth every Saturday.       flecainide (TAMBOCOR) 150 MG Tab Take 1 tablet (150 mg total) by mouth every 12 (twelve) hours. 60 tablet 11    fluticasone furoate-vilanteroL (BREO ELLIPTA) 100-25 mcg/dose diskus inhaler Inhale 1 puff into the lungs once daily. Controller 6030 each 11    gabapentin (NEURONTIN) 600 MG tablet Take 1 tablet (600 mg total) by mouth 2 (two) times daily. 60 tablet 06    ipratropium-albuteroL (COMBIVENT)  mcg/actuation inhaler Inhale 1 puff into the lungs every 6 (six) hours as needed for  "Wheezing or Shortness of Breath. Rescue 4 g 2    lisinopriL 10 MG tablet Take 1 tablet (10 mg total) by mouth once daily. 90 tablet 1    melatonin 5 mg TbDL Take 1 tablet by mouth nightly as needed (sleep).      metoprolol tartrate (LOPRESSOR) 100 MG tablet Take 1 tablet by mouth in the evening. (Patient taking differently: 2 (two) times daily. 50mg in the morning and 100mg in the evening.) 60 tablet 11    metoprolol tartrate (LOPRESSOR) 50 MG tablet Take 1 tablet (50 mg total) by mouth 2 (two) times daily. 180 tablet 3    nortriptyline (PAMELOR) 25 MG capsule TAKE ONE CAPSULE BY MOUTH EVERY EVENING 90 capsule 3    omeprazole (PRILOSEC) 20 MG capsule Take 1 capsule (20 mg total) by mouth every morning. 30 capsule 11    pulse oximeter (PULSE OXIMETER) device Use twice daily at 8 AM and 3 PM and [facility] directed. 1 each 0    rosuvastatin (CRESTOR) 10 MG tablet Take 1 tablet (10 mg total) by mouth every evening. 90 tablet 3    tiZANidine (ZANAFLEX) 4 MG tablet Take 1 tablet (4 mg total) by mouth every 6 (six) hours as needed. 60 tablet 01    [] traMADoL (ULTRAM) 50 mg tablet Take 1 tablet (50 mg total) by mouth every 6 (six) hours as needed for Pain. 21 tablet 0    valACYclovir (VALTREX) 500 MG tablet Take 1 tablet (500 mg total) by mouth daily as needed (fever blister).      VENTOLIN HFA 90 mcg/actuation inhaler INHALE TWO PUFFS into the lungs EVERY 6 HOURS AS NEEDED for FOR WHEEZING (rescue)       No current facility-administered medications on file prior to visit.       Allergies:   Review of patient's allergies indicates:   Allergen Reactions    Aspirin Other (See Comments)     "I don't take it because I've had ulcers"   ulcers  "I don't take it because I've had ulcers"     Meperidine Other (See Comments)     Felt like she was about to pass put after taking       Social History:   Social History     Socioeconomic History    Marital status: Significant Other   Tobacco Use " "   Smoking status: Never Smoker    Smokeless tobacco: Never Used   Substance and Sexual Activity    Alcohol use: No     Comment: never    Drug use: Never    Sexual activity: Yes     Partners: Male     Birth control/protection: Other-see comments     Comment: Hysterectomy 9/3/19     PHYSICAL EXAMINATION:   General    Vitals:    01/18/22 1257   Weight: 127.2 kg (280 lb 6.8 oz)   Height: 5' 4" (1.626 m)     Constitutional: Oriented to person, place, and time. No apparent distress. Pleasant.  HENT:   Head: Normocephalic and atraumatic.   Eyes: Right eye exhibits no discharge. Left eye exhibits no discharge. No scleral icterus.   Pulmonary/Chest: Effort normal. No respiratory distress.   Abdominal: There is no guarding.   Neurological: Alert and oriented to person, place, and time.   Psychiatric: Behavior is normal.   Left Knee Exam     Tenderness   The patient is experiencing tenderness in the medial joint line, lateral joint line and pes anserinus.    Range of Motion   Left knee extension: 3.   Flexion: 100     Tests   Varus: negative Valgus: negative  Lachman:  Anterior - negative      Patellar apprehension: negative    Other   Erythema: absent  Scars: absent  Sensation: normal  Pulse: present  Swelling: none        INSPECTION: There is no swelling, ecchymoses, erythema or gross deformity.  GAIT/DYNAMIC:  Antalgic, using the aid of a cane.    Imaging  X-ray of left knee from 10/19/2020: There is severe bilateral medial tibiofemoral compartment joint space loss (Kellgren Isaiah Grade IV) along with bulky tricompartment marginal osteophyte formation.  No left knee joint effusion.    Data Reviewed: X-ray    Supportive Actions: Independent visualization of images or test specimens    ASSESSMENT:   1. Chronic pain of left knee    2. Primary osteoarthritis of left knee      PLAN:     1. We proceed today with scheduled injection of Synvisc-One to the left knee joint under ultrasound guidance, see procedure note.    2. " RTC as needed if symptoms persist or reoccur.    The above note was completed, in part, with the aid of Dragon dictation software/hardware. Translation errors may be present.

## 2022-02-01 ENCOUNTER — HOSPITAL ENCOUNTER (OUTPATIENT)
Dept: RADIOLOGY | Facility: HOSPITAL | Age: 62
Discharge: HOME | End: 2022-02-01
Attending: ORTHOPAEDIC SURGERY
Payer: COMMERCIAL

## 2022-02-01 ENCOUNTER — OFFICE VISIT (OUTPATIENT)
Dept: ORTHOPEDICS | Facility: CLINIC | Age: 62
End: 2022-02-01
Payer: COMMERCIAL

## 2022-02-01 VITALS
BODY MASS INDEX: 47.95 KG/M2 | HEIGHT: 64 IN | HEART RATE: 60 BPM | WEIGHT: 280.88 LBS | SYSTOLIC BLOOD PRESSURE: 155 MMHG | DIASTOLIC BLOOD PRESSURE: 94 MMHG

## 2022-02-01 DIAGNOSIS — S93.491A SPRAIN OF ANTERIOR TALOFIBULAR LIGAMENT OF RIGHT ANKLE, INITIAL ENCOUNTER: Primary | ICD-10-CM

## 2022-02-01 DIAGNOSIS — Z82.61 FAMILY HISTORY OF RHEUMATOID ARTHRITIS: ICD-10-CM

## 2022-02-01 DIAGNOSIS — M25.571 RIGHT ANKLE PAIN, UNSPECIFIED CHRONICITY: ICD-10-CM

## 2022-02-01 DIAGNOSIS — M25.50 MULTIPLE JOINT PAIN: ICD-10-CM

## 2022-02-01 DIAGNOSIS — M25.571 RIGHT ANKLE PAIN, UNSPECIFIED CHRONICITY: Primary | ICD-10-CM

## 2022-02-01 PROCEDURE — 99204 OFFICE O/P NEW MOD 45 MIN: CPT | Mod: S$GLB,,, | Performed by: ORTHOPAEDIC SURGERY

## 2022-02-01 PROCEDURE — 99999 PR PBB SHADOW E&M-EST. PATIENT-LVL IV: ICD-10-PCS | Mod: PBBFAC,,, | Performed by: ORTHOPAEDIC SURGERY

## 2022-02-01 PROCEDURE — 3077F PR MOST RECENT SYSTOLIC BLOOD PRESSURE >= 140 MM HG: ICD-10-PCS | Mod: CPTII,S$GLB,, | Performed by: ORTHOPAEDIC SURGERY

## 2022-02-01 PROCEDURE — 73610 XR ANKLE COMPLETE 3 VIEW RIGHT: ICD-10-PCS | Mod: 26,RT,, | Performed by: RADIOLOGY

## 2022-02-01 PROCEDURE — 1160F RVW MEDS BY RX/DR IN RCRD: CPT | Mod: CPTII,S$GLB,, | Performed by: ORTHOPAEDIC SURGERY

## 2022-02-01 PROCEDURE — 1160F PR REVIEW ALL MEDS BY PRESCRIBER/CLIN PHARMACIST DOCUMENTED: ICD-10-PCS | Mod: CPTII,S$GLB,, | Performed by: ORTHOPAEDIC SURGERY

## 2022-02-01 PROCEDURE — 3080F PR MOST RECENT DIASTOLIC BLOOD PRESSURE >= 90 MM HG: ICD-10-PCS | Mod: CPTII,S$GLB,, | Performed by: ORTHOPAEDIC SURGERY

## 2022-02-01 PROCEDURE — 99204 PR OFFICE/OUTPT VISIT, NEW, LEVL IV, 45-59 MIN: ICD-10-PCS | Mod: S$GLB,,, | Performed by: ORTHOPAEDIC SURGERY

## 2022-02-01 PROCEDURE — 99999 PR PBB SHADOW E&M-EST. PATIENT-LVL IV: CPT | Mod: PBBFAC,,, | Performed by: ORTHOPAEDIC SURGERY

## 2022-02-01 PROCEDURE — 3008F BODY MASS INDEX DOCD: CPT | Mod: CPTII,S$GLB,, | Performed by: ORTHOPAEDIC SURGERY

## 2022-02-01 PROCEDURE — 1159F MED LIST DOCD IN RCRD: CPT | Mod: CPTII,S$GLB,, | Performed by: ORTHOPAEDIC SURGERY

## 2022-02-01 PROCEDURE — 3008F PR BODY MASS INDEX (BMI) DOCUMENTED: ICD-10-PCS | Mod: CPTII,S$GLB,, | Performed by: ORTHOPAEDIC SURGERY

## 2022-02-01 PROCEDURE — 73610 X-RAY EXAM OF ANKLE: CPT | Mod: 26,RT,, | Performed by: RADIOLOGY

## 2022-02-01 PROCEDURE — 73610 X-RAY EXAM OF ANKLE: CPT | Mod: TC,PO,RT

## 2022-02-01 PROCEDURE — 3080F DIAST BP >= 90 MM HG: CPT | Mod: CPTII,S$GLB,, | Performed by: ORTHOPAEDIC SURGERY

## 2022-02-01 PROCEDURE — 3077F SYST BP >= 140 MM HG: CPT | Mod: CPTII,S$GLB,, | Performed by: ORTHOPAEDIC SURGERY

## 2022-02-01 PROCEDURE — 1159F PR MEDICATION LIST DOCUMENTED IN MEDICAL RECORD: ICD-10-PCS | Mod: CPTII,S$GLB,, | Performed by: ORTHOPAEDIC SURGERY

## 2022-02-03 NOTE — PROGRESS NOTES
"HPI: Fatemeh Shoemaker is a 61 y.o. female who complains of right ankle pain. The pain began on 1/31/22 when she tripped on a step and rolled her ankle. She rates her pain as 8/10 today. She has profound peripheral neuropathy. She already uses a cane and a walker. The pain is worse with walking and standing.      PAST MEDICAL/SURGICAL/FAMILY/SOCIAL/ HISTORY: REVIEWED    ALLERGIES/MEDICATIONS: REVIEWED       Review of Systems:     Constitution: Negative.   HEENT: Negative.   Eyes: Negative.   Cardiovascular: Negative.   Respiratory: Negative.   Endocrine: Negative.   Hematologic/Lymphatic: Negative.   Skin: Negative.   Musculoskeletal: Positive for right ankle pain   Gastrointestinal: Negative.   Genitourinary: Negative.   Neurological: Negative.   Psychiatric/Behavioral: Negative.   Allergic/Immunologic: Negative.       PHYSICAL EXAM:  Vitals:    02/01/22 1413   BP: (!) 155/94   Pulse: 60     Ht Readings from Last 1 Encounters:   02/01/22 5' 4" (1.626 m)     Wt Readings from Last 1 Encounters:   02/01/22 127.4 kg (280 lb 13.9 oz)       GENERAL: Well developed, well nourished, no acute distress. BMI 48. Very pleasant.   SKIN: Skin is intact. No atrophy, abrasions or lesions are noted.   Neurological: Normal mental status. Appropriate and conversant. Alert and oriented x 3.  GAIT: Walks with an antalgic gait.    Right lower extremity compared with LLE:  2+ dorsalis pedis pulse.  Capillary refill < 3 seconds.  Normal range of motion tibiotalar and subtalar joints. Moderate pes cavovarus.  5/5 strength EHL, FHL, tibialis anterior, gastrocsoleus, tibialis posterior and peroneals. Very diminished sensation to touch sural, saphenous, superficial peroneal and deep peroneal nerves to the level of just distal to the knee.  Mild to moderate swelling, no ecchymosis or deformity. No lymphadenopathy, no masses or tumors palpated.  No evidence of charcot arthropathy today.     XRAYS:   3 views of right ankle obtained and " reviewed today show No evidence of fractures or dislocations. Moderate degenerative changes.       ASSESSMENT:        Encounter Diagnoses   Name Primary?    Sprain of anterior talofibular ligament of right ankle, initial encounter Yes    Family history of rheumatoid arthritis     Multiple joint pain         PLAN: I spent a total of 45 minutes on the day of the visit.This includes face to face time and non-face to face time preparing to see the patient (eg, review of tests), obtaining and/or reviewing separately obtained history, documenting clinical information in the electronic or other health record, independently interpreting results and communicating results to the patient/family/caregiver, or care coordinator. Ankle sleeve and handout on foot and ankle exercises given. I ordered RF/RONALD/HLA-B27. F/u prn.

## 2022-02-14 ENCOUNTER — TELEPHONE (OUTPATIENT)
Dept: ORTHOPEDICS | Facility: CLINIC | Age: 62
End: 2022-02-14
Payer: COMMERCIAL

## 2022-02-14 DIAGNOSIS — Z15.89 HLA B27 (HLA B27 POSITIVE): Primary | ICD-10-CM

## 2022-02-14 DIAGNOSIS — Z82.61 FAMILY HISTORY OF RHEUMATOID ARTHRITIS: ICD-10-CM

## 2022-02-14 NOTE — TELEPHONE ENCOUNTER
Called pt and advised that HLAB27 was positive per Dr. Arambula. Per Dr. Arambula, pt needs to see Rheumatology. Orders entered. Advised pt that they do have a wait list for appts, she can call [phone] to get scheduled. Thanks, Nicole

## 2022-02-18 ENCOUNTER — LAB VISIT (OUTPATIENT)
Dept: LAB | Facility: HOSPITAL | Age: 62
End: 2022-02-18
Attending: INTERNAL MEDICINE
Payer: COMMERCIAL

## 2022-02-18 ENCOUNTER — TELEPHONE (OUTPATIENT)
Dept: FAMILY MEDICINE | Facility: CLINIC | Age: 62
End: 2022-02-18
Payer: COMMERCIAL

## 2022-02-18 DIAGNOSIS — R30.0 DYSURIA: ICD-10-CM

## 2022-02-18 DIAGNOSIS — R30.0 DYSURIA: Primary | ICD-10-CM

## 2022-02-18 LAB
BACTERIA #/AREA URNS HPF: ABNORMAL /HPF
BILIRUB UR QL STRIP: NEGATIVE
CLARITY UR: ABNORMAL
COLOR UR: YELLOW
GLUCOSE UR QL STRIP: NEGATIVE
HGB UR QL STRIP: ABNORMAL
KETONES UR QL STRIP: NEGATIVE
LEUKOCYTE ESTERASE UR QL STRIP: ABNORMAL
MICROSCOPIC COMMENT: ABNORMAL
NITRITE UR QL STRIP: NEGATIVE
PH UR STRIP: 6 [PH] (ref 5–8)
PROT UR QL STRIP: NEGATIVE
RBC #/AREA URNS HPF: 3 /HPF (ref 0–4)
SP GR UR STRIP: 1.02 (ref 1–1.03)
SQUAMOUS #/AREA URNS HPF: 4 /HPF
URN SPEC COLLECT METH UR: ABNORMAL
WBC #/AREA URNS HPF: >100 /HPF (ref 0–5)

## 2022-02-18 PROCEDURE — 87086 URINE CULTURE/COLONY COUNT: CPT | Performed by: INTERNAL MEDICINE

## 2022-02-18 PROCEDURE — 81000 URINALYSIS NONAUTO W/SCOPE: CPT | Mod: PO | Performed by: INTERNAL MEDICINE

## 2022-02-18 RX ORDER — SULFAMETHOXAZOLE AND TRIMETHOPRIM 800; 160 MG/1; MG/1
1 TABLET ORAL 2 TIMES DAILY
Qty: 6 TABLET | Refills: 0 | Status: SHIPPED | OUTPATIENT
Start: 2022-02-18 | End: 2022-02-21

## 2022-02-18 NOTE — TELEPHONE ENCOUNTER
----- Message from Rhonda Ann sent at 2/18/2022 12:25 PM CST -----  Contact: pt  Type: Same Day Appointment    Caller is requesting a same day appointment.  Caller declined first available appt listed below.    Name of caller:Pt     When is the first available appt: 02/21    Symptoms:   UTI     Best Call back number:[phone]    Additional Info: Please advise-Thank you~

## 2022-02-18 NOTE — TELEPHONE ENCOUNTER
Spoke with patient, advised her that the UA has been placed and an antibiotic has been sent to the pharmacy to begin until we get culture results.

## 2022-02-18 NOTE — TELEPHONE ENCOUNTER
Patient believes she may have a UTI, she states this has been going on a week. She is having urgency and pressure. Can you please place urine sample so she can do it before the weekend. Order pended.

## 2022-02-19 LAB — BACTERIA UR CULT: ABNORMAL

## 2022-02-21 ENCOUNTER — TELEPHONE (OUTPATIENT)
Dept: FAMILY MEDICINE | Facility: CLINIC | Age: 62
End: 2022-02-21
Payer: COMMERCIAL

## 2022-02-21 NOTE — TELEPHONE ENCOUNTER
Spoke with patient she stated she still have  Frequency the bactrim helped.  And if you going to send in another antibiotic sent [facility]

## 2022-02-21 NOTE — TELEPHONE ENCOUNTER
----- Message from Jimbo Monge sent at 2/21/2022 11:23 AM CST -----  Type: Needs Medical Advice  Who Called:  Pt    Best Call Back Number: [phone]   Additional Information: Sts she would like someone to call her back in regards to her urinalysis from Friday.  Please advise -- Thank you

## 2022-02-21 NOTE — TELEPHONE ENCOUNTER
Culture grew an organism that is considered a contaminant or doesn't usually cause UTI's. UA showed signs of possible infection, but how are her symptoms?

## 2022-02-22 NOTE — TELEPHONE ENCOUNTER
----- Message from Daria Alonso MA sent at 2/21/2022  4:37 PM CST -----  Type:  Patient Returning Call    Who Called:  pt  Who Left Message for Patient:  Jeannette  Does the patient know what this is regarding?:  yes  Best Call Back Number:  [phone]     Additional Information:  please advise--thank you

## 2022-03-07 ENCOUNTER — PATIENT MESSAGE (OUTPATIENT)
Dept: ADMINISTRATIVE | Facility: OTHER | Age: 62
End: 2022-03-07
Payer: COMMERCIAL

## 2022-03-21 ENCOUNTER — PATIENT OUTREACH (OUTPATIENT)
Dept: ADMINISTRATIVE | Facility: OTHER | Age: 62
End: 2022-03-21
Payer: COMMERCIAL

## 2022-03-21 ENCOUNTER — OFFICE VISIT (OUTPATIENT)
Dept: PAIN MEDICINE | Facility: CLINIC | Age: 62
End: 2022-03-21
Payer: COMMERCIAL

## 2022-03-21 VITALS
HEIGHT: 64 IN | WEIGHT: 290.13 LBS | DIASTOLIC BLOOD PRESSURE: 65 MMHG | BODY MASS INDEX: 49.53 KG/M2 | SYSTOLIC BLOOD PRESSURE: 143 MMHG | HEART RATE: 60 BPM | OXYGEN SATURATION: 97 %

## 2022-03-21 DIAGNOSIS — M54.16 LUMBAR RADICULOPATHY: ICD-10-CM

## 2022-03-21 DIAGNOSIS — M25.562 LEFT KNEE PAIN, UNSPECIFIED CHRONICITY: ICD-10-CM

## 2022-03-21 DIAGNOSIS — M54.42 CHRONIC BILATERAL LOW BACK PAIN WITH BILATERAL SCIATICA: Primary | ICD-10-CM

## 2022-03-21 DIAGNOSIS — M54.41 CHRONIC BILATERAL LOW BACK PAIN WITH BILATERAL SCIATICA: Primary | ICD-10-CM

## 2022-03-21 DIAGNOSIS — J40 BRONCHITIS: ICD-10-CM

## 2022-03-21 DIAGNOSIS — R20.2 PARESTHESIAS: ICD-10-CM

## 2022-03-21 DIAGNOSIS — G89.29 CHRONIC BILATERAL LOW BACK PAIN WITH BILATERAL SCIATICA: Primary | ICD-10-CM

## 2022-03-21 DIAGNOSIS — M25.562 LEFT KNEE PAIN, UNSPECIFIED CHRONICITY: Primary | ICD-10-CM

## 2022-03-21 DIAGNOSIS — G62.9 NEUROPATHY: ICD-10-CM

## 2022-03-21 PROCEDURE — 99999 PR PBB SHADOW E&M-EST. PATIENT-LVL IV: CPT | Mod: PBBFAC,,,

## 2022-03-21 PROCEDURE — 3008F BODY MASS INDEX DOCD: CPT | Mod: CPTII,S$GLB,,

## 2022-03-21 PROCEDURE — 99213 PR OFFICE/OUTPT VISIT, EST, LEVL III, 20-29 MIN: ICD-10-PCS | Mod: S$GLB,,,

## 2022-03-21 PROCEDURE — 99999 PR PBB SHADOW E&M-EST. PATIENT-LVL IV: ICD-10-PCS | Mod: PBBFAC,,,

## 2022-03-21 PROCEDURE — 3078F DIAST BP <80 MM HG: CPT | Mod: CPTII,S$GLB,,

## 2022-03-21 PROCEDURE — 3078F PR MOST RECENT DIASTOLIC BLOOD PRESSURE < 80 MM HG: ICD-10-PCS | Mod: CPTII,S$GLB,,

## 2022-03-21 PROCEDURE — 4010F PR ACE/ARB THEARPY RXD/TAKEN: ICD-10-PCS | Mod: CPTII,S$GLB,,

## 2022-03-21 PROCEDURE — 3008F PR BODY MASS INDEX (BMI) DOCUMENTED: ICD-10-PCS | Mod: CPTII,S$GLB,,

## 2022-03-21 PROCEDURE — 3077F PR MOST RECENT SYSTOLIC BLOOD PRESSURE >= 140 MM HG: ICD-10-PCS | Mod: CPTII,S$GLB,,

## 2022-03-21 PROCEDURE — 3077F SYST BP >= 140 MM HG: CPT | Mod: CPTII,S$GLB,,

## 2022-03-21 PROCEDURE — 99213 OFFICE O/P EST LOW 20 MIN: CPT | Mod: S$GLB,,,

## 2022-03-21 PROCEDURE — 4010F ACE/ARB THERAPY RXD/TAKEN: CPT | Mod: CPTII,S$GLB,,

## 2022-03-21 RX ORDER — DICLOFENAC SODIUM 10 MG/G
2 GEL TOPICAL 4 TIMES DAILY
Qty: 50 G | Refills: 3 | Status: SHIPPED | OUTPATIENT
Start: 2022-03-21 | End: 2023-08-23 | Stop reason: SDUPTHER

## 2022-03-21 RX ORDER — FLUTICASONE FUROATE AND VILANTEROL TRIFENATATE 100; 25 UG/1; UG/1
1 POWDER RESPIRATORY (INHALATION) DAILY
Qty: 30 EACH | Refills: 11 | Status: SHIPPED | OUTPATIENT
Start: 2022-03-21 | End: 2022-04-20

## 2022-03-21 RX ORDER — HYDROCODONE BITARTRATE AND ACETAMINOPHEN 5; 325 MG/1; MG/1
1 TABLET ORAL EVERY 8 HOURS PRN
Qty: 21 TABLET | Refills: 0 | Status: SHIPPED | OUTPATIENT
Start: 2022-03-21 | End: 2022-03-28

## 2022-03-21 RX ORDER — DULOXETIN HYDROCHLORIDE 60 MG/1
60 CAPSULE, DELAYED RELEASE ORAL DAILY
Qty: 90 CAPSULE | Refills: 3 | Status: SHIPPED | OUTPATIENT
Start: 2022-03-21 | End: 2022-12-13

## 2022-03-21 NOTE — TELEPHONE ENCOUNTER
Allison Zuniga is a 54 year old female here for  Chief Complaint   Patient presents with   • Blood Disorder     Lymphocytosis     Denies latex allergy or sensitivity.    Medication verified and med list updated.  PCP and Pharmacy verified.    Social History     Tobacco Use   Smoking Status Former Smoker   • Packs/day: 1.00   • Years: 13.00   • Pack years: 13.00   • Types: Cigarettes   • Last attempt to quit: 1/3/2005   • Years since quittin.8   Smokeless Tobacco Never Used   Tobacco Comment    less than 1 pack per day     Advance Directives Filed: No    ECO - Fully active, able to carry on all predisease activities without restrictions.    Height: No.  Ht Readings from Last 1 Encounters:   19 5' 5.5\" (1.664 m)     Weight:Yes, shoes on.  Wt Readings from Last 3 Encounters:   19 72.6 kg   19 72.7 kg   10/17/19 71.7 kg       BMI: There is no height or weight on file to calculate BMI.    REVIEW OF SYSTEMS  GENERAL:  Patient denies fevers, chills, change in appetite, weight loss, but complains of: headache, night sweats, excessive fatigue and change in appetite  ALLERGIC/IMMUNOLOGIC: Verified allergies: Yes  EYES:  Patient denies significant visual difficulties, double vision, blurred vision  ENT/MOUTH: Patient denies problems with hearing, sore throat, sinus drainage, mouth sores  ENDOCRINE:  Patient denies diabetes, thyroid disease, hormone replacement, hot flashes  HEMATOLOGIC/LYMPHATIC: Patient denies bleeding, tender lymph nodes, swollen lymph nodes, but complains of: easy bruising  BREASTS: Patient denies not reviewed at this time  RESPIRATORY:  Patient denies lung pain with breathing, cough, coughing up blood, shortness of breath  CARDIOVASCULAR:  Patient denies anginal chest pain, palpitations, shortness of breath when lying flat, but complains of: peripheral edema to left lower extremity from knee to ankle since last Tuesday.    GASTROINTESTINAL: Patient denies abdominal pain , nausea,  LOV 4/2021 with PCP  LOV 11/2021 with [facility]   vomiting, diarrhea, GI bleeding, constipation, change in bowel habits, heartburn, sensation of feeling full, difficulty swallowing  : Patient denies abnormal genital masses, blood in the urine, frequency, urgency, burning with urination, hesitancy, incontinence, vaginal bleeding, discharge  MUSCULOSKELETAL:  Patient denies joint pain, bone pain, joint swelling, redness, decreased range of motion, but complains of: neck and bilateral shoulder pain.  Patient was scheduled for a breast reduction surgery.  However, the surgeon backed out.  SKIN:  Patient denies chronic rashes, inflammation, ulcerations, skin changes, itching  NEUROLOGIC:  Patient denies loss of balance, areas of focal weakness, abnormal gait, sensory problems, tingling, but complains of: numbness in left lower leg  PSYCHIATRIC: Patient denies depression, but complains of: insomnia and anxiety

## 2022-03-21 NOTE — PROGRESS NOTES
Health Maintenance Due   Topic Date Due    Colorectal Cancer Screening  Never done    Shingles Vaccine (1 of 2) Never done    COVID-19 Vaccine (3 - Booster for Moderna series) 08/31/2021    Mammogram  04/07/2022     Updates were requested from care everywhere.  Chart was reviewed for overdue Proactive [facility] Encounters ([facility]CRECIA) topics (CRS, Breast Cancer Screening, Eye exam)  Health Maintenance has been updated.  LINKS immunization registry triggered.  Immunizations were reconciled.

## 2022-03-21 NOTE — TELEPHONE ENCOUNTER
Care Due:                  Date            Visit Type   Department     Provider  --------------------------------------------------------------------------------                                MYCHART                              FOLLOWUP/OF  [facility]  Last Visit: 04-      FICE VISIT   MEDICINE       Jerson Wheat  Next Visit: None Scheduled  None         None Found                                                            Last  Test          Frequency    Reason                     Performed    Due Date  --------------------------------------------------------------------------------    Office Visit  12 months..  nortriptyline,             04- 04-                             rosuvastatin.............    Lipid Panel.  12 months..  rosuvastatin.............  10-   10-    Powered by [employer] Reference number: [id number].   3/21/2022 8:24:03 AM CDT

## 2022-03-21 NOTE — PROGRESS NOTES
[facility] Follow Up Evaluation    Referred by: Caroline Nielson PA-C  Reason for referral: back pain    CC:   Chief Complaint   Patient presents with    Follow-up      Last 3 PDI Scores 6/5/2020 2/17/2020   Pain Disability Index (PDI) 0 10       Interval HPI 3/21/2022: Fatemeh Shoemaker returns to the clinic for follow up.  Today she is reporting worsening left knee pain 10/10, after she had a full day of climbing stairs for work.  She has had injections in the left knee with PMR with good results however recently her pain has worsened.  She denies any trauma to the knee, but reports increased swelling and pain worse with driving and ambulating.  She is currently using a Rollator due to the pain in her left knee She denies any weakness or numbness.  She still reports moderate relief from previous lumbar epidural steroid injection.  She continues to take Gabapentin 600mg BID, Zanaflex, celebrex and Cymbalta with moderate relief.       Pain Intervention History:     - s/p L5/S1 ILESI on 5/21/20 with 55% relief.   - s/p L5/S1 ILESI on 6/19/20 with continued 55% relief.  - s/p L5/S1 ILESI on 12/01/21 with 75% relief     HPI:   Fatemeh Shoemaker is a 61 y.o. female who complains of back pain    Onset: more than a few years  Progression: since onset, pain is gradually worsening  Current Pain Score: 10/10  Timing: constant  Quality: aching  Radiation: yes, down the back of both legs  Associated numbness or weakness: yes numbness b/l feet up to mid calf. Weakness with walking long distances and standing  Exacerbated by: standing, walking  Allievated by: rest, leaning forward  Is Pain Level Acceptable?: No    Previous Therapies:  PT/OT: no  HEP:   Interventions:   Surgery:  Medications:   - NSAIDS:  Celebrex  - MSK Relaxants: tizanidine  - TCAs: nortriptyline  - SNRIs: cymbalta  - Topicals:   - Anticonvulsants: gabapentin  - Opioids:     History:    Current Outpatient Medications:     apixaban (ELIQUIS) 5  mg Tab, Take 1 tablet (5 mg total) by mouth 2 (two) times daily., Disp: 180 tablet, Rfl: 3    celecoxib (CELEBREX) 200 MG capsule, Take 1 capsule (200 mg total) by mouth daily as needed for Pain., Disp: 30 capsule, Rfl: 1    cyanocobalamin 500 MCG tablet, Take 1,000 mcg by mouth once daily., Disp: , Rfl:     diclofenac sodium (VOLTAREN) 1 % Gel, Apply 2 g topically 4 (four) times daily., Disp: 50 g, Rfl: 3    DULoxetine (CYMBALTA) 60 MG capsule, Take 1 capsule (60 mg total) by mouth once daily., Disp: 90 capsule, Rfl: 3    ergocalciferol (ERGOCALCIFEROL) 50,000 unit Cap, Take 50,000 Units by mouth every Saturday. , Disp: , Rfl:     flecainide (TAMBOCOR) 150 MG Tab, Take 1 tablet (150 mg total) by mouth every 12 (twelve) hours., Disp: 60 tablet, Rfl: 11    fluticasone furoate-vilanteroL (BREO ELLIPTA) 100-25 mcg/dose diskus inhaler, Inhale 1 puff into the lungs once daily. Controller, Disp: 30 each, Rfl: 11    gabapentin (NEURONTIN) 600 MG tablet, Take 1 tablet (600 mg total) by mouth 2 (two) times daily., Disp: 60 tablet, Rfl: 06    ipratropium-albuteroL (COMBIVENT)  mcg/actuation inhaler, Inhale 1 puff into the lungs every 6 (six) hours as needed for Wheezing or Shortness of Breath. Rescue, Disp: 4 g, Rfl: 2    lisinopriL 10 MG tablet, Take 1 tablet (10 mg total) by mouth once daily., Disp: 90 tablet, Rfl: 1    melatonin 5 mg TbDL, Take 1 tablet by mouth nightly as needed (sleep)., Disp: , Rfl:     metoprolol tartrate (LOPRESSOR) 100 MG tablet, Take 1 tablet by mouth in the evening., Disp: 60 tablet, Rfl: 11    metoprolol tartrate (LOPRESSOR) 50 MG tablet, Take 1 tablet (50 mg total) by mouth 2 (two) times daily., Disp: 180 tablet, Rfl: 3    nortriptyline (PAMELOR) 25 MG capsule, TAKE ONE CAPSULE BY MOUTH EVERY EVENING, Disp: 90 capsule, Rfl: 3    omeprazole (PRILOSEC) 20 MG capsule, Take 1 capsule (20 mg total) by mouth every morning., Disp: 30 capsule, Rfl: 11    pulse oximeter (PULSE  OXIMETER) device, Use twice daily at 8 AM and 3 PM and record the value in MyChart as directed., Disp: 1 each, Rfl: 0    rosuvastatin (CRESTOR) 10 MG tablet, Take 1 tablet (10 mg total) by mouth every evening., Disp: 90 tablet, Rfl: 3    tiZANidine (ZANAFLEX) 4 MG tablet, Take 1 tablet (4 mg total) by mouth every 6 (six) hours as needed., Disp: 60 tablet, Rfl: 01    valACYclovir (VALTREX) 500 MG tablet, Take 1 tablet (500 mg total) by mouth daily as needed (fever blister)., Disp: , Rfl:     VENTOLIN HFA 90 mcg/actuation inhaler, INHALE TWO PUFFS into the lungs EVERY 6 HOURS AS NEEDED for FOR WHEEZING (rescue), Disp: , Rfl:     Past Medical History:   Diagnosis Date    Anemia     Anticoagulant long-term use     Arrhythmia     Arthritis     Atrial fibrillation     history    Bronchitis     Encounter for blood transfusion     General anesthetics causing adverse effect in therapeutic use     High blood pressure     Hyperlipidemia     Lump or mass in breast     benign     Neuropathy     Obesity     Obstructive sleep apnea syndrome 2021    Ulcer        Past Surgical History:   Procedure Laterality Date    A-V CARDIAC PACEMAKER INSERTION Left 2020    Procedure: INSERTION, CARDIAC PACEMAKER, DUAL CHAMBER;  Surgeon: Bert Reaves MD;  Location: [facility] CATH;  Service: Cardiology;  Laterality: Left;    BREAST BIOPSY Right 2017    myofibroblastoma     BREAST LUMPECTOMY Right 2017     SECTION  10/25/1986    Other c/section 10/26/1997    CHOLECYSTECTOMY  2017    Dr. TOLU Bowers, [facility]     csection      CS x 2    CYSTOSCOPY N/A 9/3/2019    Procedure: CYSTOSCOPY;  Surgeon: Noemi Pierre MD;  Location: [facility] OR;  Service: OB/GYN;  Laterality: N/A;    DILATION AND CURETTAGE OF UTERUS      EPIDURAL STEROID INJECTION INTO LUMBAR SPINE N/A 2020    Procedure: Injection-steroid-epidural-lumbar L5/S1;  Surgeon: Gurjit Tavarez MD;  Location: [facility] OR;  Service: Pain  Management;  Laterality: N/A;    EPIDURAL STEROID INJECTION INTO LUMBAR SPINE N/A 6/19/2020    Procedure: Injection-steroid-epidural-lumbar L5/S1;  Surgeon: Gurjit Tavarez MD;  Location: [facility] OR;  Service: Pain Management;  Laterality: N/A;    EPIDURAL STEROID INJECTION INTO LUMBAR SPINE N/A 12/1/2021    Procedure: Injection-steroid-epidural-lumbar L5/S1;  Surgeon: Gurjit Tavarez MD;  Location: [facility] OR;  Service: Pain Management;  Laterality: N/A;    FRACTURE SURGERY  04/86    Dislocated  hip total rt hip 08/08    HIP PINNING      HYSTERECTOMY      JOINT REPLACEMENT  08/2008    LAPAROSCOPIC SALPINGO-OOPHORECTOMY Bilateral 9/3/2019    Procedure: SALPINGO-OOPHORECTOMY, LAPAROSCOPIC;  Surgeon: Noemi Pierre MD;  Location: [facility];  Service: OB/GYN;  Laterality: Bilateral;  Dr. Bentley to assist. No resident needed.     LAPAROSCOPIC TOTAL HYSTERECTOMY N/A 9/3/2019    Procedure: HYSTERECTOMY, TOTAL, LAPAROSCOPIC;  Surgeon: Noemi Pierre MD;  Location: [facility];  Service: OB/GYN;  Laterality: N/A;    NASAL SEPTUM SURGERY      OOPHORECTOMY      TOTAL HIP ARTHROPLASTY Right     TUBAL LIGATION  1997       Family History   Problem Relation Age of Onset    Colon cancer Maternal Grandmother 70        mets to ovary    Cancer Maternal Grandmother     Arthritis Paternal Grandfather     Eclampsia Paternal Grandmother     Cataracts Maternal Grandfather     Stroke Father 57    Colon cancer Father     Hypertension Father     Alcohol abuse Father     Cancer Father         Passed away July 10 2019    Hypertension Mother     Cataracts Mother     Hypertension Brother         Age 54 hypertension heart    Early death Brother         Hypertension cardio disease    No Known Problems Daughter     Stomach cancer Neg Hx     Esophageal cancer Neg Hx     Breast cancer Neg Hx     Miscarriages / Stillbirths Neg Hx     Ovarian cancer Neg Hx     Glaucoma Neg Hx     Macular degeneration Neg Hx     Retinal  "detachment Neg Hx     Strabismus Neg Hx        Social History     Socioeconomic History    Marital status: Significant Other   Tobacco Use    Smoking status: Never Smoker    Smokeless tobacco: Never Used   Substance and Sexual Activity    Alcohol use: No     Comment: never    Drug use: Never    Sexual activity: Yes     Partners: Male     Birth control/protection: Other-see comments     Comment: Hysterectomy 9/3/19       Review of patient's allergies indicates:   Allergen Reactions    Aspirin Other (See Comments)     "I don't take it because I've had ulcers"   ulcers  "I don't take it because I've had ulcers"     Meperidine Other (See Comments)     Felt like she was about to pass put after taking       Review of Systems:  General ROS: negative for - fever  Psychological ROS: negative for - hostility  Hematological and Lymphatic ROS: positive for - bruising  Endocrine ROS: negative for - unexpected weight changes  Respiratory ROS: no cough, shortness of breath, or wheezing  Cardiovascular ROS: no chest pain or dyspnea on exertion  Gastrointestinal ROS: no abdominal pain, change in bowel habits, or black or bloody stools  Musculoskeletal ROS: negative for - muscular weakness  Neurological ROS: negative for - bowel and bladder control changes  Dermatological ROS: negative for rash    Physical Exam:  Vitals:    03/21/22 1412   BP: (!) 143/65   Pulse: 60   SpO2: 97%   Weight: 131.6 kg (290 lb 2 oz)   Height: 5' 4" (1.626 m)   PainSc: 10-Worst pain ever   PainLoc: Knee     Body mass index is 49.8 kg/m².     Gen: NAD  Gait: gait antalgic due to left knee pain  Psych:  Mood appropriate for given condition  HEENT: eyes anicteric   GI: Abd soft  CV: RRR  Lungs: breathing unlabored   ROM: limited AROM of the L spine in all planes, full ROM at ankles, knees and hips  Lumbar flexion 90 degrees, extension 50 degrees, side bending 30 degrees.    Sensation: intact to light touch in all dermatomes tested from L2-S1 " bilaterally, except for b/l feet up to mid calf.   Reflexes: 0/0 b/l patella and 0/0 b/l achilles  Palpation: Diffusely tender over lumbar paraspinals  -TTP over the b/l greater trochanters,  -TTP bilateral SI joint  Tone: normal in the b/l knees and hips   Skin: intact, no rashes visible. No color changes in b/l lower extremities.   Extremities: No edema in b/l ankles or hands. No allodynia. Minor increased swelling in left knee compared to right. No temperature asymmetry in b/l knees.   Provacative Tests: no increase pain with valgus, varus or lachmans test.        Right Left   L2/3 Iliacus Hip flexion  5  5   L3/4 Qudratus Femoris Knee Extension  5  5   L4/5 Tib Anterior Ankle Dorsiflexion   5  5   L5/S1 Extensor Hallicus Longus Great toe extension  5  5                 S1/S2 Gastroc/Soleus Plantar Flexion  5  5       Imaging:  MRI lumbar spine 6/11/18  FINDINGS:  Vertebral column: The study is motion degraded.  There is multilevel degenerative change.  There is marked disc space narrowing towards the right at the L3-4 level where there is associated degenerative endplate signal change.  There is no other significant disc space narrowing.  There is no fracture.  Baseline marrow signal intensity is normal.  Anterior endplate osteophyte formation is also present at the L2-3 level.  There is subtle trace anterolisthesis of L4 on L5.    Spinal canal, conus, epidural space: Spinal canal is developmentally normal.  The conus is normal in location, contour and signal intensity, terminating at the level of T12-L1.  There is no abnormal epidural collection or mass.    Findings by level:    On the sagittal images, there is minimal bulging of the annulus and mild facet joint arthropathy at the T10-11 level but there is no spinal canal or significant foraminal stenosis.  At the T11-T12 level, there is a shallow broad central disc protrusion which narrows the ventral subarachnoid space but again there is no significant spinal  canal or foraminal stenosis and there is no cord compression.    T12-L1: There is minimal bulging of the annulus and there is mild facet joint arthropathy.  There is no spinal canal or significant foraminal stenosis.  L1-2: There is moderate facet joint arthropathy.  There is minimal bulging of the annulus with a tiny 2 mm central disc protrusion.  There is no spinal canal or significant foraminal stenosis.  L2-3: There is a moderate diffuse disc bulge with marked facet joint arthropathy.  There is mildly prominent dorsal epidural fat.  There is flattening of the ventral dural sac.  There is moderate central spinal stenosis.  AP measurement of the dural sac is 7.5 mm.  There is no significant foraminal stenosis.  L3-4: There is marked disc space narrowing towards the right.  There is a diffuse disc bulge with osteophytic ridging and superimposed broad right paracentral and foraminal disc protrusion.  There is moderate-to-marked facet joint arthropathy with ligamentum flavum thickening, right greater than left.  Also, there is mildly prominent dorsal epidural fat.  There is moderate central spinal stenosis.  There is severe right lateral recess and foraminal stenosis with mild-to-moderate left foraminal stenosis.  L4-5: There is marked facet joint arthropathy.  There is a diffuse disc bulge with superimposed broad central disc protrusion.  There is mild spinal stenosis.  There is mild-to-moderate bilateral foraminal stenosis.  L5-S1: There is a diffuse disc bulge with superimposed broad central disc protrusion with annular fissure.  There is marked left and moderate-to-marked right facet joint arthropathy with ligamentum flavum thickening.  There is no significant spinal stenosis.  The right foramina is patent.  There is severe left foraminal stenosis.  The broad central disc protrusion approaches both S1 roots.  The left S1 root is crowded between the facet joint changes and the disc protrusion.  Both S1 roots could  be affected, left greater than right.    Soft tissues, other: The prevertebral soft tissues are normal.  The aorta is mildly ectatic.    MRI Cervical Spine 08/14/20  FINDINGS:  The current examination once again demonstrates a 2 mm AP diameter cystic structure within the central aspect of the cervical cord at C6-C7 which extends over an approximately 29 mm craniocaudad dimension from the superior endplate of C6 to the C7-T1 intervertebral disc space (series 2, image 7 and series 5, image 19).  No abnormal adjacent cord edema or abnormal intramedullary enhancement.  Differential considerations include nonspecific dilatation of the central canal of the spinal cord and syringohydromyelia.     The visualized posterior fossa structures are unremarkable.  No Chiari malformation.  There is mucoperiosteal thickening observed within the ethmoid air cells bilaterally.     There is a C3 vertebral body hemangioma which shows no abnormal enhancement following contrast administration.  No pathologic marrow replacement process or cervical spine fracture.  There is degenerative disc desiccation observed at every cervical level.     The incidentally observed soft tissues of the neck are unremarkable.     C2-C3: There is right uncovertebral spurring present.  No disc protrusion or extrusion. No central canal stenosis or neuroforaminal stenosis.  There is ligamentum flavum thickening.  C3-C4: There is a minimal posterior disc osteophyte complex and uncovertebral spurring.  No disc protrusion or extrusion. No central canal stenosis or neuroforaminal stenosis.  C4-C5: There is left uncovertebral spurring.  There is a broad central disc protrusion.  There is effacement of the anterior CSF sleeve.  No central canal stenosis.  There is, at most, mild right neuroforaminal stenosis as a result of right facet arthropathy.  C5-C6: There is left facet arthropathy.  No central canal stenosis or neuroforaminal stenosis.  C6-C7: There is a 6 mm  nonenhancing perineural nerve root sleeve cyst present in the left neural foramen, similar to the prior exam.  There is bilateral uncovertebral spurring, left greater than right.  There is mild ligamentum flavum thickening.  No central canal stenosis.  There is, at most, mild bilateral neuroforaminal stenosis.  There is right facet arthropathy.  C7-T1: There is bilateral facet arthropathy, right greater than left.  No disc protrusion or extrusion. No central canal stenosis or neuroforaminal stenosis.     Impression:     1. No interval detrimental change when compared to the prior study.  29 mm craniocaudad dimension tubular cystic structure within the central aspect of the spinal cord at C6-C7 which either represents nonspecific mild dilatation of the central canal (up to 2 mm in diameter) or syringohydromyelia.  No abnormal cord edema/abnormal intramedullary enhancement.  2. 6 mm nonenhancing left foraminal perineural nerve root sleeve cyst at C6-C7.  3. Minor degenerative change of the cervical spine as detailed above.  4. C3 vertebral body hemangioma, unchanged.  No evidence of pathologic marrow replacement process.    MRI Thoracic Spine 08/14/20    FINDINGS:  There are no abnormal enhancing masses present within the thoracic spine to suggest a pathologic marrow replacement process.  No acute thoracic compression fracture appreciated.  There is a nonenhancing incidentally observed T1 hyperintense T5 vertebral body hemangioma.  There is degenerative disc desiccation at every thoracic level.  The thoracic spinal cord is normal in signal intensity with no evidence of cord edema, myelomalacia, or abnormal intramedullary enhancement.  No enhancing epidural masses or epidural fluid collections.  No imaging evidence of epidural lipomatosis.     The incidentally observed soft tissues of the chest are unremarkable.     T1-T2: There is a broad disc bulge and a superimposed broad left paracentral disc protrusion which  effaces the anterior CSF sleeve and flattens the left anterior thoracic spinal cord.  No central canal stenosis or neuroforaminal stenosis.  T4-T5: There is an 11 mm large broad left paracentral disc protrusion which flattens the left anterior aspect of the thoracic spinal cord.  There is a suggestion of abnormal cord signal on series 6, image 15 which could reflect underlying cord edema/myelomalacia.  No abnormal intramedullary enhancement.  No overall central canal stenosis or neuroforaminal stenosis.  T5-T6: There is a minimal broad left paracentral disc protrusion.  No central canal stenosis or neuroforaminal stenosis.  T6-T7: There is a broad right paracentral disc protrusion on series 6, image 21.  No central canal stenosis or neuroforaminal stenosis.  T8-T9: There is a broad right paracentral disc protrusion which effaces the right anterior CSF sleeve.  No central canal stenosis or neuroforaminal stenosis.  There is ligamentum flavum thickening.  T10-T11: There is a small broad central disc protrusion on series 6, image 34.  No central canal stenosis.  No definite neuroforaminal stenosis.  T11-T12: There is a broad central disc protrusion which effaces the anterior CSF sleeve.  There is bilateral hypertrophic facet arthropathy.  No central canal stenosis or neuroforaminal stenosis.     Impression:     1. No imaging evidence of a pathologic marrow replacement process.  2. Multilevel degenerative change of the thoracic spine with disc protrusions observed at multiple thoracic levels, most pronounced at T4-T5 where a broad left paracentral disc protrusion and flattens the left anterior aspect of the thoracic spinal cord.  There is possible cord edema noted.  No abnormal intramedullary enhancement.  No cord syrinx.  3. T5 vertebral body hemangioma.    MRI Lumbar Spine 08/14/20  FINDINGS:  There is an L1 vertebral body hemangioma present.  There is a 9 mm focus of T1/T2 signal hyperintensity noted within the left  pedicle of L5 which is favored to represent either focal fat deposition or a hemangioma.  No associated enhancement following contrast administration.  No additional concerning enhancing bone lesions elsewhere in the lumbar spine.  No acute lumbar compression fracture.  No spondylolisthesis.  No definite pars defects.  There is multilevel degenerative disc desiccation present at virtually every visualized lower thoracic and lumbar level with disc space narrowing and degenerative endplate change noted at L3-L4.  There is a levoscoliotic curvature of the lumbar spine, apex at L3-L4.     The conus medullaris terminates at L1.  The visualized lower thoracic spinal cord is normal in signal intensity with no evidence of cord edema, myelomalacia, or cord syrinx.  No abnormal intramedullary enhancement.  No peripherally enhancing epidural fluid collections.  The paraspinous musculature is unremarkable.     The incidentally observed abdominal/retroperitoneal soft tissues demonstrate diverticulosis coli..     T11-T12: There is a broad central disc protrusion which effaces the anterior CSF sleeve.  There is, at most, mild overall central canal stenosis.  No neuroforaminal stenosis.  T12-L1: No disc protrusion or extrusion. No central canal stenosis or neuroforaminal stenosis.  L1-L2: There is a broad right paracentral disc protrusion.  No disc extrusion.  No central canal stenosis or neuroforaminal stenosis.  L2-L3: There is a broad disc bulge which extends into both neural foramina.  There is prominent bilateral hypertrophic facet arthropathy.  There is mild overall central canal stenosis.  No definite neuroforaminal stenosis.  L3-L4: There is a broad right paracentral/foraminal/extraforaminal osteophyte which abuts and displaces the descending right L4 nerve in the right lateral recess.  Please correlate clinically for symptoms referable to the right L4 nerve.  There is right lateral recess stenosis.  There is ligamentum  flavum thickening and hypertrophic facet arthropathy.  There is moderate right neuroforaminal stenosis.  No left neuroforaminal stenosis.  There is moderate overall central canal stenosis.  L4-L5: There is a minimal diffuse broad disc bulge which extends into both neural foramina.  There is abutment of the exited right L4 nerve in the right extraforaminal soft tissues on series 7, image 21.  Please correlate clinically for symptoms referable to the right L4 nerve.  There is bilateral severe hypertrophic facet arthropathy present, left greater than right, resulting in prominent left lateral recess stenosis.  There is moderate-severe left neuroforaminal stenosis (series 9, image 16 and series 7, image 19).  No right neuroforaminal stenosis.  There is severe central canal stenosis.  L5-S1: The left L5 pedicle is relatively hypoplastic in size as compared to the right L5 pedicle, and there is a 9 mm focus of T1/T2 signal hyperintensity within the left L5 pedicle on series 5, image 11 which shows no abnormal enhancement following contrast administration, most likely a focus of focal fat deposition (see postcontrast T1 fat-suppressed images) or a hemangioma.  There is prominent bilateral hypertrophic facet arthropathy, left greater than right, resulting in severe proximal left neuroforaminal stenosis.  Please correlate clinically for symptoms referable to the exiting left L5 nerve (series 7, image 25, series 9, image 22, and series 6, image 22).  There is is also posterior displacement of the descending left S1 nerve in the left lateral recess.  Please correlate clinically for symptoms referable to the left S1 nerve.  There is a broad central disc protrusion at L5-S1 which abuts both descending S1 nerves and which could produce symptoms referable to both S1 nerves, similar to the prior exam.  No right neuroforaminal stenosis.  There is moderate overall central canal stenosis.     There is prominent epidural fat deposition  observed within the lumbosacral spinal canal from L4-L5 through the sacral canal.     Impression:     1. No appreciable interval detrimental change when compared to the prior exam.  2. 9 mm nonenhancing focus of T1/T2 signal hyperintensity within the left pedicle of L5 is favored to represent either a hemangioma or focal fat deposition.  No adjacent osseous edema appreciated.  No additional concerning bone lesions elsewhere in the visualized lumbar spine.  No appreciable interval detrimental change over the course of multiple prior examinations dating back to 06/11/2018.  3. Advanced multilevel degenerative change of the lumbar spine which combines with a levoscoliotic curvature of the lumbar spine to generate multilevel central canal and neuroforaminal stenosis.  Additional details are provided above.  4. Severe left neuroforaminal stenosis at L5-S1 and left lateral recess stenosis at L5-S1 as a result of prominent left facet arthropathy.  Please correlate clinically for symptoms referable to the left L5 and S1 nerves.  There is also a broad central disc protrusion at L5-S1, similar to the prior examination, which could produce symptoms referable to the S1 nerves, left greater than right.  Please correlate clinically.  5. 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    Labs:  BMP  Lab Results   Component Value Date     10/06/2021    K 4.2 10/06/2021     10/06/2021    CO2 24 10/06/2021    BUN 17 10/06/2021    CREATININE 0.56 10/06/2021    CALCIUM 9.2 10/06/2021    ANIONGAP 10 10/06/2021    ESTGFRAFRICA >60 10/06/2021    EGFRNONAA >60 10/06/2021     Lab Results   Component Value Date    ALT 16 10/06/2021    AST 27 10/06/2021    ALKPHOS 39 10/06/2021    BILITOT 0.3 10/06/2021     Lab Results   Component Value Date    WBC 16.11 (H) 09/04/2019    HGB 9.4 (L) 09/04/2019    HCT 28.9 (L) 09/04/2019    MCV 85 09/04/2019     09/04/2019           Assessment:    Problem List Items Addressed This Visit        Orthopedic    Chronic bilateral low back pain with bilateral sciatica - Primary      Other Visit Diagnoses     Left knee pain, unspecified chronicity              61 y.o. year old female with PMH HTN, a-fib on eliquis presents to the office with back pain.  She's had this pain for over 5 years and it has been gradually worsening.  No history of prior back surgery.   She has had chronic numbness in the top of her feet for years.  Her pain is worse with standing and walking and relieved with rest and leaning forward.      3/21/2022: Fatemeh Shoemaker returns to the clinic for follow up.  Today she is reporting worsening left knee pain 10/10, after she had a full day of climbing stairs for work.  She has had injections in the left knee with PMR with good results however recently her pain has worsened.  She denies any trauma to the knee, but reports increased swelling and pain worse with driving and ambulating.  She is currently using a Rollator due to the pain in her left knee She denies any weakness or numbness.  She still reports moderate relief from previous lumbar epidural steroid injection.  She continues to take Gabapentin 600mg BID, Zanaflex, celebrex and Cymbalta with moderate relief.         - today she is full 5/5 strength on exam in her lower extremities. With intact to light touch in all dermatomes tested from L2-S1 bilaterally, except for b/l feet up to mid calf. DTR: 0/0 b/l patella and 0/0 b/l achilles.  She has no increased pain with VARUS or VALGUS maneuvers.  Slight swelling in left knee greater than right.  No temperature asymmetry nor hot to touch, no bruises or signs of trauma.   - she reports she is still getting some relief from previous lumbar TFESI  - she is seen PMR in the past had moderate relief with steroid injections however I believe that is worn off now  - she is going to schedule follow-up with Dr. Gibbs for further evaluation  - she  has been told in 2020 by Dr. Hong that she is not a surgical candidate.   - she continues to take gabapentin 600mg BID, Zanaflex, Cymbalta and occasional Celebrex.   - short course of hydrocodone 5-325 mg provided by Dr. Tavarez today  - follow up as needed    : Reviewed     The above note was completed, in part, with the aid of Dragon dictation software/hardware. Translation errors may be present.

## 2022-03-22 ENCOUNTER — CLINICAL SUPPORT (OUTPATIENT)
Dept: CARDIOLOGY | Facility: HOSPITAL | Age: 62
End: 2022-03-22
Payer: COMMERCIAL

## 2022-03-22 DIAGNOSIS — Z95.0 PRESENCE OF CARDIAC PACEMAKER: ICD-10-CM

## 2022-03-22 PROCEDURE — 93296 REM INTERROG EVL PM/IDS: CPT | Mod: PO | Performed by: INTERNAL MEDICINE

## 2022-03-22 RX ORDER — CELECOXIB 200 MG/1
200 CAPSULE ORAL DAILY PRN
Qty: 30 CAPSULE | Refills: 1 | Status: ON HOLD | OUTPATIENT
Start: 2022-03-22 | End: 2022-11-13 | Stop reason: HOSPADM

## 2022-03-31 DIAGNOSIS — M17.12 PRIMARY OSTEOARTHRITIS OF LEFT KNEE: Primary | ICD-10-CM

## 2022-04-01 ENCOUNTER — HOSPITAL ENCOUNTER (OUTPATIENT)
Dept: RADIOLOGY | Facility: HOSPITAL | Age: 62
Discharge: HOME | End: 2022-04-01
Attending: ORTHOPAEDIC SURGERY
Payer: COMMERCIAL

## 2022-04-01 ENCOUNTER — OFFICE VISIT (OUTPATIENT)
Dept: ORTHOPEDICS | Facility: CLINIC | Age: 62
End: 2022-04-01
Payer: COMMERCIAL

## 2022-04-01 VITALS — BODY MASS INDEX: 49.53 KG/M2 | HEIGHT: 64 IN | WEIGHT: 290.13 LBS

## 2022-04-01 DIAGNOSIS — M17.12 PRIMARY OSTEOARTHRITIS OF LEFT KNEE: ICD-10-CM

## 2022-04-01 DIAGNOSIS — M17.12 PRIMARY OSTEOARTHRITIS OF LEFT KNEE: Primary | ICD-10-CM

## 2022-04-01 DIAGNOSIS — E66.01 MORBID OBESITY WITH BMI OF 45.0-49.9, ADULT: ICD-10-CM

## 2022-04-01 PROCEDURE — 99214 OFFICE O/P EST MOD 30 MIN: CPT | Mod: 25,S$GLB,, | Performed by: ORTHOPAEDIC SURGERY

## 2022-04-01 PROCEDURE — 4010F PR ACE/ARB THEARPY RXD/TAKEN: ICD-10-PCS | Mod: CPTII,S$GLB,, | Performed by: ORTHOPAEDIC SURGERY

## 2022-04-01 PROCEDURE — 99999 PR PBB SHADOW E&M-EST. PATIENT-LVL V: CPT | Mod: PBBFAC,,, | Performed by: ORTHOPAEDIC SURGERY

## 2022-04-01 PROCEDURE — 3008F PR BODY MASS INDEX (BMI) DOCUMENTED: ICD-10-PCS | Mod: CPTII,S$GLB,, | Performed by: ORTHOPAEDIC SURGERY

## 2022-04-01 PROCEDURE — 73562 X-RAY EXAM OF KNEE 3: CPT | Mod: TC,50,PO

## 2022-04-01 PROCEDURE — 1160F RVW MEDS BY RX/DR IN RCRD: CPT | Mod: CPTII,S$GLB,, | Performed by: ORTHOPAEDIC SURGERY

## 2022-04-01 PROCEDURE — 1159F MED LIST DOCD IN RCRD: CPT | Mod: CPTII,S$GLB,, | Performed by: ORTHOPAEDIC SURGERY

## 2022-04-01 PROCEDURE — 1160F PR REVIEW ALL MEDS BY PRESCRIBER/CLIN PHARMACIST DOCUMENTED: ICD-10-PCS | Mod: CPTII,S$GLB,, | Performed by: ORTHOPAEDIC SURGERY

## 2022-04-01 PROCEDURE — 1159F PR MEDICATION LIST DOCUMENTED IN MEDICAL RECORD: ICD-10-PCS | Mod: CPTII,S$GLB,, | Performed by: ORTHOPAEDIC SURGERY

## 2022-04-01 PROCEDURE — 73562 XR KNEE ORTHO BILAT: ICD-10-PCS | Mod: 26,,, | Performed by: RADIOLOGY

## 2022-04-01 PROCEDURE — 99214 PR OFFICE/OUTPT VISIT, EST, LEVL IV, 30-39 MIN: ICD-10-PCS | Mod: 25,S$GLB,, | Performed by: ORTHOPAEDIC SURGERY

## 2022-04-01 PROCEDURE — 20610 DRAIN/INJ JOINT/BURSA W/O US: CPT | Mod: LT,S$GLB,, | Performed by: ORTHOPAEDIC SURGERY

## 2022-04-01 PROCEDURE — 20610 LARGE JOINT ASPIRATION/INJECTION: L KNEE: ICD-10-PCS | Mod: LT,S$GLB,, | Performed by: ORTHOPAEDIC SURGERY

## 2022-04-01 PROCEDURE — 4010F ACE/ARB THERAPY RXD/TAKEN: CPT | Mod: CPTII,S$GLB,, | Performed by: ORTHOPAEDIC SURGERY

## 2022-04-01 PROCEDURE — 99999 PR PBB SHADOW E&M-EST. PATIENT-LVL V: ICD-10-PCS | Mod: PBBFAC,,, | Performed by: ORTHOPAEDIC SURGERY

## 2022-04-01 PROCEDURE — 73562 X-RAY EXAM OF KNEE 3: CPT | Mod: 26,,, | Performed by: RADIOLOGY

## 2022-04-01 PROCEDURE — 3008F BODY MASS INDEX DOCD: CPT | Mod: CPTII,S$GLB,, | Performed by: ORTHOPAEDIC SURGERY

## 2022-04-01 RX ORDER — HYDROCODONE BITARTRATE AND ACETAMINOPHEN 5; 325 MG/1; MG/1
1 TABLET ORAL EVERY 6 HOURS PRN
COMMUNITY
End: 2022-10-26

## 2022-04-01 RX ORDER — TRIAMCINOLONE ACETONIDE 40 MG/ML
40 INJECTION, SUSPENSION INTRA-ARTICULAR; INTRAMUSCULAR
Status: DISCONTINUED | OUTPATIENT
Start: 2022-04-01 | End: 2022-04-01 | Stop reason: HOSPADM

## 2022-04-01 RX ADMIN — TRIAMCINOLONE ACETONIDE 40 MG: 40 INJECTION, SUSPENSION INTRA-ARTICULAR; INTRAMUSCULAR at 09:04

## 2022-04-01 NOTE — PROGRESS NOTES
61 years old recurrence of left knee pain.  We had given her injections in the past had temporary relief comes in today interested in the possibility of knee replacement surgery    Exam shows tenderness the joint line without signs infection instability    X-rays show arthritic changes    Assessment:  Left knee arthrosis    Plan:  Kenalog injection left knee, we will get her set up with a nutritionist in hopes of getting her BMI below 45    Imaging studies ordered and reviewed by me    Further History  Aching pain  Worse with activity  Relieved with rest  No other associated symptoms  No other radiation    Further Exam  Alert and oriented  Pleasant  Contralateral limb has appropriate range of motion for age and condition  Contralateral limb has appropriate strength for age and condition  Contralateral limb has appropriate stability  for age and condition  No adenopathy  Pulses are appropriate for current condition  Skin is intact        Chief Complaint    Chief Complaint   Patient presents with    Left Knee - Pain     Last injection 7/14/21 lasted a few wks       HPI  Fatemeh Shoemaker is a 61 y.o.  female who presents with       Past Medical History  Past Medical History:   Diagnosis Date    Anemia     Anticoagulant long-term use     Arrhythmia     Arthritis     Atrial fibrillation     history    Bronchitis     Encounter for blood transfusion     General anesthetics causing adverse effect in therapeutic use     High blood pressure     Hyperlipidemia     Lump or mass in breast     benign     Neuropathy     Obesity     Obstructive sleep apnea syndrome 11/5/2021    Ulcer        Past Surgical History  Past Surgical History:   Procedure Laterality Date    A-V CARDIAC PACEMAKER INSERTION Left 9/21/2020    Procedure: INSERTION, CARDIAC PACEMAKER, DUAL CHAMBER;  Surgeon: Bert Reaves MD;  Location: [facility];  Service: Cardiology;  Laterality: Left;    BREAST BIOPSY Right 2017     myofibroblastoma     BREAST LUMPECTOMY Right 2017     SECTION  10/25/1986    Other c/section 10/26/1997    CHOLECYSTECTOMY  2017    Dr. TOLU Bowers, [facility]     csection      CS x 2    CYSTOSCOPY N/A 9/3/2019    Procedure: CYSTOSCOPY;  Surgeon: Noemi Pierre MD;  Location: [facility];  Service: OB/GYN;  Laterality: N/A;    DILATION AND CURETTAGE OF UTERUS      EPIDURAL STEROID INJECTION INTO LUMBAR SPINE N/A 2020    Procedure: Injection-steroid-epidural-lumbar L5/S1;  Surgeon: Gurjit Tavarez MD;  Location: [facility] OR;  Service: Pain Management;  Laterality: N/A;    EPIDURAL STEROID INJECTION INTO LUMBAR SPINE N/A 2020    Procedure: Injection-steroid-epidural-lumbar L5/S1;  Surgeon: Gurjit Tavarez MD;  Location: [facility] OR;  Service: Pain Management;  Laterality: N/A;    EPIDURAL STEROID INJECTION INTO LUMBAR SPINE N/A 2021    Procedure: Injection-steroid-epidural-lumbar L5/S1;  Surgeon: Gurjit Tavarez MD;  Location: [facility] OR;  Service: Pain Management;  Laterality: N/A;    FRACTURE SURGERY      Dislocated  hip total rt hip     HIP PINNING      HYSTERECTOMY      JOINT REPLACEMENT  2008    LAPAROSCOPIC SALPINGO-OOPHORECTOMY Bilateral 9/3/2019    Procedure: SALPINGO-OOPHORECTOMY, LAPAROSCOPIC;  Surgeon: Noemi Pierre MD;  Location: [facility];  Service: OB/GYN;  Laterality: Bilateral;  Dr. Bentley to assist. No resident needed.     LAPAROSCOPIC TOTAL HYSTERECTOMY N/A 9/3/2019    Procedure: HYSTERECTOMY, TOTAL, LAPAROSCOPIC;  Surgeon: Noemi Pierre MD;  Location: [facility];  Service: OB/GYN;  Laterality: N/A;    NASAL SEPTUM SURGERY      OOPHORECTOMY      TOTAL HIP ARTHROPLASTY Right     TUBAL LIGATION         Medications  Current Outpatient Medications   Medication Sig    apixaban (ELIQUIS) 5 mg Tab Take 1 tablet (5 mg total) by mouth 2 (two) times daily.    celecoxib (CELEBREX) 200 MG capsule Take 1 capsule (200 mg total) by mouth daily as needed for  Pain.    cyanocobalamin 500 MCG tablet Take 1,000 mcg by mouth once daily.    diclofenac sodium (VOLTAREN) 1 % Gel Apply 2 g topically 4 (four) times daily.    DULoxetine (CYMBALTA) 60 MG capsule Take 1 capsule (60 mg total) by mouth once daily.    ergocalciferol (ERGOCALCIFEROL) 50,000 unit Cap Take 50,000 Units by mouth every Saturday.     flecainide (TAMBOCOR) 150 MG Tab Take 1 tablet (150 mg total) by mouth every 12 (twelve) hours.    fluticasone furoate-vilanteroL (BREO ELLIPTA) 100-25 mcg/dose diskus inhaler Inhale 1 puff into the lungs once daily. Controller    lisinopriL 10 MG tablet Take 1 tablet (10 mg total) by mouth once daily.    melatonin 5 mg TbDL Take 1 tablet by mouth nightly as needed (sleep).    metoprolol tartrate (LOPRESSOR) 100 MG tablet Take 1 tablet by mouth in the evening.    metoprolol tartrate (LOPRESSOR) 50 MG tablet Take 1 tablet (50 mg total) by mouth 2 (two) times daily.    nortriptyline (PAMELOR) 25 MG capsule TAKE ONE CAPSULE BY MOUTH EVERY EVENING    omeprazole (PRILOSEC) 20 MG capsule Take 1 capsule (20 mg total) by mouth every morning.    pulse oximeter (PULSE OXIMETER) device Use twice daily at 8 AM and 3 PM and [facility] the [facility] as directed.    rosuvastatin (CRESTOR) 10 MG tablet Take 1 tablet (10 mg total) by mouth every evening.    tiZANidine (ZANAFLEX) 4 MG tablet Take 1 tablet (4 mg total) by mouth every 6 (six) hours as needed.    valACYclovir (VALTREX) 500 MG tablet Take 1 tablet (500 mg total) by mouth daily as needed (fever blister).    VENTOLIN HFA 90 mcg/actuation inhaler INHALE TWO PUFFS into the lungs EVERY 6 HOURS AS NEEDED for FOR WHEEZING (rescue)    gabapentin (NEURONTIN) 600 MG tablet Take 1 tablet (600 mg total) by mouth 2 (two) times daily.    HYDROcodone-acetaminophen (NORCO) 5-325 mg per tablet Take 1 tablet by mouth every 6 (six) hours as needed for Pain.    ipratropium-albuteroL (COMBIVENT)  mcg/actuation inhaler Inhale  "1 puff into the lungs every 6 (six) hours as needed for Wheezing or Shortness of Breath. Rescue     No current facility-administered medications for this visit.       Allergies  Review of patient's allergies indicates:   Allergen Reactions    Aspirin Other (See Comments)     "I don't take it because I've had ulcers"   ulcers  "I don't take it because I've had ulcers"     Meperidine Other (See Comments)     Felt like she was about to pass put after taking       Family History  Family History   Problem Relation Age of Onset    Colon cancer Maternal Grandmother 70        mets to ovary    Cancer Maternal Grandmother     Arthritis Paternal Grandfather     Eclampsia Paternal Grandmother     Cataracts Maternal Grandfather     Stroke Father 57    Colon cancer Father     Hypertension Father     Alcohol abuse Father     Cancer Father         Passed away July 10 2019    Hypertension Mother     Cataracts Mother     Hypertension Brother         Age 54 hypertension heart    Early death Brother         Hypertension cardio disease    No Known Problems Daughter     Stomach cancer Neg Hx     Esophageal cancer Neg Hx     Breast cancer Neg Hx     Miscarriages / Stillbirths Neg Hx     Ovarian cancer Neg Hx     Glaucoma Neg Hx     Macular degeneration Neg Hx     Retinal detachment Neg Hx     Strabismus Neg Hx        Social History  Social History     Socioeconomic History    Marital status: Significant Other   Tobacco Use    Smoking status: Never Smoker    Smokeless tobacco: Never Used   Substance and Sexual Activity    Alcohol use: No     Comment: never    Drug use: Never    Sexual activity: Yes     Partners: Male     Birth control/protection: Other-see comments     Comment: Hysterectomy 9/3/19               Review of Systems     Constitutional: Negative    HENT: Negative  Eyes: Negative  Respiratory: Negative  Cardiovascular: Negative  Musculoskeletal: HPI  Skin: Negative  Neurological: " Negative  Hematological: Negative  Endocrine: Negative                 Physical Exam    There were no vitals filed for this visit.  Body mass index is 49.8 kg/m².  Physical Examination:     General appearance -  well appearing, and in no distress  Mental status - awake  Neck - supple  Chest -  symmetric air entry  Heart - normal rate   Abdomen - soft      Assessment     1. Primary osteoarthritis of left knee    2. Morbid obesity with BMI of 45.0-49.9, adult          Plan

## 2022-04-12 ENCOUNTER — PATIENT OUTREACH (OUTPATIENT)
Dept: ADMINISTRATIVE | Facility: HOSPITAL | Age: 62
End: 2022-04-12
Payer: COMMERCIAL

## 2022-04-12 ENCOUNTER — PATIENT MESSAGE (OUTPATIENT)
Dept: ADMINISTRATIVE | Facility: HOSPITAL | Age: 62
End: 2022-04-12
Payer: COMMERCIAL

## 2022-04-12 NOTE — PROGRESS NOTES
2022 Care Everywhere updates requested and reviewed.  Immunizations reconciled. Media reports reviewed.  Duplicate HM overrides and  orders removed.  Overdue HM topic chart audit and/or requested.  Overdue lab testing linked to upcoming lab appointments if applies.    DIS reviewed      Mammogram and DEXA  *    Health Maintenance Due   Topic Date Due    Colorectal Cancer Screening  Never done    Shingles Vaccine (1 of 2) Never done    COVID-19 Vaccine (3 - Booster for Moderna series) 2021    Mammogram  2022

## 2022-04-14 ENCOUNTER — OFFICE VISIT (OUTPATIENT)
Dept: FAMILY MEDICINE | Facility: CLINIC | Age: 62
End: 2022-04-14
Payer: COMMERCIAL

## 2022-04-14 VITALS
DIASTOLIC BLOOD PRESSURE: 68 MMHG | HEIGHT: 64 IN | WEIGHT: 282.88 LBS | HEART RATE: 64 BPM | SYSTOLIC BLOOD PRESSURE: 130 MMHG | BODY MASS INDEX: 48.29 KG/M2 | OXYGEN SATURATION: 97 %

## 2022-04-14 DIAGNOSIS — R73.03 PREDIABETES: ICD-10-CM

## 2022-04-14 DIAGNOSIS — Z12.31 ENCOUNTER FOR SCREENING MAMMOGRAM FOR BREAST CANCER: ICD-10-CM

## 2022-04-14 DIAGNOSIS — D50.9 IRON DEFICIENCY ANEMIA, UNSPECIFIED IRON DEFICIENCY ANEMIA TYPE: Primary | ICD-10-CM

## 2022-04-14 DIAGNOSIS — E66.01 MORBID OBESITY WITH BMI OF 45.0-49.9, ADULT: ICD-10-CM

## 2022-04-14 DIAGNOSIS — I48.0 PAROXYSMAL ATRIAL FIBRILLATION: ICD-10-CM

## 2022-04-14 PROCEDURE — 3008F BODY MASS INDEX DOCD: CPT | Mod: CPTII,S$GLB,, | Performed by: INTERNAL MEDICINE

## 2022-04-14 PROCEDURE — 4010F PR ACE/ARB THEARPY RXD/TAKEN: ICD-10-PCS | Mod: CPTII,S$GLB,, | Performed by: INTERNAL MEDICINE

## 2022-04-14 PROCEDURE — 1160F PR REVIEW ALL MEDS BY PRESCRIBER/CLIN PHARMACIST DOCUMENTED: ICD-10-PCS | Mod: CPTII,S$GLB,, | Performed by: INTERNAL MEDICINE

## 2022-04-14 PROCEDURE — 99999 PR PBB SHADOW E&M-EST. PATIENT-LVL V: ICD-10-PCS | Mod: PBBFAC,,, | Performed by: INTERNAL MEDICINE

## 2022-04-14 PROCEDURE — 3075F SYST BP GE 130 - 139MM HG: CPT | Mod: CPTII,S$GLB,, | Performed by: INTERNAL MEDICINE

## 2022-04-14 PROCEDURE — 3075F PR MOST RECENT SYSTOLIC BLOOD PRESS GE 130-139MM HG: ICD-10-PCS | Mod: CPTII,S$GLB,, | Performed by: INTERNAL MEDICINE

## 2022-04-14 PROCEDURE — 1159F PR MEDICATION LIST DOCUMENTED IN MEDICAL RECORD: ICD-10-PCS | Mod: CPTII,S$GLB,, | Performed by: INTERNAL MEDICINE

## 2022-04-14 PROCEDURE — 99999 PR PBB SHADOW E&M-EST. PATIENT-LVL V: CPT | Mod: PBBFAC,,, | Performed by: INTERNAL MEDICINE

## 2022-04-14 PROCEDURE — 99214 OFFICE O/P EST MOD 30 MIN: CPT | Mod: S$GLB,,, | Performed by: INTERNAL MEDICINE

## 2022-04-14 PROCEDURE — 3078F PR MOST RECENT DIASTOLIC BLOOD PRESSURE < 80 MM HG: ICD-10-PCS | Mod: CPTII,S$GLB,, | Performed by: INTERNAL MEDICINE

## 2022-04-14 PROCEDURE — 4010F ACE/ARB THERAPY RXD/TAKEN: CPT | Mod: CPTII,S$GLB,, | Performed by: INTERNAL MEDICINE

## 2022-04-14 PROCEDURE — 99214 PR OFFICE/OUTPT VISIT, EST, LEVL IV, 30-39 MIN: ICD-10-PCS | Mod: S$GLB,,, | Performed by: INTERNAL MEDICINE

## 2022-04-14 PROCEDURE — 3078F DIAST BP <80 MM HG: CPT | Mod: CPTII,S$GLB,, | Performed by: INTERNAL MEDICINE

## 2022-04-14 PROCEDURE — 1160F RVW MEDS BY RX/DR IN RCRD: CPT | Mod: CPTII,S$GLB,, | Performed by: INTERNAL MEDICINE

## 2022-04-14 PROCEDURE — 1159F MED LIST DOCD IN RCRD: CPT | Mod: CPTII,S$GLB,, | Performed by: INTERNAL MEDICINE

## 2022-04-14 PROCEDURE — 3008F PR BODY MASS INDEX (BMI) DOCUMENTED: ICD-10-PCS | Mod: CPTII,S$GLB,, | Performed by: INTERNAL MEDICINE

## 2022-04-14 NOTE — PROGRESS NOTES
Subjective     Fatemeh Shoemaker is a 61 y.o. old, female here for Follow-up and Weight Loss    Patient is here for follow-up on chronic medical problems    61-year-old with past medical history of PAF, symptomatic bradycardia s/p PCM, HLD, JOSE ELIAS, peripheral neuropathy.    We reviewed labs done recently at [facility]. Prediabetic range A1C.  Diet is healthy. She is trying to lose weight.    Answers for HPI/ROS submitted by the patient on 4/13/2022  activity change: No  unexpected weight change: No  rhinorrhea: No  trouble swallowing: No  visual disturbance: No  chest tightness: No  polyuria: No  difficulty urinating: No  menstrual problem: No  joint swelling: No  arthralgias: No  confusion: No  dysphoric mood: No      Review of Systems   HENT: Negative for hearing loss.    Eyes: Negative for discharge.   Respiratory: Negative for wheezing.    Cardiovascular: Negative for chest pain and palpitations.   Gastrointestinal: Negative for blood in stool, constipation, diarrhea and vomiting.   Genitourinary: Negative for dysuria and hematuria.   Musculoskeletal: Negative for neck pain.   Neurological: Negative for weakness and headaches.   Endo/Heme/Allergies: Negative for polydipsia.     Medications     Outpatient Medications Marked as Taking for the 4/14/22 encounter (Office Visit) with Jerson Wheat MD   Medication Sig Dispense Refill    apixaban (ELIQUIS) 5 mg Tab Take 1 tablet (5 mg total) by mouth 2 (two) times daily. 180 tablet 3    celecoxib (CELEBREX) 200 MG capsule Take 1 capsule (200 mg total) by mouth daily as needed for Pain. 30 capsule 1    cyanocobalamin 500 MCG tablet Take 1,000 mcg by mouth once daily.      diclofenac sodium (VOLTAREN) 1 % Gel Apply 2 g topically 4 (four) times daily. 50 g 3    DULoxetine (CYMBALTA) 60 MG capsule Take 1 capsule (60 mg total) by mouth once daily. 90 capsule 3    ergocalciferol (ERGOCALCIFEROL) 50,000 unit Cap Take 50,000 Units by mouth every Saturday.        "flecainide (TAMBOCOR) 150 MG Tab Take 1 tablet (150 mg total) by mouth every 12 (twelve) hours. 60 tablet 11    fluticasone furoate-vilanteroL (BREO ELLIPTA) 100-25 mcg/dose diskus inhaler Inhale 1 puff into the lungs once daily. Controller 30 each 11    HYDROcodone-acetaminophen (NORCO) 5-325 mg per tablet Take 1 tablet by mouth every 6 (six) hours as needed for Pain.      lisinopriL 10 MG tablet Take 1 tablet (10 mg total) by mouth once daily. 90 tablet 1    melatonin 5 mg TbDL Take 1 tablet by mouth nightly as needed (sleep).      metoprolol tartrate (LOPRESSOR) 100 MG tablet Take 1 tablet by mouth in the evening. 60 tablet 11    metoprolol tartrate (LOPRESSOR) 50 MG tablet Take 1 tablet (50 mg total) by mouth 2 (two) times daily. 180 tablet 3    nortriptyline (PAMELOR) 25 MG capsule TAKE ONE CAPSULE BY MOUTH EVERY EVENING 90 capsule 3    omeprazole (PRILOSEC) 20 MG capsule Take 1 capsule (20 mg total) by mouth every morning. 30 capsule 11    pulse oximeter (PULSE OXIMETER) device Use twice daily at 8 AM and 3 PM and record the value in [facility] as directed. 1 each 0    rosuvastatin (CRESTOR) 10 MG tablet Take 1 tablet (10 mg total) by mouth every evening. 90 tablet 3    tiZANidine (ZANAFLEX) 4 MG tablet Take 1 tablet (4 mg total) by mouth every 6 (six) hours as needed. 60 tablet 01    valACYclovir (VALTREX) 500 MG tablet Take 1 tablet (500 mg total) by mouth daily as needed (fever blister).      VENTOLIN HFA 90 mcg/actuation inhaler INHALE TWO PUFFS into the lungs EVERY 6 HOURS AS NEEDED for FOR WHEEZING (rescue)       Objective     /68   Pulse 64   Ht 5' 4" (1.626 m)   Wt 128.3 kg (282 lb 13.6 oz)   LMP 09/08/2017   SpO2 97%   BMI 48.55 kg/m²   Physical Exam  Constitutional:       General: She is not in acute distress.     Appearance: She is well-developed.   Neurological:      Mental Status: She is alert.       Assessment and Plan     Iron deficiency anemia, unspecified iron deficiency " anemia type    Paroxysmal atrial fibrillation    Encounter for screening mammogram for breast cancer  -     Mammo Digital Screening Bilat w/ Héctor; Future; Expected date: 04/14/2022    Morbid obesity with BMI of 45.0-49.9, adult  -     semaglutide (OZEMPIC) 0.25 mg or 0.5 mg(2 mg/1.5 mL) pen injector; Inject 0.25 mg under the skin once weekly for 4 weeks, THEN increase to 0.5 mg once weekly thereafter  Dispense: 4 pen; Refill: 2    Prediabetes  -     semaglutide (OZEMPIC) 0.25 mg or 0.5 mg(2 mg/1.5 mL) pen injector; Inject 0.25 mg under the skin once weekly for 4 weeks, THEN increase to 0.5 mg once weekly thereafter  Dispense: 4 pen; Refill: 2      No follow-ups on file.  ___________________  Jerson Wheat MD  Internal Medicine and Pediatrics

## 2022-04-16 NOTE — PROGRESS NOTES
Subjective:     Patient ID: Fatemeh Shoemaker is a 61 y.o. female sent for consultation for HLA B-27 & family hx of RA by Brad Arambula MD her orthopedic surgeon.  PCP: Jerson Wheat MD  Chief Complaint: No chief complaint on file.       HPI     61 yr old lady w multiple morbidities that include cardiac issues (paroxysmal AF, bradycardia), HLD, JOSE ELIAS, periph neuropathy  Sent for consultation due to joint pain associated with +HLA B27+ & family hx of RA.    Review of her joint hx is c/w generalized OA and is confirmed by imaging.  Her LBP is/was not inflammatory & is worse w activity.  She gets pain in different large joints, usually wo joint swelling.  Celebrex does not help.  Best help is duloxetine which she appreciates when she misses it; also nortriptyline and gabapentin. She needs these meds to fcn.  She denies psoriasis, PsA, IBD, Behcets, enthesopathies, dactylitis, uveitis, family hx of SpA.     Widespread pain index = 7  Upper arm left  Upper arm right  Hip (buttock, trochanter) left  Hip (buttock, trochanter) right  Lower leg, left  Lower leg, right  Lower back    Symptom severity score = 6  Fatigue = 3  Waking Unrefreshed =2   Cognitive Symptoms = 1    There is a family hx of fibromyalgia, alcohol & drug abuse       Current Outpatient Medications   Medication Sig Dispense Refill    apixaban (ELIQUIS) 5 mg Tab Take 1 tablet (5 mg total) by mouth 2 (two) times daily. 180 tablet 3    celecoxib (CELEBREX) 200 MG capsule Take 1 capsule (200 mg total) by mouth daily as needed for Pain. 30 capsule 1    cyanocobalamin 500 MCG tablet Take 1,000 mcg by mouth once daily.      diclofenac sodium (VOLTAREN) 1 % Gel Apply 2 g topically 4 (four) times daily. 50 g 3    DULoxetine (CYMBALTA) 60 MG capsule Take 1 capsule (60 mg total) by mouth once daily. 90 capsule 3    ergocalciferol (ERGOCALCIFEROL) 50,000 unit Cap Take 50,000 Units by mouth every Saturday.       flecainide (TAMBOCOR) 150 MG Tab Take 1  tablet (150 mg total) by mouth every 12 (twelve) hours. 60 tablet 11    fluticasone furoate-vilanteroL (BREO ELLIPTA) 100-25 mcg/dose diskus inhaler Inhale 1 puff into the lungs once daily. Controller 30 each 11    gabapentin (NEURONTIN) 600 MG tablet Take 1 tablet (600 mg total) by mouth 2 (two) times daily. 60 tablet 06    HYDROcodone-acetaminophen (NORCO) 5-325 mg per tablet Take 1 tablet by mouth every 6 (six) hours as needed for Pain.      ipratropium-albuteroL (COMBIVENT)  mcg/actuation inhaler Inhale 1 puff into the lungs every 6 (six) hours as needed for Wheezing or Shortness of Breath. Rescue 4 g 2    lisinopriL 10 MG tablet Take 1 tablet (10 mg total) by mouth once daily. 90 tablet 1    melatonin 5 mg TbDL Take 1 tablet by mouth nightly as needed (sleep).      metoprolol tartrate (LOPRESSOR) 100 MG tablet Take 1 tablet by mouth in the evening. 60 tablet 11    metoprolol tartrate (LOPRESSOR) 50 MG tablet Take 1 tablet (50 mg total) by mouth 2 (two) times daily. 180 tablet 3    nortriptyline (PAMELOR) 25 MG capsule TAKE ONE CAPSULE BY MOUTH EVERY EVENING 90 capsule 3    omeprazole (PRILOSEC) 20 MG capsule Take 1 capsule (20 mg total) by mouth every morning. 30 capsule 11    pulse oximeter (PULSE OXIMETER) device Use twice daily at 8 AM and 3 PM and record the value in [facility] as directed. 1 each 0    rosuvastatin (CRESTOR) 10 MG tablet Take 1 tablet (10 mg total) by mouth every evening. 90 tablet 3    semaglutide (OZEMPIC) 0.25 mg or 0.5 mg(2 mg/1.5 mL) pen injector Inject 0.25 mg under the skin once weekly for 4 weeks, THEN increase to 0.5 mg once weekly thereafter 4 pen 2    tiZANidine (ZANAFLEX) 4 MG tablet Take 1 tablet (4 mg total) by mouth every 6 (six) hours as needed. 60 tablet 01    valACYclovir (VALTREX) 500 MG tablet Take 1 tablet (500 mg total) by mouth daily as needed (fever blister).      VENTOLIN HFA 90 mcg/actuation inhaler INHALE TWO PUFFS into the lungs EVERY 6 HOURS  "AS NEEDED for FOR WHEEZING (rescue)       No current facility-administered medications for this visit.       Review of patient's allergies indicates:   Allergen Reactions    Aspirin Other (See Comments)     "I don't take it because I've had ulcers"   ulcers  "I don't take it because I've had ulcers"     Meperidine Other (See Comments)     Felt like she was about to pass put after taking       Review of Systems   Constitutional: Positive for fatigue. Negative for diaphoresis and fever.   HENT: Negative.  Negative for mouth sores, sore throat, tinnitus and trouble swallowing.         Dry mouth   Eyes: Negative.  Negative for visual disturbance.   Respiratory: Positive for shortness of breath. Negative for cough, choking and chest tightness.         ASHBY   Cardiovascular: Positive for leg swelling. Negative for chest pain and palpitations.   Gastrointestinal: Positive for constipation. Negative for abdominal distention, abdominal pain, blood in stool, diarrhea, nausea and vomiting.   Genitourinary: Negative for frequency, hematuria and menstrual problem.        Hysterectomy & BSO   Musculoskeletal: Negative.  Negative for back pain, joint swelling, myalgias, neck pain and neck stiffness.   Skin: Negative.  Negative for rash.   Neurological: Positive for headaches. Negative for dizziness, syncope, weakness, light-headedness and numbness.   Hematological: Negative for adenopathy. Does not bruise/bleed easily.   Psychiatric/Behavioral: Positive for sleep disturbance. Negative for dysphoric mood. The patient is not nervous/anxious.        Past Medical History:   Diagnosis Date    Anemia     Anticoagulant long-term use     Arrhythmia     Arthritis     Atrial fibrillation     history    Bronchitis     Encounter for blood transfusion     General anesthetics causing adverse effect in therapeutic use     High blood pressure     Hyperlipidemia     Lump or mass in breast     benign     Neuropathy     Obesity     " Obstructive sleep apnea syndrome 2021    Ulcer        Past Surgical History:   Procedure Laterality Date    A-V CARDIAC PACEMAKER INSERTION Left 2020    Procedure: INSERTION, CARDIAC PACEMAKER, DUAL CHAMBER;  Surgeon: Bert Reaves MD;  Location: [facility] CATH;  Service: Cardiology;  Laterality: Left;    BREAST BIOPSY Right 2017    myofibroblastoma     BREAST LUMPECTOMY Right 2017     SECTION  10/25/1986    Other c/section 10/26/1997    CHOLECYSTECTOMY  2017    Dr. TOLU Bowers, [facility]     csection      CS x 2    CYSTOSCOPY N/A 9/3/2019    Procedure: CYSTOSCOPY;  Surgeon: Noemi Pierre MD;  Location: [facility];  Service: OB/GYN;  Laterality: N/A;    DILATION AND CURETTAGE OF UTERUS      EPIDURAL STEROID INJECTION INTO LUMBAR SPINE N/A 2020    Procedure: Injection-steroid-epidural-lumbar L5/S1;  Surgeon: Gurjit Tavarez MD;  Location: [facility] OR;  Service: Pain Management;  Laterality: N/A;    EPIDURAL STEROID INJECTION INTO LUMBAR SPINE N/A 2020    Procedure: Injection-steroid-epidural-lumbar L5/S1;  Surgeon: Gurjit Tavarez MD;  Location: [facility] OR;  Service: Pain Management;  Laterality: N/A;    EPIDURAL STEROID INJECTION INTO LUMBAR SPINE N/A 2021    Procedure: Injection-steroid-epidural-lumbar L5/S1;  Surgeon: Gurjit Tavarez MD;  Location: [facility] OR;  Service: Pain Management;  Laterality: N/A;    FRACTURE SURGERY      Dislocated  hip total rt hip     HIP PINNING      HYSTERECTOMY      JOINT REPLACEMENT  2008    LAPAROSCOPIC SALPINGO-OOPHORECTOMY Bilateral 9/3/2019    Procedure: SALPINGO-OOPHORECTOMY, LAPAROSCOPIC;  Surgeon: Noemi Pierre MD;  Location: [facility] OR;  Service: OB/GYN;  Laterality: Bilateral;  Dr. Bentley to assist. No resident needed.     LAPAROSCOPIC TOTAL HYSTERECTOMY N/A 9/3/2019    Procedure: HYSTERECTOMY, TOTAL, LAPAROSCOPIC;  Surgeon: Noemi Pierre MD;  Location: [facility];  Service: OB/GYN;  Laterality: N/A;    NASAL  "SEPTUM SURGERY      OOPHORECTOMY      TOTAL HIP ARTHROPLASTY Right     TUBAL LIGATION         Family History   Problem Relation Age of Onset    Colon cancer Maternal Grandmother 70        mets to ovary    Cancer Maternal Grandmother     Arthritis Paternal Grandfather     Eclampsia Paternal Grandmother     Cataracts Maternal Grandfather     Stroke Father 57    Colon cancer Father     Hypertension Father     Alcohol abuse Father     Cancer Father         Passed away July 10 2019    Hypertension Mother     Cataracts Mother     Hypertension Brother         Age 54 hypertension heart    Early death Brother         Hypertension cardio disease    No Known Problems Daughter     Stomach cancer Neg Hx     Esophageal cancer Neg Hx     Breast cancer Neg Hx     Miscarriages / Stillbirths Neg Hx     Ovarian cancer Neg Hx     Glaucoma Neg Hx     Macular degeneration Neg Hx     Retinal detachment Neg Hx     Strabismus Neg Hx    PGM w DM  MGF w RA  M; 78 yr old; Fibromyalgia  F: alcoholism  Brother  age 55: hooked on pain pills;  2020  Daughter 35 yro: on metformin; on vit B12;  Daughter 24 yro; on antidepressant.    Social History     Tobacco Use    Smoking status: Never Smoker    Smokeless tobacco: Never Used   Substance Use Topics    Alcohol use: No     Comment: never    Drug use: Never   Sedentary job;     Objective:   /63   Pulse 60   Ht 5' 4" (1.626 m)   Wt 131.5 kg (289 lb 14.5 oz)   LMP 2017   BMI 49.76 kg/m²   Physical Exam   Constitutional: She is oriented to person, place, and time. No distress.   HENT:   Head: Normocephalic and atraumatic.   Mouth/Throat: Oropharynx is clear and moist. No oropharyngeal exudate.   No facial rashes  Parotids not enlarged  No oral ulcers   Eyes: Pupils are equal, round, and reactive to light. Conjunctivae are normal. Right eye exhibits no discharge. Left eye exhibits no discharge. No scleral icterus.   Neck: No JVD present. No " tracheal deviation present. No thyromegaly present.   Cardiovascular: Normal rate, regular rhythm and normal heart sounds. Exam reveals no gallop and no friction rub.   No murmur heard.  Pulmonary/Chest: Effort normal and breath sounds normal. No respiratory distress. She has no wheezes. She has no rales. She exhibits no tenderness.   Abdominal: Soft. Bowel sounds are normal. She exhibits no distension and no mass. There is no splenomegaly or hepatomegaly. There is no abdominal tenderness. There is no rebound and no guarding.   Musculoskeletal:         General: No tenderness. Normal range of motion.      Cervical back: Neck supple.      Right lower leg: Edema present.      Left lower leg: Edema present.      Comments: No synovitis anywhere  Adequate ROM of joints  Some Heberden's in hands         Lymphadenopathy:     She has no cervical adenopathy.   Neurological: She is alert and oriented to person, place, and time. She has normal reflexes. No cranial nerve deficit. Gait normal.   Proximal and distal muscle strength 5/5.   Skin: Skin is warm and dry. No rash noted. She is not diaphoretic.   Dystrophic toe nail changes    Psychiatric: Mood, memory, affect and judgment normal.   Vitals reviewed.      3/16/22 [facility]: CBC low indices; CMP glu 108; cne 0.56;  (149); VLDL 47 (40); T3 uptake 22 (24); TSH ok;   2/1/22: ESR 10 (20); CRP 3.9; CBC Hg 11.1; Ht 36.3; low indices; UA neg pr; tr OB; >100 WBC; mod bact;  RONALD neg; RF/CCPneg; HLA B27+   11/8/17: SSA neg;     Imaging personally viewed reviewed: OA R ankle/foot/lots of calcification; OA L spine; RTHR ; SI jts OA (10/19/20)  OA bilateral knees L>R;   Assessment:   Joint pain multiple sites  Mild fibromyalgia   HLA B-27+  Generalized OA   Bilateral knees L>R   L spine/SI jt/   R ankle/foot   S/P R THR  LBP  Hyperglycemia   R/O DM or preDM  Hx of vitamin D insufficiency  Peripheral neuropathy by hx  Morbid obesity  Plan:   Discussed meaning of HLA B27+  Discussed  absence of RA on hx/pe/labs or imaging.  Discussed osteoarthritis.  Showed low back exercises --strengthening & stretching  The patient was educated on fibromyalgia/central sensitivity syndromes  Symptoms/presentations, non-pharmacologic and pharmacologic treatments and their efficacies were reviewed.   Non-pharmacologic approaches were stressed.  ExPRESS was reviewed in detail.  Regular/daily dedicated, low impact aerobic exercise was especially emphasized. Strategies for success were offered.  Additional mind body exercise such as yoga or variations (chair yoga) and/or Jose Chi have been validated.  She may benefit by a cautious increase of duloxetine & if no additional effect backing down again.   Seratonin syndrome is always of concern.  Patient was provided with written information and referred to several websites for further information.  Discussed impact of fibromyalgia/CSS on pain  Labs today: Vitamin D & Hg A1c    As I do not manage or follow fibromyalgia, the patient may be followed by her PCP and/or her other clinicians.   RTC prn      CC: Brad Arambula MD  CC: Jerson Wheat MD      Addendum: 4/20/22: HgA1C 5.9 = prediabetes  Vit D 20; Rx decara 50,000 weekly x 30 weeks followed by vit D3 1,000 IU daily.  May repeat level after rx w decara.

## 2022-04-18 ENCOUNTER — TELEPHONE (OUTPATIENT)
Dept: PHARMACY | Facility: CLINIC | Age: 62
End: 2022-04-18
Payer: COMMERCIAL

## 2022-04-19 ENCOUNTER — HOSPITAL ENCOUNTER (OUTPATIENT)
Dept: RADIOLOGY | Facility: HOSPITAL | Age: 62
Discharge: HOME | End: 2022-04-19
Attending: INTERNAL MEDICINE
Payer: COMMERCIAL

## 2022-04-19 DIAGNOSIS — Z12.31 ENCOUNTER FOR SCREENING MAMMOGRAM FOR BREAST CANCER: ICD-10-CM

## 2022-04-19 PROCEDURE — 77067 SCR MAMMO BI INCL CAD: CPT | Mod: 26,,, | Performed by: RADIOLOGY

## 2022-04-19 PROCEDURE — 77063 BREAST TOMOSYNTHESIS BI: CPT | Mod: TC,PO

## 2022-04-19 PROCEDURE — 77063 BREAST TOMOSYNTHESIS BI: CPT | Mod: 26,,, | Performed by: RADIOLOGY

## 2022-04-19 PROCEDURE — 77063 MAMMO DIGITAL SCREENING BILAT WITH TOMO: ICD-10-PCS | Mod: 26,,, | Performed by: RADIOLOGY

## 2022-04-19 PROCEDURE — 77067 MAMMO DIGITAL SCREENING BILAT WITH TOMO: ICD-10-PCS | Mod: 26,,, | Performed by: RADIOLOGY

## 2022-04-20 ENCOUNTER — OFFICE VISIT (OUTPATIENT)
Dept: RHEUMATOLOGY | Facility: CLINIC | Age: 62
End: 2022-04-20
Payer: COMMERCIAL

## 2022-04-20 ENCOUNTER — LAB VISIT (OUTPATIENT)
Dept: LAB | Facility: HOSPITAL | Age: 62
End: 2022-04-20
Attending: INTERNAL MEDICINE
Payer: COMMERCIAL

## 2022-04-20 ENCOUNTER — PATIENT MESSAGE (OUTPATIENT)
Dept: RHEUMATOLOGY | Facility: CLINIC | Age: 62
End: 2022-04-20

## 2022-04-20 VITALS
BODY MASS INDEX: 49.49 KG/M2 | HEIGHT: 64 IN | SYSTOLIC BLOOD PRESSURE: 125 MMHG | HEART RATE: 60 BPM | WEIGHT: 289.88 LBS | DIASTOLIC BLOOD PRESSURE: 63 MMHG

## 2022-04-20 DIAGNOSIS — Z15.89 HLA B27 (HLA B27 POSITIVE): ICD-10-CM

## 2022-04-20 DIAGNOSIS — E55.9 VITAMIN D INSUFFICIENCY: ICD-10-CM

## 2022-04-20 DIAGNOSIS — R73.9 HYPERGLYCEMIA: ICD-10-CM

## 2022-04-20 DIAGNOSIS — M25.50 PAIN IN JOINT INVOLVING MULTIPLE SITES: Primary | ICD-10-CM

## 2022-04-20 DIAGNOSIS — Z55.9 EDUCATIONAL CIRCUMSTANCE: ICD-10-CM

## 2022-04-20 DIAGNOSIS — M79.7 FIBROMYALGIA: ICD-10-CM

## 2022-04-20 DIAGNOSIS — M15.9 GENERALIZED OSTEOARTHRITIS OF MULTIPLE SITES: ICD-10-CM

## 2022-04-20 DIAGNOSIS — Z82.61 FAMILY HISTORY OF RHEUMATOID ARTHRITIS: ICD-10-CM

## 2022-04-20 DIAGNOSIS — E66.01 MORBID OBESITY WITH BMI OF 45.0-49.9, ADULT: ICD-10-CM

## 2022-04-20 DIAGNOSIS — M54.9 DORSALGIA: ICD-10-CM

## 2022-04-20 LAB
25(OH)D3+25(OH)D2 SERPL-MCNC: 20 NG/ML (ref 30–96)
ESTIMATED AVG GLUCOSE: 123 MG/DL (ref 68–131)
HBA1C MFR BLD: 5.9 % (ref 4–5.6)

## 2022-04-20 PROCEDURE — 99999 PR PBB SHADOW E&M-EST. PATIENT-LVL V: ICD-10-PCS | Mod: PBBFAC,,, | Performed by: INTERNAL MEDICINE

## 2022-04-20 PROCEDURE — 1159F PR MEDICATION LIST DOCUMENTED IN MEDICAL RECORD: ICD-10-PCS | Mod: CPTII,S$GLB,, | Performed by: INTERNAL MEDICINE

## 2022-04-20 PROCEDURE — 82306 VITAMIN D 25 HYDROXY: CPT | Performed by: INTERNAL MEDICINE

## 2022-04-20 PROCEDURE — 3044F HG A1C LEVEL LT 7.0%: CPT | Mod: CPTII,S$GLB,, | Performed by: INTERNAL MEDICINE

## 2022-04-20 PROCEDURE — 3074F PR MOST RECENT SYSTOLIC BLOOD PRESSURE < 130 MM HG: ICD-10-PCS | Mod: CPTII,S$GLB,, | Performed by: INTERNAL MEDICINE

## 2022-04-20 PROCEDURE — 3008F BODY MASS INDEX DOCD: CPT | Mod: CPTII,S$GLB,, | Performed by: INTERNAL MEDICINE

## 2022-04-20 PROCEDURE — 3074F SYST BP LT 130 MM HG: CPT | Mod: CPTII,S$GLB,, | Performed by: INTERNAL MEDICINE

## 2022-04-20 PROCEDURE — 3044F PR MOST RECENT HEMOGLOBIN A1C LEVEL <7.0%: ICD-10-PCS | Mod: CPTII,S$GLB,, | Performed by: INTERNAL MEDICINE

## 2022-04-20 PROCEDURE — 3008F PR BODY MASS INDEX (BMI) DOCUMENTED: ICD-10-PCS | Mod: CPTII,S$GLB,, | Performed by: INTERNAL MEDICINE

## 2022-04-20 PROCEDURE — 1159F MED LIST DOCD IN RCRD: CPT | Mod: CPTII,S$GLB,, | Performed by: INTERNAL MEDICINE

## 2022-04-20 PROCEDURE — 83036 HEMOGLOBIN GLYCOSYLATED A1C: CPT | Performed by: INTERNAL MEDICINE

## 2022-04-20 PROCEDURE — 99204 PR OFFICE/OUTPT VISIT, NEW, LEVL IV, 45-59 MIN: ICD-10-PCS | Mod: S$GLB,,, | Performed by: INTERNAL MEDICINE

## 2022-04-20 PROCEDURE — 99999 PR PBB SHADOW E&M-EST. PATIENT-LVL V: CPT | Mod: PBBFAC,,, | Performed by: INTERNAL MEDICINE

## 2022-04-20 PROCEDURE — 4010F ACE/ARB THERAPY RXD/TAKEN: CPT | Mod: CPTII,S$GLB,, | Performed by: INTERNAL MEDICINE

## 2022-04-20 PROCEDURE — 36415 COLL VENOUS BLD VENIPUNCTURE: CPT | Performed by: INTERNAL MEDICINE

## 2022-04-20 PROCEDURE — 3078F PR MOST RECENT DIASTOLIC BLOOD PRESSURE < 80 MM HG: ICD-10-PCS | Mod: CPTII,S$GLB,, | Performed by: INTERNAL MEDICINE

## 2022-04-20 PROCEDURE — 3078F DIAST BP <80 MM HG: CPT | Mod: CPTII,S$GLB,, | Performed by: INTERNAL MEDICINE

## 2022-04-20 PROCEDURE — 99204 OFFICE O/P NEW MOD 45 MIN: CPT | Mod: S$GLB,,, | Performed by: INTERNAL MEDICINE

## 2022-04-20 PROCEDURE — 4010F PR ACE/ARB THEARPY RXD/TAKEN: ICD-10-PCS | Mod: CPTII,S$GLB,, | Performed by: INTERNAL MEDICINE

## 2022-04-20 RX ORDER — ASPIRIN 325 MG
50000 TABLET, DELAYED RELEASE (ENTERIC COATED) ORAL
Qty: 30 CAPSULE | Refills: 0 | Status: SHIPPED | OUTPATIENT
Start: 2022-04-20 | End: 2024-02-05 | Stop reason: SDUPTHER

## 2022-04-20 SDOH — SOCIAL DETERMINANTS OF HEALTH (SDOH): PROBLEMS RELATED TO EDUCATION AND LITERACY, UNSPECIFIED: Z55.9

## 2022-04-20 ASSESSMENT — ROUTINE ASSESSMENT OF PATIENT INDEX DATA (RAPID3)
PATIENT GLOBAL ASSESSMENT SCORE: 7.5
PSYCHOLOGICAL DISTRESS SCORE: 1.1
AM STIFFNESS SCORE: 1, YES
PAIN SCORE: 6.5
MDHAQ FUNCTION SCORE: 0.6
FATIGUE SCORE: 6
TOTAL RAPID3 SCORE: 5.33

## 2022-04-20 NOTE — PATIENT INSTRUCTIONS
Read on fibromyalgia.  Www.myalgia.com  Www.fmaware.org    Begin a program of daily, aerobic, low impact, dedicated exercise.    For knees:  Voltaren gel  Biofreeze    Do the 2 low back exercises shown.

## 2022-04-22 ENCOUNTER — PATIENT MESSAGE (OUTPATIENT)
Dept: FAMILY MEDICINE | Facility: CLINIC | Age: 62
End: 2022-04-22
Payer: COMMERCIAL

## 2022-04-22 DIAGNOSIS — R11.0 NAUSEA: Primary | ICD-10-CM

## 2022-04-22 RX ORDER — PROMETHAZINE HYDROCHLORIDE 12.5 MG/1
12.5 TABLET ORAL 2 TIMES DAILY PRN
Qty: 10 TABLET | Refills: 0 | Status: SHIPPED | OUTPATIENT
Start: 2022-04-22

## 2022-04-27 ENCOUNTER — PATIENT MESSAGE (OUTPATIENT)
Dept: OTHER | Facility: OTHER | Age: 62
End: 2022-04-27
Payer: COMMERCIAL

## 2022-04-27 DIAGNOSIS — K06.9 DISEASE OF GINGIVA DUE TO RECURRENT ORAL HERPES SIMPLEX VIRUS (HSV) INFECTION: ICD-10-CM

## 2022-04-27 DIAGNOSIS — B00.9 DISEASE OF GINGIVA DUE TO RECURRENT ORAL HERPES SIMPLEX VIRUS (HSV) INFECTION: ICD-10-CM

## 2022-04-27 RX ORDER — VALACYCLOVIR HYDROCHLORIDE 500 MG/1
500 TABLET, FILM COATED ORAL DAILY PRN
Qty: 30 TABLET | Refills: 2 | Status: SHIPPED | OUTPATIENT
Start: 2022-04-27 | End: 2023-12-12 | Stop reason: SDUPTHER

## 2022-05-09 ENCOUNTER — PATIENT MESSAGE (OUTPATIENT)
Dept: SMOKING CESSATION | Facility: CLINIC | Age: 62
End: 2022-05-09
Payer: COMMERCIAL

## 2022-06-02 ENCOUNTER — PATIENT MESSAGE (OUTPATIENT)
Dept: OTHER | Facility: OTHER | Age: 62
End: 2022-06-02
Payer: COMMERCIAL

## 2022-06-07 ENCOUNTER — TELEPHONE (OUTPATIENT)
Dept: ORTHOPEDICS | Facility: CLINIC | Age: 62
End: 2022-06-07
Payer: COMMERCIAL

## 2022-06-09 DIAGNOSIS — M25.572 LEFT ANKLE PAIN: Primary | ICD-10-CM

## 2022-06-10 ENCOUNTER — HOSPITAL ENCOUNTER (OUTPATIENT)
Dept: RADIOLOGY | Facility: HOSPITAL | Age: 62
Discharge: HOME | End: 2022-06-10
Attending: ORTHOPAEDIC SURGERY
Payer: COMMERCIAL

## 2022-06-10 ENCOUNTER — OFFICE VISIT (OUTPATIENT)
Dept: ORTHOPEDICS | Facility: CLINIC | Age: 62
End: 2022-06-10
Payer: COMMERCIAL

## 2022-06-10 VITALS — WEIGHT: 293 LBS | HEIGHT: 64 IN | BODY MASS INDEX: 50.02 KG/M2

## 2022-06-10 DIAGNOSIS — M25.572 LEFT ANKLE PAIN: ICD-10-CM

## 2022-06-10 DIAGNOSIS — M25.572 LEFT ANKLE PAIN, UNSPECIFIED CHRONICITY: Primary | ICD-10-CM

## 2022-06-10 PROCEDURE — 1160F PR REVIEW ALL MEDS BY PRESCRIBER/CLIN PHARMACIST DOCUMENTED: ICD-10-PCS | Mod: CPTII,S$GLB,, | Performed by: ORTHOPAEDIC SURGERY

## 2022-06-10 PROCEDURE — 3008F PR BODY MASS INDEX (BMI) DOCUMENTED: ICD-10-PCS | Mod: CPTII,S$GLB,, | Performed by: ORTHOPAEDIC SURGERY

## 2022-06-10 PROCEDURE — 73610 X-RAY EXAM OF ANKLE: CPT | Mod: 26,LT,, | Performed by: RADIOLOGY

## 2022-06-10 PROCEDURE — 3044F PR MOST RECENT HEMOGLOBIN A1C LEVEL <7.0%: ICD-10-PCS | Mod: CPTII,S$GLB,, | Performed by: ORTHOPAEDIC SURGERY

## 2022-06-10 PROCEDURE — 1160F RVW MEDS BY RX/DR IN RCRD: CPT | Mod: CPTII,S$GLB,, | Performed by: ORTHOPAEDIC SURGERY

## 2022-06-10 PROCEDURE — 1159F PR MEDICATION LIST DOCUMENTED IN MEDICAL RECORD: ICD-10-PCS | Mod: CPTII,S$GLB,, | Performed by: ORTHOPAEDIC SURGERY

## 2022-06-10 PROCEDURE — 99999 PR PBB SHADOW E&M-EST. PATIENT-LVL IV: CPT | Mod: PBBFAC,,, | Performed by: ORTHOPAEDIC SURGERY

## 2022-06-10 PROCEDURE — 99213 PR OFFICE/OUTPT VISIT, EST, LEVL III, 20-29 MIN: ICD-10-PCS | Mod: S$GLB,,, | Performed by: ORTHOPAEDIC SURGERY

## 2022-06-10 PROCEDURE — 97760 ORTHOTIC MGMT&TRAING 1ST ENC: CPT | Mod: GP,S$GLB,, | Performed by: ORTHOPAEDIC SURGERY

## 2022-06-10 PROCEDURE — 73610 XR ANKLE COMPLETE 3 VIEW LEFT: ICD-10-PCS | Mod: 26,LT,, | Performed by: RADIOLOGY

## 2022-06-10 PROCEDURE — 4010F ACE/ARB THERAPY RXD/TAKEN: CPT | Mod: CPTII,S$GLB,, | Performed by: ORTHOPAEDIC SURGERY

## 2022-06-10 PROCEDURE — 3044F HG A1C LEVEL LT 7.0%: CPT | Mod: CPTII,S$GLB,, | Performed by: ORTHOPAEDIC SURGERY

## 2022-06-10 PROCEDURE — 73610 X-RAY EXAM OF ANKLE: CPT | Mod: TC,PO,LT

## 2022-06-10 PROCEDURE — 97760 PR ORTHOTIC MGMT&TRAINJ INITIAL ENC EA 15 MINS: ICD-10-PCS | Mod: GP,S$GLB,, | Performed by: ORTHOPAEDIC SURGERY

## 2022-06-10 PROCEDURE — 1159F MED LIST DOCD IN RCRD: CPT | Mod: CPTII,S$GLB,, | Performed by: ORTHOPAEDIC SURGERY

## 2022-06-10 PROCEDURE — 4010F PR ACE/ARB THEARPY RXD/TAKEN: ICD-10-PCS | Mod: CPTII,S$GLB,, | Performed by: ORTHOPAEDIC SURGERY

## 2022-06-10 PROCEDURE — 99213 OFFICE O/P EST LOW 20 MIN: CPT | Mod: S$GLB,,, | Performed by: ORTHOPAEDIC SURGERY

## 2022-06-10 PROCEDURE — 3008F BODY MASS INDEX DOCD: CPT | Mod: CPTII,S$GLB,, | Performed by: ORTHOPAEDIC SURGERY

## 2022-06-10 PROCEDURE — 99999 PR PBB SHADOW E&M-EST. PATIENT-LVL IV: ICD-10-PCS | Mod: PBBFAC,,, | Performed by: ORTHOPAEDIC SURGERY

## 2022-06-10 RX ORDER — FLUTICASONE FUROATE AND VILANTEROL TRIFENATATE 100; 25 UG/1; UG/1
1 POWDER RESPIRATORY (INHALATION)
COMMUNITY
Start: 2022-03-21 | End: 2023-01-17 | Stop reason: SDUPTHER

## 2022-06-10 RX ORDER — METOPROLOL TARTRATE 100 MG/1
TABLET ORAL
COMMUNITY
Start: 2022-05-23 | End: 2022-06-13

## 2022-06-10 RX ORDER — SEMAGLUTIDE 1.34 MG/ML
INJECTION, SOLUTION SUBCUTANEOUS
COMMUNITY
Start: 2022-04-22 | End: 2022-08-18

## 2022-06-10 NOTE — PROGRESS NOTES
61 years old was walking felt acute intense pain in the posterior aspect of her left distal leg into the ankle area 4 days ago felt a pop in hurting since then got a splint comes today follow-up    Exam shows it appears that Achilles tendon is intact on exam negative Crow's test, palpable tendon, somewhat tender there are no bruising detected somewhat tender into the muscular region as well    X-rays are negative    Assessment:  Left leg strain, possible Achilles rupture    Plan:  We can treat this non operatively.  We will get her a boot, let her come back in a few weeks time is an established patient        We performed a custom orthotic/brace fitting, adjusting and training with the patient. The patient demonstrated understanding and proper care. This was performed for 15 minutes.      Imaging studies ordered and reviewed by me    Further History  Aching pain  Worse with activity  Relieved with rest  No other associated symptoms  No other radiation    Further Exam  Alert and oriented  Pleasant  Contralateral limb has appropriate range of motion for age and condition  Contralateral limb has appropriate strength for age and condition  Contralateral limb has appropriate stability  for age and condition  No adenopathy  Pulses are appropriate for current condition  Skin is intact        Chief Complaint    Chief Complaint   Patient presents with    Left Ankle - Pain, Injury     L achilles tendon       HPI  Fatemeh Shoemaker is a 61 y.o.  female who presents with       Past Medical History  Past Medical History:   Diagnosis Date    Anemia     Anticoagulant long-term use     Arrhythmia     Arthritis     Atrial fibrillation     history    Bronchitis     Encounter for blood transfusion     General anesthetics causing adverse effect in therapeutic use     High blood pressure     Hyperlipidemia     Lump or mass in breast     benign     Neuropathy     Obesity     Obstructive sleep apnea syndrome 11/5/2021     Ulcer        Past Surgical History  Past Surgical History:   Procedure Laterality Date    A-V CARDIAC PACEMAKER INSERTION Left 2020    Procedure: INSERTION, CARDIAC PACEMAKER, DUAL CHAMBER;  Surgeon: Bert Reaves MD;  Location: [facility] CATH;  Service: Cardiology;  Laterality: Left;    BREAST BIOPSY Right 2017    myofibroblastoma     BREAST LUMPECTOMY Right 2017     SECTION  10/25/1986    Other c/section 10/26/1997    CHOLECYSTECTOMY  2017    Dr. TOLU Bowers, [facility]     csection      CS x 2    CYSTOSCOPY N/A 9/3/2019    Procedure: CYSTOSCOPY;  Surgeon: Noemi Pierre MD;  Location: [facility];  Service: OB/GYN;  Laterality: N/A;    DILATION AND CURETTAGE OF UTERUS      EPIDURAL STEROID INJECTION INTO LUMBAR SPINE N/A 2020    Procedure: Injection-steroid-epidural-lumbar L5/S1;  Surgeon: Gurjit Tavarez MD;  Location: [facility];  Service: Pain Management;  Laterality: N/A;    EPIDURAL STEROID INJECTION INTO LUMBAR SPINE N/A 2020    Procedure: Injection-steroid-epidural-lumbar L5/S1;  Surgeon: Gurjit Tavarez MD;  Location: [facility] OR;  Service: Pain Management;  Laterality: N/A;    EPIDURAL STEROID INJECTION INTO LUMBAR SPINE N/A 2021    Procedure: Injection-steroid-epidural-lumbar L5/S1;  Surgeon: Gurjit Tavarez MD;  Location: [facility] OR;  Service: Pain Management;  Laterality: N/A;    FRACTURE SURGERY      Dislocated  hip total rt hip     HIP PINNING      HYSTERECTOMY      JOINT REPLACEMENT  2008    LAPAROSCOPIC SALPINGO-OOPHORECTOMY Bilateral 9/3/2019    Procedure: SALPINGO-OOPHORECTOMY, LAPAROSCOPIC;  Surgeon: Noemi Pirere MD;  Location: [facility];  Service: OB/GYN;  Laterality: Bilateral;  Dr. Bentley to assist. No resident needed.     LAPAROSCOPIC TOTAL HYSTERECTOMY N/A 9/3/2019    Procedure: HYSTERECTOMY, TOTAL, LAPAROSCOPIC;  Surgeon: Noemi Pierre MD;  Location: [facility];  Service: OB/GYN;  Laterality: N/A;    NASAL SEPTUM SURGERY       OOPHORECTOMY      TOTAL HIP ARTHROPLASTY Right     TUBAL LIGATION         Medications  Current Outpatient Medications   Medication Sig    albuterol sulfate 90 mcg/actuation aebs Inhale 180 mcg into the lungs.    apixaban (ELIQUIS) 5 mg Tab Take 1 tablet (5 mg total) by mouth 2 (two) times daily.    celecoxib (CELEBREX) 200 MG capsule Take 1 capsule (200 mg total) by mouth daily as needed for Pain.    cholecalciferol, vitamin D3, (DECARA) 1,250 mcg (50,000 unit) capsule Take 1 capsule (50,000 Units total) by mouth every 7 days.    cyanocobalamin 500 MCG tablet Take 1,000 mcg by mouth once daily.    diclofenac sodium (VOLTAREN) 1 % Gel Apply 2 g topically 4 (four) times daily.    DULoxetine (CYMBALTA) 60 MG capsule Take 1 capsule (60 mg total) by mouth once daily.    flecainide (TAMBOCOR) 150 MG Tab Take 1 tablet (150 mg total) by mouth every 12 (twelve) hours.    fluticasone furoate-vilanteroL (BREO ELLIPTA) 100-25 mcg/dose diskus inhaler Inhale 1 puff into the lungs.    HYDROcodone-acetaminophen (NORCO) 5-325 mg per tablet Take 1 tablet by mouth every 6 (six) hours as needed for Pain.    HYDROcodone-acetaminophen (NORCO) 5-325 mg per tablet Take 1 tablet by mouth every 6 (six) hours as needed for Pain.    lisinopriL 10 MG tablet Take 1 tablet (10 mg total) by mouth once daily.    melatonin 5 mg TbDL Take 1 tablet by mouth nightly as needed (sleep).    metoprolol tartrate (LOPRESSOR) 100 MG tablet SMARTSI Tablet(s) By Mouth Every Evening    metoprolol tartrate (LOPRESSOR) 50 MG tablet Take 1 tablet (50 mg total) by mouth every morning AND 2 tablets (100 mg total) every evening.    nortriptyline (PAMELOR) 25 MG capsule TAKE ONE CAPSULE BY MOUTH EVERY EVENING    omeprazole (PRILOSEC) 20 MG capsule Take 1 capsule (20 mg total) by mouth every morning.    promethazine (PHENERGAN) 12.5 MG Tab Take 1 tablet (12.5 mg total) by mouth 2 (two) times daily as needed (nausea).    pulse oximeter (PULSE  "OXIMETER) device Use twice daily at 8 AM and 3 PM and [facility] as [facility].    rosuvastatin (CRESTOR) 10 MG tablet Take 1 tablet (10 mg total) by mouth every evening.    semaglutide (OZEMPIC) 0.25 mg or 0.5 mg(2 mg/1.5 mL) pen injector Inject into the skin.    valACYclovir (VALTREX) 500 MG tablet Take 1 tablet (500 mg total) by mouth daily as needed (fever blister).    gabapentin (NEURONTIN) 600 MG tablet Take 1 tablet (600 mg total) by mouth 2 (two) times daily.     No current facility-administered medications for this visit.       Allergies  Review of patient's allergies indicates:   Allergen Reactions    Aspirin Other (See Comments)     "I don't take it because I've had ulcers"   ulcers  "I don't take it because I've had ulcers"     Meperidine Other (See Comments)     Felt like she was about to pass put after taking       Family History  Family History   Problem Relation Age of Onset    Colon cancer Maternal Grandmother 70        mets to ovary    Cancer Maternal Grandmother     Arthritis Paternal Grandfather     Eclampsia Paternal Grandmother     Cataracts Maternal Grandfather     Stroke Father 57    Colon cancer Father     Hypertension Father     Alcohol abuse Father     Cancer Father         Passed away July 10 2019    Hypertension Mother     Cataracts Mother     Hypertension Brother         Age 54 hypertension heart    Early death Brother         Hypertension cardio disease    No Known Problems Daughter     Stomach cancer Neg Hx     Esophageal cancer Neg Hx     Breast cancer Neg Hx     Miscarriages / Stillbirths Neg Hx     Ovarian cancer Neg Hx     Glaucoma Neg Hx     Macular degeneration Neg Hx     Retinal detachment Neg Hx     Strabismus Neg Hx        Social History  Social History     Socioeconomic History    Marital status: Significant Other   Tobacco Use    Smoking status: Never Smoker    Smokeless tobacco: Never Used   Substance and Sexual Activity    " Alcohol use: No     Comment: never    Drug use: Never    Sexual activity: Yes     Partners: Male     Birth control/protection: Other-see comments     Comment: Hysterectomy 9/3/19     Social Determinants of Health     Financial Resource Strain: Low Risk     Difficulty of Paying Living Expenses: Not hard at all   Food Insecurity: No Food Insecurity    Worried About Running Out of Food in the Last Year: Never true    Ran Out of Food in the Last Year: Never true   Transportation Needs: No Transportation Needs    Lack of Transportation (Medical): No    Lack of Transportation (Non-Medical): No   Physical Activity: Inactive    Days of Exercise per Week: 0 days    Minutes of Exercise per Session: 0 min   Stress: No Stress Concern Present    Feeling of Stress : Not at all   Social Connections: Unknown    Frequency of Communication with Friends and Family: More than three times a week    Frequency of Social Gatherings with Friends and Family: Never    Active Member of Clubs or Organizations: No    Attends Club or Organization Meetings: Never    Marital Status:    Housing Stability: Low Risk     Unable to Pay for Housing in the Last Year: No    Number of Places Lived in the Last Year: 1    Unstable Housing in the Last Year: No               Review of Systems     Constitutional: Negative    HENT: Negative  Eyes: Negative  Respiratory: Negative  Cardiovascular: Negative  Musculoskeletal: HPI  Skin: Negative  Neurological: Negative  Hematological: Negative  Endocrine: Negative                 Physical Exam    There were no vitals filed for this visit.  Body mass index is 51.49 kg/m².  Physical Examination:     General appearance -  well appearing, and in no distress  Mental status - awake  Neck - supple  Chest -  symmetric air entry  Heart - normal rate   Abdomen - soft      Assessment     1. Left ankle pain, unspecified chronicity          Plan

## 2022-06-15 ENCOUNTER — PATIENT MESSAGE (OUTPATIENT)
Dept: ORTHOPEDICS | Facility: CLINIC | Age: 62
End: 2022-06-15
Payer: COMMERCIAL

## 2022-06-18 NOTE — TELEPHONE ENCOUNTER
I called the patient to schedule, but she didn't answer. 6/7/2022 EV  
maximum assist (25% patients effort)

## 2022-06-20 ENCOUNTER — CLINICAL SUPPORT (OUTPATIENT)
Dept: CARDIOLOGY | Facility: HOSPITAL | Age: 62
End: 2022-06-20
Payer: COMMERCIAL

## 2022-06-20 DIAGNOSIS — Z95.0 PRESENCE OF CARDIAC PACEMAKER: ICD-10-CM

## 2022-06-20 PROCEDURE — 93294 REM INTERROG EVL PM/LDLS PM: CPT | Mod: ,,, | Performed by: INTERNAL MEDICINE

## 2022-06-20 PROCEDURE — 93294 CARDIAC DEVICE CHECK - REMOTE: ICD-10-PCS | Mod: ,,, | Performed by: INTERNAL MEDICINE

## 2022-06-20 PROCEDURE — 93296 REM INTERROG EVL PM/IDS: CPT | Mod: PO | Performed by: INTERNAL MEDICINE

## 2022-07-08 ENCOUNTER — OFFICE VISIT (OUTPATIENT)
Dept: ORTHOPEDICS | Facility: CLINIC | Age: 62
End: 2022-07-08
Payer: COMMERCIAL

## 2022-07-08 VITALS — BODY MASS INDEX: 47.8 KG/M2 | HEIGHT: 64 IN | WEIGHT: 280 LBS

## 2022-07-08 DIAGNOSIS — M25.571 RIGHT ANKLE PAIN, UNSPECIFIED CHRONICITY: Primary | ICD-10-CM

## 2022-07-08 DIAGNOSIS — M17.12 OSTEOARTHRITIS OF LEFT KNEE, UNSPECIFIED OSTEOARTHRITIS TYPE: ICD-10-CM

## 2022-07-08 DIAGNOSIS — S86.912A: ICD-10-CM

## 2022-07-08 PROCEDURE — 3008F BODY MASS INDEX DOCD: CPT | Mod: CPTII,S$GLB,, | Performed by: ORTHOPAEDIC SURGERY

## 2022-07-08 PROCEDURE — 1159F PR MEDICATION LIST DOCUMENTED IN MEDICAL RECORD: ICD-10-PCS | Mod: CPTII,S$GLB,, | Performed by: ORTHOPAEDIC SURGERY

## 2022-07-08 PROCEDURE — 99999 PR PBB SHADOW E&M-EST. PATIENT-LVL IV: ICD-10-PCS | Mod: PBBFAC,,, | Performed by: ORTHOPAEDIC SURGERY

## 2022-07-08 PROCEDURE — 3044F HG A1C LEVEL LT 7.0%: CPT | Mod: CPTII,S$GLB,, | Performed by: ORTHOPAEDIC SURGERY

## 2022-07-08 PROCEDURE — 4010F ACE/ARB THERAPY RXD/TAKEN: CPT | Mod: CPTII,S$GLB,, | Performed by: ORTHOPAEDIC SURGERY

## 2022-07-08 PROCEDURE — 4010F PR ACE/ARB THEARPY RXD/TAKEN: ICD-10-PCS | Mod: CPTII,S$GLB,, | Performed by: ORTHOPAEDIC SURGERY

## 2022-07-08 PROCEDURE — 99999 PR PBB SHADOW E&M-EST. PATIENT-LVL IV: CPT | Mod: PBBFAC,,, | Performed by: ORTHOPAEDIC SURGERY

## 2022-07-08 PROCEDURE — 20610 LARGE JOINT ASPIRATION/INJECTION: L KNEE: ICD-10-PCS | Mod: LT,S$GLB,, | Performed by: ORTHOPAEDIC SURGERY

## 2022-07-08 PROCEDURE — 1160F RVW MEDS BY RX/DR IN RCRD: CPT | Mod: CPTII,S$GLB,, | Performed by: ORTHOPAEDIC SURGERY

## 2022-07-08 PROCEDURE — 1159F MED LIST DOCD IN RCRD: CPT | Mod: CPTII,S$GLB,, | Performed by: ORTHOPAEDIC SURGERY

## 2022-07-08 PROCEDURE — 20610 DRAIN/INJ JOINT/BURSA W/O US: CPT | Mod: LT,S$GLB,, | Performed by: ORTHOPAEDIC SURGERY

## 2022-07-08 PROCEDURE — 99214 PR OFFICE/OUTPT VISIT, EST, LEVL IV, 30-39 MIN: ICD-10-PCS | Mod: 25,S$GLB,, | Performed by: ORTHOPAEDIC SURGERY

## 2022-07-08 PROCEDURE — 3008F PR BODY MASS INDEX (BMI) DOCUMENTED: ICD-10-PCS | Mod: CPTII,S$GLB,, | Performed by: ORTHOPAEDIC SURGERY

## 2022-07-08 PROCEDURE — 3044F PR MOST RECENT HEMOGLOBIN A1C LEVEL <7.0%: ICD-10-PCS | Mod: CPTII,S$GLB,, | Performed by: ORTHOPAEDIC SURGERY

## 2022-07-08 PROCEDURE — 99214 OFFICE O/P EST MOD 30 MIN: CPT | Mod: 25,S$GLB,, | Performed by: ORTHOPAEDIC SURGERY

## 2022-07-08 PROCEDURE — 1160F PR REVIEW ALL MEDS BY PRESCRIBER/CLIN PHARMACIST DOCUMENTED: ICD-10-PCS | Mod: CPTII,S$GLB,, | Performed by: ORTHOPAEDIC SURGERY

## 2022-07-08 RX ORDER — TRIAMCINOLONE ACETONIDE 40 MG/ML
40 INJECTION, SUSPENSION INTRA-ARTICULAR; INTRAMUSCULAR
Status: DISCONTINUED | OUTPATIENT
Start: 2022-07-08 | End: 2022-07-08 | Stop reason: HOSPADM

## 2022-07-08 RX ADMIN — TRIAMCINOLONE ACETONIDE 40 MG: 40 INJECTION, SUSPENSION INTRA-ARTICULAR; INTRAMUSCULAR at 09:07

## 2022-07-08 NOTE — PROGRESS NOTES
A month out from her ankle/ leg injury and is feeling better.  She is able to walk with the boot also able to walk without the boot for short distances      61 y.o. year old presents to the clinic today with recurring pain in the left knee.  Patient has had Kenlaog injections in the past and responded well.  Last injection was 3 months ago. Patient is requesting injection today into left knee    Exam shows tenderness at the joint line without signs of infection or instability    X-rays show arthritic changes    Assessment:  left knee arthrosis    Plan:  Kenlaog into the left knee.  Encourage strengthening over time.  Followup as needed.        Imaging studies ordered and reviewed by me    Further History  Aching pain  Worse with activity  Relieved with rest  No other associated symptoms  No other radiation    Further Exam  Alert and oriented  Pleasant  Contralateral limb has appropriate range of motion for age and condition  Contralateral limb has appropriate strength for age and condition  Contralateral limb has appropriate stability  for age and condition  No adenopathy  Pulses are appropriate for current condition  Skin is intact        Chief Complaint    Chief Complaint   Patient presents with    Left Ankle - Pain       HPI  Fatemeh Shoemaker is a 61 y.o.  female who presents with       Past Medical History  Past Medical History:   Diagnosis Date    Anemia     Anticoagulant long-term use     Arrhythmia     Arthritis     Atrial fibrillation     history    Bronchitis     Encounter for blood transfusion     General anesthetics causing adverse effect in therapeutic use     High blood pressure     Hyperlipidemia     Lump or mass in breast     benign     Neuropathy     Obesity     Obstructive sleep apnea syndrome 11/5/2021    Ulcer        Past Surgical History  Past Surgical History:   Procedure Laterality Date    A-V CARDIAC PACEMAKER INSERTION Left 9/21/2020    Procedure: INSERTION, CARDIAC  PACEMAKER, DUAL CHAMBER;  Surgeon: Bert Reaves MD;  Location: [facility] CATH;  Service: Cardiology;  Laterality: Left;    BREAST BIOPSY Right 2017    myofibroblastoma     BREAST LUMPECTOMY Right 2017     SECTION  10/25/1986    Other c/section 10/26/1997    CHOLECYSTECTOMY  2017    Dr. TOLU Bowers, [facility]     csection      CS x 2    CYSTOSCOPY N/A 9/3/2019    Procedure: CYSTOSCOPY;  Surgeon: Noemi Pierre MD;  Location: [facility] OR;  Service: OB/GYN;  Laterality: N/A;    DILATION AND CURETTAGE OF UTERUS      EPIDURAL STEROID INJECTION INTO LUMBAR SPINE N/A 2020    Procedure: Injection-steroid-epidural-lumbar L5/S1;  Surgeon: Gurjit Tavarez MD;  Location: [facility] OR;  Service: Pain Management;  Laterality: N/A;    EPIDURAL STEROID INJECTION INTO LUMBAR SPINE N/A 2020    Procedure: Injection-steroid-epidural-lumbar L5/S1;  Surgeon: Gurjit Tavarez MD;  Location: [facility] OR;  Service: Pain Management;  Laterality: N/A;    EPIDURAL STEROID INJECTION INTO LUMBAR SPINE N/A 2021    Procedure: Injection-steroid-epidural-lumbar L5/S1;  Surgeon: Gurjit Tavarez MD;  Location: [facility] OR;  Service: Pain Management;  Laterality: N/A;    FRACTURE SURGERY      Dislocated  hip total rt hip     HIP PINNING      HYSTERECTOMY      JOINT REPLACEMENT  2008    LAPAROSCOPIC SALPINGO-OOPHORECTOMY Bilateral 9/3/2019    Procedure: SALPINGO-OOPHORECTOMY, LAPAROSCOPIC;  Surgeon: Noemi Pierre MD;  Location: [facility] OR;  Service: OB/GYN;  Laterality: Bilateral;  Dr. Bentley to assist. No resident needed.     LAPAROSCOPIC TOTAL HYSTERECTOMY N/A 9/3/2019    Procedure: HYSTERECTOMY, TOTAL, LAPAROSCOPIC;  Surgeon: Noemi Pierre MD;  Location: [facility];  Service: OB/GYN;  Laterality: N/A;    NASAL SEPTUM SURGERY      OOPHORECTOMY      TOTAL HIP ARTHROPLASTY Right     TUBAL LIGATION         Medications  Current Outpatient Medications   Medication Sig    albuterol sulfate 90  mcg/actuation aebs Inhale 180 mcg into the lungs.    apixaban (ELIQUIS) 5 mg Tab Take 1 tablet (5 mg total) by mouth 2 (two) times daily.    celecoxib (CELEBREX) 200 MG capsule Take 1 capsule (200 mg total) by mouth daily as needed for Pain.    cholecalciferol, vitamin D3, (DECARA) 1,250 mcg (50,000 unit) capsule Take 1 capsule (50,000 Units total) by mouth every 7 days.    cyanocobalamin 500 MCG tablet Take 1,000 mcg by mouth once daily.    diclofenac sodium (VOLTAREN) 1 % Gel Apply 2 g topically 4 (four) times daily.    DULoxetine (CYMBALTA) 60 MG capsule Take 1 capsule (60 mg total) by mouth once daily.    flecainide (TAMBOCOR) 150 MG Tab Take 1 tablet (150 mg total) by mouth every 12 (twelve) hours.    fluticasone furoate-vilanteroL (BREO ELLIPTA) 100-25 mcg/dose diskus inhaler Inhale 1 puff into the lungs.    gabapentin (NEURONTIN) 600 MG tablet Take 1 tablet (600 mg total) by mouth 2 (two) times daily.    HYDROcodone-acetaminophen (NORCO) 5-325 mg per tablet Take 1 tablet by mouth every 6 (six) hours as needed for Pain.    HYDROcodone-acetaminophen (NORCO) 5-325 mg per tablet Take 1 tablet by mouth every 6 (six) hours as needed for Pain.    lisinopriL 10 MG tablet Take 1 tablet (10 mg total) by mouth once daily.    melatonin 5 mg TbDL Take 1 tablet by mouth nightly as needed (sleep).    metoprolol tartrate (LOPRESSOR) 50 MG tablet Take 1 tablet (50 mg total) by mouth every morning AND 2 tablets (100 mg total) every evening.    nortriptyline (PAMELOR) 25 MG capsule TAKE ONE CAPSULE BY MOUTH EVERY EVENING    omeprazole (PRILOSEC) 20 MG capsule Take 1 capsule (20 mg total) by mouth every morning.    promethazine (PHENERGAN) 12.5 MG Tab Take 1 tablet (12.5 mg total) by mouth 2 (two) times daily as needed (nausea).    pulse oximeter (PULSE OXIMETER) device Use twice daily at 8 AM and 3 PM and [facility] as directed.    rosuvastatin (CRESTOR) 10 MG tablet Take 1 tablet (10 mg  "total) by mouth every evening.    semaglutide (OZEMPIC) 0.25 mg or 0.5 mg(2 mg/1.5 mL) pen injector Inject into the skin.    valACYclovir (VALTREX) 500 MG tablet Take 1 tablet (500 mg total) by mouth daily as needed (fever blister).     No current facility-administered medications for this visit.       Allergies  Review of patient's allergies indicates:   Allergen Reactions    Aspirin Other (See Comments)     "I don't take it because I've had ulcers"   ulcers  "I don't take it because I've had ulcers"     Meperidine Other (See Comments)     Felt like she was about to pass put after taking       Family History  Family History   Problem Relation Age of Onset    Colon cancer Maternal Grandmother 70        mets to ovary    Cancer Maternal Grandmother     Arthritis Paternal Grandfather     Eclampsia Paternal Grandmother     Cataracts Maternal Grandfather     Stroke Father 57    Colon cancer Father     Hypertension Father     Alcohol abuse Father     Cancer Father         Passed away July 10 2019    Hypertension Mother     Cataracts Mother     Hypertension Brother         Age 54 hypertension heart    Early death Brother         Hypertension cardio disease    No Known Problems Daughter     Stomach cancer Neg Hx     Esophageal cancer Neg Hx     Breast cancer Neg Hx     Miscarriages / Stillbirths Neg Hx     Ovarian cancer Neg Hx     Glaucoma Neg Hx     Macular degeneration Neg Hx     Retinal detachment Neg Hx     Strabismus Neg Hx        Social History  Social History     Socioeconomic History    Marital status: Significant Other   Tobacco Use    Smoking status: Never Smoker    Smokeless tobacco: Never Used   Substance and Sexual Activity    Alcohol use: No     Comment: never    Drug use: Never    Sexual activity: Yes     Partners: Male     Birth control/protection: Other-see comments     Comment: Hysterectomy 9/3/19     Social Determinants of Health     Financial Resource Strain: Low Risk  "    Difficulty of Paying Living Expenses: Not hard at all   Food Insecurity: No Food Insecurity    Worried About Running Out of Food in the Last Year: Never true    Ran Out of Food in the Last Year: Never true   Transportation Needs: No Transportation Needs    Lack of Transportation (Medical): No    Lack of Transportation (Non-Medical): No   Physical Activity: Inactive    Days of Exercise per Week: 0 days    Minutes of Exercise per Session: 0 min   Stress: No Stress Concern Present    Feeling of Stress : Not at all   Social Connections: Unknown    Frequency of Communication with Friends and Family: More than three times a week    Frequency of Social Gatherings with Friends and Family: Never    Active Member of Clubs or Organizations: No    Attends Club or Organization Meetings: Never    Marital Status:    Housing Stability: Low Risk     Unable to Pay for Housing in the Last Year: No    Number of Places Lived in the Last Year: 1    Unstable Housing in the Last Year: No               Review of Systems     Constitutional: Negative    HENT: Negative  Eyes: Negative  Respiratory: Negative  Cardiovascular: Negative  Musculoskeletal: HPI  Skin: Negative  Neurological: Negative  Hematological: Negative  Endocrine: Negative                 Physical Exam    There were no vitals filed for this visit.  Body mass index is 48.06 kg/m².  Physical Examination:     General appearance -  well appearing, and in no distress  Mental status - awake  Neck - supple  Chest -  symmetric air entry  Heart - normal rate   Abdomen - soft      Assessment     1. Right ankle pain, unspecified chronicity    2. Muscle strain of left lower leg    3. Osteoarthritis of left knee, unspecified osteoarthritis type          Plan

## 2022-07-15 ENCOUNTER — PATIENT OUTREACH (OUTPATIENT)
Dept: ADMINISTRATIVE | Facility: HOSPITAL | Age: 62
End: 2022-07-15
Payer: COMMERCIAL

## 2022-07-15 ENCOUNTER — PATIENT MESSAGE (OUTPATIENT)
Dept: ADMINISTRATIVE | Facility: HOSPITAL | Age: 62
End: 2022-07-15
Payer: COMMERCIAL

## 2022-07-15 NOTE — PROGRESS NOTES
Non-compliant report chart audits for COLON CANCER SCREENING.  Care Everywhere and media, updates requested and reviewed.       Outreach to patient in reference to colon cancer screening.  RE:  Patient needs colon cancer screening     Message sent to patient's portal.

## 2022-09-06 DIAGNOSIS — I10 ESSENTIAL HYPERTENSION: ICD-10-CM

## 2022-09-07 RX ORDER — LISINOPRIL 10 MG/1
10 TABLET ORAL DAILY
Qty: 90 TABLET | Refills: 1 | Status: SHIPPED | OUTPATIENT
Start: 2022-09-07 | End: 2023-04-06 | Stop reason: SDUPTHER

## 2022-09-08 ENCOUNTER — TELEPHONE (OUTPATIENT)
Dept: CARDIOLOGY | Facility: HOSPITAL | Age: 62
End: 2022-09-08
Payer: COMMERCIAL

## 2022-09-08 DIAGNOSIS — Z95.0 PACEMAKER: Primary | ICD-10-CM

## 2022-09-08 DIAGNOSIS — I48.0 PAROXYSMAL ATRIAL FIBRILLATION: ICD-10-CM

## 2022-09-08 DIAGNOSIS — Z95.0 PRESENCE OF CARDIAC PACEMAKER: Primary | ICD-10-CM

## 2022-09-09 NOTE — PROGRESS NOTES
Subjective:    Patient ID:  Fatemeh Shoemaker is a 61 y.o. female who presents for follow-up of PAF (6 month f/u )      HPI 62 yo female with paroxysmal AF, bradycardia, Htn, PPM, morbid obesity, esophageal ulcer, sleep apnea.     Background:  Has been managed in the past by Dr. Bojorquez. Seeing Dr. Reaves for primary cardiology. Switching EP providers because of location.     Has had paroxysmal AF, symptomatic, initially diagnosed in 2016. Has been on metoprolol and Flecainide 100 mg BID.  Has been referred for consideration of bariatric surgery.     Presented to [facility] with symptomatic bradycardia.   Echo 9/20/20 normal biventricular structure and function, CHRISTIAN 37.3  Dual chamber PPM implanted 9/21/20 (Dr. Reaves).  Returned to the hospital day of discharge with symptomatic AF.      Has been diagnosed with sleep apnea. She has elected to defer treatment and focus on weight reduction.  Flecainide has been increased to 150 mg BID.     Update:  Notes fatigue still. Has lost some weight.  Having challenges with back pain, believes it is impacting her blood pressure.  Device interrogation reveals stable function of the leads, RA pacing 92% RV pacing 36% AF burden 0%, one episode 5/17/22 lasting 4 hours (more c/w atrial flutter).    Review of Systems   Constitutional: Positive for malaise/fatigue. Negative for fever.   HENT:  Negative for congestion and sore throat.    Cardiovascular:  Negative for chest pain, dyspnea on exertion, irregular heartbeat, leg swelling, near-syncope, orthopnea, palpitations, paroxysmal nocturnal dyspnea and syncope.   Respiratory:  Negative for cough and shortness of breath.    Gastrointestinal:  Negative for abdominal pain, constipation and diarrhea.   Neurological:  Negative for dizziness, light-headedness and weakness.   Psychiatric/Behavioral:  Negative for depression. The patient is not nervous/anxious.       Objective:    Physical Exam  Constitutional:       Appearance: She is  well-developed.   Eyes:      General: No scleral icterus.     Conjunctiva/sclera: Conjunctivae normal.   Neck:      Vascular: No JVD.      Trachea: No tracheal deviation.   Cardiovascular:      Rate and Rhythm: Normal rate and regular rhythm.      Chest Wall: PMI is not displaced.   Pulmonary:      Effort: Pulmonary effort is normal. No respiratory distress.      Breath sounds: Normal breath sounds.   Abdominal:      Palpations: Abdomen is soft.      Tenderness: There is no abdominal tenderness.   Musculoskeletal:         General: No tenderness.   Skin:     General: Skin is warm and dry.      Findings: No rash.   Neurological:      Mental Status: She is alert and oriented to person, place, and time.   Psychiatric:         Behavior: Behavior normal.         Assessment:       1. Paroxysmal atrial fibrillation    2. Symptomatic bradycardia    3. Pacemaker    4. Essential hypertension    5. Obstructive sleep apnea syndrome         Plan:       Doing well overall, with low AF burden.  Lost some weight. Recommended resumption of consideration of weight reduction surgery.  Increase lopressor to 100 mg BID (currently on 50 mg in AM, 100 mg in PM).  F/u in 6 months.

## 2022-09-12 ENCOUNTER — CLINICAL SUPPORT (OUTPATIENT)
Dept: CARDIOLOGY | Facility: HOSPITAL | Age: 62
End: 2022-09-12
Attending: INTERNAL MEDICINE
Payer: COMMERCIAL

## 2022-09-12 ENCOUNTER — OFFICE VISIT (OUTPATIENT)
Dept: CARDIOLOGY | Facility: CLINIC | Age: 62
End: 2022-09-12
Payer: COMMERCIAL

## 2022-09-12 VITALS
WEIGHT: 281.31 LBS | HEART RATE: 72 BPM | SYSTOLIC BLOOD PRESSURE: 163 MMHG | HEIGHT: 65 IN | DIASTOLIC BLOOD PRESSURE: 97 MMHG | BODY MASS INDEX: 46.87 KG/M2

## 2022-09-12 DIAGNOSIS — I48.0 PAROXYSMAL ATRIAL FIBRILLATION: ICD-10-CM

## 2022-09-12 DIAGNOSIS — R00.1 SYMPTOMATIC BRADYCARDIA: ICD-10-CM

## 2022-09-12 DIAGNOSIS — I48.0 PAROXYSMAL ATRIAL FIBRILLATION: Primary | ICD-10-CM

## 2022-09-12 DIAGNOSIS — G47.33 OBSTRUCTIVE SLEEP APNEA SYNDROME: ICD-10-CM

## 2022-09-12 DIAGNOSIS — I10 ESSENTIAL HYPERTENSION: ICD-10-CM

## 2022-09-12 DIAGNOSIS — Z95.0 PRESENCE OF CARDIAC PACEMAKER: ICD-10-CM

## 2022-09-12 DIAGNOSIS — Z95.0 PACEMAKER: ICD-10-CM

## 2022-09-12 PROCEDURE — 99999 PR PBB SHADOW E&M-EST. PATIENT-LVL II: CPT | Mod: PBBFAC,,, | Performed by: INTERNAL MEDICINE

## 2022-09-12 PROCEDURE — 3008F BODY MASS INDEX DOCD: CPT | Mod: CPTII,S$GLB,, | Performed by: INTERNAL MEDICINE

## 2022-09-12 PROCEDURE — 3080F PR MOST RECENT DIASTOLIC BLOOD PRESSURE >= 90 MM HG: ICD-10-PCS | Mod: CPTII,S$GLB,, | Performed by: INTERNAL MEDICINE

## 2022-09-12 PROCEDURE — 3077F PR MOST RECENT SYSTOLIC BLOOD PRESSURE >= 140 MM HG: ICD-10-PCS | Mod: CPTII,S$GLB,, | Performed by: INTERNAL MEDICINE

## 2022-09-12 PROCEDURE — 4010F ACE/ARB THERAPY RXD/TAKEN: CPT | Mod: CPTII,S$GLB,, | Performed by: INTERNAL MEDICINE

## 2022-09-12 PROCEDURE — 3008F PR BODY MASS INDEX (BMI) DOCUMENTED: ICD-10-PCS | Mod: CPTII,S$GLB,, | Performed by: INTERNAL MEDICINE

## 2022-09-12 PROCEDURE — 93280 CARDIAC DEVICE CHECK - IN CLINIC & HOSPITAL: ICD-10-PCS | Mod: 26,,, | Performed by: INTERNAL MEDICINE

## 2022-09-12 PROCEDURE — 1159F PR MEDICATION LIST DOCUMENTED IN MEDICAL RECORD: ICD-10-PCS | Mod: CPTII,S$GLB,, | Performed by: INTERNAL MEDICINE

## 2022-09-12 PROCEDURE — 3044F PR MOST RECENT HEMOGLOBIN A1C LEVEL <7.0%: ICD-10-PCS | Mod: CPTII,S$GLB,, | Performed by: INTERNAL MEDICINE

## 2022-09-12 PROCEDURE — 99215 PR OFFICE/OUTPT VISIT, EST, LEVL V, 40-54 MIN: ICD-10-PCS | Mod: S$GLB,,, | Performed by: INTERNAL MEDICINE

## 2022-09-12 PROCEDURE — 99999 PR PBB SHADOW E&M-EST. PATIENT-LVL II: ICD-10-PCS | Mod: PBBFAC,,, | Performed by: INTERNAL MEDICINE

## 2022-09-12 PROCEDURE — 3080F DIAST BP >= 90 MM HG: CPT | Mod: CPTII,S$GLB,, | Performed by: INTERNAL MEDICINE

## 2022-09-12 PROCEDURE — 93280 PM DEVICE PROGR EVAL DUAL: CPT | Mod: 26,,, | Performed by: INTERNAL MEDICINE

## 2022-09-12 PROCEDURE — 99215 OFFICE O/P EST HI 40 MIN: CPT | Mod: S$GLB,,, | Performed by: INTERNAL MEDICINE

## 2022-09-12 PROCEDURE — 3077F SYST BP >= 140 MM HG: CPT | Mod: CPTII,S$GLB,, | Performed by: INTERNAL MEDICINE

## 2022-09-12 PROCEDURE — 3044F HG A1C LEVEL LT 7.0%: CPT | Mod: CPTII,S$GLB,, | Performed by: INTERNAL MEDICINE

## 2022-09-12 PROCEDURE — 1159F MED LIST DOCD IN RCRD: CPT | Mod: CPTII,S$GLB,, | Performed by: INTERNAL MEDICINE

## 2022-09-12 PROCEDURE — 4010F PR ACE/ARB THEARPY RXD/TAKEN: ICD-10-PCS | Mod: CPTII,S$GLB,, | Performed by: INTERNAL MEDICINE

## 2022-09-12 RX ORDER — METOPROLOL TARTRATE 100 MG/1
100 TABLET ORAL 2 TIMES DAILY
Qty: 180 TABLET | Refills: 3 | Status: SHIPPED | OUTPATIENT
Start: 2022-09-12 | End: 2023-08-23 | Stop reason: SDUPTHER

## 2022-09-18 ENCOUNTER — CLINICAL SUPPORT (OUTPATIENT)
Dept: CARDIOLOGY | Facility: HOSPITAL | Age: 62
End: 2022-09-18
Payer: COMMERCIAL

## 2022-09-18 DIAGNOSIS — Z95.0 PRESENCE OF CARDIAC PACEMAKER: ICD-10-CM

## 2022-09-18 PROCEDURE — 93296 REM INTERROG EVL PM/IDS: CPT | Mod: PO | Performed by: INTERNAL MEDICINE

## 2022-09-28 ENCOUNTER — OFFICE VISIT (OUTPATIENT)
Dept: PODIATRY | Facility: CLINIC | Age: 62
End: 2022-09-28
Payer: COMMERCIAL

## 2022-09-28 DIAGNOSIS — M20.32 HALLUX MALLEUS OF LEFT FOOT: Primary | ICD-10-CM

## 2022-09-28 DIAGNOSIS — M20.41 HAMMER TOES OF BOTH FEET: ICD-10-CM

## 2022-09-28 DIAGNOSIS — M20.42 HAMMER TOES OF BOTH FEET: ICD-10-CM

## 2022-09-28 PROCEDURE — 1159F PR MEDICATION LIST DOCUMENTED IN MEDICAL RECORD: ICD-10-PCS | Mod: CPTII,S$GLB,, | Performed by: PODIATRIST

## 2022-09-28 PROCEDURE — 4010F PR ACE/ARB THEARPY RXD/TAKEN: ICD-10-PCS | Mod: CPTII,S$GLB,, | Performed by: PODIATRIST

## 2022-09-28 PROCEDURE — 4010F ACE/ARB THERAPY RXD/TAKEN: CPT | Mod: CPTII,S$GLB,, | Performed by: PODIATRIST

## 2022-09-28 PROCEDURE — 1160F PR REVIEW ALL MEDS BY PRESCRIBER/CLIN PHARMACIST DOCUMENTED: ICD-10-PCS | Mod: CPTII,S$GLB,, | Performed by: PODIATRIST

## 2022-09-28 PROCEDURE — 3044F PR MOST RECENT HEMOGLOBIN A1C LEVEL <7.0%: ICD-10-PCS | Mod: CPTII,S$GLB,, | Performed by: PODIATRIST

## 2022-09-28 PROCEDURE — 99999 PR PBB SHADOW E&M-EST. PATIENT-LVL III: CPT | Mod: PBBFAC,,, | Performed by: PODIATRIST

## 2022-09-28 PROCEDURE — 99999 PR PBB SHADOW E&M-EST. PATIENT-LVL III: ICD-10-PCS | Mod: PBBFAC,,, | Performed by: PODIATRIST

## 2022-09-28 PROCEDURE — 1159F MED LIST DOCD IN RCRD: CPT | Mod: CPTII,S$GLB,, | Performed by: PODIATRIST

## 2022-09-28 PROCEDURE — 99213 OFFICE O/P EST LOW 20 MIN: CPT | Mod: S$GLB,,, | Performed by: PODIATRIST

## 2022-09-28 PROCEDURE — 99213 PR OFFICE/OUTPT VISIT, EST, LEVL III, 20-29 MIN: ICD-10-PCS | Mod: S$GLB,,, | Performed by: PODIATRIST

## 2022-09-28 PROCEDURE — 1160F RVW MEDS BY RX/DR IN RCRD: CPT | Mod: CPTII,S$GLB,, | Performed by: PODIATRIST

## 2022-09-28 PROCEDURE — 3044F HG A1C LEVEL LT 7.0%: CPT | Mod: CPTII,S$GLB,, | Performed by: PODIATRIST

## 2022-10-05 ENCOUNTER — PATIENT MESSAGE (OUTPATIENT)
Dept: PAIN MEDICINE | Facility: CLINIC | Age: 62
End: 2022-10-05
Payer: COMMERCIAL

## 2022-10-05 ENCOUNTER — PATIENT MESSAGE (OUTPATIENT)
Dept: PODIATRY | Facility: CLINIC | Age: 62
End: 2022-10-05
Payer: COMMERCIAL

## 2022-10-05 ENCOUNTER — PATIENT MESSAGE (OUTPATIENT)
Dept: ORTHOPEDICS | Facility: CLINIC | Age: 62
End: 2022-10-05
Payer: COMMERCIAL

## 2022-10-07 ENCOUNTER — OFFICE VISIT (OUTPATIENT)
Dept: PAIN MEDICINE | Facility: CLINIC | Age: 62
End: 2022-10-07
Payer: COMMERCIAL

## 2022-10-07 ENCOUNTER — HOSPITAL ENCOUNTER (OUTPATIENT)
Dept: RADIOLOGY | Facility: HOSPITAL | Age: 62
Discharge: HOME | End: 2022-10-07
Attending: PODIATRIST
Payer: COMMERCIAL

## 2022-10-07 VITALS
WEIGHT: 275.88 LBS | SYSTOLIC BLOOD PRESSURE: 144 MMHG | HEIGHT: 65 IN | BODY MASS INDEX: 45.97 KG/M2 | OXYGEN SATURATION: 100 % | DIASTOLIC BLOOD PRESSURE: 70 MMHG | HEART RATE: 61 BPM

## 2022-10-07 DIAGNOSIS — M54.16 LUMBAR RADICULOPATHY: Primary | ICD-10-CM

## 2022-10-07 DIAGNOSIS — G62.9 PERIPHERAL POLYNEUROPATHY: ICD-10-CM

## 2022-10-07 DIAGNOSIS — M20.32 HALLUX MALLEUS OF LEFT FOOT: ICD-10-CM

## 2022-10-07 DIAGNOSIS — M20.42 HAMMER TOES OF BOTH FEET: ICD-10-CM

## 2022-10-07 DIAGNOSIS — M20.41 HAMMER TOES OF BOTH FEET: ICD-10-CM

## 2022-10-07 DIAGNOSIS — M25.562 LEFT KNEE PAIN, UNSPECIFIED CHRONICITY: ICD-10-CM

## 2022-10-07 PROCEDURE — 4010F PR ACE/ARB THEARPY RXD/TAKEN: ICD-10-PCS | Mod: CPTII,S$GLB,,

## 2022-10-07 PROCEDURE — 1159F MED LIST DOCD IN RCRD: CPT | Mod: CPTII,S$GLB,,

## 2022-10-07 PROCEDURE — 3078F DIAST BP <80 MM HG: CPT | Mod: CPTII,S$GLB,,

## 2022-10-07 PROCEDURE — 3008F PR BODY MASS INDEX (BMI) DOCUMENTED: ICD-10-PCS | Mod: CPTII,S$GLB,,

## 2022-10-07 PROCEDURE — 3078F PR MOST RECENT DIASTOLIC BLOOD PRESSURE < 80 MM HG: ICD-10-PCS | Mod: CPTII,S$GLB,,

## 2022-10-07 PROCEDURE — 73630 X-RAY EXAM OF FOOT: CPT | Mod: 26,LT,, | Performed by: RADIOLOGY

## 2022-10-07 PROCEDURE — 4010F ACE/ARB THERAPY RXD/TAKEN: CPT | Mod: CPTII,S$GLB,,

## 2022-10-07 PROCEDURE — 73630 XR FOOT COMPLETE 3 VIEW LEFT: ICD-10-PCS | Mod: 26,LT,, | Performed by: RADIOLOGY

## 2022-10-07 PROCEDURE — 99214 PR OFFICE/OUTPT VISIT, EST, LEVL IV, 30-39 MIN: ICD-10-PCS | Mod: S$GLB,,,

## 2022-10-07 PROCEDURE — 1159F PR MEDICATION LIST DOCUMENTED IN MEDICAL RECORD: ICD-10-PCS | Mod: CPTII,S$GLB,,

## 2022-10-07 PROCEDURE — 3044F HG A1C LEVEL LT 7.0%: CPT | Mod: CPTII,S$GLB,,

## 2022-10-07 PROCEDURE — 3077F PR MOST RECENT SYSTOLIC BLOOD PRESSURE >= 140 MM HG: ICD-10-PCS | Mod: CPTII,S$GLB,,

## 2022-10-07 PROCEDURE — 3008F BODY MASS INDEX DOCD: CPT | Mod: CPTII,S$GLB,,

## 2022-10-07 PROCEDURE — 3044F PR MOST RECENT HEMOGLOBIN A1C LEVEL <7.0%: ICD-10-PCS | Mod: CPTII,S$GLB,,

## 2022-10-07 PROCEDURE — 99999 PR PBB SHADOW E&M-EST. PATIENT-LVL V: ICD-10-PCS | Mod: PBBFAC,,,

## 2022-10-07 PROCEDURE — 99214 OFFICE O/P EST MOD 30 MIN: CPT | Mod: S$GLB,,,

## 2022-10-07 PROCEDURE — 3077F SYST BP >= 140 MM HG: CPT | Mod: CPTII,S$GLB,,

## 2022-10-07 PROCEDURE — 99999 PR PBB SHADOW E&M-EST. PATIENT-LVL V: CPT | Mod: PBBFAC,,,

## 2022-10-07 PROCEDURE — 73630 X-RAY EXAM OF FOOT: CPT | Mod: TC,PO,LT

## 2022-10-07 RX ORDER — ALPRAZOLAM 0.5 MG/1
1 TABLET, ORALLY DISINTEGRATING ORAL ONCE AS NEEDED
Status: CANCELLED | OUTPATIENT
Start: 2022-10-19 | End: 2034-03-16

## 2022-10-07 NOTE — PROGRESS NOTES
[facility] Follow Up Evaluation    Referred by: Caroline Nielson PA-C  Reason for referral: back pain    CC:   Chief Complaint   Patient presents with    Back Pain      Last 3 PDI Scores 6/5/2020 2/17/2020   Pain Disability Index (PDI) 0 10     Interval HPI 10/7/2022: Fatemeh Shoemaker returns to the clinic for follow up.  Today she is reporting return of her lower back pain.  She states his pain is constant, 6-8/10, worse with walking and activities and slightly alleviated with rest.  She states the pain starts in her lower back it radiates into bilateral buttocks down back legs stopping at knees.  She denies any associated aly weakness but does feel like her legs can be week if she walks for extended periods of time.  She reports continued chronic numbness in bilateral feet starting in her shins and down.  She has returned to using a Rollator due to the severity of her pain.  She continues to take gabapentin 600 mg b.i.d., Zanaflex, Celebrex and Cymbalta with some relief.  She denies any bowel or bladder incontinence.  She has followed up with podiatry and had nail/skin removed on her left big toe.  She denies any current infection with it, fever, chills.      Pain Intervention History:     - s/p L5/S1 ILESI on 5/21/20 with 55% relief.   - s/p L5/S1 ILESI on 6/19/20 with continued 55% relief.  - s/p L5/S1 ILESI on 12/01/21 with 75% relief     HPI:   Fatemeh Shoemaker is a 61 y.o. female who complains of back pain    Onset: more than a few years  Progression: since onset, pain is gradually worsening  Current Pain Score: 8/10  Timing: constant  Quality: aching  Radiation: yes, down the back of both legs  Associated numbness or weakness: yes numbness b/l feet up to mid calf. Weakness with walking long distances and standing  Exacerbated by: standing, walking  Allievated by: rest, leaning forward  Is Pain Level Acceptable?: No    Previous Therapies:  PT/OT: no  HEP:   Interventions:    Surgery:  Medications:   - NSAIDS:  Celebrex  - MSK Relaxants: tizanidine  - TCAs: nortriptyline  - SNRIs: cymbalta  - Topicals:   - Anticonvulsants: gabapentin  - Opioids:     History:    Current Outpatient Medications:     albuterol sulfate 90 mcg/actuation aebs, Inhale 180 mcg into the lungs., Disp: , Rfl:     apixaban (ELIQUIS) 5 mg Tab, Take 1 tablet (5 mg total) by mouth 2 (two) times daily., Disp: 180 tablet, Rfl: 3    celecoxib (CELEBREX) 200 MG capsule, Take 1 capsule (200 mg total) by mouth daily as needed for Pain., Disp: 30 capsule, Rfl: 1    cholecalciferol, vitamin D3, (DECARA) 1,250 mcg (50,000 unit) capsule, Take 1 capsule (50,000 Units total) by mouth every 7 days., Disp: 30 capsule, Rfl: 0    cyanocobalamin 500 MCG tablet, Take 1,000 mcg by mouth once daily., Disp: , Rfl:     diclofenac sodium (VOLTAREN) 1 % Gel, Apply 2 g topically 4 (four) times daily., Disp: 50 g, Rfl: 3    DULoxetine (CYMBALTA) 60 MG capsule, Take 1 capsule (60 mg total) by mouth once daily., Disp: 90 capsule, Rfl: 3    flecainide (TAMBOCOR) 150 MG Tab, Take 1 tablet (150 mg total) by mouth every 12 (twelve) hours., Disp: 60 tablet, Rfl: 11    fluticasone furoate-vilanteroL (BREO ELLIPTA) 100-25 mcg/dose diskus inhaler, Inhale 1 puff into the lungs., Disp: , Rfl:     HYDROcodone-acetaminophen (NORCO) 5-325 mg per tablet, Take 1 tablet by mouth every 6 (six) hours as needed for Pain., Disp: , Rfl:     HYDROcodone-acetaminophen (NORCO) 5-325 mg per tablet, Take 1 tablet by mouth every 6 (six) hours as needed for Pain., Disp: 20 tablet, Rfl: 0    lisinopriL 10 MG tablet, Take 1 tablet (10 mg total) by mouth once daily., Disp: 90 tablet, Rfl: 1    melatonin 5 mg TbDL, Take 1 tablet by mouth nightly as needed (sleep)., Disp: , Rfl:     metoprolol tartrate (LOPRESSOR) 100 MG tablet, Take 1 tablet (100 mg total) by mouth 2 (two) times daily., Disp: 180 tablet, Rfl: 3    nortriptyline (PAMELOR) 25 MG capsule, TAKE ONE CAPSULE BY  MOUTH EVERY EVENING, Disp: 90 capsule, Rfl: 3    omeprazole (PRILOSEC) 20 MG capsule, Take 1 capsule (20 mg total) by mouth every morning., Disp: 30 capsule, Rfl: 11    promethazine (PHENERGAN) 12.5 MG Tab, Take 1 tablet (12.5 mg total) by mouth 2 (two) times daily as needed (nausea)., Disp: 10 tablet, Rfl: 0    pulse oximeter (PULSE OXIMETER) device, Use twice daily at 8 AM and 3 PM and record the value in MyChart as directed., Disp: 1 each, Rfl: 0    rosuvastatin (CRESTOR) 10 MG tablet, TAKE ONE TABLET BY MOUTH EVERY EVENING, Disp: 90 tablet, Rfl: 3    semaglutide 2 mg/dose (8 mg/3 mL) PnIj, Inject 0.5 mg into the skin., Disp: , Rfl:     valACYclovir (VALTREX) 500 MG tablet, Take 1 tablet (500 mg total) by mouth daily as needed (fever blister)., Disp: 30 tablet, Rfl: 2    gabapentin (NEURONTIN) 600 MG tablet, Take 1 tablet (600 mg total) by mouth 2 (two) times daily., Disp: 60 tablet, Rfl: 06    Past Medical History:   Diagnosis Date    Anemia     Anticoagulant long-term use     Arrhythmia     Arthritis     Atrial fibrillation     history    Bronchitis     Encounter for blood transfusion     General anesthetics causing adverse effect in therapeutic use     High blood pressure     Hyperlipidemia     Lump or mass in breast     benign     Neuropathy     Obesity     Obstructive sleep apnea syndrome 2021    Ulcer        Past Surgical History:   Procedure Laterality Date    A-V CARDIAC PACEMAKER INSERTION Left 2020    Procedure: INSERTION, CARDIAC PACEMAKER, DUAL CHAMBER;  Surgeon: Bert Reaves MD;  Location: [facility] CATH;  Service: Cardiology;  Laterality: Left;    BREAST BIOPSY Right 2017    myofibroblastoma     BREAST LUMPECTOMY Right 2017     SECTION  10/25/1986    Other c/section 10/26/1997    CHOLECYSTECTOMY  2017    Dr. TOLU Bowers, [facility]     csection      CS x 2    CYSTOSCOPY N/A 9/3/2019    Procedure: CYSTOSCOPY;  Surgeon: Noemi Pierre MD;  Location: [facility] OR;  Service:  OB/GYN;  Laterality: N/A;    DILATION AND CURETTAGE OF UTERUS      EPIDURAL STEROID INJECTION INTO LUMBAR SPINE N/A 5/21/2020    Procedure: Injection-steroid-epidural-lumbar L5/S1;  Surgeon: Gurjit Tavarez MD;  Location: [facility] OR;  Service: Pain Management;  Laterality: N/A;    EPIDURAL STEROID INJECTION INTO LUMBAR SPINE N/A 6/19/2020    Procedure: Injection-steroid-epidural-lumbar L5/S1;  Surgeon: Gurjit Tavarez MD;  Location: [facility] OR;  Service: Pain Management;  Laterality: N/A;    EPIDURAL STEROID INJECTION INTO LUMBAR SPINE N/A 12/1/2021    Procedure: Injection-steroid-epidural-lumbar L5/S1;  Surgeon: Gurjit Tavarez MD;  Location: [facility] OR;  Service: Pain Management;  Laterality: N/A;    FRACTURE SURGERY  04/86    Dislocated  hip total rt hip 08/08    HIP PINNING      HYSTERECTOMY      JOINT REPLACEMENT  08/2008    LAPAROSCOPIC SALPINGO-OOPHORECTOMY Bilateral 9/3/2019    Procedure: SALPINGO-OOPHORECTOMY, LAPAROSCOPIC;  Surgeon: Noemi Pierre MD;  Location: [facility];  Service: OB/GYN;  Laterality: Bilateral;  Dr. Bentley to assist. No resident needed.     LAPAROSCOPIC TOTAL HYSTERECTOMY N/A 9/3/2019    Procedure: HYSTERECTOMY, TOTAL, LAPAROSCOPIC;  Surgeon: Noemi Pierre MD;  Location: [facility];  Service: OB/GYN;  Laterality: N/A;    NASAL SEPTUM SURGERY      OOPHORECTOMY      TOTAL HIP ARTHROPLASTY Right     TUBAL LIGATION  1997       Family History   Problem Relation Age of Onset    Colon cancer Maternal Grandmother 70        mets to ovary    Cancer Maternal Grandmother     Arthritis Paternal Grandfather     Eclampsia Paternal Grandmother     Cataracts Maternal Grandfather     Stroke Father 57    Colon cancer Father     Hypertension Father     Alcohol abuse Father     Cancer Father         Passed away July 10 2019    Hypertension Mother     Cataracts Mother     Hypertension Brother         Age 54 hypertension heart    Early death Brother         Hypertension cardio disease    No Known Problems Daughter   "   Stomach cancer Neg Hx     Esophageal cancer Neg Hx     Breast cancer Neg Hx     Miscarriages / Stillbirths Neg Hx     Ovarian cancer Neg Hx     Glaucoma Neg Hx     Macular degeneration Neg Hx     Retinal detachment Neg Hx     Strabismus Neg Hx        Social History     Socioeconomic History    Marital status: Significant Other   Tobacco Use    Smoking status: Never    Smokeless tobacco: Never   Substance and Sexual Activity    Alcohol use: No     Comment: never    Drug use: Never    Sexual activity: Yes     Partners: Male     Birth control/protection: Other-see comments     Comment: Hysterectomy 9/3/19     Social Determinants of Health     Financial Resource Strain: Low Risk     Difficulty of Paying Living Expenses: Not hard at all   Food Insecurity: No Food Insecurity    Worried About Running Out of Food in the Last Year: Never true    Ran Out of Food in the Last Year: Never true   Transportation Needs: No Transportation Needs    Lack of Transportation (Medical): No    Lack of Transportation (Non-Medical): No   Physical Activity: Inactive    Days of Exercise per Week: 0 days    Minutes of Exercise per Session: 10 min   Stress: No Stress Concern Present    Feeling of Stress : Not at all   Social Connections: Unknown    Frequency of Communication with Friends and Family: More than three times a week    Frequency of Social Gatherings with Friends and Family: More than three times a week    Active Member of Clubs or Organizations: No    Attends Club or Organization Meetings: Never    Marital Status: Patient refused   Housing Stability: Low Risk     Unable to Pay for Housing in the Last Year: No    Number of Places Lived in the Last Year: 1    Unstable Housing in the Last Year: No       Review of patient's allergies indicates:   Allergen Reactions    Aspirin Other (See Comments)     "I don't take it because I've had ulcers"   ulcers  "I don't take it because I've had ulcers"     Meperidine Other (See Comments)     " "Felt like she was about to pass put after taking       Review of Systems:  General ROS: negative for - fever  Psychological ROS: negative for - hostility  Hematological and Lymphatic ROS: positive for - bruising  Endocrine ROS: negative for - unexpected weight changes  Respiratory ROS: no cough, shortness of breath, or wheezing  Cardiovascular ROS: no chest pain or dyspnea on exertion  Gastrointestinal ROS: no abdominal pain, change in bowel habits, or black or bloody stools  Musculoskeletal ROS: negative for - muscular weakness  Neurological ROS: negative for - bowel and bladder control changes, positive for numbness/tingling  Dermatological ROS: negative for rash    Physical Exam:  Vitals:    10/07/22 1110   BP: (!) 144/70   Pulse: 61   SpO2: 100%   Weight: 125.1 kg (275 lb 14.5 oz)   Height: 5' 5" (1.651 m)   PainSc:   6   PainLoc: Back     Body mass index is 45.91 kg/m².     Gen: NAD  Gait: gait antalgic due to knee pain and lower back pain, ambulating with Rollator  Psych:  Mood appropriate for given condition  HEENT: eyes anicteric   GI: Abd soft  CV: RRR  Lungs: breathing unlabored   ROM: limited AROM of the L spine in all planes, full ROM at ankles, knees and hips  Lumbar flexion 90 degrees, extension 50 degrees, side bending 30 degrees.    Sensation: intact to light touch in all dermatomes tested from L2-S1 bilaterally, except for b/l feet up to mid calf.   Reflexes: 0/0 b/l patella and 0/0 b/l achilles  Palpation: Diffusely tender over lumbar paraspinals  -TTP over the b/l greater trochanters,  -TTP bilateral SI joint  Tone: normal in the b/l knees and hips   Skin: intact, no rashes visible. No color changes in b/l lower extremities.  Minor cut on tip of left big toe.  This was performed by podiatrist for removal of nail/skin.  No signs of infection, swelling, or warmth to touch.  Extremities:  Mild edema in b/l ankles         Right Left   L2/3 Iliacus Hip flexion  5  5   L3/4 Qudratus Femoris Knee " Extension  5  5   L4/5 Tib Anterior Ankle Dorsiflexion   5  5   L5/S1 Extensor Hallicus Longus Great toe extension  5  5                 S1/S2 Gastroc/Soleus Plantar Flexion  5  5       Imaging:  MRI lumbar spine 6/11/18  FINDINGS:  Vertebral column: The study is motion degraded.  There is multilevel degenerative change.  There is marked disc space narrowing towards the right at the L3-4 level where there is associated degenerative endplate signal change.  There is no other significant disc space narrowing.  There is no fracture.  Baseline marrow signal intensity is normal.  Anterior endplate osteophyte formation is also present at the L2-3 level.  There is subtle trace anterolisthesis of L4 on L5.    Spinal canal, conus, epidural space: Spinal canal is developmentally normal.  The conus is normal in location, contour and signal intensity, terminating at the level of T12-L1.  There is no abnormal epidural collection or mass.    Findings by level:    On the sagittal images, there is minimal bulging of the annulus and mild facet joint arthropathy at the T10-11 level but there is no spinal canal or significant foraminal stenosis.  At the T11-T12 level, there is a shallow broad central disc protrusion which narrows the ventral subarachnoid space but again there is no significant spinal canal or foraminal stenosis and there is no cord compression.    T12-L1: There is minimal bulging of the annulus and there is mild facet joint arthropathy.  There is no spinal canal or significant foraminal stenosis.  L1-2: There is moderate facet joint arthropathy.  There is minimal bulging of the annulus with a tiny 2 mm central disc protrusion.  There is no spinal canal or significant foraminal stenosis.  L2-3: There is a moderate diffuse disc bulge with marked facet joint arthropathy.  There is mildly prominent dorsal epidural fat.  There is flattening of the ventral dural sac.  There is moderate central spinal stenosis.  AP  measurement of the dural sac is 7.5 mm.  There is no significant foraminal stenosis.  L3-4: There is marked disc space narrowing towards the right.  There is a diffuse disc bulge with osteophytic ridging and superimposed broad right paracentral and foraminal disc protrusion.  There is moderate-to-marked facet joint arthropathy with ligamentum flavum thickening, right greater than left.  Also, there is mildly prominent dorsal epidural fat.  There is moderate central spinal stenosis.  There is severe right lateral recess and foraminal stenosis with mild-to-moderate left foraminal stenosis.  L4-5: There is marked facet joint arthropathy.  There is a diffuse disc bulge with superimposed broad central disc protrusion.  There is mild spinal stenosis.  There is mild-to-moderate bilateral foraminal stenosis.  L5-S1: There is a diffuse disc bulge with superimposed broad central disc protrusion with annular fissure.  There is marked left and moderate-to-marked right facet joint arthropathy with ligamentum flavum thickening.  There is no significant spinal stenosis.  The right foramina is patent.  There is severe left foraminal stenosis.  The broad central disc protrusion approaches both S1 roots.  The left S1 root is crowded between the facet joint changes and the disc protrusion.  Both S1 roots could be affected, left greater than right.    Soft tissues, other: The prevertebral soft tissues are normal.  The aorta is mildly ectatic.    MRI Cervical Spine 08/14/20  FINDINGS:  The current examination once again demonstrates a 2 mm AP diameter cystic structure within the central aspect of the cervical cord at C6-C7 which extends over an approximately 29 mm craniocaudad dimension from the superior endplate of C6 to the C7-T1 intervertebral disc space (series 2, image 7 and series 5, image 19).  No abnormal adjacent cord edema or abnormal intramedullary enhancement.  Differential considerations include nonspecific dilatation of  the central canal of the spinal cord and syringohydromyelia.     The visualized posterior fossa structures are unremarkable.  No Chiari malformation.  There is mucoperiosteal thickening observed within the ethmoid air cells bilaterally.     There is a C3 vertebral body hemangioma which shows no abnormal enhancement following contrast administration.  No pathologic marrow replacement process or cervical spine fracture.  There is degenerative disc desiccation observed at every cervical level.     The incidentally observed soft tissues of the neck are unremarkable.     C2-C3: There is right uncovertebral spurring present.  No disc protrusion or extrusion. No central canal stenosis or neuroforaminal stenosis.  There is ligamentum flavum thickening.  C3-C4: There is a minimal posterior disc osteophyte complex and uncovertebral spurring.  No disc protrusion or extrusion. No central canal stenosis or neuroforaminal stenosis.  C4-C5: There is left uncovertebral spurring.  There is a broad central disc protrusion.  There is effacement of the anterior CSF sleeve.  No central canal stenosis.  There is, at most, mild right neuroforaminal stenosis as a result of right facet arthropathy.  C5-C6: There is left facet arthropathy.  No central canal stenosis or neuroforaminal stenosis.  C6-C7: There is a 6 mm nonenhancing perineural nerve root sleeve cyst present in the left neural foramen, similar to the prior exam.  There is bilateral uncovertebral spurring, left greater than right.  There is mild ligamentum flavum thickening.  No central canal stenosis.  There is, at most, mild bilateral neuroforaminal stenosis.  There is right facet arthropathy.  C7-T1: There is bilateral facet arthropathy, right greater than left.  No disc protrusion or extrusion. No central canal stenosis or neuroforaminal stenosis.     Impression:     1. No interval detrimental change when compared to the prior study.  29 mm craniocaudad dimension tubular  cystic structure within the central aspect of the spinal cord at C6-C7 which either represents nonspecific mild dilatation of the central canal (up to 2 mm in diameter) or syringohydromyelia.  No abnormal cord edema/abnormal intramedullary enhancement.  2. 6 mm nonenhancing left foraminal perineural nerve root sleeve cyst at C6-C7.  3. Minor degenerative change of the cervical spine as detailed above.  4. C3 vertebral body hemangioma, unchanged.  No evidence of pathologic marrow replacement process.    MRI Thoracic Spine 08/14/20    FINDINGS:  There are no abnormal enhancing masses present within the thoracic spine to suggest a pathologic marrow replacement process.  No acute thoracic compression fracture appreciated.  There is a nonenhancing incidentally observed T1 hyperintense T5 vertebral body hemangioma.  There is degenerative disc desiccation at every thoracic level.  The thoracic spinal cord is normal in signal intensity with no evidence of cord edema, myelomalacia, or abnormal intramedullary enhancement.  No enhancing epidural masses or epidural fluid collections.  No imaging evidence of epidural lipomatosis.     The incidentally observed soft tissues of the chest are unremarkable.     T1-T2: There is a broad disc bulge and a superimposed broad left paracentral disc protrusion which effaces the anterior CSF sleeve and flattens the left anterior thoracic spinal cord.  No central canal stenosis or neuroforaminal stenosis.  T4-T5: There is an 11 mm large broad left paracentral disc protrusion which flattens the left anterior aspect of the thoracic spinal cord.  There is a suggestion of abnormal cord signal on series 6, image 15 which could reflect underlying cord edema/myelomalacia.  No abnormal intramedullary enhancement.  No overall central canal stenosis or neuroforaminal stenosis.  T5-T6: There is a minimal broad left paracentral disc protrusion.  No central canal stenosis or neuroforaminal  stenosis.  T6-T7: There is a broad right paracentral disc protrusion on series 6, image 21.  No central canal stenosis or neuroforaminal stenosis.  T8-T9: There is a broad right paracentral disc protrusion which effaces the right anterior CSF sleeve.  No central canal stenosis or neuroforaminal stenosis.  There is ligamentum flavum thickening.  T10-T11: There is a small broad central disc protrusion on series 6, image 34.  No central canal stenosis.  No definite neuroforaminal stenosis.  T11-T12: There is a broad central disc protrusion which effaces the anterior CSF sleeve.  There is bilateral hypertrophic facet arthropathy.  No central canal stenosis or neuroforaminal stenosis.     Impression:     1. No imaging evidence of a pathologic marrow replacement process.  2. Multilevel degenerative change of the thoracic spine with disc protrusions observed at multiple thoracic levels, most pronounced at T4-T5 where a broad left paracentral disc protrusion and flattens the left anterior aspect of the thoracic spinal cord.  There is possible cord edema noted.  No abnormal intramedullary enhancement.  No cord syrinx.  3. T5 vertebral body hemangioma.    MRI Lumbar Spine 08/14/20  FINDINGS:  There is an L1 vertebral body hemangioma present.  There is a 9 mm focus of T1/T2 signal hyperintensity noted within the left pedicle of L5 which is favored to represent either focal fat deposition or a hemangioma.  No associated enhancement following contrast administration.  No additional concerning enhancing bone lesions elsewhere in the lumbar spine.  No acute lumbar compression fracture.  No spondylolisthesis.  No definite pars defects.  There is multilevel degenerative disc desiccation present at virtually every visualized lower thoracic and lumbar level with disc space narrowing and degenerative endplate change noted at L3-L4.  There is a levoscoliotic curvature of the lumbar spine, apex at L3-L4.     The conus medullaris  terminates at L1.  The visualized lower thoracic spinal cord is normal in signal intensity with no evidence of cord edema, myelomalacia, or cord syrinx.  No abnormal intramedullary enhancement.  No peripherally enhancing epidural fluid collections.  The paraspinous musculature is unremarkable.     The incidentally observed abdominal/retroperitoneal soft tissues demonstrate diverticulosis coli..     T11-T12: There is a broad central disc protrusion which effaces the anterior CSF sleeve.  There is, at most, mild overall central canal stenosis.  No neuroforaminal stenosis.  T12-L1: No disc protrusion or extrusion. No central canal stenosis or neuroforaminal stenosis.  L1-L2: There is a broad right paracentral disc protrusion.  No disc extrusion.  No central canal stenosis or neuroforaminal stenosis.  L2-L3: There is a broad disc bulge which extends into both neural foramina.  There is prominent bilateral hypertrophic facet arthropathy.  There is mild overall central canal stenosis.  No definite neuroforaminal stenosis.  L3-L4: There is a broad right paracentral/foraminal/extraforaminal osteophyte which abuts and displaces the descending right L4 nerve in the right lateral recess.  Please correlate clinically for symptoms referable to the right L4 nerve.  There is right lateral recess stenosis.  There is ligamentum flavum thickening and hypertrophic facet arthropathy.  There is moderate right neuroforaminal stenosis.  No left neuroforaminal stenosis.  There is moderate overall central canal stenosis.  L4-L5: There is a minimal diffuse broad disc bulge which extends into both neural foramina.  There is abutment of the exited right L4 nerve in the right extraforaminal soft tissues on series 7, image 21.  Please correlate clinically for symptoms referable to the right L4 nerve.  There is bilateral severe hypertrophic facet arthropathy present, left greater than right, resulting in prominent left lateral recess stenosis.   There is moderate-severe left neuroforaminal stenosis (series 9, image 16 and series 7, image 19).  No right neuroforaminal stenosis.  There is severe central canal stenosis.  L5-S1: The left L5 pedicle is relatively hypoplastic in size as compared to the right L5 pedicle, and there is a 9 mm focus of T1/T2 signal hyperintensity within the left L5 pedicle on series 5, image 11 which shows no abnormal enhancement following contrast administration, most likely a focus of focal fat deposition (see postcontrast T1 fat-suppressed images) or a hemangioma.  There is prominent bilateral hypertrophic facet arthropathy, left greater than right, resulting in severe proximal left neuroforaminal stenosis.  Please correlate clinically for symptoms referable to the exiting left L5 nerve (series 7, image 25, series 9, image 22, and series 6, image 22).  There is is also posterior displacement of the descending left S1 nerve in the left lateral recess.  Please correlate clinically for symptoms referable to the left S1 nerve.  There is a broad central disc protrusion at L5-S1 which abuts both descending S1 nerves and which could produce symptoms referable to both S1 nerves, similar to the prior exam.  No right neuroforaminal stenosis.  There is moderate overall central canal stenosis.     There is prominent epidural fat deposition observed within the lumbosacral spinal canal from L4-L5 through the sacral canal.     Impression:     1. No appreciable interval detrimental change when compared to the prior exam.  2. 9 mm nonenhancing focus of T1/T2 signal hyperintensity within the left pedicle of L5 is favored to represent either a hemangioma or focal fat deposition.  No adjacent osseous edema appreciated.  No additional concerning bone lesions elsewhere in the visualized lumbar spine.  No appreciable interval detrimental change over the course of multiple prior examinations dating back to 06/11/2018.  3. Advanced multilevel degenerative  change of the lumbar spine which combines with a levoscoliotic curvature of the lumbar spine to generate multilevel central canal and neuroforaminal stenosis.  Additional details are provided above.  4. Severe left neuroforaminal stenosis at L5-S1 and left lateral recess stenosis at L5-S1 as a result of prominent left facet arthropathy.  Please correlate clinically for symptoms referable to the left L5 and S1 nerves.  There is also a broad central disc protrusion at L5-S1, similar to the prior examination, which could produce symptoms referable to the S1 nerves, left greater than right.  Please correlate clinically.  5. 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    Labs:  BMP  Lab Results   Component Value Date     10/06/2021    K 4.2 10/06/2021     10/06/2021    CO2 24 10/06/2021    BUN 17 10/06/2021    CREATININE 0.56 10/06/2021    CALCIUM 9.2 10/06/2021    ANIONGAP 10 10/06/2021    ESTGFRAFRICA >60 10/06/2021    EGFRNONAA >60 10/06/2021     Lab Results   Component Value Date    ALT 16 10/06/2021    AST 27 10/06/2021    ALKPHOS 39 10/06/2021    BILITOT 0.3 10/06/2021     Lab Results   Component Value Date    WBC 16.11 (H) 09/04/2019    HGB 9.4 (L) 09/04/2019    HCT 28.9 (L) 09/04/2019    MCV 85 09/04/2019     09/04/2019           Assessment:   Problem List Items Addressed This Visit    None  Visit Diagnoses       Lumbar radiculopathy    -  Primary    Relevant Orders    Case Request Operating Room: Injection-steroid-epidural-lumbar L5/S1 (Completed)    Left knee pain, unspecified chronicity        Peripheral polyneuropathy                  61 y.o. year old female with PMH HTN, a-fib on eliquis presents to the office with back pain.  She's had this pain for over 5 years and it has been gradually worsening.  No history of prior back surgery.   She has had chronic numbness in the top of her feet for years.  Her pain is worse with standing and walking  and relieved with rest and leaning forward.      10/7/2022: Fatemeh Shoemaker returns to the clinic for follow up.  Today she is reporting return of her lower back pain.  She states his pain is constant, 6-8/10, worse with walking and activities and slightly alleviated with rest.  She states the pain starts in her lower back it radiates into bilateral buttocks down back legs stopping at knees.  She denies any associated aly weakness but does feel like her legs can be week if she walks for extended periods of time.  She reports continued chronic numbness in bilateral feet starting in her shins and down.  She has returned to using a Rollator due to the severity of her pain.  She continues to take gabapentin 600 mg b.i.d., Zanaflex, Celebrex and Cymbalta with some relief.  She denies any bowel or bladder incontinence.  She has followed up with podiatry and had nail/skin removed on her left big toe.  She denies any current infection with it, fever, chills.        - today she is full 5/5 strength on exam in her lower extremities.   - we discussed continuing to monitor her abrasion on left big toe.  She reports not being diabetic and denies any significant changes to her toe.  I do not see any signs of infection or issues but told her to continue to monitor.  - I believe her radicular pain has returned most likely from the severe stenosis at L4/5  - I independently reviewed her lumbar MRI is consistent with L4-L5: There is a minimal diffuse broad disc bulge which extends into both neural foramina.  There is abutment of the exited right L4 nerve in the right extraforaminal soft tissues   There is bilateral severe hypertrophic facet arthropathy present, left greater than right, resulting in prominent left lateral recess stenosis.  There is moderate-severe left neuroforaminal stenosis.  There is severe central canal stenosis.  - her pain is limiting her mobility and interfering with her ADLs  - she is not finding  significant relief with gabapentin and Cymbalta  - her pain is too severe to restart formal physical therapy.  - I would like to schedule her for repeat L5/S1 YUE.  She is done very well with these in the past providing her with significant relief lasting over 10 months.  - follow-up 2 weeks post procedure or sooner if needed    : Reviewed     The above note was completed, in part, with the aid of Dragon dictation software/hardware. Translation errors may be present.

## 2022-10-12 DIAGNOSIS — I48.0 PAROXYSMAL ATRIAL FIBRILLATION: ICD-10-CM

## 2022-10-12 RX ORDER — OMEPRAZOLE 20 MG/1
20 CAPSULE, DELAYED RELEASE ORAL EVERY MORNING
Qty: 90 CAPSULE | Refills: 1 | Status: SHIPPED | OUTPATIENT
Start: 2022-10-12 | End: 2023-01-12

## 2022-10-12 NOTE — TELEPHONE ENCOUNTER
Refill Routing Note   Medication(s) are not appropriate for processing by [facility] for the following reason(s):      - Medication not previously prescribed by PCP  - Patient has been seen in the ED/Hospital since the last PCP visit    [facility] action(s):  Defer Medication-related problems identified: Requires labs        Medication reconciliation completed: No     Appointments  past 12m or future 3m with PCP    Date Provider   Last Visit   4/14/2022 Jerson Wheat MD   Next Visit   Visit date not found Jerson Wheat MD   ED visits in past 90 days: 0        Note composed:11:24 AM 10/12/2022

## 2022-10-14 ENCOUNTER — OFFICE VISIT (OUTPATIENT)
Dept: ORTHOPEDICS | Facility: CLINIC | Age: 62
End: 2022-10-14
Payer: COMMERCIAL

## 2022-10-14 ENCOUNTER — OFFICE VISIT (OUTPATIENT)
Dept: PODIATRY | Facility: CLINIC | Age: 62
End: 2022-10-14
Payer: COMMERCIAL

## 2022-10-14 VITALS — BODY MASS INDEX: 45.82 KG/M2 | WEIGHT: 275 LBS | HEIGHT: 65 IN

## 2022-10-14 DIAGNOSIS — M17.12 PRIMARY OSTEOARTHRITIS OF LEFT KNEE: Primary | ICD-10-CM

## 2022-10-14 DIAGNOSIS — M20.32 HALLUX MALLEUS OF LEFT FOOT: Primary | ICD-10-CM

## 2022-10-14 DIAGNOSIS — M20.41 HAMMER TOES OF BOTH FEET: ICD-10-CM

## 2022-10-14 DIAGNOSIS — M20.42 HAMMER TOES OF BOTH FEET: ICD-10-CM

## 2022-10-14 PROCEDURE — 99214 OFFICE O/P EST MOD 30 MIN: CPT | Mod: 25,S$GLB,, | Performed by: ORTHOPAEDIC SURGERY

## 2022-10-14 PROCEDURE — 3044F PR MOST RECENT HEMOGLOBIN A1C LEVEL <7.0%: ICD-10-PCS | Mod: CPTII,S$GLB,, | Performed by: PODIATRIST

## 2022-10-14 PROCEDURE — 1159F PR MEDICATION LIST DOCUMENTED IN MEDICAL RECORD: ICD-10-PCS | Mod: CPTII,S$GLB,, | Performed by: PODIATRIST

## 2022-10-14 PROCEDURE — 1159F MED LIST DOCD IN RCRD: CPT | Mod: CPTII,S$GLB,, | Performed by: ORTHOPAEDIC SURGERY

## 2022-10-14 PROCEDURE — 99214 OFFICE O/P EST MOD 30 MIN: CPT | Mod: S$GLB,,, | Performed by: PODIATRIST

## 2022-10-14 PROCEDURE — 4010F ACE/ARB THERAPY RXD/TAKEN: CPT | Mod: CPTII,S$GLB,, | Performed by: ORTHOPAEDIC SURGERY

## 2022-10-14 PROCEDURE — 99214 PR OFFICE/OUTPT VISIT, EST, LEVL IV, 30-39 MIN: ICD-10-PCS | Mod: S$GLB,,, | Performed by: PODIATRIST

## 2022-10-14 PROCEDURE — 20610 LARGE JOINT ASPIRATION/INJECTION: L KNEE: ICD-10-PCS | Mod: LT,S$GLB,, | Performed by: ORTHOPAEDIC SURGERY

## 2022-10-14 PROCEDURE — 1160F PR REVIEW ALL MEDS BY PRESCRIBER/CLIN PHARMACIST DOCUMENTED: ICD-10-PCS | Mod: CPTII,S$GLB,, | Performed by: ORTHOPAEDIC SURGERY

## 2022-10-14 PROCEDURE — 3044F HG A1C LEVEL LT 7.0%: CPT | Mod: CPTII,S$GLB,, | Performed by: ORTHOPAEDIC SURGERY

## 2022-10-14 PROCEDURE — 1159F MED LIST DOCD IN RCRD: CPT | Mod: CPTII,S$GLB,, | Performed by: PODIATRIST

## 2022-10-14 PROCEDURE — 1159F PR MEDICATION LIST DOCUMENTED IN MEDICAL RECORD: ICD-10-PCS | Mod: CPTII,S$GLB,, | Performed by: ORTHOPAEDIC SURGERY

## 2022-10-14 PROCEDURE — 3044F HG A1C LEVEL LT 7.0%: CPT | Mod: CPTII,S$GLB,, | Performed by: PODIATRIST

## 2022-10-14 PROCEDURE — 99999 PR PBB SHADOW E&M-EST. PATIENT-LVL IV: CPT | Mod: PBBFAC,,, | Performed by: ORTHOPAEDIC SURGERY

## 2022-10-14 PROCEDURE — 1160F RVW MEDS BY RX/DR IN RCRD: CPT | Mod: CPTII,S$GLB,, | Performed by: PODIATRIST

## 2022-10-14 PROCEDURE — 99214 PR OFFICE/OUTPT VISIT, EST, LEVL IV, 30-39 MIN: ICD-10-PCS | Mod: 25,S$GLB,, | Performed by: ORTHOPAEDIC SURGERY

## 2022-10-14 PROCEDURE — 99999 PR PBB SHADOW E&M-EST. PATIENT-LVL IV: ICD-10-PCS | Mod: PBBFAC,,, | Performed by: ORTHOPAEDIC SURGERY

## 2022-10-14 PROCEDURE — 1160F RVW MEDS BY RX/DR IN RCRD: CPT | Mod: CPTII,S$GLB,, | Performed by: ORTHOPAEDIC SURGERY

## 2022-10-14 PROCEDURE — 4010F PR ACE/ARB THEARPY RXD/TAKEN: ICD-10-PCS | Mod: CPTII,S$GLB,, | Performed by: ORTHOPAEDIC SURGERY

## 2022-10-14 PROCEDURE — 4010F ACE/ARB THERAPY RXD/TAKEN: CPT | Mod: CPTII,S$GLB,, | Performed by: PODIATRIST

## 2022-10-14 PROCEDURE — 99999 PR PBB SHADOW E&M-EST. PATIENT-LVL III: CPT | Mod: PBBFAC,,, | Performed by: PODIATRIST

## 2022-10-14 PROCEDURE — 4010F PR ACE/ARB THEARPY RXD/TAKEN: ICD-10-PCS | Mod: CPTII,S$GLB,, | Performed by: PODIATRIST

## 2022-10-14 PROCEDURE — 99999 PR PBB SHADOW E&M-EST. PATIENT-LVL III: ICD-10-PCS | Mod: PBBFAC,,, | Performed by: PODIATRIST

## 2022-10-14 PROCEDURE — 3044F PR MOST RECENT HEMOGLOBIN A1C LEVEL <7.0%: ICD-10-PCS | Mod: CPTII,S$GLB,, | Performed by: ORTHOPAEDIC SURGERY

## 2022-10-14 PROCEDURE — 1160F PR REVIEW ALL MEDS BY PRESCRIBER/CLIN PHARMACIST DOCUMENTED: ICD-10-PCS | Mod: CPTII,S$GLB,, | Performed by: PODIATRIST

## 2022-10-14 PROCEDURE — 20610 DRAIN/INJ JOINT/BURSA W/O US: CPT | Mod: LT,S$GLB,, | Performed by: ORTHOPAEDIC SURGERY

## 2022-10-14 RX ORDER — TRIAMCINOLONE ACETONIDE 40 MG/ML
40 INJECTION, SUSPENSION INTRA-ARTICULAR; INTRAMUSCULAR
Status: DISCONTINUED | OUTPATIENT
Start: 2022-10-14 | End: 2022-10-14 | Stop reason: HOSPADM

## 2022-10-14 RX ADMIN — TRIAMCINOLONE ACETONIDE 40 MG: 40 INJECTION, SUSPENSION INTRA-ARTICULAR; INTRAMUSCULAR at 09:10

## 2022-10-14 NOTE — PROGRESS NOTES
61 years old with pain in the left knee 7 on the pain scale worse with activity has an upcoming trip to [place] interested in injection which has responded well to in the past    Exam shows tenderness the joint line without signs infection instability     X-rays show arthritic change     Assessment: Left knee arthrosis    Plan: Kenalog injection left knee, we will wish her well on her upcoming trip to [place]    Imaging studies ordered and reviewed by me    Further History  Aching pain  Worse with activity  Relieved with rest  No other associated symptoms  No other radiation    Further Exam  Alert and oriented  Pleasant  Contralateral limb has appropriate range of motion for age and condition  Contralateral limb has appropriate strength for age and condition  Contralateral limb has appropriate stability  for age and condition  No adenopathy  Pulses are appropriate for current condition  Skin is intact        Chief Complaint    Chief Complaint   Patient presents with    Left Knee - Pain       HPI  Fatemeh Shoemaker is a 61 y.o.  female who presents with       Past Medical History  Past Medical History:   Diagnosis Date    Anemia     Anticoagulant long-term use     Arrhythmia     Arthritis     Atrial fibrillation     history    Bronchitis     Encounter for blood transfusion     General anesthetics causing adverse effect in therapeutic use     High blood pressure     Hyperlipidemia     Lump or mass in breast     benign     Neuropathy     Obesity     Obstructive sleep apnea syndrome 2021    Ulcer        Past Surgical History  Past Surgical History:   Procedure Laterality Date    A-V CARDIAC PACEMAKER INSERTION Left 2020    Procedure: INSERTION, CARDIAC PACEMAKER, DUAL CHAMBER;  Surgeon: Bert Reaves MD;  Location: [facility];  Service: Cardiology;  Laterality: Left;    BREAST BIOPSY Right 2017    myofibroblastoma     BREAST LUMPECTOMY Right 2017     SECTION  10/25/1986     Other c/section 10/26/1997    CHOLECYSTECTOMY  12/28/2017    Dr. TOLU Bowers, [facility]     csection      CS x 2    CYSTOSCOPY N/A 9/3/2019    Procedure: CYSTOSCOPY;  Surgeon: Noemi Pierre MD;  Location: [facility];  Service: OB/GYN;  Laterality: N/A;    DILATION AND CURETTAGE OF UTERUS      EPIDURAL STEROID INJECTION INTO LUMBAR SPINE N/A 5/21/2020    Procedure: Injection-steroid-epidural-lumbar L5/S1;  Surgeon: Gurjit Tavarez MD;  Location: [facility] OR;  Service: Pain Management;  Laterality: N/A;    EPIDURAL STEROID INJECTION INTO LUMBAR SPINE N/A 6/19/2020    Procedure: Injection-steroid-epidural-lumbar L5/S1;  Surgeon: Gurjit Tavarez MD;  Location: [facility] OR;  Service: Pain Management;  Laterality: N/A;    EPIDURAL STEROID INJECTION INTO LUMBAR SPINE N/A 12/1/2021    Procedure: Injection-steroid-epidural-lumbar L5/S1;  Surgeon: Gurjit Tavarez MD;  Location: [facility] OR;  Service: Pain Management;  Laterality: N/A;    FRACTURE SURGERY  04/86    Dislocated  hip total rt hip 08/08    HIP PINNING      HYSTERECTOMY      JOINT REPLACEMENT  08/2008    LAPAROSCOPIC SALPINGO-OOPHORECTOMY Bilateral 9/3/2019    Procedure: SALPINGO-OOPHORECTOMY, LAPAROSCOPIC;  Surgeon: Noemi Pierre MD;  Location: [facility];  Service: OB/GYN;  Laterality: Bilateral;  Dr. Bentley to assist. No resident needed.     LAPAROSCOPIC TOTAL HYSTERECTOMY N/A 9/3/2019    Procedure: HYSTERECTOMY, TOTAL, LAPAROSCOPIC;  Surgeon: Noemi Pierre MD;  Location: [facility];  Service: OB/GYN;  Laterality: N/A;    NASAL SEPTUM SURGERY      OOPHORECTOMY      TOTAL HIP ARTHROPLASTY Right     TUBAL LIGATION  1997       Medications  Current Outpatient Medications   Medication Sig    albuterol sulfate 90 mcg/actuation aebs Inhale 180 mcg into the lungs.    apixaban (ELIQUIS) 5 mg Tab Take 1 tablet (5 mg total) by mouth 2 (two) times daily.    celecoxib (CELEBREX) 200 MG capsule Take 1 capsule (200 mg total) by mouth daily as needed for Pain.    cholecalciferol, vitamin D3,  (DECARA) 1,250 mcg (50,000 unit) capsule Take 1 capsule (50,000 Units total) by mouth every 7 days.    cyanocobalamin 500 MCG tablet Take 1,000 mcg by mouth once daily.    diclofenac sodium (VOLTAREN) 1 % Gel Apply 2 g topically 4 (four) times daily.    DULoxetine (CYMBALTA) 60 MG capsule Take 1 capsule (60 mg total) by mouth once daily.    flecainide (TAMBOCOR) 150 MG Tab Take 1 tablet (150 mg total) by mouth every 12 (twelve) hours.    fluticasone furoate-vilanteroL (BREO ELLIPTA) 100-25 mcg/dose diskus inhaler Inhale 1 puff into the lungs.    HYDROcodone-acetaminophen (NORCO) 5-325 mg per tablet Take 1 tablet by mouth every 6 (six) hours as needed for Pain.    HYDROcodone-acetaminophen (NORCO) 5-325 mg per tablet Take 1 tablet by mouth every 6 (six) hours as needed for Pain.    lisinopriL 10 MG tablet Take 1 tablet (10 mg total) by mouth once daily.    melatonin 5 mg TbDL Take 1 tablet by mouth nightly as needed (sleep).    metoprolol tartrate (LOPRESSOR) 100 MG tablet Take 1 tablet (100 mg total) by mouth 2 (two) times daily.    nortriptyline (PAMELOR) 25 MG capsule TAKE ONE CAPSULE BY MOUTH EVERY EVENING    omeprazole (PRILOSEC) 20 MG capsule Take 1 capsule (20 mg total) by mouth every morning.    promethazine (PHENERGAN) 12.5 MG Tab Take 1 tablet (12.5 mg total) by mouth 2 (two) times daily as needed (nausea).    pulse oximeter (PULSE OXIMETER) device Use twice daily at 8 AM and 3 PM and [facility] as directed.    rosuvastatin (CRESTOR) 10 MG tablet TAKE ONE TABLET BY MOUTH EVERY EVENING    semaglutide 2 mg/dose (8 mg/3 mL) PnIj Inject 0.5 mg into the skin.    valACYclovir (VALTREX) 500 MG tablet Take 1 tablet (500 mg total) by mouth daily as needed (fever blister).    gabapentin (NEURONTIN) 600 MG tablet Take 1 tablet (600 mg total) by mouth 2 (two) times daily.     No current facility-administered medications for this visit.       Allergies  Review of patient's allergies indicates:  "  Allergen Reactions    Aspirin Other (See Comments)     "I don't take it because I've had ulcers"   ulcers  "I don't take it because I've had ulcers"     Meperidine Other (See Comments)     Felt like she was about to pass put after taking       Family History  Family History   Problem Relation Age of Onset    Colon cancer Maternal Grandmother 70        mets to ovary    Cancer Maternal Grandmother     Arthritis Paternal Grandfather     Eclampsia Paternal Grandmother     Cataracts Maternal Grandfather     Stroke Father 57    Colon cancer Father     Hypertension Father     Alcohol abuse Father     Cancer Father         Passed away July 10 2019    Hypertension Mother     Cataracts Mother     Hypertension Brother         Age 54 hypertension heart    Early death Brother         Hypertension cardio disease    No Known Problems Daughter     Stomach cancer Neg Hx     Esophageal cancer Neg Hx     Breast cancer Neg Hx     Miscarriages / Stillbirths Neg Hx     Ovarian cancer Neg Hx     Glaucoma Neg Hx     Macular degeneration Neg Hx     Retinal detachment Neg Hx     Strabismus Neg Hx        Social History  Social History     Socioeconomic History    Marital status: Significant Other   Tobacco Use    Smoking status: Never    Smokeless tobacco: Never   Substance and Sexual Activity    Alcohol use: No     Comment: never    Drug use: Never    Sexual activity: Yes     Partners: Male     Birth control/protection: Other-see comments     Comment: Hysterectomy 9/3/19     Social Determinants of Health     Financial Resource Strain: Low Risk     Difficulty of Paying Living Expenses: Not hard at all   Food Insecurity: No Food Insecurity    Worried About Running Out of Food in the Last Year: Never true    Ran Out of Food in the Last Year: Never true   Transportation Needs: No Transportation Needs    Lack of Transportation (Medical): No    Lack of Transportation (Non-Medical): No   Physical Activity: Inactive    Days of Exercise per Week: 0 " days    Minutes of Exercise per Session: 10 min   Stress: No Stress Concern Present    Feeling of Stress : Not at all   Social Connections: Unknown    Frequency of Communication with Friends and Family: More than three times a week    Frequency of Social Gatherings with Friends and Family: More than three times a week    Active Member of Clubs or Organizations: No    Attends Club or Organization Meetings: Never    Marital Status: Patient refused   Housing Stability: Low Risk     Unable to Pay for Housing in the Last Year: No    Number of Places Lived in the Last Year: 1    Unstable Housing in the Last Year: No               Review of Systems     Constitutional: Negative    HENT: Negative  Eyes: Negative  Respiratory: Negative  Cardiovascular: Negative  Musculoskeletal: HPI  Skin: Negative  Neurological: Negative  Hematological: Negative  Endocrine: Negative                 Physical Exam    There were no vitals filed for this visit.  Body mass index is 45.76 kg/m².  Physical Examination:     General appearance -  well appearing, and in no distress  Mental status - awake  Neck - supple  Chest -  symmetric air entry  Heart - normal rate   Abdomen - soft      Assessment     1. Primary osteoarthritis of left knee          Plan

## 2022-10-14 NOTE — H&P (VIEW-ONLY)
Subjective:      Patient ID: Fatemeh Shoemaker is a 61 y.o. female.    Chief Complaint: No chief complaint on file.    Fatemeh is a 61 y.o. female who presents to the podiatry clinic  with complaint of her toes and forefoot swelling and feeling tight at times. Left toes bother her the most when weight bearing    Review of Systems   Constitution: Negative for chills and fever.   Cardiovascular: Negative for claudication and leg swelling.   Respiratory: Negative for shortness of breath.    Skin: Positive for nail changes. Negative for itching and rash.   Musculoskeletal: Positive for joint pain. Negative for muscle cramps, muscle weakness and myalgias.   Gastrointestinal: Negative for nausea and vomiting.   Neurological: Negative for focal weakness, loss of balance, numbness and paresthesias.           Objective:      Physical Exam  Constitutional:       General: She is not in acute distress.     Appearance: She is well-developed. She is not diaphoretic.   Cardiovascular:      Pulses:           Dorsalis pedis pulses are 2+ on the right side and 2+ on the left side.        Posterior tibial pulses are 2+ on the right side and 2+ on the left side.      Comments: < 3 sec capillary refill time to toes 1-5 bilateral. Toes and feet are warm to touch proximally with normal distal cooling b/l. There is some hair growth on the feet and toes b/l. There is no edema b/l. No spider veins or varicosities present b/l.     Musculoskeletal:      Comments: Equinus noted b/l ankles with < 10 deg DF noted. MMT 5/5 in DF/PF/Inv/Ev resistance with no reproduction of pain in any direction. Passive range of motion of ankle and pedal joints is painless b/l.    RIght ankle some discomfort to the lateral AFTL area some discomfort with inversion of ankle    Semi Reducible extensor and flexor contractures at the MTPJ and PIPJ of toes 1-5, bilat.           Skin:     General: Skin is warm and dry.      Coloration: Skin is not pale.      Findings: No  abrasion, bruising, burn, ecchymosis, erythema, laceration, lesion, petechiae or rash.      Nails: There is no clubbing.        Comments: Skin temperature, texture and turgor within normal limits.    Nails 1-5 bilateral are thick 3-4 mm and discolored with subungual debris     Neurological:      Mental Status: She is alert and oriented to person, place, and time.      Sensory: No sensory deficit.      Motor: No tremor, atrophy or abnormal muscle tone.      Comments: Negative tinel sign bilateral.   Psychiatric:         Behavior: Behavior normal.               Assessment:       Encounter Diagnoses   Name Primary?    Hallux malleus of left foot Yes    Hammer toes of both feet          Plan:       Diagnoses and all orders for this visit:    Hallux malleus of left foot  -     Case Request Operating Room: FUSION, JOINT, TOE, CORRECTION, HAMMER TOE    Hammer toes of both feet  -     Case Request Operating Room: FUSION, JOINT, TOE, CORRECTION, HAMMER TOE    I counseled the patient on her conditions, their implications and medical management.    Discussed treatments for the hallux malleus and the hammertoes including wide deep set shoes and inserts she wants to do more aggressive treatments as these have not helped in the past    Discussed Hallux IPJ fusion and hammertoes 2-5 repair    Case set at 11/3/22    Referred to PCP for medical optimization and risk stratification    Return 1 week post op    Ezra Arriaga DPM

## 2022-10-17 ENCOUNTER — TELEPHONE (OUTPATIENT)
Dept: SURGERY | Facility: HOSPITAL | Age: 62
End: 2022-10-17
Payer: COMMERCIAL

## 2022-10-17 NOTE — TELEPHONE ENCOUNTER
Pt is scheduled for procedure 10/19, pt has not stopped taking eliquis (took morning dose today). Please reach out to patient to advise whether procedure can proceed for Wednesday. Thank you

## 2022-10-17 NOTE — PROGRESS NOTES
Subjective:      Patient ID: Fatemeh Shoemaker is a 61 y.o. female.    Chief Complaint: Foot Pain    Fatemeh is a 61 y.o. female who presents to the podiatry clinic  with complaint of her toes and forefoot swelling and feeling tight at times. This happens at all times but gets worse at night. Pain in the left toes with contractures and especially in shoes.    Review of Systems   Constitution: Negative for chills and fever.   Cardiovascular: Negative for claudication and leg swelling.   Respiratory: Negative for shortness of breath.    Skin: Positive for nail changes. Negative for itching and rash.   Musculoskeletal: Positive for joint pain. Negative for muscle cramps, muscle weakness and myalgias.   Gastrointestinal: Negative for nausea and vomiting.   Neurological: Negative for focal weakness, loss of balance, numbness and paresthesias.           Objective:      Physical Exam  Constitutional:       General: She is not in acute distress.     Appearance: She is well-developed. She is not diaphoretic.   Cardiovascular:      Pulses:           Dorsalis pedis pulses are 2+ on the right side and 2+ on the left side.        Posterior tibial pulses are 2+ on the right side and 2+ on the left side.      Comments: < 3 sec capillary refill time to toes 1-5 bilateral. Toes and feet are warm to touch proximally with normal distal cooling b/l. There is some hair growth on the feet and toes b/l. There is no edema b/l. No spider veins or varicosities present b/l.     Musculoskeletal:      Comments: Equinus noted b/l ankles with < 10 deg DF noted. MMT 5/5 in DF/PF/Inv/Ev resistance with no reproduction of pain in any direction. Passive range of motion of ankle and pedal joints is painless b/l.    RIght ankle some discomfort to the lateral AFTL area some discomfort with inversion of ankle    Right common peroneal nerve there is pain to palpation at the fibular head right    None reducible extensor and flexor contractures at the MTPJ  and PIPJ of toes 1-5, bilat.       Skin:     General: Skin is warm and dry.      Coloration: Skin is not pale.      Findings: No abrasion, bruising, burn, ecchymosis, erythema, laceration, lesion, petechiae or rash.      Nails: There is no clubbing.        Comments: Skin temperature, texture and turgor within normal limits.    Nails 1-5 bilateral are thick 3-4 mm and discolored with subungual debris     Neurological:      Mental Status: She is alert and oriented to person, place, and time.      Sensory: No sensory deficit.      Motor: No tremor, atrophy or abnormal muscle tone.      Comments: Negative tinel sign bilateral.   Psychiatric:         Behavior: Behavior normal.               Assessment:       Encounter Diagnoses   Name Primary?    Hallux malleus of left foot Yes    Hammer toes of both feet          Plan:       Fatemeh was seen today for foot pain.    Diagnoses and all orders for this visit:    Hallux malleus of left foot  -     X-Ray Foot Complete Left; Future    Hammer toes of both feet  -     X-Ray Foot Complete Left; Future    I counseled the patient on her conditions, their implications and medical management.    Discussed importance of supportive shoes with accommodative toe box to reduce pressure and irritation to forefoot.     Surgical correction Hallux IPJ arthrodesis and hammertoe corrections 2-5    WWill consider surgery    Return in 6 weeks for follow up    Ezra Arriaga DPM

## 2022-10-18 ENCOUNTER — TELEPHONE (OUTPATIENT)
Dept: PAIN MEDICINE | Facility: CLINIC | Age: 62
End: 2022-10-18
Payer: COMMERCIAL

## 2022-10-18 NOTE — TELEPHONE ENCOUNTER
Pt would like to have her procedure moved from 10-19-22 to 10-26-22, d/t having taking Eliquis on 10-17-22. Pt plans to fly on 10-27-22. Clearance request to be sent to Dr. Tee

## 2022-10-18 NOTE — TELEPHONE ENCOUNTER
Call placed to Pt advise per Dr. Tavarez we are ok to proceed with ruby with her pacemaker. Pt verbalized understanding.

## 2022-10-18 NOTE — TELEPHONE ENCOUNTER
I believe she took the morning dose on 10/17.    Can we do the procedure some time this week if she's already held for a day?    I would recommend against flying the day after an YUE

## 2022-10-19 ENCOUNTER — TELEPHONE (OUTPATIENT)
Dept: PAIN MEDICINE | Facility: CLINIC | Age: 62
End: 2022-10-19
Payer: COMMERCIAL

## 2022-10-19 ENCOUNTER — PATIENT MESSAGE (OUTPATIENT)
Dept: SURGERY | Facility: HOSPITAL | Age: 62
End: 2022-10-19
Payer: COMMERCIAL

## 2022-10-19 ENCOUNTER — HOSPITAL ENCOUNTER (OUTPATIENT)
Dept: RADIOLOGY | Facility: HOSPITAL | Age: 62
Discharge: HOME | End: 2022-10-19
Attending: ANESTHESIOLOGY
Payer: COMMERCIAL

## 2022-10-19 DIAGNOSIS — M54.50 LOWER BACK PAIN: ICD-10-CM

## 2022-10-19 NOTE — TELEPHONE ENCOUNTER
Return call received from Pt stating she got a call from the surgery center to proceed with her surgery. Pt advised that she was going to have to reschedule. Pt  offered 10-28-22 and will be out of town. Then offered 11-2-22 and she has another procedure on 11-3-22. Pt will call back after toe surgery to schedule.

## 2022-10-24 ENCOUNTER — TELEPHONE (OUTPATIENT)
Dept: PODIATRY | Facility: CLINIC | Age: 62
End: 2022-10-24
Payer: COMMERCIAL

## 2022-10-24 ENCOUNTER — PATIENT MESSAGE (OUTPATIENT)
Dept: SURGERY | Facility: HOSPITAL | Age: 62
End: 2022-10-24
Payer: COMMERCIAL

## 2022-10-24 NOTE — TELEPHONE ENCOUNTER
[place] faxed paper work .        ----- Message from Jimmy Florence sent at 10/24/2022 10:58 AM CDT -----  Type: Needs Medical Advice  Who Called: Pt   Symptoms (please be specific):   How long has patient had these symptoms:    Pharmacy name and phone #:    Best Call Back Number: [phone]  Additional Information: Pt requesting a call back concerning a medical clearance she needs to send to her pcp to fill out. Pt lost original form.Pt wants it faxed to [phone]

## 2022-10-25 ENCOUNTER — OFFICE VISIT (OUTPATIENT)
Dept: FAMILY MEDICINE | Facility: CLINIC | Age: 62
End: 2022-10-25
Payer: COMMERCIAL

## 2022-10-25 ENCOUNTER — TELEPHONE (OUTPATIENT)
Dept: PODIATRY | Facility: CLINIC | Age: 62
End: 2022-10-25
Payer: COMMERCIAL

## 2022-10-25 ENCOUNTER — PATIENT MESSAGE (OUTPATIENT)
Dept: FAMILY MEDICINE | Facility: CLINIC | Age: 62
End: 2022-10-25

## 2022-10-25 VITALS
DIASTOLIC BLOOD PRESSURE: 40 MMHG | WEIGHT: 275.44 LBS | BODY MASS INDEX: 45.89 KG/M2 | HEART RATE: 87 BPM | OXYGEN SATURATION: 98 % | SYSTOLIC BLOOD PRESSURE: 138 MMHG | HEIGHT: 65 IN

## 2022-10-25 DIAGNOSIS — I48.0 PAROXYSMAL ATRIAL FIBRILLATION: ICD-10-CM

## 2022-10-25 DIAGNOSIS — G47.33 OBSTRUCTIVE SLEEP APNEA SYNDROME: ICD-10-CM

## 2022-10-25 DIAGNOSIS — I10 ESSENTIAL HYPERTENSION: ICD-10-CM

## 2022-10-25 DIAGNOSIS — Z01.818 PREOP EXAMINATION: Primary | ICD-10-CM

## 2022-10-25 DIAGNOSIS — R11.0 NAUSEA: Primary | ICD-10-CM

## 2022-10-25 DIAGNOSIS — T75.3XXA MOTION SICKNESS, INITIAL ENCOUNTER: ICD-10-CM

## 2022-10-25 PROBLEM — N63.10 BREAST MASS, RIGHT: Status: RESOLVED | Noted: 2018-01-29 | Resolved: 2022-10-25

## 2022-10-25 PROCEDURE — 99214 OFFICE O/P EST MOD 30 MIN: CPT | Mod: S$GLB,,, | Performed by: INTERNAL MEDICINE

## 2022-10-25 PROCEDURE — 99214 PR OFFICE/OUTPT VISIT, EST, LEVL IV, 30-39 MIN: ICD-10-PCS | Mod: S$GLB,,, | Performed by: INTERNAL MEDICINE

## 2022-10-25 PROCEDURE — 3044F PR MOST RECENT HEMOGLOBIN A1C LEVEL <7.0%: ICD-10-PCS | Mod: CPTII,S$GLB,, | Performed by: INTERNAL MEDICINE

## 2022-10-25 PROCEDURE — 3044F HG A1C LEVEL LT 7.0%: CPT | Mod: CPTII,S$GLB,, | Performed by: INTERNAL MEDICINE

## 2022-10-25 PROCEDURE — 4010F ACE/ARB THERAPY RXD/TAKEN: CPT | Mod: CPTII,S$GLB,, | Performed by: INTERNAL MEDICINE

## 2022-10-25 PROCEDURE — 3078F DIAST BP <80 MM HG: CPT | Mod: CPTII,S$GLB,, | Performed by: INTERNAL MEDICINE

## 2022-10-25 PROCEDURE — 99999 PR PBB SHADOW E&M-EST. PATIENT-LVL V: ICD-10-PCS | Mod: PBBFAC,,, | Performed by: INTERNAL MEDICINE

## 2022-10-25 PROCEDURE — 3075F PR MOST RECENT SYSTOLIC BLOOD PRESS GE 130-139MM HG: ICD-10-PCS | Mod: CPTII,S$GLB,, | Performed by: INTERNAL MEDICINE

## 2022-10-25 PROCEDURE — 99999 PR PBB SHADOW E&M-EST. PATIENT-LVL V: CPT | Mod: PBBFAC,,, | Performed by: INTERNAL MEDICINE

## 2022-10-25 PROCEDURE — 1160F PR REVIEW ALL MEDS BY PRESCRIBER/CLIN PHARMACIST DOCUMENTED: ICD-10-PCS | Mod: CPTII,S$GLB,, | Performed by: INTERNAL MEDICINE

## 2022-10-25 PROCEDURE — 4010F PR ACE/ARB THEARPY RXD/TAKEN: ICD-10-PCS | Mod: CPTII,S$GLB,, | Performed by: INTERNAL MEDICINE

## 2022-10-25 PROCEDURE — 3078F PR MOST RECENT DIASTOLIC BLOOD PRESSURE < 80 MM HG: ICD-10-PCS | Mod: CPTII,S$GLB,, | Performed by: INTERNAL MEDICINE

## 2022-10-25 PROCEDURE — 1160F RVW MEDS BY RX/DR IN RCRD: CPT | Mod: CPTII,S$GLB,, | Performed by: INTERNAL MEDICINE

## 2022-10-25 PROCEDURE — 3075F SYST BP GE 130 - 139MM HG: CPT | Mod: CPTII,S$GLB,, | Performed by: INTERNAL MEDICINE

## 2022-10-25 PROCEDURE — 1159F PR MEDICATION LIST DOCUMENTED IN MEDICAL RECORD: ICD-10-PCS | Mod: CPTII,S$GLB,, | Performed by: INTERNAL MEDICINE

## 2022-10-25 PROCEDURE — 1159F MED LIST DOCD IN RCRD: CPT | Mod: CPTII,S$GLB,, | Performed by: INTERNAL MEDICINE

## 2022-10-25 RX ORDER — ONDANSETRON 4 MG/1
4 TABLET, ORALLY DISINTEGRATING ORAL 2 TIMES DAILY
Qty: 20 TABLET | Refills: 1 | Status: SHIPPED | OUTPATIENT
Start: 2022-10-25

## 2022-10-25 RX ORDER — SCOLOPAMINE TRANSDERMAL SYSTEM 1 MG/1
1 PATCH, EXTENDED RELEASE TRANSDERMAL
Qty: 10 PATCH | Refills: 0 | Status: SHIPPED | OUTPATIENT
Start: 2022-10-25 | End: 2023-08-28

## 2022-10-25 RX ORDER — PROMETHAZINE HYDROCHLORIDE AND DEXTROMETHORPHAN HYDROBROMIDE 6.25; 15 MG/5ML; MG/5ML
5 SYRUP ORAL NIGHTLY PRN
COMMUNITY
Start: 2022-07-05 | End: 2023-12-21 | Stop reason: CLARIF

## 2022-10-25 RX ORDER — TIZANIDINE 4 MG/1
4 TABLET ORAL EVERY 6 HOURS PRN
COMMUNITY
Start: 2022-06-18 | End: 2023-04-19

## 2022-10-25 RX ORDER — CEPHALEXIN 500 MG/1
500 CAPSULE ORAL EVERY 12 HOURS
COMMUNITY
Start: 2022-07-05 | End: 2022-11-09 | Stop reason: ALTCHOICE

## 2022-10-25 NOTE — TELEPHONE ENCOUNTER
Re faxed form this am., As long as he puts she is cleared for Sx in chart notes and has a complete H&P she is good

## 2022-10-25 NOTE — PROGRESS NOTES
Subjective     Fatemeh Shoemaker is a 61 y.o. old, female here for Pre-op Exam    61-year-old with past medical history of PAF, symptomatic bradycardia s/p PCM, HLD, JOSE ELIAS, peripheral neuropathy    [facility].  She has had surgery previously with no complications.  She has no high risk medical problems except for paroxysmal atrial fibrillation with no recent recurrence.  She is compliant with all of her medications.  She is followed by a doctor at [facility] and prescribed Ozempic which has helped with her weight loss.  She continues to have iron deficiency anemia and is due for a colonoscopy and has a history of esophageal ulcers.   Surgery is considered low risk.  Exercise is limited by arthritis and back pain.  She could get up to 4 Mets of physical activity without any chest pain or dyspnea on exertion.  Mild URI symptoms the past day or two, grandson has been sick.    Review of Systems   Constitutional:  Positive for malaise/fatigue. Negative for weight loss.   HENT:  Positive for congestion.    Respiratory:  Positive for cough. Negative for shortness of breath.    Cardiovascular:  Negative for chest pain and palpitations.   Medications     Outpatient Medications Marked as Taking for the 10/25/22 encounter (Office Visit) with Jerson Wheat MD   Medication Sig Dispense Refill    albuterol sulfate 90 mcg/actuation aebs Inhale 180 mcg into the lungs.      apixaban (ELIQUIS) 5 mg Tab Take 1 tablet (5 mg total) by mouth 2 (two) times daily. 180 tablet 3    celecoxib (CELEBREX) 200 MG capsule Take 1 capsule (200 mg total) by mouth daily as needed for Pain. 30 capsule 1    cephALEXin (KEFLEX) 500 MG capsule Take 500 mg by mouth every 12 (twelve) hours.      cholecalciferol, vitamin D3, (DECARA) 1,250 mcg (50,000 unit) capsule Take 1 capsule (50,000 Units total) by mouth every 7 days. 30 capsule 0    cyanocobalamin 500 MCG tablet Take 1,000 mcg by mouth once  daily.      diclofenac sodium (VOLTAREN) 1 % Gel Apply 2 g topically 4 (four) times daily. 50 g 3    DULoxetine (CYMBALTA) 60 MG capsule Take 1 capsule (60 mg total) by mouth once daily. 90 capsule 3    flecainide (TAMBOCOR) 150 MG Tab Take 1 tablet (150 mg total) by mouth every 12 (twelve) hours. 60 tablet 11    fluticasone furoate-vilanteroL (BREO ELLIPTA) 100-25 mcg/dose diskus inhaler Inhale 1 puff into the lungs.      HYDROcodone-acetaminophen (NORCO) 5-325 mg per tablet Take 1 tablet by mouth every 6 (six) hours as needed for Pain.      HYDROcodone-acetaminophen (NORCO) 5-325 mg per tablet Take 1 tablet by mouth every 6 (six) hours as needed for Pain. 20 tablet 0    lisinopriL 10 MG tablet Take 1 tablet (10 mg total) by mouth once daily. 90 tablet 1    melatonin 5 mg TbDL Take 1 tablet by mouth nightly as needed (sleep).      metoprolol tartrate (LOPRESSOR) 100 MG tablet Take 1 tablet (100 mg total) by mouth 2 (two) times daily. 180 tablet 3    nortriptyline (PAMELOR) 25 MG capsule TAKE ONE CAPSULE BY MOUTH EVERY EVENING 90 capsule 3    omeprazole (PRILOSEC) 20 MG capsule Take 1 capsule (20 mg total) by mouth every morning. 90 capsule 1    promethazine (PHENERGAN) 12.5 MG Tab Take 1 tablet (12.5 mg total) by mouth 2 (two) times daily as needed (nausea). 10 tablet 0    promethazine-dextromethorphan (PROMETHAZINE-DM) 6.25-15 mg/5 mL Syrp Take 5 mLs by mouth nightly as needed.      pulse oximeter (PULSE OXIMETER) device Use twice daily at 8 AM and 3 PM and record the value in [facility] as directed. 1 each 0    rosuvastatin (CRESTOR) 10 MG tablet TAKE ONE TABLET BY MOUTH EVERY EVENING 90 tablet 3    semaglutide 2 mg/dose (8 mg/3 mL) PnIj Inject 0.5 mg into the skin.      tiZANidine (ZANAFLEX) 4 MG tablet Take 4 mg by mouth every 6 (six) hours as needed.      valACYclovir (VALTREX) 500 MG tablet Take 1 tablet (500 mg total) by mouth daily as needed (fever blister). 30 tablet 2     Objective     BP (!) 138/40   Pulse  "87   Ht 5' 5" (1.651 m)   Wt 125 kg (275 lb 7.4 oz)   LMP 09/08/2017   SpO2 98%   BMI 45.84 kg/m²   Physical Exam  Constitutional:       General: She is not in acute distress.     Appearance: Normal appearance. She is well-developed.   HENT:      Head: Normocephalic and atraumatic.      Right Ear: External ear normal.      Left Ear: External ear normal.   Eyes:      General:         Right eye: No discharge.         Left eye: No discharge.      Conjunctiva/sclera: Conjunctivae normal.   Cardiovascular:      Rate and Rhythm: Normal rate and regular rhythm.      Heart sounds: No murmur heard.  Pulmonary:      Effort: Pulmonary effort is normal. No respiratory distress.      Breath sounds: Normal breath sounds.   Musculoskeletal:      Cervical back: Neck supple.   Lymphadenopathy:      Cervical: No cervical adenopathy.     Assessment and Plan     Preop examination    Paroxysmal atrial fibrillation    Essential hypertension    Obstructive sleep apnea syndrome    Patient is low risk for cardiovascular perioperative events according to the revised cardiac risk index.  If the benefits to the patient outweigh the risk, and the surgeon agrees patient can proceed with surgery. Her chronic medical problems are optimized prior to surgery. No further testing is required at this time.    No follow-ups on file.  ___________________  Jerson Wheat MD  Internal Medicine and Pediatrics    "

## 2022-10-25 NOTE — TELEPHONE ENCOUNTER
----- Message from Vito Dumont sent at 10/25/2022  8:28 AM CDT -----  Contact: pt  Type: Needs Medical Advice    Who Called:pt  Best Call Back Number:[phone]    Additional Information Requesting a call back regarding Pt was calling in regards to surgical clearance pt stated they had issues receiving faxed over for wanted to see if the form could just be sent directly to Dr Nguyen office fax [phone] if not pt stated they would stop by and retrieve please call Thank you  Please Advise-Thank you

## 2022-10-26 ENCOUNTER — TELEPHONE (OUTPATIENT)
Dept: PAIN MEDICINE | Facility: CLINIC | Age: 62
End: 2022-10-26
Payer: COMMERCIAL

## 2022-10-26 ENCOUNTER — PATIENT MESSAGE (OUTPATIENT)
Dept: PAIN MEDICINE | Facility: CLINIC | Age: 62
End: 2022-10-26
Payer: COMMERCIAL

## 2022-10-26 DIAGNOSIS — M54.16 LUMBAR RADICULOPATHY: ICD-10-CM

## 2022-10-26 RX ORDER — GABAPENTIN 600 MG/1
600 TABLET ORAL 2 TIMES DAILY
Qty: 60 TABLET | Refills: 2 | Status: SHIPPED | OUTPATIENT
Start: 2022-10-26 | End: 2023-09-14 | Stop reason: SDUPTHER

## 2022-10-26 RX ORDER — HYDROCODONE BITARTRATE AND ACETAMINOPHEN 5; 325 MG/1; MG/1
1 TABLET ORAL EVERY 12 HOURS PRN
Qty: 14 TABLET | Refills: 0 | Status: ON HOLD | OUTPATIENT
Start: 2022-10-26 | End: 2022-11-03 | Stop reason: HOSPADM

## 2022-10-26 RX ORDER — GABAPENTIN 600 MG/1
600 TABLET ORAL 2 TIMES DAILY
Qty: 60 TABLET | Refills: 6 | Status: SHIPPED | OUTPATIENT
Start: 2022-10-26 | End: 2022-10-26 | Stop reason: SDUPTHER

## 2022-10-26 RX ORDER — HYDROCODONE BITARTRATE AND ACETAMINOPHEN 5; 325 MG/1; MG/1
1 TABLET ORAL EVERY 12 HOURS PRN
Qty: 14 TABLET | Refills: 0 | Status: SHIPPED | OUTPATIENT
Start: 2022-10-26 | End: 2022-10-26 | Stop reason: SDUPTHER

## 2022-10-26 NOTE — TELEPHONE ENCOUNTER
Pt stated the gabapentin helped with the nerve pain. Pt stated he is going out of town. Pt stated she wants the doctor to prescribe something for her pain along with the gabapentin. I informed pt that the doctor would have to review her chart to see if that is appropriate. I confirmed her pharmacy- [facility] in [place], [place]

## 2022-10-26 NOTE — TELEPHONE ENCOUNTER
Call placed to Pt to advise per Dr. Tavarez that refills have been called in for Gabapentin to her pharmacy of choice. Pt verbalized understanding.

## 2022-10-26 NOTE — TELEPHONE ENCOUNTER
Call received from Pt stating that the pharm does not have her Rx for Gabapentin. Pharm contacted and the refill will be ready in 15min. Pt advised and verbalized understanding.

## 2022-10-26 NOTE — TELEPHONE ENCOUNTER
----- Message from Jamey Muñiz sent at 10/26/2022 12:32 PM CDT -----  Type: Needs Medical Advice  Who Called:  pt  Symptoms (please be specific):  pt said she been sending refill request and no one have filled her gabapentin (NEURONTIN) 600 MG tablet--said she leaving to go out of town @4:30 and she need her med--please call her she need to speak to the nurse  [facility] Call Back Number: [phone]     Additional Information: thank you

## 2022-10-26 NOTE — TELEPHONE ENCOUNTER
I sent it to the pharmacy that was listed - [facility] in [place] at 2:07pm        I will resend it.

## 2022-10-31 ENCOUNTER — TELEPHONE (OUTPATIENT)
Dept: SURGERY | Facility: HOSPITAL | Age: 62
End: 2022-10-31
Payer: COMMERCIAL

## 2022-10-31 NOTE — TELEPHONE ENCOUNTER
PACEMAKERS:  If no electrocautery is planned, nothing needs to be done.  If only electrocautery below the umbilicus is planned, nothing needs to be done.  If electrocautery above the umbilicus is planned, a magnet may be placed over the device during the surgery. A magnet makes a pacemaker's pacing mode asynchronous (the pacer will pace no matter what). Magnet removal returns the pacer function to baseline without any programming required. If the patient is not pacer dependent, application of a magnet is not required.      Patient has a Biotronik dual-chamber pacemaker programmed DDDR 60  Last device check 9/12/22, underlying rhythm SB 49bpm with 1st degree AVB  Patient is not pacemaker dependent, but a magnet is recommended secondary to history of SB

## 2022-10-31 NOTE — TELEPHONE ENCOUNTER
Good afternoon,    Patient is scheduled for hammer toe correction on Thursday, 11/3 with Dr. Alvarado. Please provide recommendations for patient pacemaker. Thank you!    Lizeth Sanchez

## 2022-11-02 ENCOUNTER — PATIENT MESSAGE (OUTPATIENT)
Dept: ADMINISTRATIVE | Facility: OTHER | Age: 62
End: 2022-11-02
Payer: COMMERCIAL

## 2022-11-02 ENCOUNTER — ANESTHESIA EVENT (OUTPATIENT)
Dept: SURGERY | Facility: HOSPITAL | Age: 62
End: 2022-11-02
Payer: COMMERCIAL

## 2022-11-03 ENCOUNTER — ANESTHESIA (OUTPATIENT)
Dept: SURGERY | Facility: HOSPITAL | Age: 62
End: 2022-11-03
Payer: COMMERCIAL

## 2022-11-03 ENCOUNTER — HOSPITAL ENCOUNTER (OUTPATIENT)
Dept: RADIOLOGY | Facility: HOSPITAL | Age: 62
Discharge: HOME | End: 2022-11-03
Attending: PODIATRIST | Admitting: PODIATRIST
Payer: COMMERCIAL

## 2022-11-03 ENCOUNTER — HOSPITAL ENCOUNTER (OUTPATIENT)
Facility: HOSPITAL | Age: 62
Discharge: HOME | End: 2022-11-03
Attending: PODIATRIST | Admitting: PODIATRIST
Payer: COMMERCIAL

## 2022-11-03 VITALS
TEMPERATURE: 97 F | RESPIRATION RATE: 17 BRPM | WEIGHT: 275 LBS | SYSTOLIC BLOOD PRESSURE: 115 MMHG | HEIGHT: 65 IN | HEART RATE: 72 BPM | OXYGEN SATURATION: 99 % | BODY MASS INDEX: 45.82 KG/M2 | DIASTOLIC BLOOD PRESSURE: 72 MMHG

## 2022-11-03 DIAGNOSIS — M20.42 HAMMERTOE OF LEFT FOOT: ICD-10-CM

## 2022-11-03 DIAGNOSIS — M20.32 HALLUX MALLEUS OF LEFT FOOT: ICD-10-CM

## 2022-11-03 DIAGNOSIS — M20.42 ACQUIRED HAMMERTOE OF LEFT FOOT: ICD-10-CM

## 2022-11-03 DIAGNOSIS — L91.8 SKIN TAG: ICD-10-CM

## 2022-11-03 DIAGNOSIS — M20.32 ACQUIRED HALLUX MALLEUS OF LEFT FOOT: Primary | ICD-10-CM

## 2022-11-03 PROCEDURE — C1776 JOINT DEVICE (IMPLANTABLE): HCPCS | Mod: PO | Performed by: PODIATRIST

## 2022-11-03 PROCEDURE — 28285 REPAIR OF HAMMERTOE: CPT | Mod: 51,T1,, | Performed by: PODIATRIST

## 2022-11-03 PROCEDURE — 28755 PR FUSION BIG TOE,I-P JOINT: ICD-10-PCS | Mod: TA,,, | Performed by: PODIATRIST

## 2022-11-03 PROCEDURE — 73630 X-RAY EXAM OF FOOT: CPT | Mod: TC,FY,PO,LT

## 2022-11-03 PROCEDURE — 36000709 HC OR TIME LEV III EA ADD 15 MIN: Mod: PO | Performed by: PODIATRIST

## 2022-11-03 PROCEDURE — 25000003 PHARM REV CODE 250: Mod: PO | Performed by: PODIATRIST

## 2022-11-03 PROCEDURE — 63600175 PHARM REV CODE 636 W HCPCS: Mod: PO | Performed by: NURSE ANESTHETIST, CERTIFIED REGISTERED

## 2022-11-03 PROCEDURE — 27201423 OPTIME MED/SURG SUP & DEVICES STERILE SUPPLY: Mod: PO | Performed by: PODIATRIST

## 2022-11-03 PROCEDURE — 37000008 HC ANESTHESIA 1ST 15 MINUTES: Mod: PO | Performed by: PODIATRIST

## 2022-11-03 PROCEDURE — 28285 PR REPAIR OF HAMMERTOE,ONE: ICD-10-PCS | Mod: 51,T1,, | Performed by: PODIATRIST

## 2022-11-03 PROCEDURE — D9220A PRA ANESTHESIA: ICD-10-PCS | Mod: CRNA,,, | Performed by: NURSE ANESTHETIST, CERTIFIED REGISTERED

## 2022-11-03 PROCEDURE — 88304 PR  SURG PATH,LEVEL III: ICD-10-PCS | Mod: 26,,, | Performed by: PATHOLOGY

## 2022-11-03 PROCEDURE — 28755 FUSION OF BIG TOE JOINT: CPT | Mod: TA,,, | Performed by: PODIATRIST

## 2022-11-03 PROCEDURE — D9220A PRA ANESTHESIA: Mod: ANES,,, | Performed by: ANESTHESIOLOGY

## 2022-11-03 PROCEDURE — 25000003 PHARM REV CODE 250: Mod: PO | Performed by: NURSE ANESTHETIST, CERTIFIED REGISTERED

## 2022-11-03 PROCEDURE — C1769 GUIDE WIRE: HCPCS | Mod: PO | Performed by: PODIATRIST

## 2022-11-03 PROCEDURE — 71000015 HC POSTOP RECOV 1ST HR: Mod: PO | Performed by: PODIATRIST

## 2022-11-03 PROCEDURE — 63600175 PHARM REV CODE 636 W HCPCS: Mod: PO | Performed by: PODIATRIST

## 2022-11-03 PROCEDURE — 36000708 HC OR TIME LEV III 1ST 15 MIN: Mod: PO | Performed by: PODIATRIST

## 2022-11-03 PROCEDURE — 73630 XR FOOT COMPLETE 3 VIEW LEFT: ICD-10-PCS | Mod: 26,LT,, | Performed by: RADIOLOGY

## 2022-11-03 PROCEDURE — D9220A PRA ANESTHESIA: ICD-10-PCS | Mod: ANES,,, | Performed by: ANESTHESIOLOGY

## 2022-11-03 PROCEDURE — 63600175 PHARM REV CODE 636 W HCPCS: Mod: PO | Performed by: ANESTHESIOLOGY

## 2022-11-03 PROCEDURE — 37000009 HC ANESTHESIA EA ADD 15 MINS: Mod: PO | Performed by: PODIATRIST

## 2022-11-03 PROCEDURE — 73630 X-RAY EXAM OF FOOT: CPT | Mod: 26,LT,, | Performed by: RADIOLOGY

## 2022-11-03 PROCEDURE — C1713 ANCHOR/SCREW BN/BN,TIS/BN: HCPCS | Mod: PO | Performed by: PODIATRIST

## 2022-11-03 PROCEDURE — 88304 TISSUE EXAM BY PATHOLOGIST: CPT | Mod: 26,,, | Performed by: PATHOLOGY

## 2022-11-03 PROCEDURE — 88304 TISSUE EXAM BY PATHOLOGIST: CPT | Performed by: PATHOLOGY

## 2022-11-03 PROCEDURE — D9220A PRA ANESTHESIA: Mod: CRNA,,, | Performed by: NURSE ANESTHETIST, CERTIFIED REGISTERED

## 2022-11-03 DEVICE — TOE HAMMERTOE SMALL: Type: IMPLANTABLE DEVICE | Site: TOE | Status: FUNCTIONAL

## 2022-11-03 DEVICE — IMPLANTABLE DEVICE: Type: IMPLANTABLE DEVICE | Site: TOE | Status: FUNCTIONAL

## 2022-11-03 RX ORDER — ONDANSETRON 2 MG/ML
INJECTION INTRAMUSCULAR; INTRAVENOUS
Status: DISCONTINUED | OUTPATIENT
Start: 2022-11-03 | End: 2022-11-03

## 2022-11-03 RX ORDER — DEXAMETHASONE SODIUM PHOSPHATE 4 MG/ML
INJECTION, SOLUTION INTRA-ARTICULAR; INTRALESIONAL; INTRAMUSCULAR; INTRAVENOUS; SOFT TISSUE
Status: DISCONTINUED | OUTPATIENT
Start: 2022-11-03 | End: 2022-11-03

## 2022-11-03 RX ORDER — FENTANYL CITRATE 50 UG/ML
25 INJECTION, SOLUTION INTRAMUSCULAR; INTRAVENOUS EVERY 5 MIN PRN
Status: DISCONTINUED | OUTPATIENT
Start: 2022-11-03 | End: 2022-11-03 | Stop reason: HOSPADM

## 2022-11-03 RX ORDER — PROPOFOL 10 MG/ML
VIAL (ML) INTRAVENOUS CONTINUOUS PRN
Status: DISCONTINUED | OUTPATIENT
Start: 2022-11-03 | End: 2022-11-03

## 2022-11-03 RX ORDER — OXYCODONE HYDROCHLORIDE 5 MG/1
5 TABLET ORAL ONCE AS NEEDED
Status: DISCONTINUED | OUTPATIENT
Start: 2022-11-03 | End: 2022-11-03 | Stop reason: HOSPADM

## 2022-11-03 RX ORDER — MIDAZOLAM HYDROCHLORIDE 1 MG/ML
INJECTION, SOLUTION INTRAMUSCULAR; INTRAVENOUS
Status: DISCONTINUED | OUTPATIENT
Start: 2022-11-03 | End: 2022-11-03

## 2022-11-03 RX ORDER — ONDANSETRON 2 MG/ML
4 INJECTION INTRAMUSCULAR; INTRAVENOUS ONCE AS NEEDED
Status: DISCONTINUED | OUTPATIENT
Start: 2022-11-03 | End: 2022-11-03 | Stop reason: HOSPADM

## 2022-11-03 RX ORDER — PROPOFOL 10 MG/ML
VIAL (ML) INTRAVENOUS
Status: DISCONTINUED | OUTPATIENT
Start: 2022-11-03 | End: 2022-11-03

## 2022-11-03 RX ORDER — BUPIVACAINE HYDROCHLORIDE 5 MG/ML
INJECTION, SOLUTION EPIDURAL; INTRACAUDAL
Status: DISCONTINUED | OUTPATIENT
Start: 2022-11-03 | End: 2022-11-03 | Stop reason: HOSPADM

## 2022-11-03 RX ORDER — LIDOCAINE HYDROCHLORIDE 20 MG/ML
INJECTION INTRAVENOUS
Status: DISCONTINUED | OUTPATIENT
Start: 2022-11-03 | End: 2022-11-03

## 2022-11-03 RX ORDER — OXYCODONE AND ACETAMINOPHEN 5; 325 MG/1; MG/1
1 TABLET ORAL EVERY 4 HOURS PRN
Qty: 15 TABLET | Refills: 0 | Status: SHIPPED | OUTPATIENT
Start: 2022-11-03 | End: 2022-12-07

## 2022-11-03 RX ORDER — FENTANYL CITRATE 50 UG/ML
INJECTION, SOLUTION INTRAMUSCULAR; INTRAVENOUS
Status: DISCONTINUED | OUTPATIENT
Start: 2022-11-03 | End: 2022-11-03

## 2022-11-03 RX ORDER — LIDOCAINE HYDROCHLORIDE 10 MG/ML
INJECTION, SOLUTION EPIDURAL; INFILTRATION; INTRACAUDAL; PERINEURAL
Status: DISCONTINUED | OUTPATIENT
Start: 2022-11-03 | End: 2022-11-03 | Stop reason: HOSPADM

## 2022-11-03 RX ORDER — ACETAMINOPHEN 10 MG/ML
INJECTION, SOLUTION INTRAVENOUS
Status: DISCONTINUED | OUTPATIENT
Start: 2022-11-03 | End: 2022-11-03

## 2022-11-03 RX ORDER — SODIUM CHLORIDE, SODIUM LACTATE, POTASSIUM CHLORIDE, CALCIUM CHLORIDE 600; 310; 30; 20 MG/100ML; MG/100ML; MG/100ML; MG/100ML
125 INJECTION, SOLUTION INTRAVENOUS CONTINUOUS
Status: DISCONTINUED | OUTPATIENT
Start: 2022-11-03 | End: 2022-11-03 | Stop reason: HOSPADM

## 2022-11-03 RX ORDER — KETAMINE HCL IN 0.9 % NACL 50 MG/5 ML
SYRINGE (ML) INTRAVENOUS
Status: DISCONTINUED | OUTPATIENT
Start: 2022-11-03 | End: 2022-11-03

## 2022-11-03 RX ORDER — LIDOCAINE HYDROCHLORIDE 10 MG/ML
1 INJECTION, SOLUTION EPIDURAL; INFILTRATION; INTRACAUDAL; PERINEURAL ONCE
Status: DISCONTINUED | OUTPATIENT
Start: 2022-11-03 | End: 2022-11-03 | Stop reason: HOSPADM

## 2022-11-03 RX ORDER — SODIUM CHLORIDE, SODIUM LACTATE, POTASSIUM CHLORIDE, CALCIUM CHLORIDE 600; 310; 30; 20 MG/100ML; MG/100ML; MG/100ML; MG/100ML
INJECTION, SOLUTION INTRAVENOUS CONTINUOUS
Status: DISCONTINUED | OUTPATIENT
Start: 2022-11-03 | End: 2022-11-03 | Stop reason: HOSPADM

## 2022-11-03 RX ADMIN — ONDANSETRON 4 MG: 2 INJECTION, SOLUTION INTRAMUSCULAR; INTRAVENOUS at 08:11

## 2022-11-03 RX ADMIN — PROPOFOL 100 MCG/KG/MIN: 10 INJECTION, EMULSION INTRAVENOUS at 08:11

## 2022-11-03 RX ADMIN — LIDOCAINE HYDROCHLORIDE 100 MG: 20 INJECTION INTRAVENOUS at 08:11

## 2022-11-03 RX ADMIN — Medication 20 MG: at 08:11

## 2022-11-03 RX ADMIN — DEXAMETHASONE SODIUM PHOSPHATE 4 MG: 4 INJECTION, SOLUTION INTRAMUSCULAR; INTRAVENOUS at 08:11

## 2022-11-03 RX ADMIN — FENTANYL CITRATE 50 MCG: 50 INJECTION, SOLUTION INTRAMUSCULAR; INTRAVENOUS at 08:11

## 2022-11-03 RX ADMIN — Medication 10 MG: at 08:11

## 2022-11-03 RX ADMIN — ACETAMINOPHEN 1000 MG: 10 INJECTION, SOLUTION INTRAVENOUS at 08:11

## 2022-11-03 RX ADMIN — FENTANYL CITRATE 50 MCG: 50 INJECTION, SOLUTION INTRAMUSCULAR; INTRAVENOUS at 09:11

## 2022-11-03 RX ADMIN — DEXTROSE 2 G: 50 INJECTION, SOLUTION INTRAVENOUS at 08:11

## 2022-11-03 RX ADMIN — SODIUM CHLORIDE, SODIUM LACTATE, POTASSIUM CHLORIDE, AND CALCIUM CHLORIDE: .6; .31; .03; .02 INJECTION, SOLUTION INTRAVENOUS at 07:11

## 2022-11-03 RX ADMIN — PROPOFOL 70 MG: 10 INJECTION, EMULSION INTRAVENOUS at 08:11

## 2022-11-03 RX ADMIN — MIDAZOLAM HYDROCHLORIDE 2 MG: 1 INJECTION, SOLUTION INTRAMUSCULAR; INTRAVENOUS at 08:11

## 2022-11-03 NOTE — TRANSFER OF CARE
"Anesthesia Transfer of Care Note    Patient: Fatemeh Shoemaker    Procedure(s) Performed: Procedure(s) (LRB):  FUSION, JOINT, TOE (Left)  CORRECTION, HAMMER TOE (Left)  REMOVAL, SKIN TAG (Left)    Patient location: PACU    Anesthesia Type: MAC    Transport from OR: Transported from OR on room air with adequate spontaneous ventilation    Post pain: adequate analgesia    Post assessment: no apparent anesthetic complications and tolerated procedure well    Post vital signs: stable    Level of consciousness: awake, alert and oriented    Nausea/Vomiting: no nausea/vomiting    Complications: none    Transfer of care protocol was followed      Last vitals:   Visit Vitals  /70 (BP Location: Right arm, Patient Position: Lying)   Pulse 60   Temp 36.8 °C (98.2 °F)   Resp 16   Ht 5' 5" (1.651 m)   Wt 124.7 kg (275 lb)   LMP 09/08/2017   SpO2 96%   Breastfeeding No   BMI 45.76 kg/m²     "

## 2022-11-03 NOTE — BRIEF OP NOTE
[facility] - Surgery  Brief Operative Note    Surgery Date: 11/3/2022     Surgeon(s) and Role:     * Geo Benltey DPM - Primary    Assisting Surgeon: None    Pre-op Diagnosis:  Hallux malleus of left foot [M20.32]  Hammer toes of both feet [M20.41, M20.42]    Post-op Diagnosis:  Post-Op Diagnosis Codes:     * Hallux malleus of left foot [M20.32]     * Hammer toes of both feet [M20.41, M20.42]    Procedure(s) (LRB):  FUSION, JOINT, TOE (Left)  CORRECTION, HAMMER TOE (Left)  REMOVAL, SKIN TAG (Left)    Anesthesia: Local MAC    Operative Findings: left foot hallux IPJ fusion with 4.0 partially threaded 44 mm screw, 2-3 toes PIPJ arthrodesis with toe tac implant with k-wire left in place, 4-5 toes PIPJ arthroplasty, flexor tenotomy toes 2-5. Plantar foot skin tag excision with primary closure    Estimated Blood Loss: 10 mL         Specimens:   Specimen (24h ago, onward)       Start     Ordered    11/03/22 0900  Specimen to Pathology, Surgery General Surgery  Once        Comments: Pre-op Diagnosis: Hallux malleus of left foot [M20.32]Hammer toes of both feet [M20.41, M20.42]Procedure(s):FUSION, JOINT, TOECORRECTION, HAMMER TOEREMOVAL, SKIN TAG Number of specimens: 1. Name of specimens: 1. Left foot skin lesion     References:    Click here for ordering Quick Tip   Question Answer Comment   Procedure Type: General Surgery    Specimen Class: Known or suspected malignancy    Which provider would you like to cc? GEO BENTLEY    Release to patient Immediate        11/03/22 0900                      Discharge Note    OUTCOME: Patient tolerated treatment/procedure well without complication and is now ready for discharge.    DISPOSITION: Home or Self Care    FINAL DIAGNOSIS:  Hallux malleus of left foot    FOLLOWUP: In clinic    DISCHARGE INSTRUCTIONS:    Discharge Procedure Orders   Diet general     Keep surgical extremity elevated     Ice to affected area     Call MD for:  temperature >100.4     Call MD for:   persistent nausea and vomiting     Call MD for:  severe uncontrolled pain     Call MD for:  difficulty breathing, headache or visual disturbances     Call MD for:  redness, tenderness, or signs of infection (pain, swelling, redness, odor or green/yellow discharge around incision site)     Call MD for:  hives     Call MD for:  persistent dizziness or light-headedness     Call MD for:  extreme fatigue     Leave dressing on - Keep it clean, dry, and intact until clinic visit     Weight bearing restrictions (specify)   Order Comments: In post op shoe only     Ezra Arriaga DPM

## 2022-11-03 NOTE — DISCHARGE INSTRUCTIONS
FOOT SURGERY  After surgery:    DO:  Keep leg elevated until first post operative visit.  Keep ice pack on foot for 20 minutes each hour while awake for next 24-48 hours.  Keep dressing clean and dry. Do not change the bandages.  Advance diet as tolerated.   Check circulation frequently in toes by pressing down on toenail. Nail should turn white and then pink WITHIN 3 SECONDS when released.  Wear surgical shoe/boot. May remove to shower.  Do not walk without shoe/boot.  Resume home medications.    DO NOT:  Do not remove your dressing.  Do not get dressing wet.  Do not drive until released by your doctor.  Do not take additional tylenol/acetaminophen while taking narcotic pain medication that contains tylenol/acetaminophen.     CALL PHYSICIAN FOR:  Burning, or numbness of the toes not relieved by elevation of the leg.  Pale or cold toes.  Blue colored nail beds.  Redness, swelling, or bleeding.  Fever greater than 101,  Drainage (pus) from the operative site.  Pain unrelieved by pain medication.    FOR EMERGENCIES CONTACT YOUR PHYSICIAN'S OFFICE  [phone]

## 2022-11-03 NOTE — ANESTHESIA POSTPROCEDURE EVALUATION
Anesthesia Post Evaluation    Patient: Fatmeeh Shoemaker    Procedure(s) Performed: Procedure(s) (LRB):  FUSION, JOINT, TOE (Left)  CORRECTION, HAMMER TOE (Left)  REMOVAL, SKIN TAG (Left)    Final Anesthesia Type: MAC      Patient location during evaluation: PACU  Patient participation: Yes- Able to Participate  Level of consciousness: awake and alert and oriented  Post-procedure vital signs: reviewed and stable  Pain management: adequate  Airway patency: patent  SHAKIRA mitigation strategies: Multimodal analgesia  PONV status at discharge: No PONV  Anesthetic complications: no      Cardiovascular status: blood pressure returned to baseline  Respiratory status: unassisted, spontaneous ventilation and room air  Hydration status: euvolemic  Follow-up not needed.          Vitals Value Taken Time   /72 11/03/22 1108   Temp  11/03/22 1246   Pulse 72 11/03/22 1108   Resp 17 11/03/22 1108   SpO2 99 % 11/03/22 1108         No case tracking events are documented in the log.      Pain/Yojana Score: Yojana Score: 10 (11/3/2022 11:32 AM)

## 2022-11-03 NOTE — ANESTHESIA PREPROCEDURE EVALUATION
11/03/2022  Fatemeh Shoemaker is a 61 y.o., female.      Pre-op Assessment    I have reviewed the NPO Status.   I have reviewed the Medications.     Review of Systems  Anesthesia Hx:  No problems with previous Anesthesia    Cardiovascular:   Exercise tolerance: good Pacemaker Hypertension Dysrhythmias atrial fibrillation Denies Angina. ECG has been reviewed.    Pulmonary:   Sleep Apnea    Education provided regarding risk of obstructive sleep apnea     Renal/:  Renal/ Normal     Hepatic/GI:   PUD,    Musculoskeletal:   Arthritis     Neurological:   Neuromuscular Disease,    Endocrine:  Morbid Obesity / BMI > 40      Physical Exam  General: Well nourished    Airway:  Mallampati: II   Mouth Opening: Normal  TM Distance: Normal  Tongue: Normal  Neck ROM: Normal ROM    Dental:  Intact    Chest/Lungs:  Clear to auscultation, Normal Respiratory Rate        Anesthesia Plan  Type of Anesthesia, risks & benefits discussed:    Anesthesia Type: Gen Natural Airway, MAC  Intra-op Monitoring Plan: Standard ASA Monitors  Induction:  IV  Informed Consent: Informed consent signed with the Patient and all parties understand the risks and agree with anesthesia plan.  All questions answered. Patient consented to blood products? No  ASA Score: 3    Ready For Surgery From Anesthesia Perspective.     .

## 2022-11-03 NOTE — PLAN OF CARE
Pt VSS & all questions/concerns answered or directed to appropriate staff. Pt denies recent fever or illness. Belongings placed under stretcher, no valuables per pt. Procedure/anesthesia consents to be signed by pt in preop. Pt states ready for procedure.

## 2022-11-06 NOTE — OP NOTE
[facility] - [facility]  Brief Operative Note     Surgery Date: 11/3/2022      Surgeon(s) and Role:     * Ezra Arriaga DPM - Primary     Assisting Surgeon: None     Pre-op Diagnosis:  Hallux malleus of left foot [M20.32]  Hammer toes of both feet [M20.41, M20.42]     Post-op Diagnosis:  Post-Op Diagnosis Codes:     * Hallux malleus of left foot [M20.32]     * Hammer toes of both feet [M20.41, M20.42]     Procedure(s) (LRB):  FUSION, JOINT, TOE (Left)  CORRECTION, HAMMER TOE (Left)  REMOVAL, SKIN TAG (Left)     Anesthesia: Local MAC    Hemostasis:  Ankle tourniquet set at 250 mmHg for 125 minutes    Estimated Blood Loss: 10 mL    Specimen: None    Culture: None    Complications: None    Condition: Stable    PRE-PROCEDURE:   The patient was brought into the operating room and placed on the operating table in the supine position. Following IV sedation, local anesthesia was obtained utilizing 20 cc's of a 1:1 mixture of 1% Lidocaine plain and 0.5% Marcaine plain in an ankle block. The foot and leg was then scrubbed, prepped, and draped in the usual aseptic manner.     PROCEDURE:   Hallux IPJ fusion  Attention was directed to the left great toe IPJ where a dorsal incision was made. The incision was made through dermis, then deepened via sharp and blunt dissection through subcutaneous tissue ensuring retract any major vessels and nerves. Any bleeding vessels encountered were cauterized. The extensor tendon was released with a z-lengthening cut. The joint was then incised with a transverse incision and the soft tissue capsule was freed from the base of the distal phalanx and head of the proximal phalanx. The adjacent joint surfaces were removed with the sagittal saw and then a 4-0 screw was placed over a k-wire with the hallux in proper position from distal to proximal, the screw was noted in good placement on C-arm and the hallux was in proper position. The area was flushed and the extensor tenon repaired with 3-0  vicryl, the subcutaneous tissue with 4-0 vicryl and the skin with 4-0 nylon suture.     Hammertoes 2-3 with implant  Attention was then directed towards the 2nd digit of the left foot where a longitudinal incision was made using the #15 blade.  The incision was carefully deepened down to the level of the subcutaneous tissue and down further to the EDL tendon carefully avoiding all important neurovascular structures. A Z-lengthening of the EDL tendon was preformed at this time. The tendon was reflected away from the incision and the incision was deepened to expose the MTPJ and PIPJs. With the cut portions of the EDL tendon reflected, the PIPJ was identified where 2-3mm of the proximal phalanx head was resected using a sagittal saw. Approximately 1mm of the base of the middle phalanx was resected using the saw. The toe was prepped for a Toe Tac hammertoe implants, which were placed using the "[employer] technique guide and proper AO techniques. Proper apposition of the PIPJ was noted visually and the toe was in proper alignment. The same exact procedure was preformed within the 3rd digits. Flexor tenotomies were preformed on toes 2-5 at this point. The extensor tendons were repaired and subcutaneous tissue coapted with 4-0 vicryl and the skin closed with 4-0 nylon.    Hamertoe 4th toe with arthroplasty without implant  An incision was then made over the fourth toe left foot. a longitudinal incision was made using the #15 blade.  The incision was carefully deepened down to the level of the subcutaneous tissue and down further to the EDL tendon carefully avoiding all important neurovascular structures. A Z-lengthening of the EDL tendon was preformed at this time. The tendon was reflected away from the incision and the incision was deepened to expose the MTPJ and PIPJs. With the cut portions of the EDL tendon reflected, the PIPJ was identified where 2-3mm of the proximal phalanx head was resected using a sagittal saw. The  extensor tendons were repaired and subcutaneous tissue coapted with 4-0 vicryl and the skin closed with 4-0 nylon.     5th toe arthroplasty  Attention was then directed to the dorsal left fifth digit where two converging semielliptical incisions were made in a proximal-lateral to distal-medial direction with about a 0.5 cm x 1.5 cm fusiform shaped section of skin was removed over the PIPJ. Sharp and blunt dissection was then utilized to deepen the wound to the level of the PIPJ. The joint was entered into via a tranverse incision. The capsular tissue was then reflected from the dorsal, medial, lateral, and plantar aspect of the head of the proximal phalanx. Utilizing a bone saw, the head of the proximal phalanx was resected and removed from the wound. The cartilage was removed from the base of the middle phalanx with the oscillating saw and the remaining bone was rasped smooth with the saw. The extensor tendons were repaired and subcutaneous tissue coapted with 4-0 vicryl and the skin closed with 4-0 nylon.     Once this was done, and the foot was loaded, proper alignment of the toes 1-5 was noted. During all dissection, care was taken to avoid damage to all neurovascular structures with proper retraction and all bleeders were tied or bovied as necessary. The C-arm was used to visualize proper alignment of toes and proper application of implants.     The toes were gently splinted in a rectus position with longitudinal steri-strips and longitudinal 4x4 gauze. The tourniquet was deflated at this time. The incision was covered with xeroform, 4x4 fluff gauze, Cling and ACE wrap. The patient tolerated the procedure and anesthesia well. The patient was transported to recovery with vital signs stable and vascular status intact.     Will return to office in 1 week post op with dressings c/d/I and ambulation in the darco shoe only    Ezra Arriaga DPM

## 2022-11-07 NOTE — INTERVAL H&P NOTE
The patient has been examined and the previous podiatry progress note has been reviewed:    I concur with the findings and no changes have occurred since my previous progress note was written.    Surgery risks, benefits and alternative options discussed and understood by patient/family.    Proceed as discussed consent reviewed and proper site confirmed and marked    Ezra Arriaga DPM         Active Hospital Problems    Diagnosis  POA    *Hallux malleus of left foot [M20.32]  Yes    Acquired hammertoe of left foot [M20.42]  Yes    Skin tag [L91.8]  Yes      Resolved Hospital Problems   No resolved problems to display.

## 2022-11-09 ENCOUNTER — OFFICE VISIT (OUTPATIENT)
Dept: PODIATRY | Facility: CLINIC | Age: 62
End: 2022-11-09
Payer: COMMERCIAL

## 2022-11-09 DIAGNOSIS — Z98.890 POST-OPERATIVE STATE: Primary | ICD-10-CM

## 2022-11-09 PROCEDURE — 1160F PR REVIEW ALL MEDS BY PRESCRIBER/CLIN PHARMACIST DOCUMENTED: ICD-10-PCS | Mod: CPTII,S$GLB,, | Performed by: PODIATRIST

## 2022-11-09 PROCEDURE — 99999 PR PBB SHADOW E&M-EST. PATIENT-LVL IV: ICD-10-PCS | Mod: PBBFAC,,, | Performed by: PODIATRIST

## 2022-11-09 PROCEDURE — 1159F PR MEDICATION LIST DOCUMENTED IN MEDICAL RECORD: ICD-10-PCS | Mod: CPTII,S$GLB,, | Performed by: PODIATRIST

## 2022-11-09 PROCEDURE — 1159F MED LIST DOCD IN RCRD: CPT | Mod: CPTII,S$GLB,, | Performed by: PODIATRIST

## 2022-11-09 PROCEDURE — 3044F PR MOST RECENT HEMOGLOBIN A1C LEVEL <7.0%: ICD-10-PCS | Mod: CPTII,S$GLB,, | Performed by: PODIATRIST

## 2022-11-09 PROCEDURE — 99024 PR POST-OP FOLLOW-UP VISIT: ICD-10-PCS | Mod: S$GLB,,, | Performed by: PODIATRIST

## 2022-11-09 PROCEDURE — 4010F ACE/ARB THERAPY RXD/TAKEN: CPT | Mod: CPTII,S$GLB,, | Performed by: PODIATRIST

## 2022-11-09 PROCEDURE — 4010F PR ACE/ARB THEARPY RXD/TAKEN: ICD-10-PCS | Mod: CPTII,S$GLB,, | Performed by: PODIATRIST

## 2022-11-09 PROCEDURE — 3044F HG A1C LEVEL LT 7.0%: CPT | Mod: CPTII,S$GLB,, | Performed by: PODIATRIST

## 2022-11-09 PROCEDURE — 1160F RVW MEDS BY RX/DR IN RCRD: CPT | Mod: CPTII,S$GLB,, | Performed by: PODIATRIST

## 2022-11-09 PROCEDURE — 99999 PR PBB SHADOW E&M-EST. PATIENT-LVL IV: CPT | Mod: PBBFAC,,, | Performed by: PODIATRIST

## 2022-11-09 PROCEDURE — 99024 POSTOP FOLLOW-UP VISIT: CPT | Mod: S$GLB,,, | Performed by: PODIATRIST

## 2022-11-09 RX ORDER — SULFAMETHOXAZOLE AND TRIMETHOPRIM 800; 160 MG/1; MG/1
1 TABLET ORAL 2 TIMES DAILY
Qty: 14 TABLET | Refills: 0 | Status: SHIPPED | OUTPATIENT
Start: 2022-11-09 | End: 2022-11-16

## 2022-11-09 NOTE — PROGRESS NOTES
Subjective:      Patient ID: Fatemeh Shoemaker is a 61 y.o. female.    Chief Complaint: Follow-up (Post op)    Fatemeh is a 61 y.o. female who presents to the podiatry clinic  with complaint of her toes and forefoot swelling and feeling tight at times. Left toes bother her the most when weight bearing.    11/9/22: patient returns for follow up s/p 1 week left hallux IPJ fusion and hammertoe correction 2-5. Ambulating in darco and football no new complaints    Review of Systems   Constitution: Negative for chills and fever.   Cardiovascular: Negative for claudication and leg swelling.   Respiratory: Negative for shortness of breath.    Skin: Positive for nail changes. Negative for itching and rash.   Musculoskeletal: Positive for joint pain. Negative for muscle cramps, muscle weakness and myalgias.   Gastrointestinal: Negative for nausea and vomiting.   Neurological: Negative for focal weakness, loss of balance, numbness and paresthesias.           Objective:      Physical Exam  Constitutional:       General: She is not in acute distress.     Appearance: She is well-developed. She is not diaphoretic.   Cardiovascular:      Pulses:           Dorsalis pedis pulses are 2+ on the right side and 2+ on the left side.        Posterior tibial pulses are 2+ on the right side and 2+ on the left side.      Comments: < 3 sec capillary refill time to toes 1-5 bilateral. Toes and feet are warm to touch proximally with normal distal cooling b/l. There is some hair growth on the feet and toes b/l. There is no edema b/l. No spider veins or varicosities present b/l.     Musculoskeletal:      Comments: Equinus noted b/l ankles with < 10 deg DF noted. MMT 5/5 in DF/PF/Inv/Ev resistance with no reproduction of pain in any direction. Passive range of motion of ankle and pedal joints is painless b/l.    RIght ankle some discomfort to the lateral AFTL area some discomfort with inversion of ankle    Semi Reducible extensor and flexor  contractures at the MTPJ and PIPJ of toes 1-5, bilat.     Toes 1-5 straight rectus position      Skin:     General: Skin is warm and dry.      Coloration: Skin is not pale.      Findings: No abrasion, bruising, burn, ecchymosis, erythema, laceration, lesion, petechiae or rash.      Nails: There is no clubbing.        Comments: Skin temperature, texture and turgor within normal limits.    Nails 1-5 bilateral are thick 3-4 mm and discolored with subungual debris    Incisions toes 1-5 dorsal well coapted withs sutures intact and no dehiscence or drainage there is some erythema     Neurological:      Mental Status: She is alert and oriented to person, place, and time.      Sensory: No sensory deficit.      Motor: No tremor, atrophy or abnormal muscle tone.      Comments: Negative tinel sign bilateral.   Psychiatric:         Behavior: Behavior normal.               Assessment:       Encounter Diagnosis   Name Primary?    Post-operative state Yes         Plan:       Fatemeh was seen today for follow-up.    Diagnoses and all orders for this visit:    Post-operative state  -     sulfamethoxazole-trimethoprim 800-160mg (BACTRIM DS) 800-160 mg Tab; Take 1 tablet by mouth 2 (two) times daily. for 7 days    I counseled the patient on her conditions, their implications and medical management.    Bactrim x7 days    Dressed again in xeroform and gauze with football and darco    Return in 1 week    Ezra Arriaga DPM

## 2022-11-10 ENCOUNTER — PATIENT MESSAGE (OUTPATIENT)
Dept: PODIATRY | Facility: CLINIC | Age: 62
End: 2022-11-10
Payer: COMMERCIAL

## 2022-11-11 LAB
FINAL PATHOLOGIC DIAGNOSIS: NORMAL
GROSS: NORMAL
Lab: NORMAL
MICROSCOPIC EXAM: NORMAL

## 2022-11-12 PROBLEM — R53.1 LEFT-SIDED WEAKNESS: Status: ACTIVE | Noted: 2022-11-12

## 2022-11-13 PROBLEM — R42 DIZZINESS: Status: ACTIVE | Noted: 2022-11-13

## 2022-11-13 PROBLEM — R82.90 ABNORMAL URINALYSIS: Status: ACTIVE | Noted: 2022-11-13

## 2022-11-14 ENCOUNTER — PATIENT MESSAGE (OUTPATIENT)
Dept: NEUROLOGY | Facility: CLINIC | Age: 62
End: 2022-11-14
Payer: COMMERCIAL

## 2022-11-14 ENCOUNTER — PATIENT MESSAGE (OUTPATIENT)
Dept: PAIN MEDICINE | Facility: CLINIC | Age: 62
End: 2022-11-14
Payer: COMMERCIAL

## 2022-11-14 ENCOUNTER — TELEPHONE (OUTPATIENT)
Dept: NEUROLOGY | Facility: CLINIC | Age: 62
End: 2022-11-14
Payer: COMMERCIAL

## 2022-11-14 NOTE — TELEPHONE ENCOUNTER
----- Message from Winston Meredith sent at 11/14/2022 10:44 AM CST -----  Regarding: return call  Type:  Patient Returning Call    Who Called:  Fatemeh    Who Left Message for Patient:  Ruby    Does the patient know what this is regarding?:  yes    Best Call Back Number:  [phone]  Additional Information:

## 2022-11-14 NOTE — TELEPHONE ENCOUNTER
"Spoke with the pt, she has an appt this week with podiatry to determine if the pins can be removed from foot. If they are removed, she will notify our office to discuss scheduling MRI per Dr. Martinez "This patient is going to need neurology follow up.  She was seen for left sided weakness, but could not have MRI due to external hardware after a foot surgery.  will need outpatient mri brain.  She has a pacemaker.  She can then follow up with neuro after MRI.  Need her surgeon to give the ok when to get MRI. Hopefully her external hardware gets taken down this Wednesday and she can have MRI shortly after that (ideally would like it within 2 weeks from today)"  MRI will need to be performed at the [facility] due to pacemaker. All implant information is in the system pertaining to pacemaker and screw in toe. Pt v/u. Appt held for Dec 7th at 1:00 for appt with Ginny Villafuerte NP.   "

## 2022-11-15 ENCOUNTER — PATIENT MESSAGE (OUTPATIENT)
Dept: FAMILY MEDICINE | Facility: CLINIC | Age: 62
End: 2022-11-15
Payer: COMMERCIAL

## 2022-11-16 ENCOUNTER — PATIENT MESSAGE (OUTPATIENT)
Dept: PODIATRY | Facility: CLINIC | Age: 62
End: 2022-11-16

## 2022-11-16 ENCOUNTER — OFFICE VISIT (OUTPATIENT)
Dept: PODIATRY | Facility: CLINIC | Age: 62
End: 2022-11-16
Payer: COMMERCIAL

## 2022-11-16 DIAGNOSIS — Z98.890 POST-OPERATIVE STATE: Primary | ICD-10-CM

## 2022-11-16 PROCEDURE — 99024 PR POST-OP FOLLOW-UP VISIT: ICD-10-PCS | Mod: S$GLB,,, | Performed by: PODIATRIST

## 2022-11-16 PROCEDURE — 1159F MED LIST DOCD IN RCRD: CPT | Mod: CPTII,S$GLB,, | Performed by: PODIATRIST

## 2022-11-16 PROCEDURE — 1160F RVW MEDS BY RX/DR IN RCRD: CPT | Mod: CPTII,S$GLB,, | Performed by: PODIATRIST

## 2022-11-16 PROCEDURE — 4010F ACE/ARB THERAPY RXD/TAKEN: CPT | Mod: CPTII,S$GLB,, | Performed by: PODIATRIST

## 2022-11-16 PROCEDURE — 1160F PR REVIEW ALL MEDS BY PRESCRIBER/CLIN PHARMACIST DOCUMENTED: ICD-10-PCS | Mod: CPTII,S$GLB,, | Performed by: PODIATRIST

## 2022-11-16 PROCEDURE — 4010F PR ACE/ARB THEARPY RXD/TAKEN: ICD-10-PCS | Mod: CPTII,S$GLB,, | Performed by: PODIATRIST

## 2022-11-16 PROCEDURE — 3044F PR MOST RECENT HEMOGLOBIN A1C LEVEL <7.0%: ICD-10-PCS | Mod: CPTII,S$GLB,, | Performed by: PODIATRIST

## 2022-11-16 PROCEDURE — 99024 POSTOP FOLLOW-UP VISIT: CPT | Mod: S$GLB,,, | Performed by: PODIATRIST

## 2022-11-16 PROCEDURE — 99999 PR PBB SHADOW E&M-EST. PATIENT-LVL IV: ICD-10-PCS | Mod: PBBFAC,,, | Performed by: PODIATRIST

## 2022-11-16 PROCEDURE — 3044F HG A1C LEVEL LT 7.0%: CPT | Mod: CPTII,S$GLB,, | Performed by: PODIATRIST

## 2022-11-16 PROCEDURE — 99999 PR PBB SHADOW E&M-EST. PATIENT-LVL IV: CPT | Mod: PBBFAC,,, | Performed by: PODIATRIST

## 2022-11-16 PROCEDURE — 1159F PR MEDICATION LIST DOCUMENTED IN MEDICAL RECORD: ICD-10-PCS | Mod: CPTII,S$GLB,, | Performed by: PODIATRIST

## 2022-11-16 NOTE — LETTER
November 16, 2022      [facility]  [address]  [place] [address]  Phone: [phone]       Patient: Fatemeh Shoemaker   YOB: 1960  Date of Visit: 11/16/2022    To Whom It May Concern:    Rishi Shoemaker  was at [facility] on 11/16/2022. The patient may return to work on 11/21/22 with restrictions she needs to sit at least 30 minutes of every hour and must wear the post op shoe. If you have any questions or concerns, or if I can be of further assistance, please do not hesitate to contact me.    Sincerely,          Ezra Arriaga DPM

## 2022-11-21 ENCOUNTER — PATIENT MESSAGE (OUTPATIENT)
Dept: PODIATRY | Facility: CLINIC | Age: 62
End: 2022-11-21
Payer: COMMERCIAL

## 2022-11-22 ENCOUNTER — PATIENT MESSAGE (OUTPATIENT)
Dept: PODIATRY | Facility: CLINIC | Age: 62
End: 2022-11-22
Payer: COMMERCIAL

## 2022-11-23 ENCOUNTER — OFFICE VISIT (OUTPATIENT)
Dept: PODIATRY | Facility: CLINIC | Age: 62
End: 2022-11-23
Payer: COMMERCIAL

## 2022-11-23 DIAGNOSIS — Z98.890 POST-OPERATIVE STATE: Primary | ICD-10-CM

## 2022-11-23 PROCEDURE — 99999 PR PBB SHADOW E&M-EST. PATIENT-LVL IV: CPT | Mod: PBBFAC,,, | Performed by: PODIATRIST

## 2022-11-23 PROCEDURE — 1160F RVW MEDS BY RX/DR IN RCRD: CPT | Mod: CPTII,S$GLB,, | Performed by: PODIATRIST

## 2022-11-23 PROCEDURE — 3044F PR MOST RECENT HEMOGLOBIN A1C LEVEL <7.0%: ICD-10-PCS | Mod: CPTII,S$GLB,, | Performed by: PODIATRIST

## 2022-11-23 PROCEDURE — 1160F PR REVIEW ALL MEDS BY PRESCRIBER/CLIN PHARMACIST DOCUMENTED: ICD-10-PCS | Mod: CPTII,S$GLB,, | Performed by: PODIATRIST

## 2022-11-23 PROCEDURE — 4010F ACE/ARB THERAPY RXD/TAKEN: CPT | Mod: CPTII,S$GLB,, | Performed by: PODIATRIST

## 2022-11-23 PROCEDURE — 4010F PR ACE/ARB THEARPY RXD/TAKEN: ICD-10-PCS | Mod: CPTII,S$GLB,, | Performed by: PODIATRIST

## 2022-11-23 PROCEDURE — 1159F MED LIST DOCD IN RCRD: CPT | Mod: CPTII,S$GLB,, | Performed by: PODIATRIST

## 2022-11-23 PROCEDURE — 1159F PR MEDICATION LIST DOCUMENTED IN MEDICAL RECORD: ICD-10-PCS | Mod: CPTII,S$GLB,, | Performed by: PODIATRIST

## 2022-11-23 PROCEDURE — 99999 PR PBB SHADOW E&M-EST. PATIENT-LVL IV: ICD-10-PCS | Mod: PBBFAC,,, | Performed by: PODIATRIST

## 2022-11-23 PROCEDURE — 99024 POSTOP FOLLOW-UP VISIT: CPT | Mod: S$GLB,,, | Performed by: PODIATRIST

## 2022-11-23 PROCEDURE — 99024 PR POST-OP FOLLOW-UP VISIT: ICD-10-PCS | Mod: S$GLB,,, | Performed by: PODIATRIST

## 2022-11-23 PROCEDURE — 3044F HG A1C LEVEL LT 7.0%: CPT | Mod: CPTII,S$GLB,, | Performed by: PODIATRIST

## 2022-12-05 ENCOUNTER — TELEPHONE (OUTPATIENT)
Dept: FAMILY MEDICINE | Facility: CLINIC | Age: 62
End: 2022-12-05
Payer: COMMERCIAL

## 2022-12-05 NOTE — TELEPHONE ENCOUNTER
----- Message from Liseth Rogers sent at 12/5/2022 12:52 PM CST -----  Contact: Self  Type:  Sooner Appointment Request    Caller is requesting a sooner appointment.  Caller declined first available appointment listed below.  Caller will not accept being placed on the waitlist and is requesting a message be sent to doctor.    Name of Caller:  Patient  When is the first available appointment?  N/A  Symptoms:  Deep crouping cough, with thick phlegm (needs Hosp f/u)  Best Call Back Number:  [phone]  Additional Information:  Pt is coming to clinic on Wednesday 12/7 for another appt and wanted to see if we can work her in that day after her appt. Please call pt back to advise. Thank You.

## 2022-12-05 NOTE — TELEPHONE ENCOUNTER
----- Message from Liseth Rogers sent at 12/5/2022 12:52 PM CST -----  Contact: Self  Type:  Sooner Appointment Request    Caller is requesting a sooner appointment.  Caller declined first available appointment listed below.  Caller will not accept being placed on the waitlist and is requesting a message be sent to doctor.    Name of Caller:  Patient  When is the first available appointment?  N/A  Symptoms:  Deep crouping cough, with thick phlegm (needs Hosp f/u)  Best Call Back Number:  [phone]  Additional Information:  Pt is coming to clinic on Wednesday 12/7 for another appt and wanted to see if we can work her in that day after her appt. Please call pt back to advise. Thank You.

## 2022-12-05 NOTE — PROGRESS NOTES
Subjective:      Patient ID: Fatemeh Shoemaker is a 62 y.o. female.    Chief Complaint: Follow-up    Fatemeh is a 62 y.o. female who presents to the podiatry clinic  with complaint of her toes and forefoot swelling and feeling tight at times. Left toes bother her the most when weight bearing.    11/9/22: patient returns for follow up s/p 1 week left hallux IPJ fusion and hammertoe correction 2-5. Ambulating in darco and football no new complaints    11/16/22: Patient returns for follow up s/p 2 weeks left hallux IPJ fusion and hammertoe correction 2-5 ambulating in the darco and football    Review of Systems   Constitution: Negative for chills and fever.   Cardiovascular: Negative for claudication and leg swelling.   Respiratory: Negative for shortness of breath.    Skin: Positive for nail changes. Negative for itching and rash.   Musculoskeletal: Positive for joint pain. Negative for muscle cramps, muscle weakness and myalgias.   Gastrointestinal: Negative for nausea and vomiting.   Neurological: Negative for focal weakness, loss of balance, numbness and paresthesias.           Objective:      Physical Exam  Constitutional:       General: She is not in acute distress.     Appearance: She is well-developed. She is not diaphoretic.   Cardiovascular:      Pulses:           Dorsalis pedis pulses are 2+ on the right side and 2+ on the left side.        Posterior tibial pulses are 2+ on the right side and 2+ on the left side.      Comments: < 3 sec capillary refill time to toes 1-5 bilateral. Toes and feet are warm to touch proximally with normal distal cooling b/l. There is some hair growth on the feet and toes b/l. There is no edema b/l. No spider veins or varicosities present b/l.     Musculoskeletal:      Comments: Equinus noted b/l ankles with < 10 deg DF noted. MMT 5/5 in DF/PF/Inv/Ev resistance with no reproduction of pain in any direction. Passive range of motion of ankle and pedal joints is painless  b/l.    RIght ankle some discomfort to the lateral AFTL area some discomfort with inversion of ankle    Semi Reducible extensor and flexor contractures at the MTPJ and PIPJ of toes 1-5 right    Toes 1-5 straight rectus position left      Skin:     General: Skin is warm and dry.      Coloration: Skin is not pale.      Findings: No abrasion, bruising, burn, ecchymosis, erythema, laceration, lesion, petechiae or rash.      Nails: There is no clubbing.        Comments: Skin temperature, texture and turgor within normal limits.    Nails 1-5 bilateral are thick 3-4 mm and discolored with subungual debris    Incisions toes 1-5 dorsal well coapted withs sutures intact and no dehiscence or drainage there is some erythema     Neurological:      Mental Status: She is alert and oriented to person, place, and time.      Sensory: No sensory deficit.      Motor: No tremor, atrophy or abnormal muscle tone.      Comments: Negative tinel sign bilateral.   Psychiatric:         Behavior: Behavior normal.               Assessment:       Encounter Diagnosis   Name Primary?    Post-operative state Yes           Plan:       Fatemeh was seen today for follow-up.    Diagnoses and all orders for this visit:    Post-operative state      I counseled the patient on her conditions, their implications and medical management.    Sutures and pins were removed and she will continue ambulating only in the darco shoe    Return in 4 weeks with x-rays before transitioning back to normal shoes    Ezra Arriaga DPM

## 2022-12-07 ENCOUNTER — OFFICE VISIT (OUTPATIENT)
Dept: NEUROLOGY | Facility: CLINIC | Age: 62
End: 2022-12-07
Payer: COMMERCIAL

## 2022-12-07 ENCOUNTER — OFFICE VISIT (OUTPATIENT)
Dept: PODIATRY | Facility: CLINIC | Age: 62
End: 2022-12-07
Payer: COMMERCIAL

## 2022-12-07 ENCOUNTER — HOSPITAL ENCOUNTER (OUTPATIENT)
Dept: RADIOLOGY | Facility: HOSPITAL | Age: 62
Discharge: HOME | End: 2022-12-07
Attending: PODIATRIST
Payer: COMMERCIAL

## 2022-12-07 VITALS — HEIGHT: 65 IN | WEIGHT: 278 LBS | BODY MASS INDEX: 46.32 KG/M2

## 2022-12-07 VITALS
DIASTOLIC BLOOD PRESSURE: 91 MMHG | SYSTOLIC BLOOD PRESSURE: 138 MMHG | WEIGHT: 277.88 LBS | HEIGHT: 65 IN | BODY MASS INDEX: 46.3 KG/M2 | HEART RATE: 79 BPM

## 2022-12-07 DIAGNOSIS — Z98.890 POST-OPERATIVE STATE: ICD-10-CM

## 2022-12-07 DIAGNOSIS — Z98.890 POST-OPERATIVE STATE: Primary | ICD-10-CM

## 2022-12-07 DIAGNOSIS — G62.9 NEUROPATHY: ICD-10-CM

## 2022-12-07 DIAGNOSIS — R42 DIZZINESS AND GIDDINESS: Primary | ICD-10-CM

## 2022-12-07 DIAGNOSIS — R29.818 TRANSIENT NEUROLOGIC DEFICIT: ICD-10-CM

## 2022-12-07 DIAGNOSIS — M48.061 SPINAL STENOSIS OF LUMBAR REGION, UNSPECIFIED WHETHER NEUROGENIC CLAUDICATION PRESENT: ICD-10-CM

## 2022-12-07 DIAGNOSIS — I63.89 OTHER CEREBRAL INFARCTION: ICD-10-CM

## 2022-12-07 DIAGNOSIS — I48.0 PAROXYSMAL ATRIAL FIBRILLATION: ICD-10-CM

## 2022-12-07 DIAGNOSIS — R42 DIZZINESS: ICD-10-CM

## 2022-12-07 PROBLEM — R53.1 LEFT-SIDED WEAKNESS: Status: RESOLVED | Noted: 2022-11-12 | Resolved: 2022-12-07

## 2022-12-07 PROCEDURE — 3008F PR BODY MASS INDEX (BMI) DOCUMENTED: ICD-10-PCS | Mod: CPTII,S$GLB,, | Performed by: NURSE PRACTITIONER

## 2022-12-07 PROCEDURE — 4010F PR ACE/ARB THEARPY RXD/TAKEN: ICD-10-PCS | Mod: CPTII,S$GLB,, | Performed by: PODIATRIST

## 2022-12-07 PROCEDURE — 3044F PR MOST RECENT HEMOGLOBIN A1C LEVEL <7.0%: ICD-10-PCS | Mod: CPTII,S$GLB,, | Performed by: NURSE PRACTITIONER

## 2022-12-07 PROCEDURE — 73630 X-RAY EXAM OF FOOT: CPT | Mod: TC,PO,LT

## 2022-12-07 PROCEDURE — 99215 PR OFFICE/OUTPT VISIT, EST, LEVL V, 40-54 MIN: ICD-10-PCS | Mod: S$GLB,,, | Performed by: NURSE PRACTITIONER

## 2022-12-07 PROCEDURE — 1160F PR REVIEW ALL MEDS BY PRESCRIBER/CLIN PHARMACIST DOCUMENTED: ICD-10-PCS | Mod: CPTII,S$GLB,, | Performed by: PODIATRIST

## 2022-12-07 PROCEDURE — 1159F MED LIST DOCD IN RCRD: CPT | Mod: CPTII,S$GLB,, | Performed by: PODIATRIST

## 2022-12-07 PROCEDURE — 3008F PR BODY MASS INDEX (BMI) DOCUMENTED: ICD-10-PCS | Mod: CPTII,S$GLB,, | Performed by: PODIATRIST

## 2022-12-07 PROCEDURE — 3080F PR MOST RECENT DIASTOLIC BLOOD PRESSURE >= 90 MM HG: ICD-10-PCS | Mod: CPTII,S$GLB,, | Performed by: NURSE PRACTITIONER

## 2022-12-07 PROCEDURE — 1159F PR MEDICATION LIST DOCUMENTED IN MEDICAL RECORD: ICD-10-PCS | Mod: CPTII,S$GLB,, | Performed by: PODIATRIST

## 2022-12-07 PROCEDURE — 73630 XR FOOT COMPLETE 3 VIEW LEFT: ICD-10-PCS | Mod: 26,LT,, | Performed by: RADIOLOGY

## 2022-12-07 PROCEDURE — 4010F PR ACE/ARB THEARPY RXD/TAKEN: ICD-10-PCS | Mod: CPTII,S$GLB,, | Performed by: NURSE PRACTITIONER

## 2022-12-07 PROCEDURE — 3044F PR MOST RECENT HEMOGLOBIN A1C LEVEL <7.0%: ICD-10-PCS | Mod: CPTII,S$GLB,, | Performed by: PODIATRIST

## 2022-12-07 PROCEDURE — 3044F HG A1C LEVEL LT 7.0%: CPT | Mod: CPTII,S$GLB,, | Performed by: PODIATRIST

## 2022-12-07 PROCEDURE — 99999 PR PBB SHADOW E&M-EST. PATIENT-LVL IV: CPT | Mod: PBBFAC,,, | Performed by: PODIATRIST

## 2022-12-07 PROCEDURE — 99999 PR PBB SHADOW E&M-EST. PATIENT-LVL V: CPT | Mod: PBBFAC,,, | Performed by: NURSE PRACTITIONER

## 2022-12-07 PROCEDURE — 3044F HG A1C LEVEL LT 7.0%: CPT | Mod: CPTII,S$GLB,, | Performed by: NURSE PRACTITIONER

## 2022-12-07 PROCEDURE — 3075F SYST BP GE 130 - 139MM HG: CPT | Mod: CPTII,S$GLB,, | Performed by: NURSE PRACTITIONER

## 2022-12-07 PROCEDURE — 99215 OFFICE O/P EST HI 40 MIN: CPT | Mod: S$GLB,,, | Performed by: NURSE PRACTITIONER

## 2022-12-07 PROCEDURE — 3008F BODY MASS INDEX DOCD: CPT | Mod: CPTII,S$GLB,, | Performed by: PODIATRIST

## 2022-12-07 PROCEDURE — 3008F BODY MASS INDEX DOCD: CPT | Mod: CPTII,S$GLB,, | Performed by: NURSE PRACTITIONER

## 2022-12-07 PROCEDURE — 3075F PR MOST RECENT SYSTOLIC BLOOD PRESS GE 130-139MM HG: ICD-10-PCS | Mod: CPTII,S$GLB,, | Performed by: NURSE PRACTITIONER

## 2022-12-07 PROCEDURE — 1160F RVW MEDS BY RX/DR IN RCRD: CPT | Mod: CPTII,S$GLB,, | Performed by: PODIATRIST

## 2022-12-07 PROCEDURE — 99024 POSTOP FOLLOW-UP VISIT: CPT | Mod: S$GLB,,, | Performed by: PODIATRIST

## 2022-12-07 PROCEDURE — 4010F ACE/ARB THERAPY RXD/TAKEN: CPT | Mod: CPTII,S$GLB,, | Performed by: PODIATRIST

## 2022-12-07 PROCEDURE — 4010F ACE/ARB THERAPY RXD/TAKEN: CPT | Mod: CPTII,S$GLB,, | Performed by: NURSE PRACTITIONER

## 2022-12-07 PROCEDURE — 99024 PR POST-OP FOLLOW-UP VISIT: ICD-10-PCS | Mod: S$GLB,,, | Performed by: PODIATRIST

## 2022-12-07 PROCEDURE — 99999 PR PBB SHADOW E&M-EST. PATIENT-LVL IV: ICD-10-PCS | Mod: PBBFAC,,, | Performed by: PODIATRIST

## 2022-12-07 PROCEDURE — 3080F DIAST BP >= 90 MM HG: CPT | Mod: CPTII,S$GLB,, | Performed by: NURSE PRACTITIONER

## 2022-12-07 PROCEDURE — 73630 X-RAY EXAM OF FOOT: CPT | Mod: 26,LT,, | Performed by: RADIOLOGY

## 2022-12-07 PROCEDURE — 99999 PR PBB SHADOW E&M-EST. PATIENT-LVL V: ICD-10-PCS | Mod: PBBFAC,,, | Performed by: NURSE PRACTITIONER

## 2022-12-07 NOTE — ASSESSMENT & PLAN NOTE
Symptoms vague, non-specific  Some features suggest possible cardiac etiology. Last device check indicates low AF burden, though.   Neuropathy can certainly cause imbalance.   Will obtain MRI brain w wo & CTA H&N for evaluation of vascular or central cause.   Could Creek back to ENT for formal VNG if workup is unrevealing.   Consider PPPD.   Check serologies.   Consider EMG of LEs  Consider VRT

## 2022-12-07 NOTE — ASSESSMENT & PLAN NOTE
Presented to [facility] Nov 2022 with LSW, no other neurological symptoms.  She has significant vascular RF, thus CVA/TIA was considered. She was unable to have MRI. She had been off of her OAC for a recent surgery.   No CVA on serial CT scans. Symptoms resolved < 24 hours.   Now back on Eliquis. LDL is at goal < 70. She is not diabetic. She does have SHAKIRA that is not being addressed.

## 2022-12-07 NOTE — PROGRESS NOTES
Subjective:      Patient ID: Fatemeh Shoemaker is a 62 y.o. female.    Chief Complaint: Post-op Evaluation    Fatemeh is a 62 y.o. female who presents to the podiatry clinic  with complaint of her toes and forefoot swelling and feeling tight at times. Left toes bother her the most when weight bearing.    11/9/22: patient returns for follow up s/p 1 week left hallux IPJ fusion and hammertoe correction 2-5. Ambulating in darco and football no new complaints    11/16/22: Patient returns for follow up s/p 2 weeks left hallux IPJ fusion and hammertoe correction 2-5 ambulating in the darco and football    12/7/22: Patient returns s/p 5 weeks left hallux IPJ fusion and hammertoe correction 2-5 ambulating in the darco and football doing well ambulating in the darco    Review of Systems   Constitution: Negative for chills and fever.   Cardiovascular: Negative for claudication and leg swelling.   Respiratory: Negative for shortness of breath.    Skin: Positive for nail changes. Negative for itching and rash.   Musculoskeletal: Positive for joint pain. Negative for muscle cramps, muscle weakness and myalgias.   Gastrointestinal: Negative for nausea and vomiting.   Neurological: Negative for focal weakness, loss of balance, numbness and paresthesias.           Objective:      Physical Exam  Constitutional:       General: She is not in acute distress.     Appearance: She is well-developed. She is not diaphoretic.   Cardiovascular:      Pulses:           Dorsalis pedis pulses are 2+ on the right side and 2+ on the left side.        Posterior tibial pulses are 2+ on the right side and 2+ on the left side.      Comments: < 3 sec capillary refill time to toes 1-5 bilateral. Toes and feet are warm to touch proximally with normal distal cooling b/l. There is some hair growth on the feet and toes b/l. There is no edema b/l. No spider veins or varicosities present b/l.     Musculoskeletal:      Comments: Equinus noted b/l ankles with <  10 deg DF noted. MMT 5/5 in DF/PF/Inv/Ev resistance with no reproduction of pain in any direction. Passive range of motion of ankle and pedal joints is painless b/l.    RIght ankle some discomfort to the lateral AFTL area some discomfort with inversion of ankle    Semi Reducible extensor and flexor contractures at the MTPJ and PIPJ of toes 1-5 right    Toes 1-5 straight rectus position left      Skin:     General: Skin is warm and dry.      Coloration: Skin is not pale.      Findings: No abrasion, bruising, burn, ecchymosis, erythema, laceration, lesion, petechiae or rash.      Nails: There is no clubbing.        Comments: Skin temperature, texture and turgor within normal limits.    Nails 1-5 bilateral are thick 3-4 mm and discolored with subungual debris    Incisions toes 1-5 dorsal well healed     Neurological:      Mental Status: She is alert and oriented to person, place, and time.      Sensory: No sensory deficit.      Motor: No tremor, atrophy or abnormal muscle tone.      Comments: Negative tinel sign bilateral.   Psychiatric:         Behavior: Behavior normal.               Assessment:       Encounter Diagnosis   Name Primary?    Post-operative state Yes             Plan:       Fatemeh was seen today for post-op evaluation.    Diagnoses and all orders for this visit:    Post-operative state        I counseled the patient on her conditions, their implications and medical management.    Remain weight bearing in the darco shoe for 1 more week then transition to supportive shoes    Return in 1 month for final post op check up    Ezra Arriaga DPM

## 2022-12-07 NOTE — PATIENT INSTRUCTIONS
MRI brain, labs & CTA to look for causes of dizziness.     Suspect in part that it is related to the fact that you have neuropathy.     We will talk more at next visit about doing PT to help with the dizziness.     We should also consider doing EMG of the lower legs
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
## 2022-12-09 NOTE — INTERVAL H&P NOTE
Last procedure: PIRIFORMIS MUSCLE INJECTION UNDER FLUOROSCOPY RIGHT with local  Date: 11/15/22  Percentage of relief obtained: unable to place number on relief but pain is significantly better  Duration of relief: current    Current Pain Score: 1-2/10    Pt woke up on 11/29 and pain in right hip down right leg was gone, unsure if this was due to injection. The patient has been examined and the H&P has been reviewed:    There is no interval changes since last encounter.  EGD: History of esophageal ulcer  Sedation: GA  ASA: Per anesthesia  Mallampati: Per anesthesia    Endoscopy risks, benefits and alternative options discussed and understood by patient/family.          There are no hospital problems to display for this patient.

## 2022-12-12 ENCOUNTER — TELEPHONE (OUTPATIENT)
Dept: FAMILY MEDICINE | Facility: CLINIC | Age: 62
End: 2022-12-12
Payer: COMMERCIAL

## 2022-12-12 NOTE — TELEPHONE ENCOUNTER
Called pt, no answer, unable to leave a voicemail informing pt that appointment scheduled for 12/13/22 will be canceled and needs to be rescheduled due to provider not being in clinic that day.

## 2022-12-17 ENCOUNTER — CLINICAL SUPPORT (OUTPATIENT)
Dept: CARDIOLOGY | Facility: HOSPITAL | Age: 62
End: 2022-12-17
Payer: COMMERCIAL

## 2022-12-17 DIAGNOSIS — Z95.0 PRESENCE OF CARDIAC PACEMAKER: ICD-10-CM

## 2022-12-17 PROCEDURE — 93294 CARDIAC DEVICE CHECK - REMOTE: ICD-10-PCS | Mod: ,,, | Performed by: INTERNAL MEDICINE

## 2022-12-17 PROCEDURE — 93294 REM INTERROG EVL PM/LDLS PM: CPT | Mod: ,,, | Performed by: INTERNAL MEDICINE

## 2022-12-17 PROCEDURE — 93296 REM INTERROG EVL PM/IDS: CPT | Mod: PO | Performed by: INTERNAL MEDICINE

## 2022-12-19 NOTE — PROGRESS NOTES
Subjective:      Patient ID: Fatemeh Shoemaker is a 62 y.o. female.    Chief Complaint: Follow-up      Fatemeh is a 62 y.o. female who presents to the podiatry clinic  with complaint of her toes and forefoot swelling and feeling tight at times. Left toes bother her the most when weight bearing.    11/9/22: patient returns for follow up s/p 1 week left hallux IPJ fusion and hammertoe correction 2-5. Ambulating in darco and football no new complaints    11/16/22: Patient returns for follow up s/p 2 weeks left hallux IPJ fusion and hammertoe correction 2-5 ambulating in the darco and football    11/23/22: Patient returns s/p 3 weeks left hallux IPJ fusion and hammertoes 2-5 ambulating in the darco shoe    Review of Systems   Constitution: Negative for chills and fever.   Cardiovascular: Negative for claudication and leg swelling.   Respiratory: Negative for shortness of breath.    Skin: Positive for nail changes. Negative for itching and rash.   Musculoskeletal: Positive for joint pain. Negative for muscle cramps, muscle weakness and myalgias.   Gastrointestinal: Negative for nausea and vomiting.   Neurological: Negative for focal weakness, loss of balance, numbness and paresthesias.           Objective:      Physical Exam  Constitutional:       General: She is not in acute distress.     Appearance: She is well-developed. She is not diaphoretic.   Cardiovascular:      Pulses:           Dorsalis pedis pulses are 2+ on the right side and 2+ on the left side.        Posterior tibial pulses are 2+ on the right side and 2+ on the left side.      Comments: < 3 sec capillary refill time to toes 1-5 bilateral. Toes and feet are warm to touch proximally with normal distal cooling b/l. There is some hair growth on the feet and toes b/l. There is no edema b/l. No spider veins or varicosities present b/l.     Musculoskeletal:      Comments: Equinus noted b/l ankles with < 10 deg DF noted. MMT 5/5 in DF/PF/Inv/Ev resistance with no  reproduction of pain in any direction. Passive range of motion of ankle and pedal joints is painless b/l.    RIght ankle some discomfort to the lateral AFTL area some discomfort with inversion of ankle    Semi Reducible extensor and flexor contractures at the MTPJ and PIPJ of toes 1-5 right    Toes 1-5 straight rectus position left      Skin:     General: Skin is warm and dry.      Coloration: Skin is not pale.      Findings: No abrasion, bruising, burn, ecchymosis, erythema, laceration, lesion, petechiae or rash.      Nails: There is no clubbing.        Comments: Skin temperature, texture and turgor within normal limits.    Nails 1-5 bilateral are thick 3-4 mm and discolored with subungual debris    Incisions toes 1-5 dorsal well healed     Neurological:      Mental Status: She is alert and oriented to person, place, and time.      Sensory: No sensory deficit.      Motor: No tremor, atrophy or abnormal muscle tone.      Comments: Negative tinel sign bilateral.   Psychiatric:         Behavior: Behavior normal.               Assessment:       Encounter Diagnosis   Name Primary?    Post-operative state Yes           Plan:       Fatemeh was seen today for follow-up.    Diagnoses and all orders for this visit:    Post-operative state  -     X-Ray Foot Complete Left; Future        I counseled the patient on her conditions, their implications and medical management.    Ambulating and doing well in the darco shoe    Return in 3 weeks for follow up with x-rays before returning to normal shoes    Ezra Arriaga DPM

## 2023-01-09 ENCOUNTER — OFFICE VISIT (OUTPATIENT)
Dept: PODIATRY | Facility: CLINIC | Age: 63
End: 2023-01-09
Payer: COMMERCIAL

## 2023-01-09 VITALS — BODY MASS INDEX: 46.32 KG/M2 | WEIGHT: 278 LBS | HEIGHT: 65 IN

## 2023-01-09 DIAGNOSIS — Z98.890 POST-OPERATIVE STATE: Primary | ICD-10-CM

## 2023-01-09 PROCEDURE — 3008F BODY MASS INDEX DOCD: CPT | Mod: CPTII,S$GLB,, | Performed by: PODIATRIST

## 2023-01-09 PROCEDURE — 1159F PR MEDICATION LIST DOCUMENTED IN MEDICAL RECORD: ICD-10-PCS | Mod: CPTII,S$GLB,, | Performed by: PODIATRIST

## 2023-01-09 PROCEDURE — 99999 PR PBB SHADOW E&M-EST. PATIENT-LVL IV: ICD-10-PCS | Mod: PBBFAC,,, | Performed by: PODIATRIST

## 2023-01-09 PROCEDURE — 99024 POSTOP FOLLOW-UP VISIT: CPT | Mod: S$GLB,,, | Performed by: PODIATRIST

## 2023-01-09 PROCEDURE — 99024 PR POST-OP FOLLOW-UP VISIT: ICD-10-PCS | Mod: S$GLB,,, | Performed by: PODIATRIST

## 2023-01-09 PROCEDURE — 1160F RVW MEDS BY RX/DR IN RCRD: CPT | Mod: CPTII,S$GLB,, | Performed by: PODIATRIST

## 2023-01-09 PROCEDURE — 99999 PR PBB SHADOW E&M-EST. PATIENT-LVL IV: CPT | Mod: PBBFAC,,, | Performed by: PODIATRIST

## 2023-01-09 PROCEDURE — 1160F PR REVIEW ALL MEDS BY PRESCRIBER/CLIN PHARMACIST DOCUMENTED: ICD-10-PCS | Mod: CPTII,S$GLB,, | Performed by: PODIATRIST

## 2023-01-09 PROCEDURE — 1159F MED LIST DOCD IN RCRD: CPT | Mod: CPTII,S$GLB,, | Performed by: PODIATRIST

## 2023-01-09 PROCEDURE — 3008F PR BODY MASS INDEX (BMI) DOCUMENTED: ICD-10-PCS | Mod: CPTII,S$GLB,, | Performed by: PODIATRIST

## 2023-01-09 NOTE — PROGRESS NOTES
Subjective:      Patient ID: Fatemeh Shoemaker is a 62 y.o. female.    Chief Complaint: Post-op Evaluation      Fatemeh is a 62 y.o. female who presents to the podiatry clinic  with complaint of her toes and forefoot swelling and feeling tight at times. Left toes bother her the most when weight bearing.    11/9/22: patient returns for follow up s/p 1 week left hallux IPJ fusion and hammertoe correction 2-5. Ambulating in darco and football no new complaints    11/16/22: Patient returns for follow up s/p 2 weeks left hallux IPJ fusion and hammertoe correction 2-5 ambulating in the darco and football    12/7/22: Patient returns s/p 5 weeks left hallux IPJ fusion and hammertoe correction 2-5 ambulating in the darco and football doing well ambulating in the darco    1/9/23: Patient returns s/p 9 weeks left hallux IPJ fusion and hammertoe correction 2-5 ambulating in the darco and football doing well ambulating in a normal SAS shoe    Review of Systems   Constitution: Negative for chills and fever.   Cardiovascular: Negative for claudication and leg swelling.   Respiratory: Negative for shortness of breath.    Skin: Positive for nail changes. Negative for itching and rash.   Musculoskeletal: Positive for joint pain. Negative for muscle cramps, muscle weakness and myalgias.   Gastrointestinal: Negative for nausea and vomiting.   Neurological: Negative for focal weakness, loss of balance, numbness and paresthesias.           Objective:      Physical Exam  Constitutional:       General: She is not in acute distress.     Appearance: She is well-developed. She is not diaphoretic.   Cardiovascular:      Pulses:           Dorsalis pedis pulses are 2+ on the right side and 2+ on the left side.        Posterior tibial pulses are 2+ on the right side and 2+ on the left side.      Comments: < 3 sec capillary refill time to toes 1-5 bilateral. Toes and feet are warm to touch proximally with normal distal cooling b/l. There is some  hair growth on the feet and toes b/l. There is no edema b/l. No spider veins or varicosities present b/l.     Musculoskeletal:      Comments: Equinus noted b/l ankles with < 10 deg DF noted. MMT 5/5 in DF/PF/Inv/Ev resistance with no reproduction of pain in any direction. Passive range of motion of ankle and pedal joints is painless b/l.    RIght ankle some discomfort to the lateral AFTL area some discomfort with inversion of ankle    Semi Reducible extensor and flexor contractures at the MTPJ and PIPJ of toes 1-5 right    Toes 1-5 straight rectus position left      Skin:     General: Skin is warm and dry.      Coloration: Skin is not pale.      Findings: No abrasion, bruising, burn, ecchymosis, erythema, laceration, lesion, petechiae or rash.      Nails: There is no clubbing.        Comments: Skin temperature, texture and turgor within normal limits.    Nails 1-5 bilateral are thick 3-4 mm and discolored with subungual debris    Incisions toes 1-5 dorsal well healed     Neurological:      Mental Status: She is alert and oriented to person, place, and time.      Sensory: No sensory deficit.      Motor: No tremor, atrophy or abnormal muscle tone.      Comments: Negative tinel sign bilateral.   Psychiatric:         Behavior: Behavior normal.               Assessment:       Encounter Diagnosis   Name Primary?    Post-operative state Yes               Plan:       Fatemeh was seen today for post-op evaluation.    Diagnoses and all orders for this visit:    Post-operative state            I counseled the patient on her conditions, their implications and medical management.    Remain weight bearing in normal shoes increasing ambulation to toleration    Return KARO Arriaga DPM

## 2023-01-10 ENCOUNTER — PATIENT MESSAGE (OUTPATIENT)
Dept: FAMILY MEDICINE | Facility: CLINIC | Age: 63
End: 2023-01-10
Payer: COMMERCIAL

## 2023-01-17 ENCOUNTER — OFFICE VISIT (OUTPATIENT)
Dept: FAMILY MEDICINE | Facility: CLINIC | Age: 63
End: 2023-01-17
Payer: COMMERCIAL

## 2023-01-17 VITALS
HEART RATE: 63 BPM | HEIGHT: 65 IN | DIASTOLIC BLOOD PRESSURE: 84 MMHG | OXYGEN SATURATION: 98 % | WEIGHT: 280.63 LBS | BODY MASS INDEX: 46.75 KG/M2 | SYSTOLIC BLOOD PRESSURE: 132 MMHG

## 2023-01-17 DIAGNOSIS — J02.9 PHARYNGITIS, UNSPECIFIED ETIOLOGY: Primary | ICD-10-CM

## 2023-01-17 PROCEDURE — 1159F PR MEDICATION LIST DOCUMENTED IN MEDICAL RECORD: ICD-10-PCS | Mod: CPTII,S$GLB,, | Performed by: INTERNAL MEDICINE

## 2023-01-17 PROCEDURE — 87635 SARS-COV-2 COVID-19 AMP PRB: CPT | Mod: QW,S$GLB,, | Performed by: INTERNAL MEDICINE

## 2023-01-17 PROCEDURE — 1159F MED LIST DOCD IN RCRD: CPT | Mod: CPTII,S$GLB,, | Performed by: INTERNAL MEDICINE

## 2023-01-17 PROCEDURE — 87502 INFLUENZA DNA AMP PROBE: CPT | Mod: QW,S$GLB,, | Performed by: INTERNAL MEDICINE

## 2023-01-17 PROCEDURE — 3008F BODY MASS INDEX DOCD: CPT | Mod: CPTII,S$GLB,, | Performed by: INTERNAL MEDICINE

## 2023-01-17 PROCEDURE — 99213 PR OFFICE/OUTPT VISIT, EST, LEVL III, 20-29 MIN: ICD-10-PCS | Mod: S$GLB,,, | Performed by: INTERNAL MEDICINE

## 2023-01-17 PROCEDURE — 87502 POCT INFLUENZA A/B MOLECULAR: ICD-10-PCS | Mod: QW,S$GLB,, | Performed by: INTERNAL MEDICINE

## 2023-01-17 PROCEDURE — 87651 STREP A DNA AMP PROBE: CPT | Mod: QW,S$GLB,, | Performed by: INTERNAL MEDICINE

## 2023-01-17 PROCEDURE — 3075F SYST BP GE 130 - 139MM HG: CPT | Mod: CPTII,S$GLB,, | Performed by: INTERNAL MEDICINE

## 2023-01-17 PROCEDURE — 3079F DIAST BP 80-89 MM HG: CPT | Mod: CPTII,S$GLB,, | Performed by: INTERNAL MEDICINE

## 2023-01-17 PROCEDURE — 99213 OFFICE O/P EST LOW 20 MIN: CPT | Mod: S$GLB,,, | Performed by: INTERNAL MEDICINE

## 2023-01-17 PROCEDURE — 99999 PR PBB SHADOW E&M-EST. PATIENT-LVL IV: CPT | Mod: PBBFAC,,, | Performed by: INTERNAL MEDICINE

## 2023-01-17 PROCEDURE — 1160F PR REVIEW ALL MEDS BY PRESCRIBER/CLIN PHARMACIST DOCUMENTED: ICD-10-PCS | Mod: CPTII,S$GLB,, | Performed by: INTERNAL MEDICINE

## 2023-01-17 PROCEDURE — 3079F PR MOST RECENT DIASTOLIC BLOOD PRESSURE 80-89 MM HG: ICD-10-PCS | Mod: CPTII,S$GLB,, | Performed by: INTERNAL MEDICINE

## 2023-01-17 PROCEDURE — 87635: ICD-10-PCS | Mod: QW,S$GLB,, | Performed by: INTERNAL MEDICINE

## 2023-01-17 PROCEDURE — 99999 PR PBB SHADOW E&M-EST. PATIENT-LVL IV: ICD-10-PCS | Mod: PBBFAC,,, | Performed by: INTERNAL MEDICINE

## 2023-01-17 PROCEDURE — 3075F PR MOST RECENT SYSTOLIC BLOOD PRESS GE 130-139MM HG: ICD-10-PCS | Mod: CPTII,S$GLB,, | Performed by: INTERNAL MEDICINE

## 2023-01-17 PROCEDURE — 3008F PR BODY MASS INDEX (BMI) DOCUMENTED: ICD-10-PCS | Mod: CPTII,S$GLB,, | Performed by: INTERNAL MEDICINE

## 2023-01-17 PROCEDURE — 87651 POCT STREP A MOLECULAR: ICD-10-PCS | Mod: QW,S$GLB,, | Performed by: INTERNAL MEDICINE

## 2023-01-17 PROCEDURE — 1160F RVW MEDS BY RX/DR IN RCRD: CPT | Mod: CPTII,S$GLB,, | Performed by: INTERNAL MEDICINE

## 2023-01-17 RX ORDER — FLUTICASONE FUROATE AND VILANTEROL 100; 25 UG/1; UG/1
1 POWDER RESPIRATORY (INHALATION) DAILY
Qty: 60 EACH | Refills: 2 | Status: SHIPPED | OUTPATIENT
Start: 2023-01-17

## 2023-01-17 RX ORDER — AMOXICILLIN AND CLAVULANATE POTASSIUM 875; 125 MG/1; MG/1
1 TABLET, FILM COATED ORAL EVERY 12 HOURS
Qty: 14 TABLET | Refills: 0 | Status: SHIPPED | OUTPATIENT
Start: 2023-01-17 | End: 2023-01-24

## 2023-01-18 LAB
CTP QC/QA: YES
MOLECULAR STREP A: NEGATIVE
POC MOLECULAR INFLUENZA A AGN: NEGATIVE
POC MOLECULAR INFLUENZA B AGN: NEGATIVE
SARS-COV-2 RDRP RESP QL NAA+PROBE: NEGATIVE

## 2023-01-18 NOTE — PROGRESS NOTES
Subjective     Fatemeh Shoemaker is a 62 y.o. old, female here for Headache, Sore Throat, Nasal Congestion, and Eye Pain    61-year-old with past medical history of PAF, symptomatic bradycardia s/p PCM, HLD, chronic bronchitis, JOSE ELIAS, peripheral neuropathy    Patient is here for URI symptoms including sore throat, cough, mild headache, congestion for the past 5 days.  She is been exposed to her sick grandson with similar symptoms.  She has history of chronic bronchitis.  She has been wheezing.  She needs refills of her controller medication which she is not been using regularly due to cost and her albuterol.  She is not had fever.  She is not had any recent exacerbations however has had them in the past that lasted for several weeks or months.    Answers submitted by the patient for this visit:  Cough Questionnaire (Submitted on 1/17/2023)  Chief Complaint: Cough  Chronicity: recurrent  Onset: 1 to 4 weeks ago  Progression since onset: gradually worsening  Frequency: every few minutes  Cough characteristics: productive of sputum  ear congestion: No  nasal congestion: No  postnasal drip: Yes  rhinorrhea: No  sweats: Yes  Aggravated by: lying down  bronchitis: Yes  COPD: Yes  Treatments tried: OTC cough suppressant  Improvement on treatment: mild    Review of Systems   Constitutional:  Negative for chills, fever and weight loss.   HENT:  Positive for sore throat. Negative for ear pain.    Respiratory:  Positive for cough and wheezing. Negative for hemoptysis and shortness of breath.    Cardiovascular:  Negative for chest pain.   Gastrointestinal:  Positive for heartburn.   Musculoskeletal:  Positive for myalgias.   Skin:  Negative for rash.   Neurological:  Positive for headaches.   Medications     Outpatient Medications Marked as Taking for the 1/17/23 encounter (Office Visit) with Jerson Wheat MD   Medication Sig Dispense Refill    apixaban (ELIQUIS) 5 mg Tab Take 1 tablet (5 mg total) by mouth 2 (two)  times daily. 180 tablet 3    cholecalciferol, vitamin D3, (DECARA) 1,250 mcg (50,000 unit) capsule Take 1 capsule (50,000 Units total) by mouth every 7 days. 30 capsule 0    cyanocobalamin 500 MCG tablet Take 1,000 mcg by mouth once daily.      diclofenac sodium (VOLTAREN) 1 % Gel Apply 2 g topically 4 (four) times daily. 50 g 3    DULoxetine (CYMBALTA) 60 MG capsule TAKE ONE CAPSULE BY MOUTH DAILY 90 capsule 2    flecainide (TAMBOCOR) 150 MG Tab Take 1 tablet (150 mg total) by mouth every 12 (twelve) hours. 60 tablet 11    gabapentin (NEURONTIN) 600 MG tablet Take 1 tablet (600 mg total) by mouth 2 (two) times daily. 60 tablet 2    lisinopriL 10 MG tablet Take 1 tablet (10 mg total) by mouth once daily. 90 tablet 1    melatonin 5 mg TbDL Take 1 tablet by mouth nightly as needed (sleep).      metoprolol tartrate (LOPRESSOR) 100 MG tablet Take 1 tablet (100 mg total) by mouth 2 (two) times daily. 180 tablet 3    nortriptyline (PAMELOR) 25 MG capsule TAKE ONE CAPSULE BY MOUTH EVERY EVENING 90 capsule 3    omeprazole (PRILOSEC) 20 MG capsule TAKE ONE CAPSULE BY MOUTH EVERY MORNING 90 capsule 1    ondansetron (ZOFRAN-ODT) 4 MG TbDL Take 1 tablet (4 mg total) by mouth 2 (two) times daily. 20 tablet 1    promethazine (PHENERGAN) 12.5 MG Tab Take 1 tablet (12.5 mg total) by mouth 2 (two) times daily as needed (nausea). 10 tablet 0    promethazine-dextromethorphan (PROMETHAZINE-DM) 6.25-15 mg/5 mL Syrp Take 5 mLs by mouth nightly as needed.      pulse oximeter (PULSE OXIMETER) device Use twice daily at 8 AM and 3 PM and record the value in [facility] as directed. 1 each 0    rosuvastatin (CRESTOR) 10 MG tablet TAKE ONE TABLET BY MOUTH EVERY EVENING 90 tablet 3    scopolamine (TRANSDERM-SCOP) 1.3-1.5 mg (1 mg over 3 days) Place 1 patch onto the skin every 72 hours. 10 patch 0    semaglutide 2 mg/dose (8 mg/3 mL) PnIj Inject 0.5 mg into the skin.      tiZANidine (ZANAFLEX) 4 MG tablet Take 4 mg by mouth every 6 (six) hours as  "needed.      valACYclovir (VALTREX) 500 MG tablet Take 1 tablet (500 mg total) by mouth daily as needed (fever blister). 30 tablet 2    [DISCONTINUED] albuterol sulfate 90 mcg/actuation aebs Inhale 180 mcg into the lungs.      [DISCONTINUED] fluticasone furoate-vilanteroL (BREO ELLIPTA) 100-25 mcg/dose diskus inhaler Inhale 1 puff into the lungs.       Objective     /84   Pulse 63   Ht 5' 5" (1.651 m)   Wt 127.3 kg (280 lb 10.3 oz)   LMP 09/08/2017   SpO2 98%   BMI 46.70 kg/m²   Physical Exam  Assessment and Plan     Pharyngitis, unspecified etiology  -     POCT COVID-19 Rapid Screening  -     POCT Influenza A/B Molecular  -     POCT Strep A, Molecular    Other orders  -     fluticasone furoate-vilanteroL (BREO ELLIPTA) 100-25 mcg/dose diskus inhaler; Inhale 1 puff into the lungs once daily.  Dispense: 60 each; Refill: 2  -     amoxicillin-clavulanate 875-125mg (AUGMENTIN) 875-125 mg per tablet; Take 1 tablet by mouth every 12 (twelve) hours. for 7 days  Dispense: 14 tablet; Refill: 0  -     albuterol sulfate 90 mcg/actuation aebs; Inhale 90 mcg into the lungs every 4 (four) hours as needed (wheezing or shortness of breath).  Dispense: 1 each; Refill: 2      Discussed supportive and symptomatic care.  Controller inhaler use for at least 1 week as well as albuterol before bed and 4 to 6 times a day as needed.  Viral testing was negative.  Start Augmentin if symptoms worsen, high fever, or sinusitis last greater than 10 days.  No follow-ups on file.  ___________________  Jerson Wheat MD  Internal Medicine and Pediatrics  "

## 2023-01-19 ENCOUNTER — TELEPHONE (OUTPATIENT)
Dept: FAMILY MEDICINE | Facility: CLINIC | Age: 63
End: 2023-01-19
Payer: COMMERCIAL

## 2023-01-20 ENCOUNTER — PATIENT MESSAGE (OUTPATIENT)
Dept: FAMILY MEDICINE | Facility: CLINIC | Age: 63
End: 2023-01-20
Payer: COMMERCIAL

## 2023-01-20 RX ORDER — BENZONATATE 200 MG/1
200 CAPSULE ORAL 3 TIMES DAILY PRN
Qty: 30 CAPSULE | Refills: 0 | Status: SHIPPED | OUTPATIENT
Start: 2023-01-20 | End: 2023-01-30

## 2023-01-20 RX ORDER — PROMETHAZINE HYDROCHLORIDE AND CODEINE PHOSPHATE 6.25; 1 MG/5ML; MG/5ML
5 SOLUTION ORAL NIGHTLY PRN
Qty: 118 ML | Refills: 0 | Status: SHIPPED | OUTPATIENT
Start: 2023-01-20 | End: 2023-11-06

## 2023-02-01 ENCOUNTER — TELEPHONE (OUTPATIENT)
Dept: PAIN MEDICINE | Facility: CLINIC | Age: 63
End: 2023-02-01
Payer: COMMERCIAL

## 2023-02-01 NOTE — TELEPHONE ENCOUNTER
----- Message from Leatha Cruz sent at 2/1/2023 10:15 AM CST -----  Type:  Sooner Appointment Request    Caller is requesting a sooner appointment.  Caller declined first available appointment listed below.  Caller will not accept being placed on the waitlist and is requesting a message be sent to doctor.    Name of Caller:  pt   When is the first available appointment?  Unk?  Symptoms:  back pain , side pain   Best Call Back Number:  [phone]   Additional Information:  pt wants to get a appt asap.. back is hurting please advise  thank you

## 2023-02-08 ENCOUNTER — OFFICE VISIT (OUTPATIENT)
Dept: PAIN MEDICINE | Facility: CLINIC | Age: 63
End: 2023-02-08
Payer: COMMERCIAL

## 2023-02-08 ENCOUNTER — TELEPHONE (OUTPATIENT)
Dept: PHYSICAL MEDICINE AND REHAB | Facility: CLINIC | Age: 63
End: 2023-02-08
Payer: COMMERCIAL

## 2023-02-08 ENCOUNTER — PATIENT MESSAGE (OUTPATIENT)
Dept: SURGERY | Facility: HOSPITAL | Age: 63
End: 2023-02-08
Payer: COMMERCIAL

## 2023-02-08 VITALS
SYSTOLIC BLOOD PRESSURE: 149 MMHG | DIASTOLIC BLOOD PRESSURE: 67 MMHG | WEIGHT: 280 LBS | HEART RATE: 60 BPM | BODY MASS INDEX: 46.65 KG/M2 | HEIGHT: 65 IN

## 2023-02-08 DIAGNOSIS — G62.9 PERIPHERAL POLYNEUROPATHY: ICD-10-CM

## 2023-02-08 DIAGNOSIS — M54.16 LUMBAR RADICULOPATHY: Primary | ICD-10-CM

## 2023-02-08 DIAGNOSIS — M48.061 SPINAL STENOSIS OF LUMBAR REGION, UNSPECIFIED WHETHER NEUROGENIC CLAUDICATION PRESENT: ICD-10-CM

## 2023-02-08 DIAGNOSIS — M54.42 CHRONIC BILATERAL LOW BACK PAIN WITH BILATERAL SCIATICA: ICD-10-CM

## 2023-02-08 DIAGNOSIS — M25.562 LEFT KNEE PAIN, UNSPECIFIED CHRONICITY: ICD-10-CM

## 2023-02-08 DIAGNOSIS — G89.29 CHRONIC BILATERAL LOW BACK PAIN WITH BILATERAL SCIATICA: ICD-10-CM

## 2023-02-08 DIAGNOSIS — M54.41 CHRONIC BILATERAL LOW BACK PAIN WITH BILATERAL SCIATICA: ICD-10-CM

## 2023-02-08 PROCEDURE — 3077F PR MOST RECENT SYSTOLIC BLOOD PRESSURE >= 140 MM HG: ICD-10-PCS | Mod: CPTII,S$GLB,,

## 2023-02-08 PROCEDURE — 3008F BODY MASS INDEX DOCD: CPT | Mod: CPTII,S$GLB,,

## 2023-02-08 PROCEDURE — 3008F PR BODY MASS INDEX (BMI) DOCUMENTED: ICD-10-PCS | Mod: CPTII,S$GLB,,

## 2023-02-08 PROCEDURE — 99999 PR PBB SHADOW E&M-EST. PATIENT-LVL III: CPT | Mod: PBBFAC,,,

## 2023-02-08 PROCEDURE — 1159F PR MEDICATION LIST DOCUMENTED IN MEDICAL RECORD: ICD-10-PCS | Mod: CPTII,S$GLB,,

## 2023-02-08 PROCEDURE — 3077F SYST BP >= 140 MM HG: CPT | Mod: CPTII,S$GLB,,

## 2023-02-08 PROCEDURE — 99999 PR PBB SHADOW E&M-EST. PATIENT-LVL III: ICD-10-PCS | Mod: PBBFAC,,,

## 2023-02-08 PROCEDURE — 1159F MED LIST DOCD IN RCRD: CPT | Mod: CPTII,S$GLB,,

## 2023-02-08 PROCEDURE — 3078F DIAST BP <80 MM HG: CPT | Mod: CPTII,S$GLB,,

## 2023-02-08 PROCEDURE — 99214 PR OFFICE/OUTPT VISIT, EST, LEVL IV, 30-39 MIN: ICD-10-PCS | Mod: S$GLB,,,

## 2023-02-08 PROCEDURE — 3078F PR MOST RECENT DIASTOLIC BLOOD PRESSURE < 80 MM HG: ICD-10-PCS | Mod: CPTII,S$GLB,,

## 2023-02-08 PROCEDURE — 99214 OFFICE O/P EST MOD 30 MIN: CPT | Mod: S$GLB,,,

## 2023-02-08 RX ORDER — ALPRAZOLAM 0.5 MG/1
1 TABLET, ORALLY DISINTEGRATING ORAL ONCE AS NEEDED
Status: CANCELLED | OUTPATIENT
Start: 2023-02-08 | End: 2034-07-07

## 2023-02-08 NOTE — H&P (VIEW-ONLY)
[facility] Follow Up Evaluation    Referred by: Caroline Nielson PA-C  Reason for referral: back pain    CC:   Chief Complaint   Patient presents with    Back Pain      Last 3 PDI Scores 6/5/2020 2/17/2020   Pain Disability Index (PDI) 0 10     Interval HPI 2/8/2023: Fatemeh Shoemaker returns to the clinic for follow up.  Today she is reporting continued lower back pain 6/10, constant, worsened with walking and standing and with associated radiation into bilateral buttocks and down left leg and some down her right however left leg is worse than right.  She reports continued chronic numbness from her shins down that is unchanged.  She denies any new weakness or any new changes to her bowel or bladder function.  She continues to take gabapentin, nortriptyline, Cymbalta and occasional NSAIDs without significant relief of her pain.      Pain Intervention History:     - s/p L5/S1 ILESI on 5/21/20 with 55% relief.   - s/p L5/S1 ILESI on 6/19/20 with continued 55% relief.  - s/p L5/S1 ILESI on 12/01/21 with 75% relief     HPI:   Fatemeh Shoemaker is a 62 y.o. female who complains of back pain    Onset: more than a few years  Progression: since onset, pain is gradually worsening  Current Pain Score: 8/10  Timing: constant  Quality: aching  Radiation: yes, down the back of both legs  Associated numbness or weakness: yes numbness b/l feet up to mid calf. Weakness with walking long distances and standing  Exacerbated by: standing, walking  Allievated by: rest, leaning forward  Is Pain Level Acceptable?: No    Previous Therapies:  PT/OT: no  HEP:   Interventions:   Surgery:  Medications:   - NSAIDS:  Celebrex  - MSK Relaxants: tizanidine  - TCAs: nortriptyline  - SNRIs: cymbalta  - Topicals:   - Anticonvulsants: gabapentin  - Opioids:     History:    Current Outpatient Medications:     albuterol sulfate 90 mcg/actuation aebs, Inhale 90 mcg into the lungs every 4 (four) hours as needed (wheezing or shortness of  breath)., Disp: 1 each, Rfl: 2    apixaban (ELIQUIS) 5 mg Tab, Take 1 tablet (5 mg total) by mouth 2 (two) times daily., Disp: 180 tablet, Rfl: 3    cholecalciferol, vitamin D3, (DECARA) 1,250 mcg (50,000 unit) capsule, Take 1 capsule (50,000 Units total) by mouth every 7 days., Disp: 30 capsule, Rfl: 0    cyanocobalamin 500 MCG tablet, Take 1,000 mcg by mouth once daily., Disp: , Rfl:     diclofenac sodium (VOLTAREN) 1 % Gel, Apply 2 g topically 4 (four) times daily., Disp: 50 g, Rfl: 3    DULoxetine (CYMBALTA) 60 MG capsule, TAKE ONE CAPSULE BY MOUTH DAILY, Disp: 90 capsule, Rfl: 2    flecainide (TAMBOCOR) 150 MG Tab, Take 1 tablet (150 mg total) by mouth every 12 (twelve) hours., Disp: 60 tablet, Rfl: 11    fluticasone furoate-vilanteroL (BREO ELLIPTA) 100-25 mcg/dose diskus inhaler, Inhale 1 puff into the lungs once daily., Disp: 60 each, Rfl: 2    gabapentin (NEURONTIN) 600 MG tablet, Take 1 tablet (600 mg total) by mouth 2 (two) times daily., Disp: 60 tablet, Rfl: 2    lisinopriL 10 MG tablet, Take 1 tablet (10 mg total) by mouth once daily., Disp: 90 tablet, Rfl: 1    melatonin 5 mg TbDL, Take 1 tablet by mouth nightly as needed (sleep)., Disp: , Rfl:     metoprolol tartrate (LOPRESSOR) 100 MG tablet, Take 1 tablet (100 mg total) by mouth 2 (two) times daily., Disp: 180 tablet, Rfl: 3    nortriptyline (PAMELOR) 25 MG capsule, TAKE ONE CAPSULE BY MOUTH EVERY EVENING, Disp: 90 capsule, Rfl: 3    omeprazole (PRILOSEC) 20 MG capsule, TAKE ONE CAPSULE BY MOUTH EVERY MORNING, Disp: 90 capsule, Rfl: 1    ondansetron (ZOFRAN-ODT) 4 MG TbDL, Take 1 tablet (4 mg total) by mouth 2 (two) times daily., Disp: 20 tablet, Rfl: 1    promethazine (PHENERGAN) 12.5 MG Tab, Take 1 tablet (12.5 mg total) by mouth 2 (two) times daily as needed (nausea)., Disp: 10 tablet, Rfl: 0    promethazine-codeine 6.25-10 mg/5 ml (PHENERGAN WITH CODEINE) 6.25-10 mg/5 mL syrup, Take 5 mLs by mouth nightly as needed for Cough., Disp: 118 mL, Rfl:  0    promethazine-dextromethorphan (PROMETHAZINE-DM) 6.25-15 mg/5 mL Syrp, Take 5 mLs by mouth nightly as needed., Disp: , Rfl:     pulse oximeter (PULSE OXIMETER) device, Use twice daily at 8 AM and 3 PM and record the value in MyChart as directed., Disp: 1 each, Rfl: 0    rosuvastatin (CRESTOR) 10 MG tablet, TAKE ONE TABLET BY MOUTH EVERY EVENING, Disp: 90 tablet, Rfl: 3    scopolamine (TRANSDERM-SCOP) 1.3-1.5 mg (1 mg over 3 days), Place 1 patch onto the skin every 72 hours., Disp: 10 patch, Rfl: 0    semaglutide 2 mg/dose (8 mg/3 mL) PnIj, Inject 0.5 mg into the skin., Disp: , Rfl:     tiZANidine (ZANAFLEX) 4 MG tablet, Take 4 mg by mouth every 6 (six) hours as needed., Disp: , Rfl:     valACYclovir (VALTREX) 500 MG tablet, Take 1 tablet (500 mg total) by mouth daily as needed (fever blister)., Disp: 30 tablet, Rfl: 2    Past Medical History:   Diagnosis Date    Anemia     Anticoagulant long-term use     Arrhythmia     Arthritis     Atrial fibrillation     history    Bronchitis     Encounter for blood transfusion     General anesthetics causing adverse effect in therapeutic use     High blood pressure     Hyperlipidemia     Lump or mass in breast     benign     Neuropathy     Obesity     Obstructive sleep apnea syndrome 2021    Ulcer        Past Surgical History:   Procedure Laterality Date    A-V CARDIAC PACEMAKER INSERTION Left 2020    Procedure: INSERTION, CARDIAC PACEMAKER, DUAL CHAMBER;  Surgeon: Bert Reaves MD;  Location: [facility] CATH;  Service: Cardiology;  Laterality: Left;    BREAST BIOPSY Right 2017    myofibroblastoma     BREAST LUMPECTOMY Right 2017     SECTION  10/25/1986    Other c/section 10/26/1997    CHOLECYSTECTOMY  2017    Dr. TOLU Bowers, [facility]     CORRECTION OF HAMMER TOE Left 11/3/2022    Procedure: CORRECTION, HAMMER TOE;  Surgeon: Ezra Arriaga DPM;  Location: [facility] OR;  Service: Podiatry;  Laterality: Left;  hammertoes 2-5 left,    csection      CS x  2    CYSTOSCOPY N/A 9/3/2019    Procedure: CYSTOSCOPY;  Surgeon: Noemi Pierre MD;  Location: [facility] OR;  Service: OB/GYN;  Laterality: N/A;    DILATION AND CURETTAGE OF UTERUS      EPIDURAL STEROID INJECTION INTO LUMBAR SPINE N/A 5/21/2020    Procedure: Injection-steroid-epidural-lumbar L5/S1;  Surgeon: Gurjit Tavarez MD;  Location: [facility] OR;  Service: Pain Management;  Laterality: N/A;    EPIDURAL STEROID INJECTION INTO LUMBAR SPINE N/A 6/19/2020    Procedure: Injection-steroid-epidural-lumbar L5/S1;  Surgeon: Gurjit Tavarez MD;  Location: [facility] OR;  Service: Pain Management;  Laterality: N/A;    EPIDURAL STEROID INJECTION INTO LUMBAR SPINE N/A 12/1/2021    Procedure: Injection-steroid-epidural-lumbar L5/S1;  Surgeon: Gurjit Tavarez MD;  Location: [facility] OR;  Service: Pain Management;  Laterality: N/A;    FRACTURE SURGERY  04/86    Dislocated  hip total rt hip 08/08    HIP PINNING      HYSTERECTOMY      JOINT REPLACEMENT  08/2008    LAPAROSCOPIC SALPINGO-OOPHORECTOMY Bilateral 9/3/2019    Procedure: SALPINGO-OOPHORECTOMY, LAPAROSCOPIC;  Surgeon: Noemi Pierre MD;  Location: [facility] OR;  Service: OB/GYN;  Laterality: Bilateral;  Dr. Bentley to assist. No resident needed.     LAPAROSCOPIC TOTAL HYSTERECTOMY N/A 9/3/2019    Procedure: HYSTERECTOMY, TOTAL, LAPAROSCOPIC;  Surgeon: Noemi Pierre MD;  Location: [facility] OR;  Service: OB/GYN;  Laterality: N/A;    NASAL SEPTUM SURGERY      OOPHORECTOMY      SKIN TAG REMOVAL Left 11/3/2022    Procedure: REMOVAL, SKIN TAG;  Surgeon: Ezra Arriaga DPM;  Location: [facility] OR;  Service: Podiatry;  Laterality: Left;    TOTAL HIP ARTHROPLASTY Right     TUBAL LIGATION  1997       Family History   Problem Relation Age of Onset    Colon cancer Maternal Grandmother 70        mets to ovary    Cancer Maternal Grandmother     Arthritis Paternal Grandfather     Eclampsia Paternal Grandmother     Cataracts Maternal Grandfather     Stroke Father     Colon cancer Father     Hypertension  Father     Alcohol abuse Father     Cancer Father         Passed away July 10 2019    Hypertension Mother     Cataracts Mother     Hypertension Brother         Age 54 hypertension heart    Early death Brother         Hypertension cardio disease    No Known Problems Daughter     Stomach cancer Neg Hx     Esophageal cancer Neg Hx     Breast cancer Neg Hx     Miscarriages / Stillbirths Neg Hx     Ovarian cancer Neg Hx     Glaucoma Neg Hx     Macular degeneration Neg Hx     Retinal detachment Neg Hx     Strabismus Neg Hx        Social History     Socioeconomic History    Marital status: Significant Other   Tobacco Use    Smoking status: Never    Smokeless tobacco: Never   Substance and Sexual Activity    Alcohol use: No     Comment: never    Drug use: Never    Sexual activity: Yes     Partners: Male     Birth control/protection: See Surgical Hx, Other-see comments     Comment: Hysterectomy 9/3/19     Social Determinants of Health     Financial Resource Strain: Low Risk     Difficulty of Paying Living Expenses: Not hard at all   Food Insecurity: No Food Insecurity    Worried About Running Out of Food in the Last Year: Never true    Ran Out of Food in the Last Year: Never true   Transportation Needs: No Transportation Needs    Lack of Transportation (Medical): No    Lack of Transportation (Non-Medical): No   Physical Activity: Insufficiently Active    Days of Exercise per Week: 1 day    Minutes of Exercise per Session: 10 min   Stress: No Stress Concern Present    Feeling of Stress : Not at all   Social Connections: Unknown    Frequency of Communication with Friends and Family: Never    Frequency of Social Gatherings with Friends and Family: More than three times a week    Active Member of Clubs or Organizations: No    Attends Club or Organization Meetings: Never    Marital Status:    Housing Stability: Low Risk     Unable to Pay for Housing in the Last Year: No    Number of Places Lived in the Last Year: 1     "Unstable Housing in the Last Year: No       Review of patient's allergies indicates:   Allergen Reactions    Aspirin Other (See Comments)     "I don't take it because I've had ulcers"       Meperidine Other (See Comments)     Felt like she was about to pass out after taking       Review of Systems:  General ROS: negative for - fever  Psychological ROS: negative for - hostility  Hematological and Lymphatic ROS: positive for - bruising  Endocrine ROS: negative for - unexpected weight changes  Respiratory ROS: no cough, shortness of breath, or wheezing  Cardiovascular ROS: no chest pain or dyspnea on exertion  Gastrointestinal ROS: no abdominal pain, change in bowel habits, or black or bloody stools  Musculoskeletal ROS: negative for - muscular weakness  Neurological ROS: negative for - bowel and bladder control changes, positive for numbness/tingling  Dermatological ROS: negative for rash    Physical Exam:  Vitals:    02/08/23 0829   BP: (!) 149/67   Pulse: 60   Weight: 127 kg (280 lb)   Height: 5' 5" (1.651 m)   PainSc:   6   PainLoc: Back     Body mass index is 46.59 kg/m².     Gen: NAD  Gait: gait antalgic due to knee pain and lower back pain, ambulating with Rollator  Psych:  Mood appropriate for given condition  HEENT: eyes anicteric   GI: Abd soft  CV: RRR  Lungs: breathing unlabored   ROM: limited AROM of the L spine in all planes, full ROM at ankles, knees and hips  Lumbar flexion 90 degrees, extension 50 degrees, side bending 30 degrees.    Sensation: intact to light touch in all dermatomes tested from L2-S1 bilaterally, except for b/l feet up to mid calf.   Reflexes: 0/0 b/l patella and 0/0 b/l achilles  Palpation: Diffusely tender over lumbar paraspinals  -TTP over the b/l greater trochanters,  -TTP bilateral SI joint  Tone: normal in the b/l knees and hips   Skin: intact,  No signs of infection, swelling, or warmth to touch.  Extremities:  Mild edema in b/l ankles         Right Left   L2/3 Iliacus Hip " flexion  5  5   L3/4 Qudratus Femoris Knee Extension  5  5   L4/5 Tib Anterior Ankle Dorsiflexion   5  5   L5/S1 Extensor Hallicus Longus Great toe extension  5  Fused                 S1/S2 Gastroc/Soleus Plantar Flexion  5  5       Imaging:  MRI lumbar spine 6/11/18  FINDINGS:  Vertebral column: The study is motion degraded.  There is multilevel degenerative change.  There is marked disc space narrowing towards the right at the L3-4 level where there is associated degenerative endplate signal change.  There is no other significant disc space narrowing.  There is no fracture.  Baseline marrow signal intensity is normal.  Anterior endplate osteophyte formation is also present at the L2-3 level.  There is subtle trace anterolisthesis of L4 on L5.    Spinal canal, conus, epidural space: Spinal canal is developmentally normal.  The conus is normal in location, contour and signal intensity, terminating at the level of T12-L1.  There is no abnormal epidural collection or mass.    Findings by level:    On the sagittal images, there is minimal bulging of the annulus and mild facet joint arthropathy at the T10-11 level but there is no spinal canal or significant foraminal stenosis.  At the T11-T12 level, there is a shallow broad central disc protrusion which narrows the ventral subarachnoid space but again there is no significant spinal canal or foraminal stenosis and there is no cord compression.    T12-L1: There is minimal bulging of the annulus and there is mild facet joint arthropathy.  There is no spinal canal or significant foraminal stenosis.  L1-2: There is moderate facet joint arthropathy.  There is minimal bulging of the annulus with a tiny 2 mm central disc protrusion.  There is no spinal canal or significant foraminal stenosis.  L2-3: There is a moderate diffuse disc bulge with marked facet joint arthropathy.  There is mildly prominent dorsal epidural fat.  There is flattening of the ventral dural sac.  There is  moderate central spinal stenosis.  AP measurement of the dural sac is 7.5 mm.  There is no significant foraminal stenosis.  L3-4: There is marked disc space narrowing towards the right.  There is a diffuse disc bulge with osteophytic ridging and superimposed broad right paracentral and foraminal disc protrusion.  There is moderate-to-marked facet joint arthropathy with ligamentum flavum thickening, right greater than left.  Also, there is mildly prominent dorsal epidural fat.  There is moderate central spinal stenosis.  There is severe right lateral recess and foraminal stenosis with mild-to-moderate left foraminal stenosis.  L4-5: There is marked facet joint arthropathy.  There is a diffuse disc bulge with superimposed broad central disc protrusion.  There is mild spinal stenosis.  There is mild-to-moderate bilateral foraminal stenosis.  L5-S1: There is a diffuse disc bulge with superimposed broad central disc protrusion with annular fissure.  There is marked left and moderate-to-marked right facet joint arthropathy with ligamentum flavum thickening.  There is no significant spinal stenosis.  The right foramina is patent.  There is severe left foraminal stenosis.  The broad central disc protrusion approaches both S1 roots.  The left S1 root is crowded between the facet joint changes and the disc protrusion.  Both S1 roots could be affected, left greater than right.    Soft tissues, other: The prevertebral soft tissues are normal.  The aorta is mildly ectatic.    MRI Cervical Spine 08/14/20  FINDINGS:  The current examination once again demonstrates a 2 mm AP diameter cystic structure within the central aspect of the cervical cord at C6-C7 which extends over an approximately 29 mm craniocaudad dimension from the superior endplate of C6 to the C7-T1 intervertebral disc space (series 2, image 7 and series 5, image 19).  No abnormal adjacent cord edema or abnormal intramedullary enhancement.  Differential  considerations include nonspecific dilatation of the central canal of the spinal cord and syringohydromyelia.     The visualized posterior fossa structures are unremarkable.  No Chiari malformation.  There is mucoperiosteal thickening observed within the ethmoid air cells bilaterally.     There is a C3 vertebral body hemangioma which shows no abnormal enhancement following contrast administration.  No pathologic marrow replacement process or cervical spine fracture.  There is degenerative disc desiccation observed at every cervical level.     The incidentally observed soft tissues of the neck are unremarkable.     C2-C3: There is right uncovertebral spurring present.  No disc protrusion or extrusion. No central canal stenosis or neuroforaminal stenosis.  There is ligamentum flavum thickening.  C3-C4: There is a minimal posterior disc osteophyte complex and uncovertebral spurring.  No disc protrusion or extrusion. No central canal stenosis or neuroforaminal stenosis.  C4-C5: There is left uncovertebral spurring.  There is a broad central disc protrusion.  There is effacement of the anterior CSF sleeve.  No central canal stenosis.  There is, at most, mild right neuroforaminal stenosis as a result of right facet arthropathy.  C5-C6: There is left facet arthropathy.  No central canal stenosis or neuroforaminal stenosis.  C6-C7: There is a 6 mm nonenhancing perineural nerve root sleeve cyst present in the left neural foramen, similar to the prior exam.  There is bilateral uncovertebral spurring, left greater than right.  There is mild ligamentum flavum thickening.  No central canal stenosis.  There is, at most, mild bilateral neuroforaminal stenosis.  There is right facet arthropathy.  C7-T1: There is bilateral facet arthropathy, right greater than left.  No disc protrusion or extrusion. No central canal stenosis or neuroforaminal stenosis.     Impression:     1. No interval detrimental change when compared to the prior  study.  29 mm craniocaudad dimension tubular cystic structure within the central aspect of the spinal cord at C6-C7 which either represents nonspecific mild dilatation of the central canal (up to 2 mm in diameter) or syringohydromyelia.  No abnormal cord edema/abnormal intramedullary enhancement.  2. 6 mm nonenhancing left foraminal perineural nerve root sleeve cyst at C6-C7.  3. Minor degenerative change of the cervical spine as detailed above.  4. C3 vertebral body hemangioma, unchanged.  No evidence of pathologic marrow replacement process.    MRI Thoracic Spine 08/14/20    FINDINGS:  There are no abnormal enhancing masses present within the thoracic spine to suggest a pathologic marrow replacement process.  No acute thoracic compression fracture appreciated.  There is a nonenhancing incidentally observed T1 hyperintense T5 vertebral body hemangioma.  There is degenerative disc desiccation at every thoracic level.  The thoracic spinal cord is normal in signal intensity with no evidence of cord edema, myelomalacia, or abnormal intramedullary enhancement.  No enhancing epidural masses or epidural fluid collections.  No imaging evidence of epidural lipomatosis.     The incidentally observed soft tissues of the chest are unremarkable.     T1-T2: There is a broad disc bulge and a superimposed broad left paracentral disc protrusion which effaces the anterior CSF sleeve and flattens the left anterior thoracic spinal cord.  No central canal stenosis or neuroforaminal stenosis.  T4-T5: There is an 11 mm large broad left paracentral disc protrusion which flattens the left anterior aspect of the thoracic spinal cord.  There is a suggestion of abnormal cord signal on series 6, image 15 which could reflect underlying cord edema/myelomalacia.  No abnormal intramedullary enhancement.  No overall central canal stenosis or neuroforaminal stenosis.  T5-T6: There is a minimal broad left paracentral disc protrusion.  No central  canal stenosis or neuroforaminal stenosis.  T6-T7: There is a broad right paracentral disc protrusion on series 6, image 21.  No central canal stenosis or neuroforaminal stenosis.  T8-T9: There is a broad right paracentral disc protrusion which effaces the right anterior CSF sleeve.  No central canal stenosis or neuroforaminal stenosis.  There is ligamentum flavum thickening.  T10-T11: There is a small broad central disc protrusion on series 6, image 34.  No central canal stenosis.  No definite neuroforaminal stenosis.  T11-T12: There is a broad central disc protrusion which effaces the anterior CSF sleeve.  There is bilateral hypertrophic facet arthropathy.  No central canal stenosis or neuroforaminal stenosis.     Impression:     1. No imaging evidence of a pathologic marrow replacement process.  2. Multilevel degenerative change of the thoracic spine with disc protrusions observed at multiple thoracic levels, most pronounced at T4-T5 where a broad left paracentral disc protrusion and flattens the left anterior aspect of the thoracic spinal cord.  There is possible cord edema noted.  No abnormal intramedullary enhancement.  No cord syrinx.  3. T5 vertebral body hemangioma.    MRI Lumbar Spine 08/14/20  FINDINGS:  There is an L1 vertebral body hemangioma present.  There is a 9 mm focus of T1/T2 signal hyperintensity noted within the left pedicle of L5 which is favored to represent either focal fat deposition or a hemangioma.  No associated enhancement following contrast administration.  No additional concerning enhancing bone lesions elsewhere in the lumbar spine.  No acute lumbar compression fracture.  No spondylolisthesis.  No definite pars defects.  There is multilevel degenerative disc desiccation present at virtually every visualized lower thoracic and lumbar level with disc space narrowing and degenerative endplate change noted at L3-L4.  There is a levoscoliotic curvature of the lumbar spine, apex at  L3-L4.     The conus medullaris terminates at L1.  The visualized lower thoracic spinal cord is normal in signal intensity with no evidence of cord edema, myelomalacia, or cord syrinx.  No abnormal intramedullary enhancement.  No peripherally enhancing epidural fluid collections.  The paraspinous musculature is unremarkable.     The incidentally observed abdominal/retroperitoneal soft tissues demonstrate diverticulosis coli..     T11-T12: There is a broad central disc protrusion which effaces the anterior CSF sleeve.  There is, at most, mild overall central canal stenosis.  No neuroforaminal stenosis.  T12-L1: No disc protrusion or extrusion. No central canal stenosis or neuroforaminal stenosis.  L1-L2: There is a broad right paracentral disc protrusion.  No disc extrusion.  No central canal stenosis or neuroforaminal stenosis.  L2-L3: There is a broad disc bulge which extends into both neural foramina.  There is prominent bilateral hypertrophic facet arthropathy.  There is mild overall central canal stenosis.  No definite neuroforaminal stenosis.  L3-L4: There is a broad right paracentral/foraminal/extraforaminal osteophyte which abuts and displaces the descending right L4 nerve in the right lateral recess.  Please correlate clinically for symptoms referable to the right L4 nerve.  There is right lateral recess stenosis.  There is ligamentum flavum thickening and hypertrophic facet arthropathy.  There is moderate right neuroforaminal stenosis.  No left neuroforaminal stenosis.  There is moderate overall central canal stenosis.  L4-L5: There is a minimal diffuse broad disc bulge which extends into both neural foramina.  There is abutment of the exited right L4 nerve in the right extraforaminal soft tissues on series 7, image 21.  Please correlate clinically for symptoms referable to the right L4 nerve.  There is bilateral severe hypertrophic facet arthropathy present, left greater than right, resulting in prominent  left lateral recess stenosis.  There is moderate-severe left neuroforaminal stenosis (series 9, image 16 and series 7, image 19).  No right neuroforaminal stenosis.  There is severe central canal stenosis.  L5-S1: The left L5 pedicle is relatively hypoplastic in size as compared to the right L5 pedicle, and there is a 9 mm focus of T1/T2 signal hyperintensity within the left L5 pedicle on series 5, image 11 which shows no abnormal enhancement following contrast administration, most likely a focus of focal fat deposition (see postcontrast T1 fat-suppressed images) or a hemangioma.  There is prominent bilateral hypertrophic facet arthropathy, left greater than right, resulting in severe proximal left neuroforaminal stenosis.  Please correlate clinically for symptoms referable to the exiting left L5 nerve (series 7, image 25, series 9, image 22, and series 6, image 22).  There is is also posterior displacement of the descending left S1 nerve in the left lateral recess.  Please correlate clinically for symptoms referable to the left S1 nerve.  There is a broad central disc protrusion at L5-S1 which abuts both descending S1 nerves and which could produce symptoms referable to both S1 nerves, similar to the prior exam.  No right neuroforaminal stenosis.  There is moderate overall central canal stenosis.     There is prominent epidural fat deposition observed within the lumbosacral spinal canal from L4-L5 through the sacral canal.     Impression:     1. No appreciable interval detrimental change when compared to the prior exam.  2. 9 mm nonenhancing focus of T1/T2 signal hyperintensity within the left pedicle of L5 is favored to represent either a hemangioma or focal fat deposition.  No adjacent osseous edema appreciated.  No additional concerning bone lesions elsewhere in the visualized lumbar spine.  No appreciable interval detrimental change over the course of multiple prior examinations dating back to 06/11/2018.  3.  Advanced multilevel degenerative change of the lumbar spine which combines with a levoscoliotic curvature of the lumbar spine to generate multilevel central canal and neuroforaminal stenosis.  Additional details are provided above.  4. Severe left neuroforaminal stenosis at L5-S1 and left lateral recess stenosis at L5-S1 as a result of prominent left facet arthropathy.  Please correlate clinically for symptoms referable to the left L5 and S1 nerves.  There is also a broad central disc protrusion at L5-S1, similar to the prior examination, which could produce symptoms referable to the S1 nerves, left greater than right.  Please correlate clinically.  5. 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    Labs:  BMP  Lab Results   Component Value Date     11/13/2022    K 4.4 11/13/2022     11/13/2022    CO2 28 11/13/2022    BUN 20 (H) 11/13/2022    CREATININE 0.8 12/07/2022    CALCIUM 9.0 11/13/2022    ANIONGAP 8 11/13/2022    ESTGFRAFRICA >60 10/06/2021    EGFRNONAA >60 10/06/2021     Lab Results   Component Value Date    ALT 16 11/12/2022    AST 24 11/12/2022    ALKPHOS 38 11/12/2022    BILITOT 0.4 11/12/2022     Lab Results   Component Value Date    WBC 16.11 (H) 09/04/2019    HGB 9.4 (L) 09/04/2019    HCT 28.9 (L) 09/04/2019    MCV 85 09/04/2019     09/04/2019           Assessment:   Problem List Items Addressed This Visit          Neuro    Spinal stenosis of lumbar region       Orthopedic    Chronic bilateral low back pain with bilateral sciatica     Other Visit Diagnoses       Lumbar radiculopathy    -  Primary    Left knee pain, unspecified chronicity        Peripheral polyneuropathy                    62 y.o. year old female with PMH HTN, a-fib on eliquis presents to the office with back pain.  She's had this pain for over 5 years and it has been gradually worsening.  No history of prior back surgery.   She has had chronic numbness in the top of her  feet for years.  Her pain is worse with standing and walking and relieved with rest and leaning forward.      2/8/2023: Fatemeh Shoemaker returns to the clinic for follow up.  Today she is reporting continued lower back pain 6/10, constant, worsened with walking and standing and with associated radiation into bilateral buttocks and down left leg and some down her right however left leg is worse than right.  She reports continued chronic numbness from her shins down that is unchanged.  She denies any new weakness or any new changes to her bowel or bladder function.  She continues to take gabapentin, nortriptyline, Cymbalta and occasional NSAIDs without significant relief of her pain.        - today she is full 5/5 strength on exam in her lower extremities.  Her left big toe is now fused from previous surgery.  - I independently reviewed her lumbar MRI is consistent with L4-L5: There is a minimal diffuse broad disc bulge which extends into both neural foramina.  There is abutment of the exited right L4 nerve in the right extraforaminal soft tissues   There is bilateral severe hypertrophic facet arthropathy present, left greater than right, resulting in prominent left lateral recess stenosis.  There is moderate-severe left neuroforaminal stenosis.  There is severe central canal stenosis.  - I believe the pain from her severe central canal stenosis has returned.  - her pain is too severe to restart formal physical therapy  - she is not finding significant relief with gabapentin, Cymbalta, nortriptyline and NSAIDs  - her pain is limiting her mobility interfering with her ADLs  - for her pain I would like to schedule her for repeat L5/S1 YUE with spread to left.   - follow-up in 2 weeks post procedure or sooner if needed.  If she fails to get significant relief with this will update her lumbar MRI.  She reports she is seen Neurosurgery in the past and at the time they did not recommend surgery.      : Reviewed      The above note was completed, in part, with the aid of Dragon dictation software/hardware. Translation errors may be present.

## 2023-02-08 NOTE — TELEPHONE ENCOUNTER
Pt is scheduled for epidural steroid injection 2/22/2023 with Dr Tavarez. Would she be ok to hold eliquis for 3 days prior to procedure? Pt states she is a patient of Dr Katz and Dr Tee.     Thanks  ailyn

## 2023-02-08 NOTE — PROGRESS NOTES
[facility] Follow Up Evaluation    Referred by: Caroline Nielson PA-C  Reason for referral: back pain    CC:   Chief Complaint   Patient presents with    Back Pain      Last 3 PDI Scores 6/5/2020 2/17/2020   Pain Disability Index (PDI) 0 10     Interval HPI 2/8/2023: Fatemeh Shoemaker returns to the clinic for follow up.  Today she is reporting continued lower back pain 6/10, constant, worsened with walking and standing and with associated radiation into bilateral buttocks and down left leg and some down her right however left leg is worse than right.  She reports continued chronic numbness from her shins down that is unchanged.  She denies any new weakness or any new changes to her bowel or bladder function.  She continues to take gabapentin, nortriptyline, Cymbalta and occasional NSAIDs without significant relief of her pain.      Pain Intervention History:     - s/p L5/S1 ILESI on 5/21/20 with 55% relief.   - s/p L5/S1 ILESI on 6/19/20 with continued 55% relief.  - s/p L5/S1 ILESI on 12/01/21 with 75% relief     HPI:   Fatemeh Shoemaker is a 62 y.o. female who complains of back pain    Onset: more than a few years  Progression: since onset, pain is gradually worsening  Current Pain Score: 8/10  Timing: constant  Quality: aching  Radiation: yes, down the back of both legs  Associated numbness or weakness: yes numbness b/l feet up to mid calf. Weakness with walking long distances and standing  Exacerbated by: standing, walking  Allievated by: rest, leaning forward  Is Pain Level Acceptable?: No    Previous Therapies:  PT/OT: no  HEP:   Interventions:   Surgery:  Medications:   - NSAIDS:  Celebrex  - MSK Relaxants: tizanidine  - TCAs: nortriptyline  - SNRIs: cymbalta  - Topicals:   - Anticonvulsants: gabapentin  - Opioids:     History:    Current Outpatient Medications:     albuterol sulfate 90 mcg/actuation aebs, Inhale 90 mcg into the lungs every 4 (four) hours as needed (wheezing or shortness of  breath)., Disp: 1 each, Rfl: 2    apixaban (ELIQUIS) 5 mg Tab, Take 1 tablet (5 mg total) by mouth 2 (two) times daily., Disp: 180 tablet, Rfl: 3    cholecalciferol, vitamin D3, (DECARA) 1,250 mcg (50,000 unit) capsule, Take 1 capsule (50,000 Units total) by mouth every 7 days., Disp: 30 capsule, Rfl: 0    cyanocobalamin 500 MCG tablet, Take 1,000 mcg by mouth once daily., Disp: , Rfl:     diclofenac sodium (VOLTAREN) 1 % Gel, Apply 2 g topically 4 (four) times daily., Disp: 50 g, Rfl: 3    DULoxetine (CYMBALTA) 60 MG capsule, TAKE ONE CAPSULE BY MOUTH DAILY, Disp: 90 capsule, Rfl: 2    flecainide (TAMBOCOR) 150 MG Tab, Take 1 tablet (150 mg total) by mouth every 12 (twelve) hours., Disp: 60 tablet, Rfl: 11    fluticasone furoate-vilanteroL (BREO ELLIPTA) 100-25 mcg/dose diskus inhaler, Inhale 1 puff into the lungs once daily., Disp: 60 each, Rfl: 2    gabapentin (NEURONTIN) 600 MG tablet, Take 1 tablet (600 mg total) by mouth 2 (two) times daily., Disp: 60 tablet, Rfl: 2    lisinopriL 10 MG tablet, Take 1 tablet (10 mg total) by mouth once daily., Disp: 90 tablet, Rfl: 1    melatonin 5 mg TbDL, Take 1 tablet by mouth nightly as needed (sleep)., Disp: , Rfl:     metoprolol tartrate (LOPRESSOR) 100 MG tablet, Take 1 tablet (100 mg total) by mouth 2 (two) times daily., Disp: 180 tablet, Rfl: 3    nortriptyline (PAMELOR) 25 MG capsule, TAKE ONE CAPSULE BY MOUTH EVERY EVENING, Disp: 90 capsule, Rfl: 3    omeprazole (PRILOSEC) 20 MG capsule, TAKE ONE CAPSULE BY MOUTH EVERY MORNING, Disp: 90 capsule, Rfl: 1    ondansetron (ZOFRAN-ODT) 4 MG TbDL, Take 1 tablet (4 mg total) by mouth 2 (two) times daily., Disp: 20 tablet, Rfl: 1    promethazine (PHENERGAN) 12.5 MG Tab, Take 1 tablet (12.5 mg total) by mouth 2 (two) times daily as needed (nausea)., Disp: 10 tablet, Rfl: 0    promethazine-codeine 6.25-10 mg/5 ml (PHENERGAN WITH CODEINE) 6.25-10 mg/5 mL syrup, Take 5 mLs by mouth nightly as needed for Cough., Disp: 118 mL, Rfl:  0    promethazine-dextromethorphan (PROMETHAZINE-DM) 6.25-15 mg/5 mL Syrp, Take 5 mLs by mouth nightly as needed., Disp: , Rfl:     pulse oximeter (PULSE OXIMETER) device, Use twice daily at 8 AM and 3 PM and record the value in MyChart as directed., Disp: 1 each, Rfl: 0    rosuvastatin (CRESTOR) 10 MG tablet, TAKE ONE TABLET BY MOUTH EVERY EVENING, Disp: 90 tablet, Rfl: 3    scopolamine (TRANSDERM-SCOP) 1.3-1.5 mg (1 mg over 3 days), Place 1 patch onto the skin every 72 hours., Disp: 10 patch, Rfl: 0    semaglutide 2 mg/dose (8 mg/3 mL) PnIj, Inject 0.5 mg into the skin., Disp: , Rfl:     tiZANidine (ZANAFLEX) 4 MG tablet, Take 4 mg by mouth every 6 (six) hours as needed., Disp: , Rfl:     valACYclovir (VALTREX) 500 MG tablet, Take 1 tablet (500 mg total) by mouth daily as needed (fever blister)., Disp: 30 tablet, Rfl: 2    Past Medical History:   Diagnosis Date    Anemia     Anticoagulant long-term use     Arrhythmia     Arthritis     Atrial fibrillation     history    Bronchitis     Encounter for blood transfusion     General anesthetics causing adverse effect in therapeutic use     High blood pressure     Hyperlipidemia     Lump or mass in breast     benign     Neuropathy     Obesity     Obstructive sleep apnea syndrome 2021    Ulcer        Past Surgical History:   Procedure Laterality Date    A-V CARDIAC PACEMAKER INSERTION Left 2020    Procedure: INSERTION, CARDIAC PACEMAKER, DUAL CHAMBER;  Surgeon: Bert Reaves MD;  Location: [facility] CATH;  Service: Cardiology;  Laterality: Left;    BREAST BIOPSY Right 2017    myofibroblastoma     BREAST LUMPECTOMY Right 2017     SECTION  10/25/1986    Other c/section 10/26/1997    CHOLECYSTECTOMY  2017    Dr. TOLU Bowers, [facility]     CORRECTION OF HAMMER TOE Left 11/3/2022    Procedure: CORRECTION, HAMMER TOE;  Surgeon: Ezra Arriaga DPM;  Location: [facility] OR;  Service: Podiatry;  Laterality: Left;  hammertoes 2-5 left,    csection      CS x  2    CYSTOSCOPY N/A 9/3/2019    Procedure: CYSTOSCOPY;  Surgeon: Noemi Pierre MD;  Location: [facility] OR;  Service: OB/GYN;  Laterality: N/A;    DILATION AND CURETTAGE OF UTERUS      EPIDURAL STEROID INJECTION INTO LUMBAR SPINE N/A 5/21/2020    Procedure: Injection-steroid-epidural-lumbar L5/S1;  Surgeon: Gurjit Tavarez MD;  Location: [facility] OR;  Service: Pain Management;  Laterality: N/A;    EPIDURAL STEROID INJECTION INTO LUMBAR SPINE N/A 6/19/2020    Procedure: Injection-steroid-epidural-lumbar L5/S1;  Surgeon: Gurjit Tavarez MD;  Location: [facility] OR;  Service: Pain Management;  Laterality: N/A;    EPIDURAL STEROID INJECTION INTO LUMBAR SPINE N/A 12/1/2021    Procedure: Injection-steroid-epidural-lumbar L5/S1;  Surgeon: Gurjit Tavarez MD;  Location: [facility] OR;  Service: Pain Management;  Laterality: N/A;    FRACTURE SURGERY  04/86    Dislocated  hip total rt hip 08/08    HIP PINNING      HYSTERECTOMY      JOINT REPLACEMENT  08/2008    LAPAROSCOPIC SALPINGO-OOPHORECTOMY Bilateral 9/3/2019    Procedure: SALPINGO-OOPHORECTOMY, LAPAROSCOPIC;  Surgeon: Noemi Pierre MD;  Location: [facility] OR;  Service: OB/GYN;  Laterality: Bilateral;  Dr. Bentley to assist. No resident needed.     LAPAROSCOPIC TOTAL HYSTERECTOMY N/A 9/3/2019    Procedure: HYSTERECTOMY, TOTAL, LAPAROSCOPIC;  Surgeon: Noemi Pierre MD;  Location: [facility] OR;  Service: OB/GYN;  Laterality: N/A;    NASAL SEPTUM SURGERY      OOPHORECTOMY      SKIN TAG REMOVAL Left 11/3/2022    Procedure: REMOVAL, SKIN TAG;  Surgeon: Ezra Arriaga DPM;  Location: [facility] OR;  Service: Podiatry;  Laterality: Left;    TOTAL HIP ARTHROPLASTY Right     TUBAL LIGATION  1997       Family History   Problem Relation Age of Onset    Colon cancer Maternal Grandmother 70        mets to ovary    Cancer Maternal Grandmother     Arthritis Paternal Grandfather     Eclampsia Paternal Grandmother     Cataracts Maternal Grandfather     Stroke Father     Colon cancer Father     Hypertension  Father     Alcohol abuse Father     Cancer Father         Passed away July 10 2019    Hypertension Mother     Cataracts Mother     Hypertension Brother         Age 54 hypertension heart    Early death Brother         Hypertension cardio disease    No Known Problems Daughter     Stomach cancer Neg Hx     Esophageal cancer Neg Hx     Breast cancer Neg Hx     Miscarriages / Stillbirths Neg Hx     Ovarian cancer Neg Hx     Glaucoma Neg Hx     Macular degeneration Neg Hx     Retinal detachment Neg Hx     Strabismus Neg Hx        Social History     Socioeconomic History    Marital status: Significant Other   Tobacco Use    Smoking status: Never    Smokeless tobacco: Never   Substance and Sexual Activity    Alcohol use: No     Comment: never    Drug use: Never    Sexual activity: Yes     Partners: Male     Birth control/protection: See Surgical Hx, Other-see comments     Comment: Hysterectomy 9/3/19     Social Determinants of Health     Financial Resource Strain: Low Risk     Difficulty of Paying Living Expenses: Not hard at all   Food Insecurity: No Food Insecurity    Worried About Running Out of Food in the Last Year: Never true    Ran Out of Food in the Last Year: Never true   Transportation Needs: No Transportation Needs    Lack of Transportation (Medical): No    Lack of Transportation (Non-Medical): No   Physical Activity: Insufficiently Active    Days of Exercise per Week: 1 day    Minutes of Exercise per Session: 10 min   Stress: No Stress Concern Present    Feeling of Stress : Not at all   Social Connections: Unknown    Frequency of Communication with Friends and Family: Never    Frequency of Social Gatherings with Friends and Family: More than three times a week    Active Member of Clubs or Organizations: No    Attends Club or Organization Meetings: Never    Marital Status:    Housing Stability: Low Risk     Unable to Pay for Housing in the Last Year: No    Number of Places Lived in the Last Year: 1     "Unstable Housing in the Last Year: No       Review of patient's allergies indicates:   Allergen Reactions    Aspirin Other (See Comments)     "I don't take it because I've had ulcers"       Meperidine Other (See Comments)     Felt like she was about to pass out after taking       Review of Systems:  General ROS: negative for - fever  Psychological ROS: negative for - hostility  Hematological and Lymphatic ROS: positive for - bruising  Endocrine ROS: negative for - unexpected weight changes  Respiratory ROS: no cough, shortness of breath, or wheezing  Cardiovascular ROS: no chest pain or dyspnea on exertion  Gastrointestinal ROS: no abdominal pain, change in bowel habits, or black or bloody stools  Musculoskeletal ROS: negative for - muscular weakness  Neurological ROS: negative for - bowel and bladder control changes, positive for numbness/tingling  Dermatological ROS: negative for rash    Physical Exam:  Vitals:    02/08/23 0829   BP: (!) 149/67   Pulse: 60   Weight: 127 kg (280 lb)   Height: 5' 5" (1.651 m)   PainSc:   6   PainLoc: Back     Body mass index is 46.59 kg/m².     Gen: NAD  Gait: gait antalgic due to knee pain and lower back pain, ambulating with Rollator  Psych:  Mood appropriate for given condition  HEENT: eyes anicteric   GI: Abd soft  CV: RRR  Lungs: breathing unlabored   ROM: limited AROM of the L spine in all planes, full ROM at ankles, knees and hips  Lumbar flexion 90 degrees, extension 50 degrees, side bending 30 degrees.    Sensation: intact to light touch in all dermatomes tested from L2-S1 bilaterally, except for b/l feet up to mid calf.   Reflexes: 0/0 b/l patella and 0/0 b/l achilles  Palpation: Diffusely tender over lumbar paraspinals  -TTP over the b/l greater trochanters,  -TTP bilateral SI joint  Tone: normal in the b/l knees and hips   Skin: intact,  No signs of infection, swelling, or warmth to touch.  Extremities:  Mild edema in b/l ankles         Right Left   L2/3 Iliacus Hip " flexion  5  5   L3/4 Qudratus Femoris Knee Extension  5  5   L4/5 Tib Anterior Ankle Dorsiflexion   5  5   L5/S1 Extensor Hallicus Longus Great toe extension  5  Fused                 S1/S2 Gastroc/Soleus Plantar Flexion  5  5       Imaging:  MRI lumbar spine 6/11/18  FINDINGS:  Vertebral column: The study is motion degraded.  There is multilevel degenerative change.  There is marked disc space narrowing towards the right at the L3-4 level where there is associated degenerative endplate signal change.  There is no other significant disc space narrowing.  There is no fracture.  Baseline marrow signal intensity is normal.  Anterior endplate osteophyte formation is also present at the L2-3 level.  There is subtle trace anterolisthesis of L4 on L5.    Spinal canal, conus, epidural space: Spinal canal is developmentally normal.  The conus is normal in location, contour and signal intensity, terminating at the level of T12-L1.  There is no abnormal epidural collection or mass.    Findings by level:    On the sagittal images, there is minimal bulging of the annulus and mild facet joint arthropathy at the T10-11 level but there is no spinal canal or significant foraminal stenosis.  At the T11-T12 level, there is a shallow broad central disc protrusion which narrows the ventral subarachnoid space but again there is no significant spinal canal or foraminal stenosis and there is no cord compression.    T12-L1: There is minimal bulging of the annulus and there is mild facet joint arthropathy.  There is no spinal canal or significant foraminal stenosis.  L1-2: There is moderate facet joint arthropathy.  There is minimal bulging of the annulus with a tiny 2 mm central disc protrusion.  There is no spinal canal or significant foraminal stenosis.  L2-3: There is a moderate diffuse disc bulge with marked facet joint arthropathy.  There is mildly prominent dorsal epidural fat.  There is flattening of the ventral dural sac.  There is  moderate central spinal stenosis.  AP measurement of the dural sac is 7.5 mm.  There is no significant foraminal stenosis.  L3-4: There is marked disc space narrowing towards the right.  There is a diffuse disc bulge with osteophytic ridging and superimposed broad right paracentral and foraminal disc protrusion.  There is moderate-to-marked facet joint arthropathy with ligamentum flavum thickening, right greater than left.  Also, there is mildly prominent dorsal epidural fat.  There is moderate central spinal stenosis.  There is severe right lateral recess and foraminal stenosis with mild-to-moderate left foraminal stenosis.  L4-5: There is marked facet joint arthropathy.  There is a diffuse disc bulge with superimposed broad central disc protrusion.  There is mild spinal stenosis.  There is mild-to-moderate bilateral foraminal stenosis.  L5-S1: There is a diffuse disc bulge with superimposed broad central disc protrusion with annular fissure.  There is marked left and moderate-to-marked right facet joint arthropathy with ligamentum flavum thickening.  There is no significant spinal stenosis.  The right foramina is patent.  There is severe left foraminal stenosis.  The broad central disc protrusion approaches both S1 roots.  The left S1 root is crowded between the facet joint changes and the disc protrusion.  Both S1 roots could be affected, left greater than right.    Soft tissues, other: The prevertebral soft tissues are normal.  The aorta is mildly ectatic.    MRI Cervical Spine 08/14/20  FINDINGS:  The current examination once again demonstrates a 2 mm AP diameter cystic structure within the central aspect of the cervical cord at C6-C7 which extends over an approximately 29 mm craniocaudad dimension from the superior endplate of C6 to the C7-T1 intervertebral disc space (series 2, image 7 and series 5, image 19).  No abnormal adjacent cord edema or abnormal intramedullary enhancement.  Differential  considerations include nonspecific dilatation of the central canal of the spinal cord and syringohydromyelia.     The visualized posterior fossa structures are unremarkable.  No Chiari malformation.  There is mucoperiosteal thickening observed within the ethmoid air cells bilaterally.     There is a C3 vertebral body hemangioma which shows no abnormal enhancement following contrast administration.  No pathologic marrow replacement process or cervical spine fracture.  There is degenerative disc desiccation observed at every cervical level.     The incidentally observed soft tissues of the neck are unremarkable.     C2-C3: There is right uncovertebral spurring present.  No disc protrusion or extrusion. No central canal stenosis or neuroforaminal stenosis.  There is ligamentum flavum thickening.  C3-C4: There is a minimal posterior disc osteophyte complex and uncovertebral spurring.  No disc protrusion or extrusion. No central canal stenosis or neuroforaminal stenosis.  C4-C5: There is left uncovertebral spurring.  There is a broad central disc protrusion.  There is effacement of the anterior CSF sleeve.  No central canal stenosis.  There is, at most, mild right neuroforaminal stenosis as a result of right facet arthropathy.  C5-C6: There is left facet arthropathy.  No central canal stenosis or neuroforaminal stenosis.  C6-C7: There is a 6 mm nonenhancing perineural nerve root sleeve cyst present in the left neural foramen, similar to the prior exam.  There is bilateral uncovertebral spurring, left greater than right.  There is mild ligamentum flavum thickening.  No central canal stenosis.  There is, at most, mild bilateral neuroforaminal stenosis.  There is right facet arthropathy.  C7-T1: There is bilateral facet arthropathy, right greater than left.  No disc protrusion or extrusion. No central canal stenosis or neuroforaminal stenosis.     Impression:     1. No interval detrimental change when compared to the prior  study.  29 mm craniocaudad dimension tubular cystic structure within the central aspect of the spinal cord at C6-C7 which either represents nonspecific mild dilatation of the central canal (up to 2 mm in diameter) or syringohydromyelia.  No abnormal cord edema/abnormal intramedullary enhancement.  2. 6 mm nonenhancing left foraminal perineural nerve root sleeve cyst at C6-C7.  3. Minor degenerative change of the cervical spine as detailed above.  4. C3 vertebral body hemangioma, unchanged.  No evidence of pathologic marrow replacement process.    MRI Thoracic Spine 08/14/20    FINDINGS:  There are no abnormal enhancing masses present within the thoracic spine to suggest a pathologic marrow replacement process.  No acute thoracic compression fracture appreciated.  There is a nonenhancing incidentally observed T1 hyperintense T5 vertebral body hemangioma.  There is degenerative disc desiccation at every thoracic level.  The thoracic spinal cord is normal in signal intensity with no evidence of cord edema, myelomalacia, or abnormal intramedullary enhancement.  No enhancing epidural masses or epidural fluid collections.  No imaging evidence of epidural lipomatosis.     The incidentally observed soft tissues of the chest are unremarkable.     T1-T2: There is a broad disc bulge and a superimposed broad left paracentral disc protrusion which effaces the anterior CSF sleeve and flattens the left anterior thoracic spinal cord.  No central canal stenosis or neuroforaminal stenosis.  T4-T5: There is an 11 mm large broad left paracentral disc protrusion which flattens the left anterior aspect of the thoracic spinal cord.  There is a suggestion of abnormal cord signal on series 6, image 15 which could reflect underlying cord edema/myelomalacia.  No abnormal intramedullary enhancement.  No overall central canal stenosis or neuroforaminal stenosis.  T5-T6: There is a minimal broad left paracentral disc protrusion.  No central  canal stenosis or neuroforaminal stenosis.  T6-T7: There is a broad right paracentral disc protrusion on series 6, image 21.  No central canal stenosis or neuroforaminal stenosis.  T8-T9: There is a broad right paracentral disc protrusion which effaces the right anterior CSF sleeve.  No central canal stenosis or neuroforaminal stenosis.  There is ligamentum flavum thickening.  T10-T11: There is a small broad central disc protrusion on series 6, image 34.  No central canal stenosis.  No definite neuroforaminal stenosis.  T11-T12: There is a broad central disc protrusion which effaces the anterior CSF sleeve.  There is bilateral hypertrophic facet arthropathy.  No central canal stenosis or neuroforaminal stenosis.     Impression:     1. No imaging evidence of a pathologic marrow replacement process.  2. Multilevel degenerative change of the thoracic spine with disc protrusions observed at multiple thoracic levels, most pronounced at T4-T5 where a broad left paracentral disc protrusion and flattens the left anterior aspect of the thoracic spinal cord.  There is possible cord edema noted.  No abnormal intramedullary enhancement.  No cord syrinx.  3. T5 vertebral body hemangioma.    MRI Lumbar Spine 08/14/20  FINDINGS:  There is an L1 vertebral body hemangioma present.  There is a 9 mm focus of T1/T2 signal hyperintensity noted within the left pedicle of L5 which is favored to represent either focal fat deposition or a hemangioma.  No associated enhancement following contrast administration.  No additional concerning enhancing bone lesions elsewhere in the lumbar spine.  No acute lumbar compression fracture.  No spondylolisthesis.  No definite pars defects.  There is multilevel degenerative disc desiccation present at virtually every visualized lower thoracic and lumbar level with disc space narrowing and degenerative endplate change noted at L3-L4.  There is a levoscoliotic curvature of the lumbar spine, apex at  L3-L4.     The conus medullaris terminates at L1.  The visualized lower thoracic spinal cord is normal in signal intensity with no evidence of cord edema, myelomalacia, or cord syrinx.  No abnormal intramedullary enhancement.  No peripherally enhancing epidural fluid collections.  The paraspinous musculature is unremarkable.     The incidentally observed abdominal/retroperitoneal soft tissues demonstrate diverticulosis coli..     T11-T12: There is a broad central disc protrusion which effaces the anterior CSF sleeve.  There is, at most, mild overall central canal stenosis.  No neuroforaminal stenosis.  T12-L1: No disc protrusion or extrusion. No central canal stenosis or neuroforaminal stenosis.  L1-L2: There is a broad right paracentral disc protrusion.  No disc extrusion.  No central canal stenosis or neuroforaminal stenosis.  L2-L3: There is a broad disc bulge which extends into both neural foramina.  There is prominent bilateral hypertrophic facet arthropathy.  There is mild overall central canal stenosis.  No definite neuroforaminal stenosis.  L3-L4: There is a broad right paracentral/foraminal/extraforaminal osteophyte which abuts and displaces the descending right L4 nerve in the right lateral recess.  Please correlate clinically for symptoms referable to the right L4 nerve.  There is right lateral recess stenosis.  There is ligamentum flavum thickening and hypertrophic facet arthropathy.  There is moderate right neuroforaminal stenosis.  No left neuroforaminal stenosis.  There is moderate overall central canal stenosis.  L4-L5: There is a minimal diffuse broad disc bulge which extends into both neural foramina.  There is abutment of the exited right L4 nerve in the right extraforaminal soft tissues on series 7, image 21.  Please correlate clinically for symptoms referable to the right L4 nerve.  There is bilateral severe hypertrophic facet arthropathy present, left greater than right, resulting in prominent  left lateral recess stenosis.  There is moderate-severe left neuroforaminal stenosis (series 9, image 16 and series 7, image 19).  No right neuroforaminal stenosis.  There is severe central canal stenosis.  L5-S1: The left L5 pedicle is relatively hypoplastic in size as compared to the right L5 pedicle, and there is a 9 mm focus of T1/T2 signal hyperintensity within the left L5 pedicle on series 5, image 11 which shows no abnormal enhancement following contrast administration, most likely a focus of focal fat deposition (see postcontrast T1 fat-suppressed images) or a hemangioma.  There is prominent bilateral hypertrophic facet arthropathy, left greater than right, resulting in severe proximal left neuroforaminal stenosis.  Please correlate clinically for symptoms referable to the exiting left L5 nerve (series 7, image 25, series 9, image 22, and series 6, image 22).  There is is also posterior displacement of the descending left S1 nerve in the left lateral recess.  Please correlate clinically for symptoms referable to the left S1 nerve.  There is a broad central disc protrusion at L5-S1 which abuts both descending S1 nerves and which could produce symptoms referable to both S1 nerves, similar to the prior exam.  No right neuroforaminal stenosis.  There is moderate overall central canal stenosis.     There is prominent epidural fat deposition observed within the lumbosacral spinal canal from L4-L5 through the sacral canal.     Impression:     1. No appreciable interval detrimental change when compared to the prior exam.  2. 9 mm nonenhancing focus of T1/T2 signal hyperintensity within the left pedicle of L5 is favored to represent either a hemangioma or focal fat deposition.  No adjacent osseous edema appreciated.  No additional concerning bone lesions elsewhere in the visualized lumbar spine.  No appreciable interval detrimental change over the course of multiple prior examinations dating back to 06/11/2018.  3.  Advanced multilevel degenerative change of the lumbar spine which combines with a levoscoliotic curvature of the lumbar spine to generate multilevel central canal and neuroforaminal stenosis.  Additional details are provided above.  4. Severe left neuroforaminal stenosis at L5-S1 and left lateral recess stenosis at L5-S1 as a result of prominent left facet arthropathy.  Please correlate clinically for symptoms referable to the left L5 and S1 nerves.  There is also a broad central disc protrusion at L5-S1, similar to the prior examination, which could produce symptoms referable to the S1 nerves, left greater than right.  Please correlate clinically.  5. 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    Labs:  BMP  Lab Results   Component Value Date     11/13/2022    K 4.4 11/13/2022     11/13/2022    CO2 28 11/13/2022    BUN 20 (H) 11/13/2022    CREATININE 0.8 12/07/2022    CALCIUM 9.0 11/13/2022    ANIONGAP 8 11/13/2022    ESTGFRAFRICA >60 10/06/2021    EGFRNONAA >60 10/06/2021     Lab Results   Component Value Date    ALT 16 11/12/2022    AST 24 11/12/2022    ALKPHOS 38 11/12/2022    BILITOT 0.4 11/12/2022     Lab Results   Component Value Date    WBC 16.11 (H) 09/04/2019    HGB 9.4 (L) 09/04/2019    HCT 28.9 (L) 09/04/2019    MCV 85 09/04/2019     09/04/2019           Assessment:   Problem List Items Addressed This Visit          Neuro    Spinal stenosis of lumbar region       Orthopedic    Chronic bilateral low back pain with bilateral sciatica     Other Visit Diagnoses       Lumbar radiculopathy    -  Primary    Left knee pain, unspecified chronicity        Peripheral polyneuropathy                    62 y.o. year old female with PMH HTN, a-fib on eliquis presents to the office with back pain.  She's had this pain for over 5 years and it has been gradually worsening.  No history of prior back surgery.   She has had chronic numbness in the top of her  feet for years.  Her pain is worse with standing and walking and relieved with rest and leaning forward.      2/8/2023: Fatemeh Shoemaker returns to the clinic for follow up.  Today she is reporting continued lower back pain 6/10, constant, worsened with walking and standing and with associated radiation into bilateral buttocks and down left leg and some down her right however left leg is worse than right.  She reports continued chronic numbness from her shins down that is unchanged.  She denies any new weakness or any new changes to her bowel or bladder function.  She continues to take gabapentin, nortriptyline, Cymbalta and occasional NSAIDs without significant relief of her pain.        - today she is full 5/5 strength on exam in her lower extremities.  Her left big toe is now fused from previous surgery.  - I independently reviewed her lumbar MRI is consistent with L4-L5: There is a minimal diffuse broad disc bulge which extends into both neural foramina.  There is abutment of the exited right L4 nerve in the right extraforaminal soft tissues   There is bilateral severe hypertrophic facet arthropathy present, left greater than right, resulting in prominent left lateral recess stenosis.  There is moderate-severe left neuroforaminal stenosis.  There is severe central canal stenosis.  - I believe the pain from her severe central canal stenosis has returned.  - her pain is too severe to restart formal physical therapy  - she is not finding significant relief with gabapentin, Cymbalta, nortriptyline and NSAIDs  - her pain is limiting her mobility interfering with her ADLs  - for her pain I would like to schedule her for repeat L5/S1 YUE with spread to left.   - follow-up in 2 weeks post procedure or sooner if needed.  If she fails to get significant relief with this will update her lumbar MRI.  She reports she is seen Neurosurgery in the past and at the time they did not recommend surgery.      : Reviewed      The above note was completed, in part, with the aid of Dragon dictation software/hardware. Translation errors may be present.

## 2023-02-09 NOTE — TELEPHONE ENCOUNTER
Call to patient, no answer. Unable to leave message as patient's mailbox is full.     Pt needs to reschedule colposcopy and confirm new surgery date and time with patient. Multiple messages sent via patient portal. Will send unable to reach letter in mail for patient as well to contact office.   See note dated 2/8/2023 Dr Katz approved holding blood thinner

## 2023-02-22 ENCOUNTER — HOSPITAL ENCOUNTER (OUTPATIENT)
Facility: HOSPITAL | Age: 63
Discharge: HOME | End: 2023-02-22
Attending: ANESTHESIOLOGY | Admitting: ANESTHESIOLOGY
Payer: COMMERCIAL

## 2023-02-22 ENCOUNTER — HOSPITAL ENCOUNTER (OUTPATIENT)
Dept: RADIOLOGY | Facility: HOSPITAL | Age: 63
Discharge: HOME | End: 2023-02-22
Attending: ANESTHESIOLOGY | Admitting: ANESTHESIOLOGY
Payer: COMMERCIAL

## 2023-02-22 VITALS
DIASTOLIC BLOOD PRESSURE: 89 MMHG | OXYGEN SATURATION: 100 % | WEIGHT: 280 LBS | HEIGHT: 65 IN | SYSTOLIC BLOOD PRESSURE: 147 MMHG | TEMPERATURE: 98 F | BODY MASS INDEX: 46.65 KG/M2 | HEART RATE: 60 BPM | RESPIRATION RATE: 18 BRPM

## 2023-02-22 DIAGNOSIS — M54.16 LUMBAR RADICULOPATHY: Primary | ICD-10-CM

## 2023-02-22 DIAGNOSIS — M54.50 LOWER BACK PAIN: ICD-10-CM

## 2023-02-22 PROCEDURE — 25500020 PHARM REV CODE 255: Mod: PO | Performed by: ANESTHESIOLOGY

## 2023-02-22 PROCEDURE — 25000003 PHARM REV CODE 250: Mod: PO | Performed by: ANESTHESIOLOGY

## 2023-02-22 PROCEDURE — 63600175 PHARM REV CODE 636 W HCPCS: Mod: PO | Performed by: ANESTHESIOLOGY

## 2023-02-22 PROCEDURE — 76000 FLUOROSCOPY <1 HR PHYS/QHP: CPT | Mod: TC,PO

## 2023-02-22 PROCEDURE — 62323 PR INJ LUMBAR/SACRAL, W/IMAGING GUIDANCE: ICD-10-PCS | Mod: ,,, | Performed by: ANESTHESIOLOGY

## 2023-02-22 PROCEDURE — 62323 NJX INTERLAMINAR LMBR/SAC: CPT | Mod: ,,, | Performed by: ANESTHESIOLOGY

## 2023-02-22 PROCEDURE — 62323 NJX INTERLAMINAR LMBR/SAC: CPT | Mod: PO | Performed by: ANESTHESIOLOGY

## 2023-02-22 PROCEDURE — A9579 GAD-BASE MR CONTRAST NOS,1ML: HCPCS | Mod: PO | Performed by: ANESTHESIOLOGY

## 2023-02-22 RX ORDER — LIDOCAINE HYDROCHLORIDE 10 MG/ML
INJECTION, SOLUTION EPIDURAL; INFILTRATION; INTRACAUDAL; PERINEURAL
Status: DISCONTINUED | OUTPATIENT
Start: 2023-02-22 | End: 2023-02-22 | Stop reason: HOSPADM

## 2023-02-22 RX ORDER — ALPRAZOLAM 0.5 MG/1
1 TABLET, ORALLY DISINTEGRATING ORAL ONCE AS NEEDED
Status: COMPLETED | OUTPATIENT
Start: 2023-02-22 | End: 2023-02-22

## 2023-02-22 RX ORDER — METHYLPREDNISOLONE ACETATE 80 MG/ML
INJECTION, SUSPENSION INTRA-ARTICULAR; INTRALESIONAL; INTRAMUSCULAR; SOFT TISSUE
Status: DISCONTINUED | OUTPATIENT
Start: 2023-02-22 | End: 2023-02-22 | Stop reason: HOSPADM

## 2023-02-22 RX ADMIN — ALPRAZOLAM 1 MG: 0.5 TABLET, ORALLY DISINTEGRATING ORAL at 09:02

## 2023-02-22 NOTE — OP NOTE
"Procedure Note    Procedure Date: 2/22/2023    Procedure Performed:  L5/S1 lumbar interlaminar epidural steroid injection under fluoroscopy.    Indications: Patient has failed conservative therapy.      Pre-op diagnosis: Lumbar Radiculopathy    Post-op diagnosis: same    Physician: Gurjit Tavarez MD    IV anxiolysis medications: none    Medications injected: depomedrol 80mg, 1% Lidocaine 1ml, 2 mL sterile, preservative-free normal saline.    Local anesthetic used: 1% Lidocaine, 1 ml    Estimated Blood Loss: none    Complications:  none    Technique:  The patient was interviewed in the holding area and Risks/Benefits were discussed, including, but not limited to, the possibility of new or different pain, bleeding or infection.   All questions were answered.  The patient understood and accepted risks.  Consent was verfied.  A time-out was taken to identify patient and procedure prior to starting the procedure. The patient was placed in the prone position on the fluoroscopy table. The area of the lumbar spine was prepped with Chloraprep x2 and draped in a sterile manner. The L5/S1 interspace was identified and marked under AP fluoroscopy. The skin and subcutaneous tissues overlying the targeted interspace were anesthetized with 3-5 mL of 1% lidocaine using a 25G, 1.5" needle.  A 18G, 6" Tuohy epidural needle was directed toward the interspace under fluoroscopic guidance until the ligamentum flavum was engaged. From this point, a loss of resistance technique with a glass syringe and saline was used to identify entrance of the needle into the epidural space. Once loss of resistance was observed 1 mL of contrast solution was injected. An appropriate epidurogram was noted.  A 4 mL mixture consisting of saline, 1 mL 1% Lidocaine and 80 mg of depomedrol was injected slowly and without resistance.  The needle was flushed with normal saline and removed. The contrast was seen to be displaced after injection. Patient was " awake/responsive during all injections.  The patient tolerated the procedure well and was transferred to the .AC.. in stable condition.  The patient was monitored after the procedure and was given post-procedure and discharge instructions to follow at home. The patient was discharged in a stable condition.

## 2023-02-22 NOTE — INTERVAL H&P NOTE
The patient has been examined and the H&P has been reviewed:    I concur with the findings and no changes have occurred since H&P was written.  The risks and benefits of this intervention, and alternative therapies were discussed with the patient.  The discussion of risks included infection, bleeding, need for additional procedures or surgery, nerve damage.  Questions regarding the procedure, risks, expected outcome, and possible side effects were solicited and answered to the patient's satisfaction.  Fatemeh Lagoslps wishes to proceed with the injection or procedure.  Written consent was obtained.      There are no hospital problems to display for this patient.

## 2023-02-22 NOTE — DISCHARGE SUMMARY
[facility]  Discharge Note  Short Stay    Admit Date: 2/22/2023    Discharge Date: 2/22/2023    Attending Physician: Gurjit Tavarez     Discharge Provider: Gurjit Tavarez    Diagnoses:  There are no hospital problems to display for this patient.      Discharged Condition: Good    Final Diagnoses: Lumbar radiculopathy [M54.16]    Disposition: Home or Self Care    Hospital Course: No complications, uneventful    Outcome of Hospitalization, Treatment, Procedure, or Surgery:  Patient was admitted for outpatient interventional pain management procedure. The patient tolerated the procedure well with no complications.    Follow up/Patient Instructions:  Follow up as scheduled in Pain Management office in 2-3 weeks.  Patient has received instructions and follow up date and time.    Medications:  Continue previous medications, except restart eliquis in 24 hours    Discharge Procedure Orders   Notify your health care provider if you experience any of the following:  temperature >100.4     Notify your health care provider if you experience any of the following:  persistent nausea and vomiting or diarrhea     Notify your health care provider if you experience any of the following:  severe uncontrolled pain     Notify your health care provider if you experience any of the following:  redness, tenderness, or signs of infection (pain, swelling, redness, odor or green/yellow discharge around incision site)     Notify your health care provider if you experience any of the following:  difficulty breathing or increased cough     Notify your health care provider if you experience any of the following:  severe persistent headache     Notify your health care provider if you experience any of the following:  worsening rash     Notify your health care provider if you experience any of the following:  persistent dizziness, light-headedness, or visual disturbances     Notify your health care provider if you experience any of the  following:  increased confusion or weakness     Activity as tolerated

## 2023-03-06 ENCOUNTER — TELEPHONE (OUTPATIENT)
Dept: PAIN MEDICINE | Facility: CLINIC | Age: 63
End: 2023-03-06
Payer: COMMERCIAL

## 2023-03-06 NOTE — TELEPHONE ENCOUNTER
Called an informed the patient that Matthew Dudley and Dr. Tavarez is booked out for today 3/6/2023 and 3/7/2023. Patient was okay with that and wants to keep her appointment on 3/8/2023.

## 2023-03-06 NOTE — TELEPHONE ENCOUNTER
----- Message from Melissa Ulrich sent at 3/6/2023  1:06 PM CST -----  Contact: self  Type: Sooner Appointment Request        Caller is requesting a sooner appointment. Caller declined first available appointment listed below. Caller will not accept being placed on the waitlist and is requesting a message be sent to doctor.        Name of Caller: Patient   Best Call Back Number: [phone]  Additional Information: Pt states she is having pain and now she is having ain on her right side and need to get in sooner than Wednesday. Plz call today to get scheduled. Thanks

## 2023-03-08 ENCOUNTER — OFFICE VISIT (OUTPATIENT)
Dept: PAIN MEDICINE | Facility: CLINIC | Age: 63
End: 2023-03-08
Payer: COMMERCIAL

## 2023-03-08 ENCOUNTER — HOSPITAL ENCOUNTER (OUTPATIENT)
Dept: RADIOLOGY | Facility: HOSPITAL | Age: 63
Discharge: HOME | End: 2023-03-08
Payer: COMMERCIAL

## 2023-03-08 VITALS
WEIGHT: 277.75 LBS | SYSTOLIC BLOOD PRESSURE: 153 MMHG | TEMPERATURE: 99 F | HEART RATE: 60 BPM | BODY MASS INDEX: 46.28 KG/M2 | DIASTOLIC BLOOD PRESSURE: 73 MMHG | HEIGHT: 65 IN

## 2023-03-08 DIAGNOSIS — M54.16 LUMBAR RADICULOPATHY, CHRONIC: Primary | ICD-10-CM

## 2023-03-08 DIAGNOSIS — G62.9 PERIPHERAL POLYNEUROPATHY: ICD-10-CM

## 2023-03-08 DIAGNOSIS — M25.551 RIGHT HIP PAIN: ICD-10-CM

## 2023-03-08 DIAGNOSIS — M48.061 SPINAL STENOSIS OF LUMBAR REGION, UNSPECIFIED WHETHER NEUROGENIC CLAUDICATION PRESENT: ICD-10-CM

## 2023-03-08 DIAGNOSIS — M54.9 DORSALGIA, UNSPECIFIED: ICD-10-CM

## 2023-03-08 PROCEDURE — 3077F PR MOST RECENT SYSTOLIC BLOOD PRESSURE >= 140 MM HG: ICD-10-PCS | Mod: CPTII,S$GLB,,

## 2023-03-08 PROCEDURE — 99213 PR OFFICE/OUTPT VISIT, EST, LEVL III, 20-29 MIN: ICD-10-PCS | Mod: S$GLB,,,

## 2023-03-08 PROCEDURE — 3078F PR MOST RECENT DIASTOLIC BLOOD PRESSURE < 80 MM HG: ICD-10-PCS | Mod: CPTII,S$GLB,,

## 2023-03-08 PROCEDURE — 3008F PR BODY MASS INDEX (BMI) DOCUMENTED: ICD-10-PCS | Mod: CPTII,S$GLB,,

## 2023-03-08 PROCEDURE — 3078F DIAST BP <80 MM HG: CPT | Mod: CPTII,S$GLB,,

## 2023-03-08 PROCEDURE — 3077F SYST BP >= 140 MM HG: CPT | Mod: CPTII,S$GLB,,

## 2023-03-08 PROCEDURE — 73502 X-RAY EXAM HIP UNI 2-3 VIEWS: CPT | Mod: 26,RT,, | Performed by: RADIOLOGY

## 2023-03-08 PROCEDURE — 99213 OFFICE O/P EST LOW 20 MIN: CPT | Mod: S$GLB,,,

## 2023-03-08 PROCEDURE — 73502 X-RAY EXAM HIP UNI 2-3 VIEWS: CPT | Mod: TC,PO,RT

## 2023-03-08 PROCEDURE — 73502 XR HIP WITH PELVIS WHEN PERFORMED, 2 OR 3  VIEWS RIGHT: ICD-10-PCS | Mod: 26,RT,, | Performed by: RADIOLOGY

## 2023-03-08 PROCEDURE — 3008F BODY MASS INDEX DOCD: CPT | Mod: CPTII,S$GLB,,

## 2023-03-08 PROCEDURE — 99999 PR PBB SHADOW E&M-EST. PATIENT-LVL V: CPT | Mod: PBBFAC,,,

## 2023-03-08 PROCEDURE — 1159F PR MEDICATION LIST DOCUMENTED IN MEDICAL RECORD: ICD-10-PCS | Mod: CPTII,S$GLB,,

## 2023-03-08 PROCEDURE — 99999 PR PBB SHADOW E&M-EST. PATIENT-LVL V: ICD-10-PCS | Mod: PBBFAC,,,

## 2023-03-08 PROCEDURE — 1159F MED LIST DOCD IN RCRD: CPT | Mod: CPTII,S$GLB,,
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
## 2023-03-17 ENCOUNTER — CLINICAL SUPPORT (OUTPATIENT)
Dept: CARDIOLOGY | Facility: HOSPITAL | Age: 63
End: 2023-03-17
Payer: COMMERCIAL

## 2023-03-17 DIAGNOSIS — Z95.0 PRESENCE OF CARDIAC PACEMAKER: ICD-10-CM

## 2023-03-17 PROCEDURE — 93296 REM INTERROG EVL PM/IDS: CPT | Mod: PO | Performed by: INTERNAL MEDICINE

## 2023-03-28 ENCOUNTER — PATIENT MESSAGE (OUTPATIENT)
Dept: PAIN MEDICINE | Facility: CLINIC | Age: 63
End: 2023-03-28
Payer: COMMERCIAL

## 2023-04-06 ENCOUNTER — PATIENT MESSAGE (OUTPATIENT)
Dept: PAIN MEDICINE | Facility: CLINIC | Age: 63
End: 2023-04-06
Payer: COMMERCIAL

## 2023-04-06 ENCOUNTER — PATIENT MESSAGE (OUTPATIENT)
Dept: CARDIOLOGY | Facility: CLINIC | Age: 63
End: 2023-04-06
Payer: COMMERCIAL

## 2023-04-10 DIAGNOSIS — I48.0 PAROXYSMAL ATRIAL FIBRILLATION: ICD-10-CM

## 2023-04-11 ENCOUNTER — PATIENT MESSAGE (OUTPATIENT)
Dept: PAIN MEDICINE | Facility: CLINIC | Age: 63
End: 2023-04-11
Payer: COMMERCIAL

## 2023-04-13 NOTE — TELEPHONE ENCOUNTER
Please schedule her for the following procedure:    Physician - Dr Tavarez    Type of Procedure/Injection - Lumbar Epidural  L5/S1    to right       Laterality - NA      Type of Sedation - Local      Need to hold medication - yes      Eliquis for 3 days      Clearance needed - yes      Follow up - 2 week

## 2023-04-19 ENCOUNTER — TELEPHONE (OUTPATIENT)
Dept: PAIN MEDICINE | Facility: CLINIC | Age: 63
End: 2023-04-19
Payer: COMMERCIAL

## 2023-04-19 DIAGNOSIS — M54.16 LUMBAR RADICULOPATHY: Primary | ICD-10-CM

## 2023-04-19 RX ORDER — ALPRAZOLAM 0.5 MG/1
0.5 TABLET, ORALLY DISINTEGRATING ORAL ONCE AS NEEDED
Status: CANCELLED | OUTPATIENT
Start: 2023-04-19 | End: 2034-09-15

## 2023-04-19 NOTE — TELEPHONE ENCOUNTER
Patient is scheduled to have an epidural on 4/28 and need to hold Eliquis for 3 days please advise on clearance.

## 2023-04-28 ENCOUNTER — HOSPITAL ENCOUNTER (OUTPATIENT)
Facility: HOSPITAL | Age: 63
Discharge: HOME | End: 2023-04-28
Attending: ANESTHESIOLOGY | Admitting: ANESTHESIOLOGY
Payer: COMMERCIAL

## 2023-04-28 ENCOUNTER — HOSPITAL ENCOUNTER (OUTPATIENT)
Dept: RADIOLOGY | Facility: HOSPITAL | Age: 63
Discharge: HOME | End: 2023-04-28
Attending: ANESTHESIOLOGY | Admitting: ANESTHESIOLOGY
Payer: COMMERCIAL

## 2023-04-28 DIAGNOSIS — M54.16 LUMBAR RADICULOPATHY: Primary | ICD-10-CM

## 2023-04-28 DIAGNOSIS — M54.50 LOWER BACK PAIN: ICD-10-CM

## 2023-04-28 PROCEDURE — 62323 NJX INTERLAMINAR LMBR/SAC: CPT | Mod: PO | Performed by: ANESTHESIOLOGY

## 2023-04-28 PROCEDURE — 25000003 PHARM REV CODE 250: Mod: PO | Performed by: ANESTHESIOLOGY

## 2023-04-28 PROCEDURE — 62323 NJX INTERLAMINAR LMBR/SAC: CPT | Mod: ,,, | Performed by: ANESTHESIOLOGY

## 2023-04-28 PROCEDURE — 62323 PR INJ LUMBAR/SACRAL, W/IMAGING GUIDANCE: ICD-10-PCS | Mod: ,,, | Performed by: ANESTHESIOLOGY

## 2023-04-28 PROCEDURE — 25500020 PHARM REV CODE 255: Mod: PO | Performed by: ANESTHESIOLOGY

## 2023-04-28 PROCEDURE — 63600175 PHARM REV CODE 636 W HCPCS: Mod: PO | Performed by: ANESTHESIOLOGY

## 2023-04-28 PROCEDURE — 76000 FLUOROSCOPY <1 HR PHYS/QHP: CPT | Mod: TC,PO

## 2023-04-28 RX ORDER — ALPRAZOLAM 0.5 MG/1
0.5 TABLET, ORALLY DISINTEGRATING ORAL ONCE AS NEEDED
Status: COMPLETED | OUTPATIENT
Start: 2023-04-28 | End: 2023-04-28

## 2023-04-28 RX ORDER — LIDOCAINE HYDROCHLORIDE 10 MG/ML
INJECTION, SOLUTION EPIDURAL; INFILTRATION; INTRACAUDAL; PERINEURAL
Status: DISCONTINUED | OUTPATIENT
Start: 2023-04-28 | End: 2023-04-28 | Stop reason: HOSPADM

## 2023-04-28 RX ORDER — METHYLPREDNISOLONE ACETATE 80 MG/ML
INJECTION, SUSPENSION INTRA-ARTICULAR; INTRALESIONAL; INTRAMUSCULAR; SOFT TISSUE
Status: DISCONTINUED | OUTPATIENT
Start: 2023-04-28 | End: 2023-04-28 | Stop reason: HOSPADM

## 2023-04-28 RX ADMIN — ALPRAZOLAM 0.5 MG: 0.5 TABLET, ORALLY DISINTEGRATING ORAL at 08:04

## 2023-04-28 NOTE — DISCHARGE SUMMARY
[facility]  Discharge Note  Short Stay    Admit Date: 4/28/2023    Discharge Date: 4/28/2023    Attending Physician: Gurjit Tavarez     Discharge Provider: Gurjit Tavarez    Diagnoses:  There are no hospital problems to display for this patient.      Discharged Condition: Good    Final Diagnoses: Lumbar radiculopathy [M54.16]    Disposition: Home or Self Care    Hospital Course: No complications, uneventful    Outcome of Hospitalization, Treatment, Procedure, or Surgery:  Patient was admitted for outpatient interventional pain management procedure. The patient tolerated the procedure well with no complications.    Follow up/Patient Instructions:  Follow up as scheduled in Pain Management office in 2-3 weeks.  Patient has received instructions and follow up date and time.    Medications:  Continue previous medications, except restart eliquis in 24 hours    Discharge Procedure Orders   Notify your health care provider if you experience any of the following:  temperature >100.4     Notify your health care provider if you experience any of the following:  persistent nausea and vomiting or diarrhea     Notify your health care provider if you experience any of the following:  severe uncontrolled pain     Notify your health care provider if you experience any of the following:  redness, tenderness, or signs of infection (pain, swelling, redness, odor or green/yellow discharge around incision site)     Notify your health care provider if you experience any of the following:  difficulty breathing or increased cough     Notify your health care provider if you experience any of the following:  severe persistent headache     Notify your health care provider if you experience any of the following:  worsening rash     Notify your health care provider if you experience any of the following:  persistent dizziness, light-headedness, or visual disturbances     Notify your health care provider if you experience any of the  following:  increased confusion or weakness     Activity as tolerated

## 2023-04-28 NOTE — OP NOTE
"Procedure Note    Procedure Date: 4/28/2023    Procedure Performed:  L5/S1 lumbar interlaminar epidural steroid injection to the right under fluoroscopy.    Indications: Patient has failed conservative therapy.      Pre-op diagnosis: Lumbar Radiculopathy    Post-op diagnosis: same    Physician: Gurjit Tavarez MD    IV anxiolysis medications: none    Medications injected: depomedrol 80mg, 1% Lidocaine 1ml, 2 mL sterile, preservative-free normal saline.    Local anesthetic used: 1% Lidocaine, 1 ml    Estimated Blood Loss: none    Complications:  none    Technique:  The patient was interviewed in the holding area and Risks/Benefits were discussed, including, but not limited to, the possibility of new or different pain, bleeding or infection.   All questions were answered.  The patient understood and accepted risks.  Consent was verfied.  A time-out was taken to identify patient and procedure prior to starting the procedure. The patient was placed in the prone position on the fluoroscopy table. The area of the lumbar spine was prepped with Chloraprep x2 and draped in a sterile manner. The L5/S1 interspace was identified and marked under AP fluoroscopy. The skin and subcutaneous tissues overlying the targeted interspace were anesthetized with 3-5 mL of 1% lidocaine using a 25G, 1.5" needle.  A 18G, 6" Tuohy epidural needle was directed toward the interspace under fluoroscopic guidance until the ligamentum flavum was engaged. From this point, a loss of resistance technique with a glass syringe and saline was used to identify entrance of the needle into the epidural space. Once loss of resistance was observed 1 mL of contrast solution was injected. An appropriate epidurogram was noted.  A 4 mL mixture consisting of saline, 1 mL 1% Lidocaine and 80 mg of depomedrol was injected slowly and without resistance.  The needle was flushed with normal saline and removed. The contrast was seen to be displaced after injection. Patient " was awake/responsive during all injections.  The patient tolerated the procedure well and was [facility] in stable condition.  The patient was monitored after the procedure and was given post-procedure and discharge instructions to follow at home. The patient was discharged in a stable condition.

## 2023-04-28 NOTE — PLAN OF CARE
VSS, all questions answered. Denies recent fever or illness. Pt states ready for procedure.     Mother took pt's walker with her to car.

## 2023-04-28 NOTE — H&P
[facility] - [facility]  History & Physical - Short Stay  Pain Management       SUBJECTIVE:     Procedure: Procedure(s) (LRB):  Injection-steroid-epidural-lumbar L5/S1 (Right)    Chief Complaint/Reason for Admission:  Lumbar radiculopathy [M54.16]    PTA Medications   Medication Sig    apixaban (ELIQUIS) 5 mg Tab Take 1 tablet (5 mg total) by mouth 2 (two) times daily.    diclofenac sodium (VOLTAREN) 1 % Gel Apply 2 g topically 4 (four) times daily.    DULoxetine (CYMBALTA) 60 MG capsule TAKE ONE CAPSULE BY MOUTH DAILY    flecainide (TAMBOCOR) 150 MG Tab Take 1 tablet (150 mg total) by mouth every 12 (twelve) hours.    gabapentin (NEURONTIN) 600 MG tablet Take 1 tablet (600 mg total) by mouth 2 (two) times daily.    lisinopriL 10 MG tablet Take 1 tablet (10 mg total) by mouth once daily.    metoprolol tartrate (LOPRESSOR) 100 MG tablet Take 1 tablet (100 mg total) by mouth 2 (two) times daily.    nortriptyline (PAMELOR) 25 MG capsule TAKE ONE CAPSULE BY MOUTH EVERY EVENING    omeprazole (PRILOSEC) 20 MG capsule TAKE ONE CAPSULE BY MOUTH EVERY MORNING    rosuvastatin (CRESTOR) 10 MG tablet TAKE ONE TABLET BY MOUTH EVERY EVENING    semaglutide 2 mg/dose (8 mg/3 mL) PnIj Inject 0.5 mg into the skin.    valACYclovir (VALTREX) 500 MG tablet Take 1 tablet (500 mg total) by mouth daily as needed (fever blister).    albuterol sulfate 90 mcg/actuation aebs Inhale 90 mcg into the lungs every 4 (four) hours as needed (wheezing or shortness of breath).    cholecalciferol, vitamin D3, (DECARA) 1,250 mcg (50,000 unit) capsule Take 1 capsule (50,000 Units total) by mouth every 7 days.    cyanocobalamin 500 MCG tablet Take 1,000 mcg by mouth once daily.    fluticasone furoate-vilanteroL (BREO ELLIPTA) 100-25 mcg/dose diskus inhaler Inhale 1 puff into the lungs once daily.    melatonin 5 mg TbDL Take 1 tablet by mouth nightly as needed (sleep).    ondansetron (ZOFRAN-ODT) 4 MG TbDL Take 1 tablet (4 mg total) by mouth 2 (two) times  "daily.    promethazine (PHENERGAN) 12.5 MG Tab Take 1 tablet (12.5 mg total) by mouth 2 (two) times daily as needed (nausea).    promethazine-codeine 6.25-10 mg/5 ml (PHENERGAN WITH CODEINE) 6.25-10 mg/5 mL syrup Take 5 mLs by mouth nightly as needed for Cough.    promethazine-dextromethorphan (PROMETHAZINE-DM) 6.25-15 mg/5 mL Syrp Take 5 mLs by mouth nightly as needed.    pulse oximeter (PULSE OXIMETER) device Use twice daily at 8 AM and 3 PM and record the value in [employer] as directed.    scopolamine (TRANSDERM-SCOP) 1.3-1.5 mg (1 mg over 3 days) Place 1 patch onto the skin every 72 hours.    tiZANidine (ZANAFLEX) 4 MG tablet TAKE ONE TABLET BY MOUTH every SIX hours AS NEEDED.       Review of patient's allergies indicates:   Allergen Reactions    Aspirin Other (See Comments)     "I don't take it because I've had ulcers"       Meperidine Other (See Comments)     Felt like she was about to pass out after taking       Past Medical History:   Diagnosis Date    Anemia     Anticoagulant long-term use     Arrhythmia     Arthritis     Atrial fibrillation     history    Bronchitis     Encounter for blood transfusion     General anesthetics causing adverse effect in therapeutic use     High blood pressure     Hyperlipidemia     Lump or mass in breast     benign     Neuropathy     Obesity     Obstructive sleep apnea syndrome 2021    Ulcer      Past Surgical History:   Procedure Laterality Date    A-V CARDIAC PACEMAKER INSERTION Left 2020    Procedure: INSERTION, CARDIAC PACEMAKER, DUAL CHAMBER;  Surgeon: Bert Reaves MD;  Location: [facility] CATH;  Service: Cardiology;  Laterality: Left;    BREAST BIOPSY Right 2017    myofibroblastoma     BREAST LUMPECTOMY Right 2017     SECTION  10/25/1986    Other c/section 10/26/1997    CHOLECYSTECTOMY  2017    Dr. TOLU Bowers, [facility]     CORRECTION OF HAMMER TOE Left 11/3/2022    Procedure: CORRECTION, HAMMER TOE;  Surgeon: NANCY Aparicio;  Location: " [facility] OR;  Service: Podiatry;  Laterality: Left;  hammertoes 2-5 left,    csection      CS x 2    CYSTOSCOPY N/A 9/3/2019    Procedure: CYSTOSCOPY;  Surgeon: Noemi Pierre MD;  Location: [facility];  Service: OB/GYN;  Laterality: N/A;    DILATION AND CURETTAGE OF UTERUS      EPIDURAL STEROID INJECTION INTO LUMBAR SPINE N/A 5/21/2020    Procedure: Injection-steroid-epidural-lumbar L5/S1;  Surgeon: Gurjit Tavarez MD;  Location: [facility] OR;  Service: Pain Management;  Laterality: N/A;    EPIDURAL STEROID INJECTION INTO LUMBAR SPINE N/A 6/19/2020    Procedure: Injection-steroid-epidural-lumbar L5/S1;  Surgeon: Gurjit Tavarez MD;  Location: [facility] OR;  Service: Pain Management;  Laterality: N/A;    EPIDURAL STEROID INJECTION INTO LUMBAR SPINE N/A 12/1/2021    Procedure: Injection-steroid-epidural-lumbar L5/S1;  Surgeon: Gurjit Tavarez MD;  Location: [facility] OR;  Service: Pain Management;  Laterality: N/A;    EPIDURAL STEROID INJECTION INTO LUMBAR SPINE N/A 2/22/2023    Procedure: Injection-steroid-epidural-lumbar-L5/S1 to the left;  Surgeon: Gurjit Tavarez MD;  Location: [facility] OR;  Service: Pain Management;  Laterality: N/A;    FRACTURE SURGERY  04/86    Dislocated  hip total rt hip 08/08    HIP PINNING      HYSTERECTOMY      JOINT REPLACEMENT  08/2008    LAPAROSCOPIC SALPINGO-OOPHORECTOMY Bilateral 9/3/2019    Procedure: SALPINGO-OOPHORECTOMY, LAPAROSCOPIC;  Surgeon: Noemi Pierre MD;  Location: [facility];  Service: OB/GYN;  Laterality: Bilateral;  Dr. Bentley to assist. No resident needed.     LAPAROSCOPIC TOTAL HYSTERECTOMY N/A 9/3/2019    Procedure: HYSTERECTOMY, TOTAL, LAPAROSCOPIC;  Surgeon: Noemi Pierre MD;  Location: [facility];  Service: OB/GYN;  Laterality: N/A;    NASAL SEPTUM SURGERY      OOPHORECTOMY      SKIN TAG REMOVAL Left 11/3/2022    Procedure: REMOVAL, SKIN TAG;  Surgeon: Ezra Arriaga DPM;  Location: [facility] OR;  Service: Podiatry;  Laterality: Left;    TOTAL HIP ARTHROPLASTY Right     TUBAL  LIGATION  1997     Family History   Problem Relation Age of Onset    Colon cancer Maternal Grandmother 70        mets to ovary    Cancer Maternal Grandmother     Arthritis Paternal Grandfather     Eclampsia Paternal Grandmother     Cataracts Maternal Grandfather     Stroke Father     Colon cancer Father     Hypertension Father     Alcohol abuse Father     Cancer Father         Passed away July 10 2019    Hypertension Mother     Cataracts Mother     Hypertension Brother         Age 54 hypertension heart    Early death Brother         Hypertension cardio disease    No Known Problems Daughter     Stomach cancer Neg Hx     Esophageal cancer Neg Hx     Breast cancer Neg Hx     Miscarriages / Stillbirths Neg Hx     Ovarian cancer Neg Hx     Glaucoma Neg Hx     Macular degeneration Neg Hx     Retinal detachment Neg Hx     Strabismus Neg Hx      Social History     Tobacco Use    Smoking status: Never    Smokeless tobacco: Never   Substance Use Topics    Alcohol use: No     Comment: never    Drug use: Never        Current Facility-Administered Medications:     alprazolam ODT dissolvable tablet 0.5 mg, 0.5 mg, Oral, Once PRN, Gurjit Tavarez MD    Review of Systems:  General ROS: negative for - fever  Dermatological ROS: negative for rash    OBJECTIVE:     Vital Signs (Most Recent):  Temp: 98 °F (36.7 °C) (04/28/23 0726)  Pulse: 60 (04/28/23 0726)  Resp: (!) 23 (04/28/23 0726)  BP: 126/68 (04/28/23 0731)  SpO2: 98 % (04/28/23 0726)  Body mass index is 46.17 kg/m².    Physical Exam:  General appearance - alert, well appearing, and in no distress  Mental status - AOx3  Eyes - pupils equal and reactive, extraocular eye movements intact  Heart - normal rate, regular rhythm, normal S1, S2, no murmurs, rubs, clicks or gallops  Chest - clear to auscultation, no wheezes, rales or rhonchi, symmetric air entry  Abdomen - soft, nontender, nondistended, no masses or organomegaly  Neurological - alert, oriented, normal speech, no focal  findings or movement disorder noted  Extremities - peripheral pulses normal, no pedal edema, no clubbing or cyanosis      ASSESSMENT/PLAN:     There are no hospital problems to display for this patient.     No changes since seen on 3/8/23 except Lenin PA reviewed her updated lumbar MRI.  We will proceed with L5/S1 YUE to the right.  She had held eliquis appropriately.  The risks and benefits of this intervention, and alternative therapies were discussed with the patient.  The discussion of risks included infection, bleeding, need for additional procedures or surgery, nerve damage.  Questions regarding the procedure, risks, expected outcome, and possible side effects were solicited and answered to the patient's satisfaction.  Fatemeh Shoemaker wishes to proceed with the injection or procedure.  Written consent was obtained.      Proceed with intervention as scheduled.    Gurjit Tavarez M.D.  Interventional Pain Medicine / Anesthesiology

## 2023-05-01 VITALS
DIASTOLIC BLOOD PRESSURE: 65 MMHG | BODY MASS INDEX: 45.93 KG/M2 | OXYGEN SATURATION: 99 % | HEART RATE: 60 BPM | SYSTOLIC BLOOD PRESSURE: 134 MMHG | WEIGHT: 269 LBS | HEIGHT: 64 IN | RESPIRATION RATE: 16 BRPM | TEMPERATURE: 98 F

## 2023-05-05 ENCOUNTER — PATIENT MESSAGE (OUTPATIENT)
Dept: ADMINISTRATIVE | Facility: OTHER | Age: 63
End: 2023-05-05
Payer: COMMERCIAL

## 2023-05-05 NOTE — PROCEDURES
Large Joint Aspiration/Injection: L knee    Date/Time: 7/8/2022 9:15 AM  Performed by: Justice Gibbs MD  Authorized by: Justice Gibbs MD     Consent Done?:  Yes (Verbal)  Timeout: prior to procedure the correct patient, procedure, and site was verified    Prep: patient was prepped and draped in usual sterile fashion      Details:  Needle Size:  21 G  Approach:  Anterolateral  Location:  Knee  Site:  L knee  Medications:  40 mg triamcinolone acetonide 40 mg/mL  Patient tolerance:  Patient tolerated the procedure well with no immediate complications       Suturegard Body: The suture ends were repeatedly re-tightened and re-clamped to achieve the desired tissue expansion.

## 2023-05-08 ENCOUNTER — TELEPHONE (OUTPATIENT)
Dept: FAMILY MEDICINE | Facility: CLINIC | Age: 63
End: 2023-05-08
Payer: COMMERCIAL

## 2023-05-08 NOTE — TELEPHONE ENCOUNTER
----- Message from Eleuterio Espinoza sent at 5/8/2023 12:35 PM CDT -----  Pt would like to be called back asa regarding questions concerning her current condition(weak and fatigued)    Pt can be reached at [phone].    Thanks

## 2023-05-22 ENCOUNTER — PATIENT MESSAGE (OUTPATIENT)
Dept: PODIATRY | Facility: CLINIC | Age: 63
End: 2023-05-22
Payer: COMMERCIAL

## 2023-05-23 ENCOUNTER — OFFICE VISIT (OUTPATIENT)
Dept: PODIATRY | Facility: CLINIC | Age: 63
End: 2023-05-23
Payer: COMMERCIAL

## 2023-05-23 DIAGNOSIS — L97.512 SKIN ULCER OF FOURTH TOE OF RIGHT FOOT WITH FAT LAYER EXPOSED: Primary | ICD-10-CM

## 2023-05-23 PROCEDURE — 99999 PR PBB SHADOW E&M-EST. PATIENT-LVL III: ICD-10-PCS | Mod: PBBFAC,,, | Performed by: PODIATRIST

## 2023-05-23 PROCEDURE — 4010F ACE/ARB THERAPY RXD/TAKEN: CPT | Mod: CPTII,S$GLB,, | Performed by: PODIATRIST

## 2023-05-23 PROCEDURE — 1159F MED LIST DOCD IN RCRD: CPT | Mod: CPTII,S$GLB,, | Performed by: PODIATRIST

## 2023-05-23 PROCEDURE — 99213 OFFICE O/P EST LOW 20 MIN: CPT | Mod: S$GLB,,, | Performed by: PODIATRIST

## 2023-05-23 PROCEDURE — 99999 PR PBB SHADOW E&M-EST. PATIENT-LVL III: CPT | Mod: PBBFAC,,, | Performed by: PODIATRIST

## 2023-05-23 PROCEDURE — 1160F RVW MEDS BY RX/DR IN RCRD: CPT | Mod: CPTII,S$GLB,, | Performed by: PODIATRIST

## 2023-05-23 PROCEDURE — 1160F PR REVIEW ALL MEDS BY PRESCRIBER/CLIN PHARMACIST DOCUMENTED: ICD-10-PCS | Mod: CPTII,S$GLB,, | Performed by: PODIATRIST

## 2023-05-23 PROCEDURE — 99213 PR OFFICE/OUTPT VISIT, EST, LEVL III, 20-29 MIN: ICD-10-PCS | Mod: S$GLB,,, | Performed by: PODIATRIST

## 2023-05-23 PROCEDURE — 4010F PR ACE/ARB THEARPY RXD/TAKEN: ICD-10-PCS | Mod: CPTII,S$GLB,, | Performed by: PODIATRIST

## 2023-05-23 PROCEDURE — 1159F PR MEDICATION LIST DOCUMENTED IN MEDICAL RECORD: ICD-10-PCS | Mod: CPTII,S$GLB,, | Performed by: PODIATRIST

## 2023-05-23 NOTE — PROGRESS NOTES
Subjective:      Patient ID: Fatemeh Shoemaker is a 62 y.o. female.    Chief Complaint: Wound Care (Wound under 4th toe   cut  sliced ?????????   She doesn't know what happen)      Fatemeh is a 62 y.o. female who presents to the podiatry clinic  with complaint of right fourth toe cut at the bottom, not sure how it happened. Noticed blood in the area of the toe and came in for evaluation. Left foot doing well since surgery      Review of Systems   Constitution: Negative for chills and fever.   Cardiovascular: Negative for claudication and leg swelling.   Respiratory: Negative for shortness of breath.    Skin: Positive for nail changes. Negative for itching and rash.   Musculoskeletal: Positive for joint pain. Negative for muscle cramps, muscle weakness and myalgias.   Gastrointestinal: Negative for nausea and vomiting.   Neurological: Negative for focal weakness, loss of balance, numbness and paresthesias.           Objective:      Physical Exam  Constitutional:       General: She is not in acute distress.     Appearance: She is well-developed. She is not diaphoretic.   Cardiovascular:      Pulses:           Dorsalis pedis pulses are 2+ on the right side and 2+ on the left side.        Posterior tibial pulses are 2+ on the right side and 2+ on the left side.      Comments: < 3 sec capillary refill time to toes 1-5 bilateral. Toes and feet are warm to touch proximally with normal distal cooling b/l. There is some hair growth on the feet and toes b/l. There is no edema b/l. No spider veins or varicosities present b/l.     Musculoskeletal:      Comments: Equinus noted b/l ankles with < 10 deg DF noted. MMT 5/5 in DF/PF/Inv/Ev resistance with no reproduction of pain in any direction. Passive range of motion of ankle and pedal joints is painless b/l.    RIght ankle some discomfort to the lateral AFTL area some discomfort with inversion of ankle    Semi Reducible extensor and flexor contractures at the MTPJ and PIPJ of  toes 1-5 right    Toes 1-5 straight rectus position left      Skin:     General: Skin is warm and dry.      Coloration: Skin is not pale.      Findings: No abrasion, bruising, burn, ecchymosis, erythema, laceration, lesion, petechiae or rash.      Nails: There is no clubbing.        Comments: Skin temperature, texture and turgor within normal limits.    Right fourth toe plantar sulcus there is a 6 mm fissure or slice that is full thickness with granular base no erythema or purulence noted     Neurological:      Mental Status: She is alert and oriented to person, place, and time.      Sensory: No sensory deficit.      Motor: No tremor, atrophy or abnormal muscle tone.      Comments: Negative tinel sign bilateral.   Psychiatric:         Behavior: Behavior normal.               Assessment:       Encounter Diagnosis   Name Primary?    Skin ulcer of fourth toe of right foot with fat layer exposed Yes                 Plan:       Fatemeh was seen today for wound care.    Diagnoses and all orders for this visit:    Skin ulcer of fourth toe of right foot with fat layer exposed              I counseled the patient on her conditions, their implications and medical management.    Dressed with iodosorb and football wraps and offload in darco shoe to prevent motion at the toe    Return in 1 week for follow up    Ezra Arriaga DPM

## 2023-05-30 ENCOUNTER — OFFICE VISIT (OUTPATIENT)
Dept: PODIATRY | Facility: CLINIC | Age: 63
End: 2023-05-30
Payer: COMMERCIAL

## 2023-05-30 DIAGNOSIS — L97.512 SKIN ULCER OF FOURTH TOE OF RIGHT FOOT WITH FAT LAYER EXPOSED: Primary | ICD-10-CM

## 2023-05-30 PROCEDURE — 99212 OFFICE O/P EST SF 10 MIN: CPT | Mod: S$GLB,,, | Performed by: PODIATRIST

## 2023-05-30 PROCEDURE — 4010F PR ACE/ARB THEARPY RXD/TAKEN: ICD-10-PCS | Mod: CPTII,S$GLB,, | Performed by: PODIATRIST

## 2023-05-30 PROCEDURE — 99212 PR OFFICE/OUTPT VISIT, EST, LEVL II, 10-19 MIN: ICD-10-PCS | Mod: S$GLB,,, | Performed by: PODIATRIST

## 2023-05-30 PROCEDURE — 1159F MED LIST DOCD IN RCRD: CPT | Mod: CPTII,S$GLB,, | Performed by: PODIATRIST

## 2023-05-30 PROCEDURE — 99999 PR PBB SHADOW E&M-EST. PATIENT-LVL III: CPT | Mod: PBBFAC,,, | Performed by: PODIATRIST

## 2023-05-30 PROCEDURE — 1160F RVW MEDS BY RX/DR IN RCRD: CPT | Mod: CPTII,S$GLB,, | Performed by: PODIATRIST

## 2023-05-30 PROCEDURE — 1160F PR REVIEW ALL MEDS BY PRESCRIBER/CLIN PHARMACIST DOCUMENTED: ICD-10-PCS | Mod: CPTII,S$GLB,, | Performed by: PODIATRIST

## 2023-05-30 PROCEDURE — 1159F PR MEDICATION LIST DOCUMENTED IN MEDICAL RECORD: ICD-10-PCS | Mod: CPTII,S$GLB,, | Performed by: PODIATRIST

## 2023-05-30 PROCEDURE — 99999 PR PBB SHADOW E&M-EST. PATIENT-LVL III: ICD-10-PCS | Mod: PBBFAC,,, | Performed by: PODIATRIST

## 2023-05-30 PROCEDURE — 4010F ACE/ARB THERAPY RXD/TAKEN: CPT | Mod: CPTII,S$GLB,, | Performed by: PODIATRIST

## 2023-05-30 NOTE — PROGRESS NOTES
Subjective:      Patient ID: Fatemeh Shoemaker is a 62 y.o. female.    Chief Complaint: Wound Care (Rt foot/)      Fatemeh is a 62 y.o. female who presents to the podiatry clinic  with complaint of right fourth toe cut at the bottom, not sure how it happened. Noticed blood in the area of the toe and came in for evaluation. Left foot doing well since surgery    5/30/23: Patient returns for follow up right fourth toe ulcer, ambulating in darco and football no new complaints.      Review of Systems   Constitution: Negative for chills and fever.   Cardiovascular: Negative for claudication and leg swelling.   Respiratory: Negative for shortness of breath.    Skin: Positive for nail changes. Negative for itching and rash.   Musculoskeletal: Positive for joint pain. Negative for muscle cramps, muscle weakness and myalgias.   Gastrointestinal: Negative for nausea and vomiting.   Neurological: Negative for focal weakness, loss of balance, numbness and paresthesias.           Objective:      Physical Exam  Constitutional:       General: She is not in acute distress.     Appearance: She is well-developed. She is not diaphoretic.   Cardiovascular:      Pulses:           Dorsalis pedis pulses are 2+ on the right side and 2+ on the left side.        Posterior tibial pulses are 2+ on the right side and 2+ on the left side.      Comments: < 3 sec capillary refill time to toes 1-5 bilateral. Toes and feet are warm to touch proximally with normal distal cooling b/l. There is some hair growth on the feet and toes b/l. There is no edema b/l. No spider veins or varicosities present b/l.     Musculoskeletal:      Comments: Equinus noted b/l ankles with < 10 deg DF noted. MMT 5/5 in DF/PF/Inv/Ev resistance with no reproduction of pain in any direction. Passive range of motion of ankle and pedal joints is painless b/l.    RIght ankle some discomfort to the lateral AFTL area some discomfort with inversion of ankle    Semi Reducible extensor  and flexor contractures at the MTPJ and PIPJ of toes 1-5 right    Toes 1-5 straight rectus position left      Skin:     General: Skin is warm and dry.      Coloration: Skin is not pale.      Findings: No abrasion, bruising, burn, ecchymosis, erythema, laceration, lesion, petechiae or rash.      Nails: There is no clubbing.        Comments: Skin temperature, texture and turgor within normal limits.    Right fourth toe plantar sulcus there is a 3x1 mm fissure or slice that is healing well with new fragile epithelial covering     Neurological:      Mental Status: She is alert and oriented to person, place, and time.      Sensory: No sensory deficit.      Motor: No tremor, atrophy or abnormal muscle tone.      Comments: Negative tinel sign bilateral.   Psychiatric:         Behavior: Behavior normal.               Assessment:       Encounter Diagnosis   Name Primary?    Skin ulcer of fourth toe of right foot with fat layer exposed Yes                   Plan:       Fatemeh was seen today for wound care.    Diagnoses and all orders for this visit:    Skin ulcer of fourth toe of right foot with fat layer exposed                I counseled the patient on her conditions, their implications and medical management.    Dressed with iodosorb and football wraps and offload in darco shoe to prevent motion at the toe    Return in 1 week for follow up likely return to shoes next week    Ezra Arriaga DPM

## 2023-06-05 ENCOUNTER — OFFICE VISIT (OUTPATIENT)
Dept: PODIATRY | Facility: CLINIC | Age: 63
End: 2023-06-05
Payer: COMMERCIAL

## 2023-06-05 DIAGNOSIS — Z87.2 HEALED ULCER OF RIGHT FOOT: Primary | ICD-10-CM

## 2023-06-05 PROCEDURE — 1159F MED LIST DOCD IN RCRD: CPT | Mod: CPTII,S$GLB,, | Performed by: PODIATRIST

## 2023-06-05 PROCEDURE — 99212 OFFICE O/P EST SF 10 MIN: CPT | Mod: S$GLB,,, | Performed by: PODIATRIST

## 2023-06-05 PROCEDURE — 99999 PR PBB SHADOW E&M-EST. PATIENT-LVL III: CPT | Mod: PBBFAC,,, | Performed by: PODIATRIST

## 2023-06-05 PROCEDURE — 4010F PR ACE/ARB THEARPY RXD/TAKEN: ICD-10-PCS | Mod: CPTII,S$GLB,, | Performed by: PODIATRIST

## 2023-06-05 PROCEDURE — 99212 PR OFFICE/OUTPT VISIT, EST, LEVL II, 10-19 MIN: ICD-10-PCS | Mod: S$GLB,,, | Performed by: PODIATRIST

## 2023-06-05 PROCEDURE — 99999 PR PBB SHADOW E&M-EST. PATIENT-LVL III: ICD-10-PCS | Mod: PBBFAC,,, | Performed by: PODIATRIST

## 2023-06-05 PROCEDURE — 1159F PR MEDICATION LIST DOCUMENTED IN MEDICAL RECORD: ICD-10-PCS | Mod: CPTII,S$GLB,, | Performed by: PODIATRIST

## 2023-06-05 PROCEDURE — 4010F ACE/ARB THERAPY RXD/TAKEN: CPT | Mod: CPTII,S$GLB,, | Performed by: PODIATRIST

## 2023-06-05 PROCEDURE — 1160F RVW MEDS BY RX/DR IN RCRD: CPT | Mod: CPTII,S$GLB,, | Performed by: PODIATRIST

## 2023-06-05 PROCEDURE — 1160F PR REVIEW ALL MEDS BY PRESCRIBER/CLIN PHARMACIST DOCUMENTED: ICD-10-PCS | Mod: CPTII,S$GLB,, | Performed by: PODIATRIST

## 2023-06-05 NOTE — PROGRESS NOTES
Subjective:      Patient ID: Fatemeh Shoemaker is a 62 y.o. female.    Chief Complaint: Wound Care (Rt foot)      Fatemeh is a 62 y.o. female who presents to the podiatry clinic  with complaint of right fourth toe cut at the bottom, not sure how it happened. Noticed blood in the area of the toe and came in for evaluation. Left foot doing well since surgery    5/30/23: Patient returns for follow up right fourth toe ulcer, ambulating in darco and football no new complaints.    6/5/23: Patient returns for follow up right fourth toe ulcer ambulating in the darco shoe and football wraps    Review of Systems   Constitution: Negative for chills and fever.   Cardiovascular: Negative for claudication and leg swelling.   Respiratory: Negative for shortness of breath.    Skin: Positive for nail changes. Negative for itching and rash.   Musculoskeletal: Positive for joint pain. Negative for muscle cramps, muscle weakness and myalgias.   Gastrointestinal: Negative for nausea and vomiting.   Neurological: Negative for focal weakness, loss of balance, numbness and paresthesias.           Objective:      Physical Exam  Constitutional:       General: She is not in acute distress.     Appearance: She is well-developed. She is not diaphoretic.   Cardiovascular:      Pulses:           Dorsalis pedis pulses are 2+ on the right side and 2+ on the left side.        Posterior tibial pulses are 2+ on the right side and 2+ on the left side.      Comments: < 3 sec capillary refill time to toes 1-5 bilateral. Toes and feet are warm to touch proximally with normal distal cooling b/l. There is some hair growth on the feet and toes b/l. There is no edema b/l. No spider veins or varicosities present b/l.     Musculoskeletal:      Comments: Equinus noted b/l ankles with < 10 deg DF noted. MMT 5/5 in DF/PF/Inv/Ev resistance with no reproduction of pain in any direction. Passive range of motion of ankle and pedal joints is painless b/l.    RIght  ankle some discomfort to the lateral AFTL area some discomfort with inversion of ankle    Semi Reducible extensor and flexor contractures at the MTPJ and PIPJ of toes 1-5 right    Toes 1-5 straight rectus position left      Skin:     General: Skin is warm and dry.      Coloration: Skin is not pale.      Findings: No abrasion, bruising, burn, ecchymosis, erythema, laceration, lesion, petechiae or rash.      Nails: There is no clubbing.        Comments: Skin temperature, texture and turgor within normal limits.    Right fourth toe plantar sulcus sore now well healed     Neurological:      Mental Status: She is alert and oriented to person, place, and time.      Sensory: No sensory deficit.      Motor: No tremor, atrophy or abnormal muscle tone.      Comments: Negative tinel sign bilateral.   Psychiatric:         Behavior: Behavior normal.               Assessment:       Encounter Diagnosis   Name Primary?    Healed ulcer of right foot Yes                     Plan:       Fatemeh was seen today for wound care.    Diagnoses and all orders for this visit:    Healed ulcer of right foot                  I counseled the patient on her conditions, their implications and medical management.    Wound well healed can go back to normal shoe wear    If this returns consider doing flexor tenotomies on toes 2-5    Return PRN    Ezra Arriaga DPM

## 2023-06-13 ENCOUNTER — PATIENT MESSAGE (OUTPATIENT)
Dept: OTHER | Facility: OTHER | Age: 63
End: 2023-06-13
Payer: COMMERCIAL

## 2023-06-13 ENCOUNTER — PATIENT MESSAGE (OUTPATIENT)
Dept: FAMILY MEDICINE | Facility: CLINIC | Age: 63
End: 2023-06-13
Payer: COMMERCIAL

## 2023-06-15 ENCOUNTER — CLINICAL SUPPORT (OUTPATIENT)
Dept: CARDIOLOGY | Facility: HOSPITAL | Age: 63
End: 2023-06-15
Payer: COMMERCIAL

## 2023-06-15 DIAGNOSIS — Z95.0 PRESENCE OF CARDIAC PACEMAKER: ICD-10-CM

## 2023-06-15 PROCEDURE — 93296 REM INTERROG EVL PM/IDS: CPT | Mod: PO | Performed by: INTERNAL MEDICINE

## 2023-06-15 PROCEDURE — 93294 REM INTERROG EVL PM/LDLS PM: CPT | Mod: ,,, | Performed by: INTERNAL MEDICINE

## 2023-06-15 PROCEDURE — 93294 CARDIAC DEVICE CHECK - REMOTE: ICD-10-PCS | Mod: ,,, | Performed by: INTERNAL MEDICINE

## 2023-06-15 RX ORDER — TIRZEPATIDE 5 MG/.5ML
5 INJECTION, SOLUTION SUBCUTANEOUS
Qty: 4 PEN | Refills: 2 | Status: SHIPPED | OUTPATIENT
Start: 2023-06-15 | End: 2023-07-26

## 2023-06-15 NOTE — TELEPHONE ENCOUNTER
No care due was identified.  [facility] Embedded Care Due Messages. Reference number: [id number].   6/15/2023 9:30:57 AM CDT

## 2023-06-19 NOTE — TELEPHONE ENCOUNTER
Dicussed Holter results with Pt, stating that she had multiple episodes of atrial fibrillation with the longest being 3.5 hours. I explained that we are going to continue with the current medication regimen, and we see her for her follow up in March. She verbalized understanding and denied further questions, needs, and concerns. She is going to have an EGD today.    Per response from Magy:    We need to know what she is looking for specifically. The order is somewhat vague- for pain and swelling they could want a venous doppler to check for DVT or this could be an MSK study. The soft tissue order really should only be used for something focal or palpable. I am off tomorrow so please contact my lead Angela Guadalupe with more info.

## 2023-06-20 ENCOUNTER — PATIENT MESSAGE (OUTPATIENT)
Dept: FAMILY MEDICINE | Facility: CLINIC | Age: 63
End: 2023-06-20
Payer: COMMERCIAL

## 2023-06-22 ENCOUNTER — PATIENT MESSAGE (OUTPATIENT)
Dept: GASTROENTEROLOGY | Facility: CLINIC | Age: 63
End: 2023-06-22
Payer: COMMERCIAL

## 2023-06-22 ENCOUNTER — TELEPHONE (OUTPATIENT)
Dept: GASTROENTEROLOGY | Facility: CLINIC | Age: 63
End: 2023-06-22
Payer: COMMERCIAL

## 2023-06-22 ENCOUNTER — PATIENT MESSAGE (OUTPATIENT)
Dept: FAMILY MEDICINE | Facility: CLINIC | Age: 63
End: 2023-06-22
Payer: COMMERCIAL

## 2023-06-22 NOTE — TELEPHONE ENCOUNTER
----- Message from Melissa Ulrich sent at 6/22/2023  3:24 PM CDT -----  Contact: self  Type: Needs Medical Advice  Who Called: Patient   Best Call Back Number: [phone]  Additional Information: Pt states she wants to be scheduled for colonoscopy. Plz call pt wants an early appt time if possible or even a Monday. Thanks

## 2023-06-24 ENCOUNTER — HOSPITAL ENCOUNTER (OUTPATIENT)
Dept: RADIOLOGY | Facility: HOSPITAL | Age: 63
Discharge: HOME | End: 2023-06-24
Attending: INTERNAL MEDICINE
Payer: COMMERCIAL

## 2023-06-24 DIAGNOSIS — Z12.31 BREAST CANCER SCREENING BY MAMMOGRAM: ICD-10-CM

## 2023-06-24 PROCEDURE — 77067 SCR MAMMO BI INCL CAD: CPT | Mod: 26,,, | Performed by: RADIOLOGY

## 2023-06-24 PROCEDURE — 77063 MAMMO DIGITAL SCREENING BILAT WITH TOMO: ICD-10-PCS | Mod: 26,,, | Performed by: RADIOLOGY

## 2023-06-24 PROCEDURE — 77067 MAMMO DIGITAL SCREENING BILAT WITH TOMO: ICD-10-PCS | Mod: 26,,, | Performed by: RADIOLOGY

## 2023-06-24 PROCEDURE — 77067 SCR MAMMO BI INCL CAD: CPT | Mod: TC,PO

## 2023-06-24 PROCEDURE — 77063 BREAST TOMOSYNTHESIS BI: CPT | Mod: 26,,, | Performed by: RADIOLOGY

## 2023-07-07 ENCOUNTER — PATIENT MESSAGE (OUTPATIENT)
Dept: OTHER | Facility: OTHER | Age: 63
End: 2023-07-07
Payer: COMMERCIAL

## 2023-07-25 ENCOUNTER — PATIENT MESSAGE (OUTPATIENT)
Dept: FAMILY MEDICINE | Facility: CLINIC | Age: 63
End: 2023-07-25
Payer: COMMERCIAL

## 2023-07-25 DIAGNOSIS — E66.01 MORBID OBESITY WITH BMI OF 45.0-49.9, ADULT: Primary | ICD-10-CM

## 2023-07-26 ENCOUNTER — PATIENT MESSAGE (OUTPATIENT)
Dept: FAMILY MEDICINE | Facility: CLINIC | Age: 63
End: 2023-07-26
Payer: COMMERCIAL

## 2023-07-26 RX ORDER — ORAL SEMAGLUTIDE 3 MG/1
3 TABLET ORAL DAILY
Qty: 30 TABLET | Refills: 11 | Status: SHIPPED | OUTPATIENT
Start: 2023-07-26 | End: 2023-11-06

## 2023-07-27 ENCOUNTER — TELEPHONE (OUTPATIENT)
Dept: PHARMACY | Facility: CLINIC | Age: 63
End: 2023-07-27
Payer: COMMERCIAL

## 2023-08-15 ENCOUNTER — OFFICE VISIT (OUTPATIENT)
Dept: PAIN MEDICINE | Facility: CLINIC | Age: 63
End: 2023-08-15
Payer: COMMERCIAL

## 2023-08-15 VITALS
SYSTOLIC BLOOD PRESSURE: 132 MMHG | HEART RATE: 68 BPM | DIASTOLIC BLOOD PRESSURE: 64 MMHG | HEIGHT: 64 IN | WEIGHT: 287.06 LBS | BODY MASS INDEX: 49.01 KG/M2

## 2023-08-15 DIAGNOSIS — M54.16 LUMBAR RADICULOPATHY: Primary | ICD-10-CM

## 2023-08-15 DIAGNOSIS — M54.9 DORSALGIA, UNSPECIFIED: ICD-10-CM

## 2023-08-15 DIAGNOSIS — M48.061 SPINAL STENOSIS OF LUMBAR REGION, UNSPECIFIED WHETHER NEUROGENIC CLAUDICATION PRESENT: ICD-10-CM

## 2023-08-15 DIAGNOSIS — M25.562 BILATERAL CHRONIC KNEE PAIN: ICD-10-CM

## 2023-08-15 DIAGNOSIS — M25.561 BILATERAL CHRONIC KNEE PAIN: ICD-10-CM

## 2023-08-15 DIAGNOSIS — G89.29 BILATERAL CHRONIC KNEE PAIN: ICD-10-CM

## 2023-08-15 DIAGNOSIS — G62.9 PERIPHERAL POLYNEUROPATHY: ICD-10-CM

## 2023-08-15 PROCEDURE — 4010F PR ACE/ARB THEARPY RXD/TAKEN: ICD-10-PCS | Mod: CPTII,S$GLB,,

## 2023-08-15 PROCEDURE — 99214 PR OFFICE/OUTPT VISIT, EST, LEVL IV, 30-39 MIN: ICD-10-PCS | Mod: S$GLB,,,

## 2023-08-15 PROCEDURE — 3078F DIAST BP <80 MM HG: CPT | Mod: CPTII,S$GLB,,

## 2023-08-15 PROCEDURE — 3075F PR MOST RECENT SYSTOLIC BLOOD PRESS GE 130-139MM HG: ICD-10-PCS | Mod: CPTII,S$GLB,,

## 2023-08-15 PROCEDURE — 3075F SYST BP GE 130 - 139MM HG: CPT | Mod: CPTII,S$GLB,,

## 2023-08-15 PROCEDURE — 3008F PR BODY MASS INDEX (BMI) DOCUMENTED: ICD-10-PCS | Mod: CPTII,S$GLB,,

## 2023-08-15 PROCEDURE — 99999 PR PBB SHADOW E&M-EST. PATIENT-LVL IV: ICD-10-PCS | Mod: PBBFAC,,,

## 2023-08-15 PROCEDURE — 99999 PR PBB SHADOW E&M-EST. PATIENT-LVL IV: CPT | Mod: PBBFAC,,,

## 2023-08-15 PROCEDURE — 1159F MED LIST DOCD IN RCRD: CPT | Mod: CPTII,S$GLB,,

## 2023-08-15 PROCEDURE — 3008F BODY MASS INDEX DOCD: CPT | Mod: CPTII,S$GLB,,

## 2023-08-15 PROCEDURE — 3078F PR MOST RECENT DIASTOLIC BLOOD PRESSURE < 80 MM HG: ICD-10-PCS | Mod: CPTII,S$GLB,,

## 2023-08-15 PROCEDURE — 1159F PR MEDICATION LIST DOCUMENTED IN MEDICAL RECORD: ICD-10-PCS | Mod: CPTII,S$GLB,,

## 2023-08-15 PROCEDURE — 99214 OFFICE O/P EST MOD 30 MIN: CPT | Mod: S$GLB,,,

## 2023-08-15 PROCEDURE — 4010F ACE/ARB THERAPY RXD/TAKEN: CPT | Mod: CPTII,S$GLB,,

## 2023-08-15 NOTE — H&P (VIEW-ONLY)
[facility] Follow Up Evaluation    Referred by: Caroline Nielson PA-C  Reason for referral: back pain    CC:   Chief Complaint   Patient presents with    Botulinum Toxin Injection    Leg Pain    Knee Pain    Low-back Pain      Last 3 PDI Scores 6/5/2020 2/17/2020   Pain Disability Index (PDI) 0 10     Interval HPI 8/15/2023: Fatemeh Shoemaker returns to the office for follow up.  Today she is reporting return of her lower back pain with radiation down right leg, 8/10, constant, worsened with being seated for extended periods of time.  She continues to have numbness from mid calves down but denies any new or worsening numbness, weakness or any new changes to her bowel or bladder function.  She is also reporting worsening bilateral knee pain.  She continues to take gabapentin, nortriptyline, Cymbalta and NSAIDs without significant relief of her pain.    Pain Intervention History:     - s/p L5/S1 ILESI on 5/21/20 with 55% relief.   - s/p L5/S1 ILESI on 6/19/20 with continued 55% relief.  - s/p L5/S1 ILESI on 12/01/21 with 75% relief   - s/p L5/S1 with spread to the left on 02/22/2023 with 100% relief on 04/28/2023   - s/p L5/S1 with spread to the right on 04/28/2023 with 80% relief    HPI:   Fatemeh Shoemaker is a 62 y.o. female who complains of back pain    Onset: more than a few years  Progression: since onset, pain is gradually worsening  Current Pain Score: 8/10  Timing: constant  Quality: aching  Radiation: yes, down the back of both legs  Associated numbness or weakness: yes numbness b/l feet up to mid calf. Weakness with walking long distances and standing  Exacerbated by: standing, walking  Allievated by: rest, leaning forward  Is Pain Level Acceptable?: No    Previous Therapies:  PT/OT: no  HEP:   Interventions:   Surgery:  Medications:   - NSAIDS:  Celebrex  - MSK Relaxants: tizanidine  - TCAs: nortriptyline  - SNRIs: cymbalta  - Topicals:   - Anticonvulsants: gabapentin  - Opioids:      History:    Current Outpatient Medications:     albuterol sulfate 90 mcg/actuation aebs, Inhale 90 mcg into the lungs every 4 (four) hours as needed (wheezing or shortness of breath)., Disp: 1 each, Rfl: 2    apixaban (ELIQUIS) 5 mg Tab, Take 1 tablet (5 mg total) by mouth 2 (two) times daily., Disp: 180 tablet, Rfl: 3    cholecalciferol, vitamin D3, (DECARA) 1,250 mcg (50,000 unit) capsule, Take 1 capsule (50,000 Units total) by mouth every 7 days., Disp: 30 capsule, Rfl: 0    cyanocobalamin 500 MCG tablet, Take 1,000 mcg by mouth once daily., Disp: , Rfl:     diclofenac sodium (VOLTAREN) 1 % Gel, Apply 2 g topically 4 (four) times daily., Disp: 50 g, Rfl: 3    DULoxetine (CYMBALTA) 60 MG capsule, TAKE ONE CAPSULE BY MOUTH DAILY, Disp: 90 capsule, Rfl: 2    flecainide (TAMBOCOR) 150 MG Tab, Take 1 tablet (150 mg total) by mouth every 12 (twelve) hours., Disp: 60 tablet, Rfl: 11    fluticasone furoate-vilanteroL (BREO ELLIPTA) 100-25 mcg/dose diskus inhaler, Inhale 1 puff into the lungs once daily., Disp: 60 each, Rfl: 2    lisinopriL 10 MG tablet, Take 1 tablet (10 mg total) by mouth once daily., Disp: 90 tablet, Rfl: 3    melatonin 5 mg TbDL, Take 1 tablet by mouth nightly as needed (sleep)., Disp: , Rfl:     metoprolol tartrate (LOPRESSOR) 100 MG tablet, Take 1 tablet (100 mg total) by mouth 2 (two) times daily., Disp: 180 tablet, Rfl: 3    nortriptyline (PAMELOR) 25 MG capsule, TAKE ONE CAPSULE BY MOUTH EVERY EVENING, Disp: 90 capsule, Rfl: 3    omeprazole (PRILOSEC) 20 MG capsule, TAKE ONE CAPSULE BY MOUTH EVERY MORNING, Disp: 90 capsule, Rfl: 1    ondansetron (ZOFRAN-ODT) 4 MG TbDL, Take 1 tablet (4 mg total) by mouth 2 (two) times daily., Disp: 20 tablet, Rfl: 1    promethazine (PHENERGAN) 12.5 MG Tab, Take 1 tablet (12.5 mg total) by mouth 2 (two) times daily as needed (nausea)., Disp: 10 tablet, Rfl: 0    promethazine-codeine 6.25-10 mg/5 ml (PHENERGAN WITH CODEINE) 6.25-10 mg/5 mL syrup, Take 5 mLs by  mouth nightly as needed for Cough., Disp: 118 mL, Rfl: 0    promethazine-dextromethorphan (PROMETHAZINE-DM) 6.25-15 mg/5 mL Syrp, Take 5 mLs by mouth nightly as needed., Disp: , Rfl:     pulse oximeter (PULSE OXIMETER) device, Use twice daily at 8 AM and 3 PM and record the value in MyChart as directed., Disp: 1 each, Rfl: 0    rosuvastatin (CRESTOR) 10 MG tablet, TAKE ONE TABLET BY MOUTH EVERY EVENING, Disp: 90 tablet, Rfl: 3    scopolamine (TRANSDERM-SCOP) 1.3-1.5 mg (1 mg over 3 days), Place 1 patch onto the skin every 72 hours., Disp: 10 patch, Rfl: 0    semaglutide (RYBELSUS) 3 mg tablet, Take 1 tablet (3 mg total) by mouth once daily., Disp: 30 tablet, Rfl: 11    tiZANidine (ZANAFLEX) 4 MG tablet, TAKE ONE TABLET BY MOUTH every SIX hours AS NEEDED., Disp: 60 tablet, Rfl: 1    valACYclovir (VALTREX) 500 MG tablet, Take 1 tablet (500 mg total) by mouth daily as needed (fever blister)., Disp: 30 tablet, Rfl: 2    gabapentin (NEURONTIN) 600 MG tablet, Take 1 tablet (600 mg total) by mouth 2 (two) times daily., Disp: 60 tablet, Rfl: 2    Past Medical History:   Diagnosis Date    Anemia     Anticoagulant long-term use     Arrhythmia     Arthritis     Atrial fibrillation     history    Bronchitis     Encounter for blood transfusion     General anesthetics causing adverse effect in therapeutic use     High blood pressure     Hyperlipidemia     Lump or mass in breast     benign     Neuropathy     Obesity     Obstructive sleep apnea syndrome 2021    Ulcer        Past Surgical History:   Procedure Laterality Date    A-V CARDIAC PACEMAKER INSERTION Left 2020    Procedure: INSERTION, CARDIAC PACEMAKER, DUAL CHAMBER;  Surgeon: Bert Reaves MD;  Location: [facility] CATH;  Service: Cardiology;  Laterality: Left;    BREAST BIOPSY Right 2017    myofibroblastoma     BREAST MASS EXCISION       SECTION  10/25/1986    Other c/section 10/26/1997    CHOLECYSTECTOMY  2017    Dr. TOLU Bowers, [facility]      CORRECTION OF HAMMER TOE Left 11/03/2022    Procedure: CORRECTION, HAMMER TOE;  Surgeon: Ezra Arriaga DPM;  Location: [facility] OR;  Service: Podiatry;  Laterality: Left;  hammertoes 2-5 left,    csection      CS x 2    CYSTOSCOPY N/A 09/03/2019    Procedure: CYSTOSCOPY;  Surgeon: Noemi Pierre MD;  Location: [facility] OR;  Service: OB/GYN;  Laterality: N/A;    DILATION AND CURETTAGE OF UTERUS      EPIDURAL STEROID INJECTION INTO LUMBAR SPINE N/A 05/21/2020    Procedure: Injection-steroid-epidural-lumbar L5/S1;  Surgeon: Gurjit Tavarez MD;  Location: [facility] OR;  Service: Pain Management;  Laterality: N/A;    EPIDURAL STEROID INJECTION INTO LUMBAR SPINE N/A 06/19/2020    Procedure: Injection-steroid-epidural-lumbar L5/S1;  Surgeon: Gurjit Tavarez MD;  Location: [facility] OR;  Service: Pain Management;  Laterality: N/A;    EPIDURAL STEROID INJECTION INTO LUMBAR SPINE N/A 12/01/2021    Procedure: Injection-steroid-epidural-lumbar L5/S1;  Surgeon: Gurjit Tavarez MD;  Location: [facility] OR;  Service: Pain Management;  Laterality: N/A;    EPIDURAL STEROID INJECTION INTO LUMBAR SPINE N/A 02/22/2023    Procedure: Injection-steroid-epidural-lumbar-L5/S1 to the left;  Surgeon: Gurjit Tavarez MD;  Location: [facility] OR;  Service: Pain Management;  Laterality: N/A;    EPIDURAL STEROID INJECTION INTO LUMBAR SPINE Right 04/28/2023    Procedure: Injection-steroid-epidural-lumbar L5/S1;  Surgeon: Gurjit Tavarez MD;  Location: [facility] OR;  Service: Pain Management;  Laterality: Right;    FRACTURE SURGERY  04/1986    Dislocated  hip total rt hip 08/08    HIP PINNING      HYSTERECTOMY      JOINT REPLACEMENT  08/2008    LAPAROSCOPIC SALPINGO-OOPHORECTOMY Bilateral 09/03/2019    Procedure: SALPINGO-OOPHORECTOMY, LAPAROSCOPIC;  Surgeon: Noemi Pierre MD;  Location: [facility];  Service: OB/GYN;  Laterality: Bilateral;  Dr. Bentley to assist. No resident needed.     LAPAROSCOPIC TOTAL HYSTERECTOMY N/A 09/03/2019    Procedure: HYSTERECTOMY, TOTAL,  LAPAROSCOPIC;  Surgeon: Noemi Pierre MD;  Location: [facility] OR;  Service: OB/GYN;  Laterality: N/A;    NASAL SEPTUM SURGERY      OOPHORECTOMY      SKIN TAG REMOVAL Left 11/03/2022    Procedure: REMOVAL, SKIN TAG;  Surgeon: Ezra Arriaga DPM;  Location: [facility] OR;  Service: Podiatry;  Laterality: Left;    TOTAL HIP ARTHROPLASTY Right     TUBAL LIGATION  1997       Family History   Problem Relation Age of Onset    Colon cancer Maternal Grandmother 70        mets to ovary    Cancer Maternal Grandmother     Arthritis Paternal Grandfather     Eclampsia Paternal Grandmother     Cataracts Maternal Grandfather     Stroke Father     Colon cancer Father     Hypertension Father     Alcohol abuse Father     Cancer Father         Passed away July 10 2019    Hypertension Mother     Cataracts Mother     Hypertension Brother         Age 54 hypertension heart    Early death Brother         Hypertension cardio disease    No Known Problems Daughter     Stomach cancer Neg Hx     Esophageal cancer Neg Hx     Breast cancer Neg Hx     Miscarriages / Stillbirths Neg Hx     Ovarian cancer Neg Hx     Glaucoma Neg Hx     Macular degeneration Neg Hx     Retinal detachment Neg Hx     Strabismus Neg Hx        Social History     Socioeconomic History    Marital status: Significant Other   Tobacco Use    Smoking status: Never    Smokeless tobacco: Never   Substance and Sexual Activity    Alcohol use: No     Comment: never    Drug use: Never    Sexual activity: Yes     Partners: Male     Birth control/protection: See Surgical Hx, Other-see comments     Comment: Hysterectomy 9/3/19     Social Determinants of Health     Financial Resource Strain: Low Risk  (1/17/2023)    Overall Financial Resource Strain (CARDIA)     Difficulty of Paying Living Expenses: Not hard at all   Food Insecurity: No Food Insecurity (1/17/2023)    Hunger Vital Sign     Worried About Running Out of Food in the Last Year: Never true     Ran Out of Food in the Last Year:  "Never true   Transportation Needs: No Transportation Needs (1/17/2023)    PRAPARE - Transportation     Lack of Transportation (Medical): No     Lack of Transportation (Non-Medical): No   Physical Activity: Insufficiently Active (1/17/2023)    Exercise Vital Sign     Days of Exercise per Week: 1 day     Minutes of Exercise per Session: 10 min   Stress: No Stress Concern Present (1/17/2023)    [place] [employer] - Occupational Stress Questionnaire     Feeling of Stress : Not at all   Social Connections: Unknown (1/17/2023)    Social Connection and Isolation Panel [NHANES]     Frequency of Communication with Friends and Family: Never     Frequency of Social Gatherings with Friends and Family: More than three times a week     Active Member of Clubs or Organizations: No     Attends Club or Organization Meetings: Never     Marital Status:    Housing Stability: Low Risk  (1/17/2023)    Housing Stability Vital Sign     Unable to Pay for Housing in the Last Year: No     Number of Places Lived in the Last Year: 1     Unstable Housing in the Last Year: No       Review of patient's allergies indicates:   Allergen Reactions    Aspirin Other (See Comments)     "I don't take it because I've had ulcers"       Meperidine Other (See Comments)     Felt like she was about to pass out after taking       Review of Systems:  General ROS: negative for - fever  Psychological ROS: negative for - hostility  Hematological and Lymphatic ROS: positive for - bruising  Endocrine ROS: negative for - unexpected weight changes  Respiratory ROS: no cough, shortness of breath, or wheezing  Cardiovascular ROS: no chest pain or dyspnea on exertion  Gastrointestinal ROS: no abdominal pain, change in bowel habits, or black or bloody stools  Musculoskeletal ROS: negative for - muscular weakness  Neurological ROS: negative for - bowel and bladder control changes, positive for numbness/tingling  Dermatological ROS: negative for " "rash    Physical Exam:  Vitals:    08/15/23 1341   BP: 132/64   Pulse: 68   Weight: 130.2 kg (287 lb 0.6 oz)   Height: 5' 4" (1.626 m)   PainSc:   8   PainLoc: Leg     Body mass index is 49.27 kg/m².     Gen: NAD  Gait: gait antalgic due to knee pain and lower back pain, ambulating with Rollator  Psych:  Mood appropriate for given condition  HEENT: eyes anicteric   GI: Abd soft  CV: RRR  Lungs: breathing unlabored   ROM: limited AROM of the L spine in all planes, full ROM at ankles, knees and hips  Lumbar flexion 90 degrees, extension 50 degrees, side bending 30 degrees.    Sensation: intact to light touch in all dermatomes tested from L2-S1 bilaterally, except for b/l feet up to mid calf.   Reflexes: 0/0 b/l patella and 0/0 b/l achilles  Palpation: Diffusely tender over lumbar paraspinals  -TTP over the b/l greater trochanters,  -TTP bilateral SI joint  Tone: normal in the b/l knees and hips   Skin: intact,  No signs of infection, swelling, or warmth to touch.  Extremities:  Mild edema in b/l ankles       Right Left   L2/3 Iliacus Hip flexion  5  5   L3/4 Qudratus Femoris Knee Extension  5  5   L4/5 Tib Anterior Ankle Dorsiflexion   5  5   L5/S1 Extensor Hallicus Longus Great toe extension  5  Fused                 S1/S2 Gastroc/Soleus Plantar Flexion  5  5       Imaging:  MRI lumbar spine 6/11/18  FINDINGS:  Vertebral column: The study is motion degraded.  There is multilevel degenerative change.  There is marked disc space narrowing towards the right at the L3-4 level where there is associated degenerative endplate signal change.  There is no other significant disc space narrowing.  There is no fracture.  Baseline marrow signal intensity is normal.  Anterior endplate osteophyte formation is also present at the L2-3 level.  There is subtle trace anterolisthesis of L4 on L5.    Spinal canal, conus, epidural space: Spinal canal is developmentally normal.  The conus is normal in location, contour and signal " intensity, terminating at the level of T12-L1.  There is no abnormal epidural collection or mass.    Findings by level:    On the sagittal images, there is minimal bulging of the annulus and mild facet joint arthropathy at the T10-11 level but there is no spinal canal or significant foraminal stenosis.  At the T11-T12 level, there is a shallow broad central disc protrusion which narrows the ventral subarachnoid space but again there is no significant spinal canal or foraminal stenosis and there is no cord compression.    T12-L1: There is minimal bulging of the annulus and there is mild facet joint arthropathy.  There is no spinal canal or significant foraminal stenosis.  L1-2: There is moderate facet joint arthropathy.  There is minimal bulging of the annulus with a tiny 2 mm central disc protrusion.  There is no spinal canal or significant foraminal stenosis.  L2-3: There is a moderate diffuse disc bulge with marked facet joint arthropathy.  There is mildly prominent dorsal epidural fat.  There is flattening of the ventral dural sac.  There is moderate central spinal stenosis.  AP measurement of the dural sac is 7.5 mm.  There is no significant foraminal stenosis.  L3-4: There is marked disc space narrowing towards the right.  There is a diffuse disc bulge with osteophytic ridging and superimposed broad right paracentral and foraminal disc protrusion.  There is moderate-to-marked facet joint arthropathy with ligamentum flavum thickening, right greater than left.  Also, there is mildly prominent dorsal epidural fat.  There is moderate central spinal stenosis.  There is severe right lateral recess and foraminal stenosis with mild-to-moderate left foraminal stenosis.  L4-5: There is marked facet joint arthropathy.  There is a diffuse disc bulge with superimposed broad central disc protrusion.  There is mild spinal stenosis.  There is mild-to-moderate bilateral foraminal stenosis.  L5-S1: There is a diffuse disc bulge  with superimposed broad central disc protrusion with annular fissure.  There is marked left and moderate-to-marked right facet joint arthropathy with ligamentum flavum thickening.  There is no significant spinal stenosis.  The right foramina is patent.  There is severe left foraminal stenosis.  The broad central disc protrusion approaches both S1 roots.  The left S1 root is crowded between the facet joint changes and the disc protrusion.  Both S1 roots could be affected, left greater than right.    Soft tissues, other: The prevertebral soft tissues are normal.  The aorta is mildly ectatic.    MRI Cervical Spine 08/14/20  FINDINGS:  The current examination once again demonstrates a 2 mm AP diameter cystic structure within the central aspect of the cervical cord at C6-C7 which extends over an approximately 29 mm craniocaudad dimension from the superior endplate of C6 to the C7-T1 intervertebral disc space (series 2, image 7 and series 5, image 19).  No abnormal adjacent cord edema or abnormal intramedullary enhancement.  Differential considerations include nonspecific dilatation of the central canal of the spinal cord and syringohydromyelia.     The visualized posterior fossa structures are unremarkable.  No Chiari malformation.  There is mucoperiosteal thickening observed within the ethmoid air cells bilaterally.     There is a C3 vertebral body hemangioma which shows no abnormal enhancement following contrast administration.  No pathologic marrow replacement process or cervical spine fracture.  There is degenerative disc desiccation observed at every cervical level.     The incidentally observed soft tissues of the neck are unremarkable.     C2-C3: There is right uncovertebral spurring present.  No disc protrusion or extrusion. No central canal stenosis or neuroforaminal stenosis.  There is ligamentum flavum thickening.  C3-C4: There is a minimal posterior disc osteophyte complex and uncovertebral spurring.  No disc  protrusion or extrusion. No central canal stenosis or neuroforaminal stenosis.  C4-C5: There is left uncovertebral spurring.  There is a broad central disc protrusion.  There is effacement of the anterior CSF sleeve.  No central canal stenosis.  There is, at most, mild right neuroforaminal stenosis as a result of right facet arthropathy.  C5-C6: There is left facet arthropathy.  No central canal stenosis or neuroforaminal stenosis.  C6-C7: There is a 6 mm nonenhancing perineural nerve root sleeve cyst present in the left neural foramen, similar to the prior exam.  There is bilateral uncovertebral spurring, left greater than right.  There is mild ligamentum flavum thickening.  No central canal stenosis.  There is, at most, mild bilateral neuroforaminal stenosis.  There is right facet arthropathy.  C7-T1: There is bilateral facet arthropathy, right greater than left.  No disc protrusion or extrusion. No central canal stenosis or neuroforaminal stenosis.     Impression:     1. No interval detrimental change when compared to the prior study.  29 mm craniocaudad dimension tubular cystic structure within the central aspect of the spinal cord at C6-C7 which either represents nonspecific mild dilatation of the central canal (up to 2 mm in diameter) or syringohydromyelia.  No abnormal cord edema/abnormal intramedullary enhancement.  2. 6 mm nonenhancing left foraminal perineural nerve root sleeve cyst at C6-C7.  3. Minor degenerative change of the cervical spine as detailed above.  4. C3 vertebral body hemangioma, unchanged.  No evidence of pathologic marrow replacement process.    MRI Thoracic Spine 08/14/20    FINDINGS:  There are no abnormal enhancing masses present within the thoracic spine to suggest a pathologic marrow replacement process.  No acute thoracic compression fracture appreciated.  There is a nonenhancing incidentally observed T1 hyperintense T5 vertebral body hemangioma.  There is degenerative disc  desiccation at every thoracic level.  The thoracic spinal cord is normal in signal intensity with no evidence of cord edema, myelomalacia, or abnormal intramedullary enhancement.  No enhancing epidural masses or epidural fluid collections.  No imaging evidence of epidural lipomatosis.     The incidentally observed soft tissues of the chest are unremarkable.     T1-T2: There is a broad disc bulge and a superimposed broad left paracentral disc protrusion which effaces the anterior CSF sleeve and flattens the left anterior thoracic spinal cord.  No central canal stenosis or neuroforaminal stenosis.  T4-T5: There is an 11 mm large broad left paracentral disc protrusion which flattens the left anterior aspect of the thoracic spinal cord.  There is a suggestion of abnormal cord signal on series 6, image 15 which could reflect underlying cord edema/myelomalacia.  No abnormal intramedullary enhancement.  No overall central canal stenosis or neuroforaminal stenosis.  T5-T6: There is a minimal broad left paracentral disc protrusion.  No central canal stenosis or neuroforaminal stenosis.  T6-T7: There is a broad right paracentral disc protrusion on series 6, image 21.  No central canal stenosis or neuroforaminal stenosis.  T8-T9: There is a broad right paracentral disc protrusion which effaces the right anterior CSF sleeve.  No central canal stenosis or neuroforaminal stenosis.  There is ligamentum flavum thickening.  T10-T11: There is a small broad central disc protrusion on series 6, image 34.  No central canal stenosis.  No definite neuroforaminal stenosis.  T11-T12: There is a broad central disc protrusion which effaces the anterior CSF sleeve.  There is bilateral hypertrophic facet arthropathy.  No central canal stenosis or neuroforaminal stenosis.     Impression:     1. No imaging evidence of a pathologic marrow replacement process.  2. Multilevel degenerative change of the thoracic spine with disc protrusions observed  at multiple thoracic levels, most pronounced at T4-T5 where a broad left paracentral disc protrusion and flattens the left anterior aspect of the thoracic spinal cord.  There is possible cord edema noted.  No abnormal intramedullary enhancement.  No cord syrinx.  3. T5 vertebral body hemangioma.    MRI Lumbar Spine 08/14/20  FINDINGS:  There is an L1 vertebral body hemangioma present.  There is a 9 mm focus of T1/T2 signal hyperintensity noted within the left pedicle of L5 which is favored to represent either focal fat deposition or a hemangioma.  No associated enhancement following contrast administration.  No additional concerning enhancing bone lesions elsewhere in the lumbar spine.  No acute lumbar compression fracture.  No spondylolisthesis.  No definite pars defects.  There is multilevel degenerative disc desiccation present at virtually every visualized lower thoracic and lumbar level with disc space narrowing and degenerative endplate change noted at L3-L4.  There is a levoscoliotic curvature of the lumbar spine, apex at L3-L4.     The conus medullaris terminates at L1.  The visualized lower thoracic spinal cord is normal in signal intensity with no evidence of cord edema, myelomalacia, or cord syrinx.  No abnormal intramedullary enhancement.  No peripherally enhancing epidural fluid collections.  The paraspinous musculature is unremarkable.     The incidentally observed abdominal/retroperitoneal soft tissues demonstrate diverticulosis coli..     T11-T12: There is a broad central disc protrusion which effaces the anterior CSF sleeve.  There is, at most, mild overall central canal stenosis.  No neuroforaminal stenosis.  T12-L1: No disc protrusion or extrusion. No central canal stenosis or neuroforaminal stenosis.  L1-L2: There is a broad right paracentral disc protrusion.  No disc extrusion.  No central canal stenosis or neuroforaminal stenosis.  L2-L3: There is a broad disc bulge which extends into both  neural foramina.  There is prominent bilateral hypertrophic facet arthropathy.  There is mild overall central canal stenosis.  No definite neuroforaminal stenosis.  L3-L4: There is a broad right paracentral/foraminal/extraforaminal osteophyte which abuts and displaces the descending right L4 nerve in the right lateral recess.  Please correlate clinically for symptoms referable to the right L4 nerve.  There is right lateral recess stenosis.  There is ligamentum flavum thickening and hypertrophic facet arthropathy.  There is moderate right neuroforaminal stenosis.  No left neuroforaminal stenosis.  There is moderate overall central canal stenosis.  L4-L5: There is a minimal diffuse broad disc bulge which extends into both neural foramina.  There is abutment of the exited right L4 nerve in the right extraforaminal soft tissues on series 7, image 21.  Please correlate clinically for symptoms referable to the right L4 nerve.  There is bilateral severe hypertrophic facet arthropathy present, left greater than right, resulting in prominent left lateral recess stenosis.  There is moderate-severe left neuroforaminal stenosis (series 9, image 16 and series 7, image 19).  No right neuroforaminal stenosis.  There is severe central canal stenosis.  L5-S1: The left L5 pedicle is relatively hypoplastic in size as compared to the right L5 pedicle, and there is a 9 mm focus of T1/T2 signal hyperintensity within the left L5 pedicle on series 5, image 11 which shows no abnormal enhancement following contrast administration, most likely a focus of focal fat deposition (see postcontrast T1 fat-suppressed images) or a hemangioma.  There is prominent bilateral hypertrophic facet arthropathy, left greater than right, resulting in severe proximal left neuroforaminal stenosis.  Please correlate clinically for symptoms referable to the exiting left L5 nerve (series 7, image 25, series 9, image 22, and series 6, image 22).  There is is also  posterior displacement of the descending left S1 nerve in the left lateral recess.  Please correlate clinically for symptoms referable to the left S1 nerve.  There is a broad central disc protrusion at L5-S1 which abuts both descending S1 nerves and which could produce symptoms referable to both S1 nerves, similar to the prior exam.  No right neuroforaminal stenosis.  There is moderate overall central canal stenosis.     There is prominent epidural fat deposition observed within the lumbosacral spinal canal from L4-L5 through the sacral canal.     Impression:     1. No appreciable interval detrimental change when compared to the prior exam.  2. 9 mm nonenhancing focus of T1/T2 signal hyperintensity within the left pedicle of L5 is favored to represent either a hemangioma or focal fat deposition.  No adjacent osseous edema appreciated.  No additional concerning bone lesions elsewhere in the visualized lumbar spine.  No appreciable interval detrimental change over the course of multiple prior examinations dating back to 06/11/2018.  3. Advanced multilevel degenerative change of the lumbar spine which combines with a levoscoliotic curvature of the lumbar spine to generate multilevel central canal and neuroforaminal stenosis.  Additional details are provided above.  4. Severe left neuroforaminal stenosis at L5-S1 and left lateral recess stenosis at L5-S1 as a result of prominent left facet arthropathy.  Please correlate clinically for symptoms referable to the left L5 and S1 nerves.  There is also a broad central disc protrusion at L5-S1, similar to the prior examination, which could produce symptoms referable to the S1 nerves, left greater than right.  Please correlate clinically.  5. 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    Labs:  BMP  Lab Results   Component Value Date     11/13/2022    K 4.4 11/13/2022     11/13/2022    CO2 28 11/13/2022    BUN 20 (H)  11/13/2022    CREATININE 0.8 12/07/2022    CALCIUM 9.0 11/13/2022    ANIONGAP 8 11/13/2022    ESTGFRAFRICA >60 10/06/2021    EGFRNONAA >60 10/06/2021     Lab Results   Component Value Date    ALT 16 11/12/2022    AST 24 11/12/2022    ALKPHOS 38 11/12/2022    BILITOT 0.4 11/12/2022     Lab Results   Component Value Date    WBC 16.11 (H) 09/04/2019    HGB 9.4 (L) 09/04/2019    HCT 28.9 (L) 09/04/2019    MCV 85 09/04/2019     09/04/2019           Assessment:   Problem List Items Addressed This Visit          Neuro    Spinal stenosis of lumbar region     Other Visit Diagnoses       Lumbar radiculopathy    -  Primary    Dorsalgia, unspecified        Peripheral polyneuropathy        Bilateral chronic knee pain                        62 y.o. year old female with PMH HTN, a-fib on eliquis presents to the office with back pain.  She's had this pain for over 5 years and it has been gradually worsening.  No history of prior back surgery.   She has had chronic numbness in the top of her feet for years.  Her pain is worse with standing and walking and relieved with rest and leaning forward.      8/15/2023: Fatemeh Shoemaker returns to the office for follow up.  Today she is reporting return of her lower back pain with radiation down right leg, 8/10, constant, worsened with being seated for extended periods of time.  She continues to have numbness from mid calves down but denies any new or worsening numbness, weakness or any new changes to her bowel or bladder function.  She is also reporting worsening bilateral knee pain.  She continues to take gabapentin, nortriptyline, Cymbalta and NSAIDs without significant relief of her pain.      - today she is full 5/5 strength on exam in her lower extremities.  Her left big toe is now fused from previous surgery.   - I independently reviewed her lumbar MRI is consistent with L4-L5: There is a minimal diffuse broad disc bulge which extends into both neural foramina.  There is  abutment of the exited right L4 nerve in the right extraforaminal soft tissues   There is bilateral severe hypertrophic facet arthropathy present, left greater than right, resulting in prominent left lateral recess stenosis.  There is moderate-severe left neuroforaminal stenosis.  There is severe central canal stenosis.  - I believe her lumbar radicular pain has returned in her pain from her severe central canal stenosis.  - her pain is limiting her mobility interfering with her ADLs and quality of life.  - her pain is too severe to restart formal physical therapy.  - she is not finding significant relief with NSAIDs, Cymbalta, nortriptyline or gabapentin.  - she has been evaluated by Neurosurgery in the past and they do not recommend any surgical interventions.  - for her continued pain I would like to schedule her for repeat L5/S1 YUE with spread to the right.  She is found greater than 80% relief from previous YUE lasting over 4 months.  - follow-up 2 weeks post procedure or sooner if needed.      : Reviewed     The above note was completed, in part, with the aid of Dragon dictation software/hardware. Translation errors may be present.

## 2023-08-15 NOTE — PROGRESS NOTES
[facility] Follow Up Evaluation    Referred by: Caroline Nielson PA-C  Reason for referral: back pain    CC:   Chief Complaint   Patient presents with    Botulinum Toxin Injection    Leg Pain    Knee Pain    Low-back Pain      Last 3 PDI Scores 6/5/2020 2/17/2020   Pain Disability Index (PDI) 0 10     Interval HPI 8/15/2023: Fatemeh Shoemaker returns to the office for follow up.  Today she is reporting return of her lower back pain with radiation down right leg, 8/10, constant, worsened with being seated for extended periods of time.  She continues to have numbness from mid calves down but denies any new or worsening numbness, weakness or any new changes to her bowel or bladder function.  She is also reporting worsening bilateral knee pain.  She continues to take gabapentin, nortriptyline, Cymbalta and NSAIDs without significant relief of her pain.    Pain Intervention History:     - s/p L5/S1 ILESI on 5/21/20 with 55% relief.   - s/p L5/S1 ILESI on 6/19/20 with continued 55% relief.  - s/p L5/S1 ILESI on 12/01/21 with 75% relief   - s/p L5/S1 with spread to the left on 02/22/2023 with 100% relief on 04/28/2023   - s/p L5/S1 with spread to the right on 04/28/2023 with 80% relief    HPI:   Fatemeh Shoemaker is a 62 y.o. female who complains of back pain    Onset: more than a few years  Progression: since onset, pain is gradually worsening  Current Pain Score: 8/10  Timing: constant  Quality: aching  Radiation: yes, down the back of both legs  Associated numbness or weakness: yes numbness b/l feet up to mid calf. Weakness with walking long distances and standing  Exacerbated by: standing, walking  Allievated by: rest, leaning forward  Is Pain Level Acceptable?: No    Previous Therapies:  PT/OT: no  HEP:   Interventions:   Surgery:  Medications:   - NSAIDS:  Celebrex  - MSK Relaxants: tizanidine  - TCAs: nortriptyline  - SNRIs: cymbalta  - Topicals:   - Anticonvulsants: gabapentin  - Opioids:      History:    Current Outpatient Medications:     albuterol sulfate 90 mcg/actuation aebs, Inhale 90 mcg into the lungs every 4 (four) hours as needed (wheezing or shortness of breath)., Disp: 1 each, Rfl: 2    apixaban (ELIQUIS) 5 mg Tab, Take 1 tablet (5 mg total) by mouth 2 (two) times daily., Disp: 180 tablet, Rfl: 3    cholecalciferol, vitamin D3, (DECARA) 1,250 mcg (50,000 unit) capsule, Take 1 capsule (50,000 Units total) by mouth every 7 days., Disp: 30 capsule, Rfl: 0    cyanocobalamin 500 MCG tablet, Take 1,000 mcg by mouth once daily., Disp: , Rfl:     diclofenac sodium (VOLTAREN) 1 % Gel, Apply 2 g topically 4 (four) times daily., Disp: 50 g, Rfl: 3    DULoxetine (CYMBALTA) 60 MG capsule, TAKE ONE CAPSULE BY MOUTH DAILY, Disp: 90 capsule, Rfl: 2    flecainide (TAMBOCOR) 150 MG Tab, Take 1 tablet (150 mg total) by mouth every 12 (twelve) hours., Disp: 60 tablet, Rfl: 11    fluticasone furoate-vilanteroL (BREO ELLIPTA) 100-25 mcg/dose diskus inhaler, Inhale 1 puff into the lungs once daily., Disp: 60 each, Rfl: 2    lisinopriL 10 MG tablet, Take 1 tablet (10 mg total) by mouth once daily., Disp: 90 tablet, Rfl: 3    melatonin 5 mg TbDL, Take 1 tablet by mouth nightly as needed (sleep)., Disp: , Rfl:     metoprolol tartrate (LOPRESSOR) 100 MG tablet, Take 1 tablet (100 mg total) by mouth 2 (two) times daily., Disp: 180 tablet, Rfl: 3    nortriptyline (PAMELOR) 25 MG capsule, TAKE ONE CAPSULE BY MOUTH EVERY EVENING, Disp: 90 capsule, Rfl: 3    omeprazole (PRILOSEC) 20 MG capsule, TAKE ONE CAPSULE BY MOUTH EVERY MORNING, Disp: 90 capsule, Rfl: 1    ondansetron (ZOFRAN-ODT) 4 MG TbDL, Take 1 tablet (4 mg total) by mouth 2 (two) times daily., Disp: 20 tablet, Rfl: 1    promethazine (PHENERGAN) 12.5 MG Tab, Take 1 tablet (12.5 mg total) by mouth 2 (two) times daily as needed (nausea)., Disp: 10 tablet, Rfl: 0    promethazine-codeine 6.25-10 mg/5 ml (PHENERGAN WITH CODEINE) 6.25-10 mg/5 mL syrup, Take 5 mLs by  mouth nightly as needed for Cough., Disp: 118 mL, Rfl: 0    promethazine-dextromethorphan (PROMETHAZINE-DM) 6.25-15 mg/5 mL Syrp, Take 5 mLs by mouth nightly as needed., Disp: , Rfl:     pulse oximeter (PULSE OXIMETER) device, Use twice daily at 8 AM and 3 PM and record the value in MyChart as directed., Disp: 1 each, Rfl: 0    rosuvastatin (CRESTOR) 10 MG tablet, TAKE ONE TABLET BY MOUTH EVERY EVENING, Disp: 90 tablet, Rfl: 3    scopolamine (TRANSDERM-SCOP) 1.3-1.5 mg (1 mg over 3 days), Place 1 patch onto the skin every 72 hours., Disp: 10 patch, Rfl: 0    semaglutide (RYBELSUS) 3 mg tablet, Take 1 tablet (3 mg total) by mouth once daily., Disp: 30 tablet, Rfl: 11    tiZANidine (ZANAFLEX) 4 MG tablet, TAKE ONE TABLET BY MOUTH every SIX hours AS NEEDED., Disp: 60 tablet, Rfl: 1    valACYclovir (VALTREX) 500 MG tablet, Take 1 tablet (500 mg total) by mouth daily as needed (fever blister)., Disp: 30 tablet, Rfl: 2    gabapentin (NEURONTIN) 600 MG tablet, Take 1 tablet (600 mg total) by mouth 2 (two) times daily., Disp: 60 tablet, Rfl: 2    Past Medical History:   Diagnosis Date    Anemia     Anticoagulant long-term use     Arrhythmia     Arthritis     Atrial fibrillation     history    Bronchitis     Encounter for blood transfusion     General anesthetics causing adverse effect in therapeutic use     High blood pressure     Hyperlipidemia     Lump or mass in breast     benign     Neuropathy     Obesity     Obstructive sleep apnea syndrome 2021    Ulcer        Past Surgical History:   Procedure Laterality Date    A-V CARDIAC PACEMAKER INSERTION Left 2020    Procedure: INSERTION, CARDIAC PACEMAKER, DUAL CHAMBER;  Surgeon: Bert Reaves MD;  Location: [facility] CATH;  Service: Cardiology;  Laterality: Left;    BREAST BIOPSY Right 2017    myofibroblastoma     BREAST MASS EXCISION       SECTION  10/25/1986    Other c/section 10/26/1997    CHOLECYSTECTOMY  2017    Dr. TOLU Bowers, [facility]      CORRECTION OF HAMMER TOE Left 11/03/2022    Procedure: CORRECTION, HAMMER TOE;  Surgeon: Ezra Arriaga DPM;  Location: [facility] OR;  Service: Podiatry;  Laterality: Left;  hammertoes 2-5 left,    csection      CS x 2    CYSTOSCOPY N/A 09/03/2019    Procedure: CYSTOSCOPY;  Surgeon: Noemi Pierre MD;  Location: [facility] OR;  Service: OB/GYN;  Laterality: N/A;    DILATION AND CURETTAGE OF UTERUS      EPIDURAL STEROID INJECTION INTO LUMBAR SPINE N/A 05/21/2020    Procedure: Injection-steroid-epidural-lumbar L5/S1;  Surgeon: Gurjit Tavarez MD;  Location: [facility] OR;  Service: Pain Management;  Laterality: N/A;    EPIDURAL STEROID INJECTION INTO LUMBAR SPINE N/A 06/19/2020    Procedure: Injection-steroid-epidural-lumbar L5/S1;  Surgeon: Gurjit Tavarez MD;  Location: [facility] OR;  Service: Pain Management;  Laterality: N/A;    EPIDURAL STEROID INJECTION INTO LUMBAR SPINE N/A 12/01/2021    Procedure: Injection-steroid-epidural-lumbar L5/S1;  Surgeon: Gurjit Tavarez MD;  Location: [facility] OR;  Service: Pain Management;  Laterality: N/A;    EPIDURAL STEROID INJECTION INTO LUMBAR SPINE N/A 02/22/2023    Procedure: Injection-steroid-epidural-lumbar-L5/S1 to the left;  Surgeon: Gurjit Tavarez MD;  Location: [facility] OR;  Service: Pain Management;  Laterality: N/A;    EPIDURAL STEROID INJECTION INTO LUMBAR SPINE Right 04/28/2023    Procedure: Injection-steroid-epidural-lumbar L5/S1;  Surgeon: Gurjit Tavarez MD;  Location: [facility] OR;  Service: Pain Management;  Laterality: Right;    FRACTURE SURGERY  04/1986    Dislocated  hip total rt hip 08/08    HIP PINNING      HYSTERECTOMY      JOINT REPLACEMENT  08/2008    LAPAROSCOPIC SALPINGO-OOPHORECTOMY Bilateral 09/03/2019    Procedure: SALPINGO-OOPHORECTOMY, LAPAROSCOPIC;  Surgeon: Noemi Pierre MD;  Location: [facility];  Service: OB/GYN;  Laterality: Bilateral;  Dr. Bentley to assist. No resident needed.     LAPAROSCOPIC TOTAL HYSTERECTOMY N/A 09/03/2019    Procedure: HYSTERECTOMY, TOTAL,  LAPAROSCOPIC;  Surgeon: Noemi Pierre MD;  Location: [facility] OR;  Service: OB/GYN;  Laterality: N/A;    NASAL SEPTUM SURGERY      OOPHORECTOMY      SKIN TAG REMOVAL Left 11/03/2022    Procedure: REMOVAL, SKIN TAG;  Surgeon: Ezra Arriaga DPM;  Location: [facility] OR;  Service: Podiatry;  Laterality: Left;    TOTAL HIP ARTHROPLASTY Right     TUBAL LIGATION  1997       Family History   Problem Relation Age of Onset    Colon cancer Maternal Grandmother 70        mets to ovary    Cancer Maternal Grandmother     Arthritis Paternal Grandfather     Eclampsia Paternal Grandmother     Cataracts Maternal Grandfather     Stroke Father     Colon cancer Father     Hypertension Father     Alcohol abuse Father     Cancer Father         Passed away July 10 2019    Hypertension Mother     Cataracts Mother     Hypertension Brother         Age 54 hypertension heart    Early death Brother         Hypertension cardio disease    No Known Problems Daughter     Stomach cancer Neg Hx     Esophageal cancer Neg Hx     Breast cancer Neg Hx     Miscarriages / Stillbirths Neg Hx     Ovarian cancer Neg Hx     Glaucoma Neg Hx     Macular degeneration Neg Hx     Retinal detachment Neg Hx     Strabismus Neg Hx        Social History     Socioeconomic History    Marital status: Significant Other   Tobacco Use    Smoking status: Never    Smokeless tobacco: Never   Substance and Sexual Activity    Alcohol use: No     Comment: never    Drug use: Never    Sexual activity: Yes     Partners: Male     Birth control/protection: See Surgical Hx, Other-see comments     Comment: Hysterectomy 9/3/19     Social Determinants of Health     Financial Resource Strain: Low Risk  (1/17/2023)    Overall Financial Resource Strain (CARDIA)     Difficulty of Paying Living Expenses: Not hard at all   Food Insecurity: No Food Insecurity (1/17/2023)    Hunger Vital Sign     Worried About Running Out of Food in the Last Year: Never true     Ran Out of Food in the Last Year:  "Never true   Transportation Needs: No Transportation Needs (1/17/2023)    PRAPARE - Transportation     Lack of Transportation (Medical): No     Lack of Transportation (Non-Medical): No   Physical Activity: Insufficiently Active (1/17/2023)    Exercise Vital Sign     Days of Exercise per Week: 1 day     Minutes of Exercise per Session: 10 min   Stress: No Stress Concern Present (1/17/2023)    [employer] [place] [employer] - Occupational Stress Questionnaire     Feeling of Stress : Not at all   Social Connections: Unknown (1/17/2023)    Social Connection and Isolation Panel [NHANES]     Frequency of Communication with Friends and Family: Never     Frequency of Social Gatherings with Friends and Family: More than three times a week     Active Member of Clubs or Organizations: No     Attends Club or Organization Meetings: Never     Marital Status:    Housing Stability: Low Risk  (1/17/2023)    Housing Stability Vital Sign     Unable to Pay for Housing in the Last Year: No     Number of Places Lived in the Last Year: 1     Unstable Housing in the Last Year: No       Review of patient's allergies indicates:   Allergen Reactions    Aspirin Other (See Comments)     "I don't take it because I've had ulcers"       Meperidine Other (See Comments)     Felt like she was about to pass out after taking       Review of Systems:  General ROS: negative for - fever  Psychological ROS: negative for - hostility  Hematological and Lymphatic ROS: positive for - bruising  Endocrine ROS: negative for - unexpected weight changes  Respiratory ROS: no cough, shortness of breath, or wheezing  Cardiovascular ROS: no chest pain or dyspnea on exertion  Gastrointestinal ROS: no abdominal pain, change in bowel habits, or black or bloody stools  Musculoskeletal ROS: negative for - muscular weakness  Neurological ROS: negative for - bowel and bladder control changes, positive for numbness/tingling  Dermatological ROS: negative for " "rash    Physical Exam:  Vitals:    08/15/23 1341   BP: 132/64   Pulse: 68   Weight: 130.2 kg (287 lb 0.6 oz)   Height: 5' 4" (1.626 m)   PainSc:   8   PainLoc: Leg     Body mass index is 49.27 kg/m².     Gen: NAD  Gait: gait antalgic due to knee pain and lower back pain, ambulating with Rollator  Psych:  Mood appropriate for given condition  HEENT: eyes anicteric   GI: Abd soft  CV: RRR  Lungs: breathing unlabored   ROM: limited AROM of the L spine in all planes, full ROM at ankles, knees and hips  Lumbar flexion 90 degrees, extension 50 degrees, side bending 30 degrees.    Sensation: intact to light touch in all dermatomes tested from L2-S1 bilaterally, except for b/l feet up to mid calf.   Reflexes: 0/0 b/l patella and 0/0 b/l achilles  Palpation: Diffusely tender over lumbar paraspinals  -TTP over the b/l greater trochanters,  -TTP bilateral SI joint  Tone: normal in the b/l knees and hips   Skin: intact,  No signs of infection, swelling, or warmth to touch.  Extremities:  Mild edema in b/l ankles       Right Left   L2/3 Iliacus Hip flexion  5  5   L3/4 Qudratus Femoris Knee Extension  5  5   L4/5 Tib Anterior Ankle Dorsiflexion   5  5   L5/S1 Extensor Hallicus Longus Great toe extension  5  Fused                 S1/S2 Gastroc/Soleus Plantar Flexion  5  5       Imaging:  MRI lumbar spine 6/11/18  FINDINGS:  Vertebral column: The study is motion degraded.  There is multilevel degenerative change.  There is marked disc space narrowing towards the right at the L3-4 level where there is associated degenerative endplate signal change.  There is no other significant disc space narrowing.  There is no fracture.  Baseline marrow signal intensity is normal.  Anterior endplate osteophyte formation is also present at the L2-3 level.  There is subtle trace anterolisthesis of L4 on L5.    Spinal canal, conus, epidural space: Spinal canal is developmentally normal.  The conus is normal in location, contour and signal " intensity, terminating at the level of T12-L1.  There is no abnormal epidural collection or mass.    Findings by level:    On the sagittal images, there is minimal bulging of the annulus and mild facet joint arthropathy at the T10-11 level but there is no spinal canal or significant foraminal stenosis.  At the T11-T12 level, there is a shallow broad central disc protrusion which narrows the ventral subarachnoid space but again there is no significant spinal canal or foraminal stenosis and there is no cord compression.    T12-L1: There is minimal bulging of the annulus and there is mild facet joint arthropathy.  There is no spinal canal or significant foraminal stenosis.  L1-2: There is moderate facet joint arthropathy.  There is minimal bulging of the annulus with a tiny 2 mm central disc protrusion.  There is no spinal canal or significant foraminal stenosis.  L2-3: There is a moderate diffuse disc bulge with marked facet joint arthropathy.  There is mildly prominent dorsal epidural fat.  There is flattening of the ventral dural sac.  There is moderate central spinal stenosis.  AP measurement of the dural sac is 7.5 mm.  There is no significant foraminal stenosis.  L3-4: There is marked disc space narrowing towards the right.  There is a diffuse disc bulge with osteophytic ridging and superimposed broad right paracentral and foraminal disc protrusion.  There is moderate-to-marked facet joint arthropathy with ligamentum flavum thickening, right greater than left.  Also, there is mildly prominent dorsal epidural fat.  There is moderate central spinal stenosis.  There is severe right lateral recess and foraminal stenosis with mild-to-moderate left foraminal stenosis.  L4-5: There is marked facet joint arthropathy.  There is a diffuse disc bulge with superimposed broad central disc protrusion.  There is mild spinal stenosis.  There is mild-to-moderate bilateral foraminal stenosis.  L5-S1: There is a diffuse disc bulge  with superimposed broad central disc protrusion with annular fissure.  There is marked left and moderate-to-marked right facet joint arthropathy with ligamentum flavum thickening.  There is no significant spinal stenosis.  The right foramina is patent.  There is severe left foraminal stenosis.  The broad central disc protrusion approaches both S1 roots.  The left S1 root is crowded between the facet joint changes and the disc protrusion.  Both S1 roots could be affected, left greater than right.    Soft tissues, other: The prevertebral soft tissues are normal.  The aorta is mildly ectatic.    MRI Cervical Spine 08/14/20  FINDINGS:  The current examination once again demonstrates a 2 mm AP diameter cystic structure within the central aspect of the cervical cord at C6-C7 which extends over an approximately 29 mm craniocaudad dimension from the superior endplate of C6 to the C7-T1 intervertebral disc space (series 2, image 7 and series 5, image 19).  No abnormal adjacent cord edema or abnormal intramedullary enhancement.  Differential considerations include nonspecific dilatation of the central canal of the spinal cord and syringohydromyelia.     The visualized posterior fossa structures are unremarkable.  No Chiari malformation.  There is mucoperiosteal thickening observed within the ethmoid air cells bilaterally.     There is a C3 vertebral body hemangioma which shows no abnormal enhancement following contrast administration.  No pathologic marrow replacement process or cervical spine fracture.  There is degenerative disc desiccation observed at every cervical level.     The incidentally observed soft tissues of the neck are unremarkable.     C2-C3: There is right uncovertebral spurring present.  No disc protrusion or extrusion. No central canal stenosis or neuroforaminal stenosis.  There is ligamentum flavum thickening.  C3-C4: There is a minimal posterior disc osteophyte complex and uncovertebral spurring.  No disc  protrusion or extrusion. No central canal stenosis or neuroforaminal stenosis.  C4-C5: There is left uncovertebral spurring.  There is a broad central disc protrusion.  There is effacement of the anterior CSF sleeve.  No central canal stenosis.  There is, at most, mild right neuroforaminal stenosis as a result of right facet arthropathy.  C5-C6: There is left facet arthropathy.  No central canal stenosis or neuroforaminal stenosis.  C6-C7: There is a 6 mm nonenhancing perineural nerve root sleeve cyst present in the left neural foramen, similar to the prior exam.  There is bilateral uncovertebral spurring, left greater than right.  There is mild ligamentum flavum thickening.  No central canal stenosis.  There is, at most, mild bilateral neuroforaminal stenosis.  There is right facet arthropathy.  C7-T1: There is bilateral facet arthropathy, right greater than left.  No disc protrusion or extrusion. No central canal stenosis or neuroforaminal stenosis.     Impression:     1. No interval detrimental change when compared to the prior study.  29 mm craniocaudad dimension tubular cystic structure within the central aspect of the spinal cord at C6-C7 which either represents nonspecific mild dilatation of the central canal (up to 2 mm in diameter) or syringohydromyelia.  No abnormal cord edema/abnormal intramedullary enhancement.  2. 6 mm nonenhancing left foraminal perineural nerve root sleeve cyst at C6-C7.  3. Minor degenerative change of the cervical spine as detailed above.  4. C3 vertebral body hemangioma, unchanged.  No evidence of pathologic marrow replacement process.    MRI Thoracic Spine 08/14/20    FINDINGS:  There are no abnormal enhancing masses present within the thoracic spine to suggest a pathologic marrow replacement process.  No acute thoracic compression fracture appreciated.  There is a nonenhancing incidentally observed T1 hyperintense T5 vertebral body hemangioma.  There is degenerative disc  desiccation at every thoracic level.  The thoracic spinal cord is normal in signal intensity with no evidence of cord edema, myelomalacia, or abnormal intramedullary enhancement.  No enhancing epidural masses or epidural fluid collections.  No imaging evidence of epidural lipomatosis.     The incidentally observed soft tissues of the chest are unremarkable.     T1-T2: There is a broad disc bulge and a superimposed broad left paracentral disc protrusion which effaces the anterior CSF sleeve and flattens the left anterior thoracic spinal cord.  No central canal stenosis or neuroforaminal stenosis.  T4-T5: There is an 11 mm large broad left paracentral disc protrusion which flattens the left anterior aspect of the thoracic spinal cord.  There is a suggestion of abnormal cord signal on series 6, image 15 which could reflect underlying cord edema/myelomalacia.  No abnormal intramedullary enhancement.  No overall central canal stenosis or neuroforaminal stenosis.  T5-T6: There is a minimal broad left paracentral disc protrusion.  No central canal stenosis or neuroforaminal stenosis.  T6-T7: There is a broad right paracentral disc protrusion on series 6, image 21.  No central canal stenosis or neuroforaminal stenosis.  T8-T9: There is a broad right paracentral disc protrusion which effaces the right anterior CSF sleeve.  No central canal stenosis or neuroforaminal stenosis.  There is ligamentum flavum thickening.  T10-T11: There is a small broad central disc protrusion on series 6, image 34.  No central canal stenosis.  No definite neuroforaminal stenosis.  T11-T12: There is a broad central disc protrusion which effaces the anterior CSF sleeve.  There is bilateral hypertrophic facet arthropathy.  No central canal stenosis or neuroforaminal stenosis.     Impression:     1. No imaging evidence of a pathologic marrow replacement process.  2. Multilevel degenerative change of the thoracic spine with disc protrusions observed  at multiple thoracic levels, most pronounced at T4-T5 where a broad left paracentral disc protrusion and flattens the left anterior aspect of the thoracic spinal cord.  There is possible cord edema noted.  No abnormal intramedullary enhancement.  No cord syrinx.  3. T5 vertebral body hemangioma.    MRI Lumbar Spine 08/14/20  FINDINGS:  There is an L1 vertebral body hemangioma present.  There is a 9 mm focus of T1/T2 signal hyperintensity noted within the left pedicle of L5 which is favored to represent either focal fat deposition or a hemangioma.  No associated enhancement following contrast administration.  No additional concerning enhancing bone lesions elsewhere in the lumbar spine.  No acute lumbar compression fracture.  No spondylolisthesis.  No definite pars defects.  There is multilevel degenerative disc desiccation present at virtually every visualized lower thoracic and lumbar level with disc space narrowing and degenerative endplate change noted at L3-L4.  There is a levoscoliotic curvature of the lumbar spine, apex at L3-L4.     The conus medullaris terminates at L1.  The visualized lower thoracic spinal cord is normal in signal intensity with no evidence of cord edema, myelomalacia, or cord syrinx.  No abnormal intramedullary enhancement.  No peripherally enhancing epidural fluid collections.  The paraspinous musculature is unremarkable.     The incidentally observed abdominal/retroperitoneal soft tissues demonstrate diverticulosis coli..     T11-T12: There is a broad central disc protrusion which effaces the anterior CSF sleeve.  There is, at most, mild overall central canal stenosis.  No neuroforaminal stenosis.  T12-L1: No disc protrusion or extrusion. No central canal stenosis or neuroforaminal stenosis.  L1-L2: There is a broad right paracentral disc protrusion.  No disc extrusion.  No central canal stenosis or neuroforaminal stenosis.  L2-L3: There is a broad disc bulge which extends into both  neural foramina.  There is prominent bilateral hypertrophic facet arthropathy.  There is mild overall central canal stenosis.  No definite neuroforaminal stenosis.  L3-L4: There is a broad right paracentral/foraminal/extraforaminal osteophyte which abuts and displaces the descending right L4 nerve in the right lateral recess.  Please correlate clinically for symptoms referable to the right L4 nerve.  There is right lateral recess stenosis.  There is ligamentum flavum thickening and hypertrophic facet arthropathy.  There is moderate right neuroforaminal stenosis.  No left neuroforaminal stenosis.  There is moderate overall central canal stenosis.  L4-L5: There is a minimal diffuse broad disc bulge which extends into both neural foramina.  There is abutment of the exited right L4 nerve in the right extraforaminal soft tissues on series 7, image 21.  Please correlate clinically for symptoms referable to the right L4 nerve.  There is bilateral severe hypertrophic facet arthropathy present, left greater than right, resulting in prominent left lateral recess stenosis.  There is moderate-severe left neuroforaminal stenosis (series 9, image 16 and series 7, image 19).  No right neuroforaminal stenosis.  There is severe central canal stenosis.  L5-S1: The left L5 pedicle is relatively hypoplastic in size as compared to the right L5 pedicle, and there is a 9 mm focus of T1/T2 signal hyperintensity within the left L5 pedicle on series 5, image 11 which shows no abnormal enhancement following contrast administration, most likely a focus of focal fat deposition (see postcontrast T1 fat-suppressed images) or a hemangioma.  There is prominent bilateral hypertrophic facet arthropathy, left greater than right, resulting in severe proximal left neuroforaminal stenosis.  Please correlate clinically for symptoms referable to the exiting left L5 nerve (series 7, image 25, series 9, image 22, and series 6, image 22).  There is is also  posterior displacement of the descending left S1 nerve in the left lateral recess.  Please correlate clinically for symptoms referable to the left S1 nerve.  There is a broad central disc protrusion at L5-S1 which abuts both descending S1 nerves and which could produce symptoms referable to both S1 nerves, similar to the prior exam.  No right neuroforaminal stenosis.  There is moderate overall central canal stenosis.     There is prominent epidural fat deposition observed within the lumbosacral spinal canal from L4-L5 through the sacral canal.     Impression:     1. No appreciable interval detrimental change when compared to the prior exam.  2. 9 mm nonenhancing focus of T1/T2 signal hyperintensity within the left pedicle of L5 is favored to represent either a hemangioma or focal fat deposition.  No adjacent osseous edema appreciated.  No additional concerning bone lesions elsewhere in the visualized lumbar spine.  No appreciable interval detrimental change over the course of multiple prior examinations dating back to 06/11/2018.  3. Advanced multilevel degenerative change of the lumbar spine which combines with a levoscoliotic curvature of the lumbar spine to generate multilevel central canal and neuroforaminal stenosis.  Additional details are provided above.  4. Severe left neuroforaminal stenosis at L5-S1 and left lateral recess stenosis at L5-S1 as a result of prominent left facet arthropathy.  Please correlate clinically for symptoms referable to the left L5 and S1 nerves.  There is also a broad central disc protrusion at L5-S1, similar to the prior examination, which could produce symptoms referable to the S1 nerves, left greater than right.  Please correlate clinically.  5. 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    Labs:  BMP  Lab Results   Component Value Date     11/13/2022    K 4.4 11/13/2022     11/13/2022    CO2 28 11/13/2022    BUN 20 (H)  11/13/2022    CREATININE 0.8 12/07/2022    CALCIUM 9.0 11/13/2022    ANIONGAP 8 11/13/2022    ESTGFRAFRICA >60 10/06/2021    EGFRNONAA >60 10/06/2021     Lab Results   Component Value Date    ALT 16 11/12/2022    AST 24 11/12/2022    ALKPHOS 38 11/12/2022    BILITOT 0.4 11/12/2022     Lab Results   Component Value Date    WBC 16.11 (H) 09/04/2019    HGB 9.4 (L) 09/04/2019    HCT 28.9 (L) 09/04/2019    MCV 85 09/04/2019     09/04/2019           Assessment:   Problem List Items Addressed This Visit          Neuro    Spinal stenosis of lumbar region     Other Visit Diagnoses       Lumbar radiculopathy    -  Primary    Dorsalgia, unspecified        Peripheral polyneuropathy        Bilateral chronic knee pain                        62 y.o. year old female with PMH HTN, a-fib on eliquis presents to the office with back pain.  She's had this pain for over 5 years and it has been gradually worsening.  No history of prior back surgery.   She has had chronic numbness in the top of her feet for years.  Her pain is worse with standing and walking and relieved with rest and leaning forward.      8/15/2023: Fatemeh Shoemaker returns to the office for follow up.  Today she is reporting return of her lower back pain with radiation down right leg, 8/10, constant, worsened with being seated for extended periods of time.  She continues to have numbness from mid calves down but denies any new or worsening numbness, weakness or any new changes to her bowel or bladder function.  She is also reporting worsening bilateral knee pain.  She continues to take gabapentin, nortriptyline, Cymbalta and NSAIDs without significant relief of her pain.      - today she is full 5/5 strength on exam in her lower extremities.  Her left big toe is now fused from previous surgery.   - I independently reviewed her lumbar MRI is consistent with L4-L5: There is a minimal diffuse broad disc bulge which extends into both neural foramina.  There is  abutment of the exited right L4 nerve in the right extraforaminal soft tissues   There is bilateral severe hypertrophic facet arthropathy present, left greater than right, resulting in prominent left lateral recess stenosis.  There is moderate-severe left neuroforaminal stenosis.  There is severe central canal stenosis.  - I believe her lumbar radicular pain has returned in her pain from her severe central canal stenosis.  - her pain is limiting her mobility interfering with her ADLs and quality of life.  - her pain is too severe to restart formal physical therapy.  - she is not finding significant relief with NSAIDs, Cymbalta, nortriptyline or gabapentin.  - she has been evaluated by Neurosurgery in the past and they do not recommend any surgical interventions.  - for her continued pain I would like to schedule her for repeat L5/S1 YUE with spread to the right.  She is found greater than 80% relief from previous YUE lasting over 4 months.  - follow-up 2 weeks post procedure or sooner if needed.      : Reviewed     The above note was completed, in part, with the aid of Dragon dictation software/hardware. Translation errors may be present.

## 2023-08-16 RX ORDER — ALPRAZOLAM 0.5 MG/1
1 TABLET, ORALLY DISINTEGRATING ORAL ONCE AS NEEDED
Status: CANCELLED | OUTPATIENT
Start: 2023-08-16 | End: 2035-01-12

## 2023-08-17 ENCOUNTER — PATIENT MESSAGE (OUTPATIENT)
Dept: ORTHOPEDICS | Facility: CLINIC | Age: 63
End: 2023-08-17
Payer: COMMERCIAL

## 2023-08-22 ENCOUNTER — PATIENT MESSAGE (OUTPATIENT)
Dept: BARIATRICS | Facility: CLINIC | Age: 63
End: 2023-08-22
Payer: COMMERCIAL

## 2023-08-22 ENCOUNTER — PATIENT MESSAGE (OUTPATIENT)
Dept: FAMILY MEDICINE | Facility: CLINIC | Age: 63
End: 2023-08-22
Payer: COMMERCIAL

## 2023-08-22 DIAGNOSIS — E66.01 MORBID OBESITY WITH BMI OF 45.0-49.9, ADULT: Primary | ICD-10-CM

## 2023-08-23 DIAGNOSIS — I48.0 PAROXYSMAL ATRIAL FIBRILLATION: ICD-10-CM

## 2023-08-24 RX ORDER — METOPROLOL TARTRATE 100 MG/1
100 TABLET ORAL 2 TIMES DAILY
Qty: 60 TABLET | Refills: 0 | Status: SHIPPED | OUTPATIENT
Start: 2023-08-24 | End: 2023-11-26

## 2023-08-24 RX ORDER — FLECAINIDE ACETATE 150 MG/1
150 TABLET ORAL EVERY 12 HOURS
Qty: 60 TABLET | Refills: 0 | Status: SHIPPED | OUTPATIENT
Start: 2023-08-24 | End: 2023-10-02 | Stop reason: DRUGHIGH

## 2023-08-24 RX ORDER — DICLOFENAC SODIUM 10 MG/G
2 GEL TOPICAL 4 TIMES DAILY
Qty: 50 G | Refills: 3 | Status: SHIPPED | OUTPATIENT
Start: 2023-08-24

## 2023-08-29 ENCOUNTER — HOSPITAL ENCOUNTER (OUTPATIENT)
Facility: HOSPITAL | Age: 63
Discharge: HOME | End: 2023-08-29
Attending: ANESTHESIOLOGY | Admitting: ANESTHESIOLOGY
Payer: COMMERCIAL

## 2023-08-29 ENCOUNTER — HOSPITAL ENCOUNTER (OUTPATIENT)
Dept: RADIOLOGY | Facility: HOSPITAL | Age: 63
Discharge: HOME | End: 2023-08-29
Attending: ANESTHESIOLOGY | Admitting: ANESTHESIOLOGY
Payer: COMMERCIAL

## 2023-08-29 DIAGNOSIS — M54.16 LUMBAR RADICULOPATHY: Primary | ICD-10-CM

## 2023-08-29 DIAGNOSIS — M54.50 LOWER BACK PAIN: ICD-10-CM

## 2023-08-29 PROCEDURE — 63600175 PHARM REV CODE 636 W HCPCS: Mod: PO | Performed by: ANESTHESIOLOGY

## 2023-08-29 PROCEDURE — 62323 NJX INTERLAMINAR LMBR/SAC: CPT | Mod: ,,, | Performed by: ANESTHESIOLOGY

## 2023-08-29 PROCEDURE — 76000 FLUOROSCOPY <1 HR PHYS/QHP: CPT | Mod: TC,PO

## 2023-08-29 PROCEDURE — 62323 NJX INTERLAMINAR LMBR/SAC: CPT | Mod: PO | Performed by: ANESTHESIOLOGY

## 2023-08-29 PROCEDURE — 62323 PR INJ LUMBAR/SACRAL, W/IMAGING GUIDANCE: ICD-10-PCS | Mod: ,,, | Performed by: ANESTHESIOLOGY

## 2023-08-29 PROCEDURE — 25000003 PHARM REV CODE 250: Mod: PO | Performed by: ANESTHESIOLOGY

## 2023-08-29 PROCEDURE — 25500020 PHARM REV CODE 255: Mod: PO | Performed by: ANESTHESIOLOGY

## 2023-08-29 RX ORDER — ALPRAZOLAM 0.5 MG/1
1 TABLET, ORALLY DISINTEGRATING ORAL ONCE AS NEEDED
Status: COMPLETED | OUTPATIENT
Start: 2023-08-29 | End: 2023-08-29

## 2023-08-29 RX ORDER — LIDOCAINE HYDROCHLORIDE 10 MG/ML
INJECTION, SOLUTION EPIDURAL; INFILTRATION; INTRACAUDAL; PERINEURAL
Status: DISCONTINUED | OUTPATIENT
Start: 2023-08-29 | End: 2023-08-29 | Stop reason: HOSPADM

## 2023-08-29 RX ORDER — METHYLPREDNISOLONE ACETATE 80 MG/ML
INJECTION, SUSPENSION INTRA-ARTICULAR; INTRALESIONAL; INTRAMUSCULAR; SOFT TISSUE
Status: DISCONTINUED | OUTPATIENT
Start: 2023-08-29 | End: 2023-08-29 | Stop reason: HOSPADM

## 2023-08-29 RX ADMIN — ALPRAZOLAM 1 MG: 0.5 TABLET, ORALLY DISINTEGRATING ORAL at 12:08

## 2023-08-29 NOTE — OP NOTE
"Procedure Note    Procedure Date: 8/29/2023    Procedure Performed:  L5/S1 lumbar interlaminar epidural steroid injection under fluoroscopy.    Indications:  Fatemeh Shoemaker presents with lumbar radiculitis/radiculopathy secondary to disc herniation, osteophyte/osteophyte complexes, and/or severe degenerative disc disease producing foraminal or central spinal stenosis.  The pain has been present for at least 4 weeks and the patient has failed to respond to noninvasive conservative care.  Pain rated by NRS at baseline prior to intervention is 6/10.  Their radiculitis/radiculopathy and/or neurogenic claudication is severe enough to greatly impact their quality of life or function.     Pre-op diagnosis: Lumbar Radiculopathy    Post-op diagnosis: same    Physician: Gurjit Tavarez MD    IV anxiolysis medications: none    Medications injected: depomedrol 80mg, 1% Lidocaine 1ml, 2 mL sterile, preservative-free normal saline.    Local anesthetic used: 1% Lidocaine, 1 ml    Estimated Blood Loss: none    Complications:  none    Technique:  The patient was interviewed in the holding area and Risks/Benefits were discussed, including, but not limited to, the possibility of new or different pain, bleeding or infection.   All questions were answered.  The patient understood and accepted risks.  Consent was verfied.  A time-out was taken to identify patient and procedure prior to starting the procedure. The patient was placed in the prone position on the fluoroscopy table. The area of the lumbar spine was prepped with Chloraprep x2 and draped in a sterile manner. The L5/S1 interspace was identified and marked under AP fluoroscopy. The skin and subcutaneous tissues overlying the targeted interspace were anesthetized with 3-5 mL of 1% lidocaine using a 25G, 1.5" needle.  A 18G, 6" Tuohy epidural needle was directed toward the interspace under fluoroscopic guidance until the ligamentum flavum was engaged. From this point, a loss of " resistance technique with a glass syringe and saline was used to identify entrance of the needle into the epidural space. Once loss of resistance was observed 1 mL of contrast solution was injected. An appropriate epidurogram was noted.  A 4 mL mixture consisting of saline, 1 mL 1% Lidocaine and 80 mg of depomedrol was injected slowly and without resistance.  The needle was flushed with normal saline and removed. The contrast was seen to be displaced after injection. Patient was awake/responsive during all injections.  The patient tolerated the procedure well and was transferred to the [facility] in stable condition.  The patient was monitored after the procedure and was given post-procedure and discharge instructions to follow at home. The patient was discharged in a stable condition.

## 2023-08-29 NOTE — PLAN OF CARE
Plan of care discussed with patient. Procedure education provided. All questions and concerns answered. Patient verbalizes understanding. ]

## 2023-08-29 NOTE — DISCHARGE SUMMARY
[facility]  Discharge Note  Short Stay    Admit Date: 8/29/2023    Discharge Date: 8/29/2023    Attending Physician: Gurjit Tavarez     Discharge Provider: Gurjit Tavarez    Diagnoses:  There are no hospital problems to display for this patient.      Discharged Condition: Good    Final Diagnoses: Lumbar radiculopathy [M54.16]    Disposition: Home or Self Care    Hospital Course: No complications, uneventful    Outcome of Hospitalization, Treatment, Procedure, or Surgery:  Patient was admitted for outpatient interventional pain management procedure. The patient tolerated the procedure well with no complications.    Follow up/Patient Instructions:  Follow up as scheduled in Pain Management office in 2-3 weeks.  Patient has received instructions and follow up date and time.    Medications:  Continue previous medications, except restart eliquis in 24 hours    Discharge Procedure Orders   Notify your health care provider if you experience any of the following:  temperature >100.4     Notify your health care provider if you experience any of the following:  persistent nausea and vomiting or diarrhea     Notify your health care provider if you experience any of the following:  severe uncontrolled pain     Notify your health care provider if you experience any of the following:  redness, tenderness, or signs of infection (pain, swelling, redness, odor or green/yellow discharge around incision site)     Notify your health care provider if you experience any of the following:  difficulty breathing or increased cough     Notify your health care provider if you experience any of the following:  severe persistent headache     Notify your health care provider if you experience any of the following:  worsening rash     Notify your health care provider if you experience any of the following:  persistent dizziness, light-headedness, or visual disturbances     Notify your health care provider if you experience any of the  following:  increased confusion or weakness     Activity as tolerated

## 2023-08-30 VITALS
DIASTOLIC BLOOD PRESSURE: 77 MMHG | WEIGHT: 287 LBS | RESPIRATION RATE: 14 BRPM | HEART RATE: 60 BPM | SYSTOLIC BLOOD PRESSURE: 164 MMHG | OXYGEN SATURATION: 100 % | BODY MASS INDEX: 49 KG/M2 | HEIGHT: 64 IN | TEMPERATURE: 97 F

## 2023-08-31 ENCOUNTER — PATIENT MESSAGE (OUTPATIENT)
Dept: CARDIOLOGY | Facility: CLINIC | Age: 63
End: 2023-08-31
Payer: COMMERCIAL

## 2023-09-01 ENCOUNTER — TELEPHONE (OUTPATIENT)
Dept: BARIATRICS | Facility: CLINIC | Age: 63
End: 2023-09-01
Payer: COMMERCIAL

## 2023-09-11 ENCOUNTER — TELEPHONE (OUTPATIENT)
Dept: CARDIOLOGY | Facility: HOSPITAL | Age: 63
End: 2023-09-11
Payer: COMMERCIAL

## 2023-09-11 NOTE — TELEPHONE ENCOUNTER
Per pt's follow up notes from last appt with Dr. Tee, Pt due for follow up.  Will schedule device check with pt's appt w/Dr. Tee

## 2023-09-13 ENCOUNTER — CLINICAL SUPPORT (OUTPATIENT)
Dept: CARDIOLOGY | Facility: HOSPITAL | Age: 63
End: 2023-09-13
Payer: COMMERCIAL

## 2023-09-13 DIAGNOSIS — Z95.0 PRESENCE OF CARDIAC PACEMAKER: ICD-10-CM

## 2023-09-13 PROCEDURE — 93296 REM INTERROG EVL PM/IDS: CPT | Mod: PO | Performed by: INTERNAL MEDICINE

## 2023-09-14 ENCOUNTER — HOSPITAL ENCOUNTER (OUTPATIENT)
Dept: RADIOLOGY | Facility: HOSPITAL | Age: 63
Discharge: HOME | End: 2023-09-14
Attending: ANESTHESIOLOGY
Payer: COMMERCIAL

## 2023-09-14 ENCOUNTER — PATIENT MESSAGE (OUTPATIENT)
Dept: CARDIOLOGY | Facility: HOSPITAL | Age: 63
End: 2023-09-14
Payer: COMMERCIAL

## 2023-09-14 ENCOUNTER — OFFICE VISIT (OUTPATIENT)
Dept: PAIN MEDICINE | Facility: CLINIC | Age: 63
End: 2023-09-14
Payer: COMMERCIAL

## 2023-09-14 VITALS
WEIGHT: 274.25 LBS | HEART RATE: 59 BPM | HEIGHT: 64 IN | SYSTOLIC BLOOD PRESSURE: 146 MMHG | BODY MASS INDEX: 46.82 KG/M2 | DIASTOLIC BLOOD PRESSURE: 86 MMHG

## 2023-09-14 DIAGNOSIS — M54.9 DORSALGIA, UNSPECIFIED: ICD-10-CM

## 2023-09-14 DIAGNOSIS — M54.16 LUMBAR RADICULOPATHY: ICD-10-CM

## 2023-09-14 DIAGNOSIS — M54.9 DORSALGIA, UNSPECIFIED: Primary | ICD-10-CM

## 2023-09-14 PROCEDURE — 72114 XR LUMBAR SPINE 5 VIEW WITH FLEX AND EXT: ICD-10-PCS | Mod: 26,59,, | Performed by: RADIOLOGY

## 2023-09-14 PROCEDURE — 3079F PR MOST RECENT DIASTOLIC BLOOD PRESSURE 80-89 MM HG: ICD-10-PCS | Mod: CPTII,S$GLB,, | Performed by: ANESTHESIOLOGY

## 2023-09-14 PROCEDURE — 3077F PR MOST RECENT SYSTOLIC BLOOD PRESSURE >= 140 MM HG: ICD-10-PCS | Mod: CPTII,S$GLB,, | Performed by: ANESTHESIOLOGY

## 2023-09-14 PROCEDURE — 99214 OFFICE O/P EST MOD 30 MIN: CPT | Mod: S$GLB,,, | Performed by: ANESTHESIOLOGY

## 2023-09-14 PROCEDURE — 99999 PR PBB SHADOW E&M-EST. PATIENT-LVL V: CPT | Mod: PBBFAC,,, | Performed by: ANESTHESIOLOGY

## 2023-09-14 PROCEDURE — 72114 X-RAY EXAM L-S SPINE BENDING: CPT | Mod: TC,FY,PO

## 2023-09-14 PROCEDURE — 72082 XR SCOLIOSIS COMPLETE: ICD-10-PCS | Mod: 26,,, | Performed by: RADIOLOGY

## 2023-09-14 PROCEDURE — 1160F PR REVIEW ALL MEDS BY PRESCRIBER/CLIN PHARMACIST DOCUMENTED: ICD-10-PCS | Mod: CPTII,S$GLB,, | Performed by: ANESTHESIOLOGY

## 2023-09-14 PROCEDURE — 99999 PR PBB SHADOW E&M-EST. PATIENT-LVL V: ICD-10-PCS | Mod: PBBFAC,,, | Performed by: ANESTHESIOLOGY

## 2023-09-14 PROCEDURE — 72082 X-RAY EXAM ENTIRE SPI 2/3 VW: CPT | Mod: 26,,, | Performed by: RADIOLOGY

## 2023-09-14 PROCEDURE — 72082 X-RAY EXAM ENTIRE SPI 2/3 VW: CPT | Mod: TC,FY,PO

## 2023-09-14 PROCEDURE — 72114 X-RAY EXAM L-S SPINE BENDING: CPT | Mod: 26,59,, | Performed by: RADIOLOGY

## 2023-09-14 PROCEDURE — 4010F ACE/ARB THERAPY RXD/TAKEN: CPT | Mod: CPTII,S$GLB,, | Performed by: ANESTHESIOLOGY

## 2023-09-14 PROCEDURE — 3008F BODY MASS INDEX DOCD: CPT | Mod: CPTII,S$GLB,, | Performed by: ANESTHESIOLOGY

## 2023-09-14 PROCEDURE — 99214 PR OFFICE/OUTPT VISIT, EST, LEVL IV, 30-39 MIN: ICD-10-PCS | Mod: S$GLB,,, | Performed by: ANESTHESIOLOGY

## 2023-09-14 PROCEDURE — 1159F PR MEDICATION LIST DOCUMENTED IN MEDICAL RECORD: ICD-10-PCS | Mod: CPTII,S$GLB,, | Performed by: ANESTHESIOLOGY

## 2023-09-14 PROCEDURE — 1160F RVW MEDS BY RX/DR IN RCRD: CPT | Mod: CPTII,S$GLB,, | Performed by: ANESTHESIOLOGY

## 2023-09-14 PROCEDURE — 4010F PR ACE/ARB THEARPY RXD/TAKEN: ICD-10-PCS | Mod: CPTII,S$GLB,, | Performed by: ANESTHESIOLOGY

## 2023-09-14 PROCEDURE — 3079F DIAST BP 80-89 MM HG: CPT | Mod: CPTII,S$GLB,, | Performed by: ANESTHESIOLOGY

## 2023-09-14 PROCEDURE — 3008F PR BODY MASS INDEX (BMI) DOCUMENTED: ICD-10-PCS | Mod: CPTII,S$GLB,, | Performed by: ANESTHESIOLOGY

## 2023-09-14 PROCEDURE — 1159F MED LIST DOCD IN RCRD: CPT | Mod: CPTII,S$GLB,, | Performed by: ANESTHESIOLOGY

## 2023-09-14 PROCEDURE — 3077F SYST BP >= 140 MM HG: CPT | Mod: CPTII,S$GLB,, | Performed by: ANESTHESIOLOGY

## 2023-09-14 RX ORDER — GABAPENTIN 600 MG/1
600 TABLET ORAL 2 TIMES DAILY
Qty: 60 TABLET | Refills: 2 | Status: SHIPPED | OUTPATIENT
Start: 2023-09-14 | End: 2023-12-27 | Stop reason: SDUPTHER

## 2023-09-14 RX ORDER — TRAMADOL HYDROCHLORIDE 50 MG/1
50 TABLET ORAL EVERY 12 HOURS PRN
Qty: 30 TABLET | Refills: 0 | Status: SHIPPED | OUTPATIENT
Start: 2023-09-14

## 2023-09-14 NOTE — PROGRESS NOTES
[facility] Follow Up Evaluation      Referred by: Caroline Nielson PA-C  Reason for referral: back pain    CC:   Chief Complaint   Patient presents with    Low-back Pain    Back Pain    Mid-back Pain    Knee Pain          9/14/2023    11:40 AM 6/5/2020     8:43 AM 2/17/2020     8:10 AM   Last 3 PDI Scores   Pain Disability Index (PDI) 42 0 10       Interval HPI 9/18/2023: Fatemeh Shoemaker returns to the office for follow up.  She is s/p L5/S1 YUE on 8/29/23.  Relief.  Continues to have pain in her legs.  No new numbness or weakness.      Pain Intervention History:     - s/p L5/S1 ILESI on 5/21/20 with 55% relief.   - s/p L5/S1 ILESI on 6/19/20 with continued 55% relief.  - s/p L5/S1 ILESI on 12/01/21 with 75% relief   - s/p L5/S1 with spread to the left on 02/22/2023 with 100% relief on 04/28/2023   - s/p L5/S1 with spread to the right on 04/28/2023 with 80% relief  - s/p L5/S1 YUE on 8/29/23    HPI:   Fatemeh Shoemaker is a 62 y.o. female who complains of back pain    Onset: more than a few years  Progression: since onset, pain is gradually worsening  Current Pain Score: 8/10  Timing: constant  Quality: aching  Radiation: yes, down the back of both legs  Associated numbness or weakness: yes numbness b/l feet up to mid calf. Weakness with walking long distances and standing  Exacerbated by: standing, walking  Allievated by: rest, leaning forward  Is Pain Level Acceptable?: No    Previous Therapies:  PT/OT: no  HEP:   Interventions:   Surgery:  Medications:   - NSAIDS:  Celebrex  - MSK Relaxants: tizanidine  - TCAs: nortriptyline  - SNRIs: cymbalta  - Topicals:   - Anticonvulsants: gabapentin  - Opioids:     History:    Current Outpatient Medications:     albuterol sulfate 90 mcg/actuation aebs, Inhale 90 mcg into the lungs every 4 (four) hours as needed (wheezing or shortness of breath)., Disp: 1 each, Rfl: 2    apixaban (ELIQUIS) 5 mg Tab, Take 1 tablet (5 mg total) by mouth 2 (two) times daily.,  Disp: 180 tablet, Rfl: 3    cholecalciferol, vitamin D3, (DECARA) 1,250 mcg (50,000 unit) capsule, Take 1 capsule (50,000 Units total) by mouth every 7 days., Disp: 30 capsule, Rfl: 0    cyanocobalamin 500 MCG tablet, Take 1,000 mcg by mouth once daily., Disp: , Rfl:     diclofenac sodium (VOLTAREN) 1 % Gel, Apply 2 g topically 4 (four) times daily., Disp: 50 g, Rfl: 3    DULoxetine (CYMBALTA) 60 MG capsule, TAKE ONE CAPSULE BY MOUTH DAILY, Disp: 90 capsule, Rfl: 2    flecainide (TAMBOCOR) 150 MG Tab, Take 1 tablet (150 mg total) by mouth every 12 (twelve) hours., Disp: 60 tablet, Rfl: 0    fluticasone furoate-vilanteroL (BREO ELLIPTA) 100-25 mcg/dose diskus inhaler, Inhale 1 puff into the lungs once daily., Disp: 60 each, Rfl: 2    lisinopriL 10 MG tablet, Take 1 tablet (10 mg total) by mouth once daily., Disp: 90 tablet, Rfl: 3    melatonin 5 mg TbDL, Take 1 tablet by mouth nightly as needed (sleep)., Disp: , Rfl:     metoprolol tartrate (LOPRESSOR) 100 MG tablet, Take 1 tablet (100 mg total) by mouth 2 (two) times daily., Disp: 60 tablet, Rfl: 0    nortriptyline (PAMELOR) 25 MG capsule, TAKE ONE CAPSULE BY MOUTH EVERY EVENING, Disp: 90 capsule, Rfl: 3    omeprazole (PRILOSEC) 20 MG capsule, TAKE ONE CAPSULE BY MOUTH EVERY MORNING, Disp: 90 capsule, Rfl: 1    ondansetron (ZOFRAN-ODT) 4 MG TbDL, Take 1 tablet (4 mg total) by mouth 2 (two) times daily., Disp: 20 tablet, Rfl: 1    promethazine (PHENERGAN) 12.5 MG Tab, Take 1 tablet (12.5 mg total) by mouth 2 (two) times daily as needed (nausea)., Disp: 10 tablet, Rfl: 0    promethazine-codeine 6.25-10 mg/5 ml (PHENERGAN WITH CODEINE) 6.25-10 mg/5 mL syrup, Take 5 mLs by mouth nightly as needed for Cough., Disp: 118 mL, Rfl: 0    promethazine-dextromethorphan (PROMETHAZINE-DM) 6.25-15 mg/5 mL Syrp, Take 5 mLs by mouth nightly as needed., Disp: , Rfl:     pulse oximeter (PULSE OXIMETER) device, Use twice daily at 8 AM and 3 PM and record the value in MyChart as  directed., Disp: 1 each, Rfl: 0    rosuvastatin (CRESTOR) 10 MG tablet, TAKE ONE TABLET BY MOUTH EVERY EVENING, Disp: 90 tablet, Rfl: 3    semaglutide (RYBELSUS) 3 mg tablet, Take 1 tablet (3 mg total) by mouth once daily., Disp: 30 tablet, Rfl: 11    tiZANidine (ZANAFLEX) 4 MG tablet, TAKE ONE TABLET BY MOUTH every SIX hours AS NEEDED., Disp: 60 tablet, Rfl: 1    valACYclovir (VALTREX) 500 MG tablet, Take 1 tablet (500 mg total) by mouth daily as needed (fever blister)., Disp: 30 tablet, Rfl: 2    gabapentin (NEURONTIN) 600 MG tablet, Take 1 tablet (600 mg total) by mouth 2 (two) times daily., Disp: 60 tablet, Rfl: 2    traMADoL (ULTRAM) 50 mg tablet, Take 1 tablet (50 mg total) by mouth every 12 (twelve) hours as needed for Pain., Disp: 30 tablet, Rfl: 0    Past Medical History:   Diagnosis Date    Anemia     Anticoagulant long-term use     Arrhythmia     Arthritis     Atrial fibrillation     history    Bronchitis     Encounter for blood transfusion     General anesthetics causing adverse effect in therapeutic use     High blood pressure     Hyperlipidemia     Lump or mass in breast     benign     Neuropathy     Obesity     Obstructive sleep apnea syndrome 2021    Ulcer        Past Surgical History:   Procedure Laterality Date    A-V CARDIAC PACEMAKER INSERTION Left 2020    Procedure: INSERTION, CARDIAC PACEMAKER, DUAL CHAMBER;  Surgeon: Bert Reaves MD;  Location: [facility] CATH;  Service: Cardiology;  Laterality: Left;    BREAST BIOPSY Right 2017    myofibroblastoma     BREAST MASS EXCISION       SECTION  10/25/1986    Other c/section 10/26/1997    CHOLECYSTECTOMY  2017    Dr. TOLU Bowers, [facility]     CORRECTION OF HAMMER TOE Left 2022    Procedure: CORRECTION, HAMMER TOE;  Surgeon: Ezra Arriaga DPM;  Location: [facility] OR;  Service: Podiatry;  Laterality: Left;  hammertoes 2-5 left,    csection      CS x 2    CYSTOSCOPY N/A 2019    Procedure: CYSTOSCOPY;  Surgeon: Noemi ARTIS  MD Ignacio;  Location: [facility];  Service: OB/GYN;  Laterality: N/A;    DILATION AND CURETTAGE OF UTERUS      EPIDURAL STEROID INJECTION INTO LUMBAR SPINE N/A 05/21/2020    Procedure: Injection-steroid-epidural-lumbar L5/S1;  Surgeon: Gurjit Tavarez MD;  Location: [facility] OR;  Service: Pain Management;  Laterality: N/A;    EPIDURAL STEROID INJECTION INTO LUMBAR SPINE N/A 06/19/2020    Procedure: Injection-steroid-epidural-lumbar L5/S1;  Surgeon: Gurjit Tavarez MD;  Location: [facility] OR;  Service: Pain Management;  Laterality: N/A;    EPIDURAL STEROID INJECTION INTO LUMBAR SPINE N/A 12/01/2021    Procedure: Injection-steroid-epidural-lumbar L5/S1;  Surgeon: Gurjit Tavarez MD;  Location: [facility] OR;  Service: Pain Management;  Laterality: N/A;    EPIDURAL STEROID INJECTION INTO LUMBAR SPINE N/A 02/22/2023    Procedure: Injection-steroid-epidural-lumbar-L5/S1 to the left;  Surgeon: Gurjit Tavarez MD;  Location: [facility];  Service: Pain Management;  Laterality: N/A;    EPIDURAL STEROID INJECTION INTO LUMBAR SPINE Right 04/28/2023    Procedure: Injection-steroid-epidural-lumbar L5/S1;  Surgeon: Gurjit Tavarez MD;  Location: [facility] OR;  Service: Pain Management;  Laterality: Right;    EPIDURAL STEROID INJECTION INTO LUMBAR SPINE N/A 8/29/2023    Procedure: Injection-steroid-epidural-lumbarL5/S1 to right;  Surgeon: Gurjit Tavarez MD;  Location: [facility] OR;  Service: Pain Management;  Laterality: N/A;    FRACTURE SURGERY  04/1986    Dislocated  hip total rt hip 08/08    HIP PINNING      HYSTERECTOMY      JOINT REPLACEMENT  08/2008    LAPAROSCOPIC SALPINGO-OOPHORECTOMY Bilateral 09/03/2019    Procedure: SALPINGO-OOPHORECTOMY, LAPAROSCOPIC;  Surgeon: Noemi Pierre MD;  Location: [facility];  Service: OB/GYN;  Laterality: Bilateral;  Dr. Bentley to assist. No resident needed.     LAPAROSCOPIC TOTAL HYSTERECTOMY N/A 09/03/2019    Procedure: HYSTERECTOMY, TOTAL, LAPAROSCOPIC;  Surgeon: Noemi Pierre MD;  Location: [facility];  Service:  OB/GYN;  Laterality: N/A;    NASAL SEPTUM SURGERY      OOPHORECTOMY      SKIN TAG REMOVAL Left 11/03/2022    Procedure: REMOVAL, SKIN TAG;  Surgeon: Ezra Arriaga DPM;  Location: [facility] OR;  Service: Podiatry;  Laterality: Left;    TOTAL HIP ARTHROPLASTY Right     TUBAL LIGATION  1997       Family History   Problem Relation Age of Onset    Colon cancer Maternal Grandmother 70        mets to ovary    Cancer Maternal Grandmother     Arthritis Paternal Grandfather     Eclampsia Paternal Grandmother     Cataracts Maternal Grandfather     Stroke Father 57    Colon cancer Father     Hypertension Father     Alcohol abuse Father     Cancer Father         Passed away July 10 2019    Hypertension Mother     Cataracts Mother     Hypertension Brother         Age 54 hypertension heart    Early death Brother         Hypertension cardio disease    No Known Problems Daughter     Stomach cancer Neg Hx     Esophageal cancer Neg Hx     Breast cancer Neg Hx     Miscarriages / Stillbirths Neg Hx     Ovarian cancer Neg Hx     Glaucoma Neg Hx     Macular degeneration Neg Hx     Retinal detachment Neg Hx     Strabismus Neg Hx        Social History     Socioeconomic History    Marital status: Significant Other   Tobacco Use    Smoking status: Never    Smokeless tobacco: Never   Substance and Sexual Activity    Alcohol use: No     Comment: never    Drug use: Never    Sexual activity: Yes     Partners: Male     Birth control/protection: See Surgical Hx, Other-see comments     Comment: Hysterectomy 9/3/19     Social Determinants of Health     Financial Resource Strain: Low Risk  (1/17/2023)    Overall Financial Resource Strain (CARDIA)     Difficulty of Paying Living Expenses: Not hard at all   Food Insecurity: No Food Insecurity (1/17/2023)    Hunger Vital Sign     Worried About Running Out of Food in the Last Year: Never true     Ran Out of Food in the Last Year: Never true   Transportation Needs: No Transportation Needs (1/17/2023)     "PRAPARE - Transportation     Lack of Transportation (Medical): No     Lack of Transportation (Non-Medical): No   Physical Activity: Insufficiently Active (1/17/2023)    Exercise Vital Sign     Days of Exercise per Week: 1 day     Minutes of Exercise per Session: 10 min   Stress: No Stress Concern Present (1/17/2023)    [employer] - Occupational Stress Questionnaire     Feeling of Stress : Not at all   Social Connections: Unknown (1/17/2023)    Social Connection and Isolation Panel [NHANES]     Frequency of Communication with Friends and Family: Never     Frequency of Social Gatherings with Friends and Family: More than three times a week     Active Member of Clubs or Organizations: No     Attends Club or Organization Meetings: Never     Marital Status:    Housing Stability: Low Risk  (1/17/2023)    Housing Stability Vital Sign     Unable to Pay for Housing in the Last Year: No     Number of Places Lived in the Last Year: 1     Unstable Housing in the Last Year: No       Review of patient's allergies indicates:   Allergen Reactions    Aspirin Other (See Comments)     "I don't take it because I've had ulcers"       Meperidine Other (See Comments)     Felt like she was about to pass out after taking       Review of Systems:  General ROS: negative for - fever  Psychological ROS: negative for - hostility  Hematological and Lymphatic ROS: positive for - bruising  Endocrine ROS: negative for - unexpected weight changes  Respiratory ROS: no cough, shortness of breath, or wheezing  Cardiovascular ROS: no chest pain or dyspnea on exertion  Gastrointestinal ROS: no abdominal pain, change in bowel habits, or black or bloody stools  Musculoskeletal ROS: negative for - muscular weakness  Neurological ROS: negative for - bowel and bladder control changes, positive for numbness/tingling  Dermatological ROS: negative for rash    Physical Exam:  Vitals:    09/14/23 1141   BP: (!) 146/86   Pulse: " "(!) 59   Weight: 124.4 kg (274 lb 4 oz)   Height: 5' 4" (1.626 m)   PainSc:   6   PainLoc: Back     Body mass index is 47.08 kg/m².     Gen: NAD  Gait: gait antalgic due to knee pain and lower back pain, ambulating with Rollator  Psych:  Mood appropriate for given condition  HEENT: eyes anicteric   GI: Abd soft  CV: RRR  Lungs: breathing unlabored   ROM: limited AROM of the L spine in all planes, full ROM at ankles, knees and hips  Lumbar flexion 90 degrees, extension 50 degrees, side bending 30 degrees.    Sensation: intact to light touch in all dermatomes tested from L2-S1 bilaterally, except for b/l feet up to mid calf.   Reflexes: 0/0 b/l patella and 0/0 b/l achilles  Palpation: Diffusely tender over lumbar paraspinals  -TTP over the b/l greater trochanters,  -TTP bilateral SI joint  Tone: normal in the b/l knees and hips   Skin: intact,  No signs of infection, swelling, or warmth to touch.  Extremities:  Mild edema in b/l ankles       Right Left   L2/3 Iliacus Hip flexion  5  5   L3/4 Qudratus Femoris Knee Extension  5  5   L4/5 Tib Anterior Ankle Dorsiflexion   5  5   L5/S1 Extensor Hallicus Longus Great toe extension  5  Fused                 S1/S2 Gastroc/Soleus Plantar Flexion  5  5       Imaging:  MRI lumbar spine 6/11/18  FINDINGS:  Vertebral column: The study is motion degraded.  There is multilevel degenerative change.  There is marked disc space narrowing towards the right at the L3-4 level where there is associated degenerative endplate signal change.  There is no other significant disc space narrowing.  There is no fracture.  Baseline marrow signal intensity is normal.  Anterior endplate osteophyte formation is also present at the L2-3 level.  There is subtle trace anterolisthesis of L4 on L5.    Spinal canal, conus, epidural space: Spinal canal is developmentally normal.  The conus is normal in location, contour and signal intensity, terminating at the level of T12-L1.  There is no abnormal epidural " collection or mass.    Findings by level:    On the sagittal images, there is minimal bulging of the annulus and mild facet joint arthropathy at the T10-11 level but there is no spinal canal or significant foraminal stenosis.  At the T11-T12 level, there is a shallow broad central disc protrusion which narrows the ventral subarachnoid space but again there is no significant spinal canal or foraminal stenosis and there is no cord compression.    T12-L1: There is minimal bulging of the annulus and there is mild facet joint arthropathy.  There is no spinal canal or significant foraminal stenosis.  L1-2: There is moderate facet joint arthropathy.  There is minimal bulging of the annulus with a tiny 2 mm central disc protrusion.  There is no spinal canal or significant foraminal stenosis.  L2-3: There is a moderate diffuse disc bulge with marked facet joint arthropathy.  There is mildly prominent dorsal epidural fat.  There is flattening of the ventral dural sac.  There is moderate central spinal stenosis.  AP measurement of the dural sac is 7.5 mm.  There is no significant foraminal stenosis.  L3-4: There is marked disc space narrowing towards the right.  There is a diffuse disc bulge with osteophytic ridging and superimposed broad right paracentral and foraminal disc protrusion.  There is moderate-to-marked facet joint arthropathy with ligamentum flavum thickening, right greater than left.  Also, there is mildly prominent dorsal epidural fat.  There is moderate central spinal stenosis.  There is severe right lateral recess and foraminal stenosis with mild-to-moderate left foraminal stenosis.  L4-5: There is marked facet joint arthropathy.  There is a diffuse disc bulge with superimposed broad central disc protrusion.  There is mild spinal stenosis.  There is mild-to-moderate bilateral foraminal stenosis.  L5-S1: There is a diffuse disc bulge with superimposed broad central disc protrusion with annular fissure.  There  is marked left and moderate-to-marked right facet joint arthropathy with ligamentum flavum thickening.  There is no significant spinal stenosis.  The right foramina is patent.  There is severe left foraminal stenosis.  The broad central disc protrusion approaches both S1 roots.  The left S1 root is crowded between the facet joint changes and the disc protrusion.  Both S1 roots could be affected, left greater than right.    Soft tissues, other: The prevertebral soft tissues are normal.  The aorta is mildly ectatic.    MRI Cervical Spine 08/14/20  FINDINGS:  The current examination once again demonstrates a 2 mm AP diameter cystic structure within the central aspect of the cervical cord at C6-C7 which extends over an approximately 29 mm craniocaudad dimension from the superior endplate of C6 to the C7-T1 intervertebral disc space (series 2, image 7 and series 5, image 19).  No abnormal adjacent cord edema or abnormal intramedullary enhancement.  Differential considerations include nonspecific dilatation of the central canal of the spinal cord and syringohydromyelia.     The visualized posterior fossa structures are unremarkable.  No Chiari malformation.  There is mucoperiosteal thickening observed within the ethmoid air cells bilaterally.     There is a C3 vertebral body hemangioma which shows no abnormal enhancement following contrast administration.  No pathologic marrow replacement process or cervical spine fracture.  There is degenerative disc desiccation observed at every cervical level.     The incidentally observed soft tissues of the neck are unremarkable.     C2-C3: There is right uncovertebral spurring present.  No disc protrusion or extrusion. No central canal stenosis or neuroforaminal stenosis.  There is ligamentum flavum thickening.  C3-C4: There is a minimal posterior disc osteophyte complex and uncovertebral spurring.  No disc protrusion or extrusion. No central canal stenosis or neuroforaminal  stenosis.  C4-C5: There is left uncovertebral spurring.  There is a broad central disc protrusion.  There is effacement of the anterior CSF sleeve.  No central canal stenosis.  There is, at most, mild right neuroforaminal stenosis as a result of right facet arthropathy.  C5-C6: There is left facet arthropathy.  No central canal stenosis or neuroforaminal stenosis.  C6-C7: There is a 6 mm nonenhancing perineural nerve root sleeve cyst present in the left neural foramen, similar to the prior exam.  There is bilateral uncovertebral spurring, left greater than right.  There is mild ligamentum flavum thickening.  No central canal stenosis.  There is, at most, mild bilateral neuroforaminal stenosis.  There is right facet arthropathy.  C7-T1: There is bilateral facet arthropathy, right greater than left.  No disc protrusion or extrusion. No central canal stenosis or neuroforaminal stenosis.     Impression:     1. No interval detrimental change when compared to the prior study.  29 mm craniocaudad dimension tubular cystic structure within the central aspect of the spinal cord at C6-C7 which either represents nonspecific mild dilatation of the central canal (up to 2 mm in diameter) or syringohydromyelia.  No abnormal cord edema/abnormal intramedullary enhancement.  2. 6 mm nonenhancing left foraminal perineural nerve root sleeve cyst at C6-C7.  3. Minor degenerative change of the cervical spine as detailed above.  4. C3 vertebral body hemangioma, unchanged.  No evidence of pathologic marrow replacement process.    MRI Thoracic Spine 08/14/20    FINDINGS:  There are no abnormal enhancing masses present within the thoracic spine to suggest a pathologic marrow replacement process.  No acute thoracic compression fracture appreciated.  There is a nonenhancing incidentally observed T1 hyperintense T5 vertebral body hemangioma.  There is degenerative disc desiccation at every thoracic level.  The thoracic spinal cord is normal in  signal intensity with no evidence of cord edema, myelomalacia, or abnormal intramedullary enhancement.  No enhancing epidural masses or epidural fluid collections.  No imaging evidence of epidural lipomatosis.     The incidentally observed soft tissues of the chest are unremarkable.     T1-T2: There is a broad disc bulge and a superimposed broad left paracentral disc protrusion which effaces the anterior CSF sleeve and flattens the left anterior thoracic spinal cord.  No central canal stenosis or neuroforaminal stenosis.  T4-T5: There is an 11 mm large broad left paracentral disc protrusion which flattens the left anterior aspect of the thoracic spinal cord.  There is a suggestion of abnormal cord signal on series 6, image 15 which could reflect underlying cord edema/myelomalacia.  No abnormal intramedullary enhancement.  No overall central canal stenosis or neuroforaminal stenosis.  T5-T6: There is a minimal broad left paracentral disc protrusion.  No central canal stenosis or neuroforaminal stenosis.  T6-T7: There is a broad right paracentral disc protrusion on series 6, image 21.  No central canal stenosis or neuroforaminal stenosis.  T8-T9: There is a broad right paracentral disc protrusion which effaces the right anterior CSF sleeve.  No central canal stenosis or neuroforaminal stenosis.  There is ligamentum flavum thickening.  T10-T11: There is a small broad central disc protrusion on series 6, image 34.  No central canal stenosis.  No definite neuroforaminal stenosis.  T11-T12: There is a broad central disc protrusion which effaces the anterior CSF sleeve.  There is bilateral hypertrophic facet arthropathy.  No central canal stenosis or neuroforaminal stenosis.     Impression:     1. No imaging evidence of a pathologic marrow replacement process.  2. Multilevel degenerative change of the thoracic spine with disc protrusions observed at multiple thoracic levels, most pronounced at T4-T5 where a broad left  paracentral disc protrusion and flattens the left anterior aspect of the thoracic spinal cord.  There is possible cord edema noted.  No abnormal intramedullary enhancement.  No cord syrinx.  3. T5 vertebral body hemangioma.    MRI Lumbar Spine 08/14/20  FINDINGS:  There is an L1 vertebral body hemangioma present.  There is a 9 mm focus of T1/T2 signal hyperintensity noted within the left pedicle of L5 which is favored to represent either focal fat deposition or a hemangioma.  No associated enhancement following contrast administration.  No additional concerning enhancing bone lesions elsewhere in the lumbar spine.  No acute lumbar compression fracture.  No spondylolisthesis.  No definite pars defects.  There is multilevel degenerative disc desiccation present at virtually every visualized lower thoracic and lumbar level with disc space narrowing and degenerative endplate change noted at L3-L4.  There is a levoscoliotic curvature of the lumbar spine, apex at L3-L4.     The conus medullaris terminates at L1.  The visualized lower thoracic spinal cord is normal in signal intensity with no evidence of cord edema, myelomalacia, or cord syrinx.  No abnormal intramedullary enhancement.  No peripherally enhancing epidural fluid collections.  The paraspinous musculature is unremarkable.     The incidentally observed abdominal/retroperitoneal soft tissues demonstrate diverticulosis coli..     T11-T12: There is a broad central disc protrusion which effaces the anterior CSF sleeve.  There is, at most, mild overall central canal stenosis.  No neuroforaminal stenosis.  T12-L1: No disc protrusion or extrusion. No central canal stenosis or neuroforaminal stenosis.  L1-L2: There is a broad right paracentral disc protrusion.  No disc extrusion.  No central canal stenosis or neuroforaminal stenosis.  L2-L3: There is a broad disc bulge which extends into both neural foramina.  There is prominent bilateral hypertrophic facet  arthropathy.  There is mild overall central canal stenosis.  No definite neuroforaminal stenosis.  L3-L4: There is a broad right paracentral/foraminal/extraforaminal osteophyte which abuts and displaces the descending right L4 nerve in the right lateral recess.  Please correlate clinically for symptoms referable to the right L4 nerve.  There is right lateral recess stenosis.  There is ligamentum flavum thickening and hypertrophic facet arthropathy.  There is moderate right neuroforaminal stenosis.  No left neuroforaminal stenosis.  There is moderate overall central canal stenosis.  L4-L5: There is a minimal diffuse broad disc bulge which extends into both neural foramina.  There is abutment of the exited right L4 nerve in the right extraforaminal soft tissues on series 7, image 21.  Please correlate clinically for symptoms referable to the right L4 nerve.  There is bilateral severe hypertrophic facet arthropathy present, left greater than right, resulting in prominent left lateral recess stenosis.  There is moderate-severe left neuroforaminal stenosis (series 9, image 16 and series 7, image 19).  No right neuroforaminal stenosis.  There is severe central canal stenosis.  L5-S1: The left L5 pedicle is relatively hypoplastic in size as compared to the right L5 pedicle, and there is a 9 mm focus of T1/T2 signal hyperintensity within the left L5 pedicle on series 5, image 11 which shows no abnormal enhancement following contrast administration, most likely a focus of focal fat deposition (see postcontrast T1 fat-suppressed images) or a hemangioma.  There is prominent bilateral hypertrophic facet arthropathy, left greater than right, resulting in severe proximal left neuroforaminal stenosis.  Please correlate clinically for symptoms referable to the exiting left L5 nerve (series 7, image 25, series 9, image 22, and series 6, image 22).  There is is also posterior displacement of the descending left S1 nerve in the left  lateral recess.  Please correlate clinically for symptoms referable to the left S1 nerve.  There is a broad central disc protrusion at L5-S1 which abuts both descending S1 nerves and which could produce symptoms referable to both S1 nerves, similar to the prior exam.  No right neuroforaminal stenosis.  There is moderate overall central canal stenosis.     There is prominent epidural fat deposition observed within the lumbosacral spinal canal from L4-L5 through the sacral canal.     Impression:     1. No appreciable interval detrimental change when compared to the prior exam.  2. 9 mm nonenhancing focus of T1/T2 signal hyperintensity within the left pedicle of L5 is favored to represent either a hemangioma or focal fat deposition.  No adjacent osseous edema appreciated.  No additional concerning bone lesions elsewhere in the visualized lumbar spine.  No appreciable interval detrimental change over the course of multiple prior examinations dating back to 06/11/2018.  3. Advanced multilevel degenerative change of the lumbar spine which combines with a levoscoliotic curvature of the lumbar spine to generate multilevel central canal and neuroforaminal stenosis.  Additional details are provided above.  4. Severe left neuroforaminal stenosis at L5-S1 and left lateral recess stenosis at L5-S1 as a result of prominent left facet arthropathy.  Please correlate clinically for symptoms referable to the left L5 and S1 nerves.  There is also a broad central disc protrusion at L5-S1, similar to the prior examination, which could produce symptoms referable to the S1 nerves, left greater than right.  Please correlate clinically.  5. 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    Labs:  BMP  Lab Results   Component Value Date     11/13/2022    K 4.4 11/13/2022     11/13/2022    CO2 28 11/13/2022    BUN 20 (H) 11/13/2022    CREATININE 0.8 12/07/2022    CALCIUM 9.0 11/13/2022     ANIONGAP 8 11/13/2022    ESTGFRAFRICA >105 08/22/2022    EGFRNONAA >60 10/06/2021     Lab Results   Component Value Date    ALT 16 11/12/2022    AST 24 11/12/2022    ALKPHOS 38 11/12/2022    BILITOT 0.4 11/12/2022     Lab Results   Component Value Date    WBC 16.11 (H) 09/04/2019    HGB 9.4 (L) 09/04/2019    HCT 28.9 (L) 09/04/2019    MCV 85 09/04/2019     09/04/2019           Assessment:   Problem List Items Addressed This Visit    None  Visit Diagnoses       Dorsalgia, unspecified    -  Primary    Relevant Medications    traMADoL (ULTRAM) 50 mg tablet    Other Relevant Orders    MRI Lumbar Spine Without Contrast    X-Ray Lumbar Complete Including Flex And Ext (Completed)    Lumbar radiculopathy        Relevant Medications    gabapentin (NEURONTIN) 600 MG tablet    traMADoL (ULTRAM) 50 mg tablet                62 y.o. year old female with PMH HTN, a-fib on eliquis presents to the office with back pain.  She's had this pain for over 5 years and it has been gradually worsening.  No history of prior back surgery.   She has had chronic numbness in the top of her feet for years.  Her pain is worse with standing and walking and relieved with rest and leaning forward.        9/18/23 - Fatemeh Shoemaker returns to the office for follow up.  She is s/p L5/S1 YUE on 8/29/23.  Relief.  Continues to have pain in her legs.  No new numbness or weakness.      - today she is full 5/5 strength on exam in her lower extremities.  Her left big toe is now fused from previous surgery.   - I independently reviewed her lumbar MRI is consistent with L4-L5: There is a minimal diffuse broad disc bulge which extends into both neural foramina.  There is abutment of the exited right L4 nerve in the right extraforaminal soft tissues   There is bilateral severe hypertrophic facet arthropathy present, left greater than right, resulting in prominent left lateral recess stenosis.  There is moderate-severe left neuroforaminal stenosis.   There is severe central canal stenosis.  - I am going to get an updated lumbar MRI to look for any interval changes to her central canal narrowing.  I refilled her some tramadol to use on an as needed basis for severe pain.  She is also going to continue to take gabapentin and I refilled this for her.   - we will call her once imaging complete to discuss further treatment options.        : Reviewed     The above note was completed, in part, with the aid of Dragon dictation software/hardware. Translation errors may be present.

## 2023-09-27 ENCOUNTER — TELEPHONE (OUTPATIENT)
Dept: PAIN MEDICINE | Facility: CLINIC | Age: 63
End: 2023-09-27
Payer: COMMERCIAL

## 2023-09-27 DIAGNOSIS — M54.16 LUMBAR RADICULOPATHY: Primary | ICD-10-CM

## 2023-09-27 NOTE — TELEPHONE ENCOUNTER
Please let the patient know that I reviewed her MRI.  She continues to have narrowing her back that I think is causing her back pain and leg pain    We have tried 3 epidural so far this year and unfortunately they have not given her long-lasting relief.  At this time I recommend that she get an evaluation with 1 of our neurosurgeons.  I have placed a referral.  Please help her get an appointment.      She can follow up with me following her Neurosurgery appointment if surgery does not end up being an option for her

## 2023-09-28 DIAGNOSIS — M17.12 PRIMARY OSTEOARTHRITIS OF LEFT KNEE: Primary | ICD-10-CM

## 2023-09-28 NOTE — TELEPHONE ENCOUNTER
Spoke with patient and discussed Dr. Tavarez's recommendation to see Neurosurgery. Patient agreeable to that plan. Appointment made for 10/24 with Dr. Zamorano. Verified date, time and location with patient.

## 2023-10-02 ENCOUNTER — PATIENT MESSAGE (OUTPATIENT)
Dept: CARDIOLOGY | Facility: CLINIC | Age: 63
End: 2023-10-02
Payer: COMMERCIAL

## 2023-10-02 ENCOUNTER — OFFICE VISIT (OUTPATIENT)
Dept: ORTHOPEDICS | Facility: CLINIC | Age: 63
End: 2023-10-02
Payer: COMMERCIAL

## 2023-10-02 ENCOUNTER — HOSPITAL ENCOUNTER (OUTPATIENT)
Dept: RADIOLOGY | Facility: HOSPITAL | Age: 63
Discharge: HOME | End: 2023-10-02
Attending: ORTHOPAEDIC SURGERY
Payer: COMMERCIAL

## 2023-10-02 VITALS — BODY MASS INDEX: 46.78 KG/M2 | HEIGHT: 64 IN | WEIGHT: 274 LBS

## 2023-10-02 DIAGNOSIS — M17.12 PRIMARY OSTEOARTHRITIS OF LEFT KNEE: Primary | ICD-10-CM

## 2023-10-02 DIAGNOSIS — M17.12 PRIMARY OSTEOARTHRITIS OF LEFT KNEE: ICD-10-CM

## 2023-10-02 DIAGNOSIS — M17.0 PRIMARY OSTEOARTHRITIS OF BOTH KNEES: ICD-10-CM

## 2023-10-02 PROCEDURE — 1159F MED LIST DOCD IN RCRD: CPT | Mod: CPTII,S$GLB,, | Performed by: ORTHOPAEDIC SURGERY

## 2023-10-02 PROCEDURE — 20610 DRAIN/INJ JOINT/BURSA W/O US: CPT | Mod: LT,S$GLB,, | Performed by: ORTHOPAEDIC SURGERY

## 2023-10-02 PROCEDURE — 73562 XR KNEE ORTHO BILAT: ICD-10-PCS | Mod: 26,,, | Performed by: RADIOLOGY

## 2023-10-02 PROCEDURE — 73562 X-RAY EXAM OF KNEE 3: CPT | Mod: TC,50,PO

## 2023-10-02 PROCEDURE — 4010F PR ACE/ARB THEARPY RXD/TAKEN: ICD-10-PCS | Mod: CPTII,S$GLB,, | Performed by: ORTHOPAEDIC SURGERY

## 2023-10-02 PROCEDURE — 99214 PR OFFICE/OUTPT VISIT, EST, LEVL IV, 30-39 MIN: ICD-10-PCS | Mod: 25,S$GLB,, | Performed by: ORTHOPAEDIC SURGERY

## 2023-10-02 PROCEDURE — 3008F PR BODY MASS INDEX (BMI) DOCUMENTED: ICD-10-PCS | Mod: CPTII,S$GLB,, | Performed by: ORTHOPAEDIC SURGERY

## 2023-10-02 PROCEDURE — 99214 OFFICE O/P EST MOD 30 MIN: CPT | Mod: 25,S$GLB,, | Performed by: ORTHOPAEDIC SURGERY

## 2023-10-02 PROCEDURE — 99999 PR PBB SHADOW E&M-EST. PATIENT-LVL IV: CPT | Mod: PBBFAC,,, | Performed by: ORTHOPAEDIC SURGERY

## 2023-10-02 PROCEDURE — 73562 X-RAY EXAM OF KNEE 3: CPT | Mod: 26,,, | Performed by: RADIOLOGY

## 2023-10-02 PROCEDURE — 1159F PR MEDICATION LIST DOCUMENTED IN MEDICAL RECORD: ICD-10-PCS | Mod: CPTII,S$GLB,, | Performed by: ORTHOPAEDIC SURGERY

## 2023-10-02 PROCEDURE — 20610 LARGE JOINT ASPIRATION/INJECTION: L KNEE: ICD-10-PCS | Mod: LT,S$GLB,, | Performed by: ORTHOPAEDIC SURGERY

## 2023-10-02 PROCEDURE — 99999 PR PBB SHADOW E&M-EST. PATIENT-LVL IV: ICD-10-PCS | Mod: PBBFAC,,, | Performed by: ORTHOPAEDIC SURGERY

## 2023-10-02 PROCEDURE — 3008F BODY MASS INDEX DOCD: CPT | Mod: CPTII,S$GLB,, | Performed by: ORTHOPAEDIC SURGERY

## 2023-10-02 PROCEDURE — 4010F ACE/ARB THERAPY RXD/TAKEN: CPT | Mod: CPTII,S$GLB,, | Performed by: ORTHOPAEDIC SURGERY

## 2023-10-02 RX ORDER — TRIAMCINOLONE ACETONIDE 40 MG/ML
40 INJECTION, SUSPENSION INTRA-ARTICULAR; INTRAMUSCULAR
Status: DISCONTINUED | OUTPATIENT
Start: 2023-10-02 | End: 2023-10-02 | Stop reason: HOSPADM

## 2023-10-02 RX ORDER — FLECAINIDE ACETATE 50 MG/1
TABLET ORAL
COMMUNITY
End: 2023-10-24 | Stop reason: SDUPTHER

## 2023-10-02 RX ADMIN — TRIAMCINOLONE ACETONIDE 40 MG: 40 INJECTION, SUSPENSION INTRA-ARTICULAR; INTRAMUSCULAR at 09:10

## 2023-10-02 NOTE — PROCEDURES
Large Joint Aspiration/Injection: L knee    Date/Time: 10/2/2023 9:00 AM    Performed by: Justice Gibbs MD  Authorized by: Justice Gibbs MD    Consent Done?:  Yes (Verbal)  Timeout: prior to procedure the correct patient, procedure, and site was verified    Prep: patient was prepped and draped in usual sterile fashion      Details:  Needle Size:  21 G  Approach:  Anterolateral  Location:  Knee  Site:  L knee  Medications:  40 mg triamcinolone acetonide 40 mg/mL  Patient tolerance:  Patient tolerated the procedure well with no immediate complications

## 2023-10-02 NOTE — PROGRESS NOTES
62 y.o. year old presents to the clinic today with recurring pain in the left knee.  Chronic problem.  Patient has had Kenlaog injections in the past and responded well.  Last injection was 12 months ago.  Symptoms not improving    Exam shows tenderness at the joint line without signs of infection or instability    X-rays show arthritic changes    Assessment:  left knee arthrosis    Plan:  Kenlaog into the left knee.  Encourage strengthening over time.  Followup as needed.

## 2023-10-04 ENCOUNTER — OFFICE VISIT (OUTPATIENT)
Dept: BARIATRICS | Facility: CLINIC | Age: 63
End: 2023-10-04
Payer: COMMERCIAL

## 2023-10-04 VITALS
TEMPERATURE: 98 F | WEIGHT: 277.63 LBS | SYSTOLIC BLOOD PRESSURE: 123 MMHG | HEART RATE: 72 BPM | HEIGHT: 65 IN | DIASTOLIC BLOOD PRESSURE: 59 MMHG | BODY MASS INDEX: 46.26 KG/M2 | RESPIRATION RATE: 16 BRPM

## 2023-10-04 DIAGNOSIS — E66.01 MORBID OBESITY WITH BMI OF 45.0-49.9, ADULT: ICD-10-CM

## 2023-10-04 DIAGNOSIS — I10 ESSENTIAL HYPERTENSION: ICD-10-CM

## 2023-10-04 DIAGNOSIS — E66.01 MORBID OBESITY: Primary | ICD-10-CM

## 2023-10-04 PROCEDURE — 3078F DIAST BP <80 MM HG: CPT | Mod: CPTII,S$GLB,, | Performed by: SURGERY

## 2023-10-04 PROCEDURE — 3074F SYST BP LT 130 MM HG: CPT | Mod: CPTII,S$GLB,, | Performed by: SURGERY

## 2023-10-04 PROCEDURE — 3078F PR MOST RECENT DIASTOLIC BLOOD PRESSURE < 80 MM HG: ICD-10-PCS | Mod: CPTII,S$GLB,, | Performed by: SURGERY

## 2023-10-04 PROCEDURE — 99999 PR PBB SHADOW E&M-EST. PATIENT-LVL V: ICD-10-PCS | Mod: PBBFAC,,, | Performed by: SURGERY

## 2023-10-04 PROCEDURE — 3074F PR MOST RECENT SYSTOLIC BLOOD PRESSURE < 130 MM HG: ICD-10-PCS | Mod: CPTII,S$GLB,, | Performed by: SURGERY

## 2023-10-04 PROCEDURE — 4010F PR ACE/ARB THEARPY RXD/TAKEN: ICD-10-PCS | Mod: CPTII,S$GLB,, | Performed by: SURGERY

## 2023-10-04 PROCEDURE — 3008F PR BODY MASS INDEX (BMI) DOCUMENTED: ICD-10-PCS | Mod: CPTII,S$GLB,, | Performed by: SURGERY

## 2023-10-04 PROCEDURE — 4010F ACE/ARB THERAPY RXD/TAKEN: CPT | Mod: CPTII,S$GLB,, | Performed by: SURGERY

## 2023-10-04 PROCEDURE — 1159F PR MEDICATION LIST DOCUMENTED IN MEDICAL RECORD: ICD-10-PCS | Mod: CPTII,S$GLB,, | Performed by: SURGERY

## 2023-10-04 PROCEDURE — 1159F MED LIST DOCD IN RCRD: CPT | Mod: CPTII,S$GLB,, | Performed by: SURGERY

## 2023-10-04 PROCEDURE — 99999 PR PBB SHADOW E&M-EST. PATIENT-LVL V: CPT | Mod: PBBFAC,,, | Performed by: SURGERY

## 2023-10-04 PROCEDURE — 99215 PR OFFICE/OUTPT VISIT, EST, LEVL V, 40-54 MIN: ICD-10-PCS | Mod: S$GLB,,, | Performed by: SURGERY

## 2023-10-04 PROCEDURE — 3008F BODY MASS INDEX DOCD: CPT | Mod: CPTII,S$GLB,, | Performed by: SURGERY

## 2023-10-04 PROCEDURE — 99215 OFFICE O/P EST HI 40 MIN: CPT | Mod: S$GLB,,, | Performed by: SURGERY

## 2023-10-04 NOTE — PROGRESS NOTES
"Initial Consult    Chief Complaint   Patient presents with    Obesity       History of Present Illness:  Patient is a 62 y.o. female who is referred for evaluation of surgical treatment of morbid obesity. Her Body mass index is 50.3 kg/m². She has known comorbidities of dyslipidemia, hypertension and osteoarthritis. She has not attended the bariatric seminar and is most interested in gastric sleeve surgery.      Past attempts at weight loss include: Unsupervised: calorie counting, slim fast,;  Supervised:  Diet pills from MD;  Diet pills: phentermine;  Exercise attempts: walking or running     Weight history:   At current weight:  2 years  Obese for 35 years.  More than 35 pounds overweight for 35 years.  More than 100 pounds overweight for 35.  Started dieting at [age] years old.  Maximum weight reached: 298 pounds  Most weight lost was 30 pounds through weight loss clinic on meds for 6 months.-- has done again with ozempic recently but weight coming back   She describes Her eating habits as sweet eater.     SHAKIRA screening: did not get tested      Reflux screening: none     Review of patient's allergies indicates:   Allergen Reactions    Aspirin Other (See Comments)     "I don't take it because I've had ulcers"       Meperidine Other (See Comments)     Felt like she was about to pass out after taking    Azithromycin      Other reaction(s): Not available       Current Outpatient Medications   Medication Sig Dispense Refill    albuterol sulfate 90 mcg/actuation aebs Inhale 90 mcg into the lungs every 4 (four) hours as needed (wheezing or shortness of breath). 1 each 2    apixaban (ELIQUIS) 5 mg Tab Take 1 tablet (5 mg total) by mouth 2 (two) times daily. 180 tablet 3    diclofenac sodium (VOLTAREN) 1 % Gel Apply 2 g topically 4 (four) times daily. 50 g 3    DULoxetine (CYMBALTA) 60 MG capsule TAKE ONE CAPSULE BY MOUTH DAILY 90 capsule 2    flecainide (TAMBOCOR) 50 MG Tab Take 1 tablet twice a day by oral route.      " fluticasone furoate-vilanteroL (BREO ELLIPTA) 100-25 mcg/dose diskus inhaler Inhale 1 puff into the lungs once daily. 60 each 2    gabapentin (NEURONTIN) 600 MG tablet Take 1 tablet (600 mg total) by mouth 2 (two) times daily. 60 tablet 2    lisinopriL 10 MG tablet Take 1 tablet (10 mg total) by mouth once daily. 90 tablet 3    melatonin 5 mg TbDL Take 1 tablet by mouth nightly as needed (sleep).      metoprolol tartrate (LOPRESSOR) 100 MG tablet Take 1 tablet (100 mg total) by mouth 2 (two) times daily. 60 tablet 0    nortriptyline (PAMELOR) 25 MG capsule TAKE ONE CAPSULE BY MOUTH EVERY EVENING 90 capsule 3    omeprazole (PRILOSEC) 20 MG capsule TAKE ONE CAPSULE BY MOUTH EVERY MORNING 90 capsule 1    ondansetron (ZOFRAN-ODT) 4 MG TbDL Take 1 tablet (4 mg total) by mouth 2 (two) times daily. 20 tablet 1    promethazine (PHENERGAN) 12.5 MG Tab Take 1 tablet (12.5 mg total) by mouth 2 (two) times daily as needed (nausea). 10 tablet 0    promethazine-codeine 6.25-10 mg/5 ml (PHENERGAN WITH CODEINE) 6.25-10 mg/5 mL syrup Take 5 mLs by mouth nightly as needed for Cough. 118 mL 0    promethazine-dextromethorphan (PROMETHAZINE-DM) 6.25-15 mg/5 mL Syrp Take 5 mLs by mouth nightly as needed.      pulse oximeter (PULSE OXIMETER) device Use twice daily at 8 AM and 3 PM and record the value in [facility] as directed. 1 each 0    tiZANidine (ZANAFLEX) 4 MG tablet TAKE ONE TABLET BY MOUTH every SIX hours AS NEEDED. 60 tablet 1    traMADoL (ULTRAM) 50 mg tablet Take 1 tablet (50 mg total) by mouth every 12 (twelve) hours as needed for Pain. 30 tablet 0    valACYclovir (VALTREX) 500 MG tablet Take 1 tablet (500 mg total) by mouth daily as needed (fever blister). 30 tablet 2    cholecalciferol, vitamin D3, (DECARA) 1,250 mcg (50,000 unit) capsule Take 1 capsule (50,000 Units total) by mouth every 7 days. (Patient not taking: Reported on 10/4/2023) 30 capsule 0    cyanocobalamin 500 MCG tablet Take 1,000 mcg by mouth once daily.       rosuvastatin (CRESTOR) 10 MG tablet TAKE ONE TABLET BY MOUTH EVERY EVENING (Patient not taking: Reported on 10/4/2023) 90 tablet 3    semaglutide (RYBELSUS) 3 mg tablet Take 1 tablet (3 mg total) by mouth once daily. (Patient not taking: Reported on 10/4/2023) 30 tablet 11     No current facility-administered medications for this visit.       Past Medical History:   Diagnosis Date    Anemia     Anticoagulant long-term use     Arrhythmia     Arthritis     Atrial fibrillation     history    Bronchitis     Encounter for blood transfusion     General anesthetics causing adverse effect in therapeutic use     High blood pressure     Hyperlipidemia     Lump or mass in breast     benign     Neuropathy     Obesity     Obstructive sleep apnea syndrome 2021    Ulcer      Past Surgical History:   Procedure Laterality Date    A-V CARDIAC PACEMAKER INSERTION Left 2020    Procedure: INSERTION, CARDIAC PACEMAKER, DUAL CHAMBER;  Surgeon: Bert Reaves MD;  Location: [facility] CATH;  Service: Cardiology;  Laterality: Left;    BREAST BIOPSY Right 2017    myofibroblastoma     BREAST MASS EXCISION       SECTION  10/25/1986    Other c/section 10/26/1997    CHOLECYSTECTOMY  2017    Dr. TOLU Bowers, [facility]     CORRECTION OF HAMMER TOE Left 2022    Procedure: CORRECTION, HAMMER TOE;  Surgeon: Ezra Arriaga DPM;  Location: [facility] OR;  Service: Podiatry;  Laterality: Left;  hammertoes 2-5 left,    csection      CS x 2    CYSTOSCOPY N/A 2019    Procedure: CYSTOSCOPY;  Surgeon: Noemi Pierre MD;  Location: [facility] OR;  Service: OB/GYN;  Laterality: N/A;    DILATION AND CURETTAGE OF UTERUS      EPIDURAL STEROID INJECTION INTO LUMBAR SPINE N/A 2020    Procedure: Injection-steroid-epidural-lumbar L5/S1;  Surgeon: Gurjit Tavarez MD;  Location: [facility] OR;  Service: Pain Management;  Laterality: N/A;    EPIDURAL STEROID INJECTION INTO LUMBAR SPINE N/A 2020    Procedure:  Injection-steroid-epidural-lumbar L5/S1;  Surgeon: Gurjit Tavarez MD;  Location: [facility] OR;  Service: Pain Management;  Laterality: N/A;    EPIDURAL STEROID INJECTION INTO LUMBAR SPINE N/A 12/01/2021    Procedure: Injection-steroid-epidural-lumbar L5/S1;  Surgeon: Gurjit Tavarez MD;  Location: [facility] OR;  Service: Pain Management;  Laterality: N/A;    EPIDURAL STEROID INJECTION INTO LUMBAR SPINE N/A 02/22/2023    Procedure: Injection-steroid-epidural-lumbar-L5/S1 to the left;  Surgeon: Gurjit Tavarez MD;  Location: [facility] OR;  Service: Pain Management;  Laterality: N/A;    EPIDURAL STEROID INJECTION INTO LUMBAR SPINE Right 04/28/2023    Procedure: Injection-steroid-epidural-lumbar L5/S1;  Surgeon: Gurjit Tavarez MD;  Location: [facility] OR;  Service: Pain Management;  Laterality: Right;    EPIDURAL STEROID INJECTION INTO LUMBAR SPINE N/A 8/29/2023    Procedure: Injection-steroid-epidural-lumbarL5/S1 to right;  Surgeon: Gurjit Tavarez MD;  Location: [facility] OR;  Service: Pain Management;  Laterality: N/A;    FRACTURE SURGERY  04/1986    Dislocated  hip total rt hip 08/08    HIP PINNING      HYSTERECTOMY      JOINT REPLACEMENT  08/2008    LAPAROSCOPIC SALPINGO-OOPHORECTOMY Bilateral 09/03/2019    Procedure: SALPINGO-OOPHORECTOMY, LAPAROSCOPIC;  Surgeon: Noemi Pierre MD;  Location: [facility];  Service: OB/GYN;  Laterality: Bilateral;  Dr. Bentley to assist. No resident needed.     LAPAROSCOPIC TOTAL HYSTERECTOMY N/A 09/03/2019    Procedure: HYSTERECTOMY, TOTAL, LAPAROSCOPIC;  Surgeon: Noemi Pierre MD;  Location: [facility];  Service: OB/GYN;  Laterality: N/A;    NASAL SEPTUM SURGERY      OOPHORECTOMY      SKIN TAG REMOVAL Left 11/03/2022    Procedure: REMOVAL, SKIN TAG;  Surgeon: Ezra Arriaga DPM;  Location: [facility] OR;  Service: Podiatry;  Laterality: Left;    TOTAL HIP ARTHROPLASTY Right     TUBAL LIGATION  1997     Family History   Problem Relation Age of Onset    Colon cancer Maternal Grandmother 70        mets to  ovary    Cancer Maternal Grandmother     Arthritis Paternal Grandfather     Eclampsia Paternal Grandmother     Cataracts Maternal Grandfather     Stroke Father 57    Colon cancer Father     Hypertension Father     Alcohol abuse Father     Cancer Father         Passed away July 10 2019    Hypertension Mother     Cataracts Mother     Hypertension Brother         Age 54 hypertension heart    Early death Brother         Hypertension cardio disease    No Known Problems Daughter     Stomach cancer Neg Hx     Esophageal cancer Neg Hx     Breast cancer Neg Hx     Miscarriages / Stillbirths Neg Hx     Ovarian cancer Neg Hx     Glaucoma Neg Hx     Macular degeneration Neg Hx     Retinal detachment Neg Hx     Strabismus Neg Hx      Social History     Tobacco Use    Smoking status: Never    Smokeless tobacco: Never   Substance Use Topics    Alcohol use: No     Comment: never    Drug use: Never          Review of Systems   Constitutional:  Positive for activity change, appetite change and fatigue. Negative for chills, diaphoresis and fever.   HENT:  Negative for congestion, nosebleeds, sinus pressure, sneezing, sore throat, tinnitus and voice change.    Eyes:  Negative for pain, redness and itching.   Respiratory:  Negative for apnea, cough, choking, chest tightness, shortness of breath, wheezing and stridor.    Cardiovascular:  Positive for leg swelling. Negative for chest pain and palpitations.   Gastrointestinal:  Negative for abdominal pain, anal bleeding, constipation, diarrhea, nausea and vomiting.   Endocrine: Negative for cold intolerance and heat intolerance.   Genitourinary:  Negative for difficulty urinating and dysuria.   Musculoskeletal:  Positive for back pain, gait problem and joint swelling. Negative for arthralgias.   Skin:  Negative for rash and wound.   Allergic/Immunologic: Negative for environmental allergies and food allergies.   Neurological:  Negative for dizziness, light-headedness and headaches.  "  Hematological:  Negative for adenopathy. Does not bruise/bleed easily.   Psychiatric/Behavioral:  Negative for agitation and confusion.        Physical:     Vital Signs (Most Recent)  Temp: 97.9 °F (36.6 °C) (10/04/23 0944)  Pulse: 72 (10/04/23 0944)  Resp: 16 (10/04/23 0944)  BP: (!) 123/59 (10/04/23 0944)  5' 4.5" (1.638 m)  125.9 kg (277 lb 9.6 oz)     Body comp:  Fat Percent:  53.2 %  Fat Mass:  147.6 lb  FFM:  130 lb  TBW: 95.8 lb  TBW %:  34.5 %  BMR: 1883 kcal      Physical Exam  Vitals and nursing note reviewed.   Constitutional:       General: She is not in acute distress.     Appearance: She is well-developed. She is not diaphoretic.   HENT:      Head: Normocephalic and atraumatic.      Mouth/Throat:      Pharynx: No oropharyngeal exudate.   Eyes:      General: No scleral icterus.     Conjunctiva/sclera: Conjunctivae normal.      Pupils: Pupils are equal, round, and reactive to light.   Neck:      Thyroid: No thyromegaly.      Vascular: No JVD.      Trachea: No tracheal deviation.   Cardiovascular:      Rate and Rhythm: Normal rate and regular rhythm.      Heart sounds: Normal heart sounds. No murmur heard.     No friction rub. No gallop.   Pulmonary:      Effort: Pulmonary effort is normal. No respiratory distress.      Breath sounds: Normal breath sounds. No stridor. No wheezing or rales.   Chest:      Chest wall: No tenderness.   Abdominal:      General: Bowel sounds are normal. There is no distension.      Palpations: Abdomen is soft. There is no mass.      Tenderness: There is no abdominal tenderness. There is no guarding or rebound.   Musculoskeletal:         General: No tenderness. Normal range of motion.      Cervical back: Normal range of motion and neck supple.   Lymphadenopathy:      Cervical: No cervical adenopathy.   Skin:     General: Skin is warm and dry.      Findings: No erythema or rash.   Neurological:      Mental Status: She is alert and oriented to person, place, and time.      " Cranial Nerves: No cranial nerve deficit.   Psychiatric:         Behavior: Behavior normal.         ASSESSMENT/PLAN:        1. Morbid obesity  Ambulatory referral/consult to Psychiatry    Ambulatory referral/consult to Nutrition Services    EKG 12-lead    CANCELED: [place] Upper GI      2. Morbid obesity with BMI of 45.0-49.9, adult        3. Essential hypertension            Plan:  Fatemeh Shoemaker has morbid obesity as their Body mass index is 46.91 kg/m². She would benefit from weight loss surgery and has chosen gastric sleeve surgery as the preferred procedure. She understands that this is a tool and lifestyle change will be necessary to keep weight off. I went over possible complications of all surgeries with the patient and she is agreeable to surgery.    She will need:    Labs  EKG  UGI    1. Mild esophageal dysmotility.  2. Small duodenal diverticulum.    dietary consult  psych consult   Seminar    I will obtain the following clearances prior to surgery: cardiology     She has multiple worsening medical problems which include: obesity htn likely yessi.  We are planning to treat this with diet, exercise, and possibly elective major surgery.  She has risk factors for elective major surgery which include: htn, afib, yessi    Diet plan: high protein low carb- mainly meats and vegetables  Exercise plan: Cardiovascular exercise, get HR over 100 for 20 minutes 3 times per week.  Start multivitamin

## 2023-10-06 ENCOUNTER — CLINICAL SUPPORT (OUTPATIENT)
Dept: BARIATRICS | Facility: CLINIC | Age: 63
End: 2023-10-06
Payer: COMMERCIAL

## 2023-10-06 ENCOUNTER — TELEPHONE (OUTPATIENT)
Dept: PSYCHIATRY | Facility: CLINIC | Age: 63
End: 2023-10-06
Payer: COMMERCIAL

## 2023-10-06 VITALS — BODY MASS INDEX: 46.68 KG/M2 | HEIGHT: 65 IN | WEIGHT: 280.19 LBS

## 2023-10-06 DIAGNOSIS — E66.01 MORBID OBESITY: ICD-10-CM

## 2023-10-06 DIAGNOSIS — Z71.3 ENCOUNTER FOR DIETARY COUNSELING AND SURVEILLANCE: Primary | ICD-10-CM

## 2023-10-06 PROCEDURE — 97802 MEDICAL NUTRITION INDIV IN: CPT | Mod: S$GLB,,,

## 2023-10-06 PROCEDURE — 99999 PR PBB SHADOW E&M-EST. PATIENT-LVL III: CPT | Mod: PBBFAC,,,

## 2023-10-06 PROCEDURE — 97802 PR MED NUTR THER, 1ST, INDIV, EA 15 MIN: ICD-10-PCS | Mod: S$GLB,,,

## 2023-10-06 PROCEDURE — 99999 PR PBB SHADOW E&M-EST. PATIENT-LVL III: ICD-10-PCS | Mod: PBBFAC,,,
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
## 2023-10-13 ENCOUNTER — PATIENT MESSAGE (OUTPATIENT)
Dept: CARDIOLOGY | Facility: CLINIC | Age: 63
End: 2023-10-13
Payer: COMMERCIAL

## 2023-10-24 ENCOUNTER — OFFICE VISIT (OUTPATIENT)
Dept: NEUROSURGERY | Facility: CLINIC | Age: 63
End: 2023-10-24
Payer: COMMERCIAL

## 2023-10-24 ENCOUNTER — PATIENT MESSAGE (OUTPATIENT)
Dept: CARDIOLOGY | Facility: CLINIC | Age: 63
End: 2023-10-24
Payer: COMMERCIAL

## 2023-10-24 VITALS
HEIGHT: 64 IN | BODY MASS INDEX: 47.8 KG/M2 | SYSTOLIC BLOOD PRESSURE: 148 MMHG | HEART RATE: 72 BPM | DIASTOLIC BLOOD PRESSURE: 102 MMHG | OXYGEN SATURATION: 97 % | WEIGHT: 280 LBS

## 2023-10-24 DIAGNOSIS — I48.0 PAROXYSMAL ATRIAL FIBRILLATION: ICD-10-CM

## 2023-10-24 DIAGNOSIS — M54.16 LUMBAR RADICULOPATHY: ICD-10-CM

## 2023-10-24 PROCEDURE — 3077F SYST BP >= 140 MM HG: CPT | Mod: CPTII,S$GLB,, | Performed by: NEUROLOGICAL SURGERY

## 2023-10-24 PROCEDURE — 3008F BODY MASS INDEX DOCD: CPT | Mod: CPTII,S$GLB,, | Performed by: NEUROLOGICAL SURGERY

## 2023-10-24 PROCEDURE — 4010F PR ACE/ARB THEARPY RXD/TAKEN: ICD-10-PCS | Mod: CPTII,S$GLB,, | Performed by: NEUROLOGICAL SURGERY

## 2023-10-24 PROCEDURE — 99205 OFFICE O/P NEW HI 60 MIN: CPT | Mod: S$GLB,,, | Performed by: NEUROLOGICAL SURGERY

## 2023-10-24 PROCEDURE — 4010F ACE/ARB THERAPY RXD/TAKEN: CPT | Mod: CPTII,S$GLB,, | Performed by: NEUROLOGICAL SURGERY

## 2023-10-24 PROCEDURE — 1159F MED LIST DOCD IN RCRD: CPT | Mod: CPTII,S$GLB,, | Performed by: NEUROLOGICAL SURGERY

## 2023-10-24 PROCEDURE — 3008F PR BODY MASS INDEX (BMI) DOCUMENTED: ICD-10-PCS | Mod: CPTII,S$GLB,, | Performed by: NEUROLOGICAL SURGERY

## 2023-10-24 PROCEDURE — 99205 PR OFFICE/OUTPT VISIT, NEW, LEVL V, 60-74 MIN: ICD-10-PCS | Mod: S$GLB,,, | Performed by: NEUROLOGICAL SURGERY

## 2023-10-24 PROCEDURE — 1159F PR MEDICATION LIST DOCUMENTED IN MEDICAL RECORD: ICD-10-PCS | Mod: CPTII,S$GLB,, | Performed by: NEUROLOGICAL SURGERY

## 2023-10-24 PROCEDURE — 3077F PR MOST RECENT SYSTOLIC BLOOD PRESSURE >= 140 MM HG: ICD-10-PCS | Mod: CPTII,S$GLB,, | Performed by: NEUROLOGICAL SURGERY

## 2023-10-24 PROCEDURE — 3080F PR MOST RECENT DIASTOLIC BLOOD PRESSURE >= 90 MM HG: ICD-10-PCS | Mod: CPTII,S$GLB,, | Performed by: NEUROLOGICAL SURGERY

## 2023-10-24 PROCEDURE — 3080F DIAST BP >= 90 MM HG: CPT | Mod: CPTII,S$GLB,, | Performed by: NEUROLOGICAL SURGERY

## 2023-10-24 RX ORDER — FLECAINIDE ACETATE 150 MG/1
150 TABLET ORAL EVERY 12 HOURS
Qty: 60 TABLET | Refills: 0 | OUTPATIENT
Start: 2023-10-24

## 2023-10-24 RX ORDER — FLECAINIDE ACETATE 50 MG/1
50 TABLET ORAL 2 TIMES DAILY
Qty: 180 TABLET | Refills: 0 | Status: SHIPPED | OUTPATIENT
Start: 2023-10-24 | End: 2023-10-26 | Stop reason: SDUPTHER

## 2023-10-24 NOTE — PROGRESS NOTES
Neurosurgery History & Physical    Patient ID: Fatemeh Shoemaker is a 62 y.o. female.    Chief Complaint   Patient presents with    Lumbar Spine Pain (L-Spine)     Patient reports low back pain with bilateral leg pain; numbness and tingling present; unable to stand long periods of times; 4/10       HPI:  Ms. Shoemaker is a 62 year old female with history of chronic low back pain and leg pain/numbness.  The symptoms do tend to come and go.  Standing and walking tends to make the pain worse.  The pain in the legs seems to be worse than the back though the back pain seems to come and go with the pain.  Her pain is equal in both legs.      If she is standing and the pain comes on, leaning over will tend to make the pain better.      She was evaluated by Dr. Hong in 2020 and risk of surgery was thought to outweigh the potential benefits.    She has undergone several epidural steroid injections by Dr. Tavarez in the last few years which have had variable results including 1 injection which improved her pain for a year and a half.    Review of Systems   Constitutional:  Negative for activity change and fever.   HENT:  Negative for rhinorrhea, tinnitus, trouble swallowing and voice change.    Eyes:  Negative for visual disturbance.   Respiratory:  Negative for shortness of breath.    Cardiovascular:  Negative for chest pain.   Gastrointestinal:  Negative for nausea and vomiting.   Endocrine: Negative for cold intolerance, heat intolerance, polydipsia, polyphagia and polyuria.   Genitourinary:  Negative for decreased urine volume, frequency and urgency.   Musculoskeletal:  Positive for back pain, gait problem and myalgias. Negative for neck pain and neck stiffness.   Neurological:  Negative for dizziness, tremors, seizures, syncope, facial asymmetry, speech difficulty, weakness, light-headedness, numbness and headaches.   Psychiatric/Behavioral:  Negative for confusion.        Past Medical History:   Diagnosis Date     Anemia     Anticoagulant long-term use     Arrhythmia     Arthritis     Atrial fibrillation     history    Bronchitis     Encounter for blood transfusion     General anesthetics causing adverse effect in therapeutic use     High blood pressure     Hyperlipidemia     Lump or mass in breast     benign     Neuropathy     Obesity     Obstructive sleep apnea syndrome 11/5/2021    Ulcer      Social History     Socioeconomic History    Marital status: Significant Other   Tobacco Use    Smoking status: Never    Smokeless tobacco: Never   Substance and Sexual Activity    Alcohol use: No     Comment: never    Drug use: Never    Sexual activity: Yes     Partners: Male     Birth control/protection: See Surgical Hx, Other-see comments     Comment: Hysterectomy 9/3/19     Social Determinants of Health     Financial Resource Strain: Low Risk  (1/17/2023)    Overall Financial Resource Strain (CARDIA)     Difficulty of Paying Living Expenses: Not hard at all   Food Insecurity: No Food Insecurity (1/17/2023)    Hunger Vital Sign     Worried About Running Out of Food in the Last Year: Never true     Ran Out of Food in the Last Year: Never true   Transportation Needs: No Transportation Needs (1/17/2023)    PRAPARE - Transportation     Lack of Transportation (Medical): No     Lack of Transportation (Non-Medical): No   Physical Activity: Insufficiently Active (1/17/2023)    Exercise Vital Sign     Days of Exercise per Week: 1 day     Minutes of Exercise per Session: 10 min   Stress: No Stress Concern Present (1/17/2023)    [employer] [place] [employer] - Occupational Stress Questionnaire     Feeling of Stress : Not at all   Social Connections: Unknown (1/17/2023)    Social Connection and Isolation Panel [NHANES]     Frequency of Communication with Friends and Family: Never     Frequency of Social Gatherings with Friends and Family: More than three times a week     Active Member of Clubs or Organizations: No     Attends Club or  "Organization Meetings: Never     Marital Status:    Housing Stability: Low Risk  (1/17/2023)    Housing Stability Vital Sign     Unable to Pay for Housing in the Last Year: No     Number of Places Lived in the Last Year: 1     Unstable Housing in the Last Year: No     Family History   Problem Relation Age of Onset    Colon cancer Maternal Grandmother 70        mets to ovary    Cancer Maternal Grandmother     Arthritis Paternal Grandfather     Eclampsia Paternal Grandmother     Cataracts Maternal Grandfather     Stroke Father 57    Colon cancer Father     Hypertension Father     Alcohol abuse Father     Cancer Father         Passed away July 10 2019    Hypertension Mother     Cataracts Mother     Hypertension Brother         Age 54 hypertension heart    Early death Brother         Hypertension cardio disease    No Known Problems Daughter     Stomach cancer Neg Hx     Esophageal cancer Neg Hx     Breast cancer Neg Hx     Miscarriages / Stillbirths Neg Hx     Ovarian cancer Neg Hx     Glaucoma Neg Hx     Macular degeneration Neg Hx     Retinal detachment Neg Hx     Strabismus Neg Hx      Review of patient's allergies indicates:   Allergen Reactions    Aspirin Other (See Comments)     "I don't take it because I've had ulcers"       Meperidine Other (See Comments)     Felt like she was about to pass out after taking    Azithromycin      Other reaction(s): Not available       Current Outpatient Medications:     albuterol sulfate 90 mcg/actuation aebs, Inhale 90 mcg into the lungs every 4 (four) hours as needed (wheezing or shortness of breath)., Disp: 1 each, Rfl: 2    apixaban (ELIQUIS) 5 mg Tab, Take 1 tablet (5 mg total) by mouth 2 (two) times daily., Disp: 180 tablet, Rfl: 3    cholecalciferol, vitamin D3, (DECARA) 1,250 mcg (50,000 unit) capsule, Take 1 capsule (50,000 Units total) by mouth every 7 days., Disp: 30 capsule, Rfl: 0    cyanocobalamin 500 MCG tablet, Take 1,000 mcg by mouth once daily., Disp: , " Rfl:     diclofenac sodium (VOLTAREN) 1 % Gel, Apply 2 g topically 4 (four) times daily., Disp: 50 g, Rfl: 3    DULoxetine (CYMBALTA) 60 MG capsule, TAKE ONE CAPSULE BY MOUTH DAILY, Disp: 90 capsule, Rfl: 2    fluticasone furoate-vilanteroL (BREO ELLIPTA) 100-25 mcg/dose diskus inhaler, Inhale 1 puff into the lungs once daily., Disp: 60 each, Rfl: 2    gabapentin (NEURONTIN) 600 MG tablet, Take 1 tablet (600 mg total) by mouth 2 (two) times daily., Disp: 60 tablet, Rfl: 2    melatonin 5 mg TbDL, Take 1 tablet by mouth nightly as needed (sleep)., Disp: , Rfl:     metoprolol tartrate (LOPRESSOR) 100 MG tablet, Take 1 tablet (100 mg total) by mouth 2 (two) times daily., Disp: 60 tablet, Rfl: 0    nortriptyline (PAMELOR) 25 MG capsule, TAKE ONE CAPSULE BY MOUTH EVERY EVENING, Disp: 90 capsule, Rfl: 3    omeprazole (PRILOSEC) 20 MG capsule, TAKE ONE CAPSULE BY MOUTH EVERY MORNING, Disp: 90 capsule, Rfl: 1    ondansetron (ZOFRAN-ODT) 4 MG TbDL, Take 1 tablet (4 mg total) by mouth 2 (two) times daily., Disp: 20 tablet, Rfl: 1    promethazine (PHENERGAN) 12.5 MG Tab, Take 1 tablet (12.5 mg total) by mouth 2 (two) times daily as needed (nausea)., Disp: 10 tablet, Rfl: 0    promethazine-codeine 6.25-10 mg/5 ml (PHENERGAN WITH CODEINE) 6.25-10 mg/5 mL syrup, Take 5 mLs by mouth nightly as needed for Cough., Disp: 118 mL, Rfl: 0    promethazine-dextromethorphan (PROMETHAZINE-DM) 6.25-15 mg/5 mL Syrp, Take 5 mLs by mouth nightly as needed., Disp: , Rfl:     pulse oximeter (PULSE OXIMETER) device, Use twice daily at 8 AM and 3 PM and record the value in [facility] as directed., Disp: 1 each, Rfl: 0    rosuvastatin (CRESTOR) 10 MG tablet, TAKE ONE TABLET BY MOUTH EVERY EVENING, Disp: 90 tablet, Rfl: 3    semaglutide (RYBELSUS) 3 mg tablet, Take 1 tablet (3 mg total) by mouth once daily., Disp: 30 tablet, Rfl: 11    tiZANidine (ZANAFLEX) 4 MG tablet, TAKE ONE TABLET BY MOUTH every SIX hours AS NEEDED., Disp: 60 tablet, Rfl: 1     "traMADoL (ULTRAM) 50 mg tablet, Take 1 tablet (50 mg total) by mouth every 12 (twelve) hours as needed for Pain., Disp: 30 tablet, Rfl: 0    valACYclovir (VALTREX) 500 MG tablet, Take 1 tablet (500 mg total) by mouth daily as needed (fever blister)., Disp: 30 tablet, Rfl: 2    flecainide (TAMBOCOR) 50 MG Tab, Take 1 tablet (50 mg total) by mouth 2 (two) times a day., Disp: 180 tablet, Rfl: 0    lisinopriL 10 MG tablet, Take 1 tablet (10 mg total) by mouth once daily., Disp: 90 tablet, Rfl: 3  Blood pressure (!) 148/102, pulse 72, height 5' 4" (1.626 m), weight 127 kg (280 lb), last menstrual period 09/08/2013, SpO2 97 %.      Neurologic Exam     Mental Status   Oriented to person, place, and time.   Attention: normal.   Speech: speech is normal   Level of consciousness: alert  Knowledge: good.     Cranial Nerves     CN III, IV, VI   Extraocular motions are normal.     CN VII   Facial expression full, symmetric.     Motor Exam   Muscle bulk: normal  Overall muscle tone: normal    Strength   Strength 5/5 throughout.     Sensory Exam   Light touch normal.     Gait, Coordination, and Reflexes     Gait  Gait: normal    Reflexes   Right patellar: 1+  Left patellar: 1+      Physical Exam  Eyes:      Extraocular Movements: EOM normal.   Neurological:      Mental Status: She is oriented to person, place, and time.      Motor: Motor strength is normal.     Gait: Gait is intact.      Deep Tendon Reflexes:      Reflex Scores:       Patellar reflexes are 1+ on the right side and 1+ on the left side.  Psychiatric:         Speech: Speech normal.         Imaging:  MRI of the lumbar spine without contrast dated 09/22/2023 is personally reviewed and discussed with the patient.  There is marked spondylosis throughout the lumbar spine.  There is severe facet degeneration at L2-3, L4-5, and L5-S1.  There is multilevel neuroforaminal stenosis.  There is central canal stenosis at L2-3, L4-5, and L5-S1.  There is severe disc degeneration " at L3-4.    Assessment/Plan:   Ms. Shoemaker is a 62-year-old female with multilevel degenerative disease with chronic low back pain and bilateral S1 distribution radicular pain.  She has a chronic history of multiple joint issues.  Her BMI is 48.06 currently.    I discussed with her that surgical decision-making is extremely complex in her case given the multi level degenerative change with central and foraminal stenosis at multiple levels.  Any surgical intervention would likely involve a long segment fusion construct with decompression.  Given her body habitus with BMI of 48 as well as her chronic pain the risk to benefit ratio is not favorable for surgical intervention at this time.    I would recommend that she continues with any conservative therapy including weight loss, physical therapy, and pain management procedures.    She will return p.r.n. changes in her clinical situation in the future warranting repeat surgical consultation.

## 2023-10-26 DIAGNOSIS — I48.0 PAROXYSMAL ATRIAL FIBRILLATION: Primary | ICD-10-CM

## 2023-10-26 RX ORDER — FLECAINIDE ACETATE 150 MG/1
150 TABLET ORAL 2 TIMES DAILY
Qty: 60 TABLET | Refills: 3 | Status: SHIPPED | OUTPATIENT
Start: 2023-10-26 | End: 2024-01-26

## 2023-10-30 ENCOUNTER — TELEPHONE (OUTPATIENT)
Dept: PAIN MEDICINE | Facility: CLINIC | Age: 63
End: 2023-10-30
Payer: COMMERCIAL

## 2023-11-03 DIAGNOSIS — G62.9 NEUROPATHY: ICD-10-CM

## 2023-11-03 RX ORDER — NORTRIPTYLINE HYDROCHLORIDE 25 MG/1
CAPSULE ORAL
Qty: 90 CAPSULE | Refills: 3 | Status: SHIPPED | OUTPATIENT
Start: 2023-11-03

## 2023-11-03 NOTE — TELEPHONE ENCOUNTER
Care Due:                  Date            Visit Type   Department     Provider  --------------------------------------------------------------------------------                                MYCHART                              FOLLOWUP/OF  [facility]  Last Visit: 01-      FICE VISIT   MEDICINE       Jerson Wheat                              EP -                              PRIMARY      [facility]  Next Visit: 11-      CARE ([facility])   MEDICINE       Jerson Wheat                                                            Last  Test          Frequency    Reason                     Performed    Due Date  --------------------------------------------------------------------------------    CBC.........  12 months..  diclofenac, valACYclovir.  11- 11-    CMP.........  12 months..  diclofenac, rosuvastatin,   11- 11-                             valACYclovir.............    HBA1C.......  6 months...  semaglutide..............  11- 05-    Lipid Panel.  12 months..  rosuvastatin.............  11- 11-    [facility] Embedded Care Due Messages. Reference number: [id number].   11/03/2023 11:02:14 AM CDT

## 2023-11-06 ENCOUNTER — OFFICE VISIT (OUTPATIENT)
Dept: FAMILY MEDICINE | Facility: CLINIC | Age: 63
End: 2023-11-06
Payer: COMMERCIAL

## 2023-11-06 ENCOUNTER — LAB VISIT (OUTPATIENT)
Dept: LAB | Facility: HOSPITAL | Age: 63
End: 2023-11-06
Attending: INTERNAL MEDICINE
Payer: COMMERCIAL

## 2023-11-06 ENCOUNTER — OFFICE VISIT (OUTPATIENT)
Dept: CARDIOLOGY | Facility: CLINIC | Age: 63
End: 2023-11-06
Payer: COMMERCIAL

## 2023-11-06 VITALS
SYSTOLIC BLOOD PRESSURE: 132 MMHG | HEART RATE: 76 BPM | WEIGHT: 278 LBS | DIASTOLIC BLOOD PRESSURE: 82 MMHG | BODY MASS INDEX: 46.26 KG/M2 | OXYGEN SATURATION: 99 %

## 2023-11-06 VITALS
HEIGHT: 65 IN | HEART RATE: 60 BPM | WEIGHT: 278.25 LBS | SYSTOLIC BLOOD PRESSURE: 114 MMHG | DIASTOLIC BLOOD PRESSURE: 66 MMHG | BODY MASS INDEX: 46.36 KG/M2

## 2023-11-06 DIAGNOSIS — Z95.0 PACEMAKER: ICD-10-CM

## 2023-11-06 DIAGNOSIS — I48.0 PAROXYSMAL ATRIAL FIBRILLATION: ICD-10-CM

## 2023-11-06 DIAGNOSIS — I10 ESSENTIAL HYPERTENSION: ICD-10-CM

## 2023-11-06 DIAGNOSIS — I48.0 PAROXYSMAL ATRIAL FIBRILLATION: Primary | ICD-10-CM

## 2023-11-06 DIAGNOSIS — Z12.11 SCREENING FOR COLON CANCER: ICD-10-CM

## 2023-11-06 DIAGNOSIS — J02.9 PHARYNGITIS, UNSPECIFIED ETIOLOGY: Primary | ICD-10-CM

## 2023-11-06 DIAGNOSIS — D50.9 IRON DEFICIENCY ANEMIA, UNSPECIFIED IRON DEFICIENCY ANEMIA TYPE: ICD-10-CM

## 2023-11-06 DIAGNOSIS — R00.1 SYMPTOMATIC BRADYCARDIA: ICD-10-CM

## 2023-11-06 LAB
ALBUMIN SERPL BCP-MCNC: 3.8 G/DL (ref 3.5–5.2)
ALP SERPL-CCNC: 44 U/L (ref 55–135)
ALT SERPL W/O P-5'-P-CCNC: 16 U/L (ref 10–44)
ANION GAP SERPL CALC-SCNC: 13 MMOL/L (ref 8–16)
AST SERPL-CCNC: 21 U/L (ref 10–40)
BILIRUB SERPL-MCNC: 0.6 MG/DL (ref 0.1–1)
BUN SERPL-MCNC: 12 MG/DL (ref 8–23)
CALCIUM SERPL-MCNC: 9.7 MG/DL (ref 8.7–10.5)
CHLORIDE SERPL-SCNC: 103 MMOL/L (ref 95–110)
CHOLEST SERPL-MCNC: 246 MG/DL (ref 120–199)
CHOLEST/HDLC SERPL: 6.8 {RATIO} (ref 2–5)
CO2 SERPL-SCNC: 26 MMOL/L (ref 23–29)
CREAT SERPL-MCNC: 0.8 MG/DL (ref 0.5–1.4)
ERYTHROCYTE [DISTWIDTH] IN BLOOD BY AUTOMATED COUNT: 15.7 % (ref 11.5–14.5)
EST. GFR  (NO RACE VARIABLE): >60 ML/MIN/1.73 M^2
ESTIMATED AVG GLUCOSE: 120 MG/DL (ref 68–131)
GLUCOSE SERPL-MCNC: 119 MG/DL (ref 70–110)
HBA1C MFR BLD: 5.8 % (ref 4–5.6)
HCT VFR BLD AUTO: 38.2 % (ref 37–48.5)
HDLC SERPL-MCNC: 36 MG/DL (ref 40–75)
HDLC SERPL: 14.6 % (ref 20–50)
HGB BLD-MCNC: 12 G/DL (ref 12–16)
LDLC SERPL CALC-MCNC: 145.6 MG/DL (ref 63–159)
MCH RBC QN AUTO: 27.3 PG (ref 27–31)
MCHC RBC AUTO-ENTMCNC: 31.4 G/DL (ref 32–36)
MCV RBC AUTO: 87 FL (ref 82–98)
NONHDLC SERPL-MCNC: 210 MG/DL
PLATELET # BLD AUTO: 332 K/UL (ref 150–450)
PMV BLD AUTO: 10.8 FL (ref 9.2–12.9)
POTASSIUM SERPL-SCNC: 4.8 MMOL/L (ref 3.5–5.1)
PROT SERPL-MCNC: 7.3 G/DL (ref 6–8.4)
RBC # BLD AUTO: 4.4 M/UL (ref 4–5.4)
SODIUM SERPL-SCNC: 142 MMOL/L (ref 136–145)
TRIGL SERPL-MCNC: 322 MG/DL (ref 30–150)
TSH SERPL DL<=0.005 MIU/L-ACNC: 1.07 UIU/ML (ref 0.4–4)
WBC # BLD AUTO: 7.77 K/UL (ref 3.9–12.7)

## 2023-11-06 PROCEDURE — 3008F BODY MASS INDEX DOCD: CPT | Mod: CPTII,S$GLB,, | Performed by: INTERNAL MEDICINE

## 2023-11-06 PROCEDURE — 1160F PR REVIEW ALL MEDS BY PRESCRIBER/CLIN PHARMACIST DOCUMENTED: ICD-10-PCS | Mod: CPTII,S$GLB,, | Performed by: INTERNAL MEDICINE

## 2023-11-06 PROCEDURE — 1159F PR MEDICATION LIST DOCUMENTED IN MEDICAL RECORD: ICD-10-PCS | Mod: CPTII,S$GLB,, | Performed by: INTERNAL MEDICINE

## 2023-11-06 PROCEDURE — 84443 ASSAY THYROID STIM HORMONE: CPT | Performed by: INTERNAL MEDICINE

## 2023-11-06 PROCEDURE — 99999 PR PBB SHADOW E&M-EST. PATIENT-LVL V: CPT | Mod: PBBFAC,,, | Performed by: INTERNAL MEDICINE

## 2023-11-06 PROCEDURE — 1160F RVW MEDS BY RX/DR IN RCRD: CPT | Mod: CPTII,S$GLB,, | Performed by: INTERNAL MEDICINE

## 2023-11-06 PROCEDURE — 36415 COLL VENOUS BLD VENIPUNCTURE: CPT | Mod: PO | Performed by: INTERNAL MEDICINE

## 2023-11-06 PROCEDURE — 3078F DIAST BP <80 MM HG: CPT | Mod: CPTII,S$GLB,, | Performed by: INTERNAL MEDICINE

## 2023-11-06 PROCEDURE — 80053 COMPREHEN METABOLIC PANEL: CPT | Performed by: INTERNAL MEDICINE

## 2023-11-06 PROCEDURE — 3008F PR BODY MASS INDEX (BMI) DOCUMENTED: ICD-10-PCS | Mod: CPTII,S$GLB,, | Performed by: INTERNAL MEDICINE

## 2023-11-06 PROCEDURE — 4010F PR ACE/ARB THEARPY RXD/TAKEN: ICD-10-PCS | Mod: CPTII,S$GLB,, | Performed by: INTERNAL MEDICINE

## 2023-11-06 PROCEDURE — 3079F DIAST BP 80-89 MM HG: CPT | Mod: CPTII,S$GLB,, | Performed by: INTERNAL MEDICINE

## 2023-11-06 PROCEDURE — 1159F MED LIST DOCD IN RCRD: CPT | Mod: CPTII,S$GLB,, | Performed by: INTERNAL MEDICINE

## 2023-11-06 PROCEDURE — 3075F PR MOST RECENT SYSTOLIC BLOOD PRESS GE 130-139MM HG: ICD-10-PCS | Mod: CPTII,S$GLB,, | Performed by: INTERNAL MEDICINE

## 2023-11-06 PROCEDURE — 99999 PR PBB SHADOW E&M-EST. PATIENT-LVL V: ICD-10-PCS | Mod: PBBFAC,,, | Performed by: INTERNAL MEDICINE

## 2023-11-06 PROCEDURE — 80061 LIPID PANEL: CPT | Performed by: INTERNAL MEDICINE

## 2023-11-06 PROCEDURE — 3078F PR MOST RECENT DIASTOLIC BLOOD PRESSURE < 80 MM HG: ICD-10-PCS | Mod: CPTII,S$GLB,, | Performed by: INTERNAL MEDICINE

## 2023-11-06 PROCEDURE — 4010F ACE/ARB THERAPY RXD/TAKEN: CPT | Mod: CPTII,S$GLB,, | Performed by: INTERNAL MEDICINE

## 2023-11-06 PROCEDURE — 85027 COMPLETE CBC AUTOMATED: CPT | Performed by: INTERNAL MEDICINE

## 2023-11-06 PROCEDURE — 3074F SYST BP LT 130 MM HG: CPT | Mod: CPTII,S$GLB,, | Performed by: INTERNAL MEDICINE

## 2023-11-06 PROCEDURE — 99396 PR PREVENTIVE VISIT,EST,40-64: ICD-10-PCS | Mod: S$GLB,,, | Performed by: INTERNAL MEDICINE

## 2023-11-06 PROCEDURE — 3075F SYST BP GE 130 - 139MM HG: CPT | Mod: CPTII,S$GLB,, | Performed by: INTERNAL MEDICINE

## 2023-11-06 PROCEDURE — 83036 HEMOGLOBIN GLYCOSYLATED A1C: CPT | Performed by: INTERNAL MEDICINE

## 2023-11-06 PROCEDURE — 99214 PR OFFICE/OUTPT VISIT, EST, LEVL IV, 30-39 MIN: ICD-10-PCS | Mod: S$GLB,,, | Performed by: INTERNAL MEDICINE

## 2023-11-06 PROCEDURE — 99214 OFFICE O/P EST MOD 30 MIN: CPT | Mod: S$GLB,,, | Performed by: INTERNAL MEDICINE

## 2023-11-06 PROCEDURE — 99396 PREV VISIT EST AGE 40-64: CPT | Mod: S$GLB,,, | Performed by: INTERNAL MEDICINE

## 2023-11-06 PROCEDURE — 3074F PR MOST RECENT SYSTOLIC BLOOD PRESSURE < 130 MM HG: ICD-10-PCS | Mod: CPTII,S$GLB,, | Performed by: INTERNAL MEDICINE

## 2023-11-06 PROCEDURE — 3079F PR MOST RECENT DIASTOLIC BLOOD PRESSURE 80-89 MM HG: ICD-10-PCS | Mod: CPTII,S$GLB,, | Performed by: INTERNAL MEDICINE

## 2023-11-06 NOTE — PROGRESS NOTES
Subjective:    Patient ID:  Fatemeh Shoemaker is a 62 y.o. female who presents for follow-up of Atrial fibrillation      HPI  She comes with episodes of what she describes as twinges, brief, not always associated with palpitations.  These happen on and off, with and without exertion.    In the process of having bariatric surgery within the next year hopefully  Currently on Eliquis, flecainide  She is has an appointment with Dr. Tee next January    Review of Systems   Constitutional: Negative for decreased appetite, malaise/fatigue, weight gain and weight loss.   Cardiovascular:  Negative for chest pain, dyspnea on exertion, leg swelling, palpitations and syncope.   Respiratory:  Negative for cough and shortness of breath.    Gastrointestinal: Negative.    Neurological:  Negative for weakness.   All other systems reviewed and are negative.     Objective:      Physical Exam  Vitals and nursing note reviewed.   Constitutional:       Appearance: Normal appearance. She is well-developed.   HENT:      Head: Normocephalic.   Eyes:      Pupils: Pupils are equal, round, and reactive to light.   Neck:      Thyroid: No thyromegaly.      Vascular: No carotid bruit or JVD.   Cardiovascular:      Rate and Rhythm: Normal rate and regular rhythm.      Chest Wall: PMI is not displaced.      Pulses: Normal pulses and intact distal pulses.      Heart sounds: Normal heart sounds. No murmur heard.     No gallop.   Pulmonary:      Effort: Pulmonary effort is normal.      Breath sounds: Normal breath sounds.   Abdominal:      Palpations: Abdomen is soft. There is no mass.      Tenderness: There is no abdominal tenderness.   Musculoskeletal:         General: Normal range of motion.      Cervical back: Normal range of motion and neck supple.   Skin:     General: Skin is warm.   Neurological:      Mental Status: She is alert and oriented to person, place, and time.      Sensory: No sensory deficit.      Deep Tendon Reflexes: Reflexes  are normal and symmetric.         Most Recent EKG Results  Results for orders placed or performed during the hospital encounter of 11/12/22   EKG 12-lead    Collection Time: 11/12/22  1:07 PM    Narrative    Test Reason : R42,    Vent. Rate : 060 BPM     Atrial Rate : 060 BPM     P-R Int : 342 ms          QRS Dur : 138 ms      QT Int : 474 ms       P-R-T Axes : 000 082 069 degrees     QTc Int : 474 ms    Atrial-paced rhythm with prolonged AV conduction  Nonspecific intraventricular block  Abnormal ECG  When compared with ECG of 12-NOV-2022 11:43,  Electronic atrial pacemaker has replaced Electronic ventricular pacemaker  Confirmed by Venkata Junior MD ([id number]) on 11/12/2022 1:35:03 PM    Referred By: [email or web address]   SELF           Confirmed By:Venkata Junior MD       Most Recent Echocardiogram Results  Results for orders placed in visit on 10/11/21    Echo    Interpretation Summary  · The left ventricle is normal in size with normal systolic function.  · The estimated ejection fraction is 55%.  · Normal left ventricular diastolic function.  · Normal right ventricular size with normal right ventricular systolic function.  · Moderate left atrial enlargement.  · Mild mitral regurgitation.  · Normal central venous pressure (3 mmHg).  · The estimated PA systolic pressure is 25 mmHg.      Most Recent Nuclear Stress Test Results  Results for orders placed during the hospital encounter of 10/11/21    Nuclear Stress - Cardiology Interpreted    Interpretation Summary    Normal myocardial perfusion scan. There is no evidence of myocardial ischemia or infarction.    There is a mild to moderate intensity defect in the anterior wall of the left ventricle, secondary to breast attenuation.    The gated perfusion images showed an ejection fraction of 63% at rest.    There is normal wall motion at rest.    The EKG portion of this study is negative for ischemia.    There are no prior studies for comparison.      Most Recent Cardiac  PET Stress Test Results  No results found for this or any previous visit.      Most Recent Cardiovascular Angiogram results  No results found for this or any previous visit.      Other Most Recent Cardiology Results  Results for orders placed in visit on 09/13/23    Cardiac device check - Remote    Narrative  Battery and Leads (BL)  Normal parameters noted on battery and lead(s)    Presenting Rhythm (AZ)  Atrial Sensing-Ventricular Sensing (AS-VS)    Arrhythmic events (AE)  Device-identified arrhythmic events without corresponding EGMs and/or details noted    Anticoagulation  (AC)  Patient prescribed Apixaban (Eliquis)  Patient on anticoagulant therapy    Cardiovascular Physiologic Parameters (CPP)  No abnormalities in physiologic parameters identified    Miscellaneous Observations (MISC)  RV pacing > 40% noted  >95% Atrial Pacing    Transmission Information (TI)  Device Summary Report    Follow Up (FU)  Continue remote monitoring with quarterly reporting    Additional Comments  Pacemaker Report  Monitoring period:  4/22/23 - 7/20/23    Battery/Lead Status:  ok/75%    Ap: 95 %  : 48%    Device Defined Counters:    Atrial Fibrillation/Flutter:  up to 35/day  No EGMs or details available for review. Longest episode in past 24h reported as 23m 32s in duration.  AF burden 1% since 9/22/22      Labs reviewed    Assessment:       1. Paroxysmal atrial fibrillation    2. Essential hypertension    3. Symptomatic bradycardia    4. Pacemaker         Plan:     Continue:  Ace inhibitor, Antiarrhythmic, Beta blocker, DOAC, and Statin  Regular exercise program  Weight loss  Low cholesterol diet    Nuclear stress test next month.  Call with results.    Six-month follow-up

## 2023-11-06 NOTE — PROGRESS NOTES
Subjective     Fatemeh Shoemaker is a 62 y.o. old, female here for Annual Exam    62-year-old with past medical history of PAF, symptomatic bradycardia s/p PCM, HLD, chronic bronchitis, JOSE ELIAS, peripheral neuropathy, DDD/chronic back pain.    Patient complains of sternal pain that radiates around the lower ribs. It happens intermittently. It is not severe. It lasts for minutes or hours. She is still on a daily PPI. H/o esophageal ulcer - she has not had an EGD in many years.    PAF: has f/u with cardiology, PCM in place, some palpitations.  HLD: due for labs, did not take statin therapy.  JOSE ELIAS: will recheck CBC.  DDD: had a 2nd opinion from [facility], not a good candidate for surgery they said.    Review of Systems   Musculoskeletal:  Positive for back pain, falls and joint pain.   All other systems reviewed and are negative.    Medications     Outpatient Medications Marked as Taking for the 11/6/23 encounter (Office Visit) with Jerson Wheat MD   Medication Sig Dispense Refill    albuterol sulfate 90 mcg/actuation aebs Inhale 90 mcg into the lungs every 4 (four) hours as needed (wheezing or shortness of breath). 1 each 2    apixaban (ELIQUIS) 5 mg Tab Take 1 tablet (5 mg total) by mouth 2 (two) times daily. 180 tablet 3    cholecalciferol, vitamin D3, (DECARA) 1,250 mcg (50,000 unit) capsule Take 1 capsule (50,000 Units total) by mouth every 7 days. 30 capsule 0    cyanocobalamin 500 MCG tablet Take 1,000 mcg by mouth once daily.      diclofenac sodium (VOLTAREN) 1 % Gel Apply 2 g topically 4 (four) times daily. 50 g 3    DULoxetine (CYMBALTA) 60 MG capsule TAKE ONE CAPSULE BY MOUTH DAILY 90 capsule 2    flecainide (TAMBOCOR) 150 MG Tab Take 1 tablet (150 mg total) by mouth 2 (two) times a day. 60 tablet 3    fluticasone furoate-vilanteroL (BREO ELLIPTA) 100-25 mcg/dose diskus inhaler Inhale 1 puff into the lungs once daily. 60 each 2    gabapentin (NEURONTIN) 600 MG tablet Take 1 tablet (600 mg total) by mouth 2  (two) times daily. 60 tablet 2    HYDROcodone-acetaminophen (NORCO) 5-325 mg per tablet Take 1 tablet by mouth every 6 (six) hours as needed for Pain. 20 tablet 0    melatonin 5 mg TbDL Take 1 tablet by mouth nightly as needed (sleep).      metoprolol tartrate (LOPRESSOR) 100 MG tablet Take 1 tablet (100 mg total) by mouth 2 (two) times daily. 60 tablet 0    nortriptyline (PAMELOR) 25 MG capsule TAKE ONE CAPSULE BY MOUTH EVERY EVENING 90 capsule 3    omeprazole (PRILOSEC) 20 MG capsule TAKE ONE CAPSULE BY MOUTH EVERY MORNING 90 capsule 1    ondansetron (ZOFRAN-ODT) 4 MG TbDL Take 1 tablet (4 mg total) by mouth 2 (two) times daily. 20 tablet 1    promethazine (PHENERGAN) 12.5 MG Tab Take 1 tablet (12.5 mg total) by mouth 2 (two) times daily as needed (nausea). 10 tablet 0    promethazine-dextromethorphan (PROMETHAZINE-DM) 6.25-15 mg/5 mL Syrp Take 5 mLs by mouth nightly as needed.      pulse oximeter (PULSE OXIMETER) device Use twice daily at 8 AM and 3 PM and record the value in [facility] as directed. 1 each 0    rosuvastatin (CRESTOR) 10 MG tablet TAKE ONE TABLET BY MOUTH EVERY EVENING 90 tablet 3    tiZANidine (ZANAFLEX) 4 MG tablet TAKE ONE TABLET BY MOUTH every SIX hours AS NEEDED. 60 tablet 1    traMADoL (ULTRAM) 50 mg tablet Take 1 tablet (50 mg total) by mouth every 12 (twelve) hours as needed for Pain. 30 tablet 0    valACYclovir (VALTREX) 500 MG tablet Take 1 tablet (500 mg total) by mouth daily as needed (fever blister). 30 tablet 2    [DISCONTINUED] promethazine-codeine 6.25-10 mg/5 ml (PHENERGAN WITH CODEINE) 6.25-10 mg/5 mL syrup Take 5 mLs by mouth nightly as needed for Cough. 118 mL 0    [DISCONTINUED] semaglutide (RYBELSUS) 3 mg tablet Take 1 tablet (3 mg total) by mouth once daily. 30 tablet 11     Objective     /82   Pulse 76   Wt 126.1 kg (278 lb)   LMP 09/08/2013 (Approximate)   SpO2 99%   BMI 46.26 kg/m²   Physical Exam  Constitutional:       General: She is not in acute distress.      Appearance: Normal appearance. She is well-developed. She is obese.   HENT:      Head: Normocephalic and atraumatic.   Eyes:      Conjunctiva/sclera: Conjunctivae normal.      Pupils: Pupils are equal, round, and reactive to light.   Neck:      Thyroid: No thyroid mass or thyromegaly.   Cardiovascular:      Rate and Rhythm: Normal rate and regular rhythm.      Heart sounds: Normal heart sounds. No murmur heard.  Pulmonary:      Effort: No respiratory distress.      Breath sounds: Normal breath sounds.   Abdominal:      General: Bowel sounds are normal.      Palpations: Abdomen is soft.      Tenderness: There is no abdominal tenderness.   Musculoskeletal:         General: No deformity.      Cervical back: Neck supple.      Comments: Large back lipoma   Lymphadenopathy:      Cervical: No cervical adenopathy.      Upper Body:      Right upper body: No supraclavicular adenopathy.      Left upper body: No supraclavicular adenopathy.   Skin:     General: Skin is warm and dry.      Findings: No rash.   Neurological:      Mental Status: She is alert and oriented to person, place, and time.   Psychiatric:         Behavior: Behavior normal.       Assessment and Plan     Pharyngitis, unspecified etiology  -     Case Request Endoscopy: COLONOSCOPY, EGD (ESOPHAGOGASTRODUODENOSCOPY)    Screening for colon cancer  -     Case Request Endoscopy: COLONOSCOPY, EGD (ESOPHAGOGASTRODUODENOSCOPY)    Paroxysmal atrial fibrillation  -     Lipid Panel; Future; Expected date: 11/06/2023  -     TSH; Future; Expected date: 11/06/2023    Essential hypertension  -     Comprehensive Metabolic Panel; Future; Expected date: 11/06/2023  -     Hemoglobin A1C; Future; Expected date: 11/06/2023    Iron deficiency anemia, unspecified iron deficiency anemia type  -     CBC Without Differential; Future; Expected date: 11/06/2023      Due for EGD and colon cancer screening.  Check labs today.  ___________________  Jerson Wheat MD  Internal Medicine and  Pediatrics

## 2023-11-10 ENCOUNTER — OFFICE VISIT (OUTPATIENT)
Dept: BARIATRICS | Facility: CLINIC | Age: 63
End: 2023-11-10
Payer: COMMERCIAL

## 2023-11-10 VITALS
SYSTOLIC BLOOD PRESSURE: 142 MMHG | HEIGHT: 65 IN | TEMPERATURE: 97 F | RESPIRATION RATE: 16 BRPM | HEART RATE: 60 BPM | BODY MASS INDEX: 46.78 KG/M2 | WEIGHT: 280.81 LBS | DIASTOLIC BLOOD PRESSURE: 84 MMHG

## 2023-11-10 DIAGNOSIS — E78.00 HYPERCHOLESTEREMIA: ICD-10-CM

## 2023-11-10 DIAGNOSIS — I48.0 PAROXYSMAL ATRIAL FIBRILLATION: ICD-10-CM

## 2023-11-10 DIAGNOSIS — G47.33 OBSTRUCTIVE SLEEP APNEA SYNDROME: ICD-10-CM

## 2023-11-10 DIAGNOSIS — I10 ESSENTIAL HYPERTENSION: ICD-10-CM

## 2023-11-10 DIAGNOSIS — E66.01 MORBID OBESITY: Primary | ICD-10-CM

## 2023-11-10 DIAGNOSIS — Z95.0 PACEMAKER: ICD-10-CM

## 2023-11-10 DIAGNOSIS — K22.11 ULCER OF ESOPHAGUS WITH BLEEDING: ICD-10-CM

## 2023-11-10 PROCEDURE — 1159F PR MEDICATION LIST DOCUMENTED IN MEDICAL RECORD: ICD-10-PCS | Mod: CPTII,S$GLB,, | Performed by: NURSE PRACTITIONER

## 2023-11-10 PROCEDURE — 1160F PR REVIEW ALL MEDS BY PRESCRIBER/CLIN PHARMACIST DOCUMENTED: ICD-10-PCS | Mod: CPTII,S$GLB,, | Performed by: NURSE PRACTITIONER

## 2023-11-10 PROCEDURE — 3079F PR MOST RECENT DIASTOLIC BLOOD PRESSURE 80-89 MM HG: ICD-10-PCS | Mod: CPTII,S$GLB,, | Performed by: NURSE PRACTITIONER

## 2023-11-10 PROCEDURE — 1159F MED LIST DOCD IN RCRD: CPT | Mod: CPTII,S$GLB,, | Performed by: NURSE PRACTITIONER

## 2023-11-10 PROCEDURE — 99214 OFFICE O/P EST MOD 30 MIN: CPT | Mod: S$GLB,,, | Performed by: NURSE PRACTITIONER

## 2023-11-10 PROCEDURE — 3044F PR MOST RECENT HEMOGLOBIN A1C LEVEL <7.0%: ICD-10-PCS | Mod: CPTII,S$GLB,, | Performed by: NURSE PRACTITIONER

## 2023-11-10 PROCEDURE — 3044F HG A1C LEVEL LT 7.0%: CPT | Mod: CPTII,S$GLB,, | Performed by: NURSE PRACTITIONER

## 2023-11-10 PROCEDURE — 3008F BODY MASS INDEX DOCD: CPT | Mod: CPTII,S$GLB,, | Performed by: NURSE PRACTITIONER

## 2023-11-10 PROCEDURE — 3077F SYST BP >= 140 MM HG: CPT | Mod: CPTII,S$GLB,, | Performed by: NURSE PRACTITIONER

## 2023-11-10 PROCEDURE — 99999 PR PBB SHADOW E&M-EST. PATIENT-LVL V: CPT | Mod: PBBFAC,,, | Performed by: NURSE PRACTITIONER

## 2023-11-10 PROCEDURE — 4010F ACE/ARB THERAPY RXD/TAKEN: CPT | Mod: CPTII,S$GLB,, | Performed by: NURSE PRACTITIONER

## 2023-11-10 PROCEDURE — 3008F PR BODY MASS INDEX (BMI) DOCUMENTED: ICD-10-PCS | Mod: CPTII,S$GLB,, | Performed by: NURSE PRACTITIONER

## 2023-11-10 PROCEDURE — 3077F PR MOST RECENT SYSTOLIC BLOOD PRESSURE >= 140 MM HG: ICD-10-PCS | Mod: CPTII,S$GLB,, | Performed by: NURSE PRACTITIONER

## 2023-11-10 PROCEDURE — 99999 PR PBB SHADOW E&M-EST. PATIENT-LVL V: ICD-10-PCS | Mod: PBBFAC,,, | Performed by: NURSE PRACTITIONER

## 2023-11-10 PROCEDURE — 4010F PR ACE/ARB THEARPY RXD/TAKEN: ICD-10-PCS | Mod: CPTII,S$GLB,, | Performed by: NURSE PRACTITIONER

## 2023-11-10 PROCEDURE — 3079F DIAST BP 80-89 MM HG: CPT | Mod: CPTII,S$GLB,, | Performed by: NURSE PRACTITIONER

## 2023-11-10 PROCEDURE — 1160F RVW MEDS BY RX/DR IN RCRD: CPT | Mod: CPTII,S$GLB,, | Performed by: NURSE PRACTITIONER

## 2023-11-10 PROCEDURE — 99214 PR OFFICE/OUTPT VISIT, EST, LEVL IV, 30-39 MIN: ICD-10-PCS | Mod: S$GLB,,, | Performed by: NURSE PRACTITIONER

## 2023-11-10 NOTE — PATIENT INSTRUCTIONS
Cardio Macey     Change Mvi to regular, not over 50    Psych evaluation [phone]- bariatric evaluation

## 2023-11-10 NOTE — PROGRESS NOTES
Medically Supervised Weight Loss Documentation    Chief Complaint   Patient presents with    Follow-up    Obesity    Nutrition Counseling       History of Present Illness:  Patient is a 62 y.o. female who is referred for evaluation of surgical treatment of morbid obesity. Patient has a Body mass index is 47.45 kg/m². kg/m².  She has known comorbidities of dyslipidemia, hypertension, obstructive sleep apnea, and Afib . Patient has attended the bariatric seminar and is most interested in gastric sleeve surgery evaluation of surgical treatment of morbid obesity.       Comorbidities:   Past Medical History:   Diagnosis Date    Anemia     Anticoagulant long-term use     Arrhythmia     Arthritis     Atrial fibrillation     history    Bronchitis     Encounter for blood transfusion     General anesthetics causing adverse effect in therapeutic use     High blood pressure     Hyperlipidemia     Lump or mass in breast     benign     Neuropathy     Obesity     Obstructive sleep apnea syndrome 2021    Ulcer      Past Surgical History:   Procedure Laterality Date    A-V CARDIAC PACEMAKER INSERTION Left 2020    Procedure: INSERTION, CARDIAC PACEMAKER, DUAL CHAMBER;  Surgeon: Bert Reaves MD;  Location: [facility] CATH;  Service: Cardiology;  Laterality: Left;    BREAST BIOPSY Right 2017    myofibroblastoma     BREAST MASS EXCISION       SECTION  10/25/1986    Other c/section 10/26/1997    CHOLECYSTECTOMY  2017    Dr. TOLU Bowers, [facility]     CORRECTION OF HAMMER TOE Left 2022    Procedure: CORRECTION, HAMMER TOE;  Surgeon: Ezra Arriaga DPM;  Location: [facility] OR;  Service: Podiatry;  Laterality: Left;  hammertoes 2-5 left,    csection      CS x 2    CYSTOSCOPY N/A 2019    Procedure: CYSTOSCOPY;  Surgeon: Noemi Pierre MD;  Location: [facility] OR;  Service: OB/GYN;  Laterality: N/A;    DILATION AND CURETTAGE OF UTERUS      EPIDURAL STEROID INJECTION INTO LUMBAR SPINE N/A 2020     Procedure: Injection-steroid-epidural-lumbar L5/S1;  Surgeon: Gurjit Tavarez MD;  Location: [facility] OR;  Service: Pain Management;  Laterality: N/A;    EPIDURAL STEROID INJECTION INTO LUMBAR SPINE N/A 06/19/2020    Procedure: Injection-steroid-epidural-lumbar L5/S1;  Surgeon: Gurjit Tavarez MD;  Location: [facility] OR;  Service: Pain Management;  Laterality: N/A;    EPIDURAL STEROID INJECTION INTO LUMBAR SPINE N/A 12/01/2021    Procedure: Injection-steroid-epidural-lumbar L5/S1;  Surgeon: Gurjit Tavarez MD;  Location: [facility] OR;  Service: Pain Management;  Laterality: N/A;    EPIDURAL STEROID INJECTION INTO LUMBAR SPINE N/A 02/22/2023    Procedure: Injection-steroid-epidural-lumbar-L5/S1 to the left;  Surgeon: Gurjit Tavarez MD;  Location: [facility] OR;  Service: Pain Management;  Laterality: N/A;    EPIDURAL STEROID INJECTION INTO LUMBAR SPINE Right 04/28/2023    Procedure: Injection-steroid-epidural-lumbar L5/S1;  Surgeon: Gurjit Tavarez MD;  Location: [facility] OR;  Service: Pain Management;  Laterality: Right;    EPIDURAL STEROID INJECTION INTO LUMBAR SPINE N/A 8/29/2023    Procedure: Injection-steroid-epidural-lumbarL5/S1 to right;  Surgeon: Gurjit Tavarez MD;  Location: [facility] OR;  Service: Pain Management;  Laterality: N/A;    FRACTURE SURGERY  04/1986    Dislocated  hip total rt hip 08/08    HIP PINNING      HYSTERECTOMY      JOINT REPLACEMENT  08/2008    LAPAROSCOPIC SALPINGO-OOPHORECTOMY Bilateral 09/03/2019    Procedure: SALPINGO-OOPHORECTOMY, LAPAROSCOPIC;  Surgeon: Noemi Pierre MD;  Location: [facility];  Service: OB/GYN;  Laterality: Bilateral;  Dr. Bentley to assist. No resident needed.     LAPAROSCOPIC TOTAL HYSTERECTOMY N/A 09/03/2019    Procedure: HYSTERECTOMY, TOTAL, LAPAROSCOPIC;  Surgeon: Noemi Pierre MD;  Location: [facility];  Service: OB/GYN;  Laterality: N/A;    NASAL SEPTUM SURGERY      OOPHORECTOMY      SKIN TAG REMOVAL Left 11/03/2022    Procedure: REMOVAL, SKIN TAG;  Surgeon: Ezra Arriaga DPM;  " Location: [facility] OR;  Service: Podiatry;  Laterality: Left;    TOTAL HIP ARTHROPLASTY Right     TUBAL LIGATION  1997     Family History   Problem Relation Age of Onset    Colon cancer Maternal Grandmother 70        mets to ovary    Cancer Maternal Grandmother     Arthritis Paternal Grandfather     Eclampsia Paternal Grandmother     Cataracts Maternal Grandfather     Stroke Father 57    Colon cancer Father     Hypertension Father     Alcohol abuse Father     Cancer Father         Passed away July 10 2019    Hypertension Mother     Cataracts Mother     Hypertension Brother         Age 54 hypertension heart    Early death Brother         Hypertension cardio disease    No Known Problems Daughter     Stomach cancer Neg Hx     Esophageal cancer Neg Hx     Breast cancer Neg Hx     Miscarriages / Stillbirths Neg Hx     Ovarian cancer Neg Hx     Glaucoma Neg Hx     Macular degeneration Neg Hx     Retinal detachment Neg Hx     Strabismus Neg Hx      Social History     Tobacco Use    Smoking status: Never    Smokeless tobacco: Never   Substance Use Topics    Alcohol use: No     Comment: never    Drug use: Never        Review of patient's allergies indicates:   Allergen Reactions    Aspirin Other (See Comments)     "I don't take it because I've had ulcers"       Meperidine Other (See Comments)     Felt like she was about to pass out after taking    Azithromycin      Other reaction(s): Not available       Medications:  Current Outpatient Medications on File Prior to Visit   Medication Sig Dispense Refill    albuterol sulfate 90 mcg/actuation aebs Inhale 90 mcg into the lungs every 4 (four) hours as needed (wheezing or shortness of breath). 1 each 2    apixaban (ELIQUIS) 5 mg Tab Take 1 tablet (5 mg total) by mouth 2 (two) times daily. 180 tablet 3    cholecalciferol, vitamin D3, (DECARA) 1,250 mcg (50,000 unit) capsule Take 1 capsule (50,000 Units total) by mouth every 7 days. 30 capsule 0    cyanocobalamin 500 MCG tablet Take " 1,000 mcg by mouth once daily.      diclofenac sodium (VOLTAREN) 1 % Gel Apply 2 g topically 4 (four) times daily. 50 g 3    DULoxetine (CYMBALTA) 60 MG capsule TAKE ONE CAPSULE BY MOUTH DAILY 90 capsule 2    flecainide (TAMBOCOR) 150 MG Tab Take 1 tablet (150 mg total) by mouth 2 (two) times a day. 60 tablet 3    fluticasone furoate-vilanteroL (BREO ELLIPTA) 100-25 mcg/dose diskus inhaler Inhale 1 puff into the lungs once daily. 60 each 2    gabapentin (NEURONTIN) 600 MG tablet Take 1 tablet (600 mg total) by mouth 2 (two) times daily. 60 tablet 2    HYDROcodone-acetaminophen (NORCO) 5-325 mg per tablet Take 1 tablet by mouth every 6 (six) hours as needed for Pain. 20 tablet 0    melatonin 5 mg TbDL Take 1 tablet by mouth nightly as needed (sleep).      metoprolol tartrate (LOPRESSOR) 100 MG tablet Take 1 tablet (100 mg total) by mouth 2 (two) times daily. 60 tablet 0    nortriptyline (PAMELOR) 25 MG capsule TAKE ONE CAPSULE BY MOUTH EVERY EVENING 90 capsule 3    omeprazole (PRILOSEC) 20 MG capsule TAKE ONE CAPSULE BY MOUTH EVERY MORNING 90 capsule 1    ondansetron (ZOFRAN-ODT) 4 MG TbDL Take 1 tablet (4 mg total) by mouth 2 (two) times daily. 20 tablet 1    promethazine (PHENERGAN) 12.5 MG Tab Take 1 tablet (12.5 mg total) by mouth 2 (two) times daily as needed (nausea). 10 tablet 0    pulse oximeter (PULSE OXIMETER) device Use twice daily at 8 AM and 3 PM and record the value in [facility] as directed. 1 each 0    tiZANidine (ZANAFLEX) 4 MG tablet TAKE ONE TABLET BY MOUTH every SIX hours AS NEEDED. 60 tablet 1    traMADoL (ULTRAM) 50 mg tablet Take 1 tablet (50 mg total) by mouth every 12 (twelve) hours as needed for Pain. 30 tablet 0    valACYclovir (VALTREX) 500 MG tablet Take 1 tablet (500 mg total) by mouth daily as needed (fever blister). 30 tablet 2    lisinopriL 10 MG tablet Take 1 tablet (10 mg total) by mouth once daily. 90 tablet 3    promethazine-dextromethorphan (PROMETHAZINE-DM) 6.25-15 mg/5 mL Syrp  Take 5 mLs by mouth nightly as needed.      rosuvastatin (CRESTOR) 10 MG tablet TAKE ONE TABLET BY MOUTH EVERY EVENING (Patient not taking: Reported on 11/10/2023) 90 tablet 3     No current facility-administered medications on file prior to visit.         Body mass index is 47.45 kg/m².     Beginning Weight: 297 lbs    Last Weight: 277 lbs    Current Weight: 280 lbs   Weight Change: -17 lbs      Body comp:  Fat Percent:  54.1 %  Fat Mass:  152 lb  FFM:  128.8 lb  TBW: 95.2 lb  TBW %:  33.9 %  BMR: 1875 kcal    Wt Readings from Last 5 Encounters:   11/10/23 127.4 kg (280 lb 12.8 oz)   11/06/23 126.2 kg (278 lb 3.5 oz)   11/06/23 126.1 kg (278 lb)   10/27/23 127 kg (280 lb)   10/24/23 127 kg (280 lb)         Chart review:  Primary Care Physician: Jarret      Lab review:  Most Recent Data:  CBC:   Lab Results   Component Value Date    WBC 7.77 11/06/2023    HGB 12.0 11/06/2023    HCT 38.2 11/06/2023     11/06/2023    MCV 87 11/06/2023    RDW 15.7 (H) 11/06/2023     BMP:   Lab Results   Component Value Date     11/06/2023    K 4.8 11/06/2023     11/06/2023    CO2 26 11/06/2023    BUN 12 11/06/2023    CREATININE 0.8 11/06/2023     (H) 11/06/2023    CALCIUM 9.7 11/06/2023    MG 2.1 11/13/2022     LFTs:   Lab Results   Component Value Date    PROT 7.3 11/06/2023    ALBUMIN 3.8 11/06/2023    BILITOT 0.6 11/06/2023    AST 21 11/06/2023    ALKPHOS 44 (L) 11/06/2023    ALT 16 11/06/2023     Coags:   Lab Results   Component Value Date    INR 1.1 11/12/2022     FLP:   Lab Results   Component Value Date    CHOL 246 (H) 11/06/2023    HDL 36 (L) 11/06/2023    LDLCALC 145.6 11/06/2023    TRIG 322 (H) 11/06/2023    CHOLHDL 14.6 (L) 11/06/2023     DM:   Lab Results   Component Value Date    HGBA1C 5.8 (H) 11/06/2023    HGBA1C 5.5 11/12/2022    HGBA1C 5.9 (H) 08/22/2022    LDLCALC 145.6 11/06/2023    CREATININE 0.8 11/06/2023     Thyroid:   Lab Results   Component Value Date    TSH 1.073 11/06/2023      Anemia:   Lab Results   Component Value Date    IRON 21 (L) 10/23/2020    TIBC 428 10/23/2020    FERRITIN 26 10/23/2020    OLCZKUVM31 392 12/07/2022    FOLATE 6.7 10/23/2020     Cardiac:   Lab Results   Component Value Date    TROPONINI <0.012 11/12/2022     (H) 06/22/2018     Urinalysis:   Lab Results   Component Value Date    LABURIN ESCHERICHIA COLI  >100,000 cfu/ml   (A) 11/13/2022    COLORU Yellow 11/13/2022    SPECGRAV 1.020 11/13/2022    NITRITE Positive (A) 11/13/2022    KETONESU Negative 11/13/2022    UROBILINOGEN 2.0 11/13/2022    WBCUA 35 (H) 11/13/2022    WBCUA 35 (H) 11/13/2022         Radiology review: Images independently reviewed and interpreted.    UGI- (2021)  1. Mild esophageal dysmotility.  2. Small duodenal diverticulum.      Other Results:  EKG (my interpretation): there are no previous tracings available for comparison.        Review of Systems:  Review of Systems   Constitutional:  Positive for activity change, appetite change and fatigue. Negative for chills, diaphoresis and fever.   HENT:  Negative for congestion, nosebleeds, sinus pressure, sneezing, sore throat, tinnitus and voice change.    Eyes:  Negative for pain, redness and itching.   Respiratory:  Negative for apnea, cough, choking, chest tightness, shortness of breath, wheezing and stridor.    Cardiovascular:  Positive for leg swelling. Negative for chest pain and palpitations.   Gastrointestinal:  Negative for abdominal pain, anal bleeding, constipation, diarrhea, nausea and vomiting.   Endocrine: Negative for cold intolerance and heat intolerance.   Genitourinary:  Negative for difficulty urinating and dysuria.   Musculoskeletal:  Positive for back pain, gait problem and joint swelling. Negative for arthralgias.   Skin:  Negative for rash and wound.   Allergic/Immunologic: Negative for environmental allergies and food allergies.   Neurological:  Negative for dizziness, light-headedness and headaches.   Hematological:   "Negative for adenopathy. Does not bruise/bleed easily.   Psychiatric/Behavioral:  Negative for agitation and confusion.    All other systems reviewed and are negative.        Diet Education Discussed: Recommend high protein, low carb meals- mainly meats and vegetables.    Breakfast:  coffee with sugar/cream, PB crackers  Lunch:  chicken fried steak with Green beans  Dinner at 5:  skipped, banana  No after dinner snacking- not eating after 6  Water: > 64 oz  Diet Sprite  Usually drink [employer] brand (lemonade crystal light)  No straws      MVI:   [employer] brand- MVI over 50    Exercise/Activity Discussed: recommend cardiovascular exercise, get HR over 100 for 20 minutes 3 times per week-   Fell 3 weeks ago (evaluated at [facility])    Physical:     Vital Signs (Most Recent)  Temp: 97.4 °F (36.3 °C) (11/10/23 0934)  Pulse: 60 (11/10/23 0934)  Resp: 16 (11/10/23 0934)  BP: (!) 142/84 (11/10/23 0934)  5' 4.5" (1.638 m)  127.4 kg (280 lb 12.8 oz)     Physical Exam:  Physical Exam  Vitals and nursing note reviewed.   Constitutional:       General: She is not in acute distress.     Appearance: Normal appearance. She is obese. She is not ill-appearing or toxic-appearing.   HENT:      Head: Normocephalic and atraumatic.      Right Ear: External ear normal.      Left Ear: External ear normal.      Nose: Nose normal. No congestion or rhinorrhea.      Mouth/Throat:      Mouth: Mucous membranes are moist.      Pharynx: Oropharynx is clear.   Eyes:      General:         Right eye: No discharge.         Left eye: No discharge.      Conjunctiva/sclera: Conjunctivae normal.   Cardiovascular:      Rate and Rhythm: Rhythm irregular.      Pulses: Normal pulses.      Heart sounds: No murmur heard.  Pulmonary:      Effort: Pulmonary effort is normal. No respiratory distress.   Musculoskeletal:         General: No swelling, tenderness, deformity or signs of injury. Normal range of motion.      Right lower leg: No edema.      Left lower leg: " No edema.   Skin:     General: Skin is warm and dry.      Capillary Refill: Capillary refill takes less than 2 seconds.      Coloration: Skin is not jaundiced or pale.      Findings: No bruising, erythema or rash.   Neurological:      General: No focal deficit present.      Mental Status: She is alert and oriented to person, place, and time.      Motor: No weakness.      Gait: Gait normal.   Psychiatric:         Mood and Affect: Mood normal.         Behavior: Behavior normal.         Thought Content: Thought content normal.         Judgment: Judgment normal.       ASSESSMENT/PLAN:        1. Morbid obesity        2. BMI 45.0-49.9, adult        3. Essential hypertension        4. Paroxysmal atrial fibrillation        5. Obstructive sleep apnea syndrome        6. Pacemaker        7. Ulcer of esophagus with bleeding        8. Hypercholesteremia            Continue with Dr. Siegel's established plan on initial evaluation on 10/4/23        MSD 2/6     Patient will need:     Labs-   EKG-   UGI-   EGD/Colonscopy- planning with John  dietary consult- done  psych consult-   Seminar- done     Will obtain the following clearances prior to surgery: Cardiology- Dr. Katz/ [facility] (sees for afib)        Plan for this patient:  Falls reviewed with patient  Continue Prescribed home medications  Diet adherence   No skipping   No bananas  Tanita scale results reviewed with patient  Exercise/Activity adherence   Discussed cardio galileo  Continue multivitamins- discussion of post-operative life long MVI need, including Calcium, and a B-Complex  Clearance   EGD/colonoscopy   Cardiology   psych          I spent a total of 35 minutes on the day of the visit.This includes face to face time and non-face to face time preparing to see the patient (eg, review of tests), obtaining and/or reviewing separately obtained history, documenting clinical information in the electronic or other health record, independently interpreting results and  communicating results to the patient/family/caregiver, or care coordinator.

## 2023-11-13 ENCOUNTER — TELEPHONE (OUTPATIENT)
Dept: FAMILY MEDICINE | Facility: CLINIC | Age: 63
End: 2023-11-13
Payer: COMMERCIAL

## 2023-11-13 DIAGNOSIS — K22.10 ULCER OF ESOPHAGUS WITHOUT BLEEDING: ICD-10-CM

## 2023-11-13 DIAGNOSIS — J02.9 PHARYNGITIS, UNSPECIFIED ETIOLOGY: Primary | ICD-10-CM

## 2023-11-13 DIAGNOSIS — Z12.11 SCREENING FOR COLON CANCER: ICD-10-CM

## 2023-11-13 NOTE — TELEPHONE ENCOUNTER
----- Message from Pato Brooks sent at 11/13/2023 10:26 AM CST -----  Regarding: endo/colonoscopy orders  Contact: tia at [phone]  Type: Needs Medical Advice    Who Called:  ezio Dominguez Call Back Number: [phone]    Additional Information: pt was seen 11/6 and pt states ENDO/Colonoscopy order were to be placed after visit to can get both done on same day. I cannot see orders on chart as of today. Please call pt discuss..

## 2023-11-13 NOTE — TELEPHONE ENCOUNTER
----- Message from Merced Yoli sent at 11/13/2023  4:09 PM CST -----  Regarding: Return Call  Patient Returning Call      Who Called: Pt    Who Left Message for Patient: [place]    Does the patient know what this is regarding?: Yes    Would the patient rather a call back or a response via [facility] [employer]? Call back    Best Call Back Number: [phone]      Additional Information:  Please Advise - Thank you

## 2023-11-14 NOTE — TELEPHONE ENCOUNTER
Mrs. Shoemaker is looking for a sooner appointment for an EGD and a colonoscopy. We don't have anything over here in [place] till January.

## 2023-11-15 ENCOUNTER — TELEPHONE (OUTPATIENT)
Dept: PREADMISSION TESTING | Facility: HOSPITAL | Age: 63
End: 2023-11-15
Payer: COMMERCIAL

## 2023-11-15 NOTE — TELEPHONE ENCOUNTER
----- Message from Claudette Richardson LPN sent at 11/15/2023  2:29 PM CST -----  Regarding: FW: Cardiac/Eliquis clearance for EGD/Colonoscopy    ----- Message -----  From: Bert Reaves MD  Sent: 11/15/2023   2:25 PM CST  To: [facility] Cardio Clinical Staff  Subject: FW: Cardiac/Eliquis clearance for EGD/Colono#    Wait for results of stress test    ----- Message -----  From: Lydia Chung RN  Sent: 11/15/2023   9:44 AM CST  To: Bert Reaves MD  Subject: Cardiac/Eliquis clearance for EGD/Colonoscopy    This patient is being scheduled for one of the following procedures: Endoscopy & Colonoscopy    The patient is scheduled for a stress test 12/1. Would she be cleared to have the EGD and Colonoscopy prior to the stress test or wait until after the stress test is completed.     Please choose one of the following:  [ ] Patient is at low/moderate risk for procedure/anesthesia from cardiac standpoint.   [ ] Patient is at increased risk but not prohibited risk from cardiac standpoint. To minimize risk, we recommend the following:   ___________________________________________________________________  ___________________________________________________________________  [ ] Patient is at prohibited risk from cardiac standpoint for above procedure. Reason/recommendation:  ______________________________________________________________________ ______________________________________________________________________      Our records indicate that they are currently taking: ls anticoag list: Eliquis (APIXABAN)  #    Please indicate if and when the patient can safely stop their medication prior to the procedure.    Please take into consideration that therapeutic maneuvers may be performed during the procedure that requires delay in restarting anticoagulation therapy.       MEDICATION HOLD TIME  Aggrenox (ASA/DIPYRIDAMOLE) 7 days  Persantine (DIPYRIDAMOLE) 3 days  Arixtra (FONDAPARINUX) 2 days  Aspirin 325mg  Aspirin  81mg No need to hold  Brilinta (TICAGRELOR) 5 days  Coumadin (WARFARIN) 5 days  Effient (PRASUGREL) 5 days  Eliquis (APIXABAN) # 2 days    # if CKD then 4 days  Fragmin (DALTEPARIN) 24 hours  Plavix (CLOPIDOGREL) 5 days  Pletal (CILOSTAZOL) 5 days  Pradaxa (DABIGATRAN)# 2 days    # if CKD then 4 days  Savaysa (EDOXABAN) 2 days  Ticlid (TICLIDOPINE) 10 days  Xarelto (RIVAROXABAN)# 2 days # if CKD then 4 days  Zontivity (VORAPAXAR) 14 days  Lovenox (ENOXAPARIN) Q24hr dosing: hold 24hrs  Q12hr dosing:   Hold 12 hours          Patient gave verbal consent Yes    If you have questions or concerns, please call us at [phone].  Thank you,   Lydia Chung RN  Endoscopy Scheduling/Pre-admit Department   [phone] I work 3 days a week so if you missed me then call  [phone] and leave a voicemail for a return call from another nurse

## 2023-11-15 NOTE — TELEPHONE ENCOUNTER
Ma spoke with pt   Pt was offered a sooner appointment on 11/24 patient states she will be out of town   Pt thanked mA for call

## 2023-11-24 DIAGNOSIS — R20.2 PARESTHESIAS: ICD-10-CM

## 2023-11-24 DIAGNOSIS — G62.9 NEUROPATHY: ICD-10-CM

## 2023-11-24 DIAGNOSIS — I48.0 PAROXYSMAL ATRIAL FIBRILLATION: ICD-10-CM

## 2023-11-24 DIAGNOSIS — I48.0 PAROXYSMAL ATRIAL FIBRILLATION: Primary | ICD-10-CM

## 2023-11-24 RX ORDER — DULOXETIN HYDROCHLORIDE 60 MG/1
CAPSULE, DELAYED RELEASE ORAL
Qty: 90 CAPSULE | Refills: 3 | Status: SHIPPED | OUTPATIENT
Start: 2023-11-24

## 2023-11-24 NOTE — TELEPHONE ENCOUNTER
No care due was identified.  [facility] Embedded Care Due Messages. Reference number: [id number].   11/24/2023 10:19:43 AM CST

## 2023-11-24 NOTE — TELEPHONE ENCOUNTER
Refill Routing Note   Medication(s) are not appropriate for processing by [facility] for the following reason(s):        Required vitals abnormal    [facility] action(s):  Defer               Appointments  past 12m or future 3m with PCP    Date Provider   Last Visit   11/6/2023 Jerson Wheat MD   Next Visit   Visit date not found Jerson Wheat MD   ED visits in past 90 days: 1        Note composed:10:40 AM 11/24/2023

## 2023-11-26 RX ORDER — METOPROLOL TARTRATE 100 MG/1
100 TABLET ORAL 2 TIMES DAILY
Qty: 180 TABLET | Refills: 0 | Status: SHIPPED | OUTPATIENT
Start: 2023-11-26 | End: 2024-02-28 | Stop reason: SDUPTHER

## 2023-11-29 ENCOUNTER — PATIENT MESSAGE (OUTPATIENT)
Dept: RADIOLOGY | Facility: HOSPITAL | Age: 63
End: 2023-11-29
Payer: COMMERCIAL

## 2023-12-01 ENCOUNTER — HOSPITAL ENCOUNTER (OUTPATIENT)
Dept: RADIOLOGY | Facility: HOSPITAL | Age: 63
Discharge: HOME | End: 2023-12-01
Attending: INTERNAL MEDICINE
Payer: COMMERCIAL

## 2023-12-01 ENCOUNTER — CLINICAL SUPPORT (OUTPATIENT)
Dept: CARDIOLOGY | Facility: HOSPITAL | Age: 63
End: 2023-12-01
Attending: INTERNAL MEDICINE
Payer: COMMERCIAL

## 2023-12-01 ENCOUNTER — PATIENT MESSAGE (OUTPATIENT)
Dept: BARIATRICS | Facility: CLINIC | Age: 63
End: 2023-12-01
Payer: COMMERCIAL

## 2023-12-01 VITALS — BODY MASS INDEX: 46.65 KG/M2 | WEIGHT: 280 LBS | HEIGHT: 65 IN

## 2023-12-01 DIAGNOSIS — Z95.0 PACEMAKER: ICD-10-CM

## 2023-12-01 DIAGNOSIS — R00.1 SYMPTOMATIC BRADYCARDIA: ICD-10-CM

## 2023-12-01 DIAGNOSIS — I10 ESSENTIAL HYPERTENSION: ICD-10-CM

## 2023-12-01 DIAGNOSIS — I48.0 PAROXYSMAL ATRIAL FIBRILLATION: ICD-10-CM

## 2023-12-01 PROCEDURE — 93016 CV STRESS TEST SUPVJ ONLY: CPT | Mod: ,,, | Performed by: INTERNAL MEDICINE

## 2023-12-01 PROCEDURE — 93018 PR CARDIAC STRESS TST,INTERP/REPT ONLY: ICD-10-PCS | Mod: ,,, | Performed by: INTERNAL MEDICINE

## 2023-12-01 PROCEDURE — 93018 CV STRESS TEST I&R ONLY: CPT | Mod: ,,, | Performed by: INTERNAL MEDICINE

## 2023-12-01 PROCEDURE — 93016 NUCLEAR STRESS - CARDIOLOGY INTERPRETED (CUPID ONLY): ICD-10-PCS | Mod: ,,, | Performed by: INTERNAL MEDICINE

## 2023-12-01 PROCEDURE — 78452 NUCLEAR STRESS - CARDIOLOGY INTERPRETED (CUPID ONLY): ICD-10-PCS | Mod: 26,,, | Performed by: INTERNAL MEDICINE

## 2023-12-01 PROCEDURE — 93017 CV STRESS TEST TRACING ONLY: CPT | Mod: PO

## 2023-12-01 PROCEDURE — 78452 HT MUSCLE IMAGE SPECT MULT: CPT | Mod: PO

## 2023-12-01 PROCEDURE — 63600175 PHARM REV CODE 636 W HCPCS: Mod: PO | Performed by: INTERNAL MEDICINE

## 2023-12-01 PROCEDURE — A9502 TC99M TETROFOSMIN: HCPCS | Mod: PO

## 2023-12-01 PROCEDURE — 78452 HT MUSCLE IMAGE SPECT MULT: CPT | Mod: 26,,, | Performed by: INTERNAL MEDICINE

## 2023-12-01 RX ORDER — REGADENOSON 0.08 MG/ML
0.4 INJECTION, SOLUTION INTRAVENOUS
Status: COMPLETED | OUTPATIENT
Start: 2023-12-01 | End: 2023-12-01

## 2023-12-01 RX ADMIN — REGADENOSON 0.4 MG: 0.08 INJECTION, SOLUTION INTRAVENOUS at 02:12

## 2023-12-04 ENCOUNTER — PATIENT MESSAGE (OUTPATIENT)
Dept: CARDIOLOGY | Facility: CLINIC | Age: 63
End: 2023-12-04
Payer: COMMERCIAL

## 2023-12-04 LAB
CV PHARM DOSE: 0.4 MG
CV STRESS BASE HR: 60 BPM
DIASTOLIC BLOOD PRESSURE: 80 MMHG
NUC REST EJECTION FRACTION: 59
OHS CV CPX 1 MINUTE RECOVERY HEART RATE: 60 BPM
OHS CV CPX 85 PERCENT MAX PREDICTED HEART RATE MALE: 133
OHS CV CPX MAX PREDICTED HEART RATE: 157
OHS CV CPX PATIENT IS FEMALE: 1
OHS CV CPX PATIENT IS MALE: 0
OHS CV CPX PEAK DIASTOLIC BLOOD PRESSURE: 80 MMHG
OHS CV CPX PEAK HEAR RATE: 82 BPM
OHS CV CPX PEAK RATE PRESSURE PRODUCT: NORMAL
OHS CV CPX PEAK SYSTOLIC BLOOD PRESSURE: 134 MMHG
OHS CV CPX PERCENT MAX PREDICTED HEART RATE ACHIEVED: 54
OHS CV CPX RATE PRESSURE PRODUCT PRESENTING: 8040
OHS CV PHARM TIME: 1432 MIN
SYSTOLIC BLOOD PRESSURE: 134 MMHG

## 2023-12-04 NOTE — TELEPHONE ENCOUNTER
Wt Readings from Last 3 Encounters:   11/29/23 95.5 kg (210 lb 8 oz)   11/24/23 92.1 kg (203 lb)   09/08/23 92.1 kg (203 lb)   Euvolemic on exam.  Stable, no exacerbation  Continue Lasix and Lopressor daily  Monitor weight, trend BMP called pt and scheduled a consult appt

## 2023-12-06 ENCOUNTER — PATIENT MESSAGE (OUTPATIENT)
Dept: CARDIOLOGY | Facility: CLINIC | Age: 63
End: 2023-12-06
Payer: COMMERCIAL

## 2023-12-06 DIAGNOSIS — R94.39 POSITIVE CARDIAC STRESS TEST: Primary | ICD-10-CM

## 2023-12-06 RX ORDER — SODIUM CHLORIDE 9 MG/ML
INJECTION, SOLUTION INTRAVENOUS ONCE
Status: CANCELLED | OUTPATIENT
Start: 2023-12-06 | End: 2023-12-06

## 2023-12-06 RX ORDER — SODIUM CHLORIDE 0.9 % (FLUSH) 0.9 %
10 SYRINGE (ML) INJECTION
Status: DISCONTINUED | OUTPATIENT
Start: 2023-12-06 | End: 2023-12-26 | Stop reason: ALTCHOICE

## 2023-12-06 NOTE — PROGRESS NOTES
Angiogram    Arrive for procedure at: [facility] on 12/26/23 at 6 am. Your procedure is scheduled for 8 am. Enter through the main entrance and check in at the reception desk. They will direct you upstairs to the cath lab.      You will receive a phone call from [facility] Pre-Op Department with further instructions and exact arrival time prior to your scheduled procedure.    Notify the nurse if you are ALLERGIC TO IODINE.    FASTING: You MAY NOT have anything to eat or drink AFTER MIDNIGHT the day before your procedure.       MEDICATIONS: You may take your regular morning medications with water. If there are any medications that you should not take, you will be instructed to hold them for that morning.    CARDIOLOGY PRE-PROCEDURE MEDICATION ORDERS:  ** Please hold any medications that are checked below:    HOLD   # OF DAYS TO HOLD  Eliquis   _ DAY BEFORE & DAY OF _      CONTINUE the Following Medications   You can take all your other medications that morning.        WHAT TO EXPECT:    How long will the procedure take?  The procedure will take an average of 1 - 2 hours to perform.  After the procedure, you will need to lay flat for around 4 - 6 hours to minimize bleeding from the puncture site. If the wrist is accessed you will need to keep your arm still as instructed by the nurse.    When can I go home?  You may be able to be discharged home that same afternoon if there were no complications.  If you have one of the following: balloon; stent; pacemaker or defibrillator procedures, you may spend one night for observation.  Your doctor will determine your discharge based upon your progress.  The results of your procedure will be discussed with you before you are discharged.  Any further testing or procedures will be scheduled for you either before you leave or you will be instructed to call for a future appointment.      TRANSPORTATION:  PLEASE ARRANGE TO HAVE SOMEONE DRIVE YOU HOME FOLLOWING YOUR  PROCEDURE, YOU WILL NOT BE ALLOWED TO DRIVE.

## 2023-12-08 ENCOUNTER — E-CONSULT (OUTPATIENT)
Dept: CARDIOLOGY | Facility: CLINIC | Age: 63
End: 2023-12-08
Payer: COMMERCIAL

## 2023-12-08 DIAGNOSIS — R29.818 TRANSIENT NEUROLOGIC DEFICIT: ICD-10-CM

## 2023-12-08 DIAGNOSIS — R00.1 SYMPTOMATIC BRADYCARDIA: ICD-10-CM

## 2023-12-08 DIAGNOSIS — I48.0 PAROXYSMAL ATRIAL FIBRILLATION: ICD-10-CM

## 2023-12-08 DIAGNOSIS — Z95.0 PACEMAKER: ICD-10-CM

## 2023-12-08 DIAGNOSIS — I10 ESSENTIAL HYPERTENSION: Primary | ICD-10-CM

## 2023-12-08 DIAGNOSIS — E66.01 MORBID OBESITY WITH BMI OF 45.0-49.9, ADULT: ICD-10-CM

## 2023-12-08 PROCEDURE — 99451 PR INTERPROF, PHONE/INTERNET/EHR, CONSULT, >= 5MINS: ICD-10-PCS | Mod: S$GLB,,, | Performed by: INTERNAL MEDICINE

## 2023-12-08 PROCEDURE — 99451 NTRPROF PH1/NTRNET/EHR 5/>: CPT | Mod: S$GLB,,, | Performed by: INTERNAL MEDICINE

## 2023-12-08 NOTE — CONSULTS
[facility] - Cardiology  Response for E-Consult     Patient Name: Fatemeh Shoemaker  MRN: [id number]  Primary Care Provider: Jerson Wheat MD   Requesting Provider: Hafsa Esposito NP  Consults    Recommendation: Pt not cleared for colonoscopy pending [facility] 12-26 for (+) NMT/stress    Contingency: 62 y/o female with PMHx :  Essential hypertension   Paroxysmal atrial fibrillation   Symptomatic bradycardia   Pacemaker     Pt with anginal equivalent 11-23 Cv visit, NMt/stress (=) [facility] planned 12-26    Total time of Consultation: 10 minute    I did not speak to the requesting provider verbally about this.     *This eConsult is based on the clinical data available to me and is furnished without benefit of a physical examination. The eConsult will need to be interpreted in light of any clinical issues or changes in patient status not available to me at the time of filing this eConsults. Significant changes in patient condition or level of acuity should result in immediate formal consultation and reevaluation. Please alert me if you have further questions.    Thank you for this eConsult referral.     Matt Landa MD  [facility] - Cardiology

## 2023-12-12 ENCOUNTER — CLINICAL SUPPORT (OUTPATIENT)
Dept: CARDIOLOGY | Facility: HOSPITAL | Age: 63
End: 2023-12-12
Payer: COMMERCIAL

## 2023-12-12 DIAGNOSIS — B00.9 DISEASE OF GINGIVA DUE TO RECURRENT ORAL HERPES SIMPLEX VIRUS (HSV) INFECTION: ICD-10-CM

## 2023-12-12 DIAGNOSIS — E78.5 HYPERLIPIDEMIA, UNSPECIFIED HYPERLIPIDEMIA TYPE: ICD-10-CM

## 2023-12-12 DIAGNOSIS — K06.9 DISEASE OF GINGIVA DUE TO RECURRENT ORAL HERPES SIMPLEX VIRUS (HSV) INFECTION: ICD-10-CM

## 2023-12-13 ENCOUNTER — CLINICAL SUPPORT (OUTPATIENT)
Dept: CARDIOLOGY | Facility: HOSPITAL | Age: 63
End: 2023-12-13
Payer: COMMERCIAL

## 2023-12-13 ENCOUNTER — CLINICAL SUPPORT (OUTPATIENT)
Dept: CARDIOLOGY | Facility: HOSPITAL | Age: 63
End: 2023-12-13
Attending: INTERNAL MEDICINE
Payer: COMMERCIAL

## 2023-12-13 DIAGNOSIS — Z95.0 PRESENCE OF CARDIAC PACEMAKER: ICD-10-CM

## 2023-12-13 PROCEDURE — 93294 REM INTERROG EVL PM/LDLS PM: CPT | Mod: ,,, | Performed by: INTERNAL MEDICINE

## 2023-12-13 PROCEDURE — 93296 REM INTERROG EVL PM/IDS: CPT | Mod: PO | Performed by: INTERNAL MEDICINE

## 2023-12-13 PROCEDURE — 93294 CARDIAC DEVICE CHECK - REMOTE: ICD-10-PCS | Mod: ,,, | Performed by: INTERNAL MEDICINE

## 2023-12-13 RX ORDER — VALACYCLOVIR HYDROCHLORIDE 500 MG/1
500 TABLET, FILM COATED ORAL DAILY PRN
Qty: 30 TABLET | Refills: 2 | Status: SHIPPED | OUTPATIENT
Start: 2023-12-13

## 2023-12-13 RX ORDER — ROSUVASTATIN CALCIUM 10 MG/1
10 TABLET, COATED ORAL NIGHTLY
Qty: 90 TABLET | Refills: 3 | Status: SHIPPED | OUTPATIENT
Start: 2023-12-13

## 2023-12-13 NOTE — TELEPHONE ENCOUNTER
No care due was identified.  [facility] Embedded Care Due Messages. Reference number: [id number].   12/12/2023 6:34:12 PM CST

## 2023-12-15 LAB
OHS CV AF BURDEN PERCENT: < 1
OHS CV DC REMOTE DEVICE TYPE: NORMAL
OHS CV RV PACING PERCENT: 46 %

## 2023-12-26 PROBLEM — R94.39 POSITIVE CARDIAC STRESS TEST: Status: ACTIVE | Noted: 2023-12-26

## 2023-12-27 DIAGNOSIS — M54.16 LUMBAR RADICULOPATHY: ICD-10-CM

## 2023-12-27 RX ORDER — GABAPENTIN 600 MG/1
600 TABLET ORAL 2 TIMES DAILY
Qty: 60 TABLET | Refills: 2 | Status: SHIPPED | OUTPATIENT
Start: 2023-12-27 | End: 2024-03-26

## 2023-12-29 ENCOUNTER — PATIENT MESSAGE (OUTPATIENT)
Dept: PAIN MEDICINE | Facility: CLINIC | Age: 63
End: 2023-12-29
Payer: COMMERCIAL

## 2024-01-02 NOTE — TELEPHONE ENCOUNTER
Is she referring to the medication Gabapentin?     This has not been discontinued.    I see in the chart that Jerrod prescribed this for her to start on 12/27/2023 with 2 refills to last until March 26, 2024.      This was sent to the [facility] in [place]

## 2024-01-05 ENCOUNTER — OFFICE VISIT (OUTPATIENT)
Dept: BARIATRICS | Facility: CLINIC | Age: 64
End: 2024-01-05
Payer: COMMERCIAL

## 2024-01-05 VITALS
HEART RATE: 60 BPM | WEIGHT: 282.63 LBS | SYSTOLIC BLOOD PRESSURE: 141 MMHG | HEIGHT: 65 IN | DIASTOLIC BLOOD PRESSURE: 76 MMHG | TEMPERATURE: 98 F | BODY MASS INDEX: 47.09 KG/M2 | RESPIRATION RATE: 16 BRPM

## 2024-01-05 DIAGNOSIS — R79.89 LOW VITAMIN D LEVEL: ICD-10-CM

## 2024-01-05 DIAGNOSIS — G47.33 OBSTRUCTIVE SLEEP APNEA SYNDROME: ICD-10-CM

## 2024-01-05 DIAGNOSIS — E66.01 MORBID OBESITY: Primary | ICD-10-CM

## 2024-01-05 DIAGNOSIS — I10 ESSENTIAL HYPERTENSION: ICD-10-CM

## 2024-01-05 DIAGNOSIS — I48.0 PAROXYSMAL ATRIAL FIBRILLATION: ICD-10-CM

## 2024-01-05 DIAGNOSIS — E78.00 HYPERCHOLESTEREMIA: ICD-10-CM

## 2024-01-05 PROCEDURE — 3077F SYST BP >= 140 MM HG: CPT | Mod: CPTII,S$GLB,, | Performed by: NURSE PRACTITIONER

## 2024-01-05 PROCEDURE — 3008F BODY MASS INDEX DOCD: CPT | Mod: CPTII,S$GLB,, | Performed by: NURSE PRACTITIONER

## 2024-01-05 PROCEDURE — 99999 PR PBB SHADOW E&M-EST. PATIENT-LVL IV: CPT | Mod: PBBFAC,,, | Performed by: NURSE PRACTITIONER

## 2024-01-05 PROCEDURE — 1159F MED LIST DOCD IN RCRD: CPT | Mod: CPTII,S$GLB,, | Performed by: NURSE PRACTITIONER

## 2024-01-05 PROCEDURE — 99214 OFFICE O/P EST MOD 30 MIN: CPT | Mod: S$GLB,,, | Performed by: NURSE PRACTITIONER

## 2024-01-05 PROCEDURE — 1160F RVW MEDS BY RX/DR IN RCRD: CPT | Mod: CPTII,S$GLB,, | Performed by: NURSE PRACTITIONER

## 2024-01-05 PROCEDURE — 3078F DIAST BP <80 MM HG: CPT | Mod: CPTII,S$GLB,, | Performed by: NURSE PRACTITIONER

## 2024-01-05 NOTE — PROGRESS NOTES
Medically Supervised Weight Loss Documentation    Chief Complaint   Patient presents with    Follow-up    Obesity    Nutrition Counseling    Weight Check       History of Present Illness:  Patient is a 63 y.o. female who is referred for evaluation of surgical treatment of morbid obesity. Patient has a Body mass index is 47.76 kg/m². kg/m².  She has known comorbidities of dyslipidemia, hypertension, obstructive sleep apnea, and Afib . Patient has attended the bariatric seminar and is most interested in gastric sleeve surgery evaluation of surgical treatment of morbid obesity.       Comorbidities:   Past Medical History:   Diagnosis Date    Anemia     Anticoagulant long-term use     Arrhythmia     Arthritis     Atrial fibrillation     history    Bronchitis     COPD (chronic obstructive pulmonary disease)     Encounter for blood transfusion     General anesthetics causing adverse effect in therapeutic use     GERD (gastroesophageal reflux disease)     High blood pressure     Hyperlipidemia     Lump or mass in breast     benign     Neuropathy     Obesity     Obstructive sleep apnea syndrome 2021    no cpap    Ulcer     Unspecified chronic bronchitis      Past Surgical History:   Procedure Laterality Date    A-V CARDIAC PACEMAKER INSERTION Left 2020    Procedure: INSERTION, CARDIAC PACEMAKER, DUAL CHAMBER;  Surgeon: Bert Reaves MD;  Location: [facility] CATH;  Service: Cardiology;  Laterality: Left;    ANGIOGRAM, CORONARY, WITH LEFT HEART CATHETERIZATION  2023    Procedure: Left Heart Cath;  Surgeon: Bert Reaves MD;  Location: [facility] CATH;  Service: Cardiology;;    BREAST BIOPSY Right 2017    myofibroblastoma     BREAST MASS EXCISION       SECTION  10/25/1986    Other c/section 10/26/1997    CHOLECYSTECTOMY  2017    Dr. TOLU Bowers, [facility]     CORRECTION OF HAMMER TOE Left 2022    Procedure: CORRECTION, HAMMER TOE;  Surgeon: Ezra Arriaga DPM;  Location: [facility] OR;   Service: Podiatry;  Laterality: Left;  hammertoes 2-5 left,    csection      CS x 2    CYSTOSCOPY N/A 09/03/2019    Procedure: CYSTOSCOPY;  Surgeon: Noemi Pierre MD;  Location: [facility] OR;  Service: OB/GYN;  Laterality: N/A;    DILATION AND CURETTAGE OF UTERUS      EPIDURAL STEROID INJECTION INTO LUMBAR SPINE N/A 05/21/2020    Procedure: Injection-steroid-epidural-lumbar L5/S1;  Surgeon: Gurjit Tavarez MD;  Location: [facility] OR;  Service: Pain Management;  Laterality: N/A;    EPIDURAL STEROID INJECTION INTO LUMBAR SPINE N/A 06/19/2020    Procedure: Injection-steroid-epidural-lumbar L5/S1;  Surgeon: Gurjit Tavarez MD;  Location: [facility] OR;  Service: Pain Management;  Laterality: N/A;    EPIDURAL STEROID INJECTION INTO LUMBAR SPINE N/A 12/01/2021    Procedure: Injection-steroid-epidural-lumbar L5/S1;  Surgeon: Gurjit Tavarez MD;  Location: [facility] OR;  Service: Pain Management;  Laterality: N/A;    EPIDURAL STEROID INJECTION INTO LUMBAR SPINE N/A 02/22/2023    Procedure: Injection-steroid-epidural-lumbar-L5/S1 to the left;  Surgeon: Gurjit Tavarez MD;  Location: [facility] OR;  Service: Pain Management;  Laterality: N/A;    EPIDURAL STEROID INJECTION INTO LUMBAR SPINE Right 04/28/2023    Procedure: Injection-steroid-epidural-lumbar L5/S1;  Surgeon: Gurjit Tavarez MD;  Location: [facility] OR;  Service: Pain Management;  Laterality: Right;    EPIDURAL STEROID INJECTION INTO LUMBAR SPINE N/A 08/29/2023    Procedure: Injection-steroid-epidural-lumbarL5/S1 to right;  Surgeon: Gurjit Tavarez MD;  Location: [facility] OR;  Service: Pain Management;  Laterality: N/A;    FRACTURE SURGERY  04/1986    Dislocated  hip total rt hip 08/08    HIP PINNING      HYSTERECTOMY      JOINT REPLACEMENT Right 08/2008    hip    LAPAROSCOPIC SALPINGO-OOPHORECTOMY Bilateral 09/03/2019    Procedure: SALPINGO-OOPHORECTOMY, LAPAROSCOPIC;  Surgeon: Noemi Pierre MD;  Location: [facility] OR;  Service: OB/GYN;  Laterality: Bilateral;  Dr. Bentley to assist. No  "resident needed.     LAPAROSCOPIC TOTAL HYSTERECTOMY N/A 09/03/2019    Procedure: HYSTERECTOMY, TOTAL, LAPAROSCOPIC;  Surgeon: Noemi Pierre MD;  Location: [facility] OR;  Service: OB/GYN;  Laterality: N/A;    NASAL SEPTUM SURGERY      OOPHORECTOMY      SKIN TAG REMOVAL Left 11/03/2022    Procedure: REMOVAL, SKIN TAG;  Surgeon: Ezra Arriaga DPM;  Location: [facility] OR;  Service: Podiatry;  Laterality: Left;    TOTAL HIP ARTHROPLASTY Right     TUBAL LIGATION  1997     Family History   Problem Relation Age of Onset    Colon cancer Maternal Grandmother 70        mets to ovary    Cancer Maternal Grandmother     Arthritis Paternal Grandfather     Eclampsia Paternal Grandmother     Cataracts Maternal Grandfather     Stroke Father 57    Colon cancer Father     Hypertension Father     Alcohol abuse Father     Cancer Father         Passed away July 10 2019    Hypertension Mother     Cataracts Mother     Hypertension Brother         Age 54 hypertension heart    Early death Brother         Hypertension cardio disease    No Known Problems Daughter     Stomach cancer Neg Hx     Esophageal cancer Neg Hx     Breast cancer Neg Hx     Miscarriages / Stillbirths Neg Hx     Ovarian cancer Neg Hx     Glaucoma Neg Hx     Macular degeneration Neg Hx     Retinal detachment Neg Hx     Strabismus Neg Hx      Social History     Tobacco Use    Smoking status: Never    Smokeless tobacco: Never   Substance Use Topics    Alcohol use: No     Comment: never    Drug use: Never        Review of patient's allergies indicates:   Allergen Reactions    Aspirin Other (See Comments)     "I don't take it because I've had ulcers"       Meperidine Other (See Comments)     Felt like she was about to pass out after taking    Azithromycin      Other reaction(s): Not available       Medications:  Current Outpatient Medications on File Prior to Visit   Medication Sig Dispense Refill    albuterol sulfate 90 mcg/actuation aebs Inhale 90 mcg into the lungs every 4 " (four) hours as needed (wheezing or shortness of breath). 1 each 2    apixaban (ELIQUIS) 5 mg Tab Take 1 tablet (5 mg total) by mouth 2 (two) times daily. 180 tablet 3    cholecalciferol, vitamin D3, (DECARA) 1,250 mcg (50,000 unit) capsule Take 1 capsule (50,000 Units total) by mouth every 7 days. 30 capsule 0    cyanocobalamin 500 MCG tablet Take 1,000 mcg by mouth once daily.      diclofenac sodium (VOLTAREN) 1 % Gel Apply 2 g topically 4 (four) times daily. 50 g 3    DULoxetine (CYMBALTA) 60 MG capsule TAKE ONE CAPSULE BY MOUTH DAILY 90 capsule 3    flecainide (TAMBOCOR) 150 MG Tab Take 1 tablet (150 mg total) by mouth 2 (two) times a day. 60 tablet 3    fluticasone furoate-vilanteroL (BREO ELLIPTA) 100-25 mcg/dose diskus inhaler Inhale 1 puff into the lungs once daily. 60 each 2    gabapentin (NEURONTIN) 600 MG tablet Take 1 tablet (600 mg total) by mouth 2 (two) times daily. 60 tablet 2    metoprolol tartrate (LOPRESSOR) 100 MG tablet TAKE ONE TABLET BY MOUTH TWICE DAILY 180 tablet 0    nortriptyline (PAMELOR) 25 MG capsule TAKE ONE CAPSULE BY MOUTH EVERY EVENING 90 capsule 3    omeprazole (PRILOSEC) 20 MG capsule TAKE ONE CAPSULE BY MOUTH EVERY MORNING 90 capsule 1    ondansetron (ZOFRAN-ODT) 4 MG TbDL Take 1 tablet (4 mg total) by mouth 2 (two) times daily. 20 tablet 1    promethazine (PHENERGAN) 12.5 MG Tab Take 1 tablet (12.5 mg total) by mouth 2 (two) times daily as needed (nausea). 10 tablet 0    pulse oximeter (PULSE OXIMETER) device Use twice daily at 8 AM and 3 PM and record the value in [facility] as directed. 1 each 0    rosuvastatin (CRESTOR) 10 MG tablet Take 1 tablet (10 mg total) by mouth every evening. 90 tablet 3    tiZANidine (ZANAFLEX) 4 MG tablet TAKE ONE TABLET BY MOUTH every SIX hours AS NEEDED. 60 tablet 1    traMADoL (ULTRAM) 50 mg tablet Take 1 tablet (50 mg total) by mouth every 12 (twelve) hours as needed for Pain. 30 tablet 0    valACYclovir (VALTREX) 500 MG tablet Take 1 tablet (500  mg total) by mouth daily as needed (fever blister). 30 tablet 2    lisinopriL 10 MG tablet Take 1 tablet (10 mg total) by mouth once daily. 90 tablet 3     No current facility-administered medications on file prior to visit.         Body mass index is 47.76 kg/m².     Beginning Weight: 297 lbs    Last Weight: 280 lbs    Current Weight: 282 lbs   Weight Change: -15 lbs      Body comp:  Fat Percent:  54 %  Fat Mass:  152 lb  FFM:  130 lb  TBW: 96 lb  TBW %:  354  BMR: 1890 kcal    Wt Readings from Last 5 Encounters:   01/05/24 128.2 kg (282 lb 9.6 oz)   12/26/23 129.4 kg (285 lb 4.4 oz)   12/01/23 127 kg (280 lb)   11/10/23 127.4 kg (280 lb 12.8 oz)   11/06/23 126.2 kg (278 lb 3.5 oz)         Chart review:  Primary Care Physician: Jarret      Lab review:  Most Recent Data:  CBC:   Lab Results   Component Value Date    WBC 7.02 12/26/2023    HGB 10.8 (L) 12/26/2023    HCT 34.2 (L) 12/26/2023     12/26/2023    MCV 85 12/26/2023    RDW 13.9 12/26/2023     BMP:   Lab Results   Component Value Date     12/26/2023    K 3.8 12/26/2023     12/26/2023    CO2 30 12/26/2023    BUN 10 12/26/2023    CREATININE 0.67 12/26/2023     (H) 12/26/2023    CALCIUM 9.0 12/26/2023    MG 2.1 11/13/2022     LFTs:   Lab Results   Component Value Date    PROT 7.3 11/06/2023    ALBUMIN 3.8 11/06/2023    BILITOT 0.6 11/06/2023    AST 21 11/06/2023    ALKPHOS 44 (L) 11/06/2023    ALT 16 11/06/2023     Coags:   Lab Results   Component Value Date    INR 1.1 11/12/2022     FLP:   Lab Results   Component Value Date    CHOL 246 (H) 11/06/2023    HDL 36 (L) 11/06/2023    LDLCALC 145.6 11/06/2023    TRIG 322 (H) 11/06/2023    CHOLHDL 14.6 (L) 11/06/2023     DM:   Lab Results   Component Value Date    HGBA1C 5.8 (H) 11/06/2023    HGBA1C 5.5 11/12/2022    HGBA1C 5.9 (H) 08/22/2022    LDLCALC 145.6 11/06/2023    CREATININE 0.67 12/26/2023     Thyroid:   Lab Results   Component Value Date    TSH 1.073 11/06/2023     Anemia:   Lab  Results   Component Value Date    IRON 21 (L) 10/23/2020    TIBC 428 10/23/2020    FERRITIN 26 10/23/2020    BEJMKUVY72 392 12/07/2022    FOLATE 6.7 10/23/2020     Cardiac:   Lab Results   Component Value Date    TROPONINI <0.012 11/12/2022     (H) 06/22/2018     Urinalysis:   Lab Results   Component Value Date    LABURIN ESCHERICHIA COLI  >100,000 cfu/ml   (A) 11/13/2022    COLORU Yellow 11/13/2022    SPECGRAV 1.020 11/13/2022    NITRITE Positive (A) 11/13/2022    KETONESU Negative 11/13/2022    UROBILINOGEN 2.0 11/13/2022    WBCUA 35 (H) 11/13/2022    WBCUA 35 (H) 11/13/2022         Radiology review: Images independently reviewed and interpreted.    UGI- (2021)  1. Mild esophageal dysmotility.  2. Small duodenal diverticulum.      Other Results:  EKG (my interpretation): there are no previous tracings available for comparison.    Nuclear Stress Test (12/1/23)    Abnormal myocardial perfusion scan.    There is a mild to moderate intensity, moderate sized, reversible perfusion abnormality that is consistent with ischemia in the anterior and anterolateral wall(s).    There are no other significant perfusion abnormalities.    The gated perfusion images showed an ejection fraction of 59% at rest.    There is normal wall motion at rest.    The ECG portion of the study is negative for ischemia.    The patient reported no chest pain during the stress test.    When compared to the previous study from 10/11/2021, ischemia appreciated.    Cardiac Cath (12/26/23)    The coronary arteries were normal..    The left ventricular systolic function was normal.    The left ventricular end diastolic pressure was severely elevated.       Review of Systems:  Review of Systems   Constitutional:  Positive for activity change, appetite change and fatigue. Negative for chills, diaphoresis and fever.   HENT:  Negative for congestion, nosebleeds, sinus pressure, sneezing, sore throat, tinnitus and voice change.    Eyes:  Negative for  "pain, redness and itching.   Respiratory:  Negative for apnea, cough, choking, chest tightness, shortness of breath, wheezing and stridor.    Cardiovascular:  Positive for leg swelling. Negative for chest pain and palpitations.   Gastrointestinal:  Negative for abdominal pain, anal bleeding, constipation, diarrhea, nausea and vomiting.   Endocrine: Negative for cold intolerance and heat intolerance.   Genitourinary:  Negative for difficulty urinating and dysuria.   Musculoskeletal:  Positive for back pain, gait problem and joint swelling. Negative for arthralgias.   Skin:  Negative for rash and wound.   Allergic/Immunologic: Negative for environmental allergies and food allergies.   Neurological:  Negative for dizziness, light-headedness and headaches.   Hematological:  Negative for adenopathy. Does not bruise/bleed easily.   Psychiatric/Behavioral:  Negative for agitation and confusion.    All other systems reviewed and are negative.        Diet Education Discussed: Recommend high protein, low carb meals- mainly meats and vegetables.    Breakfast:  coffee with sugar/cream, PB crackers  Lunch:  chicken fried steak with Green beans  Dinner at 5:  skipped, chicken  No after dinner snacking- not eating after 6  Water: 1-2 bottles (32 oz)  Started Crystal Light Lemonade  Diet Sprite- eliminated  No straws      MVI:   [employer] brand- MVI    Exercise/Activity Discussed: recommend cardiovascular exercise, get HR over 100 for 20 minutes 3 times per week-  Walking with rolling walker due to back pain    Physical:     Vital Signs (Most Recent)  Temp: 97.6 °F (36.4 °C) (01/05/24 0850)  Pulse: 60 (01/05/24 0850)  Resp: 16 (01/05/24 0850)  BP: (!) 141/76 (01/05/24 0850)  5' 4.5" (1.638 m)  128.2 kg (282 lb 9.6 oz)     Physical Exam:  Physical Exam  Vitals and nursing note reviewed.   Constitutional:       General: She is not in acute distress.     Appearance: Normal appearance. She is obese. She is not ill-appearing or " toxic-appearing.   HENT:      Head: Normocephalic and atraumatic.      Right Ear: External ear normal.      Left Ear: External ear normal.      Nose: Nose normal. No congestion or rhinorrhea.      Mouth/Throat:      Mouth: Mucous membranes are moist.      Pharynx: Oropharynx is clear.   Eyes:      General:         Right eye: No discharge.         Left eye: No discharge.      Conjunctiva/sclera: Conjunctivae normal.   Cardiovascular:      Rate and Rhythm: Rhythm irregular.      Pulses: Normal pulses.      Heart sounds: No murmur heard.  Pulmonary:      Effort: Pulmonary effort is normal. No respiratory distress.   Musculoskeletal:         General: No swelling, tenderness, deformity or signs of injury. Normal range of motion.      Right lower leg: No edema.      Left lower leg: No edema.   Skin:     General: Skin is warm and dry.      Capillary Refill: Capillary refill takes less than 2 seconds.      Coloration: Skin is not jaundiced or pale.      Findings: No bruising, erythema or rash.   Neurological:      General: No focal deficit present.      Mental Status: She is alert and oriented to person, place, and time.      Motor: No weakness.      Gait: Gait normal.   Psychiatric:         Mood and Affect: Mood normal.         Behavior: Behavior normal.         Thought Content: Thought content normal.         Judgment: Judgment normal.       ASSESSMENT/PLAN:        1. Morbid obesity        2. BMI 45.0-49.9, adult        3. Essential hypertension        4. Paroxysmal atrial fibrillation        5. Obstructive sleep apnea syndrome        6. Hypercholesteremia            Continue with Dr. Siegel's established plan on initial evaluation on 10/4/23        MSD 3/6     Patient will need:     Labs-   EKG-   UGI- 7/2021  EGD/Colonscopy- planning with John  dietary consult- done  psych consult-   Seminar- done     Will obtain the following clearances prior to surgery: Cardiology- Dr. Katz/ [facility] (sees for afib)        Plan for  this patient:  Falls reviewed with patient  Continue Prescribed home medications  Diet adherence   No skipping   No bananas, no PB crackers   Increase water  Tanita scale results reviewed with patient  Exercise/Activity adherence   Discussed cardio galileo  Continue multivitamins- discussion of post-operative life long MVI need, including Calcium, and a B-Complex  Clearance   EGD/colonoscopy   Cardiology  Labs before next visit with Dr. Siegel             I spent a total of 34 minutes on the day of the visit.This includes face to face time and non-face to face time preparing to see the patient (eg, review of tests), obtaining and/or reviewing separately obtained history, documenting clinical information in the electronic or other health record, independently interpreting results and communicating results to the patient/family/caregiver, or care coordinator.

## 2024-01-08 ENCOUNTER — TELEPHONE (OUTPATIENT)
Dept: CARDIOLOGY | Facility: CLINIC | Age: 64
End: 2024-01-08
Payer: COMMERCIAL

## 2024-01-16 ENCOUNTER — CLINICAL SUPPORT (OUTPATIENT)
Dept: CARDIOLOGY | Facility: HOSPITAL | Age: 64
End: 2024-01-16
Attending: INTERNAL MEDICINE
Payer: COMMERCIAL

## 2024-01-16 DIAGNOSIS — R00.1 SYMPTOMATIC BRADYCARDIA: ICD-10-CM

## 2024-01-16 DIAGNOSIS — Z95.0 PRESENCE OF CARDIAC PACEMAKER: ICD-10-CM

## 2024-01-16 DIAGNOSIS — I48.0 PAROXYSMAL ATRIAL FIBRILLATION: Primary | ICD-10-CM

## 2024-01-16 DIAGNOSIS — Z95.0 PRESENCE OF CARDIAC PACEMAKER: Primary | ICD-10-CM

## 2024-01-16 PROCEDURE — 93280 PM DEVICE PROGR EVAL DUAL: CPT | Mod: 26,,, | Performed by: INTERNAL MEDICINE

## 2024-01-16 PROCEDURE — 93280 PM DEVICE PROGR EVAL DUAL: CPT | Mod: PO

## 2024-01-16 NOTE — PROGRESS NOTES
Subjective:   Patient ID:  Fatemeh Shoemaker is a 63 y.o. female who presents for follow-up of No chief complaint on file.      HPI 62 yo female with paroxysmal AF, bradycardia, Htn, PPM, morbid obesity, esophageal ulcer, sleep apnea.    The patient location is: Home  The chief complaint leading to consultation is: tachy-yohannes syndrome     Visit type: audiovisual     Face to Face time with patient: 10 minutes  20 minutes of total time spent on the encounter, which includes face to face time and non-face to face time preparing to see the patient (eg, review of tests), Obtaining and/or reviewing separately obtained history, Documenting clinical information in the electronic or other health record, Independently interpreting results (not separately reported) and communicating results to the patient/family/caregiver, or Care coordination (not separately reported).        Each patient to whom he or she provides medical services by telemedicine is:  (1) informed of the relationship between the physician and patient and the respective role of any other health care provider with respect to management of the patient; and (2) notified that he or she may decline to receive medical services by telemedicine and may withdraw from such care at any time.       Background:  Has been managed in the past by Dr. Bojorquez. Seeing Dr. Reaves for primary cardiology. Switching EP providers because of location.     Has had paroxysmal AF, symptomatic, initially diagnosed in 2016. Has been on metoprolol and Flecainide 100 mg BID.  Has been referred for consideration of bariatric surgery.     Presented to [facility] with symptomatic bradycardia.   Echo 9/20/20 normal biventricular structure and function, CHRISTIAN 37.3  Dual chamber PPM implanted 9/21/20 (Dr. Reaves).  Returned to the hospital day of discharge with symptomatic AF.      Has been diagnosed with sleep apnea. She has elected to defer treatment and focus on weight reduction.  Flecainide  has been increased to 150 mg BID.     Update:  Feeling well overall. Has not had symptoms c/w atrial fibrillation.  Has not lost weight. Plan is for Bariatric surgery in April, pending work-up.  Needs dental work.  + stress test obtained prior to colonoscopy.  [facility] 12/26/23 normal coronary arteries.    Device interrogation reveals stable function of the leads, RA pacing 96% RV pacing 52% AF burden 1%, with longest episode being 1 hour 30 minutes      Review of Systems   Constitutional: Negative. Negative for fever and malaise/fatigue.   HENT:  Negative for congestion and sore throat.    Cardiovascular:  Negative for chest pain, dyspnea on exertion, irregular heartbeat, leg swelling, near-syncope, orthopnea, palpitations, paroxysmal nocturnal dyspnea and syncope.   Respiratory:  Negative for cough and shortness of breath.    Gastrointestinal:  Negative for abdominal pain, constipation and diarrhea.   Neurological:  Negative for dizziness, light-headedness and weakness.   Psychiatric/Behavioral:  Negative for depression. The patient is not nervous/anxious.    All other systems reviewed and are negative.        Assessment:      1. Paroxysmal atrial fibrillation    2. Symptomatic bradycardia    3. Pacemaker    4. Essential hypertension    5. Morbid obesity with BMI of 45.0-49.9, adult    6. Ulcer of esophagus with bleeding    7. Obstructive sleep apnea syndrome        Plan:     Remains stable from an AF standpoint.  Discussed need for weight reduction and sleep apnea management. I am hopeful that Bariatric surgery will occur.  ECG in the next week.  F/u in 6 months.

## 2024-01-17 ENCOUNTER — OFFICE VISIT (OUTPATIENT)
Dept: ELECTROPHYSIOLOGY | Facility: CLINIC | Age: 64
End: 2024-01-17
Payer: COMMERCIAL

## 2024-01-17 VITALS — BODY MASS INDEX: 47.09 KG/M2 | WEIGHT: 282.63 LBS | HEIGHT: 65 IN

## 2024-01-17 DIAGNOSIS — I48.0 PAROXYSMAL ATRIAL FIBRILLATION: Primary | ICD-10-CM

## 2024-01-17 DIAGNOSIS — G47.33 OBSTRUCTIVE SLEEP APNEA SYNDROME: ICD-10-CM

## 2024-01-17 DIAGNOSIS — K22.11 ULCER OF ESOPHAGUS WITH BLEEDING: ICD-10-CM

## 2024-01-17 DIAGNOSIS — Z95.0 PACEMAKER: ICD-10-CM

## 2024-01-17 DIAGNOSIS — I10 ESSENTIAL HYPERTENSION: ICD-10-CM

## 2024-01-17 DIAGNOSIS — E66.01 MORBID OBESITY WITH BMI OF 45.0-49.9, ADULT: ICD-10-CM

## 2024-01-17 DIAGNOSIS — R00.1 SYMPTOMATIC BRADYCARDIA: ICD-10-CM

## 2024-01-17 PROCEDURE — 1159F MED LIST DOCD IN RCRD: CPT | Mod: CPTII,95,, | Performed by: INTERNAL MEDICINE

## 2024-01-17 PROCEDURE — 99214 OFFICE O/P EST MOD 30 MIN: CPT | Mod: 95,,, | Performed by: INTERNAL MEDICINE

## 2024-01-17 PROCEDURE — 3008F BODY MASS INDEX DOCD: CPT | Mod: CPTII,95,, | Performed by: INTERNAL MEDICINE

## 2024-01-19 ENCOUNTER — PATIENT MESSAGE (OUTPATIENT)
Dept: CARDIOLOGY | Facility: CLINIC | Age: 64
End: 2024-01-19
Payer: COMMERCIAL

## 2024-01-23 ENCOUNTER — TELEPHONE (OUTPATIENT)
Dept: PREADMISSION TESTING | Facility: HOSPITAL | Age: 64
End: 2024-01-23
Payer: COMMERCIAL

## 2024-01-23 NOTE — TELEPHONE ENCOUNTER
Spoke with pt regarding cardiac clearance for EGD/Colonoscopy. Pt saw Randal on 1/17/2024, under update in note states pt needs stress test prior to colonoscopy. Pt states she just had stress test in December and was not aware she had to repeat. Pt is not scheduled for a stress test. Pt having dental work done on 1/30 and would like a little time to heal before we schedule procedures. Would like a call back in middle of February to schedule procedures. Pt would like to know why she would need a repeat stress test.

## 2024-01-24 LAB
OHS CV AF BURDEN PERCENT: < 1
OHS CV DC REMOTE DEVICE TYPE: NORMAL
OHS CV RV PACING PERCENT: 52 %

## 2024-01-25 ENCOUNTER — E-CONSULT (OUTPATIENT)
Dept: CARDIOLOGY | Facility: CLINIC | Age: 64
End: 2024-01-25
Payer: COMMERCIAL

## 2024-01-25 DIAGNOSIS — Z01.810 PREOP CARDIOVASCULAR EXAM: Primary | ICD-10-CM

## 2024-01-25 PROCEDURE — 99451 NTRPROF PH1/NTRNET/EHR 5/>: CPT | Mod: S$GLB,,, | Performed by: INTERNAL MEDICINE

## 2024-01-25 NOTE — CONSULTS
[facility] Cardiology [facility]  Response for E-Consult     Patient Name: Fatemeh Shoemaker  MRN: [id number]  Primary Care Provider: Jerson Wheat MD   Requesting Provider: Simona Kaur NP  E-Consult to Cardiology  Consult performed by: Shawn Le MD  Consult ordered by: Simona Kaur NP          64 yo female, E consult for preop clearance of EGD  The chart reviewed.  PMH PFA,s/p PPM, HTN  12/26/23 h/o LHC nl coronary artery  GFR > 60    Plan  Elevated periop risk of CV events for non-high risk procedure.  Ok to proceed the scheduled procedure without further cardiac study.  OK to hold eliquis 2 days before the procedure and resume postop once hemodynamically stable  No indication for pacemaker reprogramming      Total time of Consultation: 10 minute    I did not speak to the requesting provider verbally about this.     *This eConsult is based on the clinical data available to me and is furnished without benefit of a physical examination. The eConsult will need to be interpreted in light of any clinical issues or changes in patient status not available to me at the time of filing this eConsults. Significant changes in patient condition or level of acuity should result in immediate formal consultation and reevaluation. Please alert me if you have further questions.    Thank you for this eConsult referral.     Shawn Le MD  The [address]

## 2024-01-26 DIAGNOSIS — I48.0 PAROXYSMAL ATRIAL FIBRILLATION: ICD-10-CM

## 2024-01-26 RX ORDER — FLECAINIDE ACETATE 150 MG/1
150 TABLET ORAL 2 TIMES DAILY
Qty: 180 TABLET | Refills: 3 | Status: SHIPPED | OUTPATIENT
Start: 2024-01-26

## 2024-02-02 ENCOUNTER — CLINICAL SUPPORT (OUTPATIENT)
Dept: BARIATRICS | Facility: CLINIC | Age: 64
End: 2024-02-02
Payer: COMMERCIAL

## 2024-02-02 ENCOUNTER — LAB VISIT (OUTPATIENT)
Dept: LAB | Facility: HOSPITAL | Age: 64
End: 2024-02-02
Attending: NURSE PRACTITIONER
Payer: COMMERCIAL

## 2024-02-02 VITALS — WEIGHT: 279.31 LBS | BODY MASS INDEX: 46.53 KG/M2 | HEIGHT: 65 IN

## 2024-02-02 DIAGNOSIS — E66.01 MORBID OBESITY: ICD-10-CM

## 2024-02-02 DIAGNOSIS — Z71.3 ENCOUNTER FOR DIETARY COUNSELING AND SURVEILLANCE: Primary | ICD-10-CM

## 2024-02-02 DIAGNOSIS — R79.89 LOW VITAMIN D LEVEL: ICD-10-CM

## 2024-02-02 DIAGNOSIS — E78.00 HYPERCHOLESTEREMIA: ICD-10-CM

## 2024-02-02 DIAGNOSIS — I10 ESSENTIAL HYPERTENSION: ICD-10-CM

## 2024-02-02 PROCEDURE — 36415 COLL VENOUS BLD VENIPUNCTURE: CPT | Mod: PO | Performed by: NURSE PRACTITIONER

## 2024-02-02 PROCEDURE — 83036 HEMOGLOBIN GLYCOSYLATED A1C: CPT | Performed by: NURSE PRACTITIONER

## 2024-02-02 PROCEDURE — 80076 HEPATIC FUNCTION PANEL: CPT | Performed by: NURSE PRACTITIONER

## 2024-02-02 PROCEDURE — 84439 ASSAY OF FREE THYROXINE: CPT | Performed by: NURSE PRACTITIONER

## 2024-02-02 PROCEDURE — 84443 ASSAY THYROID STIM HORMONE: CPT | Performed by: NURSE PRACTITIONER

## 2024-02-02 PROCEDURE — 84425 ASSAY OF VITAMIN B-1: CPT | Performed by: NURSE PRACTITIONER

## 2024-02-02 PROCEDURE — 80048 BASIC METABOLIC PNL TOTAL CA: CPT | Performed by: NURSE PRACTITIONER

## 2024-02-02 PROCEDURE — 84100 ASSAY OF PHOSPHORUS: CPT | Performed by: NURSE PRACTITIONER

## 2024-02-02 PROCEDURE — 83735 ASSAY OF MAGNESIUM: CPT | Performed by: NURSE PRACTITIONER

## 2024-02-02 PROCEDURE — 82728 ASSAY OF FERRITIN: CPT | Performed by: NURSE PRACTITIONER

## 2024-02-02 PROCEDURE — 82746 ASSAY OF FOLIC ACID SERUM: CPT | Performed by: NURSE PRACTITIONER

## 2024-02-02 PROCEDURE — 97803 MED NUTRITION INDIV SUBSEQ: CPT | Mod: S$GLB,,,

## 2024-02-02 PROCEDURE — 83540 ASSAY OF IRON: CPT | Performed by: NURSE PRACTITIONER

## 2024-02-02 PROCEDURE — 82306 VITAMIN D 25 HYDROXY: CPT | Performed by: NURSE PRACTITIONER

## 2024-02-02 PROCEDURE — 80061 LIPID PANEL: CPT | Performed by: NURSE PRACTITIONER

## 2024-02-02 PROCEDURE — 85025 COMPLETE CBC W/AUTO DIFF WBC: CPT | Performed by: NURSE PRACTITIONER

## 2024-02-02 PROCEDURE — 82607 VITAMIN B-12: CPT | Performed by: NURSE PRACTITIONER

## 2024-02-02 PROCEDURE — 99999 PR PBB SHADOW E&M-EST. PATIENT-LVL III: CPT | Mod: PBBFAC,,,

## 2024-02-02 PROCEDURE — 84480 ASSAY TRIIODOTHYRONINE (T3): CPT | Performed by: NURSE PRACTITIONER

## 2024-02-02 PROCEDURE — 86677 HELICOBACTER PYLORI ANTIBODY: CPT | Performed by: NURSE PRACTITIONER

## 2024-02-02 NOTE — PROGRESS NOTES
NUTRITION NOTE    Referring Physician: Dr. Siegel  Reason for MNT Referral: Medically Supervised Diet pending Gastric Sleeve    PAST MEDICAL HISTORY:    Patient presents for 4/6 visit for MSD with weight loss over the past month; total weight loss by making numerous dietary and lifestyle changes.    Past Medical History:   Diagnosis Date    Anemia     Anticoagulant long-term use     Arrhythmia     Arthritis     Atrial fibrillation     history    Bronchitis     COPD (chronic obstructive pulmonary disease)     Encounter for blood transfusion     General anesthetics causing adverse effect in therapeutic use     GERD (gastroesophageal reflux disease)     High blood pressure     Hyperlipidemia     Lump or mass in breast     benign     Neuropathy     Obesity     Obstructive sleep apnea syndrome 11/05/2021    no cpap    Ulcer     Unspecified chronic bronchitis        CLINICAL DATA:  63 y.o. female.    There were no vitals filed for this visit.    Current Weight: 279 lbs  Weight Change Since Initial Visit: -18 lbs  Ideal Body Weight: 123 lbs  BMI: 47.21    CURRENT DIET:  Regular diet.  Diet Recall: Food records are present.    Diet Includes:    Breakfast: scrambled egg with 1 strip of adams; work - egg sandwich with milner   Lunch: cheese sandwich with bowl of soup; meat+veg+ rice   Dinner: pan fried pork chop with broccoli and lima beans   Snacks: granola bar, handful of chips/crackers, cashews  Meal Pattern: Improved.  Protein Supplements: 0 per day. Premier or lean body available in house.   Snacking: Adequate. Snacks include healthy choices and junk foods.    Vegetables: Likes a variety. Eats almost daily.  Fruits: Likes a variety. Eats 2-3 times per week.  Beverages: 32oz water bottle x2/d, Crystal light lemonade, small sips of coke occasionally, coffee with sweet-n-low + powdered creamer  Dining out: Reduced lately. Salad station x1/month.   Cooking at home: Daily. Mostly baked, grilled, smothered and fried meat, fish,  starchy CHO and vegetables.     CURRENT EXERCISE:  Minimal, rolling walker in use. Walking around house and increased steps at work    Vitamins / Minerals / Herbs: Victor Hugo's brand MVI, D3 1250mcg. Reports no longer taking B12 or melatonin supplements.      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  Alcohol: None.  Smoking: None.       ASSESSMENT:  Patient demonstrates some willingness to change lifestyle habits as evidenced by weight loss, daily food logs, dietary changes, increased vegetables, reduced dining out, no longer utilizing straws, not eating after 6pm, and more food preparation at home.    Doing fair with working on greatest challenges (sweets, coke, chips ).    Adequate calorie intake.  Inadequate protein intake.    Patient stated receiving new dentures on 1/30, struggling with food sources. Primarily utilizing liquids and soft foods. Review protein sources within the diet. Reports decreasing pork based products due to increased neuropathy pain. Reiterated protein sources such as chicken, turkey, or fish to begin implementing within the diet. Reviewed dumping syndrome, pre-op, and post-op diet progression. Pmhx of ulcers, RD recommended no caffeine intake until week 6 of diet progression. All questions answered. Nutrition booklet provided x2.     PLAN:    Diet: Adjust diet plan.    Exercise: Increase by walking up and down drive way 2d/wk.     Behavior Modification: Continue to document food & activity logs daily. Ensure protein source at every meal. Tracking via bariatric dwight. Swap bread to whole grain tortilla wraps. Change snacks to protein source with fiber such as nuts with 1 small cutie, 17 turkey pepperoni's + babybel + cucumber, or pb to go cup with apple. Remove coke.     Weight loss prior to surgery (5-10% TBW): -18 lbs    Return to clinic in one month.    SESSION TIME:  60 minutes

## 2024-02-03 LAB
25(OH)D3+25(OH)D2 SERPL-MCNC: 17 NG/ML (ref 30–96)
ALBUMIN SERPL BCP-MCNC: 3.9 G/DL (ref 3.5–5.2)
ALP SERPL-CCNC: 51 U/L (ref 55–135)
ALT SERPL W/O P-5'-P-CCNC: 15 U/L (ref 10–44)
ANION GAP SERPL CALC-SCNC: 13 MMOL/L (ref 8–16)
AST SERPL-CCNC: 22 U/L (ref 10–40)
BASOPHILS # BLD AUTO: 0.04 K/UL (ref 0–0.2)
BASOPHILS NFR BLD: 0.6 % (ref 0–1.9)
BILIRUB DIRECT SERPL-MCNC: 0.2 MG/DL (ref 0.1–0.3)
BILIRUB SERPL-MCNC: 0.5 MG/DL (ref 0.1–1)
BUN SERPL-MCNC: 7 MG/DL (ref 8–23)
CALCIUM SERPL-MCNC: 10.3 MG/DL (ref 8.7–10.5)
CHLORIDE SERPL-SCNC: 106 MMOL/L (ref 95–110)
CHOLEST SERPL-MCNC: 149 MG/DL (ref 120–199)
CHOLEST/HDLC SERPL: 3.8 {RATIO} (ref 2–5)
CO2 SERPL-SCNC: 23 MMOL/L (ref 23–29)
CREAT SERPL-MCNC: 0.8 MG/DL (ref 0.5–1.4)
DIFFERENTIAL METHOD BLD: ABNORMAL
EOSINOPHIL # BLD AUTO: 0.1 K/UL (ref 0–0.5)
EOSINOPHIL NFR BLD: 1.7 % (ref 0–8)
ERYTHROCYTE [DISTWIDTH] IN BLOOD BY AUTOMATED COUNT: 15.4 % (ref 11.5–14.5)
EST. GFR  (NO RACE VARIABLE): >60 ML/MIN/1.73 M^2
ESTIMATED AVG GLUCOSE: 117 MG/DL (ref 68–131)
FERRITIN SERPL-MCNC: 13 NG/ML (ref 20–300)
FOLATE SERPL-MCNC: 5.9 NG/ML (ref 4–24)
GLUCOSE SERPL-MCNC: 113 MG/DL (ref 70–110)
HBA1C MFR BLD: 5.7 % (ref 4–5.6)
HCT VFR BLD AUTO: 39.2 % (ref 37–48.5)
HDLC SERPL-MCNC: 39 MG/DL (ref 40–75)
HDLC SERPL: 26.2 % (ref 20–50)
HGB BLD-MCNC: 11.7 G/DL (ref 12–16)
IMM GRANULOCYTES # BLD AUTO: 0.02 K/UL (ref 0–0.04)
IMM GRANULOCYTES NFR BLD AUTO: 0.3 % (ref 0–0.5)
IRON SERPL-MCNC: 32 UG/DL (ref 30–160)
LDLC SERPL CALC-MCNC: 74.4 MG/DL (ref 63–159)
LYMPHOCYTES # BLD AUTO: 2.4 K/UL (ref 1–4.8)
LYMPHOCYTES NFR BLD: 32.6 % (ref 18–48)
MAGNESIUM SERPL-MCNC: 2.1 MG/DL (ref 1.6–2.6)
MCH RBC QN AUTO: 26.5 PG (ref 27–31)
MCHC RBC AUTO-ENTMCNC: 29.8 G/DL (ref 32–36)
MCV RBC AUTO: 89 FL (ref 82–98)
MONOCYTES # BLD AUTO: 0.5 K/UL (ref 0.3–1)
MONOCYTES NFR BLD: 6.9 % (ref 4–15)
NEUTROPHILS # BLD AUTO: 4.2 K/UL (ref 1.8–7.7)
NEUTROPHILS NFR BLD: 57.9 % (ref 38–73)
NONHDLC SERPL-MCNC: 110 MG/DL
NRBC BLD-RTO: 0 /100 WBC
PHOSPHATE SERPL-MCNC: 3 MG/DL (ref 2.7–4.5)
PLATELET # BLD AUTO: 310 K/UL (ref 150–450)
PMV BLD AUTO: 11.3 FL (ref 9.2–12.9)
POTASSIUM SERPL-SCNC: 4.4 MMOL/L (ref 3.5–5.1)
PROT SERPL-MCNC: 7.7 G/DL (ref 6–8.4)
RBC # BLD AUTO: 4.42 M/UL (ref 4–5.4)
SATURATED IRON: 7 % (ref 20–50)
SODIUM SERPL-SCNC: 142 MMOL/L (ref 136–145)
T3 SERPL-MCNC: 130 NG/DL (ref 60–180)
T4 FREE SERPL-MCNC: 0.82 NG/DL (ref 0.71–1.51)
TOTAL IRON BINDING CAPACITY: 444 UG/DL (ref 250–450)
TRANSFERRIN SERPL-MCNC: 300 MG/DL (ref 200–375)
TRIGL SERPL-MCNC: 178 MG/DL (ref 30–150)
TSH SERPL DL<=0.005 MIU/L-ACNC: 1.44 UIU/ML (ref 0.4–4)
VIT B12 SERPL-MCNC: 472 PG/ML (ref 210–950)
WBC # BLD AUTO: 7.21 K/UL (ref 3.9–12.7)

## 2024-02-05 ENCOUNTER — PATIENT MESSAGE (OUTPATIENT)
Dept: BARIATRICS | Facility: CLINIC | Age: 64
End: 2024-02-05
Payer: COMMERCIAL

## 2024-02-05 DIAGNOSIS — R79.89 LOW VITAMIN D LEVEL: Primary | ICD-10-CM

## 2024-02-05 DIAGNOSIS — D50.8 IRON DEFICIENCY ANEMIA SECONDARY TO INADEQUATE DIETARY IRON INTAKE: ICD-10-CM

## 2024-02-05 LAB — H PYLORI IGG SERPL QL IA: NEGATIVE

## 2024-02-05 RX ORDER — ASPIRIN 325 MG
50000 TABLET, DELAYED RELEASE (ENTERIC COATED) ORAL
Qty: 30 CAPSULE | Refills: 0 | Status: SHIPPED | OUTPATIENT
Start: 2024-02-05

## 2024-02-05 RX ORDER — FERROUS SULFATE 325(65) MG
325 TABLET, DELAYED RELEASE (ENTERIC COATED) ORAL
Qty: 90 TABLET | Refills: 1 | Status: SHIPPED | OUTPATIENT
Start: 2024-02-05 | End: 2024-04-05

## 2024-02-06 LAB — VIT B1 BLD-MCNC: 58 UG/L (ref 38–122)

## 2024-02-08 DIAGNOSIS — I48.0 PAROXYSMAL ATRIAL FIBRILLATION: ICD-10-CM

## 2024-02-08 RX ORDER — OMEPRAZOLE 20 MG/1
CAPSULE, DELAYED RELEASE ORAL
Qty: 90 CAPSULE | Refills: 2 | Status: SHIPPED | OUTPATIENT
Start: 2024-02-08

## 2024-02-08 NOTE — TELEPHONE ENCOUNTER
No care due was identified.  [facility] Embedded Care Due Messages. Reference number: [id number].   2/08/2024 1:44:27 PM CST

## 2024-02-08 NOTE — TELEPHONE ENCOUNTER
Refill Decision Note   Fatemeh Shoemaker  is requesting a refill authorization.  Brief Assessment and Rationale for Refill:  Approve     Medication Therapy Plan:         Comments:     Note composed:5:53 PM 02/08/2024

## 2024-02-27 ENCOUNTER — OFFICE VISIT (OUTPATIENT)
Dept: PSYCHIATRY | Facility: CLINIC | Age: 64
End: 2024-02-27
Payer: COMMERCIAL

## 2024-02-27 DIAGNOSIS — E66.01 MORBID OBESITY WITH BMI OF 45.0-49.9, ADULT: ICD-10-CM

## 2024-02-27 DIAGNOSIS — Z01.818 PREOPERATIVE EVALUATION TO RULE OUT SURGICAL CONTRAINDICATION: Primary | ICD-10-CM

## 2024-02-27 DIAGNOSIS — G47.33 OBSTRUCTIVE SLEEP APNEA SYNDROME: ICD-10-CM

## 2024-02-27 DIAGNOSIS — I10 ESSENTIAL HYPERTENSION: ICD-10-CM

## 2024-02-27 PROCEDURE — 3044F HG A1C LEVEL LT 7.0%: CPT | Mod: CPTII,95,, | Performed by: PSYCHOLOGIST

## 2024-02-27 PROCEDURE — 96138 PSYCL/NRPSYC TECH 1ST: CPT | Mod: 95,,, | Performed by: PSYCHOLOGIST

## 2024-02-27 PROCEDURE — 4010F ACE/ARB THERAPY RXD/TAKEN: CPT | Mod: CPTII,95,, | Performed by: PSYCHOLOGIST

## 2024-02-27 PROCEDURE — 90791 PSYCH DIAGNOSTIC EVALUATION: CPT | Mod: 95,,, | Performed by: PSYCHOLOGIST

## 2024-02-27 PROCEDURE — 96139 PSYCL/NRPSYC TST TECH EA: CPT | Mod: 95,,, | Performed by: PSYCHOLOGIST

## 2024-02-27 PROCEDURE — 96131 PSYCL TST EVAL PHYS/QHP EA: CPT | Mod: 95,,, | Performed by: PSYCHOLOGIST

## 2024-02-27 PROCEDURE — 96130 PSYCL TST EVAL PHYS/QHP 1ST: CPT | Mod: 95,,, | Performed by: PSYCHOLOGIST

## 2024-02-28 DIAGNOSIS — I48.0 PAROXYSMAL ATRIAL FIBRILLATION: ICD-10-CM

## 2024-02-28 RX ORDER — METOPROLOL TARTRATE 100 MG/1
100 TABLET ORAL 2 TIMES DAILY
Qty: 180 TABLET | Refills: 3 | Status: SHIPPED | OUTPATIENT
Start: 2024-02-28
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
## 2024-03-11 ENCOUNTER — TELEPHONE (OUTPATIENT)
Dept: BARIATRICS | Facility: CLINIC | Age: 64
End: 2024-03-11
Payer: COMMERCIAL

## 2024-03-11 NOTE — TELEPHONE ENCOUNTER
Returned call to pt. Rescheduled appt per her request. Pt agrees to new date and time.     ----- Message from Madison Rubio sent at 3/11/2024 12:24 PM CDT -----  Regarding: Reschedule  Contact: pt  Type:  Reschedule Appointment Request    Caller is requesting to reschedule appointment.      Name of Caller:pt    When is the first available appointment?friday    Best Call Back Number:[phone]    Additional Information: pt is asking to be rescheduled for any day except for this Wednesday or Friday.   Please advise. Thank you.

## 2024-03-12 ENCOUNTER — CLINICAL SUPPORT (OUTPATIENT)
Dept: BARIATRICS | Facility: CLINIC | Age: 64
End: 2024-03-12
Payer: COMMERCIAL

## 2024-03-12 VITALS — HEIGHT: 65 IN | BODY MASS INDEX: 47.2 KG/M2 | WEIGHT: 283.31 LBS

## 2024-03-12 DIAGNOSIS — E66.01 MORBID OBESITY: ICD-10-CM

## 2024-03-12 DIAGNOSIS — Z71.3 ENCOUNTER FOR DIETARY COUNSELING AND SURVEILLANCE: Primary | ICD-10-CM

## 2024-03-12 PROCEDURE — 97803 MED NUTRITION INDIV SUBSEQ: CPT | Mod: S$GLB,,,

## 2024-03-12 PROCEDURE — 99999 PR PBB SHADOW E&M-EST. PATIENT-LVL III: CPT | Mod: PBBFAC,,,

## 2024-03-12 NOTE — PROGRESS NOTES
NUTRITION NOTE    Referring Physician: Dr. Siegel  Reason for MNT Referral: Medically Supervised Diet pending Gastric Sleeve    PAST MEDICAL HISTORY:    Patient presents for 5/6 visit for MSD with weight gain over the past month; total weight loss by making numerous dietary and lifestyle changes.    Past Medical History:   Diagnosis Date    Anemia     Anticoagulant long-term use     Arrhythmia     Arthritis     Atrial fibrillation     history    Bronchitis     COPD (chronic obstructive pulmonary disease)     Encounter for blood transfusion     General anesthetics causing adverse effect in therapeutic use     GERD (gastroesophageal reflux disease)     High blood pressure     Hyperlipidemia     Lump or mass in breast     benign     Neuropathy     Obesity     Obstructive sleep apnea syndrome 11/05/2021    no cpap    Ulcer     Unspecified chronic bronchitis        CLINICAL DATA:  63 y.o. female.    There were no vitals filed for this visit.    Current Weight: 283 lbs  Weight Change Since Initial Visit: -14 lbs  Ideal Body Weight: 123 lbs  BMI: 47.88    CURRENT DIET:  Regular diet.  Diet Recall: Food records are present.    Diet Includes:    Breakfast: scrambled egg with 1 packet of grits or 1 strip of adams on the weekends   Lunch: lasagna, hamburger steak with gravy and green salad; milner sandwich   Dinner: hamburger debi with green butter beans; chicken thighs with squash and broccoli   Snacks: 1/2 little tim cake, 1 banana, pudding  Meal Pattern: Improved.  Protein Supplements: 0 per day. Premier or lean body available in house.   Snacking: Adequate. Snacks include healthy choices and junk foods.    Vegetables: Likes a variety. Eats almost daily.  Fruits: Likes a variety. Eats 2-3 times per week.  Beverages: 32oz water bottle x2/d, unsweet tea with sweet-n-low, small sips of coke occasionally, coffee with sugar and creamer 2-3d/wk  Dining out: Reduced lately. Salad station x1/month.   Cooking at home: Daily.  Mostly baked, grilled, smothered and fried meat, fish, starchy CHO and vegetables.     CURRENT EXERCISE:  Minimal, rolling walker in use. Walking around house and increased steps at work    Vitamins / Minerals / Herbs: Victor Hugo's brand MVI, D3 1250mcg, iron 325mg weekly.      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  Alcohol: None.  Smoking: None.       ASSESSMENT:  Patient demonstrates some willingness to change lifestyle habits as evidenced by weight loss, daily food logs, dietary changes, increased vegetables, reduced dining out, no longer utilizing straws, not eating after 6pm, and more food preparation at home.    Doing fair with working on greatest challenges (sweets, coke, chips ).    Adequate calorie intake.  Improved  protein intake.    Patient stated receiving new dentures on 1/30, continues to struggle with food sources. Primarily utilizing soft foods such as mashed potatoes, grits, or milner sandwiches. Reiterated soft protein sources to incorporate within the diet. Reviewed dumping syndrome, pre-op, and post-op diet progression. Pmhx of ulcers, RD recommended no caffeine intake until week 6 of diet progression. All questions answered. Nutrition booklet provided x2.     PLAN:    Diet: Adjust diet plan.    Exercise: Increase by walking up and down drive way 1d/wk to develop consistency.     Behavior Modification: Continue to document food & activity logs daily. Ensure protein source at every meal. Tracking via bariatric dwight. Swap bread to whole grain tortilla wraps. Remove coke. Swap sugar for sugar substitutes such as sweet-n-low, splenda, or stevia. Ensure evening meal is protein and vegetables. Reduce sweets by utilizing flavored greek yogurt, protein shake, or SF pudding/popsicles/fudgsicles.     Weight loss prior to surgery (5-10% TBW): -14 lbs    Return to clinic in one month.    SESSION TIME:  60 minutes

## 2024-03-13 ENCOUNTER — HOSPITAL ENCOUNTER (OUTPATIENT)
Dept: CARDIOLOGY | Facility: HOSPITAL | Age: 64
Discharge: HOME | End: 2024-03-13
Attending: INTERNAL MEDICINE
Payer: COMMERCIAL

## 2024-03-13 PROCEDURE — 93294 REM INTERROG EVL PM/LDLS PM: CPT | Mod: ,,, | Performed by: INTERNAL MEDICINE

## 2024-03-13 PROCEDURE — 93296 REM INTERROG EVL PM/IDS: CPT | Mod: PO | Performed by: INTERNAL MEDICINE

## 2024-03-26 ENCOUNTER — TELEPHONE (OUTPATIENT)
Dept: PREADMISSION TESTING | Facility: HOSPITAL | Age: 64
End: 2024-03-26
Payer: COMMERCIAL

## 2024-03-26 LAB
OHS CV AF BURDEN PERCENT: < 1
OHS CV DC REMOTE DEVICE TYPE: NORMAL
OHS CV RV PACING PERCENT: 54 %

## 2024-03-26 NOTE — TELEPHONE ENCOUNTER
Called pt to attempt to reschedule EGD/Colonoscopy. Pt states her symptoms have improved so she no longer wants to do upper scope, and she wants to wait to do colonoscopy until after her weight loss surgery. Pt states wishes to cancel order now and will call her primary doctor to get a new order when ready to schedule

## 2024-04-08 ENCOUNTER — TELEPHONE (OUTPATIENT)
Dept: ORTHOPEDICS | Facility: CLINIC | Age: 64
End: 2024-04-08
Payer: COMMERCIAL

## 2024-04-08 ENCOUNTER — OFFICE VISIT (OUTPATIENT)
Dept: FAMILY MEDICINE | Facility: CLINIC | Age: 64
End: 2024-04-08
Payer: COMMERCIAL

## 2024-04-08 VITALS
DIASTOLIC BLOOD PRESSURE: 80 MMHG | HEART RATE: 60 BPM | WEIGHT: 273.5 LBS | OXYGEN SATURATION: 98 % | SYSTOLIC BLOOD PRESSURE: 132 MMHG | BODY MASS INDEX: 46.22 KG/M2

## 2024-04-08 DIAGNOSIS — J01.90 ACUTE BACTERIAL SINUSITIS: Primary | ICD-10-CM

## 2024-04-08 DIAGNOSIS — M54.16 LUMBAR RADICULOPATHY: ICD-10-CM

## 2024-04-08 DIAGNOSIS — M67.449 DIGITAL MUCINOUS CYST OF FINGER: ICD-10-CM

## 2024-04-08 DIAGNOSIS — I48.0 PAROXYSMAL ATRIAL FIBRILLATION: ICD-10-CM

## 2024-04-08 DIAGNOSIS — R05.1 ACUTE COUGH: ICD-10-CM

## 2024-04-08 DIAGNOSIS — I10 ESSENTIAL HYPERTENSION: ICD-10-CM

## 2024-04-08 DIAGNOSIS — B96.89 ACUTE BACTERIAL SINUSITIS: Primary | ICD-10-CM

## 2024-04-08 LAB
CTP QC/QA: YES
SARS-COV-2 RDRP RESP QL NAA+PROBE: NEGATIVE

## 2024-04-08 PROCEDURE — 3075F SYST BP GE 130 - 139MM HG: CPT | Mod: CPTII,S$GLB,, | Performed by: PHYSICIAN ASSISTANT

## 2024-04-08 PROCEDURE — 99999 PR PBB SHADOW E&M-EST. PATIENT-LVL IV: CPT | Mod: PBBFAC,,, | Performed by: PHYSICIAN ASSISTANT

## 2024-04-08 PROCEDURE — 3079F DIAST BP 80-89 MM HG: CPT | Mod: CPTII,S$GLB,, | Performed by: PHYSICIAN ASSISTANT

## 2024-04-08 PROCEDURE — 99214 OFFICE O/P EST MOD 30 MIN: CPT | Mod: S$GLB,,, | Performed by: PHYSICIAN ASSISTANT

## 2024-04-08 PROCEDURE — 1159F MED LIST DOCD IN RCRD: CPT | Mod: CPTII,S$GLB,, | Performed by: PHYSICIAN ASSISTANT

## 2024-04-08 PROCEDURE — 87635 SARS-COV-2 COVID-19 AMP PRB: CPT | Mod: QW,S$GLB,, | Performed by: PHYSICIAN ASSISTANT

## 2024-04-08 PROCEDURE — 4010F ACE/ARB THERAPY RXD/TAKEN: CPT | Mod: CPTII,S$GLB,, | Performed by: PHYSICIAN ASSISTANT

## 2024-04-08 PROCEDURE — 3044F HG A1C LEVEL LT 7.0%: CPT | Mod: CPTII,S$GLB,, | Performed by: PHYSICIAN ASSISTANT

## 2024-04-08 PROCEDURE — 1160F RVW MEDS BY RX/DR IN RCRD: CPT | Mod: CPTII,S$GLB,, | Performed by: PHYSICIAN ASSISTANT

## 2024-04-08 PROCEDURE — 3008F BODY MASS INDEX DOCD: CPT | Mod: CPTII,S$GLB,, | Performed by: PHYSICIAN ASSISTANT

## 2024-04-08 RX ORDER — MUPIROCIN 20 MG/G
OINTMENT TOPICAL 3 TIMES DAILY
Qty: 30 G | Refills: 0 | Status: SHIPPED | OUTPATIENT
Start: 2024-04-08 | End: 2024-04-18

## 2024-04-08 RX ORDER — ALBUTEROL SULFATE 0.83 MG/ML
2.5 SOLUTION RESPIRATORY (INHALATION) EVERY 6 HOURS PRN
Qty: 30 ML | Refills: 0 | Status: SHIPPED | OUTPATIENT
Start: 2024-04-08 | End: 2025-04-08

## 2024-04-08 RX ORDER — GABAPENTIN 600 MG/1
600 TABLET ORAL 2 TIMES DAILY
Qty: 60 TABLET | Refills: 1 | Status: SHIPPED | OUTPATIENT
Start: 2024-04-08

## 2024-04-08 RX ORDER — AMOXICILLIN AND CLAVULANATE POTASSIUM 875; 125 MG/1; MG/1
1 TABLET, FILM COATED ORAL EVERY 12 HOURS
Qty: 14 TABLET | Refills: 0 | Status: SHIPPED | OUTPATIENT
Start: 2024-04-08 | End: 2024-04-15

## 2024-04-08 NOTE — PROGRESS NOTES
Subjective     Patient ID: Fatemeh Shoemaker is a 63 y.o. female.    Chief Complaint: Blister, Cough, Headache, Shortness of Breath, and Sore Throat      HPI      Pt is well known to me, PCP Dr. Wheat.     Pt is a 63 year old female with lumbar stenosis, A-fib, HTN, hx of BRANDI BSO, iron def anemia, obesity, hx of esophageal ulcer, SHAKIRA. She presents today complaining of a growth near her right thumb nail. Has been present for at least 2 months. It is a blister and it actually popped last night. Was very painful prior to popping, but now she has experienced some relief. Has never had this before.     Pt also complains of cough, congestion x 1 week. Wheezing at night. Also reports sinus pressure and green rhinorrhea.  No fever, chills, GI upset. Has been using albuterol once daily to try to help symptoms. Also tried sudafed.       Pt also states she is preparing for bariatric surgery in the upcoming few weeks.     Review of Systems   All other systems reviewed and are negative.         Objective     Physical Exam  Vitals and nursing note reviewed.   Constitutional:       General: She is not in acute distress.     Appearance: Normal appearance. She is not ill-appearing, toxic-appearing or diaphoretic.   HENT:      Head: Normocephalic and atraumatic.      Right Ear: Tympanic membrane, ear canal and external ear normal. There is no impacted cerumen.      Left Ear: Tympanic membrane, ear canal and external ear normal. There is no impacted cerumen.      Nose: No congestion or rhinorrhea.      Comments: Maxillary sinus pressure bilaterally     Mouth/Throat:      Pharynx: No oropharyngeal exudate or posterior oropharyngeal erythema.   Eyes:      General: No scleral icterus.        Right eye: No discharge.         Left eye: No discharge.      Conjunctiva/sclera: Conjunctivae normal.      Pupils: Pupils are equal, round, and reactive to light.   Cardiovascular:      Rate and Rhythm: Normal rate and regular rhythm.       Pulses: Normal pulses.      Heart sounds: Normal heart sounds. No murmur heard.     No friction rub. No gallop.   Pulmonary:      Effort: Pulmonary effort is normal. No respiratory distress.      Breath sounds: Normal breath sounds. No stridor. No wheezing, rhonchi or rales.   Abdominal:      General: Abdomen is flat. Bowel sounds are normal.      Palpations: Abdomen is soft.   Musculoskeletal:         General: No deformity or signs of injury.      Cervical back: Normal range of motion and neck supple.      Right lower leg: No edema.      Left lower leg: No edema.   Lymphadenopathy:      Cervical: No cervical adenopathy.   Skin:     General: Skin is warm.      Coloration: Skin is not jaundiced or pale.      Findings: No lesion or rash.      Comments: Cyst with viscous clear fluid expression near right thumb cuticle   Neurological:      General: No focal deficit present.      Mental Status: She is alert and oriented to person, place, and time. Mental status is at baseline.   Psychiatric:         Mood and Affect: Mood normal.         Behavior: Behavior normal.         Thought Content: Thought content normal.         Judgment: Judgment normal.       1. Acute bacterial sinusitis  - amoxicillin-clavulanate 875-125mg (AUGMENTIN) 875-125 mg per tablet; Take 1 tablet by mouth every 12 (twelve) hours. for 7 days  Dispense: 14 tablet; Refill: 0  -symptomatic treatment measures discussed    2. Acute cough  - POCT COVID-19 Rapid Screening- negative  - albuterol (PROVENTIL) 2.5 mg /3 mL (0.083 %) nebulizer solution; Take 3 mLs (2.5 mg total) by nebulization every 6 (six) hours as needed for Wheezing or Shortness of Breath. Rescue  Dispense: 30 mL; Refill: 0    3. Digital mucinous cyst of finger  - Ambulatory referral/consult to Hand Surgery; Future  -no obvious infection at this time, but because recently opened, I will give topical abx  - mupirocin (BACTROBAN) 2 % ointment; Apply topically 3 (three) times daily. for 10 days   Dispense: 30 g; Refill: 0    4. Paroxysmal atrial fibrillation  -rate controlled and anticoagulated    5. Essential hypertension  -controlled, continue current meds      RTC/ER precautions given. F/U with me prn    Crystal Burks PA-C

## 2024-04-08 NOTE — PATIENT INSTRUCTIONS
A few reminders from today:    Use albuterol 3-4x a day for the next 5 days  Augmentin as prescribed  Cough drops as needed  Honey lemon tea  Hot steamy showers/warm compresses over sinuses  Rest, hydrate, eat healthy  Tylenol as needed for discomfort  Take daily multivitamin, eat plenty of protein to help with strength and healing  Warm salt water gargles as needed for throat irritation  Coricidin cough syrup or plain mucinex for cough  Negative covid test  Schedule hand specialist  Mupirocin for finger    Follow up with me if needed.   Please go to ER/urgent care if after hours or symptoms persist/worsen.     Do not hesitate to get in touch with me should you have any further questions.     Thank you for trusting me with your care!  I wish you health and happiness.    -Crystal Burks PA-C

## 2024-04-09 ENCOUNTER — TELEPHONE (OUTPATIENT)
Dept: ORTHOPEDICS | Facility: CLINIC | Age: 64
End: 2024-04-09
Payer: COMMERCIAL

## 2024-04-10 ENCOUNTER — PATIENT MESSAGE (OUTPATIENT)
Dept: BARIATRICS | Facility: CLINIC | Age: 64
End: 2024-04-10
Payer: COMMERCIAL

## 2024-04-10 ENCOUNTER — TELEPHONE (OUTPATIENT)
Dept: BARIATRICS | Facility: CLINIC | Age: 64
End: 2024-04-10
Payer: COMMERCIAL

## 2024-04-15 ENCOUNTER — OFFICE VISIT (OUTPATIENT)
Dept: BARIATRICS | Facility: CLINIC | Age: 64
End: 2024-04-15
Payer: COMMERCIAL

## 2024-04-15 VITALS
BODY MASS INDEX: 46.28 KG/M2 | HEIGHT: 65 IN | TEMPERATURE: 99 F | DIASTOLIC BLOOD PRESSURE: 90 MMHG | HEART RATE: 110 BPM | SYSTOLIC BLOOD PRESSURE: 153 MMHG | RESPIRATION RATE: 16 BRPM | WEIGHT: 277.81 LBS

## 2024-04-15 DIAGNOSIS — I48.0 PAROXYSMAL ATRIAL FIBRILLATION: ICD-10-CM

## 2024-04-15 DIAGNOSIS — E66.01 MORBID OBESITY: Primary | ICD-10-CM

## 2024-04-15 DIAGNOSIS — I10 ESSENTIAL HYPERTENSION: ICD-10-CM

## 2024-04-15 DIAGNOSIS — Z95.0 PACEMAKER: ICD-10-CM

## 2024-04-15 DIAGNOSIS — G47.33 OBSTRUCTIVE SLEEP APNEA SYNDROME: ICD-10-CM

## 2024-04-15 DIAGNOSIS — E78.00 HYPERCHOLESTEREMIA: ICD-10-CM

## 2024-04-15 PROCEDURE — 4010F ACE/ARB THERAPY RXD/TAKEN: CPT | Mod: CPTII,S$GLB,, | Performed by: NURSE PRACTITIONER

## 2024-04-15 PROCEDURE — 3008F BODY MASS INDEX DOCD: CPT | Mod: CPTII,S$GLB,, | Performed by: NURSE PRACTITIONER

## 2024-04-15 PROCEDURE — 3044F HG A1C LEVEL LT 7.0%: CPT | Mod: CPTII,S$GLB,, | Performed by: NURSE PRACTITIONER

## 2024-04-15 PROCEDURE — 99214 OFFICE O/P EST MOD 30 MIN: CPT | Mod: S$GLB,,, | Performed by: NURSE PRACTITIONER

## 2024-04-15 PROCEDURE — 1160F RVW MEDS BY RX/DR IN RCRD: CPT | Mod: CPTII,S$GLB,, | Performed by: NURSE PRACTITIONER

## 2024-04-15 PROCEDURE — 3077F SYST BP >= 140 MM HG: CPT | Mod: CPTII,S$GLB,, | Performed by: NURSE PRACTITIONER

## 2024-04-15 PROCEDURE — 3080F DIAST BP >= 90 MM HG: CPT | Mod: CPTII,S$GLB,, | Performed by: NURSE PRACTITIONER

## 2024-04-15 PROCEDURE — 99999 PR PBB SHADOW E&M-EST. PATIENT-LVL V: CPT | Mod: PBBFAC,,, | Performed by: NURSE PRACTITIONER

## 2024-04-15 PROCEDURE — 1159F MED LIST DOCD IN RCRD: CPT | Mod: CPTII,S$GLB,, | Performed by: NURSE PRACTITIONER

## 2024-04-15 NOTE — PROGRESS NOTES
"Bariatric  History & Physical    SUBJECTIVE:     Chief Complaint   Patient presents with    Pre-op Exam    Obesity       History of Present Illness:    Patient is a 63 y.o. female who is referred for evaluation of surgical treatment of morbid obesity. Her Body mass index is 46.95 kg/m². She has known comorbidities of dyslipidemia, GERD, hypertension, obstructive sleep apnea, osteoarthritis, and afib + Pacemaker . She has attended the bariatric seminar and is most interested in gastric sleeve surgery.    Review of patient's allergies indicates:   Allergen Reactions    Aspirin Other (See Comments)     "I don't take it because I've had ulcers"       Meperidine Other (See Comments)     Felt like she was about to pass out after taking    Azithromycin      Other reaction(s): Not available       Past Medical History:   Diagnosis Date    Anemia     Anticoagulant long-term use     Arrhythmia     Arthritis     Atrial fibrillation     history    Bronchitis     COPD (chronic obstructive pulmonary disease)     Encounter for blood transfusion     General anesthetics causing adverse effect in therapeutic use     GERD (gastroesophageal reflux disease)     High blood pressure     Hyperlipidemia     Lump or mass in breast     benign     Neuropathy     Obesity     Obstructive sleep apnea syndrome 2021    no cpap    Ulcer     Unspecified chronic bronchitis      Past Surgical History:   Procedure Laterality Date    A-V CARDIAC PACEMAKER INSERTION Left 2020    Procedure: INSERTION, CARDIAC PACEMAKER, DUAL CHAMBER;  Surgeon: Bert Reaves MD;  Location: [facility] CATH;  Service: Cardiology;  Laterality: Left;    ANGIOGRAM, CORONARY, WITH LEFT HEART CATHETERIZATION  2023    Procedure: Left Heart Cath;  Surgeon: Bert Reaves MD;  Location: [facility] CATH;  Service: Cardiology;;    BREAST BIOPSY Right 2017    myofibroblastoma     BREAST MASS EXCISION       SECTION  10/25/1986    Other c/section 10/26/1997 "    CHOLECYSTECTOMY  12/28/2017    Dr. TOLU Bowers, ST     CORRECTION OF HAMMER TOE Left 11/03/2022    Procedure: CORRECTION, HAMMER TOE;  Surgeon: Ezra Arriaga DPM;  Location: [facility] OR;  Service: Podiatry;  Laterality: Left;  hammertoes 2-5 left,    csection      CS x 2    CYSTOSCOPY N/A 09/03/2019    Procedure: CYSTOSCOPY;  Surgeon: Noemi Pierre MD;  Location: [facility] OR;  Service: OB/GYN;  Laterality: N/A;    DILATION AND CURETTAGE OF UTERUS      EPIDURAL STEROID INJECTION INTO LUMBAR SPINE N/A 05/21/2020    Procedure: Injection-steroid-epidural-lumbar L5/S1;  Surgeon: Gurjit Tavarez MD;  Location: [facility] OR;  Service: Pain Management;  Laterality: N/A;    EPIDURAL STEROID INJECTION INTO LUMBAR SPINE N/A 06/19/2020    Procedure: Injection-steroid-epidural-lumbar L5/S1;  Surgeon: Gurjit Tavarez MD;  Location: [facility] OR;  Service: Pain Management;  Laterality: N/A;    EPIDURAL STEROID INJECTION INTO LUMBAR SPINE N/A 12/01/2021    Procedure: Injection-steroid-epidural-lumbar L5/S1;  Surgeon: Gurjit Tavarez MD;  Location: [facility] OR;  Service: Pain Management;  Laterality: N/A;    EPIDURAL STEROID INJECTION INTO LUMBAR SPINE N/A 02/22/2023    Procedure: Injection-steroid-epidural-lumbar-L5/S1 to the left;  Surgeon: Gurjit Tavarez MD;  Location: [facility] OR;  Service: Pain Management;  Laterality: N/A;    EPIDURAL STEROID INJECTION INTO LUMBAR SPINE Right 04/28/2023    Procedure: Injection-steroid-epidural-lumbar L5/S1;  Surgeon: Gurjit Tavarez MD;  Location: [facility] OR;  Service: Pain Management;  Laterality: Right;    EPIDURAL STEROID INJECTION INTO LUMBAR SPINE N/A 08/29/2023    Procedure: Injection-steroid-epidural-lumbarL5/S1 to right;  Surgeon: Gurjit Tavarez MD;  Location: [facility] OR;  Service: Pain Management;  Laterality: N/A;    FRACTURE SURGERY  04/1986    Dislocated  hip total rt hip 08/08    HIP PINNING      HYSTERECTOMY      JOINT REPLACEMENT Right 08/2008    hip    LAPAROSCOPIC SALPINGO-OOPHORECTOMY  Bilateral 09/03/2019    Procedure: SALPINGO-OOPHORECTOMY, LAPAROSCOPIC;  Surgeon: Noemi Pierre MD;  Location: [facility] OR;  Service: OB/GYN;  Laterality: Bilateral;  Dr. Bentley to assist. No resident needed.     LAPAROSCOPIC TOTAL HYSTERECTOMY N/A 09/03/2019    Procedure: HYSTERECTOMY, TOTAL, LAPAROSCOPIC;  Surgeon: Noemi Pierre MD;  Location: [facility] OR;  Service: OB/GYN;  Laterality: N/A;    NASAL SEPTUM SURGERY      OOPHORECTOMY      SKIN TAG REMOVAL Left 11/03/2022    Procedure: REMOVAL, SKIN TAG;  Surgeon: Ezra Arriaga DPM;  Location: [facility] OR;  Service: Podiatry;  Laterality: Left;    TOTAL HIP ARTHROPLASTY Right     TUBAL LIGATION  1997     Family History   Problem Relation Name Age of Onset    Colon cancer Maternal Grandmother Zonia 70        mets to ovary    Cancer Maternal Grandmother Zonia     Arthritis Paternal Grandfather Ray     Eclampsia Paternal Grandmother      Cataracts Maternal Grandfather      Stroke Father Manny 57    Colon cancer Father Manny     Hypertension Father Manny     Alcohol abuse Father Manny     Cancer Father Manny         Passed away July 10 2019    Hypertension Mother Leyla     Cataracts Mother Leyla     Hypertension Brother Randell         Age 54 hypertension heart    Early death Brother Randell         Hypertension cardio disease    No Known Problems Daughter      Stomach cancer Neg Hx      Esophageal cancer Neg Hx      Breast cancer Neg Hx      Miscarriages / Stillbirths Neg Hx      Ovarian cancer Neg Hx      Glaucoma Neg Hx      Macular degeneration Neg Hx      Retinal detachment Neg Hx      Strabismus Neg Hx       Social History     Tobacco Use    Smoking status: Never    Smokeless tobacco: Never   Substance Use Topics    Alcohol use: No     Comment: never    Drug use: Never        Current Outpatient Medications   Medication Sig Dispense Refill    albuterol (PROVENTIL) 2.5 mg /3 mL (0.083 %) nebulizer solution Take 3 mLs (2.5 mg total) by nebulization every 6 (six) hours as  needed for Wheezing or Shortness of Breath. Rescue 30 mL 0    albuterol sulfate 90 mcg/actuation aebs Inhale 90 mcg into the lungs every 4 (four) hours as needed (wheezing or shortness of breath). 1 each 2    amoxicillin-clavulanate 875-125mg (AUGMENTIN) 875-125 mg per tablet Take 1 tablet by mouth every 12 (twelve) hours. for 7 days 14 tablet 0    apixaban (ELIQUIS) 5 mg Tab Take 1 tablet (5 mg total) by mouth 2 (two) times daily. 180 tablet 3    cholecalciferol, vitamin D3, (DECARA) 1,250 mcg (50,000 unit) capsule Take 1 capsule (50,000 Units total) by mouth every 7 days. 30 capsule 0    DULoxetine (CYMBALTA) 60 MG capsule TAKE ONE CAPSULE BY MOUTH DAILY 90 capsule 3    flecainide (TAMBOCOR) 150 MG Tab TAKE ONE TABLET BY MOUTH TWICE DAILY 180 tablet 3    fluticasone furoate-vilanteroL (BREO ELLIPTA) 100-25 mcg/dose diskus inhaler Inhale 1 puff into the lungs once daily. 60 each 2    gabapentin (NEURONTIN) 600 MG tablet TAKE ONE TABLET BY MOUTH TWICE DAILY 60 tablet 1    lisinopriL 10 MG tablet Take 1 tablet (10 mg total) by mouth once daily. 90 tablet 3    metoprolol tartrate (LOPRESSOR) 100 MG tablet Take 1 tablet (100 mg total) by mouth 2 (two) times daily. 180 tablet 3    mupirocin (BACTROBAN) 2 % ointment Apply topically 3 (three) times daily. for 10 days 30 g 0    nortriptyline (PAMELOR) 25 MG capsule TAKE ONE CAPSULE BY MOUTH EVERY EVENING 90 capsule 3    omeprazole (PRILOSEC) 20 MG capsule TAKE ONE CAPSULE BY MOUTH EVERY MORNING 90 capsule 2    ondansetron (ZOFRAN-ODT) 4 MG TbDL Take 1 tablet (4 mg total) by mouth 2 (two) times daily. 20 tablet 1    promethazine (PHENERGAN) 12.5 MG Tab Take 1 tablet (12.5 mg total) by mouth 2 (two) times daily as needed (nausea). 10 tablet 0    pulse oximeter (PULSE OXIMETER) device Use twice daily at 8 AM and 3 PM and record the value in [facility] as directed. 1 each 0    rosuvastatin (CRESTOR) 10 MG tablet Take 1 tablet (10 mg total) by mouth every evening. 90 tablet 3     tiZANidine (ZANAFLEX) 4 MG tablet TAKE ONE TABLET BY MOUTH every SIX hours AS NEEDED. 60 tablet 1    traMADoL (ULTRAM) 50 mg tablet Take 1 tablet (50 mg total) by mouth every 12 (twelve) hours as needed for Pain. 30 tablet 0    valACYclovir (VALTREX) 500 MG tablet Take 1 tablet (500 mg total) by mouth daily as needed (fever blister). 30 tablet 2    cyanocobalamin 500 MCG tablet Take 1,000 mcg by mouth once daily. (Patient not taking: Reported on 4/8/2024)      diclofenac sodium (VOLTAREN) 1 % Gel Apply 2 g topically 4 (four) times daily. (Patient not taking: Reported on 1/17/2024) 50 g 3     No current facility-administered medications for this visit.         Beginning Weight: 297 lbs    Last Weight: 283 lbs    Current Weight: 277 lbs   Weight Change: -20 lbs    Body comp:  Fat Percent:  52.2 %  Fat Mass:  145 lb  FFM:  132.8 lb  TBW: 97.6 lb  TBW %:  35.1 %  BMR: 1913 kcal    Wt Readings from Last 10 Encounters:   04/15/24 126 kg (277 lb 12.8 oz)   04/08/24 124 kg (273 lb 7.7 oz)   03/12/24 128.5 kg (283 lb 4.7 oz)   02/02/24 126.7 kg (279 lb 5.2 oz)   01/17/24 128.2 kg (282 lb 10.1 oz)   01/05/24 128.2 kg (282 lb 9.6 oz)   12/26/23 129.4 kg (285 lb 4.4 oz)   12/01/23 127 kg (280 lb)   11/10/23 127.4 kg (280 lb 12.8 oz)   11/06/23 126.2 kg (278 lb 3.5 oz)         Diet Education Discussed:  Recommend high protein, low carb meals- mainly meats and vegetables.    Breakfast:  adams, eggs, 2-3 bites grits  Lunch:  hamburger steak, green beans  Dinner:  sausage, cheese rolled up in tortilla, chicken and broccoli   Water: > 64 oz  8 oz Coffee with sweet'n'low or sugar, regular creamer  New dentures in January    MVI:  Linda brand    Exercise:  Walking/up & down    Review of Systems:  Review of Systems   Constitutional:  Positive for activity change, appetite change and fatigue. Negative for chills, diaphoresis and fever.   HENT:  Negative for congestion, nosebleeds, sinus pressure, sneezing, sore throat, tinnitus and voice  "change.    Eyes:  Negative for pain, redness and itching.   Respiratory:  Negative for apnea, cough, choking, chest tightness, shortness of breath, wheezing and stridor.    Cardiovascular:  Positive for leg swelling. Negative for chest pain and palpitations.   Gastrointestinal:  Negative for abdominal pain, anal bleeding, constipation, diarrhea, nausea and vomiting.   Endocrine: Negative for cold intolerance and heat intolerance.   Genitourinary:  Negative for difficulty urinating and dysuria.   Musculoskeletal:  Positive for back pain, gait problem and joint swelling. Negative for arthralgias.   Skin:  Negative for rash and wound.   Allergic/Immunologic: Negative for environmental allergies and food allergies.   Neurological:  Negative for dizziness, light-headedness and headaches.   Hematological:  Negative for adenopathy. Does not bruise/bleed easily.   Psychiatric/Behavioral:  Negative for agitation and confusion.    All other systems reviewed and are negative.      OBJECTIVE:     Vital Signs (Most Recent)  Temp: 98.5 °F (36.9 °C) (04/15/24 1334)  Pulse: 110 (04/15/24 1334)  Resp: 16 (04/15/24 1334)  BP: (!) 153/90 (04/15/24 1334)  5' 4.5" (1.638 m)  126 kg (277 lb 12.8 oz)       Chart review:  Primary Care Physician: Jarret      Lab review:  Most Recent Data:  CBC:   Lab Results   Component Value Date    WBC 7.21 02/02/2024    HGB 11.7 (L) 02/02/2024    HCT 39.2 02/02/2024     02/02/2024    MCV 89 02/02/2024    RDW 15.4 (H) 02/02/2024     BMP:   Lab Results   Component Value Date     02/02/2024    K 4.4 02/02/2024     02/02/2024    CO2 23 02/02/2024    BUN 7 (L) 02/02/2024    CREATININE 0.8 02/02/2024     (H) 02/02/2024    CALCIUM 10.3 02/02/2024    MG 2.1 02/02/2024    PHOS 3.0 02/02/2024     LFTs:   Lab Results   Component Value Date    PROT 7.7 02/02/2024    ALBUMIN 3.9 02/02/2024    BILITOT 0.5 02/02/2024    AST 22 02/02/2024    ALKPHOS 51 (L) 02/02/2024    ALT 15 02/02/2024 "     Coags:   Lab Results   Component Value Date    INR 1.1 11/12/2022     FLP:   Lab Results   Component Value Date    CHOL 149 02/02/2024    HDL 39 (L) 02/02/2024    LDLCALC 74.4 02/02/2024    TRIG 178 (H) 02/02/2024    CHOLHDL 26.2 02/02/2024     DM:   Lab Results   Component Value Date    HGBA1C 5.7 (H) 02/02/2024    HGBA1C 5.8 (H) 11/06/2023    HGBA1C 5.5 11/12/2022    LDLCALC 74.4 02/02/2024    CREATININE 0.8 02/02/2024     Thyroid:   Lab Results   Component Value Date    TSH 1.435 02/02/2024    FREET4 0.82 02/02/2024    I8ONEES 130 02/02/2024     Anemia:   Lab Results   Component Value Date    IRON 32 02/02/2024    TIBC 444 02/02/2024    FERRITIN 13 (L) 02/02/2024    KQIYKDXB41 472 02/02/2024    FOLATE 5.9 02/02/2024     Cardiac:   Lab Results   Component Value Date    TROPONINI <0.012 11/12/2022     (H) 06/22/2018     Urinalysis:   Lab Results   Component Value Date    LABURIN ESCHERICHIA COLI  >100,000 cfu/ml   (A) 11/13/2022    COLORU Yellow 11/13/2022    SPECGRAV 1.020 11/13/2022    NITRITE Positive (A) 11/13/2022    KETONESU Negative 11/13/2022    UROBILINOGEN 2.0 11/13/2022    WBCUA 35 (H) 11/13/2022    WBCUA 35 (H) 11/13/2022         Radiology review: Images independently reviewed and interpreted.    UGI  (2021)  1. Mild esophageal dysmotility.  2. Small duodenal diverticulum.    Other Results:  EKG (my interpretation): Atrial Pacing.  Nuclear Stress Test (11/2023)    Abnormal myocardial perfusion scan.    There is a mild to moderate intensity, moderate sized, reversible perfusion abnormality that is consistent with ischemia in the anterior and anterolateral wall(s).    There are no other significant perfusion abnormalities.    The gated perfusion images showed an ejection fraction of 59% at rest.    There is normal wall motion at rest.    The ECG portion of the study is negative for ischemia.    The patient reported no chest pain during the stress test.    When compared to the previous study  from 10/11/2021, ischemia appreciated.  Cardiac Cath (12/26/2023)    The coronary arteries were normal..    The left ventricular systolic function was normal.    The left ventricular end diastolic pressure was severely elevated.  Physical Exam:  Physical Exam  Vitals and nursing note reviewed.   Constitutional:       General: She is not in acute distress.     Appearance: Normal appearance. She is obese. She is not ill-appearing or toxic-appearing.   HENT:      Head: Normocephalic and atraumatic.      Right Ear: External ear normal.      Left Ear: External ear normal.      Nose: Nose normal. No congestion or rhinorrhea.      Mouth/Throat:      Mouth: Mucous membranes are moist.      Pharynx: Oropharynx is clear.   Eyes:      General:         Right eye: No discharge.         Left eye: No discharge.      Conjunctiva/sclera: Conjunctivae normal.   Cardiovascular:      Rate and Rhythm: Regular rhythm.      Pulses: Normal pulses.      Heart sounds: No murmur heard.  Pulmonary:      Effort: Pulmonary effort is normal. No respiratory distress.   Abdominal:      General: Bowel sounds are normal. There is no distension.      Palpations: Abdomen is soft. There is no mass.      Tenderness: There is no abdominal tenderness. There is no guarding or rebound.      Hernia: No hernia is present.   Musculoskeletal:         General: No swelling, tenderness, deformity or signs of injury. Normal range of motion.      Right lower leg: No edema.      Left lower leg: No edema.   Skin:     General: Skin is warm and dry.      Capillary Refill: Capillary refill takes less than 2 seconds.      Coloration: Skin is not jaundiced or pale.      Findings: No bruising, erythema or rash.   Neurological:      General: No focal deficit present.      Mental Status: She is alert and oriented to person, place, and time.      Motor: No weakness.      Gait: Gait normal.   Psychiatric:         Mood and Affect: Mood normal.         Behavior: Behavior normal.          Thought Content: Thought content normal.         Judgment: Judgment normal.       ASSESSMENT/PLAN:        Labs: done  UGI: done  EKG:  done  Psych consult: done  Dietary consult: done  Seminar: done    Will obtain the following clearances prior to surgery:   Cardiology- done      1. Morbid obesity        2. BMI 45.0-49.9, adult        3. Essential hypertension        4. Paroxysmal atrial fibrillation        5. Obstructive sleep apnea syndrome        6. Hypercholesteremia        7. Pacemaker                Plan:  Fatemeh Shoemaker has morbid obesity as their Body mass index is 46.95 kg/m². She would benefit from weight loss surgery and has chosen gastric sleeve surgery as the preferred procedure. She understands that this is a tool and lifestyle change will be necessary to keep weight off. I went over possible complications of the chosen surgery with the patient and she is agreeable to surgery.    She has been instructed to start the pre operative diet.- May 6, stool softeners BID   Pre op Diet    Breakfast- protein shake  Lunch- protein shake  Dinner- 3 ounces of meat and vegetables (from list previously given)  NO SNACKING  Only water to drink     She surgical date is May 20 at [facility]       The patient has been taught the post operative diet and understands that she will need to stay on this diet to prevent complication.  They are aware of the post operative follow up and return to see me after surgery to treat/prevent/identify any complications.  she has been advised to attend support groups post operatively.

## 2024-04-23 DIAGNOSIS — I48.0 PAROXYSMAL ATRIAL FIBRILLATION: ICD-10-CM

## 2024-04-24 RX ORDER — APIXABAN 5 MG/1
5 TABLET, FILM COATED ORAL 2 TIMES DAILY
Qty: 180 TABLET | Refills: 2 | Status: SHIPPED | OUTPATIENT
Start: 2024-04-24

## 2024-05-01 ENCOUNTER — OFFICE VISIT (OUTPATIENT)
Dept: ORTHOPEDICS | Facility: CLINIC | Age: 64
End: 2024-05-01
Payer: COMMERCIAL

## 2024-05-01 VITALS — WEIGHT: 273 LBS | BODY MASS INDEX: 46.61 KG/M2 | HEIGHT: 64 IN

## 2024-05-01 DIAGNOSIS — M67.40 MUCOID CYST OF JOINT: Primary | ICD-10-CM

## 2024-05-01 DIAGNOSIS — I10 ESSENTIAL HYPERTENSION: ICD-10-CM

## 2024-05-01 PROCEDURE — 99999 PR PBB SHADOW E&M-EST. PATIENT-LVL III: CPT | Mod: PBBFAC,,, | Performed by: ORTHOPAEDIC SURGERY

## 2024-05-01 PROCEDURE — 3008F BODY MASS INDEX DOCD: CPT | Mod: CPTII,S$GLB,, | Performed by: ORTHOPAEDIC SURGERY

## 2024-05-01 PROCEDURE — 99203 OFFICE O/P NEW LOW 30 MIN: CPT | Mod: S$GLB,,, | Performed by: ORTHOPAEDIC SURGERY

## 2024-05-01 PROCEDURE — 4010F ACE/ARB THERAPY RXD/TAKEN: CPT | Mod: CPTII,S$GLB,, | Performed by: ORTHOPAEDIC SURGERY

## 2024-05-01 PROCEDURE — 3044F HG A1C LEVEL LT 7.0%: CPT | Mod: CPTII,S$GLB,, | Performed by: ORTHOPAEDIC SURGERY

## 2024-05-01 PROCEDURE — 1159F MED LIST DOCD IN RCRD: CPT | Mod: CPTII,S$GLB,, | Performed by: ORTHOPAEDIC SURGERY

## 2024-05-01 RX ORDER — SODIUM CHLORIDE 9 MG/ML
INJECTION, SOLUTION INTRAVENOUS CONTINUOUS
Status: CANCELLED | OUTPATIENT
Start: 2024-05-01

## 2024-05-01 RX ORDER — ENOXAPARIN SODIUM 100 MG/ML
40 INJECTION SUBCUTANEOUS EVERY 12 HOURS
Status: CANCELLED | OUTPATIENT
Start: 2024-05-01

## 2024-05-01 RX ORDER — PANTOPRAZOLE SODIUM 40 MG/10ML
40 INJECTION, POWDER, LYOPHILIZED, FOR SOLUTION INTRAVENOUS DAILY
Status: CANCELLED | OUTPATIENT
Start: 2024-05-02

## 2024-05-01 NOTE — PROGRESS NOTES
2024    Chief Complaint:  Chief Complaint   Patient presents with    Right Hand - Pain       HPI:  Fatemeh Shoemaker is a 63 y.o. female, who presents to clinic today history of a cyst on the dorsum of her right thumb.  And ruptures and then goes down.  Currently he was incised.  She has no other complaints.    PMHX:  Past Medical History:   Diagnosis Date    Anemia     Anticoagulant long-term use     Arrhythmia     Arthritis     Atrial fibrillation     history    Bronchitis     COPD (chronic obstructive pulmonary disease)     Encounter for blood transfusion     General anesthetics causing adverse effect in therapeutic use     GERD (gastroesophageal reflux disease)     High blood pressure     Hyperlipidemia     Lump or mass in breast     benign     Neuropathy     Obesity     Obstructive sleep apnea syndrome 2021    no cpap    Ulcer     Unspecified chronic bronchitis        PSHX:  Past Surgical History:   Procedure Laterality Date    A-V CARDIAC PACEMAKER INSERTION Left 2020    Procedure: INSERTION, CARDIAC PACEMAKER, DUAL CHAMBER;  Surgeon: Bert Reaves MD;  Location: [facility] CATH;  Service: Cardiology;  Laterality: Left;    ANGIOGRAM, CORONARY, WITH LEFT HEART CATHETERIZATION  2023    Procedure: Left Heart Cath;  Surgeon: Bert Reaves MD;  Location: [facility] CATH;  Service: Cardiology;;    BREAST BIOPSY Right 2017    myofibroblastoma     BREAST MASS EXCISION       SECTION  10/25/1986    Other c/section 10/26/1997    CHOLECYSTECTOMY  2017    Dr. TOLU Bowers, [facility]     CORRECTION OF HAMMER TOE Left 2022    Procedure: CORRECTION, HAMMER TOE;  Surgeon: Ezra Arriaga DPM;  Location: [facility] OR;  Service: Podiatry;  Laterality: Left;  hammertoes 2-5 left,    csection      CS x 2    CYSTOSCOPY N/A 2019    Procedure: CYSTOSCOPY;  Surgeon: Noemi Pierre MD;  Location: [facility] OR;  Service: OB/GYN;  Laterality: N/A;    DILATION AND CURETTAGE OF UTERUS       EPIDURAL STEROID INJECTION INTO LUMBAR SPINE N/A 05/21/2020    Procedure: Injection-steroid-epidural-lumbar L5/S1;  Surgeon: Gurjit Tavarez MD;  Location: [facility] OR;  Service: Pain Management;  Laterality: N/A;    EPIDURAL STEROID INJECTION INTO LUMBAR SPINE N/A 06/19/2020    Procedure: Injection-steroid-epidural-lumbar L5/S1;  Surgeon: Gurjit Tavarez MD;  Location: [facility] OR;  Service: Pain Management;  Laterality: N/A;    EPIDURAL STEROID INJECTION INTO LUMBAR SPINE N/A 12/01/2021    Procedure: Injection-steroid-epidural-lumbar L5/S1;  Surgeon: Gurjit Tavarez MD;  Location: [facility] OR;  Service: Pain Management;  Laterality: N/A;    EPIDURAL STEROID INJECTION INTO LUMBAR SPINE N/A 02/22/2023    Procedure: Injection-steroid-epidural-lumbar-L5/S1 to the left;  Surgeon: Gurjit Tavarez MD;  Location: [facility] OR;  Service: Pain Management;  Laterality: N/A;    EPIDURAL STEROID INJECTION INTO LUMBAR SPINE Right 04/28/2023    Procedure: Injection-steroid-epidural-lumbar L5/S1;  Surgeon: Gurjit Tavarez MD;  Location: [facility] OR;  Service: Pain Management;  Laterality: Right;    EPIDURAL STEROID INJECTION INTO LUMBAR SPINE N/A 08/29/2023    Procedure: Injection-steroid-epidural-lumbarL5/S1 to right;  Surgeon: Gurjit Tavarez MD;  Location: [facility] OR;  Service: Pain Management;  Laterality: N/A;    FRACTURE SURGERY  04/1986    Dislocated  hip total rt hip 08/08    HIP PINNING      HYSTERECTOMY      JOINT REPLACEMENT Right 08/2008    hip    LAPAROSCOPIC SALPINGO-OOPHORECTOMY Bilateral 09/03/2019    Procedure: SALPINGO-OOPHORECTOMY, LAPAROSCOPIC;  Surgeon: Noemi Pierre MD;  Location: [facility] OR;  Service: OB/GYN;  Laterality: Bilateral;  Dr. Bentley to assist. No resident needed.     LAPAROSCOPIC TOTAL HYSTERECTOMY N/A 09/03/2019    Procedure: HYSTERECTOMY, TOTAL, LAPAROSCOPIC;  Surgeon: Noemi Pierre MD;  Location: [facility] OR;  Service: OB/GYN;  Laterality: N/A;    NASAL SEPTUM SURGERY      OOPHORECTOMY      SKIN TAG REMOVAL Left  11/03/2022    Procedure: REMOVAL, SKIN TAG;  Surgeon: Ezra Arriaga DPM;  Location: [facility] OR;  Service: Podiatry;  Laterality: Left;    TOTAL HIP ARTHROPLASTY Right     TUBAL LIGATION  1997       FMHX:  Family History   Problem Relation Name Age of Onset    Colon cancer Maternal Grandmother Zonia 70        mets to ovary    Cancer Maternal Grandmother Zonia     Arthritis Paternal Grandfather Ray     Eclampsia Paternal Grandmother      Cataracts Maternal Grandfather      Stroke Father Manny 57    Colon cancer Father Manny     Hypertension Father Manny     Alcohol abuse Father Manny     Cancer Father Manny         Passed away July 10 2019    Hypertension Mother Leyla     Cataracts Mother Leyla     Hypertension Brother Randell         Age 54 hypertension heart    Early death Brother Randell         Hypertension cardio disease    No Known Problems Daughter      Stomach cancer Neg Hx      Esophageal cancer Neg Hx      Breast cancer Neg Hx      Miscarriages / Stillbirths Neg Hx      Ovarian cancer Neg Hx      Glaucoma Neg Hx      Macular degeneration Neg Hx      Retinal detachment Neg Hx      Strabismus Neg Hx         SOCHX:  Social History     Tobacco Use    Smoking status: Never    Smokeless tobacco: Never   Substance Use Topics    Alcohol use: No     Comment: never       ALLERGIES:  Aspirin, Meperidine, and Azithromycin    CURRENT MEDICATIONS:  Current Outpatient Medications on File Prior to Visit   Medication Sig Dispense Refill    albuterol (PROVENTIL) 2.5 mg /3 mL (0.083 %) nebulizer solution Take 3 mLs (2.5 mg total) by nebulization every 6 (six) hours as needed for Wheezing or Shortness of Breath. Rescue 30 mL 0    albuterol sulfate 90 mcg/actuation aebs Inhale 90 mcg into the lungs every 4 (four) hours as needed (wheezing or shortness of breath). 1 each 2    cholecalciferol, vitamin D3, (DECARA) 1,250 mcg (50,000 unit) capsule Take 1 capsule (50,000 Units total) by mouth every 7 days. 30 capsule 0    cyanocobalamin  500 MCG tablet Take 1,000 mcg by mouth once daily. (Patient not taking: Reported on 4/8/2024)      diclofenac sodium (VOLTAREN) 1 % Gel Apply 2 g topically 4 (four) times daily. (Patient not taking: Reported on 1/17/2024) 50 g 3    DULoxetine (CYMBALTA) 60 MG capsule TAKE ONE CAPSULE BY MOUTH DAILY 90 capsule 3    ELIQUIS 5 mg Tab TAKE ONE TABLET BY MOUTH TWICE DAILY 180 tablet 2    flecainide (TAMBOCOR) 150 MG Tab TAKE ONE TABLET BY MOUTH TWICE DAILY 180 tablet 3    fluticasone furoate-vilanteroL (BREO ELLIPTA) 100-25 mcg/dose diskus inhaler Inhale 1 puff into the lungs once daily. 60 each 2    gabapentin (NEURONTIN) 600 MG tablet TAKE ONE TABLET BY MOUTH TWICE DAILY 60 tablet 1    lisinopriL 10 MG tablet Take 1 tablet (10 mg total) by mouth once daily. 90 tablet 3    metoprolol tartrate (LOPRESSOR) 100 MG tablet Take 1 tablet (100 mg total) by mouth 2 (two) times daily. 180 tablet 3    nortriptyline (PAMELOR) 25 MG capsule TAKE ONE CAPSULE BY MOUTH EVERY EVENING 90 capsule 3    omeprazole (PRILOSEC) 20 MG capsule TAKE ONE CAPSULE BY MOUTH EVERY MORNING 90 capsule 2    ondansetron (ZOFRAN-ODT) 4 MG TbDL Take 1 tablet (4 mg total) by mouth 2 (two) times daily. 20 tablet 1    promethazine (PHENERGAN) 12.5 MG Tab Take 1 tablet (12.5 mg total) by mouth 2 (two) times daily as needed (nausea). 10 tablet 0    pulse oximeter (PULSE OXIMETER) device Use twice daily at 8 AM and 3 PM and record the value in [facility] as directed. 1 each 0    rosuvastatin (CRESTOR) 10 MG tablet Take 1 tablet (10 mg total) by mouth every evening. 90 tablet 3    tiZANidine (ZANAFLEX) 4 MG tablet TAKE ONE TABLET BY MOUTH every SIX hours AS NEEDED. 60 tablet 1    traMADoL (ULTRAM) 50 mg tablet Take 1 tablet (50 mg total) by mouth every 12 (twelve) hours as needed for Pain. 30 tablet 0    valACYclovir (VALTREX) 500 MG tablet Take 1 tablet (500 mg total) by mouth daily as needed (fever blister). 30 tablet 2     No current facility-administered  "medications on file prior to visit.       REVIEW OF SYSTEMS:  Review of Systems   Constitutional: Negative.    HENT: Negative.     Eyes: Negative.    Respiratory: Negative.     Cardiovascular: Negative.    Gastrointestinal: Negative.    Genitourinary: Negative.    Musculoskeletal:  Positive for joint pain.   Skin: Negative.    Neurological: Negative.    Endo/Heme/Allergies: Negative.    Psychiatric/Behavioral: Negative.       GENERAL PHYSICAL EXAM:   Ht 5' 4" (1.626 m)   Wt 123.8 kg (273 lb)   LMP 09/08/2013 (Approximate)   BMI 46.86 kg/m²    GEN: well developed, well nourished, no acute distress   HENT: Normocephalic, atraumatic   EYES: No discharge, conjunctiva normal   NECK: Supple, non-tender   PULM: No wheezing, no respiratory distress   CV: RRR   ABD: Soft, non-tender    ORTHO EXAM:   Examination of the right hand and thumb reveals that there is a mucoid cyst over the dorsum of the thumb.  It has caused a nail deformity.  There is no surrounding edema.  There is tenderness he was neurovascularly intact    RADIOLOGY:   None    ASSESSMENT:   Right thumb mucoid cyst    PLAN:  1. I have discussed treatment with her.  I have discussed the possibility of excision.  Also discussed observation versus aspiration.  At this time we will continue to observe it as she does have some upcoming surgeries     2. She will refrain from rupture with needles    3.  She will follow up with me if she has any worsening at which point we will discuss the possibility of surgical excision    "

## 2024-05-02 DIAGNOSIS — I10 ESSENTIAL HYPERTENSION: ICD-10-CM

## 2024-05-02 RX ORDER — LISINOPRIL 10 MG/1
10 TABLET ORAL
Qty: 90 TABLET | Refills: 3 | Status: ON HOLD | OUTPATIENT
Start: 2024-05-02 | End: 2024-05-21 | Stop reason: HOSPADM

## 2024-05-02 RX ORDER — LISINOPRIL 10 MG/1
10 TABLET ORAL DAILY
Qty: 90 TABLET | Refills: 3 | Status: SHIPPED | OUTPATIENT
Start: 2024-05-02 | End: 2024-05-31

## 2024-05-15 ENCOUNTER — CLINICAL SUPPORT (OUTPATIENT)
Dept: BARIATRICS | Facility: CLINIC | Age: 64
End: 2024-05-15
Payer: COMMERCIAL

## 2024-05-15 ENCOUNTER — HOSPITAL ENCOUNTER (OUTPATIENT)
Dept: RADIOLOGY | Facility: HOSPITAL | Age: 64
Discharge: HOME | End: 2024-05-15
Payer: COMMERCIAL

## 2024-05-15 ENCOUNTER — HOSPITAL ENCOUNTER (OUTPATIENT)
Dept: PREADMISSION TESTING | Facility: HOSPITAL | Age: 64
Discharge: HOME | End: 2024-05-15
Attending: SURGERY
Payer: COMMERCIAL

## 2024-05-15 VITALS
HEART RATE: 60 BPM | RESPIRATION RATE: 18 BRPM | WEIGHT: 276.81 LBS | OXYGEN SATURATION: 96 % | BODY MASS INDEX: 46.12 KG/M2 | SYSTOLIC BLOOD PRESSURE: 108 MMHG | TEMPERATURE: 99 F | DIASTOLIC BLOOD PRESSURE: 70 MMHG | HEIGHT: 65 IN

## 2024-05-15 VITALS — BODY MASS INDEX: 46.12 KG/M2 | HEIGHT: 65 IN | WEIGHT: 276.81 LBS

## 2024-05-15 DIAGNOSIS — E66.01 MORBID OBESITY: Primary | ICD-10-CM

## 2024-05-15 DIAGNOSIS — Z01.818 PREOP TESTING: Primary | ICD-10-CM

## 2024-05-15 DIAGNOSIS — Z01.818 PREOP TESTING: ICD-10-CM

## 2024-05-15 LAB
ALBUMIN SERPL BCP-MCNC: 4.1 G/DL (ref 3.5–5.2)
ALP SERPL-CCNC: 42 U/L (ref 55–135)
ALT SERPL W/O P-5'-P-CCNC: 11 U/L (ref 10–44)
ANION GAP SERPL CALC-SCNC: 7 MMOL/L (ref 8–16)
AST SERPL-CCNC: 13 U/L (ref 10–40)
BASOPHILS # BLD AUTO: 0.04 K/UL (ref 0–0.2)
BASOPHILS NFR BLD: 0.4 % (ref 0–1.9)
BILIRUB SERPL-MCNC: 0.4 MG/DL (ref 0.1–1)
BUN SERPL-MCNC: 23 MG/DL (ref 8–23)
CALCIUM SERPL-MCNC: 9.3 MG/DL (ref 8.7–10.5)
CHLORIDE SERPL-SCNC: 104 MMOL/L (ref 95–110)
CO2 SERPL-SCNC: 30 MMOL/L (ref 23–29)
CREAT SERPL-MCNC: 0.9 MG/DL (ref 0.5–1.4)
DIFFERENTIAL METHOD BLD: ABNORMAL
EOSINOPHIL # BLD AUTO: 0.1 K/UL (ref 0–0.5)
EOSINOPHIL NFR BLD: 0.4 % (ref 0–8)
ERYTHROCYTE [DISTWIDTH] IN BLOOD BY AUTOMATED COUNT: 15.9 % (ref 11.5–14.5)
EST. GFR  (NO RACE VARIABLE): >60 ML/MIN/1.73 M^2
GLUCOSE SERPL-MCNC: 106 MG/DL (ref 70–110)
HCT VFR BLD AUTO: 36.8 % (ref 37–48.5)
HGB BLD-MCNC: 11.7 G/DL (ref 12–16)
IMM GRANULOCYTES # BLD AUTO: 0.04 K/UL (ref 0–0.04)
IMM GRANULOCYTES NFR BLD AUTO: 0.4 % (ref 0–0.5)
LYMPHOCYTES # BLD AUTO: 3.6 K/UL (ref 1–4.8)
LYMPHOCYTES NFR BLD: 31.8 % (ref 18–48)
MCH RBC QN AUTO: 26.8 PG (ref 27–31)
MCHC RBC AUTO-ENTMCNC: 31.8 G/DL (ref 32–36)
MCV RBC AUTO: 84 FL (ref 82–98)
MONOCYTES # BLD AUTO: 0.7 K/UL (ref 0.3–1)
MONOCYTES NFR BLD: 6.6 % (ref 4–15)
NEUTROPHILS # BLD AUTO: 6.7 K/UL (ref 1.8–7.7)
NEUTROPHILS NFR BLD: 60.4 % (ref 38–73)
NRBC BLD-RTO: 0 /100 WBC
PLATELET # BLD AUTO: 305 K/UL (ref 150–450)
PMV BLD AUTO: 10.2 FL (ref 9.2–12.9)
POTASSIUM SERPL-SCNC: 3.8 MMOL/L (ref 3.5–5.1)
PROT SERPL-MCNC: 7 G/DL (ref 6–8.4)
RBC # BLD AUTO: 4.36 M/UL (ref 4–5.4)
SODIUM SERPL-SCNC: 141 MMOL/L (ref 136–145)
WBC # BLD AUTO: 11.16 K/UL (ref 3.9–12.7)

## 2024-05-15 PROCEDURE — 99999 PR PBB SHADOW E&M-EST. PATIENT-LVL I: CPT | Mod: PBBFAC,,,

## 2024-05-15 PROCEDURE — 80053 COMPREHEN METABOLIC PANEL: CPT | Performed by: ANESTHESIOLOGY

## 2024-05-15 PROCEDURE — 85025 COMPLETE CBC W/AUTO DIFF WBC: CPT | Performed by: ANESTHESIOLOGY

## 2024-05-15 PROCEDURE — 99499 UNLISTED E&M SERVICE: CPT | Mod: S$GLB,,, | Performed by: SURGERY

## 2024-05-15 PROCEDURE — 71046 X-RAY EXAM CHEST 2 VIEWS: CPT | Mod: TC

## 2024-05-15 RX ORDER — AMOXICILLIN/POTASSIUM CLAV 875-125 MG
TABLET ORAL
Status: ON HOLD | COMMUNITY
Start: 2024-05-09 | End: 2024-05-21 | Stop reason: HOSPADM

## 2024-05-15 RX ORDER — BENZONATATE 200 MG/1
CAPSULE ORAL
COMMUNITY
Start: 2024-05-08

## 2024-05-15 NOTE — DISCHARGE INSTRUCTIONS
To confirm, Your doctor has instructed you that surgery is scheduled for:  Monday 5/20/24    Pre-Op will call the afternoon prior to surgery between 4:00 and 6:00 PM with the final arrival time.  Friday     Please report to Registration at front of the hospital --it is best to park in the garage on [facility]    Do not eat or drink anything after midnight the night before your surgery - THIS INCLUDES  WATER, GUM, MINTS AND CANDY.    YOU MAY BRUSH YOUR TEETH     TAKE APPROVED  MEDICATIONS WITH A SMALL SIP OF WATER THE MORNING OF YOUR PROCEDURE: See Medication list    PLEASE NOTE:  The surgery schedule has many variables which may affect the time of your surgery case.  Family members should be available if your surgery time changes.      DO NOT TAKE THESE MEDICATIONS 5-7 DAYS PRIOR to your procedure or per your surgeon's request: ASPIRIN, ALEVE, ADVIL, IBUPROFEN,  LAWSON SELTZER, BC , FISH OIL , VITAMIN E, HERBALS  (May take Tylenol)    ONLY if you are prescribed any types of blood thinners such as:  Aspirin, Coumadin, Plavix, Pradaxa, Xarelto, Aggrenox, Effient, Eliquis, Savasya, Brilinta, or any other, ask your surgeon whether you should stop taking them and how long before surgery you should stop.  You may also need to verify with the prescribing physician if it is ok to stop your medication.                                                     IMPORTANT INSTRUCTIONS    Do not smoke, vape or drink alcoholic beverages 24 hours prior to your procedure.  Shower the night before AND the morning of your procedure with a Chlorhexidine wash such as Hibiclens or Dial antibacterial soap from the neck down.    Do not get it on your face or in your eyes.  You may use your own shampoo and face wash. This helps your skin to be as bacteria free as possible.   Do not apply any deodorant, lotion or powder after you shower.   DO NOT remove hair from the surgery site.  Do not shave the incision site unless you are given specific  instructions to do so.    Sleep in a bed with clean sheets.  Do not sleep with a pet in the bed.   If you wear contact lenses, dentures, hearing aids or glasses, bring a container to put them in during surgery and give to a family member for safe keeping.    Please leave all jewelry, piercing's and valuables at home.   If your doctor has scheduled you for an overnight stay, bring a small overnight bag with any personal items you need.  Wear comfortable clothing that is easy to remove and place back on after surgery.       If you have any questions about these instructions, call Pre-Op Admit  Nursing at [phone] , Pre-Op Day Surgery Unit at [phone]

## 2024-05-15 NOTE — PROGRESS NOTES
Confirmed with patient that sheis having a sleeve gastrectomy on  05/20/2024 at [facility].    Weight 274.8 lb  Fat%  53.2  Fat mass 146.2 lb   .6 lb  TBW  94.6 lb  TBW% 34.4  BMI  46.4        Preop diet reviewed with patient.   Reminded of liquids only (water and protein shakes) on the day before surgery.   Reviewed progression of diet after surgery    ~Clear liquid diet for the 1st week post op with a goal of 64oz of fluid intake daily  ~encouraged protein intake to facilitate the healing process, this can include protein saha, gatorade zero with protein, protein broth, etc  ~avoid food/liquids with temperature extreme of too hot or too cold, as this may cause spasms of the stomach leading to increased pain and complications with the healing process  ~Full liquid diet for weeks 2 and 3 post-op with a goal of 60 gm of protein daily   ~begin bariatric vitamins on 1st day of post-op week 2  ~emphasized smooth, liquid consistency of intake. Any chunks or bits of food may cause complications of healing.   Reviewed the importance of  ~post op activity as tolerated  ~ incentive spirometry as provided by the respiratory therapist at the hospital   ~adequate oral fluid intake/hydration  ~written copy of ambulation, and hydration tracking tool provided  Reviewed post-op wound/incision care (written copy provided)   ~1st shower 48 hours after surgery and then daily, no rubbing or scrubbing and pat to dry   ~no tub baths, swimming pool, hot tub until cleared at 2 week post-op visit   ~may remove outer bandage in 1st shower, steri-strips remain until they fall off  ~educated patient of signs and symptoms of infection and importance of  reporting them (redness, swelling, drainage, fever)  List of approved post-op over-the-counter meds provided   ~emphasized NO NSAIDS  Reviewed post-op constipation protocol, written copy provided.  Instructed of post-operative limitations   ~no driving if taking narcotic pain medication.     ~no lifting >5 lbs for 2 weeks, >30 lbs for 6 weeks post-op    Patient verbalized complete understanding.    Pt reports that she was diagnosed with MRSA sinusitis on 5/8/24 and prescribed a 14 day course of Augmentin, which she has taken 1/2 so far. Dr. Siegel notified, he advised to proceed as planned.

## 2024-05-15 NOTE — PRE ADMISSION SCREENING
Preadmit assessment complete. Review of patient health history and home medications. Questions answered. Patient voiced understanding. Chlorhexidine scrub given to patient for preop shower Preop education per AVS pt states she will stop eliquis 5/17/24 per order. Pt states +MRSA nasal swab, on abx

## 2024-05-20 ENCOUNTER — HOSPITAL ENCOUNTER (OUTPATIENT)
Facility: HOSPITAL | Age: 64
Discharge: HOME | End: 2024-05-21
Attending: SURGERY | Admitting: SURGERY
Payer: COMMERCIAL

## 2024-05-20 ENCOUNTER — ANESTHESIA (OUTPATIENT)
Dept: SURGERY | Facility: HOSPITAL | Age: 64
End: 2024-05-20
Payer: COMMERCIAL

## 2024-05-20 ENCOUNTER — ANESTHESIA EVENT (OUTPATIENT)
Dept: SURGERY | Facility: HOSPITAL | Age: 64
End: 2024-05-20
Payer: COMMERCIAL

## 2024-05-20 DIAGNOSIS — E66.01 MORBID OBESITY: ICD-10-CM

## 2024-05-20 DIAGNOSIS — R07.9 CHEST PAIN: ICD-10-CM

## 2024-05-20 DIAGNOSIS — Z95.0 PACEMAKER: ICD-10-CM

## 2024-05-20 DIAGNOSIS — E78.00 HYPERCHOLESTEREMIA: ICD-10-CM

## 2024-05-20 DIAGNOSIS — I48.0 PAROXYSMAL ATRIAL FIBRILLATION: ICD-10-CM

## 2024-05-20 DIAGNOSIS — G47.33 OBSTRUCTIVE SLEEP APNEA SYNDROME: ICD-10-CM

## 2024-05-20 DIAGNOSIS — Z90.3 S/P GASTRIC SLEEVE PROCEDURE: Primary | ICD-10-CM

## 2024-05-20 DIAGNOSIS — I25.10 CORONARY ARTERY DISEASE, UNSPECIFIED VESSEL OR LESION TYPE, UNSPECIFIED WHETHER ANGINA PRESENT, UNSPECIFIED WHETHER NATIVE OR TRANSPLANTED HEART: ICD-10-CM

## 2024-05-20 DIAGNOSIS — E66.01 MORBID OBESITY WITH BMI OF 45.0-49.9, ADULT: ICD-10-CM

## 2024-05-20 DIAGNOSIS — I10 ESSENTIAL HYPERTENSION: ICD-10-CM

## 2024-05-20 PROCEDURE — 63600175 PHARM REV CODE 636 W HCPCS: Performed by: SURGERY

## 2024-05-20 PROCEDURE — D9220A PRA ANESTHESIA: Mod: CRNA,,, | Performed by: NURSE ANESTHETIST, CERTIFIED REGISTERED

## 2024-05-20 PROCEDURE — 37000009 HC ANESTHESIA EA ADD 15 MINS: Performed by: SURGERY

## 2024-05-20 PROCEDURE — 37000008 HC ANESTHESIA 1ST 15 MINUTES: Performed by: SURGERY

## 2024-05-20 PROCEDURE — 94761 N-INVAS EAR/PLS OXIMETRY MLT: CPT

## 2024-05-20 PROCEDURE — 94799 UNLISTED PULMONARY SVC/PX: CPT

## 2024-05-20 PROCEDURE — C9113 INJ PANTOPRAZOLE SODIUM, VIA: HCPCS | Performed by: SURGERY

## 2024-05-20 PROCEDURE — 71000039 HC RECOVERY, EACH ADD'L HOUR: Performed by: SURGERY

## 2024-05-20 PROCEDURE — 25000003 PHARM REV CODE 250: Performed by: NURSE ANESTHETIST, CERTIFIED REGISTERED

## 2024-05-20 PROCEDURE — 36000710: Performed by: SURGERY

## 2024-05-20 PROCEDURE — 25000003 PHARM REV CODE 250: Performed by: ANESTHESIOLOGY

## 2024-05-20 PROCEDURE — 43775 LAP SLEEVE GASTRECTOMY: CPT | Mod: ,,, | Performed by: SURGERY

## 2024-05-20 PROCEDURE — 94760 N-INVAS EAR/PLS OXIMETRY 1: CPT | Mod: XB

## 2024-05-20 PROCEDURE — 25000003 PHARM REV CODE 250: Performed by: SURGERY

## 2024-05-20 PROCEDURE — 27201423 OPTIME MED/SURG SUP & DEVICES STERILE SUPPLY: Performed by: SURGERY

## 2024-05-20 PROCEDURE — 99900035 HC TECH TIME PER 15 MIN (STAT)

## 2024-05-20 PROCEDURE — D9220A PRA ANESTHESIA: Mod: ANES,,, | Performed by: ANESTHESIOLOGY

## 2024-05-20 PROCEDURE — 63600175 PHARM REV CODE 636 W HCPCS: Performed by: NURSE ANESTHETIST, CERTIFIED REGISTERED

## 2024-05-20 PROCEDURE — 63600175 PHARM REV CODE 636 W HCPCS: Performed by: ANESTHESIOLOGY

## 2024-05-20 PROCEDURE — 71000033 HC RECOVERY, INTIAL HOUR: Performed by: SURGERY

## 2024-05-20 PROCEDURE — 25000003 PHARM REV CODE 250

## 2024-05-20 PROCEDURE — 36000711: Performed by: SURGERY

## 2024-05-20 RX ORDER — LANOLIN ALCOHOL/MO/W.PET/CERES
800 CREAM (GRAM) TOPICAL
Status: DISCONTINUED | OUTPATIENT
Start: 2024-05-20 | End: 2024-05-21 | Stop reason: HOSPADM

## 2024-05-20 RX ORDER — ROCURONIUM BROMIDE 10 MG/ML
INJECTION, SOLUTION INTRAVENOUS
Status: DISCONTINUED | OUTPATIENT
Start: 2024-05-20 | End: 2024-05-20

## 2024-05-20 RX ORDER — PHENYLEPHRINE HCL IN 0.9% NACL 1 MG/10 ML
SYRINGE (ML) INTRAVENOUS
Status: DISCONTINUED | OUTPATIENT
Start: 2024-05-20 | End: 2024-05-20

## 2024-05-20 RX ORDER — SODIUM CHLORIDE, SODIUM LACTATE, POTASSIUM CHLORIDE, CALCIUM CHLORIDE 600; 310; 30; 20 MG/100ML; MG/100ML; MG/100ML; MG/100ML
INJECTION, SOLUTION INTRAVENOUS CONTINUOUS
Status: DISCONTINUED | OUTPATIENT
Start: 2024-05-20 | End: 2024-05-21 | Stop reason: HOSPADM

## 2024-05-20 RX ORDER — MIDAZOLAM HYDROCHLORIDE 1 MG/ML
INJECTION INTRAMUSCULAR; INTRAVENOUS
Status: DISCONTINUED | OUTPATIENT
Start: 2024-05-20 | End: 2024-05-20

## 2024-05-20 RX ORDER — CEFAZOLIN SODIUM 2 G/50ML
2 SOLUTION INTRAVENOUS
Status: COMPLETED | OUTPATIENT
Start: 2024-05-20 | End: 2024-05-21

## 2024-05-20 RX ORDER — ENOXAPARIN SODIUM 100 MG/ML
40 INJECTION SUBCUTANEOUS EVERY 12 HOURS
Status: DISCONTINUED | OUTPATIENT
Start: 2024-05-20 | End: 2024-05-20 | Stop reason: HOSPADM

## 2024-05-20 RX ORDER — PANTOPRAZOLE SODIUM 40 MG/1
40 TABLET, DELAYED RELEASE ORAL DAILY
Status: DISCONTINUED | OUTPATIENT
Start: 2024-05-20 | End: 2024-05-21 | Stop reason: HOSPADM

## 2024-05-20 RX ORDER — EPHEDRINE SULFATE 50 MG/ML
INJECTION, SOLUTION INTRAVENOUS
Status: DISCONTINUED | OUTPATIENT
Start: 2024-05-20 | End: 2024-05-20

## 2024-05-20 RX ORDER — PANTOPRAZOLE SODIUM 40 MG/10ML
40 INJECTION, POWDER, LYOPHILIZED, FOR SOLUTION INTRAVENOUS DAILY
Status: DISCONTINUED | OUTPATIENT
Start: 2024-05-20 | End: 2024-05-20 | Stop reason: HOSPADM

## 2024-05-20 RX ORDER — SODIUM,POTASSIUM PHOSPHATES 280-250MG
2 POWDER IN PACKET (EA) ORAL
Status: DISCONTINUED | OUTPATIENT
Start: 2024-05-20 | End: 2024-05-21 | Stop reason: HOSPADM

## 2024-05-20 RX ORDER — OXYCODONE HYDROCHLORIDE 10 MG/1
10 TABLET ORAL EVERY 4 HOURS PRN
Status: DISCONTINUED | OUTPATIENT
Start: 2024-05-20 | End: 2024-05-21 | Stop reason: HOSPADM

## 2024-05-20 RX ORDER — LIDOCAINE HYDROCHLORIDE 20 MG/ML
JELLY TOPICAL
Status: DISCONTINUED | OUTPATIENT
Start: 2024-05-20 | End: 2024-05-20

## 2024-05-20 RX ORDER — HYDROMORPHONE HYDROCHLORIDE 1 MG/ML
0.2 INJECTION, SOLUTION INTRAMUSCULAR; INTRAVENOUS; SUBCUTANEOUS EVERY 5 MIN PRN
Status: COMPLETED | OUTPATIENT
Start: 2024-05-20 | End: 2024-05-20

## 2024-05-20 RX ORDER — ALBUTEROL SULFATE 0.83 MG/ML
2.5 SOLUTION RESPIRATORY (INHALATION) EVERY 6 HOURS PRN
Status: DISCONTINUED | OUTPATIENT
Start: 2024-05-20 | End: 2024-05-21 | Stop reason: HOSPADM

## 2024-05-20 RX ORDER — SODIUM CHLORIDE 9 MG/ML
INJECTION, SOLUTION INTRAVENOUS CONTINUOUS
Status: DISCONTINUED | OUTPATIENT
Start: 2024-05-20 | End: 2024-05-20

## 2024-05-20 RX ORDER — DEXAMETHASONE SODIUM PHOSPHATE 4 MG/ML
INJECTION, SOLUTION INTRA-ARTICULAR; INTRALESIONAL; INTRAMUSCULAR; INTRAVENOUS; SOFT TISSUE
Status: DISCONTINUED | OUTPATIENT
Start: 2024-05-20 | End: 2024-05-20

## 2024-05-20 RX ORDER — GABAPENTIN 300 MG/1
600 CAPSULE ORAL 2 TIMES DAILY
Status: DISCONTINUED | OUTPATIENT
Start: 2024-05-20 | End: 2024-05-21 | Stop reason: HOSPADM

## 2024-05-20 RX ORDER — OXYCODONE HYDROCHLORIDE 5 MG/1
5 TABLET ORAL
Status: DISCONTINUED | OUTPATIENT
Start: 2024-05-20 | End: 2024-05-20 | Stop reason: HOSPADM

## 2024-05-20 RX ORDER — DIPHENHYDRAMINE HYDROCHLORIDE 50 MG/ML
12.5 INJECTION INTRAMUSCULAR; INTRAVENOUS
Status: DISCONTINUED | OUTPATIENT
Start: 2024-05-20 | End: 2024-05-20 | Stop reason: HOSPADM

## 2024-05-20 RX ORDER — DULOXETIN HYDROCHLORIDE 30 MG/1
60 CAPSULE, DELAYED RELEASE ORAL DAILY
Status: DISCONTINUED | OUTPATIENT
Start: 2024-05-21 | End: 2024-05-21 | Stop reason: HOSPADM

## 2024-05-20 RX ORDER — DIPHENHYDRAMINE HYDROCHLORIDE 50 MG/ML
12.5 INJECTION INTRAMUSCULAR; INTRAVENOUS EVERY 4 HOURS PRN
Status: DISCONTINUED | OUTPATIENT
Start: 2024-05-20 | End: 2024-05-21 | Stop reason: HOSPADM

## 2024-05-20 RX ORDER — ONDANSETRON HYDROCHLORIDE 2 MG/ML
INJECTION, SOLUTION INTRAVENOUS
Status: DISCONTINUED | OUTPATIENT
Start: 2024-05-20 | End: 2024-05-20

## 2024-05-20 RX ORDER — FAMOTIDINE 10 MG/ML
INJECTION INTRAVENOUS
Status: DISCONTINUED | OUTPATIENT
Start: 2024-05-20 | End: 2024-05-20

## 2024-05-20 RX ORDER — BUPIVACAINE HYDROCHLORIDE 2.5 MG/ML
INJECTION, SOLUTION EPIDURAL; INFILTRATION; INTRACAUDAL
Status: DISCONTINUED | OUTPATIENT
Start: 2024-05-20 | End: 2024-05-20 | Stop reason: HOSPADM

## 2024-05-20 RX ORDER — DEXMEDETOMIDINE HYDROCHLORIDE 100 UG/ML
INJECTION, SOLUTION INTRAVENOUS
Status: DISCONTINUED | OUTPATIENT
Start: 2024-05-20 | End: 2024-05-20

## 2024-05-20 RX ORDER — PROPOFOL 10 MG/ML
VIAL (ML) INTRAVENOUS
Status: DISCONTINUED | OUTPATIENT
Start: 2024-05-20 | End: 2024-05-20

## 2024-05-20 RX ORDER — FENTANYL CITRATE 50 UG/ML
INJECTION, SOLUTION INTRAMUSCULAR; INTRAVENOUS
Status: DISCONTINUED | OUTPATIENT
Start: 2024-05-20 | End: 2024-05-20

## 2024-05-20 RX ORDER — HYDROMORPHONE HYDROCHLORIDE 1 MG/ML
1 INJECTION, SOLUTION INTRAMUSCULAR; INTRAVENOUS; SUBCUTANEOUS EVERY 6 HOURS PRN
Status: DISCONTINUED | OUTPATIENT
Start: 2024-05-20 | End: 2024-05-21 | Stop reason: HOSPADM

## 2024-05-20 RX ORDER — ENOXAPARIN SODIUM 100 MG/ML
40 INJECTION SUBCUTANEOUS EVERY 12 HOURS
Status: DISCONTINUED | OUTPATIENT
Start: 2024-05-20 | End: 2024-05-21 | Stop reason: HOSPADM

## 2024-05-20 RX ORDER — LIDOCAINE HYDROCHLORIDE 20 MG/ML
INJECTION, SOLUTION EPIDURAL; INFILTRATION; INTRACAUDAL; PERINEURAL
Status: DISCONTINUED | OUTPATIENT
Start: 2024-05-20 | End: 2024-05-20

## 2024-05-20 RX ORDER — ACETAMINOPHEN 10 MG/ML
INJECTION, SOLUTION INTRAVENOUS
Status: DISCONTINUED | OUTPATIENT
Start: 2024-05-20 | End: 2024-05-20

## 2024-05-20 RX ORDER — ONDANSETRON HYDROCHLORIDE 2 MG/ML
8 INJECTION, SOLUTION INTRAVENOUS EVERY 6 HOURS PRN
Status: DISCONTINUED | OUTPATIENT
Start: 2024-05-20 | End: 2024-05-21 | Stop reason: HOSPADM

## 2024-05-20 RX ORDER — NORTRIPTYLINE HYDROCHLORIDE 25 MG/1
25 CAPSULE ORAL NIGHTLY
Status: DISCONTINUED | OUTPATIENT
Start: 2024-05-20 | End: 2024-05-21 | Stop reason: HOSPADM

## 2024-05-20 RX ORDER — METOPROLOL TARTRATE 50 MG/1
100 TABLET ORAL 2 TIMES DAILY
Status: DISCONTINUED | OUTPATIENT
Start: 2024-05-20 | End: 2024-05-21 | Stop reason: HOSPADM

## 2024-05-20 RX ORDER — ONDANSETRON HYDROCHLORIDE 2 MG/ML
4 INJECTION, SOLUTION INTRAVENOUS DAILY PRN
Status: DISCONTINUED | OUTPATIENT
Start: 2024-05-20 | End: 2024-05-20 | Stop reason: HOSPADM

## 2024-05-20 RX ORDER — LISINOPRIL 10 MG/1
10 TABLET ORAL DAILY
Status: DISCONTINUED | OUTPATIENT
Start: 2024-05-20 | End: 2024-05-21 | Stop reason: HOSPADM

## 2024-05-20 RX ADMIN — EPHEDRINE SULFATE 10 MG: 50 INJECTION INTRAVENOUS at 08:05

## 2024-05-20 RX ADMIN — SODIUM CHLORIDE, POTASSIUM CHLORIDE, SODIUM LACTATE AND CALCIUM CHLORIDE: 600; 310; 30; 20 INJECTION, SOLUTION INTRAVENOUS at 07:05

## 2024-05-20 RX ADMIN — CEFAZOLIN SODIUM 3 ML: 2 SOLUTION INTRAVENOUS at 08:05

## 2024-05-20 RX ADMIN — NORTRIPTYLINE HYDROCHLORIDE 25 MG: 25 CAPSULE ORAL at 08:05

## 2024-05-20 RX ADMIN — Medication 600 MCG: at 08:05

## 2024-05-20 RX ADMIN — Medication 200 MCG: at 08:05

## 2024-05-20 RX ADMIN — SODIUM CHLORIDE, SODIUM LACTATE, POTASSIUM CHLORIDE, AND CALCIUM CHLORIDE: .6; .31; .03; .02 INJECTION, SOLUTION INTRAVENOUS at 07:05

## 2024-05-20 RX ADMIN — ONDANSETRON 4 MG: 2 INJECTION INTRAMUSCULAR; INTRAVENOUS at 07:05

## 2024-05-20 RX ADMIN — HYDROMORPHONE HYDROCHLORIDE 0.2 MG: 1 INJECTION, SOLUTION INTRAMUSCULAR; INTRAVENOUS; SUBCUTANEOUS at 09:05

## 2024-05-20 RX ADMIN — LIDOCAINE HYDROCHLORIDE 1 EACH: 20 JELLY TOPICAL at 08:05

## 2024-05-20 RX ADMIN — ENOXAPARIN SODIUM 40 MG: 40 INJECTION SUBCUTANEOUS at 07:05

## 2024-05-20 RX ADMIN — OXYCODONE HYDROCHLORIDE 5 MG: 5 TABLET ORAL at 09:05

## 2024-05-20 RX ADMIN — GABAPENTIN 600 MG: 300 CAPSULE ORAL at 08:05

## 2024-05-20 RX ADMIN — METOPROLOL TARTRATE 100 MG: 50 TABLET, FILM COATED ORAL at 08:05

## 2024-05-20 RX ADMIN — ACETAMINOPHEN 1000 MG: 10 INJECTION, SOLUTION INTRAVENOUS at 08:05

## 2024-05-20 RX ADMIN — MIDAZOLAM HYDROCHLORIDE 1 MG: 1 INJECTION, SOLUTION INTRAMUSCULAR; INTRAVENOUS at 07:05

## 2024-05-20 RX ADMIN — PROPOFOL 160 MG: 10 INJECTION, EMULSION INTRAVENOUS at 08:05

## 2024-05-20 RX ADMIN — CEFAZOLIN SODIUM 2 G: 2 SOLUTION INTRAVENOUS at 04:05

## 2024-05-20 RX ADMIN — OXYCODONE HYDROCHLORIDE 10 MG: 10 TABLET ORAL at 08:05

## 2024-05-20 RX ADMIN — SODIUM CHLORIDE, POTASSIUM CHLORIDE, SODIUM LACTATE AND CALCIUM CHLORIDE: 600; 310; 30; 20 INJECTION, SOLUTION INTRAVENOUS at 11:05

## 2024-05-20 RX ADMIN — Medication 300 MCG: at 08:05

## 2024-05-20 RX ADMIN — SUGAMMADEX 200 MG: 100 INJECTION, SOLUTION INTRAVENOUS at 08:05

## 2024-05-20 RX ADMIN — FAMOTIDINE 20 MG: 10 INJECTION, SOLUTION INTRAVENOUS at 07:05

## 2024-05-20 RX ADMIN — DEXAMETHASONE SODIUM PHOSPHATE 8 MG: 4 INJECTION, SOLUTION INTRAMUSCULAR; INTRAVENOUS at 08:05

## 2024-05-20 RX ADMIN — DEXMEDETOMIDINE HYDROCHLORIDE 8 MCG: 100 INJECTION, SOLUTION INTRAVENOUS at 08:05

## 2024-05-20 RX ADMIN — FLECAINIDE ACETATE 150 MG: 100 TABLET ORAL at 08:05

## 2024-05-20 RX ADMIN — FIBRINOGEN HUMAN, HUMAN THROMBIN: 90; 500 SOLUTION TOPICAL at 08:05

## 2024-05-20 RX ADMIN — LIDOCAINE HYDROCHLORIDE 60 MG: 20 INJECTION, SOLUTION INTRAVENOUS at 08:05

## 2024-05-20 RX ADMIN — PANTOPRAZOLE SODIUM 40 MG: 40 INJECTION, POWDER, FOR SOLUTION INTRAVENOUS at 07:05

## 2024-05-20 RX ADMIN — FENTANYL CITRATE 50 MCG: 50 INJECTION, SOLUTION INTRAMUSCULAR; INTRAVENOUS at 08:05

## 2024-05-20 RX ADMIN — DEXMEDETOMIDINE HYDROCHLORIDE 8 MCG: 100 INJECTION, SOLUTION INTRAVENOUS at 07:05

## 2024-05-20 RX ADMIN — OXYCODONE HYDROCHLORIDE 10 MG: 10 TABLET ORAL at 04:05

## 2024-05-20 RX ADMIN — ROCURONIUM BROMIDE 50 MG: 10 INJECTION, SOLUTION INTRAVENOUS at 08:05

## 2024-05-20 NOTE — CARE UPDATE
05/20/24 1232   Patient Assessment/Suction   Level of Consciousness (AVPU) alert   Respiratory Effort Normal;Unlabored   PRE-TX-O2   Device (Oxygen Therapy) room air   SpO2 96 %   Pulse Oximetry Type Intermittent   $ Pulse Oximetry - Multiple Charge Pulse Oximetry - Multiple   Pulse 60   Resp 16   Incentive Spirometer   $ Incentive Spirometer Charges postop instruction   Administration (IS) instruction provided, initial   Number of Repetitions (IS) 10   Level Incentive Spirometer (mL) 2000   Patient Tolerance (IS) good

## 2024-05-20 NOTE — ASSESSMENT & PLAN NOTE
Chronic, controlled. Latest blood pressure and vitals reviewed-     Temp:  [97.3 °F (36.3 °C)-98.3 °F (36.8 °C)]   Pulse:  [60-62]   Resp:  [14-20]   BP: ()/(50-86)   SpO2:  [97 %-100 %] .   Home meds for hypertension were reviewed and noted below.   Hypertension Medications               lisinopriL 10 MG tablet TAKE ONE TABLET BY MOUTH EVERY DAY    lisinopriL 10 MG tablet Take 1 tablet (10 mg total) by mouth once daily.    metoprolol tartrate (LOPRESSOR) 100 MG tablet Take 1 tablet (100 mg total) by mouth 2 (two) times daily.            While in the hospital, will manage blood pressure as follows; Continue home antihypertensive regimen    Will utilize p.r.n. blood pressure medication only if patient's blood pressure greater than 180/110 and she develops symptoms such as worsening chest pain or shortness of breath.

## 2024-05-20 NOTE — ASSESSMENT & PLAN NOTE
Body mass index is 46.31 kg/m². Morbid obesity complicates all aspects of disease management from diagnostic modalities to treatment including the new gastric surgery. Weight loss encouraged and health benefits explained to patient.

## 2024-05-20 NOTE — TRANSFER OF CARE
"Anesthesia Transfer of Care Note    Patient: Fatemeh Shoemaker    Procedure(s) Performed: Procedure(s) (LRB):  GASTRECTOMY, SLEEVE, LAPAROSCOPIC (N/A)    Patient location: PACU    Anesthesia Type: general    Transport from OR: Transported from OR on room air with adequate spontaneous ventilation    Post pain: adequate analgesia    Post assessment: no apparent anesthetic complications and tolerated procedure well    Post vital signs: stable    Level of consciousness: responds to stimulation    Nausea/Vomiting: no nausea/vomiting    Complications: none    Transfer of care protocol was followedComments: AAXO3 SV, exchanging well.  To PACU, VSS upon arrival. Report to RN       Last vitals: Visit Vitals  /86   Pulse 62   Temp 36.8 °C (98.3 °F) (Oral)   Resp 18   Ht 5' 4.5" (1.638 m)   Wt 124.3 kg (274 lb)   LMP 09/08/2013 (Approximate)   SpO2 97%   Breastfeeding No   BMI 46.31 kg/m²     "

## 2024-05-20 NOTE — ADDENDUM NOTE
Addendum  created 05/20/24 1139 by Tammy Bautista, CRNA    Flowsheet accepted, Intraprocedure Meds edited, Orders acknowledged in Narrator

## 2024-05-20 NOTE — SUBJECTIVE & OBJECTIVE
Past Medical History:   Diagnosis Date    Anemia     Anticoagulant long-term use     Arrhythmia     Arthritis     Atrial fibrillation     history    Bronchitis     COPD (chronic obstructive pulmonary disease)     Encounter for blood transfusion     General anesthetics causing adverse effect in therapeutic use     GERD (gastroesophageal reflux disease)     High blood pressure     Hyperlipidemia     Lump or mass in breast     benign     Neuropathy     Obesity     Obstructive sleep apnea syndrome 2021    no cpap    Ulcer     Unspecified chronic bronchitis        Past Surgical History:   Procedure Laterality Date    A-V CARDIAC PACEMAKER INSERTION Left 2020    Procedure: INSERTION, CARDIAC PACEMAKER, DUAL CHAMBER;  Surgeon: Bert Reaves MD;  Location: [facility] CATH;  Service: Cardiology;  Laterality: Left;    ANGIOGRAM, CORONARY, WITH LEFT HEART CATHETERIZATION  2023    Procedure: Left Heart Cath;  Surgeon: Bert Reaves MD;  [facility];  Service: Cardiology;;    BREAST BIOPSY Right 2017    myofibroblastoma     BREAST MASS EXCISION       SECTION  10/25/1986    Other c/section 10/26/1997    CHOLECYSTECTOMY  2017    Dr. TOLU Bowers, [facility]     CORRECTION OF HAMMER TOE Left 2022    Procedure: CORRECTION, HAMMER TOE;  Surgeon: Ezra Arriaga DPM;  Location: [facility] OR;  Service: Podiatry;  Laterality: Left;  hammertoes 2-5 left,    csection      CS x 2    CYSTOSCOPY N/A 2019    Procedure: CYSTOSCOPY;  Surgeon: Noemi Pierre MD;  Location: [facility] OR;  Service: OB/GYN;  Laterality: N/A;    DILATION AND CURETTAGE OF UTERUS      EPIDURAL STEROID INJECTION INTO LUMBAR SPINE N/A 2020    Procedure: Injection-steroid-epidural-lumbar L5/S1;  Surgeon: Gurjit Tavarez MD;  Location: [facility] OR;  Service: Pain Management;  Laterality: N/A;    EPIDURAL STEROID INJECTION INTO LUMBAR SPINE N/A 2020    Procedure: Injection-steroid-epidural-lumbar L5/S1;  Surgeon:  "Gurjit Tavarez MD;  Location: [facility] OR;  Service: Pain Management;  Laterality: N/A;    EPIDURAL STEROID INJECTION INTO LUMBAR SPINE N/A 12/01/2021    Procedure: Injection-steroid-epidural-lumbar L5/S1;  Surgeon: Gurjit Tavarez MD;  Location: [facility] OR;  Service: Pain Management;  Laterality: N/A;    EPIDURAL STEROID INJECTION INTO LUMBAR SPINE N/A 02/22/2023    Procedure: Injection-steroid-epidural-lumbar-L5/S1 to the left;  Surgeon: Gurjit Tavarez MD;  Location: [facility] OR;  Service: Pain Management;  Laterality: N/A;    EPIDURAL STEROID INJECTION INTO LUMBAR SPINE Right 04/28/2023    Procedure: Injection-steroid-epidural-lumbar L5/S1;  Surgeon: Gurjit Tavarez MD;  Location: [facility] OR;  Service: Pain Management;  Laterality: Right;    EPIDURAL STEROID INJECTION INTO LUMBAR SPINE N/A 08/29/2023    Procedure: Injection-steroid-epidural-lumbarL5/S1 to right;  Surgeon: Gurjit Tavarez MD;  Location: [facility] OR;  Service: Pain Management;  Laterality: N/A;    FRACTURE SURGERY  04/1986    Dislocated  hip total rt hip 08/08    HIP PINNING Right 2008    HYSTERECTOMY      JOINT REPLACEMENT Right 08/2008    hip    LAPAROSCOPIC SALPINGO-OOPHORECTOMY Bilateral 09/03/2019    Procedure: SALPINGO-OOPHORECTOMY, LAPAROSCOPIC;  Surgeon: Noemi Pierre MD;  Location: [facility];  Service: OB/GYN;  Laterality: Bilateral;  Dr. Bentley to assist. No resident needed.     LAPAROSCOPIC TOTAL HYSTERECTOMY N/A 09/03/2019    Procedure: HYSTERECTOMY, TOTAL, LAPAROSCOPIC;  Surgeon: Noemi Pierre MD;  Location: [facility];  Service: OB/GYN;  Laterality: N/A;    NASAL SEPTUM SURGERY  1982    OOPHORECTOMY      SKIN TAG REMOVAL Left 11/03/2022    Procedure: REMOVAL, SKIN TAG;  Surgeon: Ezra Arriaga DPM;  Location: [facility];  Service: Podiatry;  Laterality: Left;    TOTAL HIP ARTHROPLASTY Right     TUBAL LIGATION  1997       Review of patient's allergies indicates:   Allergen Reactions    Aspirin Other (See Comments)     "I don't take it because " "I've had ulcers"       Meperidine Other (See Comments)     Felt like she was about to pass out after taking    Azithromycin      Other reaction(s): Not available       No current facility-administered medications on file prior to encounter.     Current Outpatient Medications on File Prior to Encounter   Medication Sig    albuterol (PROVENTIL) 2.5 mg /3 mL (0.083 %) nebulizer solution Take 3 mLs (2.5 mg total) by nebulization every 6 (six) hours as needed for Wheezing or Shortness of Breath. Rescue    albuterol sulfate 90 mcg/actuation aebs Inhale 90 mcg into the lungs every 4 (four) hours as needed (wheezing or shortness of breath).    cholecalciferol, vitamin D3, (DECARA) 1,250 mcg (50,000 unit) capsule Take 1 capsule (50,000 Units total) by mouth every 7 days.    DULoxetine (CYMBALTA) 60 MG capsule TAKE ONE CAPSULE BY MOUTH DAILY    flecainide (TAMBOCOR) 150 MG Tab TAKE ONE TABLET BY MOUTH TWICE DAILY    gabapentin (NEURONTIN) 600 MG tablet TAKE ONE TABLET BY MOUTH TWICE DAILY    lisinopriL 10 MG tablet TAKE ONE TABLET BY MOUTH EVERY DAY    metoprolol tartrate (LOPRESSOR) 100 MG tablet Take 1 tablet (100 mg total) by mouth 2 (two) times daily.    nortriptyline (PAMELOR) 25 MG capsule TAKE ONE CAPSULE BY MOUTH EVERY EVENING    omeprazole (PRILOSEC) 20 MG capsule TAKE ONE CAPSULE BY MOUTH EVERY MORNING    rosuvastatin (CRESTOR) 10 MG tablet Take 1 tablet (10 mg total) by mouth every evening.    diclofenac sodium (VOLTAREN) 1 % Gel Apply 2 g topically 4 (four) times daily. (Patient not taking: Reported on 1/17/2024)    ELIQUIS 5 mg Tab TAKE ONE TABLET BY MOUTH TWICE DAILY (Patient taking differently: Take 5 mg by mouth 2 (two) times daily. 5/15/24 pt to stop Thursday night 5/16/24)    ondansetron (ZOFRAN-ODT) 4 MG TbDL Take 1 tablet (4 mg total) by mouth 2 (two) times daily.    promethazine (PHENERGAN) 12.5 MG Tab Take 1 tablet (12.5 mg total) by mouth 2 (two) times daily as needed (nausea).    pulse oximeter (PULSE " OXIMETER) device Use twice daily at 8 AM and 3 PM and record the value in MyChart as directed.    tiZANidine (ZANAFLEX) 4 MG tablet TAKE ONE TABLET BY MOUTH every SIX hours AS NEEDED.    valACYclovir (VALTREX) 500 MG tablet Take 1 tablet (500 mg total) by mouth daily as needed (fever blister).     Family History       Problem Relation (Age of Onset)    Alcohol abuse Father    Arthritis Paternal Grandfather    Cancer Maternal Grandmother, Father    Cataracts Maternal Grandfather, Mother    Colon cancer Maternal Grandmother (70), Father    Early death Brother    Eclampsia Paternal Grandmother    Hypertension Father, Mother, Brother    No Known Problems Daughter    Stroke Father (57)          Tobacco Use    Smoking status: Never    Smokeless tobacco: Never   Substance and Sexual Activity    Alcohol use: No     Comment: never    Drug use: Never    Sexual activity: Yes     Partners: Male     Birth control/protection: See Surgical Hx, Other-see comments     Comment: Hysterectomy 9/3/19     Review of Systems   Constitutional:  Positive for activity change and appetite change.   HENT: Negative.     Respiratory: Negative.     Gastrointestinal:  Positive for abdominal distention. Negative for diarrhea and vomiting.   Genitourinary: Negative.      Objective:     Vital Signs (Most Recent):  Temp: 97.3 °F (36.3 °C) (05/20/24 0857)  Pulse: 60 (05/20/24 1000)  Resp: 20 (05/20/24 1000)  BP: 129/75 (05/20/24 1000)  SpO2: 100 % (05/20/24 1000) Vital Signs (24h Range):  Temp:  [97.3 °F (36.3 °C)-98.3 °F (36.8 °C)] 97.3 °F (36.3 °C)  Pulse:  [60-62] 60  Resp:  [14-20] 20  SpO2:  [97 %-100 %] 100 %  BP: ()/(50-86) 129/75     Weight: 124.3 kg (274 lb)  Body mass index is 46.31 kg/m².     Physical Exam  Constitutional:       Appearance: She is obese.      Comments: Body mass index is 46.31 kg/m².   Eyes:      Extraocular Movements: Extraocular movements intact.      Pupils: Pupils are equal, round, and reactive to light.    Pulmonary:      Effort: Pulmonary effort is normal.   Abdominal:      Tenderness: There is abdominal tenderness.      Comments: Large abdominal girth; incision sites d/I without hematoma, streaking, drainage   Musculoskeletal:         General: Normal range of motion.      Cervical back: Normal range of motion.   Neurological:      General: No focal deficit present.      Mental Status: She is alert and oriented to person, place, and time.              CRANIAL NERVES     CN III, IV, VI   Pupils are equal, round, and reactive to light.       Significant Labs:   Recent Labs   Lab 02/02/24  0816   TSH 1.435     Urine Culture:       Significant Imaging:

## 2024-05-20 NOTE — H&P
[facility] Medicine  History & Physical    Patient Name: Fatemeh Shoemaker  MRN: [id number]  Patient Class: OP- Outpatient Recovery  Admission Date: 5/20/2024  Attending Physician: Ouamr Siegel MD   Primary Care Provider: Jerson Wheat MD         Patient information was obtained from patient and ER records.     Subjective:     Principal Problem:S/P gastric sleeve procedure    Chief Complaint: No chief complaint on file.       HPI: Fatemeh Shoemaker is a 63 y.o. female with  has a past medical history of Anemia, Anticoagulant long-term use, Arrhythmia, Arthritis, Atrial fibrillation, Bronchitis, COPD (chronic obstructive pulmonary disease), Encounter for blood transfusion, General anesthetics causing adverse effect in therapeutic use, GERD (gastroesophageal reflux disease), High blood pressure, Hyperlipidemia, Lump or mass in breast, Neuropathy, Obesity, Obstructive sleep apnea syndrome (11/05/2021), Ulcer, and Unspecified chronic bronchitis. who is s/p gastrectomy sleeve with Dr. Locke. Patient is a non-smoker; has pacemaker. She is post op day -0-.    Past Medical History:   Diagnosis Date    Anemia     Anticoagulant long-term use     Arrhythmia     Arthritis     Atrial fibrillation     history    Bronchitis     COPD (chronic obstructive pulmonary disease)     Encounter for blood transfusion     General anesthetics causing adverse effect in therapeutic use     GERD (gastroesophageal reflux disease)     High blood pressure     Hyperlipidemia     Lump or mass in breast     benign     Neuropathy     Obesity     Obstructive sleep apnea syndrome 11/05/2021    no cpap    Ulcer     Unspecified chronic bronchitis        Past Surgical History:   Procedure Laterality Date    A-V CARDIAC PACEMAKER INSERTION Left 09/21/2020    Procedure: INSERTION, CARDIAC PACEMAKER, DUAL CHAMBER;  Surgeon: Bert Reaves MD;  Location: [facility];  Service: Cardiology;  Laterality: Left;     ANGIOGRAM, CORONARY, WITH LEFT HEART CATHETERIZATION  2023    Procedure: Left Heart Cath;  Surgeon: Bert Reaves MD;  Location: [facility] CATH;  Service: Cardiology;;    BREAST BIOPSY Right 2017    myofibroblastoma     BREAST MASS EXCISION       SECTION  10/25/1986    Other c/section 10/26/1997    CHOLECYSTECTOMY  2017    Dr. TOLU Bowers, [facility]     CORRECTION OF HAMMER TOE Left 2022    Procedure: CORRECTION, HAMMER TOE;  Surgeon: Ezra Arriaga DP;  Location: [facility] OR;  Service: Podiatry;  Laterality: Left;  hammertoes 2-5 left,    csection      CS x 2    CYSTOSCOPY N/A 2019    Procedure: CYSTOSCOPY;  Surgeon: Noemi Pierre MD;  Location: [facility] OR;  Service: OB/GYN;  Laterality: N/A;    DILATION AND CURETTAGE OF UTERUS      EPIDURAL STEROID INJECTION INTO LUMBAR SPINE N/A 2020    Procedure: Injection-steroid-epidural-lumbar L5/S1;  Surgeon: Gurjit Tavarez MD;  Location: [facility] OR;  Service: Pain Management;  Laterality: N/A;    EPIDURAL STEROID INJECTION INTO LUMBAR SPINE N/A 2020    Procedure: Injection-steroid-epidural-lumbar L5/S1;  Surgeon: Gurjit Tavarez MD;  Location: [facility] OR;  Service: Pain Management;  Laterality: N/A;    EPIDURAL STEROID INJECTION INTO LUMBAR SPINE N/A 2021    Procedure: Injection-steroid-epidural-lumbar L5/S1;  Surgeon: Gurjit Tavarez MD;  Location: [facility] OR;  Service: Pain Management;  Laterality: N/A;    EPIDURAL STEROID INJECTION INTO LUMBAR SPINE N/A 2023    Procedure: Injection-steroid-epidural-lumbar-L5/S1 to the left;  Surgeon: Gurjit Tavarez MD;  Location: [facility] OR;  Service: Pain Management;  Laterality: N/A;    EPIDURAL STEROID INJECTION INTO LUMBAR SPINE Right 2023    Procedure: Injection-steroid-epidural-lumbar L5/S1;  Surgeon: Gurjit Tavarez MD;  Location: [facility] OR;  Service: Pain Management;  Laterality: Right;    EPIDURAL STEROID INJECTION INTO LUMBAR SPINE N/A 2023    Procedure:  "Injection-steroid-epidural-lumbarL5/S1 to right;  Surgeon: Gurjit Tavarez MD;  Location: [facility] OR;  Service: Pain Management;  Laterality: N/A;    FRACTURE SURGERY  04/1986    Dislocated  hip total rt hip 08/08    HIP PINNING Right 2008    HYSTERECTOMY      JOINT REPLACEMENT Right 08/2008    hip    LAPAROSCOPIC SALPINGO-OOPHORECTOMY Bilateral 09/03/2019    Procedure: SALPINGO-OOPHORECTOMY, LAPAROSCOPIC;  Surgeon: Noemi Pierre MD;  Location: [facility] OR;  Service: OB/GYN;  Laterality: Bilateral;  Dr. Bentley to assist. No resident needed.     LAPAROSCOPIC TOTAL HYSTERECTOMY N/A 09/03/2019    Procedure: HYSTERECTOMY, TOTAL, LAPAROSCOPIC;  Surgeon: Noemi Pierre MD;  Location: [facility] OR;  Service: OB/GYN;  Laterality: N/A;    NASAL SEPTUM SURGERY  1982    OOPHORECTOMY      SKIN TAG REMOVAL Left 11/03/2022    Procedure: REMOVAL, SKIN TAG;  Surgeon: Ezra Arriaga DPM;  Location: [facility] OR;  Service: Podiatry;  Laterality: Left;    TOTAL HIP ARTHROPLASTY Right     TUBAL LIGATION  1997       Review of patient's allergies indicates:   Allergen Reactions    Aspirin Other (See Comments)     "I don't take it because I've had ulcers"       Meperidine Other (See Comments)     Felt like she was about to pass out after taking    Azithromycin      Other reaction(s): Not available       No current facility-administered medications on file prior to encounter.     Current Outpatient Medications on File Prior to Encounter   Medication Sig    albuterol (PROVENTIL) 2.5 mg /3 mL (0.083 %) nebulizer solution Take 3 mLs (2.5 mg total) by nebulization every 6 (six) hours as needed for Wheezing or Shortness of Breath. Rescue    albuterol sulfate 90 mcg/actuation aebs Inhale 90 mcg into the lungs every 4 (four) hours as needed (wheezing or shortness of breath).    cholecalciferol, vitamin D3, (DECARA) 1,250 mcg (50,000 unit) capsule Take 1 capsule (50,000 Units total) by mouth every 7 days.    DULoxetine (CYMBALTA) 60 MG capsule TAKE ONE " CAPSULE BY MOUTH DAILY    flecainide (TAMBOCOR) 150 MG Tab TAKE ONE TABLET BY MOUTH TWICE DAILY    gabapentin (NEURONTIN) 600 MG tablet TAKE ONE TABLET BY MOUTH TWICE DAILY    lisinopriL 10 MG tablet TAKE ONE TABLET BY MOUTH EVERY DAY    metoprolol tartrate (LOPRESSOR) 100 MG tablet Take 1 tablet (100 mg total) by mouth 2 (two) times daily.    nortriptyline (PAMELOR) 25 MG capsule TAKE ONE CAPSULE BY MOUTH EVERY EVENING    omeprazole (PRILOSEC) 20 MG capsule TAKE ONE CAPSULE BY MOUTH EVERY MORNING    rosuvastatin (CRESTOR) 10 MG tablet Take 1 tablet (10 mg total) by mouth every evening.    diclofenac sodium (VOLTAREN) 1 % Gel Apply 2 g topically 4 (four) times daily. (Patient not taking: Reported on 1/17/2024)    ELIQUIS 5 mg Tab TAKE ONE TABLET BY MOUTH TWICE DAILY (Patient taking differently: Take 5 mg by mouth 2 (two) times daily. 5/15/24 pt to stop Thursday night 5/16/24)    ondansetron (ZOFRAN-ODT) 4 MG TbDL Take 1 tablet (4 mg total) by mouth 2 (two) times daily.    promethazine (PHENERGAN) 12.5 MG Tab Take 1 tablet (12.5 mg total) by mouth 2 (two) times daily as needed (nausea).    pulse oximeter (PULSE OXIMETER) device Use twice daily at 8 AM and 3 PM and record the value in [facility] as directed.    tiZANidine (ZANAFLEX) 4 MG tablet TAKE ONE TABLET BY MOUTH every SIX hours AS NEEDED.    valACYclovir (VALTREX) 500 MG tablet Take 1 tablet (500 mg total) by mouth daily as needed (fever blister).     Family History       Problem Relation (Age of Onset)    Alcohol abuse Father    Arthritis Paternal Grandfather    Cancer Maternal Grandmother, Father    Cataracts Maternal Grandfather, Mother    Colon cancer Maternal Grandmother (70), Father    Early death Brother    Eclampsia Paternal Grandmother    Hypertension Father, Mother, Brother    No Known Problems Daughter    Stroke Father (57)          Tobacco Use    Smoking status: Never    Smokeless tobacco: Never   Substance and Sexual Activity    Alcohol use: No      Comment: never    Drug use: Never    Sexual activity: Yes     Partners: Male     Birth control/protection: See Surgical Hx, Other-see comments     Comment: Hysterectomy 9/3/19     Review of Systems   Constitutional:  Positive for activity change and appetite change.   HENT: Negative.     Respiratory: Negative.     Gastrointestinal:  Positive for abdominal distention. Negative for diarrhea and vomiting.   Genitourinary: Negative.      Objective:     Vital Signs (Most Recent):  Temp: 97.3 °F (36.3 °C) (05/20/24 0857)  Pulse: 60 (05/20/24 1000)  Resp: 20 (05/20/24 1000)  BP: 129/75 (05/20/24 1000)  SpO2: 100 % (05/20/24 1000) Vital Signs (24h Range):  Temp:  [97.3 °F (36.3 °C)-98.3 °F (36.8 °C)] 97.3 °F (36.3 °C)  Pulse:  [60-62] 60  Resp:  [14-20] 20  SpO2:  [97 %-100 %] 100 %  BP: ()/(50-86) 129/75     Weight: 124.3 kg (274 lb)  Body mass index is 46.31 kg/m².     Physical Exam  Constitutional:       Appearance: She is obese.      Comments: Body mass index is 46.31 kg/m².   Eyes:      Extraocular Movements: Extraocular movements intact.      Pupils: Pupils are equal, round, and reactive to light.   Pulmonary:      Effort: Pulmonary effort is normal.   Abdominal:      Tenderness: There is abdominal tenderness.      Comments: Large abdominal girth; incision sites d/I without hematoma, streaking, drainage   Musculoskeletal:         General: Normal range of motion.      Cervical back: Normal range of motion.   Neurological:      General: No focal deficit present.      Mental Status: She is alert and oriented to person, place, and time.              CRANIAL NERVES     CN III, IV, VI   Pupils are equal, round, and reactive to light.       Significant Labs:   Recent Labs   Lab 02/02/24  0816   TSH 1.435     Urine Culture:       Significant Imaging:         Assessment/Plan:     * S/P gastric sleeve procedure  BS +  Post op day -0-  Follow surgery recs      Obstructive sleep apnea syndrome  Cpap  nightly      Pacemaker  In situ      Morbid obesity with BMI of 45.0-49.9, adult  Body mass index is 46.31 kg/m². Morbid obesity complicates all aspects of disease management from diagnostic modalities to treatment including the new gastric surgery. Weight loss encouraged and health benefits explained to patient.         Paroxysmal atrial fibrillation  Patient with Long standing persistent (>12 months) atrial fibrillation which is controlled currently with Beta Blocker and flecainide . Patient is currently in sinus rhythm.[id number] Score: 2. Anticoagulation indicated. Anticoagulation done with eliquis .  Sinus paced on tele  Continue monitoring  Restart BB/flecanide  Holding eliquis for now    Primary hypertension  Chronic, controlled. Latest blood pressure and vitals reviewed-     Temp:  [97.3 °F (36.3 °C)-98.3 °F (36.8 °C)]   Pulse:  [60-62]   Resp:  [14-20]   BP: ()/(50-86)   SpO2:  [97 %-100 %] .   Home meds for hypertension were reviewed and noted below.   Hypertension Medications               lisinopriL 10 MG tablet TAKE ONE TABLET BY MOUTH EVERY DAY    lisinopriL 10 MG tablet Take 1 tablet (10 mg total) by mouth once daily.    metoprolol tartrate (LOPRESSOR) 100 MG tablet Take 1 tablet (100 mg total) by mouth 2 (two) times daily.            While in the hospital, will manage blood pressure as follows; Continue home antihypertensive regimen    Will utilize p.r.n. blood pressure medication only if patient's blood pressure greater than 180/110 and she develops symptoms such as worsening chest pain or shortness of breath.      VTE Risk Mitigation (From admission, onward)           Ordered     enoxaparin injection 40 mg  Every 12 hours         05/20/24 1019     IP VTE HIGH RISK PATIENT  Once         05/20/24 1019     Place SOL hose  Until discontinued         05/20/24 1019     Place sequential compression device  Until discontinued         05/20/24 1019                       On 5/20/2024, the patient was  determined to be placed on service under my care, Ashley Alicia DNP, MELLISA, FNP-C, in collaboration with the above listed cosigning physician Dr. Arthur MD (hospital medication) who is the staff attending.          Ashley Alicia DNP, MELLISA, FNP-C  [facility]  [facility]  [email or web address]

## 2024-05-20 NOTE — ANESTHESIA POSTPROCEDURE EVALUATION
Anesthesia Post Evaluation    Patient: Fatemeh Shoemaker    Procedure(s) Performed: Procedure(s) (LRB):  GASTRECTOMY, SLEEVE, LAPAROSCOPIC (N/A)    Final Anesthesia Type: general      Patient location during evaluation: PACU  Patient participation: Yes- Able to Participate  Level of consciousness: awake and alert and oriented  Post-procedure vital signs: reviewed and stable  Pain management: adequate  Airway patency: patent    PONV status at discharge: No PONV  Anesthetic complications: no      Cardiovascular status: blood pressure returned to baseline, hemodynamically stable and stable  Respiratory status: unassisted, spontaneous ventilation and room air  Hydration status: euvolemic  Follow-up not needed.              Vitals Value Taken Time   /75 05/20/24 1000   Temp 36.3 °C (97.3 °F) 05/20/24 0857   Pulse 60 05/20/24 1008   Resp 14 05/20/24 1007   SpO2 100 % 05/20/24 1008   Vitals shown include unfiled device data.      Event Time   Out of Recovery 10:09:30         Pain/Yojana Score: Pain Rating Prior to Med Admin: 6 (5/20/2024  9:20 AM)  Yojana Score: 9 (5/20/2024  9:30 AM)

## 2024-05-20 NOTE — NURSING
Patient arrived to floor. Patient verbalizes mild pain due to transport to floor. Patients mother at bedside with patient. Patient alert and oriented X4.

## 2024-05-20 NOTE — ANESTHESIA PREPROCEDURE EVALUATION
2024  Fatemeh Shoemaker is a 63 y.o., female.    Patient Active Problem List   Diagnosis    Essential hypertension    Paroxysmal atrial fibrillation    Morbid obesity with BMI of 45.0-49.9, adult    Esophageal ulcer    Cervicalgia    Lumbar degenerative disc disease    S/P TLH/BSO, 9/3/19    Chronic bilateral low back pain with bilateral sciatica    Lumbar spondylosis    Spinal stenosis of lumbar region    Symptomatic bradycardia    Pacemaker    Obstructive sleep apnea syndrome    Iron deficiency anemia    Occult blood in stools    Hallux malleus of left foot    Acquired hammertoe of left foot    Skin tag    Abnormal urinalysis    Dizziness    Transient neurologic deficit    Positive cardiac stress test    Preop cardiovascular exam       Past Surgical History:   Procedure Laterality Date    A-V CARDIAC PACEMAKER INSERTION Left 2020    Procedure: INSERTION, CARDIAC PACEMAKER, DUAL CHAMBER;  Surgeon: Bert Reaves MD;  Location: [facility] CATH;  Service: Cardiology;  Laterality: Left;    ANGIOGRAM, CORONARY, WITH LEFT HEART CATHETERIZATION  2023    Procedure: Left Heart Cath;  Surgeon: Bert Reaves MD;  Location: [facility] CATH;  Service: Cardiology;;    BREAST BIOPSY Right 2017    myofibroblastoma     BREAST MASS EXCISION       SECTION  10/25/1986    Other c/section 10/26/1997    CHOLECYSTECTOMY  2017    Dr. TOLU Bowers, [facility]     CORRECTION OF HAMMER TOE Left 2022    Procedure: CORRECTION, HAMMER TOE;  Surgeon: Ezra Arriaga DPM;  Location: [facility] OR;  Service: Podiatry;  Laterality: Left;  hammertoes 2-5 left,    csection      CS x 2    CYSTOSCOPY N/A 2019    Procedure: CYSTOSCOPY;  Surgeon: Noemi Pierre MD;  Location: [facility] OR;  Service: OB/GYN;  Laterality: N/A;    DILATION AND CURETTAGE OF UTERUS      EPIDURAL STEROID INJECTION INTO LUMBAR SPINE  N/A 05/21/2020    Procedure: Injection-steroid-epidural-lumbar L5/S1;  Surgeon: Gurjit Tavarez MD;  Location: [facility] OR;  Service: Pain Management;  Laterality: N/A;    EPIDURAL STEROID INJECTION INTO LUMBAR SPINE N/A 06/19/2020    Procedure: Injection-steroid-epidural-lumbar L5/S1;  Surgeon: Gurjit Tavarez MD;  Location: [facility] OR;  Service: Pain Management;  Laterality: N/A;    EPIDURAL STEROID INJECTION INTO LUMBAR SPINE N/A 12/01/2021    Procedure: Injection-steroid-epidural-lumbar L5/S1;  Surgeon: Gurjit Tavarez MD;  Location: [facility] OR;  Service: Pain Management;  Laterality: N/A;    EPIDURAL STEROID INJECTION INTO LUMBAR SPINE N/A 02/22/2023    Procedure: Injection-steroid-epidural-lumbar-L5/S1 to the left;  Surgeon: Gurjit Tavarez MD;  Location: [facility] OR;  Service: Pain Management;  Laterality: N/A;    EPIDURAL STEROID INJECTION INTO LUMBAR SPINE Right 04/28/2023    Procedure: Injection-steroid-epidural-lumbar L5/S1;  Surgeon: Gurjit Tavarez MD;  Location: [facility] OR;  Service: Pain Management;  Laterality: Right;    EPIDURAL STEROID INJECTION INTO LUMBAR SPINE N/A 08/29/2023    Procedure: Injection-steroid-epidural-lumbarL5/S1 to right;  Surgeon: Gurjit Tavarez MD;  Location: [facility] OR;  Service: Pain Management;  Laterality: N/A;    FRACTURE SURGERY  04/1986    Dislocated  hip total rt hip 08/08    HIP PINNING Right 2008    HYSTERECTOMY      JOINT REPLACEMENT Right 08/2008    hip    LAPAROSCOPIC SALPINGO-OOPHORECTOMY Bilateral 09/03/2019    Procedure: SALPINGO-OOPHORECTOMY, LAPAROSCOPIC;  Surgeon: Noemi Pierre MD;  Location: [facility];  Service: OB/GYN;  Laterality: Bilateral;  Dr. Bentley to assist. No resident needed.     LAPAROSCOPIC TOTAL HYSTERECTOMY N/A 09/03/2019    Procedure: HYSTERECTOMY, TOTAL, LAPAROSCOPIC;  Surgeon: Noemi Pierre MD;  Location: [facility];  Service: OB/GYN;  Laterality: N/A;    NASAL SEPTUM SURGERY  1982    OOPHORECTOMY      SKIN TAG REMOVAL Left 11/03/2022    Procedure: REMOVAL,  SKIN TAG;  Surgeon: Ezra Arriaga DPM;  Location: [facility];  Service: Podiatry;  Laterality: Left;    TOTAL HIP ARTHROPLASTY Right     TUBAL LIGATION  1997        Tobacco Use:  The patient  reports that she has never smoked. She has never used smokeless tobacco.     Results for orders placed or performed during the hospital encounter of 12/26/23   EKG 12-LEAD on arrival to floor    Collection Time: 12/26/23  8:38 AM    Narrative    Test Reason : I25.10,    Vent. Rate : 060 BPM     Atrial Rate : 060 BPM     P-R Int : 292 ms          QRS Dur : 118 ms      QT Int : 454 ms       P-R-T Axes : 000 049 060 degrees     QTc Int : 454 ms    Atrial-paced rhythm with prolonged AV conduction  Nonspecific intraventricular conduction delay  Minimal voltage criteria for LVH, may be normal variant ( [place] product )  Nonspecific ST and T wave abnormality  Abnormal ECG  When compared with ECG of 26-DEC-2023 06:20,  Electronic atrial pacemaker has replaced Electronic ventricular pacemaker  Confirmed by ALESSANDRA QUIGLEY MD ([id number]) on 12/26/2023 8:47:46 AM    Referred By:  SORAIDA           Confirmed By:ALESSANDRA QUIGLEY MD             Lab Results   Component Value Date    WBC 11.16 05/15/2024    HGB 11.7 (L) 05/15/2024    HCT 36.8 (L) 05/15/2024    MCV 84 05/15/2024     05/15/2024     BMP  Lab Results   Component Value Date     05/15/2024    K 3.8 05/15/2024     05/15/2024    CO2 30 (H) 05/15/2024    BUN 23 05/15/2024    CREATININE 0.9 05/15/2024    CALCIUM 9.3 05/15/2024    ANIONGAP 7 (L) 05/15/2024     05/15/2024     (H) 02/02/2024     (H) 12/26/2023       Results for orders placed in visit on 10/11/21    Echo    Interpretation Summary  · The left ventricle is normal in size with normal systolic function.  · The estimated ejection fraction is 55%.  · Normal left ventricular diastolic function.  · Normal right ventricular size with normal right ventricular systolic function.  · Moderate  left atrial enlargement.  · Mild mitral regurgitation.  · Normal central venous pressure (3 mmHg).  · The estimated PA systolic pressure is 25 mmHg.            Pre-op Assessment    I have reviewed the Patient Summary Reports.     I have reviewed the Nursing Notes. I have reviewed the NPO Status.   I have reviewed the Medications.     Review of Systems  Anesthesia Hx:  No problems with previous Anesthesia             Denies Family Hx of Anesthesia complications.    Denies Personal Hx of Anesthesia complications.                    Social:  Non-Smoker       Hematology/Oncology:  Hematology Normal                                     EENT/Dental:  EENT/Dental Normal           Cardiovascular:    Pacemaker (Biotronic) Hypertension    Dysrhythmias atrial fibrillation      hyperlipidemia                             Pulmonary:   COPD     Sleep Apnea           Education provided regarding risk of obstructive sleep apnea            Renal/:  Renal/ Normal                 Hepatic/GI:     GERD             Musculoskeletal:  Arthritis               Neurological:    Neuromuscular Disease, (lower back pain with bilateral sciatica)                                   Endocrine:  Endocrine Normal          Morbid Obesity / BMI > 40  Psych:  Psychiatric Normal                    Physical Exam  General: Well nourished    Airway:  Mallampati: III   Mouth Opening: Normal  TM Distance: Normal  Tongue: Normal  Neck ROM: Normal ROM    Dental:  Dentures  Top dentures  Chest/Lungs:  Clear to auscultation, Normal Respiratory Rate    Heart:  Rate: Normal  Rhythm: Regular Rhythm        Anesthesia Plan  Type of Anesthesia, risks & benefits discussed:    Anesthesia Type: Gen ETT  Intra-op Monitoring Plan: Standard ASA Monitors  Post Op Pain Control Plan: IV/PO Opioids PRN and multimodal analgesia  Induction:  IV  Airway Plan: Video  Informed Consent: Informed consent signed with the Patient and all parties understand the risks and agree with  anesthesia plan.  All questions answered.   ASA Score: 3  Anesthesia Plan Notes: Sleep apnea precautions, give reduce fentanyl and versed dose, awake extubation, and sitting up positioning.  Reglan 10 mg, and scopolamine patch.  Multimodal analgesia with ofirmev 1000mg, decadron 8 mg, and precedex or ketamine.  PONV prophylaxis with Pepcid 20 mg IV, and Zofran 4 mg IV.        Ready For Surgery From Anesthesia Perspective.     .

## 2024-05-20 NOTE — OP NOTE
Laparoscopic Sleeve Gastrectomy Procedure Note    Date of procedure:   05/20/2024    Indications: Morbid obesity unresponsive to medical treatment.    Pre-operative Diagnosis:     1. Morbid obesity          2. BMI 45.0-49.9, adult          3. Essential hypertension          4. Paroxysmal atrial fibrillation          5. Obstructive sleep apnea syndrome          6. Hypercholesteremia          7. Pacemaker              Post-operative Diagnosis: Same    Surgeon: Oumar Siegel MD    Assistants: Mary Longoria MD    No qualified resident was available to assist in this case    Anesthesia: General endotracheal anesthesia    ASA Class: 3    Procedure Details   The patient was seen in the Holding Room. The risks, benefits, complications, treatment options, and expected outcomes were discussed with the patient. The possibilities of reaction to medication, pulmonary aspiration, perforation of viscus, bleeding, recurrent infection, the need for additional procedures, failure to diagnose a condition, and creating a complication requiring transfusion or operation were discussed with the patient. The patient concurred with the proposed plan, giving informed consent. The site of surgery properly noted/marked. The patient was taken to the Operating Room identified as Fatemeh Shoemaker and the procedure verified as Sleeve Gastrectomy. A Time Out was held and the above information confirmed.    Full general anesthesia was induced with orotracheal intubation. The patient was prepped and draped in a supine position.  Foot board was placed. Appropriate antibiotics were given intravenously.    The 5 mm applied medical optiview was used to enter into the abdominal cavity under direct vision in the right upper quadrant. The abdomen was insufflated.    There were no untoward findings on diagnostic laparoscopy. Trocars were placed under direct vision. The patient was placed into steep reverse trendelenburg position.    The [facility]  retractor was then placed through a high epigastric incision site and positioned to hold the liver.    The procedure was started by identifying an area approx. 5 cm proximal to the pylorus. The voyant was then used to take down the short gastrics up to the left harpreet which was identified and cleared.    The sleeve was shaped using titan stapler without reinforcement up the the left harpreet over a 40 [place] Visi G bougie as a sizing device.  The distal sleeve was stapled slightly further away from the scope than the proximal sleeve to avoid encroachment of the incisura.     A provacative leak test, looking for air bubbles while the sleeve is insufflated and clamped, was preformed and did not reveal any leaks.     Hemostasis was achieved.  Tiseel was sprayed on the staple line.    The specimen was removed out of the left upper quadrant port and the site was closed with a zero vicryl.     Marcaine was injected at each port site.    The wounds were heavily irrigated. The skin was closed with 4-0 suture. Sterile dressings were applied.    Instrument, sponge, and needle counts were correct prior to wound closure and at the conclusion of the case.     Findings:  normal anatomy    Estimated Blood Loss: 20.0 cc    Drains: none    Total IV Fluids: see anesthesia    Specimens: gastrectomy    Implants: tiseel    Complications:  None; patient tolerated the procedure well.    Disposition: PACU - hemodynamically stable.    Condition: stable    Attending Attestation: I was present and scrubbed for the entire procedure.

## 2024-05-20 NOTE — PLAN OF CARE
Spoke with Andrey Ervin with [employer].  States that AICD functioning as programmed and all looks good

## 2024-05-20 NOTE — NURSING
Nurses Note -- 4 Eyes      5/20/2024   10:34 AM      Skin assessed during: Admit      [] No Altered Skin Integrity Present    []Prevention Measures Documented      [x] Yes- Altered Skin Integrity Present or Discovered   [x] LDA Added if Not in Epic (Describe Wound)   [] New Altered Skin Integrity was Present on Admit and Documented in LDA   [x] Wound Image Taken    Patient has 5 lap sites post surgery, and a scabbed over cut to third toe on left foot patient states she previously got at home due to shoes.     Wound Care Consulted? No    Attending Nurse:  Taylor Good RN/Staff Member:   [id number]

## 2024-05-20 NOTE — ASSESSMENT & PLAN NOTE
Patient with Long standing persistent (>12 months) atrial fibrillation which is controlled currently with Beta Blocker and flecainide . Patient is currently in sinus rhythm.[id number] Score: 2. Anticoagulation indicated. Anticoagulation done with eliquis .  Sinus paced on tele  Continue monitoring  Restart BB/flecanide  Holding eliquis for now

## 2024-05-20 NOTE — HPI
Fatemeh Shoemaker is a 63 y.o. female with  has a past medical history of Anemia, Anticoagulant long-term use, Arrhythmia, Arthritis, Atrial fibrillation, Bronchitis, COPD (chronic obstructive pulmonary disease), Encounter for blood transfusion, General anesthetics causing adverse effect in therapeutic use, GERD (gastroesophageal reflux disease), High blood pressure, Hyperlipidemia, Lump or mass in breast, Neuropathy, Obesity, Obstructive sleep apnea syndrome (11/05/2021), Ulcer, and Unspecified chronic bronchitis. who is s/p gastrectomy sleeve with Dr. Locke. Patient is a non-smoker; has pacemaker. She is post op day -0-.

## 2024-05-20 NOTE — H&P
"Bariatric  History & Physical     SUBJECTIVE:          Chief Complaint   Patient presents with    Pre-op Exam    Obesity         History of Present Illness:     Patient is a 63 y.o. female who is referred for evaluation of surgical treatment of morbid obesity. Her Body mass index is 46.95 kg/m². She has known comorbidities of dyslipidemia, GERD, hypertension, obstructive sleep apnea, osteoarthritis, and afib + Pacemaker . She has attended the bariatric seminar and is most interested in gastric sleeve surgery.           Review of patient's allergies indicates:   Allergen Reactions    Aspirin Other (See Comments)       "I don't take it because I've had ulcers"        Meperidine Other (See Comments)       Felt like she was about to pass out after taking    Azithromycin         Other reaction(s): Not available              Past Medical History:   Diagnosis Date    Anemia      Anticoagulant long-term use      Arrhythmia      Arthritis      Atrial fibrillation       history    Bronchitis      COPD (chronic obstructive pulmonary disease)      Encounter for blood transfusion      General anesthetics causing adverse effect in therapeutic use      GERD (gastroesophageal reflux disease)      High blood pressure      Hyperlipidemia      Lump or mass in breast       benign     Neuropathy      Obesity      Obstructive sleep apnea syndrome 11/05/2021     no cpap    Ulcer      Unspecified chronic bronchitis              Past Surgical History:   Procedure Laterality Date    A-V CARDIAC PACEMAKER INSERTION Left 09/21/2020     Procedure: INSERTION, CARDIAC PACEMAKER, DUAL CHAMBER;  Surgeon: Bert Reaves MD;  [facility];  Service: Cardiology;  Laterality: Left;    ANGIOGRAM, CORONARY, WITH LEFT HEART CATHETERIZATION   12/26/2023     Procedure: Left Heart Cath;  Surgeon: Bert Reaves MD;  Location: [facility] CATH;  Service: Cardiology;;    BREAST BIOPSY Right 2017     myofibroblastoma     BREAST MASS EXCISION    "      SECTION   10/25/1986     Other c/section 10/26/1997    CHOLECYSTECTOMY   2017     Dr. TOLU Bowers, [facility]     CORRECTION OF HAMMER TOE Left 2022     Procedure: CORRECTION, HAMMER TOE;  Surgeon: Ezra Arriaga DPM;  Location: [facility] OR;  Service: Podiatry;  Laterality: Left;  hammertoes 2-5 left,    csection         CS x 2    CYSTOSCOPY N/A 2019     Procedure: CYSTOSCOPY;  Surgeon: Noemi Pierre MD;  Location: [facility] OR;  Service: OB/GYN;  Laterality: N/A;    DILATION AND CURETTAGE OF UTERUS        EPIDURAL STEROID INJECTION INTO LUMBAR SPINE N/A 2020     Procedure: Injection-steroid-epidural-lumbar L5/S1;  Surgeon: Gurjit Tavarez MD;  Location: [facility] OR;  Service: Pain Management;  Laterality: N/A;    EPIDURAL STEROID INJECTION INTO LUMBAR SPINE N/A 2020     Procedure: Injection-steroid-epidural-lumbar L5/S1;  Surgeon: Gurjit Tavarez MD;  Location: [facility] OR;  Service: Pain Management;  Laterality: N/A;    EPIDURAL STEROID INJECTION INTO LUMBAR SPINE N/A 2021     Procedure: Injection-steroid-epidural-lumbar L5/S1;  Surgeon: Gurjit Tavarez MD;  Location: [facility] OR;  Service: Pain Management;  Laterality: N/A;    EPIDURAL STEROID INJECTION INTO LUMBAR SPINE N/A 2023     Procedure: Injection-steroid-epidural-lumbar-L5/S1 to the left;  Surgeon: Gurjit Tavarez MD;  Location: [facility] OR;  Service: Pain Management;  Laterality: N/A;    EPIDURAL STEROID INJECTION INTO LUMBAR SPINE Right 2023     Procedure: Injection-steroid-epidural-lumbar L5/S1;  Surgeon: Gurjit Tavarez MD;  Location: [facility] OR;  Service: Pain Management;  Laterality: Right;    EPIDURAL STEROID INJECTION INTO LUMBAR SPINE N/A 2023     Procedure: Injection-steroid-epidural-lumbarL5/S1 to right;  Surgeon: Gurjit Tavarez MD;  Location: [facility] OR;  Service: Pain Management;  Laterality: N/A;    FRACTURE SURGERY   1986     Dislocated  hip total rt hip     HIP PINNING        HYSTERECTOMY         JOINT REPLACEMENT Right 08/2008     hip    LAPAROSCOPIC SALPINGO-OOPHORECTOMY Bilateral 09/03/2019     Procedure: SALPINGO-OOPHORECTOMY, LAPAROSCOPIC;  Surgeon: Noemi Pierre MD;  Location: [facility];  Service: OB/GYN;  Laterality: Bilateral;  Dr. Bentley to assist. No resident needed.     LAPAROSCOPIC TOTAL HYSTERECTOMY N/A 09/03/2019     Procedure: HYSTERECTOMY, TOTAL, LAPAROSCOPIC;  Surgeon: Noemi Pierre MD;  Location: [facility];  Service: OB/GYN;  Laterality: N/A;    NASAL SEPTUM SURGERY        OOPHORECTOMY        SKIN TAG REMOVAL Left 11/03/2022     Procedure: REMOVAL, SKIN TAG;  Surgeon: Ezra Arriaga DPM;  Location: [facility] OR;  Service: Podiatry;  Laterality: Left;    TOTAL HIP ARTHROPLASTY Right      TUBAL LIGATION   1997             Family History   Problem Relation Name Age of Onset    Colon cancer Maternal Grandmother Zonia 70         mets to ovary    Cancer Maternal Grandmother Zonia      Arthritis Paternal Grandfather Ray      Eclampsia Paternal Grandmother        Cataracts Maternal Grandfather        Stroke Father Manny 57    Colon cancer Father Manny      Hypertension Father Manny      Alcohol abuse Father Manny      Cancer Father Manny           Passed away July 10 2019    Hypertension Mother Leyla      Cataracts Mother Leyla      Hypertension Brother Randell           Age 54 hypertension heart    Early death Brother Randell           Hypertension cardio disease    No Known Problems Daughter        Stomach cancer Neg Hx        Esophageal cancer Neg Hx        Breast cancer Neg Hx        Miscarriages / Stillbirths Neg Hx        Ovarian cancer Neg Hx        Glaucoma Neg Hx        Macular degeneration Neg Hx        Retinal detachment Neg Hx        Strabismus Neg Hx          Social History   Social History            Tobacco Use    Smoking status: Never    Smokeless tobacco: Never   Substance Use Topics    Alcohol use: No       Comment: never    Drug use: Never            Current Medications           Current Outpatient Medications   Medication Sig Dispense Refill    albuterol (PROVENTIL) 2.5 mg /3 mL (0.083 %) nebulizer solution Take 3 mLs (2.5 mg total) by nebulization every 6 (six) hours as needed for Wheezing or Shortness of Breath. Rescue 30 mL 0    albuterol sulfate 90 mcg/actuation aebs Inhale 90 mcg into the lungs every 4 (four) hours as needed (wheezing or shortness of breath). 1 each 2    amoxicillin-clavulanate 875-125mg (AUGMENTIN) 875-125 mg per tablet Take 1 tablet by mouth every 12 (twelve) hours. for 7 days 14 tablet 0    apixaban (ELIQUIS) 5 mg Tab Take 1 tablet (5 mg total) by mouth 2 (two) times daily. 180 tablet 3    cholecalciferol, vitamin D3, (DECARA) 1,250 mcg (50,000 unit) capsule Take 1 capsule (50,000 Units total) by mouth every 7 days. 30 capsule 0    DULoxetine (CYMBALTA) 60 MG capsule TAKE ONE CAPSULE BY MOUTH DAILY 90 capsule 3    flecainide (TAMBOCOR) 150 MG Tab TAKE ONE TABLET BY MOUTH TWICE DAILY 180 tablet 3    fluticasone furoate-vilanteroL (BREO ELLIPTA) 100-25 mcg/dose diskus inhaler Inhale 1 puff into the lungs once daily. 60 each 2    gabapentin (NEURONTIN) 600 MG tablet TAKE ONE TABLET BY MOUTH TWICE DAILY 60 tablet 1    lisinopriL 10 MG tablet Take 1 tablet (10 mg total) by mouth once daily. 90 tablet 3    metoprolol tartrate (LOPRESSOR) 100 MG tablet Take 1 tablet (100 mg total) by mouth 2 (two) times daily. 180 tablet 3    mupirocin (BACTROBAN) 2 % ointment Apply topically 3 (three) times daily. for 10 days 30 g 0    nortriptyline (PAMELOR) 25 MG capsule TAKE ONE CAPSULE BY MOUTH EVERY EVENING 90 capsule 3    omeprazole (PRILOSEC) 20 MG capsule TAKE ONE CAPSULE BY MOUTH EVERY MORNING 90 capsule 2    ondansetron (ZOFRAN-ODT) 4 MG TbDL Take 1 tablet (4 mg total) by mouth 2 (two) times daily. 20 tablet 1    promethazine (PHENERGAN) 12.5 MG Tab Take 1 tablet (12.5 mg total) by mouth 2 (two) times daily as needed (nausea). 10 tablet 0    pulse oximeter (PULSE OXIMETER)  device Use twice daily at 8 AM and 3 PM and record the value in MyChart as directed. 1 each 0    rosuvastatin (CRESTOR) 10 MG tablet Take 1 tablet (10 mg total) by mouth every evening. 90 tablet 3    tiZANidine (ZANAFLEX) 4 MG tablet TAKE ONE TABLET BY MOUTH every SIX hours AS NEEDED. 60 tablet 1    traMADoL (ULTRAM) 50 mg tablet Take 1 tablet (50 mg total) by mouth every 12 (twelve) hours as needed for Pain. 30 tablet 0    valACYclovir (VALTREX) 500 MG tablet Take 1 tablet (500 mg total) by mouth daily as needed (fever blister). 30 tablet 2    cyanocobalamin 500 MCG tablet Take 1,000 mcg by mouth once daily. (Patient not taking: Reported on 4/8/2024)        diclofenac sodium (VOLTAREN) 1 % Gel Apply 2 g topically 4 (four) times daily. (Patient not taking: Reported on 1/17/2024) 50 g 3      No current facility-administered medications for this visit.               Beginning Weight: 297 lbs     Last Weight: 283 lbs     Current Weight: 277 lbs                      Weight Change: -20 lbs     Body comp:  Fat Percent:  52.2 %  Fat Mass:  145 lb  FFM:  132.8 lb  TBW: 97.6 lb  TBW %:  35.1 %  BMR: 1913 kcal         Wt Readings from Last 10 Encounters:   04/15/24 126 kg (277 lb 12.8 oz)   04/08/24 124 kg (273 lb 7.7 oz)   03/12/24 128.5 kg (283 lb 4.7 oz)   02/02/24 126.7 kg (279 lb 5.2 oz)   01/17/24 128.2 kg (282 lb 10.1 oz)   01/05/24 128.2 kg (282 lb 9.6 oz)   12/26/23 129.4 kg (285 lb 4.4 oz)   12/01/23 127 kg (280 lb)   11/10/23 127.4 kg (280 lb 12.8 oz)   11/06/23 126.2 kg (278 lb 3.5 oz)            Diet Education Discussed:  Recommend high protein, low carb meals- mainly meats and vegetables.     Breakfast:  adams, eggs, 2-3 bites grits  Lunch:  hamburger steak, green beans  Dinner:  sausage, cheese rolled up in tortilla, chicken and broccoli   Water: > 64 oz  8 oz Coffee with sweet'n'low or sugar, regular creamer  New dentures in January     MVI:  Linda brand     Exercise:  Walking/up & down     Review of  "Systems:  Review of Systems   Constitutional:  Positive for activity change, appetite change and fatigue. Negative for chills, diaphoresis and fever.   HENT:  Negative for congestion, nosebleeds, sinus pressure, sneezing, sore throat, tinnitus and voice change.    Eyes:  Negative for pain, redness and itching.   Respiratory:  Negative for apnea, cough, choking, chest tightness, shortness of breath, wheezing and stridor.    Cardiovascular:  Positive for leg swelling. Negative for chest pain and palpitations.   Gastrointestinal:  Negative for abdominal pain, anal bleeding, constipation, diarrhea, nausea and vomiting.   Endocrine: Negative for cold intolerance and heat intolerance.   Genitourinary:  Negative for difficulty urinating and dysuria.   Musculoskeletal:  Positive for back pain, gait problem and joint swelling. Negative for arthralgias.   Skin:  Negative for rash and wound.   Allergic/Immunologic: Negative for environmental allergies and food allergies.   Neurological:  Negative for dizziness, light-headedness and headaches.   Hematological:  Negative for adenopathy. Does not bruise/bleed easily.   Psychiatric/Behavioral:  Negative for agitation and confusion.    All other systems reviewed and are negative.        OBJECTIVE:      Vital Signs (Most Recent)  Temp: 98.5 °F (36.9 °C) (04/15/24 1334)  Pulse: 110 (04/15/24 1334)  Resp: 16 (04/15/24 1334)  BP: (!) 153/90 (04/15/24 1334)  5' 4.5" (1.638 m)  126 kg (277 lb 12.8 oz)         Chart review:  Primary Care Physician: Jarret        Lab review:  Most Recent Data:  CBC:         Lab Results   Component Value Date     WBC 7.21 02/02/2024     HGB 11.7 (L) 02/02/2024     HCT 39.2 02/02/2024      02/02/2024     MCV 89 02/02/2024     RDW 15.4 (H) 02/02/2024      BMP:         Lab Results   Component Value Date      02/02/2024     K 4.4 02/02/2024      02/02/2024     CO2 23 02/02/2024     BUN 7 (L) 02/02/2024     CREATININE 0.8 02/02/2024     GLU " 113 (H) 02/02/2024     CALCIUM 10.3 02/02/2024     MG 2.1 02/02/2024     PHOS 3.0 02/02/2024      LFTs:         Lab Results   Component Value Date     PROT 7.7 02/02/2024     ALBUMIN 3.9 02/02/2024     BILITOT 0.5 02/02/2024     AST 22 02/02/2024     ALKPHOS 51 (L) 02/02/2024     ALT 15 02/02/2024      Coags:         Lab Results   Component Value Date     INR 1.1 11/12/2022      FLP:         Lab Results   Component Value Date     CHOL 149 02/02/2024     HDL 39 (L) 02/02/2024     LDLCALC 74.4 02/02/2024     TRIG 178 (H) 02/02/2024     CHOLHDL 26.2 02/02/2024      DM:         Lab Results   Component Value Date     HGBA1C 5.7 (H) 02/02/2024     HGBA1C 5.8 (H) 11/06/2023     HGBA1C 5.5 11/12/2022     LDLCALC 74.4 02/02/2024     CREATININE 0.8 02/02/2024      Thyroid:         Lab Results   Component Value Date     TSH 1.435 02/02/2024     FREET4 0.82 02/02/2024     E0JEYXW 130 02/02/2024      Anemia:         Lab Results   Component Value Date     IRON 32 02/02/2024     TIBC 444 02/02/2024     FERRITIN 13 (L) 02/02/2024     FVYIKCPA88 472 02/02/2024     FOLATE 5.9 02/02/2024      Cardiac:         Lab Results   Component Value Date     TROPONINI <0.012 11/12/2022      (H) 06/22/2018      Urinalysis:         Lab Results   Component Value Date     LABURIN ESCHERICHIA COLI  >100,000 cfu/ml   (A) 11/13/2022     COLORU Yellow 11/13/2022     SPECGRAV 1.020 11/13/2022     NITRITE Positive (A) 11/13/2022     KETONESU Negative 11/13/2022     UROBILINOGEN 2.0 11/13/2022     WBCUA 35 (H) 11/13/2022     WBCUA 35 (H) 11/13/2022            Radiology review: Images independently reviewed and interpreted.     UGI  (2021)  1. Mild esophageal dysmotility.  2. Small duodenal diverticulum.     Other Results:  EKG (my interpretation): Atrial Pacing.  Nuclear Stress Test (11/2023)    Abnormal myocardial perfusion scan.    There is a mild to moderate intensity, moderate sized, reversible perfusion abnormality that is consistent with  ischemia in the anterior and anterolateral wall(s).    There are no other significant perfusion abnormalities.    The gated perfusion images showed an ejection fraction of 59% at rest.    There is normal wall motion at rest.    The ECG portion of the study is negative for ischemia.    The patient reported no chest pain during the stress test.    When compared to the previous study from 10/11/2021, ischemia appreciated.  Cardiac Cath (12/26/2023)    The coronary arteries were normal..    The left ventricular systolic function was normal.    The left ventricular end diastolic pressure was severely elevated.  Physical Exam:  Physical Exam  Vitals and nursing note reviewed.   Constitutional:       General: She is not in acute distress.     Appearance: Normal appearance. She is obese. She is not ill-appearing or toxic-appearing.   HENT:      Head: Normocephalic and atraumatic.      Right Ear: External ear normal.      Left Ear: External ear normal.      Nose: Nose normal. No congestion or rhinorrhea.      Mouth/Throat:      Mouth: Mucous membranes are moist.      Pharynx: Oropharynx is clear.   Eyes:      General:         Right eye: No discharge.         Left eye: No discharge.      Conjunctiva/sclera: Conjunctivae normal.   Cardiovascular:      Rate and Rhythm: Regular rhythm.      Pulses: Normal pulses.      Heart sounds: No murmur heard.  Pulmonary:      Effort: Pulmonary effort is normal. No respiratory distress.   Abdominal:      General: Bowel sounds are normal. There is no distension.      Palpations: Abdomen is soft. There is no mass.      Tenderness: There is no abdominal tenderness. There is no guarding or rebound.      Hernia: No hernia is present.   Musculoskeletal:         General: No swelling, tenderness, deformity or signs of injury. Normal range of motion.      Right lower leg: No edema.      Left lower leg: No edema.   Skin:     General: Skin is warm and dry.      Capillary Refill: Capillary refill takes  less than 2 seconds.      Coloration: Skin is not jaundiced or pale.      Findings: No bruising, erythema or rash.   Neurological:      General: No focal deficit present.      Mental Status: She is alert and oriented to person, place, and time.      Motor: No weakness.      Gait: Gait normal.   Psychiatric:         Mood and Affect: Mood normal.         Behavior: Behavior normal.         Thought Content: Thought content normal.         Judgment: Judgment normal.         ASSESSMENT/PLAN:      Plan  Labs: done  UGI: done  EKG:  done  Psych consult: done  Dietary consult: done  Seminar: done     Will obtain the following clearances prior to surgery:   Cardiology- done        1. Morbid obesity          2. BMI 45.0-49.9, adult          3. Essential hypertension          4. Paroxysmal atrial fibrillation          5. Obstructive sleep apnea syndrome          6. Hypercholesteremia          7. Pacemaker                      Plan:  Fatemeh Shoemaker has morbid obesity as their Body mass index is 46.95 kg/m². She would benefit from weight loss surgery and has chosen gastric sleeve surgery as the preferred procedure. She understands that this is a tool and lifestyle change will be necessary to keep weight off. I went over possible complications of the chosen surgery with the patient and she is agreeable to surgery.     She has been instructed to start the pre operative diet.- May 6, stool softeners BID              Pre op Diet     Breakfast- protein shake  Lunch- protein shake  Dinner- 3 ounces of meat and vegetables (from list previously given)  NO SNACKING  Only water to drink      She surgical date is May [facility] at [facility]         The patient has been taught the post operative diet and understands that she will need to stay on this diet to prevent complication.  They are aware of the post operative follow up and return to see me after surgery to treat/prevent/identify any complications.  she has been advised  to attend support groups post operatively.

## 2024-05-21 VITALS
DIASTOLIC BLOOD PRESSURE: 76 MMHG | SYSTOLIC BLOOD PRESSURE: 125 MMHG | HEIGHT: 65 IN | TEMPERATURE: 97 F | RESPIRATION RATE: 20 BRPM | OXYGEN SATURATION: 97 % | WEIGHT: 288.13 LBS | BODY MASS INDEX: 48 KG/M2 | HEART RATE: 60 BPM

## 2024-05-21 LAB
ANION GAP SERPL CALC-SCNC: 8 MMOL/L (ref 8–16)
BASOPHILS # BLD AUTO: 0.01 K/UL (ref 0–0.2)
BASOPHILS NFR BLD: 0.1 % (ref 0–1.9)
BUN SERPL-MCNC: 9 MG/DL (ref 8–23)
CALCIUM SERPL-MCNC: 8.4 MG/DL (ref 8.7–10.5)
CHLORIDE SERPL-SCNC: 104 MMOL/L (ref 95–110)
CO2 SERPL-SCNC: 25 MMOL/L (ref 23–29)
CREAT SERPL-MCNC: 0.5 MG/DL (ref 0.5–1.4)
DIFFERENTIAL METHOD BLD: ABNORMAL
EOSINOPHIL # BLD AUTO: 0 K/UL (ref 0–0.5)
EOSINOPHIL NFR BLD: 0 % (ref 0–8)
ERYTHROCYTE [DISTWIDTH] IN BLOOD BY AUTOMATED COUNT: 15.7 % (ref 11.5–14.5)
EST. GFR  (NO RACE VARIABLE): >60 ML/MIN/1.73 M^2
GLUCOSE SERPL-MCNC: 118 MG/DL (ref 70–110)
HCT VFR BLD AUTO: 30.6 % (ref 37–48.5)
HGB BLD-MCNC: 9.5 G/DL (ref 12–16)
IMM GRANULOCYTES # BLD AUTO: 0.09 K/UL (ref 0–0.04)
IMM GRANULOCYTES NFR BLD AUTO: 0.7 % (ref 0–0.5)
LYMPHOCYTES # BLD AUTO: 1.5 K/UL (ref 1–4.8)
LYMPHOCYTES NFR BLD: 11.7 % (ref 18–48)
MAGNESIUM SERPL-MCNC: 2 MG/DL (ref 1.6–2.6)
MCH RBC QN AUTO: 26.5 PG (ref 27–31)
MCHC RBC AUTO-ENTMCNC: 31 G/DL (ref 32–36)
MCV RBC AUTO: 85 FL (ref 82–98)
MONOCYTES # BLD AUTO: 0.7 K/UL (ref 0.3–1)
MONOCYTES NFR BLD: 5.7 % (ref 4–15)
NEUTROPHILS # BLD AUTO: 10.3 K/UL (ref 1.8–7.7)
NEUTROPHILS NFR BLD: 81.8 % (ref 38–73)
NRBC BLD-RTO: 0 /100 WBC
OHS QRS DURATION: 110 MS
OHS QTC CALCULATION: 446 MS
PHOSPHATE SERPL-MCNC: 3.1 MG/DL (ref 2.7–4.5)
PLATELET # BLD AUTO: 285 K/UL (ref 150–450)
PMV BLD AUTO: 10.8 FL (ref 9.2–12.9)
POTASSIUM SERPL-SCNC: 3.9 MMOL/L (ref 3.5–5.1)
RBC # BLD AUTO: 3.59 M/UL (ref 4–5.4)
SODIUM SERPL-SCNC: 137 MMOL/L (ref 136–145)
WBC # BLD AUTO: 12.52 K/UL (ref 3.9–12.7)

## 2024-05-21 PROCEDURE — 94761 N-INVAS EAR/PLS OXIMETRY MLT: CPT

## 2024-05-21 PROCEDURE — 85025 COMPLETE CBC W/AUTO DIFF WBC: CPT | Performed by: SURGERY

## 2024-05-21 PROCEDURE — 83735 ASSAY OF MAGNESIUM: CPT | Performed by: SURGERY

## 2024-05-21 PROCEDURE — 84100 ASSAY OF PHOSPHORUS: CPT | Performed by: SURGERY

## 2024-05-21 PROCEDURE — 80048 BASIC METABOLIC PNL TOTAL CA: CPT | Performed by: SURGERY

## 2024-05-21 PROCEDURE — 36415 COLL VENOUS BLD VENIPUNCTURE: CPT | Performed by: SURGERY

## 2024-05-21 PROCEDURE — 97161 PT EVAL LOW COMPLEX 20 MIN: CPT

## 2024-05-21 PROCEDURE — 93010 ELECTROCARDIOGRAM REPORT: CPT | Mod: ,,, | Performed by: GENERAL PRACTICE

## 2024-05-21 PROCEDURE — 25000003 PHARM REV CODE 250: Performed by: SURGERY

## 2024-05-21 PROCEDURE — 93005 ELECTROCARDIOGRAM TRACING: CPT | Performed by: GENERAL PRACTICE

## 2024-05-21 PROCEDURE — 63600175 PHARM REV CODE 636 W HCPCS: Performed by: SURGERY

## 2024-05-21 PROCEDURE — 25000003 PHARM REV CODE 250: Performed by: NURSE PRACTITIONER

## 2024-05-21 PROCEDURE — 97530 THERAPEUTIC ACTIVITIES: CPT

## 2024-05-21 RX ORDER — ONDANSETRON 4 MG/1
4 TABLET, ORALLY DISINTEGRATING ORAL EVERY 6 HOURS PRN
Qty: 30 TABLET | Refills: 0 | Status: SHIPPED | OUTPATIENT
Start: 2024-05-21

## 2024-05-21 RX ORDER — SIMETHICONE 80 MG
2 TABLET,CHEWABLE ORAL 3 TIMES DAILY
Status: DISCONTINUED | OUTPATIENT
Start: 2024-05-21 | End: 2024-05-21 | Stop reason: HOSPADM

## 2024-05-21 RX ORDER — HYDROCODONE BITARTRATE AND ACETAMINOPHEN 5; 325 MG/1; MG/1
1 TABLET ORAL EVERY 6 HOURS PRN
Qty: 20 TABLET | Refills: 0 | Status: SHIPPED | OUTPATIENT
Start: 2024-05-21

## 2024-05-21 RX ADMIN — METOPROLOL TARTRATE 100 MG: 50 TABLET, FILM COATED ORAL at 08:05

## 2024-05-21 RX ADMIN — HYDROMORPHONE HYDROCHLORIDE 1 MG: 1 INJECTION, SOLUTION INTRAMUSCULAR; INTRAVENOUS; SUBCUTANEOUS at 08:05

## 2024-05-21 RX ADMIN — ENOXAPARIN SODIUM 40 MG: 40 INJECTION SUBCUTANEOUS at 08:05

## 2024-05-21 RX ADMIN — GABAPENTIN 600 MG: 300 CAPSULE ORAL at 08:05

## 2024-05-21 RX ADMIN — OXYCODONE HYDROCHLORIDE 10 MG: 10 TABLET ORAL at 02:05

## 2024-05-21 RX ADMIN — PANTOPRAZOLE SODIUM 40 MG: 40 TABLET, DELAYED RELEASE ORAL at 08:05

## 2024-05-21 RX ADMIN — HYDROMORPHONE HYDROCHLORIDE 1 MG: 1 INJECTION, SOLUTION INTRAMUSCULAR; INTRAVENOUS; SUBCUTANEOUS at 01:05

## 2024-05-21 RX ADMIN — FLECAINIDE ACETATE 150 MG: 100 TABLET ORAL at 08:05

## 2024-05-21 RX ADMIN — SIMETHICONE 160 MG: 80 TABLET, CHEWABLE ORAL at 03:05

## 2024-05-21 RX ADMIN — DULOXETINE HYDROCHLORIDE 60 MG: 30 CAPSULE, DELAYED RELEASE ORAL at 08:05

## 2024-05-21 RX ADMIN — SODIUM CHLORIDE, POTASSIUM CHLORIDE, SODIUM LACTATE AND CALCIUM CHLORIDE: 600; 310; 30; 20 INJECTION, SOLUTION INTRAVENOUS at 05:05

## 2024-05-21 RX ADMIN — CEFAZOLIN SODIUM 2 G: 2 SOLUTION INTRAVENOUS at 12:05

## 2024-05-21 RX ADMIN — CEFAZOLIN SODIUM 2 G: 2 SOLUTION INTRAVENOUS at 06:05

## 2024-05-21 RX ADMIN — LISINOPRIL 10 MG: 10 TABLET ORAL at 08:05

## 2024-05-21 RX ADMIN — SIMETHICONE 160 MG: 80 TABLET, CHEWABLE ORAL at 10:05

## 2024-05-21 NOTE — PLAN OF CARE
Problem: Adult Inpatient Plan of Care  Goal: Plan of Care Review  Outcome: Progressing  Goal: Patient-Specific Goal (Individualized)  Outcome: Progressing  Goal: Absence of Hospital-Acquired Illness or Injury  Outcome: Progressing  Goal: Optimal Comfort and Wellbeing  Outcome: Progressing  Goal: Readiness for Transition of Care  Outcome: Progressing     Problem: Bariatric Environmental Safety  Goal: Safety Maintained with Care  Outcome: Progressing     Problem: Skin Injury Risk Increased  Goal: Skin Health and Integrity  Outcome: Progressing     Problem: Wound  Goal: Optimal Coping  Outcome: Progressing  Goal: Optimal Functional Ability  Outcome: Progressing  Goal: Absence of Infection Signs and Symptoms  Outcome: Progressing  Goal: Improved Oral Intake  Outcome: Progressing  Goal: Optimal Pain Control and Function  Outcome: Progressing  Goal: Skin Health and Integrity  Outcome: Progressing  Goal: Optimal Wound Healing  Outcome: Progressing     Problem: Fall Injury Risk  Goal: Absence of Fall and Fall-Related Injury  Outcome: Progressing

## 2024-05-21 NOTE — NURSING
Patients IV removed. Monitor removed and returned. Reviewed discharge instructions with patient and patients mother per her request, verbalized understanding. Educated patient and gave packet regarding diet that patient should follow post op gastric sleeve procedure. Patient ambulating with walker without difficulty, denies pain/discomfort. 5Lap sites to abdomen has no drainage/redness present. Patient transported downstairs with staff via wheelchair where mother awaits for discharge home.

## 2024-05-21 NOTE — PLAN OF CARE
Charts and orders reviewed. Pt to discharge home. Pt follow-up appointment recommendations  added to AVS. Pt has no other needs to be addressed by case management. Pt cleared to discharge from case management standpoint.          05/21/24 1456   Final Note   Assessment Type Final Discharge Note   Anticipated Discharge Disposition Home   What phone number can be called within the next 1-3 days to see how you are doing after discharge? [phone]   Hospital Resources/Appts/Education Provided Provided patient/caregiver with written discharge plan information;Provided education on problems/symptoms using teachback   Post-Acute Status   Discharge Delays None known at this time

## 2024-05-21 NOTE — PT/OT/SLP EVAL
"Physical Therapy Evaluation and Discharge Note    Patient Name:  Fatemeh Shoemaker   MRN:  [id number]    Recommendations:     Discharge Recommendations: No PT needed   Discharge Equipment Recommendations: none   Barriers to discharge: None    Assessment:     Fatemeh Sohemaker is a 63 y.o. female admitted with a medical diagnosis of S/P gastric sleeve procedure. Patient is agreeable to participation with PT evaluation. She transfers supine <> sit <> stand independently. She ambulated 324' with rollator and supervision. PT assisted patient with dressing in anticipation of discharge. At this time, patient is functioning at their prior level of function and does not require further acute PT services.     Recent Surgery: Procedure(s) (LRB):  GASTRECTOMY, SLEEVE, LAPAROSCOPIC (N/A) 1 Day Post-Op    Plan:     During this hospitalization, patient does not require further acute PT services.  Please re-consult if situation changes.      Subjective     Chief Complaint: ready for discharge   Patient/Family Comments/goals: home  Pain/Comfort:  Pain Rating 1:  ("soreness")  Location 1: abdomen  Pain Addressed 1: Reposition, Distraction    Patients cultural, spiritual, Restorationist conflicts given the current situation:      Living Environment:  Patient lives with family in a mobile home with 6 [place] and B rails.   Prior to admission, patients level of function was [place] with rollator or QC. She reports 2 falls in the past year attributed to water grandson spilled. She denies any recent PT. She works doing property tax.  Equipment used at home: rollator, shower chair, cane, quad.  DME owned (not currently used): none.  Upon discharge, patient will have assistance from family.    Objective:     Communicated with SAVANA Vazquez prior to session.  Patient found HOB elevated with telemetryChris upon PT entry to room.    General Precautions: Standard, fall    Orthopedic Precautions:N/A   Braces: N/A  Respiratory Status: Room " air    Exams:  RLE Strength: WNL  LLE Strength: WNL    Functional Mobility:  Bed Mobility:     Supine to Sit: independence  Transfers:     Sit to Stand:  independence with no AD  Gait: 324' with rollator and supervision    AM-PAC 6 CLICK MOBILITY  Total Score:23       Treatment and Education:  Patient was educated on the importance of OOB activity and functional mobility to negate negative effects of prolonged bed rest during hospitalization, safe transfers and ambulation, and D/C planning     Patient requires Lety for dressing in anticipation of D/C    AM-PAC 6 CLICK MOBILITY  Total Score:23     Patient left up in chair with all lines intact, call button in reach, RN notified, and mother present.    GOALS:   Multidisciplinary Problems       Physical Therapy Goals       Not on file                    History:     Past Medical History:   Diagnosis Date    Anemia     Anticoagulant long-term use     Arrhythmia     Arthritis     Atrial fibrillation     history    Bronchitis     COPD (chronic obstructive pulmonary disease)     Encounter for blood transfusion     General anesthetics causing adverse effect in therapeutic use     GERD (gastroesophageal reflux disease)     High blood pressure     Hyperlipidemia     Lump or mass in breast     benign     Neuropathy     Obesity     Obstructive sleep apnea syndrome 2021    no cpap    Ulcer     Unspecified chronic bronchitis        Past Surgical History:   Procedure Laterality Date    A-V CARDIAC PACEMAKER INSERTION Left 2020    Procedure: INSERTION, CARDIAC PACEMAKER, DUAL CHAMBER;  Surgeon: Bert Reaves MD;  Location: [facility] CATH;  Service: Cardiology;  Laterality: Left;    ANGIOGRAM, CORONARY, WITH LEFT HEART CATHETERIZATION  2023    Procedure: Left Heart Cath;  Surgeon: Bert Reaves MD;  Location: [facility] CATH;  Service: Cardiology;;    BREAST BIOPSY Right 2017    myofibroblastoma     BREAST MASS EXCISION       SECTION  10/25/1986     Other c/section 10/26/1997    CHOLECYSTECTOMY  12/28/2017    Dr. TOLU Bowers, [facility]     CORRECTION OF HAMMER TOE Left 11/03/2022    Procedure: CORRECTION, HAMMER TOE;  Surgeon: Ezra Arriaga DPM;  Location: [facility] OR;  Service: Podiatry;  Laterality: Left;  hammertoes 2-5 left,    csection      CS x 2    CYSTOSCOPY N/A 09/03/2019    Procedure: CYSTOSCOPY;  Surgeon: Noemi Pierre MD;  Location: [facility] OR;  Service: OB/GYN;  Laterality: N/A;    DILATION AND CURETTAGE OF UTERUS      EPIDURAL STEROID INJECTION INTO LUMBAR SPINE N/A 05/21/2020    Procedure: Injection-steroid-epidural-lumbar L5/S1;  Surgeon: Gurjit Tavarez MD;  Location: [facility] OR;  Service: Pain Management;  Laterality: N/A;    EPIDURAL STEROID INJECTION INTO LUMBAR SPINE N/A 06/19/2020    Procedure: Injection-steroid-epidural-lumbar L5/S1;  Surgeon: Gurjit Tavarez MD;  Location: [facility] OR;  Service: Pain Management;  Laterality: N/A;    EPIDURAL STEROID INJECTION INTO LUMBAR SPINE N/A 12/01/2021    Procedure: Injection-steroid-epidural-lumbar L5/S1;  Surgeon: Gurjit Tavarez MD;  Location: [facility] OR;  Service: Pain Management;  Laterality: N/A;    EPIDURAL STEROID INJECTION INTO LUMBAR SPINE N/A 02/22/2023    Procedure: Injection-steroid-epidural-lumbar-L5/S1 to the left;  Surgeon: Gurjit Tavarez MD;  Location: [facility] OR;  Service: Pain Management;  Laterality: N/A;    EPIDURAL STEROID INJECTION INTO LUMBAR SPINE Right 04/28/2023    Procedure: Injection-steroid-epidural-lumbar L5/S1;  Surgeon: Gurjit Tavarez MD;  Location: [facility] OR;  Service: Pain Management;  Laterality: Right;    EPIDURAL STEROID INJECTION INTO LUMBAR SPINE N/A 08/29/2023    Procedure: Injection-steroid-epidural-lumbarL5/S1 to right;  Surgeon: Gurjit Tavarez MD;  Location: [facility] OR;  Service: Pain Management;  Laterality: N/A;    FRACTURE SURGERY  04/1986    Dislocated  hip total rt hip 08/08    HIP PINNING Right 2008    HYSTERECTOMY      JOINT REPLACEMENT Right 08/2008    hip     LAPAROSCOPIC SALPINGO-OOPHORECTOMY Bilateral 09/03/2019    Procedure: SALPINGO-OOPHORECTOMY, LAPAROSCOPIC;  Surgeon: Noemi Pierre MD;  Location: [facility];  Service: OB/GYN;  Laterality: Bilateral;  Dr. Bentley to assist. No resident needed.     LAPAROSCOPIC SLEEVE GASTRECTOMY N/A 5/20/2024    Procedure: GASTRECTOMY, SLEEVE, LAPAROSCOPIC;  Surgeon: Oumar Siegel MD;  Location: [facility] OR;  Service: General;  Laterality: N/A;    LAPAROSCOPIC TOTAL HYSTERECTOMY N/A 09/03/2019    Procedure: HYSTERECTOMY, TOTAL, LAPAROSCOPIC;  Surgeon: Noemi Pierre MD;  Location: [facility];  Service: OB/GYN;  Laterality: N/A;    NASAL SEPTUM SURGERY  1982    OOPHORECTOMY      SKIN TAG REMOVAL Left 11/03/2022    Procedure: REMOVAL, SKIN TAG;  Surgeon: Ezra Arriaga DPM;  Location: [facility];  Service: Podiatry;  Laterality: Left;    TOTAL HIP ARTHROPLASTY Right     TUBAL LIGATION  1997       Time Tracking:     PT Received On: 05/21/24  PT Start Time: 1322     PT Stop Time: 1345  PT Total Time (min): 23 min     Billable Minutes: Evaluation 10 and Therapeutic Activity 13      05/21/2024

## 2024-05-21 NOTE — HOSPITAL COURSE
Patient postop day 1 gastric sleeve--successful has been up and out of bed.  Bowel sounds positive has been able to tolerate meals without issue.  Monitored on tele without any ectopy noted, vitals grossly stable, afebrile, no leukocytosis.  She is awake alert and in good spirits abdominal incision site normal without drainage, hematoma, redness or streaking, no tenderness to touch.  Cleared by surgery for discharge with instructions to restart eliquis tonight.  Pain meds sent to pharmacy by surgery.  Patient is anxious for discharge and will be discharged to home in stable condition.  Follow up closely with surgery in clinic.

## 2024-05-21 NOTE — PLAN OF CARE
Problem: Adult Inpatient Plan of Care  Goal: Plan of Care Review  Outcome: Met  Goal: Patient-Specific Goal (Individualized)  Outcome: Met  Goal: Absence of Hospital-Acquired Illness or Injury  Outcome: Met  Goal: Optimal Comfort and Wellbeing  Outcome: Met  Goal: Readiness for Transition of Care  Outcome: Met     Problem: Bariatric Environmental Safety  Goal: Safety Maintained with Care  Outcome: Met     Problem: Skin Injury Risk Increased  Goal: Skin Health and Integrity  Outcome: Met     Problem: Wound  Goal: Optimal Coping  Outcome: Met  Goal: Optimal Functional Ability  Outcome: Met  Goal: Absence of Infection Signs and Symptoms  Outcome: Met  Goal: Improved Oral Intake  Outcome: Met  Goal: Optimal Pain Control and Function  Outcome: Met  Goal: Skin Health and Integrity  Outcome: Met  Goal: Optimal Wound Healing  Outcome: Met     Problem: Fall Injury Risk  Goal: Absence of Fall and Fall-Related Injury  Outcome: Met

## 2024-05-21 NOTE — DISCHARGE INSTRUCTIONS
Shower over incisions daily with soap and water  Do not bath or get into a pool  Clear liquid diet for 1 week, then full liquid diet for 2 weeks  Use Incentive Spirometer at Home

## 2024-05-21 NOTE — SUBJECTIVE & OBJECTIVE
"Interval History: doing well  Tolerating liquids      Medications:  Continuous Infusions:   lactated ringers   Intravenous Continuous 125 mL/hr at 05/21/24 0508 New Bag at 05/21/24 0508     Scheduled Meds:   DULoxetine  60 mg Oral Daily    enoxparin  40 mg Subcutaneous Q12H    flecainide  150 mg Oral BID    gabapentin  600 mg Oral BID    lisinopriL  10 mg Oral Daily    metoprolol tartrate  100 mg Oral BID    nortriptyline  25 mg Oral QHS    pantoprazole  40 mg Oral Daily    simethicone  2 tablet Oral TID     PRN Meds:  Current Facility-Administered Medications:     albuterol, 2.5 mg, Nebulization, Q6H PRN    diphenhydrAMINE, 12.5 mg, Intravenous, Q4H PRN    HYDROmorphone, 1 mg, Intravenous, Q6H PRN    magnesium oxide, 800 mg, Oral, PRN    magnesium oxide, 800 mg, Oral, PRN    ondansetron, 8 mg, Intravenous, Q6H PRN    oxyCODONE, 10 mg, Oral, Q4H PRN    potassium bicarbonate, 35 mEq, Oral, PRN    potassium bicarbonate, 50 mEq, Oral, PRN    potassium bicarbonate, 60 mEq, Oral, PRN    potassium, sodium phosphates, 2 packet, Oral, PRN    potassium, sodium phosphates, 2 packet, Oral, PRN    potassium, sodium phosphates, 2 packet, Oral, PRN     Review of patient's allergies indicates:   Allergen Reactions    Aspirin Other (See Comments)     "I don't take it because I've had ulcers"       Meperidine Other (See Comments)     Felt like she was about to pass out after taking    Azithromycin      Other reaction(s): Not available     Objective:     Vital Signs (Most Recent):  Temp: 97.2 °F (36.2 °C) (05/21/24 1100)  Pulse: 60 (05/21/24 1100)  Resp: 20 (05/21/24 1100)  BP: 125/76 (05/21/24 1100)  SpO2: 97 % (05/21/24 1100) Vital Signs (24h Range):  Temp:  [97.2 °F (36.2 °C)-98.5 °F (36.9 °C)] 97.2 °F (36.2 °C)  Pulse:  [59-70] 60  Resp:  [16-20] 20  SpO2:  [92 %-100 %] 97 %  BP: (115-149)/(64-90) 125/76     Weight: 130.7 kg (288 lb 2.3 oz)  Body mass index is 48.7 kg/m².    Intake/Output - Last 3 Shifts         05/19 0700  05/20 " 0659 05/20 0700  05/21 0659 05/21 0700 05/22 0659    P.O.  30 60    I.V. (mL/kg)  500 (3.8)     IV Piggyback  400     Total Intake(mL/kg)  930 (7.1) 60 (0.5)    Urine (mL/kg/hr)  850 (0.3)     Blood  20     Total Output  870     Net  +60 +60           Stool Occurrence  0 x              Physical Exam  Abdominal:      General: Abdomen is flat. There is no distension.      Palpations: Abdomen is soft.          Significant Labs:  I have reviewed all pertinent lab results within the past 24 hours.  CBC:   Recent Labs   Lab 05/21/24  0500   WBC 12.52   RBC 3.59*   HGB 9.5*   HCT 30.6*      MCV 85   MCH 26.5*   MCHC 31.0*     CMP:   Recent Labs   Lab 05/15/24  1637 05/21/24  0500    118*   CALCIUM 9.3 8.4*   ALBUMIN 4.1  --    PROT 7.0  --     137   K 3.8 3.9   CO2 30* 25    104   BUN 23 9   CREATININE 0.9 0.5   ALKPHOS 42*  --    ALT 11  --    AST 13  --    BILITOT 0.4  --        Significant Diagnostics:  I have reviewed all pertinent imaging results/findings within the past 24 hours.

## 2024-05-21 NOTE — PLAN OF CARE
[facility]  Initial Discharge Assessment       Primary Care Provider: Jerson Wheat MD    Admission Diagnosis: Morbid obesity [E66.01]  BMI 45.0-49.9, adult [Z68.42]  Essential hypertension [I10]  Paroxysmal atrial fibrillation [I48.0]  Obstructive sleep apnea syndrome [G47.33]  Hypercholesteremia [E78.00]  Pacemaker [Z95.0]    Admission Date: 5/20/2024  Expected Discharge Date: 5/21/2024    Transition of Care Barriers: None    Payor: BLUE CROSS BLUE SHIELD / Plan: [place] PPO / Product Type: PPO /     Extended Emergency Contact Information  Primary Emergency Contact: ShoemakerAlexSriram  Address: [facility] [facility] [address]           [place] [address] [place]  Mobile Phone: [phone]  Relation: Spouse  Secondary Emergency Contact: ShoemakerLibyb  Address: [address]           [place] [address] [place]  Mobile Phone: [phone]  Relation: Daughter  Mother: Leyla Amor  Mobile Phone: [phone]    Discharge Plan A: Home with family  Discharge Plan B: Home      [facility] - [place], [place] - [address]  [address]  [place] [address]  Phone: [phone] Fax: [phone]      DC assessment completed with patient and mother at bedside. Verified information on facesheet as correct. Denies POA. Lives at listed address with spouse, adult children, and her mother. PCP is Dr. Wheat- last apt was in April. [facility] Denies hh/hd/outpt services. DME- rollator, bp monitor, nebulizer, and sc. Uses rollator all the time. Modified independent. Reports that she will have help from family while recovering from sx. Reports taking home medications as prescribed and can currently afford them. Denies recent inpt stay in last 30 days. Verified insurance on file. DC plan is home.     Initial Assessment (most recent)       Adult Discharge Assessment - 05/21/24 1520          Discharge Assessment    Confirmed/corrected address, phone number and insurance Yes      Confirmed Demographics Correct on Facesheet     Source of Information patient     Reason For Admission planned sx     People in Home spouse;child(pramod), adult     Facility Arrived From: home     Do you expect to return to your current living situation? Yes     Do you have help at home or someone to help you manage your care at home? Yes     Prior to hospitilization cognitive status: Alert/Oriented     Current cognitive status: Alert/Oriented     Walking or Climbing Stairs Difficulty yes     Walking or Climbing Stairs ambulation difficulty, requires equipment;ambulation difficulty, assistance 1 person     Dressing/Bathing Difficulty yes     Dressing/Bathing bathing difficulty, requires equipment     Equipment Currently Used at Home blood pressure machine;rollator;nebulizer;shower chair     Readmission within 30 days? No     Patient currently being followed by outpatient case management? No     Do you currently have service(s) that help you manage your care at home? No     Do you take prescription medications? Yes     Do you have prescription coverage? Yes     Do you have any problems affording any of your prescribed medications? No     Is the patient taking medications as prescribed? yes     Who is going to help you get home at discharge? mom     How do you get to doctors appointments? family or friend will provide;car, drives self     Are you on dialysis? No     Do you take coumadin? No     Discharge Plan A Home with family     Discharge Plan B Home     DME Needed Upon Discharge  none     Transition of Care Barriers None

## 2024-05-21 NOTE — DISCHARGE SUMMARY
[facility] Medicine  Discharge Summary      Patient Name: Fatemeh Shoemaker  MRN: [id number]  SENTHIL: [id number]  Patient Class: OP- Outpatient Recovery  Admission Date: 5/20/2024  Hospital Length of Stay: 0 days  Discharge Date and Time:  05/21/2024 2:43 PM  Attending Physician: Oumar Siegel MD   Discharging Provider: BRENNA Cuevas  Primary Care Provider: Jerson Wheat MD    Primary Care Team: Networked reference to record PCT     HPI:   Fatemeh Shoemaker is a 63 y.o. female with  has a past medical history of Anemia, Anticoagulant long-term use, Arrhythmia, Arthritis, Atrial fibrillation, Bronchitis, COPD (chronic obstructive pulmonary disease), Encounter for blood transfusion, General anesthetics causing adverse effect in therapeutic use, GERD (gastroesophageal reflux disease), High blood pressure, Hyperlipidemia, Lump or mass in breast, Neuropathy, Obesity, Obstructive sleep apnea syndrome (11/05/2021), Ulcer, and Unspecified chronic bronchitis. who is s/p gastrectomy sleeve with Dr. Locke. Patient is a non-smoker; has pacemaker. She is post op day -0-.    Procedure(s) (LRB):  GASTRECTOMY, SLEEVE, LAPAROSCOPIC (N/A)      Hospital Course:   Patient postop day 1 gastric sleeve--successful has been up and out of bed.  Bowel sounds positive has been able to tolerate meals without issue.  Monitored on tele without any ectopy noted, vitals grossly stable, afebrile, no leukocytosis.  She is awake alert and in good spirits abdominal incision site normal without drainage, hematoma, redness or streaking, no tenderness to touch.  Cleared by surgery for discharge with instructions to restart eliquis tonight.  Pain meds sent to pharmacy by surgery.  Patient is anxious for discharge and will be discharged to home in stable condition.  Follow up closely with surgery in clinic.     Goals of Care Treatment Preferences:  Code Status: Full Code      Consults:   Consults (From  "admission, onward)          Status Ordering Provider     Inpatient consult to Hospitalist  Once        Provider:  David Gibson MD    Acknowledged AUGIE CHAPPELL            No new Assessment & Plan notes have been filed under this hospital service since the last note was generated.  Service: Hospital Medicine    Final Active Diagnoses:    Diagnosis Date Noted POA    PRINCIPAL PROBLEM:  S/P gastric sleeve procedure [Z90.3] 05/20/2024 Not Applicable    CAD (coronary artery disease) [I25.10] 05/20/2024 Yes    Obstructive sleep apnea syndrome [G47.33] 11/05/2021 Yes    Pacemaker [Z95.0] 10/16/2020 Yes    Morbid obesity with BMI of 45.0-49.9, adult [E66.01, Z68.42] 12/28/2016 Not Applicable    Paroxysmal atrial fibrillation [I48.0] 12/28/2016 Yes    Primary hypertension [I10] 12/28/2016 Yes      Problems Resolved During this Admission:       Discharged Condition: stable    Disposition: Home or Self Care    Follow Up:   Follow-up Information       Augie Chappell MD Follow up.    Specialties: General Surgery, Bariatrics, Surgery  Contact information:  [facility]  [place] [address]  [phone]                           Patient Instructions:      Activity as tolerated       Significant Diagnostic Studies: Labs: CMP   Recent Labs   Lab 05/21/24  0500      K 3.9      CO2 25   *   BUN 9   CREATININE 0.5   CALCIUM 8.4*   ANIONGAP 8   , CBC   Recent Labs   Lab 05/21/24  0500   WBC 12.52   HGB 9.5*   HCT 30.6*      , INR   Lab Results   Component Value Date    INR 1.1 11/12/2022    INR 1.1 10/06/2021    INR 1.1 09/19/2020   , Lipid Panel   Lab Results   Component Value Date    CHOL 149 02/02/2024    HDL 39 (L) 02/02/2024    LDLCALC 74.4 02/02/2024    TRIG 178 (H) 02/02/2024    CHOLHDL 26.2 02/02/2024   , Troponin No results for input(s): "TROPONINI" in the last 168 hours., and A1C:   Recent Labs   Lab 02/02/24  0816   HGBA1C 5.7*       Pending Diagnostic Studies:       Procedure " Component Value Units Date/Time    EKG 12-lead [[id number]] Collected: 05/21/24 0832    Order Status: Sent Lab Status: In process Updated: 05/21/24 0849     QRS Duration 110 ms      OHS QTC Calculation 446 ms     Narrative:      Test Reason : R07.9,    Vent. Rate : 060 BPM     Atrial Rate : 060 BPM     P-R Int : 264 ms          QRS Dur : 110 ms      QT Int : 446 ms       P-R-T Axes : 000 062 094 degrees     QTc Int : 446 ms    Atrial-paced rhythm with prolonged AV conduction  Nonspecific T wave abnormality  Abnormal ECG  When compared with ECG of 26-DEC-2023 08:38,  No significant change was found    Referred By: AUGIE CHAPPELL           Confirmed By:            Medications:  Reconciled Home Medications:      Medication List        START taking these medications      HYDROcodone-acetaminophen 5-325 mg per tablet  Commonly known as: NORCO  Take 1 tablet by mouth every 6 (six) hours as needed for Pain.            CHANGE how you take these medications      lisinopriL 10 MG tablet  Take 1 tablet (10 mg total) by mouth once daily.  What changed: Another medication with the same name was removed. Continue taking this medication, and follow the directions you see here.     ondansetron 4 MG Tbdl  Commonly known as: ZOFRAN-ODT  Take 1 tablet (4 mg total) by mouth every 6 (six) hours as needed (nv).  What changed:   when to take this  reasons to take this            CONTINUE taking these medications      * albuterol sulfate 90 mcg/actuation Aebs  Inhale 90 mcg into the lungs every 4 (four) hours as needed (wheezing or shortness of breath).     * albuterol 2.5 mg /3 mL (0.083 %) nebulizer solution  Commonly known as: PROVENTIL  Take 3 mLs (2.5 mg total) by nebulization every 6 (six) hours as needed for Wheezing or Shortness of Breath. Rescue     benzonatate 200 MG capsule  Commonly known as: TESSALON  Take 1 capsule 3 times a day by oral route as needed for 10 days, for cough.     cholecalciferol (vitamin D3) 1,250 mcg (50,000  unit) capsule  Commonly known as: DECARA  Take 1 capsule (50,000 Units total) by mouth every 7 days.     DULoxetine 60 MG capsule  Commonly known as: CYMBALTA  TAKE ONE CAPSULE BY MOUTH DAILY     ELIQUIS 5 mg Tab  Generic drug: apixaban  TAKE ONE TABLET BY MOUTH TWICE DAILY     flecainide 150 MG Tab  Commonly known as: TAMBOCOR  TAKE ONE TABLET BY MOUTH TWICE DAILY     gabapentin 600 MG tablet  Commonly known as: NEURONTIN  TAKE ONE TABLET BY MOUTH TWICE DAILY     metoprolol tartrate 100 MG tablet  Commonly known as: LOPRESSOR  Take 1 tablet (100 mg total) by mouth 2 (two) times daily.     nortriptyline 25 MG capsule  Commonly known as: PAMELOR  TAKE ONE CAPSULE BY MOUTH EVERY EVENING     omeprazole 20 MG capsule  Commonly known as: PRILOSEC  TAKE ONE CAPSULE BY MOUTH EVERY MORNING     promethazine 12.5 MG Tab  Commonly known as: PHENERGAN  Take 1 tablet (12.5 mg total) by mouth 2 (two) times daily as needed (nausea).     pulse oximeter device  Commonly known as: pulse oximeter  Use twice daily at 8 AM and 3 PM and [facility].     rosuvastatin 10 MG tablet  Commonly known as: CRESTOR  Take 1 tablet (10 mg total) by mouth every evening.     tiZANidine 4 MG tablet  Commonly known as: ZANAFLEX  TAKE ONE TABLET BY MOUTH every SIX hours AS NEEDED.     valACYclovir 500 MG tablet  Commonly known as: VALTREX  Take 1 tablet (500 mg total) by mouth daily as needed (fever blister).           * This list has 2 medication(s) that are the same as other medications prescribed for you. Read the directions carefully, and ask your doctor or other care provider to review them with you.                STOP taking these medications      AUGMENTIN 875-125 mg per tablet  Generic drug: amoxicillin-clavulanate 875-125mg            ASK your doctor about these medications      diclofenac sodium 1 % Gel  Commonly known as: VOLTAREN  Apply 2 g topically 4 (four) times daily.              Indwelling Lines/Drains at  time of discharge:   Lines/Drains/Airways       None                   Time spent on the discharge of patient: 45 minutes             Ashley Alicia DNP, APRN, FNP-C  [facility]  [facility]  [email or web address]

## 2024-05-21 NOTE — PROGRESS NOTES
"[facility]  General Surgery  Progress Note    Subjective:     History of Present Illness:  No notes on file    Post-Op Info:  Procedure(s) (LRB):  GASTRECTOMY, SLEEVE, LAPAROSCOPIC (N/A)   1 Day Post-Op     Interval History: doing well  Tolerating liquids      Medications:  Continuous Infusions:   lactated ringers   Intravenous Continuous 125 mL/hr at 05/21/24 0508 New Bag at 05/21/24 0508     Scheduled Meds:   DULoxetine  60 mg Oral Daily    enoxparin  40 mg Subcutaneous Q12H    flecainide  150 mg Oral BID    gabapentin  600 mg Oral BID    lisinopriL  10 mg Oral Daily    metoprolol tartrate  100 mg Oral BID    nortriptyline  25 mg Oral QHS    pantoprazole  40 mg Oral Daily    simethicone  2 tablet Oral TID     PRN Meds:  Current Facility-Administered Medications:     albuterol, 2.5 mg, Nebulization, Q6H PRN    diphenhydrAMINE, 12.5 mg, Intravenous, Q4H PRN    HYDROmorphone, 1 mg, Intravenous, Q6H PRN    magnesium oxide, 800 mg, Oral, PRN    magnesium oxide, 800 mg, Oral, PRN    ondansetron, 8 mg, Intravenous, Q6H PRN    oxyCODONE, 10 mg, Oral, Q4H PRN    potassium bicarbonate, 35 mEq, Oral, PRN    potassium bicarbonate, 50 mEq, Oral, PRN    potassium bicarbonate, 60 mEq, Oral, PRN    potassium, sodium phosphates, 2 packet, Oral, PRN    potassium, sodium phosphates, 2 packet, Oral, PRN    potassium, sodium phosphates, 2 packet, Oral, PRN     Review of patient's allergies indicates:   Allergen Reactions    Aspirin Other (See Comments)     "I don't take it because I've had ulcers"       Meperidine Other (See Comments)     Felt like she was about to pass out after taking    Azithromycin      Other reaction(s): Not available     Objective:     Vital Signs (Most Recent):  Temp: 97.2 °F (36.2 °C) (05/21/24 1100)  Pulse: 60 (05/21/24 1100)  Resp: 20 (05/21/24 1100)  BP: 125/76 (05/21/24 1100)  SpO2: 97 % (05/21/24 1100) Vital Signs (24h Range):  Temp:  [97.2 °F (36.2 °C)-98.5 °F (36.9 °C)] 97.2 °F (36.2 " °C)  Pulse:  [59-70] 60  Resp:  [16-20] 20  SpO2:  [92 %-100 %] 97 %  BP: (115-149)/(64-90) 125/76     Weight: 130.7 kg (288 lb 2.3 oz)  Body mass index is 48.7 kg/m².    Intake/Output - Last 3 Shifts         05/19 0700  05/20 0659 05/20 0700 05/21 0659 05/21 0700 05/22 0659    P.O.  30 60    I.V. (mL/kg)  500 (3.8)     IV Piggyback  400     Total Intake(mL/kg)  930 (7.1) 60 (0.5)    Urine (mL/kg/hr)  850 (0.3)     Blood  20     Total Output  870     Net  +60 +60           Stool Occurrence  0 x              Physical Exam  Abdominal:      General: Abdomen is flat. There is no distension.      Palpations: Abdomen is soft.          Significant Labs:  I have reviewed all pertinent lab results within the past 24 hours.  CBC:   Recent Labs   Lab 05/21/24  0500   WBC 12.52   RBC 3.59*   HGB 9.5*   HCT 30.6*      MCV 85   MCH 26.5*   MCHC 31.0*     CMP:   Recent Labs   Lab 05/15/24  1637 05/21/24  0500    118*   CALCIUM 9.3 8.4*   ALBUMIN 4.1  --    PROT 7.0  --     137   K 3.8 3.9   CO2 30* 25    104   BUN 23 9   CREATININE 0.9 0.5   ALKPHOS 42*  --    ALT 11  --    AST 13  --    BILITOT 0.4  --        Significant Diagnostics:  I have reviewed all pertinent imaging results/findings within the past 24 hours.  Assessment/Plan:     Morbid obesity with BMI of 45.0-49.9, adult  Doing well  Ok to dc  Restart blood thinner tonight        Oumar Siegel MD  General Surgery  [facility]

## 2024-05-28 ENCOUNTER — TELEPHONE (OUTPATIENT)
Dept: BARIATRICS | Facility: CLINIC | Age: 64
End: 2024-05-28
Payer: COMMERCIAL

## 2024-05-28 ENCOUNTER — PATIENT MESSAGE (OUTPATIENT)
Dept: BARIATRICS | Facility: CLINIC | Age: 64
End: 2024-05-28
Payer: COMMERCIAL

## 2024-05-28 NOTE — TELEPHONE ENCOUNTER
Called pt to reschedule appt from 6/4 to 6/5. No answer, LVM with details of new appt and call back number.

## 2024-05-30 ENCOUNTER — PATIENT MESSAGE (OUTPATIENT)
Dept: BARIATRICS | Facility: CLINIC | Age: 64
End: 2024-05-30
Payer: COMMERCIAL

## 2024-05-31 ENCOUNTER — TELEPHONE (OUTPATIENT)
Dept: BARIATRICS | Facility: CLINIC | Age: 64
End: 2024-05-31
Payer: COMMERCIAL

## 2024-05-31 ENCOUNTER — TELEPHONE (OUTPATIENT)
Dept: CARDIOLOGY | Facility: CLINIC | Age: 64
End: 2024-05-31
Payer: COMMERCIAL

## 2024-05-31 ENCOUNTER — NURSE TRIAGE (OUTPATIENT)
Dept: ADMINISTRATIVE | Facility: CLINIC | Age: 64
End: 2024-05-31
Payer: COMMERCIAL

## 2024-05-31 NOTE — TELEPHONE ENCOUNTER
OOC RN  Dr Siegel, A  s/p gastric sleeve 5/20/24  79/75  76/47,   Dizzy and weak now.  Picked up mom who is driving home now,   Call 911 now.    m  Reason for Disposition   Systolic BP < 90 and feeling dizzy, lightheaded, or weak    Protocols used: Blood Pressure - Low-A-OH

## 2024-05-31 NOTE — TELEPHONE ENCOUNTER
Called pt in response to note from on-call nurse. Pt reports that she called 911 and is waiting for the ambulance to arrive. Dr. Siegel notified.

## 2024-05-31 NOTE — TELEPHONE ENCOUNTER
----- Message from Venkata Junior MD sent at 5/31/2024 12:05 PM CDT -----  Regarding: RE: medication  How much weight did she lose?  Lets do HTN eVisit and will need pulse numbers as well    -Venkata  ----- Message -----  From: Libby Fair RN  Sent: 5/31/2024  11:22 AM CDT  To: Venkata Junior MD; [facility] Staff  Subject: medication                                       Hello,   Ms Weldon recently had bariatric surgery by Dr. Siegel.   Due to rapid weight loss, she will probably need some adjustments in her medications. I encouraged her to reach out to your office for advisement, but due to her extensive cardiac history I wanted to be sure you are aware.     Please let me know if you have any questions or if I can be of any assistance.     All the Best,  SAVANA Mejia

## 2024-06-03 ENCOUNTER — OFFICE VISIT (OUTPATIENT)
Dept: FAMILY MEDICINE | Facility: CLINIC | Age: 64
End: 2024-06-03
Payer: COMMERCIAL

## 2024-06-03 VITALS
HEART RATE: 79 BPM | DIASTOLIC BLOOD PRESSURE: 80 MMHG | WEIGHT: 266.13 LBS | SYSTOLIC BLOOD PRESSURE: 120 MMHG | BODY MASS INDEX: 44.97 KG/M2 | OXYGEN SATURATION: 100 %

## 2024-06-03 DIAGNOSIS — G89.29 CHRONIC BILATERAL LOW BACK PAIN WITH BILATERAL SCIATICA: ICD-10-CM

## 2024-06-03 DIAGNOSIS — I48.0 PAROXYSMAL ATRIAL FIBRILLATION: Primary | ICD-10-CM

## 2024-06-03 DIAGNOSIS — D50.9 IRON DEFICIENCY ANEMIA, UNSPECIFIED IRON DEFICIENCY ANEMIA TYPE: ICD-10-CM

## 2024-06-03 DIAGNOSIS — Z12.31 ENCOUNTER FOR SCREENING MAMMOGRAM FOR BREAST CANCER: ICD-10-CM

## 2024-06-03 DIAGNOSIS — M54.42 CHRONIC BILATERAL LOW BACK PAIN WITH BILATERAL SCIATICA: ICD-10-CM

## 2024-06-03 DIAGNOSIS — M54.41 CHRONIC BILATERAL LOW BACK PAIN WITH BILATERAL SCIATICA: ICD-10-CM

## 2024-06-03 DIAGNOSIS — Z90.3 S/P GASTRIC SLEEVE PROCEDURE: ICD-10-CM

## 2024-06-03 DIAGNOSIS — I10 ESSENTIAL HYPERTENSION: ICD-10-CM

## 2024-06-03 PROBLEM — R42 DIZZINESS: Status: RESOLVED | Noted: 2022-11-13 | Resolved: 2024-06-03

## 2024-06-03 PROBLEM — M54.2 CERVICALGIA: Status: RESOLVED | Noted: 2018-03-20 | Resolved: 2024-06-03

## 2024-06-03 PROCEDURE — 1159F MED LIST DOCD IN RCRD: CPT | Mod: CPTII,S$GLB,, | Performed by: INTERNAL MEDICINE

## 2024-06-03 PROCEDURE — 1160F RVW MEDS BY RX/DR IN RCRD: CPT | Mod: CPTII,S$GLB,, | Performed by: INTERNAL MEDICINE

## 2024-06-03 PROCEDURE — 3044F HG A1C LEVEL LT 7.0%: CPT | Mod: CPTII,S$GLB,, | Performed by: INTERNAL MEDICINE

## 2024-06-03 PROCEDURE — 3074F SYST BP LT 130 MM HG: CPT | Mod: CPTII,S$GLB,, | Performed by: INTERNAL MEDICINE

## 2024-06-03 PROCEDURE — 3079F DIAST BP 80-89 MM HG: CPT | Mod: CPTII,S$GLB,, | Performed by: INTERNAL MEDICINE

## 2024-06-03 PROCEDURE — 99214 OFFICE O/P EST MOD 30 MIN: CPT | Mod: S$GLB,,, | Performed by: INTERNAL MEDICINE

## 2024-06-03 PROCEDURE — 3008F BODY MASS INDEX DOCD: CPT | Mod: CPTII,S$GLB,, | Performed by: INTERNAL MEDICINE

## 2024-06-03 PROCEDURE — 99999 PR PBB SHADOW E&M-EST. PATIENT-LVL IV: CPT | Mod: PBBFAC,,, | Performed by: INTERNAL MEDICINE

## 2024-06-03 PROCEDURE — 4010F ACE/ARB THERAPY RXD/TAKEN: CPT | Mod: CPTII,S$GLB,, | Performed by: INTERNAL MEDICINE

## 2024-06-03 NOTE — PROGRESS NOTES
Subjective     Fatemeh Shoemaker is a 63 y.o. old, female here for Annual Exam    63-year-old with past medical history of PAF, symptomatic bradycardia s/p PCM, HLD, chronic bronchitis, JOSE ELIAS, peripheral neuropathy, DDD/chronic back pain.     Here for hospital follow-up. Recently seen for hypotension, dehydration in the setting of recently having a gastric sleeve (followed by Dr. Siegel). She received IVF's and lisinopril was stopped. She is feeling better and monitoring her BP.  PAF: possible recurrence, very brief recently, currently on eliquis, flecanide, and metoprolol.  JOSE ELIAS: stable anemia on labs.  CBP: stable, bothersome when walking.    ROS  Medications     Outpatient Medications Marked as Taking for the 6/3/24 encounter (Office Visit) with Jerson Wheat MD   Medication Sig Dispense Refill    albuterol (PROVENTIL) 2.5 mg /3 mL (0.083 %) nebulizer solution Take 3 mLs (2.5 mg total) by nebulization every 6 (six) hours as needed for Wheezing or Shortness of Breath. Rescue 30 mL 0    albuterol sulfate 90 mcg/actuation aebs Inhale 90 mcg into the lungs every 4 (four) hours as needed (wheezing or shortness of breath). 1 each 2    benzonatate (TESSALON) 200 MG capsule Take 1 capsule 3 times a day by oral route as needed for 10 days, for cough.      cholecalciferol, vitamin D3, (DECARA) 1,250 mcg (50,000 unit) capsule Take 1 capsule (50,000 Units total) by mouth every 7 days. 30 capsule 0    DULoxetine (CYMBALTA) 60 MG capsule TAKE ONE CAPSULE BY MOUTH DAILY 90 capsule 3    ELIQUIS 5 mg Tab TAKE ONE TABLET BY MOUTH TWICE DAILY (Patient taking differently: Take 5 mg by mouth 2 (two) times daily. 5/15/24 pt to stop Thursday night 5/16/24) 180 tablet 2    flecainide (TAMBOCOR) 150 MG Tab TAKE ONE TABLET BY MOUTH TWICE DAILY 180 tablet 3    gabapentin (NEURONTIN) 600 MG tablet TAKE ONE TABLET BY MOUTH TWICE DAILY 60 tablet 1    HYDROcodone-acetaminophen (NORCO) 5-325 mg per tablet Take 1 tablet by mouth every  6 (six) hours as needed for Pain. 20 tablet 0    metoprolol tartrate (LOPRESSOR) 100 MG tablet Take 1 tablet (100 mg total) by mouth 2 (two) times daily. 180 tablet 3    nortriptyline (PAMELOR) 25 MG capsule TAKE ONE CAPSULE BY MOUTH EVERY EVENING 90 capsule 3    omeprazole (PRILOSEC) 20 MG capsule TAKE ONE CAPSULE BY MOUTH EVERY MORNING 90 capsule 2    ondansetron (ZOFRAN-ODT) 4 MG TbDL Take 1 tablet (4 mg total) by mouth every 6 (six) hours as needed (nv). 30 tablet 0    promethazine (PHENERGAN) 12.5 MG Tab Take 1 tablet (12.5 mg total) by mouth 2 (two) times daily as needed (nausea). 10 tablet 0    pulse oximeter (PULSE OXIMETER) device Use twice daily at 8 AM and 3 PM and [facility]. 1 each 0    rosuvastatin (CRESTOR) 10 MG tablet Take 1 tablet (10 mg total) by mouth every evening. 90 tablet 3    tiZANidine (ZANAFLEX) 4 MG tablet TAKE ONE TABLET BY MOUTH every SIX hours AS NEEDED. 60 tablet 1    valACYclovir (VALTREX) 500 MG tablet Take 1 tablet (500 mg total) by mouth daily as needed (fever blister). 30 tablet 2     Objective     /80   Pulse 79   Wt 120.7 kg (266 lb 1.5 oz)   LMP 09/08/2013 (Approximate)   SpO2 100%   BMI 44.97 kg/m²   Physical Exam  Constitutional:       General: She is not in acute distress.     Appearance: Normal appearance. She is well-developed.   Cardiovascular:      Rate and Rhythm: Normal rate and regular rhythm.      Heart sounds: No murmur heard.  Pulmonary:      Effort: Pulmonary effort is normal. No respiratory distress.      Breath sounds: Normal breath sounds.       Assessment and Plan     Paroxysmal atrial fibrillation    Essential hypertension    Encounter for screening mammogram for breast cancer  -     Mammo Digital Screening [facility] w/ [facility]; Future; Expected date: 06/03/2024    S/P gastric sleeve procedure    Chronic bilateral low back pain with bilateral sciatica    Iron deficiency anemia, unspecified iron deficiency anemia  type      Cont to hold lisinopril. Monitor carefully at home the next week. If needed for BP's <90/60, then decrease dose of metoprolol.   Stay hydrated, increase protein intake.    ___________________  Jerson Wheat MD  Internal Medicine and Pediatrics

## 2024-06-05 ENCOUNTER — TELEPHONE (OUTPATIENT)
Dept: BARIATRICS | Facility: CLINIC | Age: 64
End: 2024-06-05

## 2024-06-05 ENCOUNTER — CLINICAL SUPPORT (OUTPATIENT)
Dept: BARIATRICS | Facility: CLINIC | Age: 64
End: 2024-06-05
Payer: COMMERCIAL

## 2024-06-05 ENCOUNTER — TELEPHONE (OUTPATIENT)
Dept: BARIATRICS | Facility: CLINIC | Age: 64
End: 2024-06-05
Payer: COMMERCIAL

## 2024-06-05 VITALS — HEIGHT: 65 IN | WEIGHT: 261 LBS | BODY MASS INDEX: 43.49 KG/M2

## 2024-06-05 DIAGNOSIS — E66.01 MORBID OBESITY: ICD-10-CM

## 2024-06-05 DIAGNOSIS — Z71.3 ENCOUNTER FOR DIETARY COUNSELING AND SURVEILLANCE: Primary | ICD-10-CM

## 2024-06-05 PROCEDURE — 99999 PR PBB SHADOW E&M-EST. PATIENT-LVL IV: CPT | Mod: PBBFAC,,,

## 2024-06-05 NOTE — TELEPHONE ENCOUNTER
Returned call to pt. She requested a return to work letter to be emailed to [email or web address]. She plans to return to work on 6/6/2024    ----- Message from Libby Briceno sent at 6/5/2024  2:22 PM CDT -----  Contact: self  Type: Needs Medical Advice  Who Called:  pt  Best Call Back Number: [phone]   Additional Information: pt would like to speak with libby regarding a letter for work.please call

## 2024-06-05 NOTE — PATIENT INSTRUCTIONS
Goals:   Begin tracking fluids to ensure 64oz daily.   Addition of sugar free flavor enhancer, fruit infused, or protein saha.   Incorporate setting timers on phone to sip every 15 minutes.   Addition of calcium citrate with D3 to meet 1200mg daily per ASMBS guidelines.     Calcium citrate with D3 options:   Equate Calcium Citrate + D3 Petites Tablets Dietary Supplement, 200 Count - [employer]   Equate Calcium Citrate + D3 Tablets Dietary Supplement, 630 mg, 180 [place] - [email or web address]

## 2024-06-05 NOTE — PROGRESS NOTES
NUTRITION NOTE    Referring Physician: Dr. Siegel  Reason for MNT Referral: Follow-up 2 Weeks s/p Gastric Sleeve    PAST MEDICAL HISTORY:  Denies nausea, vomiting, constipation, and diarrhea.  Reports problems, including dehydration .    Past Medical History:   Diagnosis Date    Anemia     Anticoagulant long-term use     Arrhythmia     Arthritis     Atrial fibrillation     history    Bronchitis     COPD (chronic obstructive pulmonary disease)     Encounter for blood transfusion     General anesthetics causing adverse effect in therapeutic use     GERD (gastroesophageal reflux disease)     High blood pressure     Hyperlipidemia     Lump or mass in breast     benign     Neuropathy     Obesity     Obstructive sleep apnea syndrome 11/05/2021    no cpap    Ulcer     Unspecified chronic bronchitis        CLINICAL DATA:  63 y.o. female.    There were no vitals filed for this visit.    Current Weight: 261 lbs  Fat%  53%  Fat mass 138.6lbs   .8lbs  TBW  90lbs  TBW% 34.4%  BMI: 44.11  Total Weight Loss: -36 lbs    LABS:  Reviewed.    CURRENT DIET:  Bariatric Liquid Diet    Diet Recall: 60-65 grams of protein/day; 32-64 oz of fluids/day    Diet Includes: protein shake x2/d, 2oz pureed and strained broccoli soup with chicken broth, SF popsicles/ fudgsicles x3/d, 3oz greek yogurt, SF jello, strained chicken noodle soup, cream of broccoli and mushroom  Protein Supplements: Equate or Premier    EXERCISE:  Walking around house. Rotating with using cane and rolling walker to walk to and from MD appointment.     Restrictions to Exercise: None.    VITAMINS/MINERALS:  Multivitamins: Bariatric pal at bedtime  B-Complex: Included with multivitamin.  Calcium Citrate + Vitamin D: None  Vitamin B12:  Included with multivitamin    ASSESSMENT:  Doing fair overall.  Adequate protein intake.  Improved fluid intake.    Patient had ER visit on 5/31 for dehydration with hypotension in which she received 1L fluids and was instructed to stop  lisinopril. Staff checked BP in office at visit, 133/75. Patient observed with Cirkul water bottle at visit. Discussed swapping top of water bottle due to suction mechanisms increase risk for pressure build up in stomach. Encouraged addition of sugar free flavor enhancers, fruit infused water, change of temperature, setting timers, and tracking fluids to improve intake.     BARIATRIC DIET DISCUSSION:  Instructed and provided written materials on bariatric liquid diet plan.  Reinforced post-op nutrition guidelines.  Reviewed calcium supplement and supplement facts label.  Reviewed signs and symptoms of dehydration. Emphasized increasing water by 1 glass if outdoor for 1 hour with temperature increasing.   Discussed recipes for soft foods phase.   Answered all questions.     PLAN/RECOMMONDATIONS:  Continue bariatric liquid diet.  Maintain protein intake.  Increase fluid intake.  Begin tracking fluids to ensure 64oz daily.   Addition of sugar free flavor enhancer, fruit infused, or protein saha.   Incorporate setting timers on phone to sip every 15 minutes.   Continue light exercise.  Begin appropriate vitamins & minerals.  Adjust vitamins & minerals by: addition of calcium citrate with D3 to meet 1200mg daily per ASMBS guidelines.     Return to clinic in 4 weeks.    SESSION TIME: 30 minutes

## 2024-06-05 NOTE — TELEPHONE ENCOUNTER
Called pt to reschedule appointment because Dr. Siegel is in surgery. Pt agreed to new date and time. Pt will still come today to see dietician.

## 2024-06-05 NOTE — LETTER
June 5, 2024      [facility]  [address]  [address]  [place] [address]  Phone: [phone]  Fax: [phone]       Patient: Fatemeh Shoemaker   YOB: 1960    To Whom It May Concern:    Rishi Shoemaker may return to work on 6/6/2024. If you have any questions or concerns, or if I can be of further assistance, please do not hesitate to contact me.    Sincerely,  CHASITY Siegel MD

## 2024-06-06 ENCOUNTER — CLINICAL SUPPORT (OUTPATIENT)
Dept: CARDIOLOGY | Facility: HOSPITAL | Age: 64
End: 2024-06-06
Payer: COMMERCIAL

## 2024-06-06 DIAGNOSIS — Z95.0 PRESENCE OF CARDIAC PACEMAKER: ICD-10-CM

## 2024-06-10 ENCOUNTER — PATIENT MESSAGE (OUTPATIENT)
Dept: BARIATRICS | Facility: CLINIC | Age: 64
End: 2024-06-10
Payer: COMMERCIAL

## 2024-06-12 ENCOUNTER — OFFICE VISIT (OUTPATIENT)
Dept: BARIATRICS | Facility: CLINIC | Age: 64
End: 2024-06-12
Payer: COMMERCIAL

## 2024-06-12 ENCOUNTER — PATIENT MESSAGE (OUTPATIENT)
Dept: BARIATRICS | Facility: CLINIC | Age: 64
End: 2024-06-12

## 2024-06-12 ENCOUNTER — HOSPITAL ENCOUNTER (OUTPATIENT)
Dept: CARDIOLOGY | Facility: HOSPITAL | Age: 64
Discharge: HOME | End: 2024-06-12
Attending: INTERNAL MEDICINE

## 2024-06-12 VITALS
DIASTOLIC BLOOD PRESSURE: 74 MMHG | RESPIRATION RATE: 16 BRPM | WEIGHT: 257.81 LBS | BODY MASS INDEX: 42.95 KG/M2 | HEART RATE: 104 BPM | HEIGHT: 65 IN | SYSTOLIC BLOOD PRESSURE: 118 MMHG | TEMPERATURE: 98 F

## 2024-06-12 DIAGNOSIS — E66.01 MORBID OBESITY: Primary | ICD-10-CM

## 2024-06-12 PROCEDURE — 1159F MED LIST DOCD IN RCRD: CPT | Mod: CPTII,S$GLB,, | Performed by: NURSE PRACTITIONER

## 2024-06-12 PROCEDURE — 1160F RVW MEDS BY RX/DR IN RCRD: CPT | Mod: CPTII,S$GLB,, | Performed by: NURSE PRACTITIONER

## 2024-06-12 PROCEDURE — 3078F DIAST BP <80 MM HG: CPT | Mod: CPTII,S$GLB,, | Performed by: NURSE PRACTITIONER

## 2024-06-12 PROCEDURE — 3044F HG A1C LEVEL LT 7.0%: CPT | Mod: CPTII,S$GLB,, | Performed by: NURSE PRACTITIONER

## 2024-06-12 PROCEDURE — 4010F ACE/ARB THERAPY RXD/TAKEN: CPT | Mod: CPTII,S$GLB,, | Performed by: NURSE PRACTITIONER

## 2024-06-12 PROCEDURE — 99999 PR PBB SHADOW E&M-EST. PATIENT-LVL IV: CPT | Mod: PBBFAC,,, | Performed by: NURSE PRACTITIONER

## 2024-06-12 PROCEDURE — 99024 POSTOP FOLLOW-UP VISIT: CPT | Mod: S$GLB,,, | Performed by: NURSE PRACTITIONER

## 2024-06-12 PROCEDURE — 93296 REM INTERROG EVL PM/IDS: CPT | Mod: PO | Performed by: INTERNAL MEDICINE

## 2024-06-12 PROCEDURE — 3074F SYST BP LT 130 MM HG: CPT | Mod: CPTII,S$GLB,, | Performed by: NURSE PRACTITIONER

## 2024-06-12 PROCEDURE — 93294 REM INTERROG EVL PM/LDLS PM: CPT | Mod: ,,, | Performed by: INTERNAL MEDICINE

## 2024-06-12 NOTE — PROGRESS NOTES
Bariatric Surgery Post Op Note    Surgery: gastric sleeve surgery  Date: 24  Initial weight: 299 lbs  Last weight: 277 lbs  Current weight: 257 lbs        Wt Readings from Last 10 Encounters:   24 116.9 kg (257 lb 12.8 oz)   24 118.4 kg (261 lb)   24 120.7 kg (266 lb 1.5 oz)   24 130 kg (286 lb 9.6 oz)   24 130.7 kg (288 lb 2.3 oz)   05/15/24 125.6 kg (276 lb 12.8 oz)   05/15/24 125.6 kg (276 lb 12.8 oz)   24 123.8 kg (273 lb)   04/15/24 126 kg (277 lb 12.8 oz)   24 124 kg (273 lb 7.7 oz)         Comorbidities:   Past Medical History:   Diagnosis Date    Anemia     Anticoagulant long-term use     Arrhythmia     Arthritis     Atrial fibrillation     history    Bronchitis     COPD (chronic obstructive pulmonary disease)     Encounter for blood transfusion     General anesthetics causing adverse effect in therapeutic use     GERD (gastroesophageal reflux disease)     High blood pressure     Hyperlipidemia     Lump or mass in breast     benign     Neuropathy     Obesity     Obstructive sleep apnea syndrome 2021    no cpap    Ulcer     Unspecified chronic bronchitis      Past Surgical History:   Procedure Laterality Date    A-V CARDIAC PACEMAKER INSERTION Left 2020    Procedure: INSERTION, CARDIAC PACEMAKER, DUAL CHAMBER;  Surgeon: Bert Reaves MD;  Location: [facility] CATH;  Service: Cardiology;  Laterality: Left;    ANGIOGRAM, CORONARY, WITH LEFT HEART CATHETERIZATION  2023    Procedure: Left Heart Cath;  Surgeon: Bert Reaves MD;  Location: [facility] CATH;  Service: Cardiology;;    BREAST BIOPSY Right 2017    myofibroblastoma     BREAST MASS EXCISION       SECTION  10/25/1986    Other c/section 10/26/1997    CHOLECYSTECTOMY  2017    Dr. TOLU Bowers, [facility]     CORRECTION OF HAMMER TOE Left 2022    Procedure: CORRECTION, HAMMER TOE;  Surgeon: Ezra Arriaga DPM;  Location: [facility] OR;  Service: Podiatry;  Laterality: Left;   hammertoes 2-5 left,    csection      CS x 2    CYSTOSCOPY N/A 09/03/2019    Procedure: CYSTOSCOPY;  Surgeon: Noemi Pierre MD;  Location: [facility] OR;  Service: OB/GYN;  Laterality: N/A;    DILATION AND CURETTAGE OF UTERUS      EPIDURAL STEROID INJECTION INTO LUMBAR SPINE N/A 05/21/2020    Procedure: Injection-steroid-epidural-lumbar L5/S1;  Surgeon: Gurjit Tavarez MD;  Location: [facility] OR;  Service: Pain Management;  Laterality: N/A;    EPIDURAL STEROID INJECTION INTO LUMBAR SPINE N/A 06/19/2020    Procedure: Injection-steroid-epidural-lumbar L5/S1;  Surgeon: Gurjit Tavarez MD;  Location: [facility] OR;  Service: Pain Management;  Laterality: N/A;    EPIDURAL STEROID INJECTION INTO LUMBAR SPINE N/A 12/01/2021    Procedure: Injection-steroid-epidural-lumbar L5/S1;  Surgeon: Gurjit Tavarez MD;  Location: [facility] OR;  Service: Pain Management;  Laterality: N/A;    EPIDURAL STEROID INJECTION INTO LUMBAR SPINE N/A 02/22/2023    Procedure: Injection-steroid-epidural-lumbar-L5/S1 to the left;  Surgeon: Gurjit Tavarez MD;  Location: [facility] OR;  Service: Pain Management;  Laterality: N/A;    EPIDURAL STEROID INJECTION INTO LUMBAR SPINE Right 04/28/2023    Procedure: Injection-steroid-epidural-lumbar L5/S1;  Surgeon: Gurjit Tavarez MD;  Location: [facility] OR;  Service: Pain Management;  Laterality: Right;    EPIDURAL STEROID INJECTION INTO LUMBAR SPINE N/A 08/29/2023    Procedure: Injection-steroid-epidural-lumbarL5/S1 to right;  Surgeon: Gurjit Tavarez MD;  Location: [facility] OR;  Service: Pain Management;  Laterality: N/A;    FRACTURE SURGERY  04/1986    Dislocated  hip total rt hip 08/08    HIP PINNING Right 2008    HYSTERECTOMY      JOINT REPLACEMENT Right 08/2008    hip    LAPAROSCOPIC SALPINGO-OOPHORECTOMY Bilateral 09/03/2019    Procedure: SALPINGO-OOPHORECTOMY, LAPAROSCOPIC;  Surgeon: Noemi Pierre MD;  Location: [facility] OR;  Service: OB/GYN;  Laterality: Bilateral;  Dr. Bentley to assist. No resident needed.     LAPAROSCOPIC  SLEEVE GASTRECTOMY N/A 5/20/2024    Procedure: GASTRECTOMY, SLEEVE, LAPAROSCOPIC;  Surgeon: Oumar Siegel MD;  Location: [facility] OR;  Service: General;  Laterality: N/A;    LAPAROSCOPIC TOTAL HYSTERECTOMY N/A 09/03/2019    Procedure: HYSTERECTOMY, TOTAL, LAPAROSCOPIC;  Surgeon: Noemi Pierre MD;  Location: [facility] OR;  Service: OB/GYN;  Laterality: N/A;    NASAL SEPTUM SURGERY  1982    OOPHORECTOMY      SKIN TAG REMOVAL Left 11/03/2022    Procedure: REMOVAL, SKIN TAG;  Surgeon: Ezra Arriaga DPM;  Location: [facility] OR;  Service: Podiatry;  Laterality: Left;    TOTAL HIP ARTHROPLASTY Right     TUBAL LIGATION  1997     Family History   Problem Relation Name Age of Onset    Colon cancer Maternal Grandmother Zonia 70        mets to ovary    Cancer Maternal Grandmother Zonia     Arthritis Paternal Grandfather Ray     Eclampsia Paternal Grandmother      Cataracts Maternal Grandfather      Stroke Father Manny 57    Colon cancer Father Manny     Hypertension Father Manny     Alcohol abuse Father Manny     Cancer Father Manny         Passed away July 10 2019    Hypertension Mother Leyla     Cataracts Mother Leyla     Hypertension Brother Randell         Age 54 hypertension heart    Early death Brother Randell         Hypertension cardio disease    No Known Problems Daughter      Stomach cancer Neg Hx      Esophageal cancer Neg Hx      Breast cancer Neg Hx      Miscarriages / Stillbirths Neg Hx      Ovarian cancer Neg Hx      Glaucoma Neg Hx      Macular degeneration Neg Hx      Retinal detachment Neg Hx      Strabismus Neg Hx       Social History     Tobacco Use    Smoking status: Never    Smokeless tobacco: Never   Substance Use Topics    Alcohol use: No     Comment: never    Drug use: Never          Medications:  Current Outpatient Medications on File Prior to Visit   Medication Sig Dispense Refill    albuterol (PROVENTIL) 2.5 mg /3 mL (0.083 %) nebulizer solution Take 3 mLs (2.5 mg total) by nebulization every 6 (six)  hours as needed for Wheezing or Shortness of Breath. Rescue 30 mL 0    albuterol sulfate 90 mcg/actuation aebs Inhale 90 mcg into the lungs every 4 (four) hours as needed (wheezing or shortness of breath). 1 each 2    benzonatate (TESSALON) 200 MG capsule Take 1 capsule 3 times a day by oral route as needed for 10 days, for cough.      cholecalciferol, vitamin D3, (DECARA) 1,250 mcg (50,000 unit) capsule Take 1 capsule (50,000 Units total) by mouth every 7 days. 30 capsule 0    DULoxetine (CYMBALTA) 60 MG capsule TAKE ONE CAPSULE BY MOUTH DAILY 90 capsule 3    ELIQUIS 5 mg Tab TAKE ONE TABLET BY MOUTH TWICE DAILY (Patient taking differently: Take 5 mg by mouth 2 (two) times daily. 5/15/24 pt to stop Thursday night 5/16/24) 180 tablet 2    flecainide (TAMBOCOR) 150 MG Tab TAKE ONE TABLET BY MOUTH TWICE DAILY 180 tablet 3    gabapentin (NEURONTIN) 600 MG tablet TAKE ONE TABLET BY MOUTH TWICE DAILY 60 tablet 1    HYDROcodone-acetaminophen (NORCO) 5-325 mg per tablet Take 1 tablet by mouth every 6 (six) hours as needed for Pain. 20 tablet 0    metoprolol tartrate (LOPRESSOR) 100 MG tablet Take 1 tablet (100 mg total) by mouth 2 (two) times daily. 180 tablet 3    nortriptyline (PAMELOR) 25 MG capsule TAKE ONE CAPSULE BY MOUTH EVERY EVENING 90 capsule 3    omeprazole (PRILOSEC) 20 MG capsule TAKE ONE CAPSULE BY MOUTH EVERY MORNING 90 capsule 2    ondansetron (ZOFRAN-ODT) 4 MG TbDL Take 1 tablet (4 mg total) by mouth every 6 (six) hours as needed (nv). 30 tablet 0    promethazine (PHENERGAN) 12.5 MG Tab Take 1 tablet (12.5 mg total) by mouth 2 (two) times daily as needed (nausea). 10 tablet 0    pulse oximeter (PULSE OXIMETER) device Use twice daily at 8 AM and 3 PM and record the value in [facility] as directed. 1 each 0    rosuvastatin (CRESTOR) 10 MG tablet Take 1 tablet (10 mg total) by mouth every evening. 90 tablet 3    tiZANidine (ZANAFLEX) 4 MG tablet TAKE ONE TABLET BY MOUTH every SIX hours AS NEEDED. 60 tablet 1     valACYclovir (VALTREX) 500 MG tablet Take 1 tablet (500 mg total) by mouth daily as needed (fever blister). 30 tablet 2    diclofenac sodium (VOLTAREN) 1 % Gel Apply 2 g topically 4 (four) times daily. (Patient not taking: Reported on 1/17/2024) 50 g 3     No current facility-administered medications on file prior to visit.         Labs: individually reviewed and interpreted.  Most Recent Data:  CBC:   Lab Results   Component Value Date    WBC 8.47 05/31/2024    HGB 11.8 (L) 05/31/2024    HCT 37.1 05/31/2024     05/31/2024    MCV 83 05/31/2024    RDW 15.9 (H) 05/31/2024     BMP:   Lab Results   Component Value Date     (L) 05/31/2024    K 4.5 05/31/2024     05/31/2024    CO2 17 (L) 05/31/2024    BUN 31 (H) 05/31/2024    CREATININE 1.11 05/31/2024    GLU 99 05/31/2024    CALCIUM 9.7 05/31/2024    MG 2.0 05/21/2024    PHOS 3.1 05/21/2024     LFTs:   Lab Results   Component Value Date    PROT 7.3 05/31/2024    ALBUMIN 4.1 05/31/2024    BILITOT 0.7 05/31/2024    AST 47 (H) 05/31/2024    ALKPHOS 37 (L) 05/31/2024    ALT 22 05/31/2024     Coags:   Lab Results   Component Value Date    INR 1.1 11/12/2022     FLP:   Lab Results   Component Value Date    CHOL 149 02/02/2024    HDL 39 (L) 02/02/2024    LDLCALC 74.4 02/02/2024    TRIG 178 (H) 02/02/2024    CHOLHDL 26.2 02/02/2024     DM:   Lab Results   Component Value Date    HGBA1C 5.7 (H) 02/02/2024    HGBA1C 5.8 (H) 11/06/2023    HGBA1C 5.5 11/12/2022    LDLCALC 74.4 02/02/2024    CREATININE 1.11 05/31/2024     Thyroid:   Lab Results   Component Value Date    TSH 1.435 02/02/2024    FREET4 0.82 02/02/2024    Q4DZKOU 130 02/02/2024     Anemia:   Lab Results   Component Value Date    IRON 32 02/02/2024    TIBC 444 02/02/2024    FERRITIN 13 (L) 02/02/2024    SAUWXBOP28 472 02/02/2024    FOLATE 5.9 02/02/2024     Cardiac:   Lab Results   Component Value Date    TROPONINI <0.012 11/12/2022     (H) 06/22/2018     Urinalysis:   Lab Results   Component  Value Date    LABURIN ESCHERICHIA COLI  >100,000 cfu/ml   (A) 11/13/2022    COLORU Yellow 05/31/2024    SPECGRAV 1.010 05/31/2024    NITRITE Negative 05/31/2024    KETONESU 1+ (A) 05/31/2024    UROBILINOGEN 0.2 05/31/2024    WBCUA 35 (H) 11/13/2022    WBCUA 35 (H) 11/13/2022           IV Infusions: none  Ed Visits or readmissions: 2 L fluids and dc'd on 5/31/24  Constipation: none  Reflux: none  Vomiting: none      Body comp:  Fat Percent:  50.7 %  Fat Mass:  130.8 lb  FFM:  127 lb  TBW: 92.8 lb  TBW %:  36 %  BMR: 1814 kcal      Wt Readings from Last 10 Encounters:   06/12/24 116.9 kg (257 lb 12.8 oz)   06/05/24 118.4 kg (261 lb)   06/03/24 120.7 kg (266 lb 1.5 oz)   05/31/24 130 kg (286 lb 9.6 oz)   05/21/24 130.7 kg (288 lb 2.3 oz)   05/15/24 125.6 kg (276 lb 12.8 oz)   05/15/24 125.6 kg (276 lb 12.8 oz)   05/01/24 123.8 kg (273 lb)   04/15/24 126 kg (277 lb 12.8 oz)   04/08/24 124 kg (273 lb 7.7 oz)         Diet:  Breakfast: eggs  Lunch: broth  Water: 60+    Exercise:  Walking at work    MVI:   Baripal  calcium        Physical Exam:  Vitals:    06/12/24 1410   BP: 118/74   Pulse: 104   Resp: 16   Temp: 98.2 °F (36.8 °C)       PE:  NAD  RRR  Soft/nt/nd  Wound/Incision: clean, dry, intact, no drainage, healed      A/P: s/p Sleeve      Counseling/Plan for patient:  Falls reviewed with patient  Continue prescribed medications  Tanita scale results reviewed with patient  16 lb loss Fat  1 lb loss of Muscle (FFM)  2 lb loss of Water   Diet adherence  Ensure 60 gm of protein at minimum/day  Do not skip meals  No longer on lisinopril  Exercise: ok for cardio, no heavy lifting over 30 pounds for another month  Vitamins: 2 mvi daily, calcium, and b complex  Continue multivitamins- discussion of post-operative life long MVI need, including Calcium, and a B-Complex          Co morbidities:     1. Morbid obesity        2. BMI 40.0-44.9, adult            I spent a total of 18 minutes on the day of the visit.This includes face  to face time and non-face to face time preparing to see the patient (eg, review of tests), obtaining and/or reviewing separately obtained history, documenting clinical information in the electronic or other health record, independently interpreting results and communicating results to the patient/family/caregiver, or care coordinator.

## 2024-06-20 ENCOUNTER — PATIENT MESSAGE (OUTPATIENT)
Dept: ORTHOPEDICS | Facility: CLINIC | Age: 64
End: 2024-06-20

## 2024-06-20 ENCOUNTER — OFFICE VISIT (OUTPATIENT)
Dept: ORTHOPEDICS | Facility: CLINIC | Age: 64
End: 2024-06-20
Payer: COMMERCIAL

## 2024-06-20 DIAGNOSIS — M25.562 CHRONIC PAIN OF LEFT KNEE: Primary | ICD-10-CM

## 2024-06-20 DIAGNOSIS — G89.29 CHRONIC PAIN OF LEFT KNEE: Primary | ICD-10-CM

## 2024-06-20 DIAGNOSIS — M17.12 OSTEOARTHRITIS OF LEFT KNEE, UNSPECIFIED OSTEOARTHRITIS TYPE: ICD-10-CM

## 2024-06-20 PROCEDURE — 99999 PR PBB SHADOW E&M-EST. PATIENT-LVL III: CPT | Mod: PBBFAC,,, | Performed by: ORTHOPAEDIC SURGERY

## 2024-06-20 PROCEDURE — 20610 DRAIN/INJ JOINT/BURSA W/O US: CPT | Mod: LT,S$GLB,, | Performed by: ORTHOPAEDIC SURGERY

## 2024-06-20 PROCEDURE — 3044F HG A1C LEVEL LT 7.0%: CPT | Mod: CPTII,S$GLB,, | Performed by: ORTHOPAEDIC SURGERY

## 2024-06-20 PROCEDURE — 4010F ACE/ARB THERAPY RXD/TAKEN: CPT | Mod: CPTII,S$GLB,, | Performed by: ORTHOPAEDIC SURGERY

## 2024-06-20 PROCEDURE — 99214 OFFICE O/P EST MOD 30 MIN: CPT | Mod: 25,S$GLB,, | Performed by: ORTHOPAEDIC SURGERY

## 2024-06-20 PROCEDURE — 1159F MED LIST DOCD IN RCRD: CPT | Mod: CPTII,S$GLB,, | Performed by: ORTHOPAEDIC SURGERY

## 2024-06-20 RX ADMIN — TRIAMCINOLONE ACETONIDE 40 MG: 40 INJECTION, SUSPENSION INTRA-ARTICULAR; INTRAMUSCULAR at 03:06

## 2024-06-20 NOTE — PROCEDURES
Large Joint Aspiration/Injection: L knee    Date/Time: 6/20/2024 3:30 PM    Performed by: Justice Gibbs MD  Authorized by: Justice Gibbs MD    Consent Done?:  Yes (Verbal)  Timeout: prior to procedure the correct patient, procedure, and site was verified    Prep: patient was prepped and draped in usual sterile fashion      Details:  Needle Size:  21 G  Approach:  Anterolateral  Location:  Knee  Site:  L knee  Medications:  40 mg triamcinolone acetonide 40 mg/mL  Patient tolerance:  Patient tolerated the procedure well with no immediate complications

## 2024-06-20 NOTE — PROGRESS NOTES
63 years old recurrence of left knee pain.  We would given her injection with Kenalog about 8 months ago did well until this past week has been more painful for     Exam shows tenderness the joint line without signs infection instability skin intact compartments soft     X-rays show arthritic change     Assessment: Left knee arthrosis     Plan: Kenalog injection left knee, continue with weight loss and consideration of knee replacement surgery if she gets her BMI below 40

## 2024-06-21 ENCOUNTER — PATIENT MESSAGE (OUTPATIENT)
Dept: BARIATRICS | Facility: CLINIC | Age: 64
End: 2024-06-21
Payer: COMMERCIAL

## 2024-06-24 LAB
OHS CV AF BURDEN PERCENT: 3
OHS CV DC REMOTE DEVICE TYPE: NORMAL
OHS CV ICD SHOCK: NO
OHS CV RV PACING PERCENT: 30 %

## 2024-06-27 RX ORDER — TRIAMCINOLONE ACETONIDE 40 MG/ML
40 INJECTION, SUSPENSION INTRA-ARTICULAR; INTRAMUSCULAR
Status: DISCONTINUED | OUTPATIENT
Start: 2024-06-20 | End: 2024-06-27 | Stop reason: HOSPADM

## 2024-07-16 DIAGNOSIS — M54.16 LUMBAR RADICULOPATHY: ICD-10-CM

## 2024-07-16 RX ORDER — GABAPENTIN 600 MG/1
600 TABLET ORAL 2 TIMES DAILY
Qty: 60 TABLET | Refills: 1 | Status: SHIPPED | OUTPATIENT
Start: 2024-07-16

## 2024-07-22 ENCOUNTER — TELEPHONE (OUTPATIENT)
Dept: BARIATRICS | Facility: CLINIC | Age: 64
End: 2024-07-22
Payer: COMMERCIAL

## 2024-07-26 ENCOUNTER — OFFICE VISIT (OUTPATIENT)
Dept: BARIATRICS | Facility: CLINIC | Age: 64
End: 2024-07-26
Payer: COMMERCIAL

## 2024-07-26 VITALS
HEART RATE: 61 BPM | DIASTOLIC BLOOD PRESSURE: 54 MMHG | TEMPERATURE: 97 F | WEIGHT: 244.38 LBS | HEIGHT: 65 IN | BODY MASS INDEX: 40.72 KG/M2 | SYSTOLIC BLOOD PRESSURE: 110 MMHG

## 2024-07-26 DIAGNOSIS — E66.01 MORBID OBESITY: Primary | ICD-10-CM

## 2024-07-26 DIAGNOSIS — E78.00 HYPERCHOLESTEREMIA: ICD-10-CM

## 2024-07-26 DIAGNOSIS — I10 ESSENTIAL HYPERTENSION: ICD-10-CM

## 2024-07-26 DIAGNOSIS — G47.33 OBSTRUCTIVE SLEEP APNEA SYNDROME: ICD-10-CM

## 2024-07-26 PROCEDURE — 99999 PR PBB SHADOW E&M-EST. PATIENT-LVL IV: CPT | Mod: PBBFAC,,, | Performed by: SURGERY

## 2024-07-26 NOTE — PROGRESS NOTES
Post Op Note    Surgery: gastric sleeve surgery  Date: 5/20/24  Initial weight: 277  Last weight: 257  Current weight: 244    Reflux: none  Vomiting: none    Diet:    Breakfast:  protein drink  Lunch: cucumber tomato bread  Dinner: hamburger; turkey    Exercise:  Moving more  No more walker or cane    MVI: macy mvi calcium     Vitals:    07/26/24 0957   BP: (!) 110/54   Pulse: 61   Temp: 97.2 °F (36.2 °C)       Body comp:  Fat Percent:  52.2 %  Fat Mass:  127.6 lb  FFM:  116.8 lb  TBW: 85 lb  TBW %:  34.8 %  BMR: 1682 kcal    PE:  NAD  RRR  Soft/nt/nd    Labs: none    A/P: s/p sleeve     Counseling for patient:    Diet: continue low carb dieting  Exercise: as much as possible- no restrictions  Vitamins: macy mvi, calcium    Co morbidities:     1. Morbid obesity        2. BMI 40.0-44.9, adult        3. Essential hypertension        4. Obstructive sleep apnea syndrome        5. Hypercholesteremia            -All above: Evaluated, monitored, and treated with diet and exercise program.

## 2024-09-06 DIAGNOSIS — M54.16 RADICULOPATHY, LUMBAR REGION: ICD-10-CM

## 2024-09-06 RX ORDER — TIZANIDINE 4 MG/1
TABLET ORAL
Qty: 60 TABLET | Refills: 1 | Status: SHIPPED | OUTPATIENT
Start: 2024-09-06

## 2024-09-11 ENCOUNTER — CLINICAL SUPPORT (OUTPATIENT)
Dept: CARDIOLOGY | Facility: HOSPITAL | Age: 64
End: 2024-09-11
Payer: COMMERCIAL

## 2024-09-11 DIAGNOSIS — Z95.0 PRESENCE OF CARDIAC PACEMAKER: ICD-10-CM

## 2024-09-11 PROCEDURE — 93294 REM INTERROG EVL PM/LDLS PM: CPT | Mod: ,,, | Performed by: INTERNAL MEDICINE

## 2024-09-12 ENCOUNTER — HOSPITAL ENCOUNTER (OUTPATIENT)
Dept: CARDIOLOGY | Facility: HOSPITAL | Age: 64
Discharge: HOME | End: 2024-09-12
Attending: INTERNAL MEDICINE
Payer: COMMERCIAL

## 2024-09-12 PROCEDURE — 93296 REM INTERROG EVL PM/IDS: CPT | Mod: PO | Performed by: INTERNAL MEDICINE

## 2024-09-18 ENCOUNTER — OFFICE VISIT (OUTPATIENT)
Dept: ORTHOPEDICS | Facility: CLINIC | Age: 64
End: 2024-09-18
Payer: COMMERCIAL
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
## 2024-09-18 DIAGNOSIS — M25.562 ACUTE PAIN OF LEFT KNEE: ICD-10-CM

## 2024-09-18 DIAGNOSIS — M25.562 LEFT KNEE PAIN: Primary | ICD-10-CM

## 2024-09-18 DIAGNOSIS — M17.12 PRIMARY OSTEOARTHRITIS OF LEFT KNEE: ICD-10-CM

## 2024-09-18 LAB
OHS CV AF BURDEN PERCENT: 2
OHS CV DC REMOTE DEVICE TYPE: NORMAL
OHS CV RV PACING PERCENT: 40 %

## 2024-09-18 PROCEDURE — 99213 OFFICE O/P EST LOW 20 MIN: CPT | Mod: 25,S$GLB,, | Performed by: ORTHOPAEDIC SURGERY

## 2024-09-18 PROCEDURE — 97760 ORTHOTIC MGMT&TRAING 1ST ENC: CPT | Mod: S$GLB,,, | Performed by: ORTHOPAEDIC SURGERY

## 2024-09-18 PROCEDURE — 4010F ACE/ARB THERAPY RXD/TAKEN: CPT | Mod: CPTII,S$GLB,, | Performed by: ORTHOPAEDIC SURGERY

## 2024-09-18 PROCEDURE — 3044F HG A1C LEVEL LT 7.0%: CPT | Mod: CPTII,S$GLB,, | Performed by: ORTHOPAEDIC SURGERY

## 2024-09-18 NOTE — PROGRESS NOTES
63 years old left knee pain started couple days ago after she had a fall onto that side is the same knee that we had been treating for arthritis with injections     Exam shows no signs infection I can not detect any instability extensor mechanism functioning well tenderness throughout     X-rays show arthritic changes no acute findings     Assessment:  Left knee arthrosis status post fall from standing height     Plan:  Symptomatic care, knee brace, follow-up in several weeks time as needed    We performed a custom orthotic/brace fitting, adjusting and training with the patient. The patient demonstrated understanding and proper care. This was performed for 15 minutes.

## 2024-09-20 DIAGNOSIS — M54.16 LUMBAR RADICULOPATHY: ICD-10-CM

## 2024-09-20 RX ORDER — GABAPENTIN 600 MG/1
600 TABLET ORAL 2 TIMES DAILY
Qty: 60 TABLET | Refills: 1 | Status: SHIPPED | OUTPATIENT
Start: 2024-09-20

## 2024-10-01 ENCOUNTER — PATIENT MESSAGE (OUTPATIENT)
Dept: OTHER | Facility: OTHER | Age: 64
End: 2024-10-01
Payer: COMMERCIAL

## 2024-10-08 ENCOUNTER — PATIENT MESSAGE (OUTPATIENT)
Dept: ORTHOPEDICS | Facility: CLINIC | Age: 64
End: 2024-10-08
Payer: COMMERCIAL

## 2024-10-09 ENCOUNTER — TELEPHONE (OUTPATIENT)
Dept: ORTHOPEDICS | Facility: CLINIC | Age: 64
End: 2024-10-09
Payer: COMMERCIAL

## 2024-10-09 NOTE — TELEPHONE ENCOUNTER
----- Message from Ludivina sent at 10/9/2024 12:15 PM CDT -----  Contact: pt [phone]  Type:  Patient Returning Call    Who Called:  Pt   Who Left Message for Patient:  na   Does the patient know what this is regarding?:  Yes, knee brace   Best Call Back Number:  [phone]    Additional Information:  Pt stated she missed call yesterday

## 2024-10-09 NOTE — TELEPHONE ENCOUNTER
Forward message to Ziggywilly about paying cash for a brace and changing her appt time. Patient verbalized understanding.

## 2024-10-25 DIAGNOSIS — M54.16 LUMBAR RADICULOPATHY: ICD-10-CM

## 2024-10-25 RX ORDER — GABAPENTIN 600 MG/1
600 TABLET ORAL 2 TIMES DAILY
Qty: 60 TABLET | Refills: 1 | Status: SHIPPED | OUTPATIENT
Start: 2024-10-25

## 2024-11-11 DIAGNOSIS — R20.2 PARESTHESIAS: ICD-10-CM

## 2024-11-11 DIAGNOSIS — G62.9 NEUROPATHY: ICD-10-CM

## 2024-11-11 RX ORDER — NORTRIPTYLINE HYDROCHLORIDE 25 MG/1
25 CAPSULE ORAL NIGHTLY
Qty: 90 CAPSULE | Refills: 3 | Status: SHIPPED | OUTPATIENT
Start: 2024-11-11

## 2024-11-11 RX ORDER — DULOXETIN HYDROCHLORIDE 60 MG/1
60 CAPSULE, DELAYED RELEASE ORAL DAILY
Qty: 90 CAPSULE | Refills: 3 | Status: SHIPPED | OUTPATIENT
Start: 2024-11-11

## 2024-11-11 NOTE — TELEPHONE ENCOUNTER
Care Due:                  Date            Visit Type   Department     Provider  --------------------------------------------------------------------------------                                EP -                              PRIMARY      [facility] FAMILY  Last Visit: 06-      CARE ([facility])   MEDICINE       Jerson Wheat  Next Visit: None Scheduled  None         None Found                                                            Last  Test          Frequency    Reason                     Performed    Due Date  --------------------------------------------------------------------------------    Lipid Panel.  12 months..  rosuvastatin.............  02- 01-    [facility] Embedded Care Due Messages. Reference number: [id number].   11/11/2024 10:31:19 AM CST

## 2024-11-11 NOTE — TELEPHONE ENCOUNTER
No care due was identified.  Health [facility] Embedded Care Due Messages. Reference number: [id number].   11/11/2024 10:32:05 AM CST

## 2024-11-14 ENCOUNTER — OFFICE VISIT (OUTPATIENT)
Dept: FAMILY MEDICINE | Facility: CLINIC | Age: 64
End: 2024-11-14
Payer: COMMERCIAL

## 2024-11-14 VITALS
DIASTOLIC BLOOD PRESSURE: 74 MMHG | SYSTOLIC BLOOD PRESSURE: 112 MMHG | WEIGHT: 238.31 LBS | BODY MASS INDEX: 40.91 KG/M2 | HEART RATE: 72 BPM | OXYGEN SATURATION: 99 %

## 2024-11-14 DIAGNOSIS — Z12.11 SCREENING FOR COLON CANCER: ICD-10-CM

## 2024-11-14 DIAGNOSIS — Z90.3 S/P GASTRIC SLEEVE PROCEDURE: Primary | ICD-10-CM

## 2024-11-14 DIAGNOSIS — G62.9 NEUROPATHY: ICD-10-CM

## 2024-11-14 DIAGNOSIS — I48.0 PAROXYSMAL ATRIAL FIBRILLATION: ICD-10-CM

## 2024-11-14 PROBLEM — Z01.810 PREOP CARDIOVASCULAR EXAM: Status: RESOLVED | Noted: 2024-01-25 | Resolved: 2024-11-14

## 2024-11-14 PROBLEM — L91.8 SKIN TAG: Status: RESOLVED | Noted: 2022-11-03 | Resolved: 2024-11-14

## 2024-11-14 PROCEDURE — 3074F SYST BP LT 130 MM HG: CPT | Mod: CPTII,S$GLB,, | Performed by: INTERNAL MEDICINE

## 2024-11-14 PROCEDURE — 1159F MED LIST DOCD IN RCRD: CPT | Mod: CPTII,S$GLB,, | Performed by: INTERNAL MEDICINE

## 2024-11-14 PROCEDURE — 4010F ACE/ARB THERAPY RXD/TAKEN: CPT | Mod: CPTII,S$GLB,, | Performed by: INTERNAL MEDICINE

## 2024-11-14 PROCEDURE — 3044F HG A1C LEVEL LT 7.0%: CPT | Mod: CPTII,S$GLB,, | Performed by: INTERNAL MEDICINE

## 2024-11-14 PROCEDURE — 99999 PR PBB SHADOW E&M-EST. PATIENT-LVL IV: CPT | Mod: PBBFAC,,, | Performed by: INTERNAL MEDICINE

## 2024-11-14 PROCEDURE — 1160F RVW MEDS BY RX/DR IN RCRD: CPT | Mod: CPTII,S$GLB,, | Performed by: INTERNAL MEDICINE

## 2024-11-14 PROCEDURE — 99214 OFFICE O/P EST MOD 30 MIN: CPT | Mod: S$GLB,,, | Performed by: INTERNAL MEDICINE

## 2024-11-14 PROCEDURE — 3008F BODY MASS INDEX DOCD: CPT | Mod: CPTII,S$GLB,, | Performed by: INTERNAL MEDICINE

## 2024-11-14 PROCEDURE — 3078F DIAST BP <80 MM HG: CPT | Mod: CPTII,S$GLB,, | Performed by: INTERNAL MEDICINE

## 2024-11-14 NOTE — PROGRESS NOTES
Subjective     Fatemeh Shoemaker is a 63 y.o. old, female here for Ankle Pain    63-year-old with past medical history of PAF, symptomatic bradycardia s/p PCM, HLD, chronic bronchitis, JOSE ELIAS, peripheral neuropathy, DDD/chronic back pain, obesity s/p gastric sleeve.    Recent small laceration to lateral Right ankle. They are using iodine to clean.    PAF: no recent symptoms, remains on eliquis.  JOSE ELIAS: need to recheck CBC and iron.  Chronic neuropathy stable symptoms wise, can't feel the laceration on the ankle.  S/p gastric sleeve: Due to have vitamin levels checked, Dr. Siegel left.  Wt Readings from Last 3 Encounters:   11/14/24 1010 108.1 kg (238 lb 5.1 oz)   09/15/24 1338 108.9 kg (240 lb)   07/26/24 0957 110.9 kg (244 lb 6.4 oz)   Wt stable      ROS  Medications     Outpatient Medications Marked as Taking for the 11/14/24 encounter (Office Visit) with Jerson Wheat MD   Medication Sig Dispense Refill    albuterol (PROVENTIL) 2.5 mg /3 mL (0.083 %) nebulizer solution Take 3 mLs (2.5 mg total) by nebulization every 6 (six) hours as needed for Wheezing or Shortness of Breath. Rescue 30 mL 0    albuterol sulfate 90 mcg/actuation aebs Inhale 90 mcg into the lungs every 4 (four) hours as needed (wheezing or shortness of breath). 1 each 2    benzonatate (TESSALON) 200 MG capsule Take 1 capsule 3 times a day by oral route as needed for 10 days, for cough.      cholecalciferol, vitamin D3, (DECARA) 1,250 mcg (50,000 unit) capsule Take 1 capsule (50,000 Units total) by mouth every 7 days. 30 capsule 0    DULoxetine (CYMBALTA) 60 MG capsule Take 1 capsule (60 mg total) by mouth once daily. 90 capsule 3    ELIQUIS 5 mg Tab TAKE ONE TABLET BY MOUTH TWICE DAILY (Patient taking differently: Take 5 mg by mouth 2 (two) times daily. 5/15/24 pt to stop Thursday night 5/16/24) 180 tablet 2    flecainide (TAMBOCOR) 150 MG Tab TAKE ONE TABLET BY MOUTH TWICE DAILY 180 tablet 3    gabapentin (NEURONTIN) 600 MG tablet Take 1  tablet (600 mg total) by mouth 2 (two) times daily. 60 tablet 1    HYDROcodone-acetaminophen (NORCO) 5-325 mg per tablet Take 1 tablet by mouth every 6 (six) hours as needed for Pain. 10 tablet 0    metoprolol tartrate (LOPRESSOR) 100 MG tablet Take 1 tablet (100 mg total) by mouth 2 (two) times daily. 180 tablet 3    nortriptyline (PAMELOR) 25 MG capsule Take 1 capsule (25 mg total) by mouth every evening. 90 capsule 3    omeprazole (PRILOSEC) 20 MG capsule TAKE ONE CAPSULE BY MOUTH EVERY MORNING 90 capsule 2    ondansetron (ZOFRAN-ODT) 4 MG TbDL Take 1 tablet (4 mg total) by mouth every 6 (six) hours as needed (nv). 30 tablet 0    promethazine (PHENERGAN) 12.5 MG Tab Take 1 tablet (12.5 mg total) by mouth 2 (two) times daily as needed (nausea). 10 tablet 0    pulse oximeter (PULSE OXIMETER) device Use twice daily at 8 AM and 3 PM and record the value in [facility] as directed. 1 each 0    rosuvastatin (CRESTOR) 10 MG tablet Take 1 tablet (10 mg total) by mouth every evening. 90 tablet 3    tiZANidine (ZANAFLEX) 4 MG tablet TAKE ONE TABLET BY MOUTH every SIX hours AS NEEDED. 60 tablet 1    valACYclovir (VALTREX) 500 MG tablet Take 1 tablet (500 mg total) by mouth daily as needed (fever blister). 30 tablet 2     Objective     /74   Pulse 72   Wt 108.1 kg (238 lb 5.1 oz)   LMP 09/08/2013 (Approximate)   SpO2 99%   BMI 40.91 kg/m²   Physical Exam  Skin:     General: Skin is warm and dry.      Comments: Small laceration, appears clean, no erythema or fluctuance       Assessment and Plan     S/P gastric sleeve procedure  -     Comprehensive Metabolic Panel; Future; Expected date: 11/14/2024  -     Lipid Panel; Future; Expected date: 11/14/2024  -     CBC Without Differential; Future; Expected date: 11/14/2024  -     Hemoglobin A1C; Future; Expected date: 11/14/2024  -     Ferritin; Future; Expected date: 11/14/2024  -     Vitamin D; Future; Expected date: 11/14/2024  -     Vitamin B1; Future; Expected date:  11/14/2024  -     Vitamin B12; Future; Expected date: 11/14/2024    Paroxysmal atrial fibrillation    Neuropathy      Good wound care of laceration - soap and water, keep covered part of the day.  Labs as above.    ___________________  Jerson Wheat MD  Internal Medicine and Pediatrics

## 2024-12-10 ENCOUNTER — CLINICAL SUPPORT (OUTPATIENT)
Dept: CARDIOLOGY | Facility: HOSPITAL | Age: 64
End: 2024-12-10
Payer: COMMERCIAL

## 2024-12-10 DIAGNOSIS — Z95.0 PRESENCE OF CARDIAC PACEMAKER: ICD-10-CM

## 2024-12-11 ENCOUNTER — PATIENT MESSAGE (OUTPATIENT)
Dept: BARIATRICS | Facility: CLINIC | Age: 64
End: 2024-12-11
Payer: COMMERCIAL

## 2024-12-11 ENCOUNTER — HOSPITAL ENCOUNTER (OUTPATIENT)
Dept: CARDIOLOGY | Facility: HOSPITAL | Age: 64
Discharge: HOME | End: 2024-12-11
Attending: INTERNAL MEDICINE
Payer: COMMERCIAL

## 2024-12-11 ENCOUNTER — TELEPHONE (OUTPATIENT)
Dept: GASTROENTEROLOGY | Facility: CLINIC | Age: 64
End: 2024-12-11
Payer: COMMERCIAL

## 2024-12-11 PROCEDURE — 93296 REM INTERROG EVL PM/IDS: CPT | Mod: PO | Performed by: INTERNAL MEDICINE

## 2024-12-11 PROCEDURE — 93294 REM INTERROG EVL PM/LDLS PM: CPT | Mod: ,,, | Performed by: INTERNAL MEDICINE

## 2024-12-20 LAB
OHS CV AF BURDEN PERCENT: < 1
OHS CV AF BURDEN PERCENT: < 1
OHS CV DC REMOTE DEVICE TYPE: NORMAL
OHS CV DC REMOTE DEVICE TYPE: NORMAL
OHS CV RV PACING PERCENT: 52 %
OHS CV RV PACING PERCENT: 52 %

## 2024-12-23 DIAGNOSIS — M54.16 LUMBAR RADICULOPATHY: ICD-10-CM

## 2024-12-23 RX ORDER — GABAPENTIN 600 MG/1
600 TABLET ORAL 2 TIMES DAILY
Qty: 60 TABLET | Refills: 1 | Status: SHIPPED | OUTPATIENT
Start: 2024-12-23

## 2025-01-08 ENCOUNTER — TELEPHONE (OUTPATIENT)
Dept: GASTROENTEROLOGY | Facility: CLINIC | Age: 65
End: 2025-01-08
Payer: COMMERCIAL

## 2025-01-13 DIAGNOSIS — E78.5 HYPERLIPIDEMIA, UNSPECIFIED HYPERLIPIDEMIA TYPE: ICD-10-CM

## 2025-01-13 RX ORDER — ROSUVASTATIN CALCIUM 10 MG/1
10 TABLET, COATED ORAL NIGHTLY
Qty: 90 TABLET | Refills: 3 | OUTPATIENT
Start: 2025-01-13

## 2025-01-13 RX ORDER — ROSUVASTATIN CALCIUM 10 MG/1
10 TABLET, COATED ORAL NIGHTLY
Qty: 90 TABLET | Refills: 3 | Status: SHIPPED | OUTPATIENT
Start: 2025-01-13

## 2025-01-13 NOTE — TELEPHONE ENCOUNTER
Refill Decision Note   Fatemeh Shoemaker  is requesting a refill authorization.  Brief Assessment and Rationale for Refill:  Quick Discontinue     Medication Therapy Plan:         Comments:     Note composed:3:09 PM 01/13/2025

## 2025-01-13 NOTE — TELEPHONE ENCOUNTER
No care due was identified.  Health [facility] Embedded Care Due Messages. Reference number: [id number].   1/13/2025 11:11:12 AM CST

## 2025-01-13 NOTE — TELEPHONE ENCOUNTER
No care due was identified.  Health [facility] Embedded Care Due Messages. Reference number: [id number].   1/13/2025 11:08:04 AM CST

## 2025-01-16 ENCOUNTER — TELEPHONE (OUTPATIENT)
Dept: GASTROENTEROLOGY | Facility: CLINIC | Age: 65
End: 2025-01-16

## 2025-01-16 DIAGNOSIS — I48.0 PAROXYSMAL ATRIAL FIBRILLATION: Primary | ICD-10-CM

## 2025-01-16 NOTE — TELEPHONE ENCOUNTER
Several attempts have been made to contact pt to schedule ordered procedure. Case request canceled. Letter placed in mail. Pt will be scheduled from this request upon call back.

## 2025-02-14 DIAGNOSIS — I48.0 PAROXYSMAL ATRIAL FIBRILLATION: ICD-10-CM

## 2025-02-14 RX ORDER — OMEPRAZOLE 20 MG/1
CAPSULE, DELAYED RELEASE ORAL
Qty: 90 CAPSULE | Refills: 2 | Status: SHIPPED | OUTPATIENT
Start: 2025-02-14

## 2025-02-14 NOTE — TELEPHONE ENCOUNTER
Refill Decision Note   Fatemeh Shoemaker  is requesting a refill authorization.  Brief Assessment and Rationale for Refill:  Approve     Medication Therapy Plan:         Comments:     Note composed:3:46 PM 02/14/2025

## 2025-02-14 NOTE — TELEPHONE ENCOUNTER
No care due was identified.  Health [facility] Embedded Care Due Messages. Reference number: [id number].   2/14/2025 12:56:24 PM CST

## 2025-02-24 DIAGNOSIS — M54.16 LUMBAR RADICULOPATHY: ICD-10-CM

## 2025-02-25 DIAGNOSIS — I48.0 PAROXYSMAL ATRIAL FIBRILLATION: ICD-10-CM

## 2025-02-25 RX ORDER — GABAPENTIN 600 MG/1
600 TABLET ORAL 2 TIMES DAILY
Qty: 60 TABLET | Refills: 1 | Status: SHIPPED | OUTPATIENT
Start: 2025-02-25

## 2025-02-25 RX ORDER — FLECAINIDE ACETATE 150 MG/1
150 TABLET ORAL 2 TIMES DAILY
Qty: 180 TABLET | Refills: 3 | Status: SHIPPED | OUTPATIENT
Start: 2025-02-25

## 2025-03-11 DIAGNOSIS — I48.0 PAROXYSMAL ATRIAL FIBRILLATION: ICD-10-CM

## 2025-03-12 ENCOUNTER — CLINICAL SUPPORT (OUTPATIENT)
Dept: CARDIOLOGY | Facility: HOSPITAL | Age: 65
End: 2025-03-12
Payer: COMMERCIAL

## 2025-03-12 ENCOUNTER — HOSPITAL ENCOUNTER (OUTPATIENT)
Dept: CARDIOLOGY | Facility: HOSPITAL | Age: 65
Discharge: HOME | End: 2025-03-12
Attending: INTERNAL MEDICINE
Payer: COMMERCIAL

## 2025-03-12 DIAGNOSIS — Z95.0 PRESENCE OF CARDIAC PACEMAKER: ICD-10-CM

## 2025-03-12 PROCEDURE — 93296 REM INTERROG EVL PM/IDS: CPT | Mod: PO | Performed by: INTERNAL MEDICINE

## 2025-03-12 PROCEDURE — 93294 REM INTERROG EVL PM/LDLS PM: CPT | Mod: ,,, | Performed by: INTERNAL MEDICINE

## 2025-03-12 RX ORDER — METOPROLOL TARTRATE 100 MG/1
100 TABLET ORAL 2 TIMES DAILY
Qty: 180 TABLET | Refills: 3 | Status: SHIPPED | OUTPATIENT
Start: 2025-03-12

## 2025-03-14 ENCOUNTER — TELEPHONE (OUTPATIENT)
Dept: CARDIOLOGY | Facility: CLINIC | Age: 65
End: 2025-03-14
Payer: COMMERCIAL

## 2025-03-14 DIAGNOSIS — Z95.0 PRESENCE OF CARDIAC PACEMAKER: Primary | ICD-10-CM

## 2025-03-14 NOTE — TELEPHONE ENCOUNTER
----- Message from Shanell sent at 3/14/2025 12:24 PM CDT -----  Contact: self  Type:  Appointment RequestCaller is requesting a appointment. Name of Caller:ptSymptoms:Would the patient rather a call back or a response via [facility]? [facility] Call Back Number:[phone] Information: pt states she would take the march appt   Please call back to advise. Thanks!

## 2025-03-24 ENCOUNTER — HOSPITAL ENCOUNTER (OUTPATIENT)
Dept: CARDIOLOGY | Facility: HOSPITAL | Age: 65
Discharge: HOME | End: 2025-03-24
Attending: INTERNAL MEDICINE
Payer: COMMERCIAL

## 2025-03-24 ENCOUNTER — OFFICE VISIT (OUTPATIENT)
Dept: CARDIOLOGY | Facility: CLINIC | Age: 65
End: 2025-03-24
Payer: COMMERCIAL

## 2025-03-24 ENCOUNTER — OFFICE VISIT (OUTPATIENT)
Dept: FAMILY MEDICINE | Facility: CLINIC | Age: 65
End: 2025-03-24
Payer: COMMERCIAL

## 2025-03-24 VITALS
DIASTOLIC BLOOD PRESSURE: 88 MMHG | WEIGHT: 242.94 LBS | HEIGHT: 64 IN | BODY MASS INDEX: 41.48 KG/M2 | HEART RATE: 85 BPM | SYSTOLIC BLOOD PRESSURE: 133 MMHG

## 2025-03-24 VITALS
HEART RATE: 80 BPM | SYSTOLIC BLOOD PRESSURE: 134 MMHG | WEIGHT: 242.94 LBS | OXYGEN SATURATION: 97 % | DIASTOLIC BLOOD PRESSURE: 80 MMHG | BODY MASS INDEX: 41.7 KG/M2

## 2025-03-24 DIAGNOSIS — I10 PRIMARY HYPERTENSION: ICD-10-CM

## 2025-03-24 DIAGNOSIS — I25.10 CORONARY ARTERY DISEASE, UNSPECIFIED VESSEL OR LESION TYPE, UNSPECIFIED WHETHER ANGINA PRESENT, UNSPECIFIED WHETHER NATIVE OR TRANSPLANTED HEART: ICD-10-CM

## 2025-03-24 DIAGNOSIS — G47.33 OBSTRUCTIVE SLEEP APNEA SYNDROME: ICD-10-CM

## 2025-03-24 DIAGNOSIS — Z12.11 SCREENING FOR COLON CANCER: ICD-10-CM

## 2025-03-24 DIAGNOSIS — R73.03 PREDIABETES: ICD-10-CM

## 2025-03-24 DIAGNOSIS — Z95.0 PRESENCE OF CARDIAC PACEMAKER: ICD-10-CM

## 2025-03-24 DIAGNOSIS — I25.10 CORONARY ARTERY DISEASE INVOLVING NATIVE CORONARY ARTERY OF NATIVE HEART WITHOUT ANGINA PECTORIS: ICD-10-CM

## 2025-03-24 DIAGNOSIS — Z12.31 ENCOUNTER FOR SCREENING MAMMOGRAM FOR BREAST CANCER: ICD-10-CM

## 2025-03-24 DIAGNOSIS — R00.1 SYMPTOMATIC BRADYCARDIA: ICD-10-CM

## 2025-03-24 DIAGNOSIS — G47.33 OBSTRUCTIVE SLEEP APNEA SYNDROME: Primary | ICD-10-CM

## 2025-03-24 DIAGNOSIS — I48.0 PAROXYSMAL ATRIAL FIBRILLATION: Primary | ICD-10-CM

## 2025-03-24 DIAGNOSIS — Z95.0 PACEMAKER: ICD-10-CM

## 2025-03-24 DIAGNOSIS — E66.01 MORBID OBESITY WITH BMI OF 45.0-49.9, ADULT: ICD-10-CM

## 2025-03-24 LAB
OHS CV AF BURDEN PERCENT: < 1
OHS CV DC REMOTE DEVICE TYPE: NORMAL
OHS CV RV PACING PERCENT: 53 %
OHS QRS DURATION: 120 MS
OHS QTC CALCULATION: 462 MS

## 2025-03-24 PROCEDURE — 99214 OFFICE O/P EST MOD 30 MIN: CPT | Mod: S$GLB,,, | Performed by: INTERNAL MEDICINE

## 2025-03-24 PROCEDURE — 93010 ELECTROCARDIOGRAM REPORT: CPT | Mod: S$GLB,,, | Performed by: INTERNAL MEDICINE

## 2025-03-24 PROCEDURE — 99999 PR PBB SHADOW E&M-EST. PATIENT-LVL IV: CPT | Mod: PBBFAC,,, | Performed by: INTERNAL MEDICINE

## 2025-03-24 PROCEDURE — 93280 PM DEVICE PROGR EVAL DUAL: CPT | Mod: PO

## 2025-03-24 PROCEDURE — 99214 OFFICE O/P EST MOD 30 MIN: CPT | Mod: 25,S$GLB,, | Performed by: INTERNAL MEDICINE

## 2025-03-24 PROCEDURE — 93280 PM DEVICE PROGR EVAL DUAL: CPT | Mod: 26,,, | Performed by: INTERNAL MEDICINE

## 2025-03-24 PROCEDURE — 93005 ELECTROCARDIOGRAM TRACING: CPT | Mod: PO

## 2025-03-24 RX ORDER — SEMAGLUTIDE 0.25 MG/.5ML
0.25 INJECTION, SOLUTION SUBCUTANEOUS
Qty: 2 ML | Refills: 2 | Status: SHIPPED | OUTPATIENT
Start: 2025-03-24 | End: 2025-03-28 | Stop reason: SDUPTHER

## 2025-03-24 NOTE — PROGRESS NOTES
Subjective     Fatemeh Shoemaker is a 64 y.o. old, female here for Weight Loss    64-year-old with past medical history of PAF, symptomatic bradycardia s/p PCM, HLD, chronic bronchitis, JOSE ELIAS, peripheral neuropathy, DDD/chronic back pain, obesity s/p gastric sleeve, SHAKIRA    Patient is here for follow-up on chronic medical problems    She has been at a plateau the past several months as far as weight loss. She lost substantial weight following her gastric sleeve.  Mobility is limited due to arthritis but she is able to walk some.  She has been trying to continue to limit her caloric intake, eats very small portions throughout the day. She does not track calories and could possibly make some better choices when it comes to her ratios of macronutrients.    She has a history of prediabetes, CAD, and SHAKIRA, so I am hoping her insurance will approve a GLP1a medication to help her back on track with caloric restriction and weight loss.    Wt Readings from Last 3 Encounters:   03/24/25 1309 110.2 kg (242 lb 15.2 oz)   03/24/25 1022 110.2 kg (242 lb 15.2 oz)   11/14/24 1010 108.1 kg (238 lb 5.1 oz)       Review of Systems   Constitutional: Negative.      Medications     Outpatient Medications Marked as Taking for the 3/24/25 encounter (Office Visit) with Jerson Wheat MD   Medication Sig Dispense Refill    cholecalciferol, vitamin D3, (DECARA) 1,250 mcg (50,000 unit) capsule Take 1 capsule (50,000 Units total) by mouth every 7 days. 30 capsule 0    diclofenac sodium (VOLTAREN) 1 % Gel Apply 2 g topically 4 (four) times daily. 50 g 3    DULoxetine (CYMBALTA) 60 MG capsule Take 1 capsule (60 mg total) by mouth once daily. 90 capsule 3    ELIQUIS 5 mg Tab TAKE ONE TABLET BY MOUTH TWICE DAILY 180 tablet 2    flecainide (TAMBOCOR) 150 MG Tab Take 1 tablet (150 mg total) by mouth 2 (two) times daily. 180 tablet 3    gabapentin (NEURONTIN) 600 MG tablet Take 1 tablet (600 mg total) by mouth 2 (two) times daily. 60 tablet 1     metoprolol tartrate (LOPRESSOR) 100 MG tablet Take 1 tablet (100 mg total) by mouth 2 (two) times daily. 180 tablet 3    nortriptyline (PAMELOR) 25 MG capsule Take 1 capsule (25 mg total) by mouth every evening. 90 capsule 3    omeprazole (PRILOSEC) 20 MG capsule TAKE ONE CAPSULE BY MOUTH EVERY MORNING 90 capsule 2    ondansetron (ZOFRAN-ODT) 4 MG TbDL Take 1 tablet (4 mg total) by mouth every 6 (six) hours as needed (nv). 30 tablet 0    promethazine (PHENERGAN) 12.5 MG Tab Take 1 tablet (12.5 mg total) by mouth 2 (two) times daily as needed (nausea). 10 tablet 0    pulse oximeter (PULSE OXIMETER) device Use twice daily at 8 AM and 3 PM and [facility]. 1 each 0    rosuvastatin (CRESTOR) 10 MG tablet Take 1 tablet (10 mg total) by mouth every evening. 90 tablet 3    tiZANidine (ZANAFLEX) 4 MG tablet TAKE ONE TABLET BY MOUTH every SIX hours AS NEEDED. 60 tablet 1    valACYclovir (VALTREX) 500 MG tablet Take 1 tablet (500 mg total) by mouth daily as needed (fever blister). 30 tablet 2     Objective     /80   Pulse 80   Wt 110.2 kg (242 lb 15.2 oz)   LMP 09/08/2013 (Approximate)   SpO2 97%   BMI 41.70 kg/m²   Physical Exam  Constitutional:       General: She is not in acute distress.     Appearance: Normal appearance. She is well-developed.   Neurological:      Mental Status: She is alert.       Assessment and Plan     Obstructive sleep apnea syndrome  -     semaglutide, weight loss, (WEGOVY) 0.25 mg/0.5 mL PnIj; Inject 0.25 mg into the skin every 7 days.  Dispense: 2 mL; Refill: 2    Coronary artery disease involving native coronary artery of native heart without angina pectoris  -     semaglutide, weight loss, (WEGOVY) 0.25 mg/0.5 mL PnIj; Inject 0.25 mg into the skin every 7 days.  Dispense: 2 mL; Refill: 2    Prediabetes  -     semaglutide, weight loss, (WEGOVY) 0.25 mg/0.5 mL PnIj; Inject 0.25 mg into the skin every 7 days.  Dispense: 2 mL; Refill: 2    Encounter for  screening mammogram for breast cancer  -     Mammo Digital Screening Bilat w/ Héctor (XPD); Future; Expected date: 03/24/2025    Screening for colon cancer  -     Case Request Endoscopy: COLONOSCOPY    F/u 3 mo    ___________________  Jerson Wheat MD  Internal Medicine and Pediatrics

## 2025-03-24 NOTE — PROGRESS NOTES
SUBJECTIVE:    Patient ID:  Fatemeh Shoemaker is a 64 y.o. female who presents for follow of atrial fibrillation    Medical history:  Paroxysmal atrial fibrillation   Bradycardia  Dual-chamber pacemaker in Situ  SHAKIRA  HTN   Morbid obesity   Esophageal ulcer    Cardiologist: Dr. Reaves    Has had paroxysmal AF, symptomatic, initially diagnosed in 2016. Has been on metoprolol and Flecainide 100 mg BID.  Has been referred for consideration of bariatric surgery.     Presented to [facility] with symptomatic bradycardia.   Echo 9/20/20 normal biventricular structure and function, CHRISTIAN 37.3  Dual chamber PPM implanted 9/21/20 (Dr. Reaves).  Returned to the hospital day of discharge with symptomatic AF.      Has been diagnosed with sleep apnea. She has elected to defer treatment and focus on weight reduction.  Flecainide has been increased to 150 mg BID.      1/17/24  No symptoms c/w atrial fibrillation.  Has not lost weight. Plan is for Bariatric surgery in April, pending work-up.  [facility] 12/26/23 normal coronary arteries.     03/24/2025  S/p gastric sleeve surgery 05/20/2024.  Does not exercise  Tolerating OAC. Only taking daily.     Device shows AF currently which initiated around 9:51 AM. Longest episode 1 hour 57 minutes on 2/24/25    I personally reviewed the ECG/telemetry strip today. My interpretation is atrial paced rhythm with a prolonged AV delay.    Past Medical History:   Diagnosis Date    Anemia     Anticoagulant long-term use     Arrhythmia     Arthritis     Atrial fibrillation     history    Bronchitis     COPD (chronic obstructive pulmonary disease)     Encounter for blood transfusion     General anesthetics causing adverse effect in therapeutic use     GERD (gastroesophageal reflux disease)     High blood pressure     Hyperlipidemia     Lump or mass in breast     benign     Neuropathy     Obesity     Obstructive sleep apnea syndrome 11/05/2021    no cpap    Ulcer     Unspecified chronic bronchitis         Past Surgical History:   Procedure Laterality Date    A-V CARDIAC PACEMAKER INSERTION Left 2020    Procedure: INSERTION, CARDIAC PACEMAKER, DUAL CHAMBER;  Surgeon: Bert Reaves MD;  Location: [facility] CATH;  Service: Cardiology;  Laterality: Left;    ANGIOGRAM, CORONARY, WITH LEFT HEART CATHETERIZATION  2023    Procedure: Left Heart Cath;  Surgeon: Bert Reaves MD;  [facility];  Service: Cardiology;;    BREAST BIOPSY Right 2017    myofibroblastoma     BREAST MASS EXCISION       SECTION  10/25/1986    Other c/section 10/26/1997    CHOLECYSTECTOMY  2017    Dr. TOLU Bowers, [facility]     CORRECTION OF HAMMER TOE Left 2022    Procedure: CORRECTION, HAMMER TOE;  Surgeon: Ezra Arriaga DP;  Location: [facility] OR;  Service: Podiatry;  Laterality: Left;  hammertoes 2-5 left,    csection      CS x 2    CYSTOSCOPY N/A 2019    Procedure: CYSTOSCOPY;  Surgeon: Noemi Pierre MD;  Location: [facility] OR;  Service: OB/GYN;  Laterality: N/A;    DILATION AND CURETTAGE OF UTERUS      EPIDURAL STEROID INJECTION INTO LUMBAR SPINE N/A 2020    Procedure: Injection-steroid-epidural-lumbar L5/S1;  Surgeon: Gurjit Tavarez MD;  Location: [facility] OR;  Service: Pain Management;  Laterality: N/A;    EPIDURAL STEROID INJECTION INTO LUMBAR SPINE N/A 2020    Procedure: Injection-steroid-epidural-lumbar L5/S1;  Surgeon: Gurjit Tavarez MD;  Location: [facility] OR;  Service: Pain Management;  Laterality: N/A;    EPIDURAL STEROID INJECTION INTO LUMBAR SPINE N/A 2021    Procedure: Injection-steroid-epidural-lumbar L5/S1;  Surgeon: Gurjit Tavarez MD;  Location: [facility] OR;  Service: Pain Management;  Laterality: N/A;    EPIDURAL STEROID INJECTION INTO LUMBAR SPINE N/A 2023    Procedure: Injection-steroid-epidural-lumbar-L5/S1 to the left;  Surgeon: Gurjit Tavarez MD;  Location: [facility] OR;  Service: Pain Management;  Laterality: N/A;    EPIDURAL STEROID INJECTION INTO LUMBAR  SPINE Right 04/28/2023    Procedure: Injection-steroid-epidural-lumbar L5/S1;  Surgeon: Gurjit Tavarez MD;  Location[place] [facility] [place];  Service: Pain Management;  Laterality: Right;    EPIDURAL STEROID INJECTION INTO LUMBAR SPINE N/A 08/29/2023    Procedure: Injection-steroid-epidural-lumbarL5/S1 to right;  Surgeon: Gurjit Tavarez MD;  Location: [facility] OR;  Service: Pain Management;  Laterality: N/A;    FRACTURE SURGERY  04/1986    Dislocated  hip total rt hip 08/08    HIP PINNING Right 2008    HYSTERECTOMY      JOINT REPLACEMENT Right 08/2008    hip    LAPAROSCOPIC SALPINGO-OOPHORECTOMY Bilateral 09/03/2019    Procedure: SALPINGO-OOPHORECTOMY, LAPAROSCOPIC;  Surgeon: Noemi Pierre MD;  Location: [facility] [place] OR;  Service: OB/GYN;  Laterality: Bilateral;  Dr. Bentley to assist. No resident needed.     LAPAROSCOPIC SLEEVE GASTRECTOMY N/A 5/20/2024    Procedure: GASTRECTOMY, SLEEVE, LAPAROSCOPIC;  Surgeon: Oumar Siegel MD;  Location: [facility] OR;  Service: General;  Laterality: N/A;    LAPAROSCOPIC TOTAL HYSTERECTOMY N/A 09/03/2019    Procedure: HYSTERECTOMY, TOTAL, LAPAROSCOPIC;  Surgeon: Noemi Pierre MD;  Location: [facility] [place] OR;  Service: OB/GYN;  Laterality: N/A;    NASAL SEPTUM SURGERY  1982    OOPHORECTOMY      SKIN TAG REMOVAL Left 11/03/2022    Procedure: REMOVAL, SKIN TAG;  Surgeon: Ezra Arriaga DPM;  Location: [facility] OR;  Service: Podiatry;  Laterality: Left;    TOTAL HIP ARTHROPLASTY Right     TUBAL LIGATION  1997       Family History   Problem Relation Name Age of Onset    Colon cancer Maternal Grandmother Zonia 70        mets to ovary    Cancer Maternal Grandmother Zonia     Arthritis Paternal Grandfather Ray     Eclampsia Paternal Grandmother      Cataracts Maternal Grandfather      Stroke Father Manny 57    Colon cancer Father Manny     Hypertension Father Manny     Alcohol abuse Father Manny     Cancer Father Manny         Passed away July 10 2019    Hypertension Mother Leyla     Cataracts Mother Leyla      Hypertension Brother Randell         Age 54 hypertension heart    Early death Brother Randell         Hypertension cardio disease    No Known Problems Daughter      Stomach cancer Neg Hx      Esophageal cancer Neg Hx      Breast cancer Neg Hx      Miscarriages / Stillbirths Neg Hx      Ovarian cancer Neg Hx      Glaucoma Neg Hx      Macular degeneration Neg Hx      Retinal detachment Neg Hx      Strabismus Neg Hx         Social History     Socioeconomic History    Marital status: Significant Other   Tobacco Use    Smoking status: Never    Smokeless tobacco: Never   Substance and Sexual Activity    Alcohol use: No     Comment: never    Drug use: Never    Sexual activity: Yes     Partners: Male     Birth control/protection: See Surgical Hx, Other-see comments     Comment: Hysterectomy 9/3/19     Social Drivers of Health     Financial Resource Strain: Patient Declined (1/16/2024)    Overall Financial Resource Strain (CARDIA)     Difficulty of Paying Living Expenses: Patient declined   Food Insecurity: Patient Declined (1/16/2024)    Hunger Vital Sign     Worried About Running Out of Food in the Last Year: Patient declined     Ran Out of Food in the Last Year: Patient declined   Transportation Needs: Patient Declined (1/16/2024)    PRAPARE - Transportation     Lack of Transportation (Medical): Patient declined     Lack of Transportation (Non-Medical): Patient declined   Physical Activity: Unknown (1/16/2024)    Exercise Vital Sign     Days of Exercise per Week: Patient declined   Stress: Patient Declined (1/16/2024)    [employer] - Occupational Stress Questionnaire     Feeling of Stress : Patient declined   Housing Stability: Patient Declined (1/16/2024)    Housing Stability Vital Sign     Unable to Pay for Housing in the Last Year: Patient declined     Number of Places Lived in the Last Year: 1     Unstable Housing in the Last Year: Patient declined       Review of patient's allergies indicates:  "  Allergen Reactions    Aspirin Other (See Comments)     "I don't take it because I've had ulcers"       Meperidine Other (See Comments)     Felt like she was about to pass out after taking    Azithromycin      Other reaction(s): Not available       Review of Systems   All other systems reviewed and are negative.         OBJECTIVE:     Vitals:    03/24/25 1022   BP: 133/88   Pulse: 85         BP Readings from Last 5 Encounters:   11/14/24 112/74   09/15/24 104/74   07/26/24 (!) 110/54   06/12/24 118/74   06/03/24 120/80        Physical Exam  Vitals and nursing note reviewed.   Constitutional:       General: She is not in acute distress.     Appearance: She is well-developed. She is obese. She is not diaphoretic.   HENT:      Head: Normocephalic and atraumatic.   Eyes:      General: No scleral icterus.        Right eye: No discharge.         Left eye: No discharge.   Cardiovascular:      Rate and Rhythm: Normal rate. Rhythm irregular. No extrasystoles are present.     Pulses: Normal pulses and intact distal pulses.           Radial pulses are 2+ on the right side and 2+ on the left side.      Heart sounds: Normal heart sounds, S1 normal and S2 normal. Heart sounds not distant. No opening snap. No murmur heard.     No friction rub. No gallop. No S3 or S4 sounds.   Pulmonary:      Effort: Pulmonary effort is normal. No respiratory distress.      Breath sounds: No wheezing or rales.   Abdominal:      General: Bowel sounds are normal. There is no distension.      Palpations: Abdomen is soft.      Tenderness: There is no abdominal tenderness.   Musculoskeletal:         General: No deformity. Normal range of motion.      Cervical back: Normal range of motion and neck supple.   Skin:     General: Skin is warm and dry.      Findings: No erythema or rash.   Neurological:      Mental Status: She is alert.             Current Outpatient Medications   Medication Instructions    albuterol (PROVENTIL) 2.5 mg, Nebulization, Every 6 " hours PRN, Rescue    albuterol sulfate 90 mcg, Inhalation, Every 4 hours PRN    benzonatate (TESSALON) 200 MG capsule Take 1 capsule 3 times a day by oral route as needed for 10 days, for cough.    cholecalciferol (vitamin D3) (DECARA) 50,000 Units, Oral, Every 7 days    diclofenac sodium (VOLTAREN) 2 g, Topical (Top), 4 times daily    DULoxetine (CYMBALTA) 60 mg, Oral, Daily    ELIQUIS 5 mg, Oral, 2 times daily    flecainide (TAMBOCOR) 150 mg, Oral, 2 times daily    gabapentin (NEURONTIN) 600 mg, Oral, 2 times daily    HYDROcodone-acetaminophen (NORCO) 5-325 mg per tablet 1 tablet, Oral, Every 6 hours PRN    metoprolol tartrate (LOPRESSOR) 100 mg, Oral, 2 times daily    nortriptyline (PAMELOR) 25 mg, Oral, Nightly    omeprazole (PRILOSEC) 20 MG capsule TAKE ONE CAPSULE BY MOUTH EVERY MORNING    ondansetron (ZOFRAN-ODT) 4 mg, Oral, Every 6 hours PRN    promethazine (PHENERGAN) 12.5 mg, Oral, 2 times daily PRN    pulse oximeter (PULSE OXIMETER) device Use twice daily at 8 AM and 3 PM and record the value in MyChart as directed.    rosuvastatin (CRESTOR) 10 mg, Oral, Nightly    tiZANidine (ZANAFLEX) 4 MG tablet TAKE ONE TABLET BY MOUTH every SIX hours AS NEEDED.    valACYclovir (VALTREX) 500 mg, Oral, Daily PRN       Lipid Panel:   Lab Results   Component Value Date    CHOL 175 12/06/2024    HDL 42 12/06/2024    LDLCALC 97 12/06/2024    TRIG 209 (H) 12/06/2024    CHOLHDL 26.2 02/02/2024       The 10-year ASCVD risk score (Dk DK, et al., 2019) is: 5.4%    Values used to calculate the score:      Age: 64 years      Sex: Female      Is Non- : No      Diabetic: No      Tobacco smoker: No      Systolic Blood Pressure: 112 mmHg      Is BP treated: Yes      HDL Cholesterol: 42 mg/dL      Total Cholesterol: 175 mg/dL    Most Recent EKG Results  Results for orders placed or performed during the hospital encounter of 05/20/24   EKG 12-lead    Collection Time: 05/21/24  8:32 AM   Result Value Ref  Range    QRS Duration 110 ms    [facility] QTC Calculation 446 ms    Narrative    Test Reason : R07.9,    Vent. Rate : 060 BPM     Atrial Rate : 060 BPM     P-R Int : 264 ms          QRS Dur : 110 ms      QT Int : 446 ms       P-R-T Axes : 000 062 094 degrees     QTc Int : 446 ms    Atrial-paced rhythm with prolonged AV conduction  Nonspecific T wave abnormality  Abnormal ECG  When compared with ECG of 26-DEC-2023 08:38,  No significant change was found  Confirmed by Chong MCCORMICK, Benny LUX ([id number]) on 5/21/2024 8:49:44 PM    Referred By: AUGIE CHAPPELL           Confirmed By:Benny Schwarz MD       Most Recent Echocardiogram Results  Results for orders placed in visit on 10/11/21    Echo    Interpretation Summary  · The left ventricle is normal in size with normal systolic function.  · The estimated ejection fraction is 55%.  · Normal left ventricular diastolic function.  · Normal right ventricular size with normal right ventricular systolic function.  · Moderate left atrial enlargement.  · Mild mitral regurgitation.  · Normal central venous pressure (3 mmHg).  · The estimated PA systolic pressure is 25 mmHg.      Most Recent Nuclear Stress Test Results  Results for orders placed during the hospital encounter of 12/01/23    Nuclear Stress - Cardiology Interpreted    Interpretation Summary    Abnormal myocardial perfusion scan.    There is a mild to moderate intensity, moderate sized, reversible perfusion abnormality that is consistent with ischemia in the anterior and anterolateral wall(s).    There are no other significant perfusion abnormalities.    The gated perfusion images showed an ejection fraction of 59% at rest.    There is normal wall motion at rest.    The ECG portion of the study is negative for ischemia.    The patient reported no chest pain during the stress test.    When compared to the previous study from 10/11/2021, ischemia appreciated.      Most Recent Cardiac PET Stress Test Results  No results found for  this or any previous visit.      Most Recent Cardiovascular Angiogram results  Results for orders placed during the hospital encounter of 12/26/23    Cardiac catheterization    Conclusion    The coronary arteries were normal..    The left ventricular systolic function was normal.    The left ventricular end diastolic pressure was severely elevated.    The procedure log was documented by Documenter: Fareed Morin and verified by Bert Reaves MD.    Date: 12/26/2023  Time: 7:58 AM      Other Most Recent Cardiology Results  Results for orders placed in visit on 12/10/24    Cardiac device check - Remote        All pertinent data including labs, imaging, EKGs, and studies listed above were reviewed.  All of the patients ECG tracings since most recent visit were personally interpreted by this provider    ASSESSMENT:   Fatemeh Shoemaker is a 64 y.o. female who presents for follow of AF.  Currently in atrial fibrillation.  We discussed possible ablation in the future; however, I would like for her SHAKIRA to be managed 1st.    1. Paroxysmal atrial fibrillation        2. Symptomatic bradycardia        3. Coronary artery disease, unspecified vessel or lesion type, unspecified whether angina present, unspecified whether native or transplanted heart        4. Primary hypertension        5. Pacemaker        6. Morbid obesity with BMI of 45.0-49.9, adult        7. Obstructive sleep apnea syndrome  Ambulatory referral/consult to Pulmonology        PLAN FOR TREATMENT OF ABOVE DIAGNOSES:     Increase Eliquis 5 mg BID  Device check later this week to ensure she is no longer in atrial fibrillation  Continue flecainide 150 mg BID  Referral to pulmonary for SHAKIRA treatment  Discussed importance of weight loss  Mediterranean diet    F/u in 6 months    Jules Fajardo MD  Cardiac Electrophysiology    This note was partially generated using voice recognition and generative artificial intelligence software. While every effort has been made  to ensure its accuracy, transcription errors may exist. Please reach out to me with any questions or if clarification is needed.

## 2025-03-25 ENCOUNTER — PATIENT MESSAGE (OUTPATIENT)
Dept: FAMILY MEDICINE | Facility: CLINIC | Age: 65
End: 2025-03-25
Payer: COMMERCIAL

## 2025-03-25 DIAGNOSIS — I25.10 CORONARY ARTERY DISEASE INVOLVING NATIVE CORONARY ARTERY OF NATIVE HEART WITHOUT ANGINA PECTORIS: ICD-10-CM

## 2025-03-25 DIAGNOSIS — G47.33 OBSTRUCTIVE SLEEP APNEA SYNDROME: ICD-10-CM

## 2025-03-25 DIAGNOSIS — R73.03 PREDIABETES: ICD-10-CM

## 2025-03-26 ENCOUNTER — TELEPHONE (OUTPATIENT)
Dept: GASTROENTEROLOGY | Facility: CLINIC | Age: 65
End: 2025-03-26
Payer: COMMERCIAL

## 2025-03-26 NOTE — TELEPHONE ENCOUNTER
Attempted to reach pt to schedule scope from case request. Left VM, call back number provided. [employer] message sent.

## 2025-03-28 ENCOUNTER — TELEPHONE (OUTPATIENT)
Dept: CARDIOLOGY | Facility: HOSPITAL | Age: 65
End: 2025-03-28
Payer: COMMERCIAL

## 2025-03-28 RX ORDER — SEMAGLUTIDE 0.25 MG/.5ML
0.25 INJECTION, SOLUTION SUBCUTANEOUS
Qty: 6 ML | Refills: 1 | Status: SHIPPED | OUTPATIENT
Start: 2025-03-28

## 2025-03-31 ENCOUNTER — PATIENT MESSAGE (OUTPATIENT)
Dept: FAMILY MEDICINE | Facility: CLINIC | Age: 65
End: 2025-03-31
Payer: COMMERCIAL

## 2025-04-03 ENCOUNTER — PATIENT MESSAGE (OUTPATIENT)
Dept: CARDIOLOGY | Facility: CLINIC | Age: 65
End: 2025-04-03
Payer: COMMERCIAL

## 2025-04-08 LAB
OHS CV AF BURDEN PERCENT: < 1
OHS CV DC REMOTE DEVICE TYPE: NORMAL
OHS CV RV PACING PERCENT: 55 %

## 2025-04-15 ENCOUNTER — TELEPHONE (OUTPATIENT)
Dept: FAMILY MEDICINE | Facility: CLINIC | Age: 65
End: 2025-04-15
Payer: COMMERCIAL

## 2025-04-15 DIAGNOSIS — G47.33 OBSTRUCTIVE SLEEP APNEA SYNDROME: Primary | ICD-10-CM

## 2025-04-15 RX ORDER — TIRZEPATIDE 2.5 MG/.5ML
2.5 INJECTION, SOLUTION SUBCUTANEOUS
Qty: 4 PEN | Refills: 2 | Status: SHIPPED | OUTPATIENT
Start: 2025-04-15

## 2025-04-15 NOTE — TELEPHONE ENCOUNTER
Wegovy was denied by the patients insurance. However, they stated Zepbound has an expanded indication for treatment of moderate to severe obstructive sleep apnea in adults with obesity. Recommend submission for Zepbound fo the the treatment of SHAKIRA.

## 2025-05-01 DIAGNOSIS — K06.9 DISEASE OF GINGIVA DUE TO RECURRENT ORAL HERPES SIMPLEX VIRUS (HSV) INFECTION: ICD-10-CM

## 2025-05-01 DIAGNOSIS — B00.9 DISEASE OF GINGIVA DUE TO RECURRENT ORAL HERPES SIMPLEX VIRUS (HSV) INFECTION: ICD-10-CM

## 2025-05-01 NOTE — TELEPHONE ENCOUNTER
Care Due:                  Date            Visit Type   Department     Provider  --------------------------------------------------------------------------------                                MYCHART                              FOLLOWUP/OF  [facility] FAMILY  Last Visit: 03-      FICE VISIT   MEDICINE       Jerson Wheat                              EP -                              PRIMARY      [facility]  Next Visit: 06-      CARE ([facility])   MEDICINE       Jerson Wheat                                                            Last  Test          Frequency    Reason                     Performed    Due Date  --------------------------------------------------------------------------------    HBA1C.......  6 months...  tirzepatide,.............  12-   06-    [employer] Embedded Care Due Messages. Reference number: [id number].   5/01/2025 6:41:38 PM CDT  
home

## 2025-05-02 DIAGNOSIS — B00.9 DISEASE OF GINGIVA DUE TO RECURRENT ORAL HERPES SIMPLEX VIRUS (HSV) INFECTION: ICD-10-CM

## 2025-05-02 DIAGNOSIS — K06.9 DISEASE OF GINGIVA DUE TO RECURRENT ORAL HERPES SIMPLEX VIRUS (HSV) INFECTION: ICD-10-CM

## 2025-05-02 RX ORDER — VALACYCLOVIR HYDROCHLORIDE 500 MG/1
TABLET, FILM COATED ORAL
Qty: 30 TABLET | Refills: 2 | OUTPATIENT
Start: 2025-05-02

## 2025-05-02 RX ORDER — VALACYCLOVIR HYDROCHLORIDE 500 MG/1
500 TABLET, FILM COATED ORAL DAILY PRN
Qty: 30 TABLET | Refills: 2 | Status: SHIPPED | OUTPATIENT
Start: 2025-05-02

## 2025-05-02 NOTE — TELEPHONE ENCOUNTER
No care due was identified.  Health [facility] Embedded Care Due Messages. Reference number: [id number].   5/02/2025 8:20:53 AM CDT

## 2025-05-02 NOTE — TELEPHONE ENCOUNTER
Refill Decision Note   Fatemeh Shoemaker  is requesting a refill authorization.  Brief Assessment and Rationale for Refill:  Quick Discontinue     Medication Therapy Plan:         Comments:     Note composed:8:52 AM 05/02/2025            8 (severe pain)

## 2025-05-26 ENCOUNTER — TELEPHONE (OUTPATIENT)
Dept: GASTROENTEROLOGY | Facility: CLINIC | Age: 65
End: 2025-05-26
Payer: COMMERCIAL

## 2025-05-26 DIAGNOSIS — I48.0 PAROXYSMAL ATRIAL FIBRILLATION: ICD-10-CM

## 2025-05-30 DIAGNOSIS — M54.16 RADICULOPATHY, LUMBAR REGION: ICD-10-CM

## 2025-05-30 RX ORDER — TIZANIDINE 4 MG/1
4 TABLET ORAL EVERY 6 HOURS PRN
Qty: 60 TABLET | Refills: 1 | Status: SHIPPED | OUTPATIENT
Start: 2025-05-30

## 2025-06-02 ENCOUNTER — TELEPHONE (OUTPATIENT)
Dept: PAIN MEDICINE | Facility: CLINIC | Age: 65
End: 2025-06-02
Payer: COMMERCIAL

## 2025-06-02 DIAGNOSIS — M54.16 LUMBAR RADICULOPATHY: ICD-10-CM

## 2025-06-03 RX ORDER — GABAPENTIN 600 MG/1
600 TABLET ORAL 2 TIMES DAILY
Qty: 180 TABLET | Refills: 0 | Status: SHIPPED | OUTPATIENT
Start: 2025-06-03 | End: 2025-09-01

## 2025-06-11 ENCOUNTER — CLINICAL SUPPORT (OUTPATIENT)
Dept: CARDIOLOGY | Facility: HOSPITAL | Age: 65
End: 2025-06-11
Payer: COMMERCIAL

## 2025-06-11 ENCOUNTER — HOSPITAL ENCOUNTER (OUTPATIENT)
Dept: CARDIOLOGY | Facility: HOSPITAL | Age: 65
Discharge: HOME | End: 2025-06-11
Attending: INTERNAL MEDICINE
Payer: COMMERCIAL

## 2025-06-11 DIAGNOSIS — Z95.0 PRESENCE OF CARDIAC PACEMAKER: ICD-10-CM

## 2025-06-11 PROCEDURE — 93296 REM INTERROG EVL PM/IDS: CPT | Mod: PO | Performed by: INTERNAL MEDICINE

## 2025-06-11 PROCEDURE — 93294 REM INTERROG EVL PM/LDLS PM: CPT | Mod: ,,, | Performed by: INTERNAL MEDICINE

## 2025-06-19 ENCOUNTER — HOSPITAL ENCOUNTER (OUTPATIENT)
Dept: RADIOLOGY | Facility: HOSPITAL | Age: 65
Discharge: HOME | End: 2025-06-19
Attending: ORTHOPAEDIC SURGERY
Payer: COMMERCIAL

## 2025-06-19 ENCOUNTER — OFFICE VISIT (OUTPATIENT)
Dept: ORTHOPEDICS | Facility: CLINIC | Age: 65
End: 2025-06-19
Payer: COMMERCIAL

## 2025-06-19 DIAGNOSIS — M25.562 ACUTE PAIN OF LEFT KNEE: Primary | ICD-10-CM

## 2025-06-19 DIAGNOSIS — M25.562 ACUTE PAIN OF LEFT KNEE: ICD-10-CM

## 2025-06-19 DIAGNOSIS — M17.12 OSTEOARTHRITIS OF LEFT KNEE, UNSPECIFIED OSTEOARTHRITIS TYPE: ICD-10-CM

## 2025-06-19 PROCEDURE — 73562 X-RAY EXAM OF KNEE 3: CPT | Mod: TC,PO,LT

## 2025-06-19 PROCEDURE — 73560 X-RAY EXAM OF KNEE 1 OR 2: CPT | Mod: 26,RT,, | Performed by: RADIOLOGY

## 2025-06-19 PROCEDURE — 73562 X-RAY EXAM OF KNEE 3: CPT | Mod: 26,LT,, | Performed by: RADIOLOGY

## 2025-06-19 PROCEDURE — 99999 PR PBB SHADOW E&M-EST. PATIENT-LVL III: CPT | Mod: PBBFAC,,, | Performed by: ORTHOPAEDIC SURGERY

## 2025-06-19 RX ORDER — TRIAMCINOLONE ACETONIDE 40 MG/ML
40 INJECTION, SUSPENSION INTRA-ARTICULAR; INTRAMUSCULAR
Status: DISCONTINUED | OUTPATIENT
Start: 2025-06-19 | End: 2025-06-19 | Stop reason: HOSPADM

## 2025-06-19 RX ADMIN — TRIAMCINOLONE ACETONIDE 40 MG: 40 INJECTION, SUSPENSION INTRA-ARTICULAR; INTRAMUSCULAR at 01:06

## 2025-06-19 NOTE — PROGRESS NOTES
64 years old recurrence of left knee pain.  We had given her an injection last year good relief.  Was doing well had a recent fall which is similar to what she had last year     Exam shows extensor mechanism is intact no signs infection PCL is intact tenderness the joint line     Old x-rays show degenerative changes     Assessment:  Left knee arthrosis exacerbated by fall     Plan: We will check an x-ray prior to Kenalog injection today, encourage continued weight loss and strengthening, consideration of total knee arthroplasty

## 2025-06-19 NOTE — PROCEDURES
Large Joint Aspiration/Injection: L knee    Date/Time: 6/19/2025 1:30 PM    Performed by: Justice Gibbs MD  Authorized by: Justice Gibbs MD    Consent Done?:  Yes (Verbal)  Timeout: prior to procedure the correct patient, procedure, and site was verified    Prep: patient was prepped and draped in usual sterile fashion      Details:  Needle Size:  21 G  Approach:  Anterolateral  Location:  Knee  Site:  L knee  Medications:  40 mg triamcinolone acetonide 40 mg/mL  Patient tolerance:  Patient tolerated the procedure well with no immediate complications

## 2025-06-24 LAB
OHS CV AF BURDEN PERCENT: < 1
OHS CV DC REMOTE DEVICE TYPE: NORMAL
OHS CV ICD SHOCK: NO
OHS CV RV PACING PERCENT: 62 %

## 2025-07-11 ENCOUNTER — TELEPHONE (OUTPATIENT)
Dept: FAMILY MEDICINE | Facility: CLINIC | Age: 65
End: 2025-07-11
Payer: COMMERCIAL

## 2025-07-11 NOTE — TELEPHONE ENCOUNTER
Copied from CRM #[id number]. Topic: Appointments - Same Day Access  >> Jul 11, 2025 12:01 PM Jenifer wrote:  Type:  Same Day Appointment Request    Caller is requesting a same day appointment.  Caller declined first available appointment listed below.      Name of Caller:   pt  When is the first available appointment?   07/16  Symptoms:   fall - 2 days , bruising and pain under breast on ribcage. Pt is asking for same day appt or orders to have xray   Best Call Back Number:  [phone]  Additional Information:   Pt asking for office to call her please

## 2025-07-11 NOTE — TELEPHONE ENCOUNTER
Called pt, she states she fell 2.5 days ago onto her right side. Increasing pain and difficulty breathing since. Advised pt to proceed to ER due to SOB so that treatment can be determined sooner than next week. Pt verbalized understanding.

## 2025-09-03 DIAGNOSIS — M54.16 LUMBAR RADICULOPATHY: ICD-10-CM

## 2025-09-04 RX ORDER — GABAPENTIN 600 MG/1
600 TABLET ORAL 2 TIMES DAILY
Qty: 180 TABLET | Refills: 0 | Status: SHIPPED | OUTPATIENT
Start: 2025-09-04 | End: 2025-12-03

## (undated) DEVICE — ELECTRODE BLD EXT INSUL 1

## (undated) DEVICE — SOL SOD CHLORIDE 0.9% 10ML

## (undated) DEVICE — GLOVE 7.5 PROTEXIS PI MICRO

## (undated) DEVICE — STOCKINET 4INX48

## (undated) DEVICE — SUT ETHILON 2-0 BLK PS-2

## (undated) DEVICE — JELLY SURGILUBE 5GR

## (undated) DEVICE — SUT 3-0 VICRYL / SH (J416)

## (undated) DEVICE — APPLIER CLIP EPIX UNIV 5X34

## (undated) DEVICE — SOL NS 1000CC

## (undated) DEVICE — APPLICATOR CHLORAPREP ORN 26ML

## (undated) DEVICE — STAPLER TITAN ENDO PWR 23CM

## (undated) DEVICE — COUNTERSINK

## (undated) DEVICE — ELECTRODE REM PLYHSV RETURN 9

## (undated) DEVICE — TIP RUMI KOH-EFFICIENT 3.5

## (undated) DEVICE — BANDAGE CONFORM 3IN STRL

## (undated) DEVICE — SUT 2/0 30IN SILK BLK BRAI

## (undated) DEVICE — TRAY NERVE BLOCK

## (undated) DEVICE — PAD CAST SPECIALIST STRL 4

## (undated) DEVICE — MARYLAND FUSION DEVICE

## (undated) DEVICE — SET INFLOW TUBE HYSTER

## (undated) DEVICE — PAD PERI POST REPLACEMNT

## (undated) DEVICE — TAPE MEDIPORE 4IN X 2YDS

## (undated) DEVICE — GLOVE BIOGEL ECLIPSE SZ 7.5

## (undated) DEVICE — DRAIN SINGLE ROUND 3/16 15F

## (undated) DEVICE — SUT VICRYL+ 27 UR-6 VIOL

## (undated) DEVICE — SEE MEDLINE ITEM 157128

## (undated) DEVICE — SEE MEDLINE ITEM 146417

## (undated) DEVICE — PACK CUSTOM UNIV BASIN SLI

## (undated) DEVICE — DRAPE STERI LONG

## (undated) DEVICE — COVER MAYO STAND 23X54IN

## (undated) DEVICE — SEE L#120831

## (undated) DEVICE — SOL IRR NACL .9% 3000ML

## (undated) DEVICE — STRIP MEDI WND CLSR 1/2X4IN

## (undated) DEVICE — APPLICATOR CHLORAPREP CLR 10.5

## (undated) DEVICE — GLOVE SURG ULTRA TOUCH 7.5

## (undated) DEVICE — EVACUATOR WOUND BULB 100CC

## (undated) DEVICE — SOL NACL IRR 3000ML

## (undated) DEVICE — GLOVE SURG BIOGEL LATEX SZ 7.5

## (undated) DEVICE — SET BASIN 48X48IN 6000ML RING

## (undated) DEVICE — SPONGE LAP 18X18 PREWASHED

## (undated) DEVICE — SET TUBE PNEUMOCLEAR SE HI FLO

## (undated) DEVICE — SEE MEDLINE ITEM 157117

## (undated) DEVICE — Device

## (undated) DEVICE — SOL 9P NACL IRR PIC IL

## (undated) DEVICE — CAUTERY BOVIE PENCIL

## (undated) DEVICE — TIP RUMI GREEN DISPOSABLE6.7MM

## (undated) DEVICE — GLOVE BIOGEL SKINSENSE PI 6.0

## (undated) DEVICE — SET TRI-LUMEN FILTERED TUBE

## (undated) DEVICE — DRAPE HALF SURGICAL 40X58IN

## (undated) DEVICE — SEALER LIGASURE LAP 37CM 5MM

## (undated) DEVICE — SCISSOR 5MMX35CM DIRECT DRIVE

## (undated) DEVICE — BLADE NARROW SH TPR 18X5.5MM

## (undated) DEVICE — SPONGE DERMACEA GAUZE 4X4

## (undated) DEVICE — GLOVE BIOGEL SKINSENSE PI 6.5

## (undated) DEVICE — SEE MEDLINE ITEM 146313

## (undated) DEVICE — DRAPE T EXTRM SURG 121X128X90

## (undated) DEVICE — BANDAGE ESMARK LATEX FREE 4INX

## (undated) DEVICE — SEE MEDLINE ITEM 152622

## (undated) DEVICE — SEE MEDLINE ITEM 157148

## (undated) DEVICE — SYR 30CC LUER LOCK

## (undated) DEVICE — BLADE SURG CARBON STEEL SZ11

## (undated) DEVICE — GLOVE BIOGEL SKINSENSE PI 7.0

## (undated) DEVICE — UNDERGLOVES BIOGEL PI SZ 7 LF

## (undated) DEVICE — GUIDEWIRE UT 1.4X150MM
Type: IMPLANTABLE DEVICE | Site: TOE | Status: NON-FUNCTIONAL
Removed: 2022-11-03

## (undated) DEVICE — SUT MCRYL PLUS 4-0 PS2 27IN

## (undated) DEVICE — PACK BASIC

## (undated) DEVICE — TROCAR KII FIOS 5MM X 100MM

## (undated) DEVICE — KIT WING PAD POSITIONING

## (undated) DEVICE — STRAP OR TABLE 5IN X 72IN

## (undated) DEVICE — CANNULATED DRILL

## (undated) DEVICE — SYR 10CC LUER LOCK

## (undated) DEVICE — NDL INSUF ULTRA VERESS 120MM

## (undated) DEVICE — SUT ETHILON 4-0 PS2 18 BLK

## (undated) DEVICE — SOL BETADINE 5%

## (undated) DEVICE — IRRIGATOR SUCTION W/TIP

## (undated) DEVICE — SUT EASE CROSSBOW CLSR SYS

## (undated) DEVICE — SUT 4-0 VICRYL / SH

## (undated) DEVICE — SLEEVE SCD EXPRESS CALF MEDIUM

## (undated) DEVICE — SOL STRL WATER INJ 1000ML BG

## (undated) DEVICE — BLADE MEDIUM 9MM X 25MM

## (undated) DEVICE — APPLICATOR DUPLOSPRAY MIS 40CM

## (undated) DEVICE — CLOSURE SKIN STERI STRIP 1/2X4

## (undated) DEVICE — DRESSING XEROFORM FOIL PK 1X8

## (undated) DEVICE — NDL HYPO REG 25G X 1 1/2

## (undated) DEVICE — DRAPE INCISE IOBAN 2 23X17IN

## (undated) DEVICE — SEE MEDLINE ITEM 157110

## (undated) DEVICE — NDL SPINAL 18GX3.5 SPINOCAN

## (undated) DEVICE — SEE MEDLINE ITEM 146296

## (undated) DEVICE — ENDOSTITCH POLYSORB 0 ES-9

## (undated) DEVICE — DRESSING TRANS 4X4 TEGADERM

## (undated) DEVICE — SUT MONOCRYL 4-0 PS-2

## (undated) DEVICE — DRAPE THREE-QTR REINF 53X77IN

## (undated) DEVICE — GLOVE BIOGEL SKINSENSE PI 7.5

## (undated) DEVICE — SEE MEDLINE ITEM 146292

## (undated) DEVICE — PENCIL ELECTROSURG HOLST W/BLD

## (undated) DEVICE — SOL NACL IRR 1000ML BTL

## (undated) DEVICE — SEE MEDLINE ITEM 157116

## (undated) DEVICE — LINER SUCTION 3000CC

## (undated) DEVICE — TEAR CROSS LRG 14MM X 7MM

## (undated) DEVICE — SUT SILK 2.0 BLK 18

## (undated) DEVICE — TOWEL OR DISP STRL BLUE 4/PK

## (undated) DEVICE — DRAPE STERI-DRAPE 1000 17X11IN

## (undated) DEVICE — SUT ETHILON 3-0 FS-1 30

## (undated) DEVICE — SUT 3-0 ETHILON 18 FS-1

## (undated) DEVICE — ELECTRODE ELECSURG 5MMX32CM

## (undated) DEVICE — NDL SAFETY 21G X 1 1/2 ECLPSE

## (undated) DEVICE — ENDOSTITCH INSTRUMENT

## (undated) DEVICE — BLADE SURG #15 CARBON STEEL

## (undated) DEVICE — BANDAGE MATRIX HK LOOP 4IN 5YD

## (undated) DEVICE — BLADE SCALP OPHTL RND TIP

## (undated) DEVICE — SYR 50ML CATH TIP

## (undated) DEVICE — PACK LAPAROSCOPY BAPTIST

## (undated) DEVICE — SUT MONOCRYL 4-0 SH UND MON

## (undated) DEVICE — TROCAR KII FIOS 11MM X 100MM

## (undated) DEVICE — TROCAR VERSAONE OPT FACE CLSR

## (undated) DEVICE — SOL CLEARIFY VISUALIZATION LAP

## (undated) DEVICE — SYS SEE SHARP SCOPE ANTIFOG

## (undated) DEVICE — BANDAGE ADHESIVE

## (undated) DEVICE — KIT ENDO CARPEL TUNNAL SINGLE

## (undated) DEVICE — SOL PVP-I SCRUB 7.5% 4OZ

## (undated) DEVICE — CONTAINER SPECIMEN STRL 4OZ

## (undated) DEVICE — GAUZE SPONGE BULKEE 6X6.75IN

## (undated) DEVICE — GLOVE 7.5 PROTEXIS PI BLUE

## (undated) DEVICE — TRAY GENERAL LAPAROSCOPY SMH

## (undated) DEVICE — ADHESIVE DERMABOND ADVANCED

## (undated) DEVICE — GAUZE SPONGE 4X4 12PLY

## (undated) DEVICE — ELECTRODE BLADE INSULATED 1 IN

## (undated) DEVICE — SET CYSTO IRRIGATION UNIV SPIK

## (undated) DEVICE — DRAPE LAP TIBURON 77X122IN

## (undated) DEVICE — GOWN X-LG STERILE BACK

## (undated) DEVICE — TRAY DRY SKIN SCRUB PREP

## (undated) DEVICE — DEVICE TRUECLEAR INCISOR 2.9

## (undated) DEVICE — TROCAR SMTH 5MM ADPT 19MM